# Patient Record
Sex: FEMALE | Race: WHITE | NOT HISPANIC OR LATINO | Employment: OTHER | URBAN - NONMETROPOLITAN AREA
[De-identification: names, ages, dates, MRNs, and addresses within clinical notes are randomized per-mention and may not be internally consistent; named-entity substitution may affect disease eponyms.]

---

## 2017-01-11 PROBLEM — H93.19 TINNITUS: Status: ACTIVE | Noted: 2017-01-11

## 2017-01-11 PROBLEM — E55.9 VITAMIN D DEFICIENCY: Status: ACTIVE | Noted: 2017-01-11

## 2017-01-11 PROBLEM — K52.839 MICROSCOPIC COLITIS: Status: ACTIVE | Noted: 2017-01-11

## 2017-01-11 PROBLEM — G47.00 INSOMNIA: Status: ACTIVE | Noted: 2017-01-11

## 2017-01-11 PROBLEM — H40.9 GLAUCOMA: Status: ACTIVE | Noted: 2017-01-11

## 2017-01-11 PROBLEM — E78.5 HYPERLIPIDEMIA: Status: ACTIVE | Noted: 2017-01-11

## 2017-01-11 PROBLEM — I83.90 ASYMPTOMATIC VARICOSE VEINS: Status: ACTIVE | Noted: 2017-01-11

## 2017-01-11 PROBLEM — M81.0 OSTEOPOROSIS: Status: ACTIVE | Noted: 2017-01-11

## 2017-04-12 ENCOUNTER — OFFICE VISIT (OUTPATIENT)
Dept: INTERNAL MEDICINE | Facility: CLINIC | Age: 72
End: 2017-04-12

## 2017-04-12 ENCOUNTER — ANTICOAGULATION VISIT (OUTPATIENT)
Dept: INTERNAL MEDICINE | Facility: CLINIC | Age: 72
End: 2017-04-12

## 2017-04-12 VITALS
BODY MASS INDEX: 19.15 KG/M2 | TEMPERATURE: 97.8 F | RESPIRATION RATE: 14 BRPM | DIASTOLIC BLOOD PRESSURE: 64 MMHG | SYSTOLIC BLOOD PRESSURE: 100 MMHG | OXYGEN SATURATION: 98 % | HEIGHT: 67 IN | WEIGHT: 122 LBS | HEART RATE: 64 BPM

## 2017-04-12 DIAGNOSIS — Z23 NEED FOR PNEUMOCOCCAL VACCINE: ICD-10-CM

## 2017-04-12 DIAGNOSIS — E78.5 HYPERLIPIDEMIA, UNSPECIFIED HYPERLIPIDEMIA TYPE: Primary | ICD-10-CM

## 2017-04-12 DIAGNOSIS — G47.00 INSOMNIA, UNSPECIFIED TYPE: ICD-10-CM

## 2017-04-12 DIAGNOSIS — E55.9 VITAMIN D DEFICIENCY: ICD-10-CM

## 2017-04-12 DIAGNOSIS — Z12.11 SCREENING FOR COLON CANCER: ICD-10-CM

## 2017-04-12 DIAGNOSIS — Z00.00 HEALTH CARE MAINTENANCE: ICD-10-CM

## 2017-04-12 DIAGNOSIS — M81.0 OSTEOPOROSIS: ICD-10-CM

## 2017-04-12 DIAGNOSIS — I83.90 ASYMPTOMATIC VARICOSE VEINS, UNSPECIFIED LATERALITY: ICD-10-CM

## 2017-04-12 LAB
25(OH)D3+25(OH)D2 SERPL-MCNC: 53.7 NG/ML
ALBUMIN SERPL-MCNC: 4.1 G/DL (ref 3.5–5)
ALBUMIN/GLOB SERPL: 1.6 G/DL (ref 1–2)
ALP SERPL-CCNC: 52 U/L (ref 38–126)
ALT SERPL-CCNC: 37 U/L (ref 13–69)
APPEARANCE UR: CLEAR
AST SERPL-CCNC: 33 U/L (ref 15–46)
BASOPHILS # BLD AUTO: 0.07 10*3/MM3 (ref 0–0.2)
BASOPHILS NFR BLD AUTO: 1.1 % (ref 0–2.5)
BILIRUB SERPL-MCNC: 0.5 MG/DL (ref 0.2–1.3)
BILIRUB UR QL STRIP: NEGATIVE
BUN SERPL-MCNC: 16 MG/DL (ref 7–20)
BUN/CREAT SERPL: 20 (ref 7.1–23.5)
CALCIUM SERPL-MCNC: 10 MG/DL (ref 8.4–10.2)
CHLORIDE SERPL-SCNC: 104 MMOL/L (ref 98–107)
CHOLEST SERPL-MCNC: 182 MG/DL (ref 0–199)
CO2 SERPL-SCNC: 30 MMOL/L (ref 26–30)
COLOR UR: YELLOW
CREAT SERPL-MCNC: 0.8 MG/DL (ref 0.6–1.3)
EOSINOPHIL # BLD AUTO: 0.15 10*3/MM3 (ref 0–0.7)
EOSINOPHIL NFR BLD AUTO: 2.4 % (ref 0–7)
ERYTHROCYTE [DISTWIDTH] IN BLOOD BY AUTOMATED COUNT: 12.9 % (ref 11.5–14.5)
GLOBULIN SER CALC-MCNC: 2.6 GM/DL
GLUCOSE SERPL-MCNC: 92 MG/DL (ref 74–98)
GLUCOSE UR QL: NEGATIVE
HCT VFR BLD AUTO: 45.1 % (ref 37–47)
HDLC SERPL-MCNC: 94 MG/DL (ref 40–60)
HGB BLD-MCNC: 14.1 G/DL (ref 12–16)
HGB UR QL STRIP: NEGATIVE
IMM GRANULOCYTES # BLD: 0.02 10*3/MM3 (ref 0–0.06)
IMM GRANULOCYTES NFR BLD: 0.3 % (ref 0–0.6)
KETONES UR QL STRIP: NEGATIVE
LDLC SERPL CALC-MCNC: 77 MG/DL (ref 0–99)
LEUKOCYTE ESTERASE UR QL STRIP: NEGATIVE
LYMPHOCYTES # BLD AUTO: 1.36 10*3/MM3 (ref 0.6–3.4)
LYMPHOCYTES NFR BLD AUTO: 22.2 % (ref 10–50)
MCH RBC QN AUTO: 30.2 PG (ref 27–31)
MCHC RBC AUTO-ENTMCNC: 31.3 G/DL (ref 30–37)
MCV RBC AUTO: 96.6 FL (ref 81–99)
MONOCYTES # BLD AUTO: 0.44 10*3/MM3 (ref 0–0.9)
MONOCYTES NFR BLD AUTO: 7.2 % (ref 0–12)
NEUTROPHILS # BLD AUTO: 4.09 10*3/MM3 (ref 2–6.9)
NEUTROPHILS NFR BLD AUTO: 66.8 % (ref 37–80)
NITRITE UR QL STRIP: NEGATIVE
NRBC BLD AUTO-RTO: 0 /100 WBC (ref 0–0)
PH UR STRIP: 7 [PH] (ref 5–8)
PLATELET # BLD AUTO: 241 10*3/MM3 (ref 130–400)
POTASSIUM SERPL-SCNC: 4.3 MMOL/L (ref 3.5–5.1)
PROT SERPL-MCNC: 6.7 G/DL (ref 6.3–8.2)
PROT UR QL STRIP: NEGATIVE
RBC # BLD AUTO: 4.67 10*6/MM3 (ref 4.2–5.4)
SODIUM SERPL-SCNC: 141 MMOL/L (ref 137–145)
SP GR UR: 1.01 (ref 1–1.03)
TRIGL SERPL-MCNC: 55 MG/DL
TSH SERPL DL<=0.005 MIU/L-ACNC: 1.42 MIU/ML (ref 0.47–4.68)
UROBILINOGEN UR STRIP-MCNC: NORMAL MG/DL
VLDLC SERPL CALC-MCNC: 11 MG/DL
WBC # BLD AUTO: 6.13 10*3/MM3 (ref 4.8–10.8)

## 2017-04-12 PROCEDURE — G0009 ADMIN PNEUMOCOCCAL VACCINE: HCPCS | Performed by: INTERNAL MEDICINE

## 2017-04-12 PROCEDURE — 90670 PCV13 VACCINE IM: CPT | Performed by: INTERNAL MEDICINE

## 2017-04-12 PROCEDURE — G0439 PPPS, SUBSEQ VISIT: HCPCS | Performed by: INTERNAL MEDICINE

## 2017-04-12 RX ORDER — TAFLUPROST 0 MG/.3ML
SOLUTION/ DROPS OPHTHALMIC
Refills: 3 | COMMUNITY
Start: 2017-03-02 | End: 2018-04-16

## 2017-04-12 RX ORDER — IBUPROFEN 200 MG
600 CAPSULE ORAL DAILY
COMMUNITY
Start: 2014-10-24

## 2017-04-12 RX ORDER — UREA 10 %
800 LOTION (ML) TOPICAL DAILY
COMMUNITY
Start: 2014-10-24

## 2017-04-12 RX ORDER — MULTIVIT WITH MINERALS/LUTEIN
TABLET ORAL DAILY
COMMUNITY
Start: 2014-10-24 | End: 2018-10-11 | Stop reason: ALTCHOICE

## 2017-04-12 RX ORDER — MAGNESIUM CARB/ALUMINUM HYDROX 105-160MG
TABLET,CHEWABLE ORAL ONCE
COMMUNITY
End: 2017-12-13 | Stop reason: HOSPADM

## 2017-04-12 RX ORDER — BRINZOLAMIDE/BRIMONIDINE TARTRATE 10; 2 MG/ML; MG/ML
SUSPENSION/ DROPS OPHTHALMIC
Refills: 3 | COMMUNITY
Start: 2017-02-03

## 2017-04-12 NOTE — PROGRESS NOTES
QUICK REFERENCE INFORMATION:  The ABCs of the Annual Wellness Visit    Subsequent Medicare Wellness Visit    HEALTH RISK ASSESSMENT    1945    Recent Hospitalizations:  No recent hospitalization(s)..        Current Medical Providers:  Patient Care Team:  Marty Cheung MD as PCP - General  Marty Cheung MD as PCP - Family Medicine  [place] OD as PCP - Claims Attributed - PLEASE DO NOT REMOVE  Marty Cheung MD as MSSP ACO Attributed - PLEASE DO NOT REMOVE        Smoking Status:  History   Smoking Status   • Former Smoker   Smokeless Tobacco   • Not on file     Comment: quit over 2 years ago       Alcohol Consumption:  History   Alcohol Use No       Depression Screen:   PHQ-9 Depression Screening 4/12/2017   Little interest or pleasure in doing things 0   Feeling down, depressed, or hopeless 0   Trouble falling or staying asleep, or sleeping too much 0   Feeling tired or having little energy 0   Poor appetite or overeating 0   Feeling bad about yourself - or that you are a failure or have let yourself or your family down 0   Trouble concentrating on things, such as reading the newspaper or watching television 0   Moving or speaking so slowly that other people could have noticed. Or the opposite - being so fidgety or restless that you have been moving around a lot more than usual 0   Thoughts that you would be better off dead, or of hurting yourself in some way 0   PHQ-9 Total Score 0   If you checked off any problems, how difficult have these problems made it for you to do your work, take care of things at home, or get along with other people? Not difficult at all       Health Habits and Functional and Cognitive Screening:  Functional & Cognitive Status 4/12/2017   Do you have difficulty preparing food and eating? No   Do you have difficulty bathing yourself? No   Do you have difficulty getting dressed? No   Do you have difficulty using the toilet? No   Do you have difficulty moving around from place  to place? No   In the past year have you fallen or experienced a near fall? No   Do you need help using the phone?  No   Are you deaf or do you have serious difficulty hearing?  No   Do you need help with transportation? No   Do you need help shopping? No   Do you need help preparing meals?  No   Do you need help with housework?  No   Do you need help with laundry? No   Do you need help taking your medications? No   Do you need help managing money? No   Do you have difficulty concentrating, remembering or making decisions? No       Health Habits  Current Diet: Well Balanced Diet  Dental Exam: Up to date  Eye Exam: Up to date  Exercise (times per week): 0 times per week  Current Exercise Activities Include: None      Does the patient have evidence of cognitive impairment? No    Aspirin use counseling: Taking ASA appropriately as indicated      Recent Lab Results:  CMP:  Lab Results   Component Value Date    GLU 95 11/07/2014    BUN 8 (L) 04/13/2016    CREATININE 0.7 04/13/2016    BCR 17.7 11/07/2014     04/13/2016    K 4.3 04/13/2016    CO2 31 04/13/2016    CALCIUM 9.4 04/13/2016    ALBUMIN 4.3 04/13/2016    LABIL2 1.4 11/07/2014    BILITOT 0.5 04/13/2016    ALKPHOS 70 04/13/2016    AST 22 04/13/2016    ALT 20 04/13/2016     Lipid Panel:  Lab Results   Component Value Date    CHLPL 197 04/13/2016    TRIG 69 04/13/2016    HDL 94 (H) 04/13/2016     HbA1c:       Visual Acuity:  No exam data present    Age-appropriate Screening Schedule:  Refer to the list below for future screening recommendations based on patient's age, sex and/or medical conditions. Orders for these recommended tests are listed in the plan section. The patient has been provided with a written plan.    Health Maintenance   Topic Date Due   • TDAP/TD VACCINES (1 - Tdap) 07/30/1962   • PNEUMOCOCCAL VACCINES (65+ LOW/MEDIUM RISK) (1 of 2 - PCV13) 07/30/2008   • ZOSTER VACCINE  05/05/2016   • INFLUENZA VACCINE  08/01/2016   • DXA SCAN  04/12/2017   •  LIPID PANEL  04/13/2017   • MAMMOGRAM  04/15/2018   • COLONOSCOPY  07/01/2024        Subjective   History of Present Illness    Odalys Miles is a 71 y.o. female who presents for an Subsequent Wellness Visit.    The following portions of the patient's history were reviewed and updated as appropriate: allergies, current medications, past family history, past medical history, past social history, past surgical history and problem list.    Outpatient Medications Prior to Visit   Medication Sig Dispense Refill   • calcitonin, salmon, (MIACALCIN) 200 UNIT/ACT nasal spray 1 spray into each nostril Daily. INSERT 1 SQUIRT IN ONE NOSTRIL DAILY ALTERNATING EACH NOSTRIL 3 each 3     No facility-administered medications prior to visit.        Patient Active Problem List   Diagnosis   • Glaucoma   • Hyperlipidemia   • Insomnia   • Microscopic colitis   • Tinnitus   • Asymptomatic varicose veins   • Vitamin D deficiency   • Osteoporosis       Advance Care Planning:  has an advance directive - a copy HAS NOT been provided    Identification of Risk Factors:  Risk factors include: inactivity.    Review of Systems   Constitutional: Negative.    HENT: Negative.    Eyes: Negative.    Respiratory: Negative.    Cardiovascular: Negative.    Gastrointestinal: Negative.    Endocrine: Negative.    Genitourinary: Negative.    Musculoskeletal: Negative.    Skin: Negative.    Allergic/Immunologic: Negative.    Neurological: Negative.    Hematological: Negative.    Psychiatric/Behavioral: Negative.        Compared to one year ago, the patient feels her physical health is the same.  Compared to one year ago, the patient feels her mental health is the same.    Objective     Physical Exam   Constitutional: She is oriented to person, place, and time. She appears well-developed and well-nourished.   HENT:   Head: Normocephalic and atraumatic.   Right Ear: External ear normal.   Left Ear: External ear normal.   Nose: Nose normal.   Mouth/Throat:  "Oropharynx is clear and moist.   Eyes: Conjunctivae and EOM are normal. Pupils are equal, round, and reactive to light.   Neck: Normal range of motion. Neck supple.   Cardiovascular: Normal rate, regular rhythm, normal heart sounds and intact distal pulses.    Pulmonary/Chest: Effort normal and breath sounds normal.   Abdominal: Soft. Bowel sounds are normal.   Genitourinary: Vagina normal and uterus normal.   Musculoskeletal: Normal range of motion.   Neurological: She is alert and oriented to person, place, and time. She has normal reflexes.   Skin: Skin is warm and dry.   Psychiatric: She has a normal mood and affect. Her behavior is normal. Judgment and thought content normal.       Vitals:    04/12/17 0927   BP: 100/64   Pulse: 64   Resp: 14   Temp: 97.8 °F (36.6 °C)   SpO2: 98%   Weight: 122 lb (55.3 kg)   Height: 67\" (170.2 cm)       Body mass index is 19.11 kg/(m^2).  Discussed the patient's BMI with her. The BMI is in the acceptable range.    Assessment/Plan   Patient Self-Management and Personalized Health Advice  The patient has been provided with information about: exercise and preventive services including:   · Advance directive.    Visit Diagnoses:  No diagnosis found.    No orders of the defined types were placed in this encounter.      Outpatient Encounter Prescriptions as of 4/12/2017   Medication Sig Dispense Refill   • ascorbic acid (VITAMIN C) 1000 MG tablet Take  by mouth Daily.     • aspirin 81 MG tablet Take  by mouth Daily.     • BUDESONIDE ER PO Take  by mouth.     • calcitonin, salmon, (MIACALCIN) 200 UNIT/ACT nasal spray 1 spray into each nostril Daily. INSERT 1 SQUIRT IN ONE NOSTRIL DAILY ALTERNATING EACH NOSTRIL 3 each 3   • calcium (OS-KAYLYN) 600 MG tablet Take  by mouth Daily.     • Cholecalciferol (VITAMIN D3) 1000 UNITS capsule Take 1 capsule by mouth Daily.     • folic acid (FOLVITE) 800 MCG tablet Take  by mouth Daily.     • magnesium citrate 1.745 GM/30ML solution solution Take  by " mouth 1 (One) Time.     • Probiotic Product (PROBIOTIC ADVANCED PO) Take  by mouth.     • SIMBRINZA 1-0.2 % suspension INSTILL 1 DROP INTO BOTH EYES TWICE A DAY  3   • ZIOPTAN 0.0015 % solution ophthalmic solution INSTILL 1 DROP IN BOTH EYES AT BEDTIME  3     No facility-administered encounter medications on file as of 4/12/2017.        Reviewed use of high risk medication in the elderly: no  Reviewed for potential of harmful drug interactions in the elderly: no    Follow Up:  No Follow-up on file.     An After Visit Summary and PPPS with all of these plans were given to the patient.       microscopic colitis, follow up with GI Dr. Travis for treatment plan.  osteoporosis dexa 4/2015  Tinnitus seen by ENT  insomnia melatonin continue, decline medicine  Hyperlipidemia contine tx  Vitamin D deficiency continue supplement  tdap flu Pneumovax zostavax done. prevnar today  Mammogram schedule patient declined, agree to do today  colonoscopy 7/2014   do lab today  annual PE

## 2017-05-10 LAB
DEVELOPER EXPIRATION DATE: NORMAL
DEVELOPER LOT NUMBER: NORMAL
EXPIRATION DATE: NORMAL
FECAL OCCULT BLOOD SCREEN, POC: NEGATIVE
Lab: NORMAL
NEGATIVE CONTROL: NEGATIVE
POSITIVE CONTROL: POSITIVE

## 2017-05-10 PROCEDURE — 82270 OCCULT BLOOD FECES: CPT | Performed by: INTERNAL MEDICINE

## 2017-05-25 ENCOUNTER — TRANSCRIBE ORDERS (OUTPATIENT)
Dept: INTERNAL MEDICINE | Facility: CLINIC | Age: 72
End: 2017-05-25

## 2017-05-25 DIAGNOSIS — Z12.31 VISIT FOR SCREENING MAMMOGRAM: Primary | ICD-10-CM

## 2017-06-01 ENCOUNTER — HOSPITAL ENCOUNTER (OUTPATIENT)
Dept: MAMMOGRAPHY | Facility: HOSPITAL | Age: 72
Discharge: HOME | End: 2017-06-01
Attending: INTERNAL MEDICINE | Admitting: INTERNAL MEDICINE

## 2017-06-01 DIAGNOSIS — Z12.31 VISIT FOR SCREENING MAMMOGRAM: ICD-10-CM

## 2017-06-01 PROCEDURE — G0202 SCR MAMMO BI INCL CAD: HCPCS | Performed by: RADIOLOGY

## 2017-06-01 PROCEDURE — 77063 BREAST TOMOSYNTHESIS BI: CPT | Performed by: RADIOLOGY

## 2017-06-01 PROCEDURE — G0202 SCR MAMMO BI INCL CAD: HCPCS

## 2017-06-01 PROCEDURE — 77063 BREAST TOMOSYNTHESIS BI: CPT

## 2017-07-24 ENCOUNTER — OUTSIDE FACILITY SERVICE (OUTPATIENT)
Dept: GASTROENTEROLOGY | Facility: CLINIC | Age: 72
End: 2017-07-24

## 2017-07-24 ENCOUNTER — LAB REQUISITION (OUTPATIENT)
Dept: LAB | Facility: HOSPITAL | Age: 72
End: 2017-07-24

## 2017-07-24 DIAGNOSIS — D12.3 BENIGN NEOPLASM OF TRANSVERSE COLON: ICD-10-CM

## 2017-07-24 DIAGNOSIS — R19.7 DIARRHEA: ICD-10-CM

## 2017-07-24 PROCEDURE — 45380 COLONOSCOPY AND BIOPSY: CPT | Performed by: INTERNAL MEDICINE

## 2017-07-24 PROCEDURE — 88305 TISSUE EXAM BY PATHOLOGIST: CPT | Performed by: INTERNAL MEDICINE

## 2017-07-25 LAB
CYTO UR: NORMAL
LAB AP CASE REPORT: NORMAL
LAB AP CLINICAL INFORMATION: NORMAL
Lab: NORMAL
PATH REPORT.FINAL DX SPEC: NORMAL
PATH REPORT.GROSS SPEC: NORMAL

## 2017-07-28 ENCOUNTER — TELEPHONE (OUTPATIENT)
Dept: GASTROENTEROLOGY | Facility: CLINIC | Age: 72
End: 2017-07-28

## 2017-07-28 NOTE — TELEPHONE ENCOUNTER
Talked to patient today regarding her Biopsy results letter from Dr Travis. Patient stated she is already down to One capsule a day with the Entocort. I advised patient to continue Entocort one capsule a day up to 3 months. After 3 months of therapy is finished to give us a call and I will see what Dr Travis wants the next step of  therapy to be. Patient voiced understanding.

## 2017-09-07 ENCOUNTER — OFFICE VISIT (OUTPATIENT)
Dept: INTERNAL MEDICINE | Facility: CLINIC | Age: 72
End: 2017-09-07

## 2017-09-07 VITALS
OXYGEN SATURATION: 95 % | HEIGHT: 67 IN | RESPIRATION RATE: 14 BRPM | DIASTOLIC BLOOD PRESSURE: 60 MMHG | SYSTOLIC BLOOD PRESSURE: 100 MMHG | HEART RATE: 60 BPM | BODY MASS INDEX: 18.99 KG/M2 | WEIGHT: 121 LBS | TEMPERATURE: 98.1 F

## 2017-09-07 DIAGNOSIS — M54.32 SCIATICA OF LEFT SIDE: Primary | ICD-10-CM

## 2017-09-07 PROCEDURE — 99213 OFFICE O/P EST LOW 20 MIN: CPT | Performed by: INTERNAL MEDICINE

## 2017-09-07 RX ORDER — TRAMADOL HYDROCHLORIDE 50 MG/1
50 TABLET ORAL EVERY 6 HOURS PRN
Qty: 28 TABLET | Refills: 1 | Status: SHIPPED | OUTPATIENT
Start: 2017-09-07 | End: 2017-12-13 | Stop reason: HOSPADM

## 2017-09-07 RX ORDER — CYCLOBENZAPRINE HCL 10 MG
10 TABLET ORAL 3 TIMES DAILY PRN
Qty: 42 TABLET | Refills: 0 | Status: SHIPPED | OUTPATIENT
Start: 2017-09-07 | End: 2017-12-13 | Stop reason: HOSPADM

## 2017-09-07 RX ORDER — DICLOFENAC SODIUM 75 MG/1
TABLET, DELAYED RELEASE ORAL
Qty: 30 TABLET | Refills: 1 | Status: SHIPPED | OUTPATIENT
Start: 2017-09-07 | End: 2017-09-07

## 2017-09-07 NOTE — PROGRESS NOTES
Subjective   Odalys Miles is a 72 y.o. female.     Chief Complaint   Patient presents with   • Back Pain     lower back pain - left hip pain - pain runs down the left leg       Back Pain   This is a new problem. The current episode started in the past 7 days. The problem occurs intermittently. The problem is unchanged. The pain is present in the gluteal. The quality of the pain is described as aching. The pain radiates to the left thigh. The pain is moderate. The pain is worse during the day. The symptoms are aggravated by bending and lying down. Stiffness is present all day. Pertinent negatives include no numbness. She has tried analgesics for the symptoms. The treatment provided no relief.          Current Outpatient Prescriptions:   •  ascorbic acid (VITAMIN C) 1000 MG tablet, Take  by mouth Daily., Disp: , Rfl:   •  aspirin 81 MG tablet, Take  by mouth Daily., Disp: , Rfl:   •  calcitonin, salmon, (MIACALCIN) 200 UNIT/ACT nasal spray, 1 spray into each nostril Daily. INSERT 1 SQUIRT IN ONE NOSTRIL DAILY ALTERNATING EACH NOSTRIL, Disp: 3 each, Rfl: 3  •  calcium (OS-KAYLYN) 600 MG tablet, Take  by mouth Daily., Disp: , Rfl:   •  Cholecalciferol (VITAMIN D3) 1000 UNITS capsule, Take 1 capsule by mouth Daily., Disp: , Rfl:   •  folic acid (FOLVITE) 800 MCG tablet, Take  by mouth Daily., Disp: , Rfl:   •  magnesium citrate 1.745 GM/30ML solution solution, Take  by mouth 1 (One) Time., Disp: , Rfl:   •  Probiotic Product (PROBIOTIC ADVANCED PO), Take  by mouth., Disp: , Rfl:   •  SIMBRINZA 1-0.2 % suspension, INSTILL 1 DROP INTO BOTH EYES TWICE A DAY, Disp: , Rfl: 3  •  ZIOPTAN 0.0015 % solution ophthalmic solution, INSTILL 1 DROP IN BOTH EYES AT BEDTIME, Disp: , Rfl: 3  •  cyclobenzaprine (FLEXERIL) 10 MG tablet, Take 1 tablet by mouth 3 (Three) Times a Day As Needed for Muscle Spasms., Disp: 42 tablet, Rfl: 0  •  traMADol (ULTRAM) 50 MG tablet, Take 1 tablet by mouth Every 6 (Six) Hours As Needed for Moderate  Pain ., Disp: 28 tablet, Rfl: 1    The following portions of the patient's history were reviewed and updated as appropriate: allergies, current medications, past family history, past medical history, past social history, past surgical history and problem list.    Review of Systems   Constitutional: Negative.    Respiratory: Negative.    Cardiovascular: Negative.    Gastrointestinal: Negative.    Musculoskeletal: Positive for back pain.   Skin: Negative.    Neurological: Negative for numbness.   Psychiatric/Behavioral: Negative.        Objective   Physical Exam   Constitutional: She is oriented to person, place, and time. She appears well-developed and well-nourished.   Neck: Neck supple.   Cardiovascular: Normal rate, regular rhythm and normal heart sounds.    Pulmonary/Chest: Effort normal and breath sounds normal.   Abdominal: Soft. Bowel sounds are normal.   Musculoskeletal: She exhibits tenderness.   Neurological: She is alert and oriented to person, place, and time.   Psychiatric: She has a normal mood and affect. Her behavior is normal.       All tests have been reviewed.    Assessment/Plan   Diagnoses and all orders for this visit:    Sciatica of left side  -     Ambulatory Referral to Physical Therapy    Other orders  -     cyclobenzaprine (FLEXERIL) 10 MG tablet; Take 1 tablet by mouth 3 (Three) Times a Day As Needed for Muscle Spasms.  -     Tylenol and tramadoa           3 week with others

## 2017-09-25 ENCOUNTER — TELEPHONE (OUTPATIENT)
Dept: GASTROENTEROLOGY | Facility: CLINIC | Age: 72
End: 2017-09-25

## 2017-09-25 NOTE — TELEPHONE ENCOUNTER
Informed patient of Dr Travis recommendation below. Patient voiced understanding. She request 90 supply refill. She also scheduled an appointment in 3 months with Dr Travis.

## 2017-09-25 NOTE — TELEPHONE ENCOUNTER
Patient called this morning. She stated she had been taking the Entocort 1 capsule daily; which she was doing well on. For the past two months; she has been taking the Entocort- 1 capsule every other day. Now, she having Urgency Liquid stools. Dr Travis please advise. Thanks

## 2017-09-25 NOTE — TELEPHONE ENCOUNTER
Please have her resume taking one (3mg) Entercort in AM daily for at least 3 months.  Then, she should call to see if office visit is needed.Pino Travis MD

## 2017-09-26 DIAGNOSIS — K52.839 MICROSCOPIC COLITIS, UNSPECIFIED MICROSCOPIC COLITIS TYPE: Primary | ICD-10-CM

## 2017-09-26 RX ORDER — BUDESONIDE 3 MG/1
3 CAPSULE, COATED PELLETS ORAL DAILY
Qty: 90 CAPSULE | Refills: 0 | Status: SHIPPED | OUTPATIENT
Start: 2017-09-26 | End: 2017-12-13 | Stop reason: SDUPTHER

## 2017-09-28 ENCOUNTER — OFFICE VISIT (OUTPATIENT)
Dept: INTERNAL MEDICINE | Facility: CLINIC | Age: 72
End: 2017-09-28

## 2017-09-28 VITALS
HEIGHT: 67 IN | OXYGEN SATURATION: 99 % | BODY MASS INDEX: 19.15 KG/M2 | WEIGHT: 122 LBS | DIASTOLIC BLOOD PRESSURE: 60 MMHG | SYSTOLIC BLOOD PRESSURE: 100 MMHG | RESPIRATION RATE: 14 BRPM | HEART RATE: 64 BPM | TEMPERATURE: 98.7 F

## 2017-09-28 DIAGNOSIS — E78.5 HYPERLIPIDEMIA, UNSPECIFIED HYPERLIPIDEMIA TYPE: Primary | ICD-10-CM

## 2017-09-28 DIAGNOSIS — K52.839 MICROSCOPIC COLITIS, UNSPECIFIED MICROSCOPIC COLITIS TYPE: ICD-10-CM

## 2017-09-28 DIAGNOSIS — M81.0 OSTEOPOROSIS: ICD-10-CM

## 2017-09-28 DIAGNOSIS — G47.00 INSOMNIA, UNSPECIFIED TYPE: ICD-10-CM

## 2017-09-28 DIAGNOSIS — M54.32 SCIATICA OF LEFT SIDE: ICD-10-CM

## 2017-09-28 DIAGNOSIS — I83.90 ASYMPTOMATIC VARICOSE VEINS, UNSPECIFIED LATERALITY: ICD-10-CM

## 2017-09-28 DIAGNOSIS — E55.9 VITAMIN D DEFICIENCY: ICD-10-CM

## 2017-09-28 PROCEDURE — 99214 OFFICE O/P EST MOD 30 MIN: CPT | Performed by: INTERNAL MEDICINE

## 2017-09-28 NOTE — PROGRESS NOTES
Subjective   Odalys Miles is a 72 y.o. female.     Chief Complaint   Patient presents with   • Follow-up       History of Present Illness   Patient here for follow-up of.  Sciatic nerve pain on the left side patient is awaiting for physical therapy Aleve helps.  Certain position make it worse achy in nature patient has this pain for more than a month.  Colitis on medication stable now.  Osteoporosis on medication stable.  Hyperlipidemia stable on good diet.  Vitamin D deficiency on supplement to stable.  Mammogram stable now.    Current Outpatient Prescriptions:   •  ascorbic acid (VITAMIN C) 1000 MG tablet, Take  by mouth Daily., Disp: , Rfl:   •  aspirin 81 MG tablet, Take  by mouth Daily., Disp: , Rfl:   •  Budesonide (ENTOCORT EC) 3 MG 24 hr capsule, Take 1 capsule by mouth Daily for 90 days. (Patient taking differently: Take 6 mg by mouth Daily.), Disp: 90 capsule, Rfl: 0  •  calcitonin, salmon, (MIACALCIN) 200 UNIT/ACT nasal spray, 1 spray into each nostril Daily. INSERT 1 SQUIRT IN ONE NOSTRIL DAILY ALTERNATING EACH NOSTRIL, Disp: 3 each, Rfl: 3  •  calcium (OS-KAYLYN) 600 MG tablet, Take  by mouth Daily., Disp: , Rfl:   •  Cholecalciferol (VITAMIN D3) 1000 UNITS capsule, Take 1 capsule by mouth Daily., Disp: , Rfl:   •  cyclobenzaprine (FLEXERIL) 10 MG tablet, Take 1 tablet by mouth 3 (Three) Times a Day As Needed for Muscle Spasms., Disp: 42 tablet, Rfl: 0  •  folic acid (FOLVITE) 800 MCG tablet, Take  by mouth Daily., Disp: , Rfl:   •  magnesium citrate 1.745 GM/30ML solution solution, Take  by mouth 1 (One) Time., Disp: , Rfl:   •  Probiotic Product (PROBIOTIC ADVANCED PO), Take  by mouth., Disp: , Rfl:   •  SIMBRINZA 1-0.2 % suspension, INSTILL 1 DROP INTO BOTH EYES TWICE A DAY, Disp: , Rfl: 3  •  traMADol (ULTRAM) 50 MG tablet, Take 1 tablet by mouth Every 6 (Six) Hours As Needed for Moderate Pain ., Disp: 28 tablet, Rfl: 1  •  ZIOPTAN 0.0015 % solution ophthalmic solution, INSTILL 1 DROP IN BOTH  EYES AT BEDTIME, Disp: , Rfl: 3    The following portions of the patient's history were reviewed and updated as appropriate: allergies, current medications, past family history, past medical history, past social history, past surgical history and problem list.    Review of Systems   Constitutional: Negative.    Respiratory: Negative.    Cardiovascular: Negative.    Gastrointestinal: Negative.    Musculoskeletal: Positive for arthralgias and back pain.   Skin: Negative.    Neurological: Negative.    Psychiatric/Behavioral: Negative.        Objective   Physical Exam   Constitutional: She is oriented to person, place, and time. She appears well-nourished.   Neck: Neck supple.   Cardiovascular: Normal rate, regular rhythm and normal heart sounds.    Pulmonary/Chest: Effort normal and breath sounds normal.   Abdominal: Bowel sounds are normal.   Musculoskeletal: She exhibits tenderness.   Neurological: She is alert and oriented to person, place, and time.   Skin: Skin is warm.   Psychiatric: She has a normal mood and affect.       All tests have been reviewed.    Assessment/Plan   There are no diagnoses linked to this encounter.          Sciatica of left side pending  to Physical Therapy and aleve  microscopic colitis, follow up with GI Dr. Travis on med  osteoporosis dexa 4/2015  Tinnitus seen by ENT  insomnia melatonin continue, decline medicine  Hyperlipidemia contine tx  Vitamin D deficiency continue supplement  tdap  Pneumovax zostavax done. prevnar done  Mammogram normal  colonoscopy 7/2014     annual PE

## 2017-09-29 ENCOUNTER — TELEPHONE (OUTPATIENT)
Dept: GASTROENTEROLOGY | Facility: CLINIC | Age: 72
End: 2017-09-29

## 2017-09-29 NOTE — TELEPHONE ENCOUNTER
Returned patient's call. No answer; left voice message. Entocort 3 mg- one by mouth in the morning x 3 months per Dr Travis.

## 2017-10-03 ENCOUNTER — TREATMENT (OUTPATIENT)
Dept: PHYSICAL THERAPY | Facility: CLINIC | Age: 72
End: 2017-10-03

## 2017-10-03 DIAGNOSIS — M54.42 ACUTE LEFT-SIDED LOW BACK PAIN WITH LEFT-SIDED SCIATICA: Primary | ICD-10-CM

## 2017-10-03 DIAGNOSIS — M53.3 SI (SACROILIAC) PAIN: ICD-10-CM

## 2017-10-03 PROCEDURE — G8978 MOBILITY CURRENT STATUS: HCPCS | Performed by: PHYSICAL THERAPIST

## 2017-10-03 PROCEDURE — G8979 MOBILITY GOAL STATUS: HCPCS | Performed by: PHYSICAL THERAPIST

## 2017-10-03 PROCEDURE — 97110 THERAPEUTIC EXERCISES: CPT | Performed by: PHYSICAL THERAPIST

## 2017-10-03 PROCEDURE — 97161 PT EVAL LOW COMPLEX 20 MIN: CPT | Performed by: PHYSICAL THERAPIST

## 2017-10-03 NOTE — PROGRESS NOTES
Physical Therapy Initial Evaluation and Plan of Care      Subjective Evaluation    History of Present Illness  Mechanism of injury: Pt reports she had skin tear left ankle. Was trying to keep leg elevated and was sitting on couch with poor posture and left leg elevated above her heart. Feels this is what caused her back to start hurting. Began about 2 months ago.   Pain left side SI, buttock and left leg to the knee. Denies N/T in leg.   No diagnostic tests have been done.   History of left lower leg injury with vascular damage and poor circulation.   Osteoporosis        Patient Occupation: retired Pain  Current pain ratin  At worst pain rating: 10  Exacerbated by: sitting, raising leg to put on pants, end of day, some sleep disturbance.    Patient Goals  Patient goals for therapy: decreased pain             Objective     Postural Observations    Additional Postural Observation Details  Increased thoracic kyphosis, sway back    Tenderness     Additional Tenderness Details  Left PSIS, SI , piriformis    Neurological Testing     Additional Neurological Details  Pt has history of decreased sensation left LE from previous injury.   Does not report any numbness/tingling left LE at this time    Active Range of Motion     Additional Active Range of Motion Details  AROM Trunk:   Flexion 73 pain  Extension 19  Right sidebending 21 pain  Left sidebending 10  Right rotation 35  Left rotation 40    Strength/Myotome Testing     Additional Strength Details  Normal strength. Pain with left hip flexion    Tests     Lumbar     Left   Negative passive SLR.     Additional Tests Details  Distraction- decreased pain    General Comments     Spine Comments  As pt has wounds around left ankle/heel she has been wearing 2 heel cups in the left shoe only to elevate foot as to where there is not pressure from shoe placed on wounds.   Advised her to do this bilateral however vs just on the left side.         Assessment/Plan    Manual  Therapy:         mins  31447;  Therapeutic Exercise:    10     mins  53108;     Neuromuscular Saritha:        mins  98766;    Therapeutic Activity:          mins  51333;     Gait Training:           mins  20586;     Ultrasound:          mins  34974;    Electrical Stimulation:         mins  89895 ( );  Dry Needling          mins self-pay    Timed Treatment:   10   mins   Total Treatment:     55   mins    PT SIGNATURE: Lucio Pham PT, DPT   DATE TREATMENT INITIATED: 10/3/2017    Initial Certification  Certification Period: 1/1/2018  I certify that the therapy services are furnished while this patient is under my care.  The services outlined above are required by this patient, and will be reviewed every 90 days.     PHYSICIAN: Marty Cheung MD      DATE:     Please sign and return via fax to  .. Thank you, [facility].

## 2017-10-10 ENCOUNTER — TREATMENT (OUTPATIENT)
Dept: PHYSICAL THERAPY | Facility: CLINIC | Age: 72
End: 2017-10-10

## 2017-10-10 DIAGNOSIS — M54.42 ACUTE LEFT-SIDED LOW BACK PAIN WITH LEFT-SIDED SCIATICA: Primary | ICD-10-CM

## 2017-10-10 DIAGNOSIS — M53.3 SI (SACROILIAC) PAIN: ICD-10-CM

## 2017-10-10 PROCEDURE — 97110 THERAPEUTIC EXERCISES: CPT | Performed by: PHYSICAL THERAPIST

## 2017-10-10 PROCEDURE — 97140 MANUAL THERAPY 1/> REGIONS: CPT | Performed by: PHYSICAL THERAPIST

## 2017-10-10 PROCEDURE — 97530 THERAPEUTIC ACTIVITIES: CPT | Performed by: PHYSICAL THERAPIST

## 2017-10-10 NOTE — PROGRESS NOTES
Physical Therapy Daily Progress Note          Subjective  Pt admits she has not been doing exercises at home like she should. Has changed heel lifts in shoes to be equal height bilateral. Says she has been feeling much better overall. Has not had any pain in her leg. Now just localized to left hip. Some increased pain after sitting in car in traffic for 3 hours, but even then, did not have any symptoms in her leg.     Objective    See Exercise, Manual, and Modality Logs for complete treatment.   TTP left SI, piriformis      Assessment & Plan     Assessment  Impairments: abnormal or restricted ROM, activity intolerance, impaired physical strength, lacks appropriate home exercise program and pain with function  Assessment details: Pt presents to therapy with complaints of left sided low back pain that radiates into left buttock and left LE. Denies N/T. Decreased sitting tolerance. Pain with lifting leg to put on pants. Deficits with trunk ROM. Negative SLR. Pt was noted to be wearing 2 heel lifts on left shoe only to take pressure off of wound at ankle. Advised her against this and feel it could be a cause for her symptoms.   Prognosis: good  Prognosis details: STG 2 wks  1. Pt to be independent with HEP.  2. Pt to rate pain at worst less than or equal to 2/10 for improved sleep quality.   3. Pt to report at least 50% decrease in radicular pain for improved function.     LTG 4 wks  1. Pt to have improved trunk ROM to WNL for improved function with lower body dressing.  2. Pt to have improved LE strength to 5/5 for improved walking and standing tolerance.   3. Pt to rate overall improvement with therapy at least 60% for return to previous activity level.   Functional Limitations: sleeping, walking, uncomfortable because of pain, sitting and standing  Plan  Therapy options: will be seen for skilled physical therapy services  Planned modality interventions: cryotherapy, electrical stimulation/Russian stimulation,  thermotherapy (hydrocollator packs) and ultrasound  Planned therapy interventions: abdominal trunk stabilization, flexibility, home exercise program, manual therapy, soft tissue mobilization, spinal/joint mobilization, strengthening, stretching and therapeutic activities  Frequency: 2x week  Duration in weeks: 4  Treatment plan discussed with: patient     Pt doing well. Progressing. Symptoms have decreased. Radicular symptoms have improved. Did well with exercises and reports she will try to do some at home    Progress per Plan of Care           Manual Therapy:    10     mins  77060;  Therapeutic Exercise:    18     mins  65093;     Neuromuscular Saritha:         mins  61082;    Therapeutic Activity:     12     mins  23913;     Gait Training:            mins  83326;     Ultrasound:          mins  28443;    Electrical Stimulation:         mins  40874 ( );  Dry Needling          mins self-pay    Timed Treatment:   40   mins   Total Treatment:     55   mins    Lucio Pham PT, DPT  Physical Therapist

## 2017-10-12 ENCOUNTER — TREATMENT (OUTPATIENT)
Dept: PHYSICAL THERAPY | Facility: CLINIC | Age: 72
End: 2017-10-12

## 2017-10-12 DIAGNOSIS — M53.3 SI (SACROILIAC) PAIN: ICD-10-CM

## 2017-10-12 DIAGNOSIS — M54.42 ACUTE LEFT-SIDED LOW BACK PAIN WITH LEFT-SIDED SCIATICA: Primary | ICD-10-CM

## 2017-10-12 PROCEDURE — 97530 THERAPEUTIC ACTIVITIES: CPT | Performed by: PHYSICAL THERAPIST

## 2017-10-12 PROCEDURE — 97110 THERAPEUTIC EXERCISES: CPT | Performed by: PHYSICAL THERAPIST

## 2017-10-12 NOTE — PROGRESS NOTES
Physical Therapy Daily Progress Note    Visit # : 3        Subjective Pt reports her hip is hurting some today. Worse when she sits.     Objective    See Exercise, Manual, and Modality Logs for complete treatment.       Assessment/Plan Sig TTP at glut med today. STM done to this region along with SI and piriformis.  Did well with exercises. No increased pain.     Progress per Plan of Care           Manual Therapy:    10     mins  44596;  Therapeutic Exercise:    28     mins  41438;     Neuromuscular Saritha:         mins  14056;    Therapeutic Activity:     10     mins  60146;     Gait Training:            mins  07308;     Ultrasound:          mins  91526;    Electrical Stimulation:         mins  90676 ( );  Dry Needling          mins self-pay    Timed Treatment:   48   mins   Total Treatment:     63   mins    Lucio Pham, PT, DPT  Physical Therapist

## 2017-10-16 ENCOUNTER — TREATMENT (OUTPATIENT)
Dept: PHYSICAL THERAPY | Facility: CLINIC | Age: 72
End: 2017-10-16

## 2017-10-16 DIAGNOSIS — M54.42 ACUTE LEFT-SIDED LOW BACK PAIN WITH LEFT-SIDED SCIATICA: Primary | ICD-10-CM

## 2017-10-16 DIAGNOSIS — M53.3 SI (SACROILIAC) PAIN: ICD-10-CM

## 2017-10-16 PROCEDURE — 97140 MANUAL THERAPY 1/> REGIONS: CPT | Performed by: PHYSICAL THERAPIST

## 2017-10-16 PROCEDURE — 97110 THERAPEUTIC EXERCISES: CPT | Performed by: PHYSICAL THERAPIST

## 2017-10-16 NOTE — PROGRESS NOTES
Physical Therapy Daily Progress Note    Visit # : 4        Subjective Pt reports she is feeling a little better, but says it's still hurting. Admits she hasn't been doing exercises at home as much as she should. Overall pain has decreased. Has not been had to take any Advil and is sleeping better at night.     Objective    See Exercise, Manual, and Modality Logs for complete treatment.       Assessment/Plan Less TTP today with STM. Doing well with exercises. Will progress with strengthening as able.     Progress per Plan of Care           Manual Therapy:    8     mins  86953;  Therapeutic Exercise:    33     mins  76352;     Neuromuscular Saritha:         mins  05549;    Therapeutic Activity:          mins  07825;     Gait Training:            mins  05808;     Ultrasound:          mins  37056;    Electrical Stimulation:         mins  33697 ( );  Dry Needling          mins self-pay    Timed Treatment:   41   mins   Total Treatment:     57   mins    Lucio Pham, PT, DPT  Physical Therapist

## 2017-10-19 ENCOUNTER — TREATMENT (OUTPATIENT)
Dept: PHYSICAL THERAPY | Facility: CLINIC | Age: 72
End: 2017-10-19

## 2017-10-19 DIAGNOSIS — M54.42 ACUTE LEFT-SIDED LOW BACK PAIN WITH LEFT-SIDED SCIATICA: Primary | ICD-10-CM

## 2017-10-19 DIAGNOSIS — M53.3 SI (SACROILIAC) PAIN: ICD-10-CM

## 2017-10-19 PROCEDURE — 97140 MANUAL THERAPY 1/> REGIONS: CPT | Performed by: PHYSICAL THERAPIST

## 2017-10-19 PROCEDURE — 97110 THERAPEUTIC EXERCISES: CPT | Performed by: PHYSICAL THERAPIST

## 2017-10-19 NOTE — PROGRESS NOTES
Physical Therapy Daily Progress Note    Visit # : 5        Subjective Pt reports she is feeling better. It still hurts some but is getting better. Still hasn't been doing exercises as often as prescribed.     Objective    See Exercise, Manual, and Modality Logs for complete treatment.       Assessment/Plan Less TTP today. Pt progressing. Hopefully she will continue to improve as she has progressed well thus far.     Progress per Plan of Care           Manual Therapy:    10     mins  60980;  Therapeutic Exercise:    30     mins  89010;     Neuromuscular Saritha:         mins  50927;    Therapeutic Activity:          mins  04610;     Gait Training:            mins  43879;     Ultrasound:          mins  34481;    Electrical Stimulation:         mins  04551 ( );  Dry Needling          mins self-pay    Timed Treatment:   40   mins   Total Treatment:     58   mins    Lucio Pham, PT, DPT  Physical Therapist

## 2017-10-24 ENCOUNTER — TREATMENT (OUTPATIENT)
Dept: PHYSICAL THERAPY | Facility: CLINIC | Age: 72
End: 2017-10-24

## 2017-10-24 DIAGNOSIS — M54.42 ACUTE LEFT-SIDED LOW BACK PAIN WITH LEFT-SIDED SCIATICA: Primary | ICD-10-CM

## 2017-10-24 DIAGNOSIS — M53.3 SI (SACROILIAC) PAIN: ICD-10-CM

## 2017-10-24 PROCEDURE — 97140 MANUAL THERAPY 1/> REGIONS: CPT | Performed by: PHYSICAL THERAPIST

## 2017-10-24 PROCEDURE — 97110 THERAPEUTIC EXERCISES: CPT | Performed by: PHYSICAL THERAPIST

## 2017-10-24 NOTE — PROGRESS NOTES
Physical Therapy Daily Progress Note    Visit # : 6        Subjective Pt reports overall her hip is feeling better. Still some pain with sitting, but has not been radiating into her leg.     Objective    See Exercise, Manual, and Modality Logs for complete treatment.       Assessment/Plan Less TTP today. Pt progressing with therapy. Still requires additional therapy in order to meet goals outlined in the plan of care.     Progress per Plan of Care           Manual Therapy:    10     mins  00337;  Therapeutic Exercise:    34     mins  50102;     Neuromuscular Saritha:         mins  59249;    Therapeutic Activity:          mins  28635;     Gait Training:            mins  93635;     Ultrasound:          mins  01564;    Electrical Stimulation:         mins  21221 ( );  Dry Needling          mins self-pay    Timed Treatment:   44   mins   Total Treatment:     62   mins    Lucio Pham, PT, DPT  Physical Therapist

## 2017-10-26 ENCOUNTER — TREATMENT (OUTPATIENT)
Dept: PHYSICAL THERAPY | Facility: CLINIC | Age: 72
End: 2017-10-26

## 2017-10-26 DIAGNOSIS — M53.3 SI (SACROILIAC) PAIN: ICD-10-CM

## 2017-10-26 DIAGNOSIS — M54.42 ACUTE LEFT-SIDED LOW BACK PAIN WITH LEFT-SIDED SCIATICA: Primary | ICD-10-CM

## 2017-10-26 PROCEDURE — 97140 MANUAL THERAPY 1/> REGIONS: CPT | Performed by: PHYSICAL THERAPIST

## 2017-10-26 PROCEDURE — 97110 THERAPEUTIC EXERCISES: CPT | Performed by: PHYSICAL THERAPIST

## 2017-10-26 NOTE — PROGRESS NOTES
Physical Therapy Daily Progress Note    Visit # : 7        Subjective Pt reports her hip and back are feeling better. Still some pain with prolonged sitting. Asked her if she was able to sit longer before pain would start and she wasn't sure. Hasn't pain that close attention    Objective    See Exercise, Manual, and Modality Logs for complete treatment.       Assessment/Plan Pt progressing with strengthening. Able to combine bridge with add squeeze today. Less TTP in SI region. Advised to use pillow/cushion with prolonged sitting.     Progress per Plan of Care           Manual Therapy:    10     mins  21785;  Therapeutic Exercise:    29     mins  00143;     Neuromuscular Saritha:         mins  26208;    Therapeutic Activity:          mins  43053;     Gait Training:            mins  34352;     Ultrasound:          mins  22989;    Electrical Stimulation:         mins  31733 ( );  Dry Needling          mins self-pay    Timed Treatment:   39   mins   Total Treatment:     55   mins    Lucio Pham, PT, DPT  Physical Therapist

## 2017-10-31 ENCOUNTER — TREATMENT (OUTPATIENT)
Dept: PHYSICAL THERAPY | Facility: CLINIC | Age: 72
End: 2017-10-31

## 2017-10-31 DIAGNOSIS — M54.42 ACUTE LEFT-SIDED LOW BACK PAIN WITH LEFT-SIDED SCIATICA: Primary | ICD-10-CM

## 2017-10-31 DIAGNOSIS — M53.3 SI (SACROILIAC) PAIN: ICD-10-CM

## 2017-10-31 PROCEDURE — 97140 MANUAL THERAPY 1/> REGIONS: CPT | Performed by: PHYSICAL THERAPIST

## 2017-10-31 PROCEDURE — 97110 THERAPEUTIC EXERCISES: CPT | Performed by: PHYSICAL THERAPIST

## 2017-10-31 NOTE — PROGRESS NOTES
Physical Therapy Daily Progress Note    Visit # : 8        Subjective Pt reports overall improvement with therapy 95%. Says the only time she has pain is with sitting on a hard surface. Feels she will be ready for discharge this week. Wants to come to appt Thursday and then discharge with HEP.     Objective    See Exercise, Manual, and Modality Logs for complete treatment.       Assessment/Plan Less TTP today. Will reassess next visit for discharge summary if pt is still feeling well. Will review HEP and issued pictures, TB, etc as needed.   Advised her to use cushion/pillow when sitting on a hard surface.     Progress per Plan of Care           Manual Therapy:    10     mins  49757;  Therapeutic Exercise:    29     mins  34403;     Neuromuscular Saritha:         mins  28335;    Therapeutic Activity:          mins  49382;     Gait Training:            mins  23311;     Ultrasound:          mins  10182;    Electrical Stimulation:         mins  64674 ( );  Dry Needling          mins self-pay    Timed Treatment:   39   mins   Total Treatment:     55   mins    Lucio Pham, PT, DPT  Physical Therapist

## 2017-11-02 ENCOUNTER — TREATMENT (OUTPATIENT)
Dept: PHYSICAL THERAPY | Facility: CLINIC | Age: 72
End: 2017-11-02

## 2017-11-02 DIAGNOSIS — M54.42 ACUTE LEFT-SIDED LOW BACK PAIN WITH LEFT-SIDED SCIATICA: Primary | ICD-10-CM

## 2017-11-02 DIAGNOSIS — M53.3 SI (SACROILIAC) PAIN: ICD-10-CM

## 2017-11-02 PROCEDURE — 97110 THERAPEUTIC EXERCISES: CPT | Performed by: PHYSICAL THERAPIST

## 2017-11-02 PROCEDURE — 97140 MANUAL THERAPY 1/> REGIONS: CPT | Performed by: PHYSICAL THERAPIST

## 2017-11-02 NOTE — PROGRESS NOTES
Physical Therapy Daily Progress Note/MD note    Visit # : 9        Subjective Pt reports overall improvement with therapy 95%. Says the only time she really has pain is when sitting on a hard surface. Feels she is ready for discharge. Will continue with exercises at home.     Objective    See Exercise, Manual, and Modality Logs for complete treatment.     AROM Trunk:  Flexion 90  Extension 30  Right sidebending 28  Left sidebending 22  Right rotation 40  Left rotation 40    AROM pain free    TTP left SI, piriformis region    Assessment/Plan Pt has progressed with therapy. Some pain has persisted, but seems to only occur with prolonged sitting or sitting on a hard surface. Pt has been adivsed to use a pillow/cushion and has been instructed on trying to avoid sacral sitting. Pt to be discharged at this time to continue with HEP.              Manual Therapy:    10     mins  32533;  Therapeutic Exercise:    32     mins  92878;     Neuromuscular Saritha:         mins  77004;    Therapeutic Activity:          mins  63190;     Gait Training:            mins  78786;     Ultrasound:          mins  10331;    Electrical Stimulation:         mins  46276 ( );  Dry Needling          mins self-pay    Timed Treatment:   42   mins   Total Treatment:     57   mins    Lucio Pham PT, DPT  Physical Therapist

## 2017-12-13 ENCOUNTER — OFFICE VISIT (OUTPATIENT)
Dept: GASTROENTEROLOGY | Facility: CLINIC | Age: 72
End: 2017-12-13

## 2017-12-13 VITALS
DIASTOLIC BLOOD PRESSURE: 72 MMHG | TEMPERATURE: 97.6 F | WEIGHT: 123 LBS | BODY MASS INDEX: 19.3 KG/M2 | SYSTOLIC BLOOD PRESSURE: 110 MMHG | RESPIRATION RATE: 14 BRPM | HEART RATE: 59 BPM | OXYGEN SATURATION: 99 % | HEIGHT: 67 IN

## 2017-12-13 DIAGNOSIS — K52.832 LYMPHOCYTIC COLITIS: Primary | ICD-10-CM

## 2017-12-13 DIAGNOSIS — Z79.899 MEDICATION MANAGEMENT: ICD-10-CM

## 2017-12-13 DIAGNOSIS — K52.839 MICROSCOPIC COLITIS, UNSPECIFIED MICROSCOPIC COLITIS TYPE: ICD-10-CM

## 2017-12-13 PROCEDURE — 99214 OFFICE O/P EST MOD 30 MIN: CPT | Performed by: INTERNAL MEDICINE

## 2017-12-13 RX ORDER — BUDESONIDE 3 MG/1
3 CAPSULE, COATED PELLETS ORAL DAILY
Qty: 90 CAPSULE | Refills: 3 | Status: SHIPPED | OUTPATIENT
Start: 2017-12-13 | End: 2018-03-13

## 2017-12-13 NOTE — PROGRESS NOTES
[facility]   History & Physical    Chief Complaint   Patient presents with   • Microscopic Colitis         Subjective CC diarrhea      HPI  71 yo female with well-documented microscopic colitis who has done well on Entercort 3mg daily but has full-blown recurrence of symptoms of watery diarrhea and incontinence when she stops her Entercort.  She has numerous questions about her disease and her treatment.  No blood in stools. No weight loss.  No upper GI symptoms.  Her last colonoscopy was July 2017.  She did have polyps also and has been advised to return for her next colon exam in 2022. When taking 3 mg daily of Entercort she has 1 formed stool per day.      Past Medical History:   Diagnosis Date   • Abnormal liver enzymes    • Body mass index (BMI) 20.0-20.9, adult    • BPPV (benign paroxysmal positional vertigo)    • Fracture     as a child   • Glaucoma    • Ingrowing toenail    • Mammogram abnormal    • Onychomycosis    • Osteoporosis    • Sebaceous cyst    • Skin cancer    • Syncope, near    • Transient ischemic attack          Family History   Problem Relation Age of Onset   • Heart attack Mother    • Diabetes Mother    • Stroke Father    • Breast cancer Neg Hx    • Ovarian cancer Neg Hx           reports that she quit smoking about 10 years ago. She started smoking about 57 years ago. She does not have any smokeless tobacco history on file. She reports that she does not drink alcohol or use illicit drugs.        Current Outpatient Prescriptions:   •  ascorbic acid (VITAMIN C) 1000 MG tablet, Take  by mouth Daily., Disp: , Rfl:   •  aspirin 81 MG tablet, Take  by mouth Daily., Disp: , Rfl:   •  Budesonide (ENTOCORT EC) 3 MG 24 hr capsule, Take 1 capsule by mouth Daily for 90 days., Disp: 90 capsule, Rfl: 3  •  calcitonin, salmon, (MIACALCIN) 200 UNIT/ACT nasal spray, 1 spray into each nostril Daily. INSERT 1 SQUIRT IN ONE NOSTRIL DAILY ALTERNATING EACH NOSTRIL, Disp: 3 each,  "Rfl: 3  •  calcium (OS-KAYLYN) 600 MG tablet, Take  by mouth Daily., Disp: , Rfl:   •  Cholecalciferol (VITAMIN D3) 1000 UNITS capsule, Take 1 capsule by mouth Daily., Disp: , Rfl:   •  folic acid (FOLVITE) 800 MCG tablet, Take  by mouth Daily., Disp: , Rfl:   •  Multiple Vitamins-Minerals (MULTIVITAMIN ADULT PO), Take  by mouth., Disp: , Rfl:   •  Probiotic Product (PROBIOTIC ADVANCED PO), Take  by mouth., Disp: , Rfl:   •  SIMBRINZA 1-0.2 % suspension, INSTILL 1 DROP INTO BOTH EYES TWICE A DAY, Disp: , Rfl: 3  •  ZIOPTAN 0.0015 % solution ophthalmic solution, INSTILL 1 DROP IN BOTH EYES AT BEDTIME, Disp: , Rfl: 3    Allergies:  Review of patient's allergies indicates no known allergies.    ROS:    Review of Systems   Constitution: Negative.   HENT: Negative.    Eyes: Negative.    Cardiovascular: Negative.    Respiratory: Negative.    Endocrine: Negative.    Hematologic/Lymphatic: Negative.    Skin: Negative.    Musculoskeletal: Negative.    Gastrointestinal: Negative.    Genitourinary: Negative.    Neurological: Negative.    Psychiatric/Behavioral: Negative.    Allergic/Immunologic: Negative.        Objective     Blood pressure 110/72, pulse 59, temperature 97.6 °F (36.4 °C), temperature source Temporal Artery , resp. rate 14, height 170.2 cm (67\"), weight 55.8 kg (123 lb), SpO2 99 %.    Physical Exam   Constitutional: Pt is oriented to person, place, and time and well-developed, well-nourished, and in no distress.   HENT:   Mouth/Throat: Oropharynx is clear and moist.   Neck: Normal range of motion. Neck supple.   Cardiovascular: Normal rate, regular rhythm and normal heart sounds.    Pulmonary/Chest: Effort normal and breath sounds normal. No respiratory distress. No  wheezes.   Abdominal: Soft. Bowel sounds are normal.  Soft, non-tender, normal bowel sounds; no bruits, organomegaly or masses.  Skin: Skin is warm and dry.   Psychiatric: Mood, memory, affect and judgment normal.     Assessment/Plan We discussed at " length her diagnosis (not precancerous, not a surgical problem , cause unknown but treatment with budesonide helps almost everyone with colitis of this type) .  Our aim is to get off Entercort whenever able to tolerate its cessation but to at least try decreasing dose every 6 months.  She will try every other day and cessation in 6 months.  I have refilled her Entercort 3 mg daily for 1 year.  She is to return as needed.     Diagnosis:  Odalys was seen today for microscopic colitis.    Diagnoses and all orders for this visit:    Lymphocytic colitis  -     Budesonide (ENTOCORT EC) 3 MG 24 hr capsule; Take 1 capsule by mouth Daily for 90 days.    Medication management  -     Budesonide (ENTOCORT EC) 3 MG 24 hr capsule; Take 1 capsule by mouth Daily for 90 days.    Microscopic colitis, unspecified microscopic colitis type  -     Budesonide (ENTOCORT EC) 3 MG 24 hr capsule; Take 1 capsule by mouth Daily for 90 days.        Anticipated Surgical Procedure:    The risks, benefits, and alternatives of this procedure have been discussed with the patient or the responsible party- the patient understands and agrees to proceed.

## 2018-04-16 ENCOUNTER — OFFICE VISIT (OUTPATIENT)
Dept: INTERNAL MEDICINE | Facility: CLINIC | Age: 73
End: 2018-04-16

## 2018-04-16 VITALS
RESPIRATION RATE: 14 BRPM | HEIGHT: 67 IN | SYSTOLIC BLOOD PRESSURE: 120 MMHG | HEART RATE: 64 BPM | BODY MASS INDEX: 19.3 KG/M2 | WEIGHT: 123 LBS | OXYGEN SATURATION: 94 % | DIASTOLIC BLOOD PRESSURE: 78 MMHG | TEMPERATURE: 97.7 F

## 2018-04-16 DIAGNOSIS — M75.81 ROTATOR CUFF TENDONITIS, RIGHT: ICD-10-CM

## 2018-04-16 DIAGNOSIS — E55.9 VITAMIN D DEFICIENCY: ICD-10-CM

## 2018-04-16 DIAGNOSIS — M54.32 SCIATICA OF LEFT SIDE: ICD-10-CM

## 2018-04-16 DIAGNOSIS — G47.00 INSOMNIA, UNSPECIFIED TYPE: ICD-10-CM

## 2018-04-16 DIAGNOSIS — H40.9 GLAUCOMA, UNSPECIFIED GLAUCOMA TYPE, UNSPECIFIED LATERALITY: ICD-10-CM

## 2018-04-16 DIAGNOSIS — E78.5 HYPERLIPIDEMIA, UNSPECIFIED HYPERLIPIDEMIA TYPE: Primary | ICD-10-CM

## 2018-04-16 DIAGNOSIS — M81.0 OSTEOPOROSIS, UNSPECIFIED OSTEOPOROSIS TYPE, UNSPECIFIED PATHOLOGICAL FRACTURE PRESENCE: ICD-10-CM

## 2018-04-16 DIAGNOSIS — I83.90 ASYMPTOMATIC VARICOSE VEINS: ICD-10-CM

## 2018-04-16 DIAGNOSIS — H93.19 TINNITUS, UNSPECIFIED LATERALITY: ICD-10-CM

## 2018-04-16 DIAGNOSIS — K52.839 MICROSCOPIC COLITIS, UNSPECIFIED MICROSCOPIC COLITIS TYPE: ICD-10-CM

## 2018-04-16 LAB
25(OH)D3+25(OH)D2 SERPL-MCNC: 34 NG/ML
ALBUMIN SERPL-MCNC: 4.1 G/DL (ref 3.5–5)
ALBUMIN/GLOB SERPL: 1.5 G/DL (ref 1–2)
ALP SERPL-CCNC: 67 U/L (ref 38–126)
ALT SERPL-CCNC: 33 U/L (ref 13–69)
APPEARANCE UR: CLEAR
AST SERPL-CCNC: 29 U/L (ref 15–46)
BASOPHILS # BLD AUTO: 0.08 10*3/MM3 (ref 0–0.2)
BASOPHILS NFR BLD AUTO: 1 % (ref 0–2.5)
BILIRUB SERPL-MCNC: 0.5 MG/DL (ref 0.2–1.3)
BILIRUB UR QL STRIP: NEGATIVE
BUN SERPL-MCNC: 18 MG/DL (ref 7–20)
BUN/CREAT SERPL: 22.5 (ref 7.1–23.5)
CALCIUM SERPL-MCNC: 10.2 MG/DL (ref 8.4–10.2)
CHLORIDE SERPL-SCNC: 102 MMOL/L (ref 98–107)
CHOLEST SERPL-MCNC: 183 MG/DL (ref 0–199)
CO2 SERPL-SCNC: 31 MMOL/L (ref 26–30)
COLOR UR: YELLOW
CREAT SERPL-MCNC: 0.8 MG/DL (ref 0.6–1.3)
EOSINOPHIL # BLD AUTO: 0.15 10*3/MM3 (ref 0–0.7)
EOSINOPHIL NFR BLD AUTO: 1.9 % (ref 0–7)
ERYTHROCYTE [DISTWIDTH] IN BLOOD BY AUTOMATED COUNT: 12.6 % (ref 11.5–14.5)
GFR SERPLBLD CREATININE-BSD FMLA CKD-EPI: 70 ML/MIN/1.73
GFR SERPLBLD CREATININE-BSD FMLA CKD-EPI: 85 ML/MIN/1.73
GLOBULIN SER CALC-MCNC: 2.7 GM/DL
GLUCOSE SERPL-MCNC: 91 MG/DL (ref 74–98)
GLUCOSE UR QL: NEGATIVE
HCT VFR BLD AUTO: 44.5 % (ref 37–47)
HDLC SERPL-MCNC: 96 MG/DL (ref 40–60)
HGB BLD-MCNC: 14.4 G/DL (ref 12–16)
HGB UR QL STRIP: NEGATIVE
IMM GRANULOCYTES # BLD: 0.03 10*3/MM3 (ref 0–0.06)
IMM GRANULOCYTES NFR BLD: 0.4 % (ref 0–0.6)
KETONES UR QL STRIP: NEGATIVE
LDLC SERPL CALC-MCNC: 76 MG/DL (ref 0–99)
LEUKOCYTE ESTERASE UR QL STRIP: NEGATIVE
LYMPHOCYTES # BLD AUTO: 1.42 10*3/MM3 (ref 0.6–3.4)
LYMPHOCYTES NFR BLD AUTO: 17.7 % (ref 10–50)
MCH RBC QN AUTO: 30.3 PG (ref 27–31)
MCHC RBC AUTO-ENTMCNC: 32.4 G/DL (ref 30–37)
MCV RBC AUTO: 93.5 FL (ref 81–99)
MONOCYTES # BLD AUTO: 0.39 10*3/MM3 (ref 0–0.9)
MONOCYTES NFR BLD AUTO: 4.9 % (ref 0–12)
NEUTROPHILS # BLD AUTO: 5.97 10*3/MM3 (ref 2–6.9)
NEUTROPHILS NFR BLD AUTO: 74.1 % (ref 37–80)
NITRITE UR QL STRIP: NEGATIVE
NRBC BLD AUTO-RTO: 0 /100 WBC (ref 0–0)
PH UR STRIP: 7 [PH] (ref 5–8)
PLATELET # BLD AUTO: 271 10*3/MM3 (ref 130–400)
POTASSIUM SERPL-SCNC: 4.1 MMOL/L (ref 3.5–5.1)
PROT SERPL-MCNC: 6.8 G/DL (ref 6.3–8.2)
PROT UR QL STRIP: NEGATIVE
RBC # BLD AUTO: 4.76 10*6/MM3 (ref 4.2–5.4)
SODIUM SERPL-SCNC: 143 MMOL/L (ref 137–145)
SP GR UR: 1.01 (ref 1–1.03)
TRIGL SERPL-MCNC: 54 MG/DL
TSH SERPL DL<=0.005 MIU/L-ACNC: 0.65 MIU/ML (ref 0.47–4.68)
UROBILINOGEN UR STRIP-MCNC: NORMAL MG/DL
VLDLC SERPL CALC-MCNC: 10.8 MG/DL
WBC # BLD AUTO: 8.04 10*3/MM3 (ref 4.8–10.8)

## 2018-04-16 PROCEDURE — 20610 DRAIN/INJ JOINT/BURSA W/O US: CPT | Performed by: INTERNAL MEDICINE

## 2018-04-16 PROCEDURE — G0439 PPPS, SUBSEQ VISIT: HCPCS | Performed by: INTERNAL MEDICINE

## 2018-04-16 PROCEDURE — 99213 OFFICE O/P EST LOW 20 MIN: CPT | Performed by: INTERNAL MEDICINE

## 2018-04-16 RX ORDER — TETRAHYDROZOLINE HCL 0.05 %
DROPS OPHTHALMIC (EYE)
COMMUNITY
End: 2018-10-11

## 2018-04-16 RX ORDER — METHYLPREDNISOLONE ACETATE 40 MG/ML
40 INJECTION, SUSPENSION INTRA-ARTICULAR; INTRALESIONAL; INTRAMUSCULAR; SOFT TISSUE ONCE
Status: COMPLETED | OUTPATIENT
Start: 2018-04-16 | End: 2018-04-16

## 2018-04-16 RX ADMIN — METHYLPREDNISOLONE ACETATE 40 MG: 40 INJECTION, SUSPENSION INTRA-ARTICULAR; INTRALESIONAL; INTRAMUSCULAR; SOFT TISSUE at 11:23

## 2018-04-16 NOTE — PROGRESS NOTES
QUICK REFERENCE INFORMATION:  The ABCs of the Annual Wellness Visit    Subsequent Medicare Wellness Visit    HEALTH RISK ASSESSMENT    1945    Recent Hospitalizations:  No hospitalization(s) within the last year..        Current Medical Providers:  Patient Care Team:  Marty Cheung MD as PCP - General  Marty Cheung MD as PCP - Family Medicine  Coleman Babcock MD as PCP - Claims Attributed        Smoking Status:  History   Smoking Status   • Former Smoker   • Start date: 1960   • Quit date: 2007   Smokeless Tobacco   • Not on file       Alcohol Consumption:  History   Alcohol Use No       Depression Screen:   PHQ-2/PHQ-9 Depression Screening 4/16/2018   Little interest or pleasure in doing things 0   Feeling down, depressed, or hopeless 0   Trouble falling or staying asleep, or sleeping too much -   Feeling tired or having little energy -   Poor appetite or overeating -   Feeling bad about yourself - or that you are a failure or have let yourself or your family down -   Trouble concentrating on things, such as reading the newspaper or watching television -   Moving or speaking so slowly that other people could have noticed. Or the opposite - being so fidgety or restless that you have been moving around a lot more than usual -   Thoughts that you would be better off dead, or of hurting yourself in some way -   Total Score 0   If you checked off any problems, how difficult have these problems made it for you to do your work, take care of things at home, or get along with other people? -       Health Habits and Functional and Cognitive Screening:  Functional & Cognitive Status 4/16/2018   Do you have difficulty preparing food and eating? No   Do you have difficulty bathing yourself, getting dressed or grooming yourself? No   Do you have difficulty using the toilet? No   Do you have difficulty moving around from place to place? No   Do you have trouble with steps or getting out of a bed or a chair? No   In the  past year have you fallen or experienced a near fall? No   Current Diet Well Balanced Diet   Dental Exam Unknown   Eye Exam Up to date   Exercise (times per week) 7 times per week   Current Exercise Activities Include Walking   Do you need help using the phone?  No   Are you deaf or do you have serious difficulty hearing?  No   Do you need help with transportation? No   Do you need help shopping? No   Do you need help preparing meals?  No   Do you need help with housework?  No   Do you need help with laundry? No   Do you need help taking your medications? No   Do you need help managing money? No   Do you ever drive or ride in a car without wearing a seat belt? No   Have you felt unusual stress, anger or loneliness in the last month? No   Who do you live with? Alone   If you need help, do you have trouble finding someone available to you? No   Have you been bothered in the last four weeks by sexual problems? No   Do you have difficulty concentrating, remembering or making decisions? No           Does the patient have evidence of cognitive impairment? No    Aspirin use counseling: Taking ASA appropriately as indicated      Recent Lab Results:  CMP:  Lab Results   Component Value Date    GLU 92 04/12/2017    BUN 16 04/12/2017    CREATININE 0.80 04/12/2017    EGFRIFNONA 70 04/12/2017    EGFRIFAFRI 85 04/12/2017    BCR 20.0 04/12/2017     04/12/2017    K 4.3 04/12/2017    CO2 30.0 04/12/2017    CALCIUM 10.0 04/12/2017    PROTENTOTREF 6.7 04/12/2017    ALBUMIN 4.10 04/12/2017    LABGLOBREF 2.6 04/12/2017    LABIL2 1.6 04/12/2017    BILITOT 0.5 04/12/2017    ALKPHOS 52 04/12/2017    AST 33 04/12/2017    ALT 37 04/12/2017     Lipid Panel:  Lab Results   Component Value Date    TRIG 55 04/12/2017    HDL 94 (H) 04/12/2017    VLDL 11 04/12/2017     HbA1c:       Visual Acuity:  No exam data present    Age-appropriate Screening Schedule:  Refer to the list below for future screening recommendations based on patient's age,  sex and/or medical conditions. Orders for these recommended tests are listed in the plan section. The patient has been provided with a written plan.    Health Maintenance   Topic Date Due   • TDAP/TD VACCINES (1 - Tdap) 04/13/2012   • DXA SCAN  04/12/2017   • LIPID PANEL  04/12/2018   • MAMMOGRAM  06/01/2019   • COLONOSCOPY  07/26/2027   • PNEUMOCOCCAL VACCINES (65+ LOW/MEDIUM RISK)  Completed   • ZOSTER VACCINE  Completed        Subjective   History of Present Illness    Odalys Miels is a 72 y.o. female who presents for an Subsequent Wellness Visit.Patient here for Medicare wellness also medical problems to be addressed.  Patient complains right shoulder pain chronic gradually getting worse achy in nature certain position worse over-the-counter, medicine no help physical therapy no help lay on the shoulder makes it worse.  Left sided sciatica patient still has pain.  Insomnia on melatonin helps.  Hyperlipidemia stable.  Vitamin D deficiency on supplement to stable.  microscopic colitis patient is seeing gastroenterologist    The following portions of the patient's history were reviewed and updated as appropriate: allergies, current medications, past family history, past medical history, past social history, past surgical history and problem list.    Outpatient Medications Prior to Visit   Medication Sig Dispense Refill   • ascorbic acid (VITAMIN C) 1000 MG tablet Take  by mouth Daily.     • aspirin 81 MG tablet Take  by mouth Daily.     • calcium (OS-KAYLYN) 600 MG tablet Take  by mouth Daily.     • Cholecalciferol (VITAMIN D3) 1000 UNITS capsule Take 1 capsule by mouth Daily.     • folic acid (FOLVITE) 800 MCG tablet Take  by mouth Daily.     • Multiple Vitamins-Minerals (MULTIVITAMIN ADULT PO) Take  by mouth.     • Probiotic Product (PROBIOTIC ADVANCED PO) Take  by mouth.     • SIMBRINZA 1-0.2 % suspension INSTILL 1 DROP INTO BOTH EYES TWICE A DAY  3   • calcitonin, salmon, (MIACALCIN) 200 UNIT/ACT nasal spray  "1 spray into each nostril Daily. INSERT 1 SQUIRT IN ONE NOSTRIL DAILY ALTERNATING EACH NOSTRIL 3 each 3   • ZIOPTAN 0.0015 % solution ophthalmic solution INSTILL 1 DROP IN BOTH EYES AT BEDTIME  3     No facility-administered medications prior to visit.        Patient Active Problem List   Diagnosis   • Glaucoma   • Hyperlipidemia   • Insomnia   • Microscopic colitis   • Tinnitus   • Asymptomatic varicose veins   • Vitamin D deficiency   • Osteoporosis   • Sciatica of left side       Advance Care Planning:  power of  for healthcare on file    Identification of Risk Factors:  Risk factors include: none.    Review of Systems   Constitutional: Negative.    Respiratory: Negative.    Cardiovascular: Negative.    Gastrointestinal: Negative.    Musculoskeletal: Positive for arthralgias and back pain.   Skin: Negative.    Psychiatric/Behavioral: Negative.        Compared to one year ago, the patient feels her physical health is the same.  Compared to one year ago, the patient feels her mental health is the same.    Objective     Physical Exam   Constitutional: She is oriented to person, place, and time. She appears well-developed and well-nourished.   Neck: Neck supple.   Cardiovascular: Normal rate, regular rhythm and normal heart sounds.    Pulmonary/Chest: Effort normal and breath sounds normal.   Abdominal: Soft. Bowel sounds are normal.   Musculoskeletal: She exhibits tenderness.   Neurological: She is alert and oriented to person, place, and time.   Psychiatric: She has a normal mood and affect. Her behavior is normal.       Vitals:    04/16/18 0928   BP: 120/78   Pulse: 64   Resp: 14   Temp: 97.7 °F (36.5 °C)   SpO2: 94%   Weight: 55.8 kg (123 lb)   Height: 170.2 cm (67\")       Patient's Body mass index is 19.26 kg/m². BMI is within normal parameters. No follow-up required.      Assessment/Plan   Patient Self-Management and Personalized Health Advice  The patient has been provided with information about: none " and preventive services including:   · Advance directive.    Visit Diagnoses:  No diagnosis found.    No orders of the defined types were placed in this encounter.      Outpatient Encounter Prescriptions as of 4/16/2018   Medication Sig Dispense Refill   • ascorbic acid (VITAMIN C) 1000 MG tablet Take  by mouth Daily.     • aspirin 81 MG tablet Take  by mouth Daily.     • calcium (OS-KAYLYN) 600 MG tablet Take  by mouth Daily.     • Cholecalciferol (VITAMIN D3) 1000 UNITS capsule Take 1 capsule by mouth Daily.     • folic acid (FOLVITE) 800 MCG tablet Take  by mouth Daily.     • magnesium chloride ER (MAG-DELAY) 535 (64 Mg) MG CR tablet Take  by mouth Daily.     • Multiple Vitamins-Minerals (MULTIVITAMIN ADULT PO) Take  by mouth.     • Probiotic Product (PROBIOTIC ADVANCED PO) Take  by mouth.     • SIMBRINZA 1-0.2 % suspension INSTILL 1 DROP INTO BOTH EYES TWICE A DAY  3   • tetrahydrozoline 0.05 % ophthalmic solution 1 drop 3 times daily.     • [DISCONTINUED] calcitonin, salmon, (MIACALCIN) 200 UNIT/ACT nasal spray 1 spray into each nostril Daily. INSERT 1 SQUIRT IN ONE NOSTRIL DAILY ALTERNATING EACH NOSTRIL 3 each 3   • [DISCONTINUED] ZIOPTAN 0.0015 % solution ophthalmic solution INSTILL 1 DROP IN BOTH EYES AT BEDTIME  3     No facility-administered encounter medications on file as of 4/16/2018.        Reviewed use of high risk medication in the elderly: no  Reviewed for potential of harmful drug interactions in the elderly: no    Follow Up:  No Follow-up on file.     An After Visit Summary and PPPS with all of these plans were given to the patient.        Sciatica of left side refer to Physical Therapy and aleve     microscopic colitis, follow up with GI Dr. Travis on med  osteoporosis dexa scheduled  Tinnitus seen by ENT  insomnia melatonin continue, decline medicine  Hyperlipidemia contine tx  Vitamin D deficiency continue supplement  tdap  Pneumovax zostavax done. prevnar done  Right shoulder pain rotator cuff  tendonitis, cortisone sthot  Mammogram normal  colonoscopy 7/2014   Td 3/1/2013   1 mo after test

## 2018-04-17 ENCOUNTER — APPOINTMENT (OUTPATIENT)
Dept: BONE DENSITY | Facility: HOSPITAL | Age: 73
End: 2018-04-17

## 2018-04-17 PROCEDURE — 77080 DXA BONE DENSITY AXIAL: CPT

## 2018-04-25 ENCOUNTER — TELEPHONE (OUTPATIENT)
Dept: INTERNAL MEDICINE | Facility: CLINIC | Age: 73
End: 2018-04-25

## 2018-04-25 DIAGNOSIS — G89.29 CHRONIC RIGHT SHOULDER PAIN: Primary | ICD-10-CM

## 2018-04-25 DIAGNOSIS — M25.511 CHRONIC RIGHT SHOULDER PAIN: Primary | ICD-10-CM

## 2018-04-25 DIAGNOSIS — M25.552 LEFT HIP PAIN: ICD-10-CM

## 2018-04-25 NOTE — TELEPHONE ENCOUNTER
Miley called from [facility] physical therapy.  Patient is scheduled to be seen today, but her most recent order sts DX is Hyperlipidemia and they need another diagnosis listed, as patient has Medicare and it cannot be billed with the current diagnosis.  Per Patient, she is being seen for her Right Shoulder and Left Hip.  Can a new order be put in and faxed to JEANNE at [phone]?

## 2018-05-06 ENCOUNTER — OFFICE VISIT (OUTPATIENT)
Dept: PRIMARY CARE CLINIC | Age: 73
End: 2018-05-06
Payer: MEDICARE

## 2018-05-06 VITALS
SYSTOLIC BLOOD PRESSURE: 116 MMHG | DIASTOLIC BLOOD PRESSURE: 74 MMHG | WEIGHT: 116.5 LBS | BODY MASS INDEX: 17.66 KG/M2 | OXYGEN SATURATION: 95 % | HEIGHT: 68 IN | TEMPERATURE: 98.3 F | HEART RATE: 91 BPM | RESPIRATION RATE: 18 BRPM

## 2018-05-06 DIAGNOSIS — J01.10 ACUTE FRONTAL SINUSITIS, RECURRENCE NOT SPECIFIED: Primary | ICD-10-CM

## 2018-05-06 DIAGNOSIS — R05.9 COUGH: ICD-10-CM

## 2018-05-06 DIAGNOSIS — R09.81 HEAD CONGESTION: ICD-10-CM

## 2018-05-06 LAB
INFLUENZA A ANTIBODY: NEGATIVE
INFLUENZA B ANTIBODY: NEGATIVE

## 2018-05-06 PROCEDURE — G8400 PT W/DXA NO RESULTS DOC: HCPCS | Performed by: NURSE PRACTITIONER

## 2018-05-06 PROCEDURE — 1090F PRES/ABSN URINE INCON ASSESS: CPT | Performed by: NURSE PRACTITIONER

## 2018-05-06 PROCEDURE — G8427 DOCREV CUR MEDS BY ELIG CLIN: HCPCS | Performed by: NURSE PRACTITIONER

## 2018-05-06 PROCEDURE — 99213 OFFICE O/P EST LOW 20 MIN: CPT | Performed by: NURSE PRACTITIONER

## 2018-05-06 PROCEDURE — 87804 INFLUENZA ASSAY W/OPTIC: CPT | Performed by: NURSE PRACTITIONER

## 2018-05-06 PROCEDURE — 1036F TOBACCO NON-USER: CPT | Performed by: NURSE PRACTITIONER

## 2018-05-06 PROCEDURE — 1123F ACP DISCUSS/DSCN MKR DOCD: CPT | Performed by: NURSE PRACTITIONER

## 2018-05-06 PROCEDURE — 3017F COLORECTAL CA SCREEN DOC REV: CPT | Performed by: NURSE PRACTITIONER

## 2018-05-06 PROCEDURE — G8419 CALC BMI OUT NRM PARAM NOF/U: HCPCS | Performed by: NURSE PRACTITIONER

## 2018-05-06 PROCEDURE — 4040F PNEUMOC VAC/ADMIN/RCVD: CPT | Performed by: NURSE PRACTITIONER

## 2018-05-06 RX ORDER — CALCIUM CARBONATE 500(1250)
500 TABLET ORAL DAILY
Status: ON HOLD | COMMUNITY
End: 2022-05-02 | Stop reason: SDUPTHER

## 2018-05-06 RX ORDER — CEFDINIR 300 MG/1
300 CAPSULE ORAL 2 TIMES DAILY
Qty: 20 CAPSULE | Refills: 0 | Status: SHIPPED | OUTPATIENT
Start: 2018-05-06 | End: 2018-05-16

## 2018-05-06 RX ORDER — PREDNISONE 5 MG/1
TABLET ORAL
Qty: 1 EACH | Refills: 0 | Status: ON HOLD | OUTPATIENT
Start: 2018-05-06 | End: 2019-12-30

## 2018-05-06 RX ORDER — MAGNESIUM 30 MG
30 TABLET ORAL 2 TIMES DAILY
Status: ON HOLD | COMMUNITY
End: 2019-12-30

## 2018-05-06 RX ORDER — LORATADINE 10 MG/1
10 CAPSULE, LIQUID FILLED ORAL DAILY
Status: ON HOLD | COMMUNITY
End: 2019-12-30

## 2018-05-06 ASSESSMENT — ENCOUNTER SYMPTOMS
WHEEZING: 0
SINUS PRESSURE: 1
NAUSEA: 1
COUGH: 1
SHORTNESS OF BREATH: 0
SORE THROAT: 0
EYE ITCHING: 0
ABDOMINAL DISTENTION: 0
VOICE CHANGE: 1
ABDOMINAL PAIN: 0
BACK PAIN: 0
BLOOD IN STOOL: 0
SINUS PAIN: 1
CHEST TIGHTNESS: 0
EYE REDNESS: 0
DIARRHEA: 1
CONSTIPATION: 0

## 2018-05-17 ENCOUNTER — OFFICE VISIT (OUTPATIENT)
Dept: INTERNAL MEDICINE | Facility: CLINIC | Age: 73
End: 2018-05-17

## 2018-05-17 VITALS
RESPIRATION RATE: 12 BRPM | SYSTOLIC BLOOD PRESSURE: 120 MMHG | OXYGEN SATURATION: 97 % | TEMPERATURE: 98 F | BODY MASS INDEX: 19.3 KG/M2 | HEART RATE: 78 BPM | HEIGHT: 67 IN | WEIGHT: 123 LBS | DIASTOLIC BLOOD PRESSURE: 74 MMHG

## 2018-05-17 DIAGNOSIS — M81.0 OSTEOPOROSIS, UNSPECIFIED OSTEOPOROSIS TYPE, UNSPECIFIED PATHOLOGICAL FRACTURE PRESENCE: ICD-10-CM

## 2018-05-17 DIAGNOSIS — G47.00 INSOMNIA, UNSPECIFIED TYPE: ICD-10-CM

## 2018-05-17 DIAGNOSIS — E55.9 VITAMIN D DEFICIENCY: Primary | ICD-10-CM

## 2018-05-17 PROCEDURE — 99213 OFFICE O/P EST LOW 20 MIN: CPT | Performed by: INTERNAL MEDICINE

## 2018-05-17 NOTE — PROGRESS NOTES
Subjective   Odalys Miles is a 72 y.o. female.     Chief Complaint   Patient presents with   • Follow-up     follow up after physical therapy       History of Present Illness   Patient here for follow-up.  Sciatica nerve pain improved.  DEXA scan showed osteopenia.  Insomnia stable on melatonin.  Hyperlipidemia stable medication.  The vitamin D stable supplement.  Shoulder pain improved after cortisone shot.    Current Outpatient Prescriptions:   •  ascorbic acid (VITAMIN C) 1000 MG tablet, Take  by mouth Daily., Disp: , Rfl:   •  aspirin 81 MG tablet, Take  by mouth Daily., Disp: , Rfl:   •  calcium (OS-KAYLYN) 600 MG tablet, Take  by mouth Daily., Disp: , Rfl:   •  Cholecalciferol (VITAMIN D3) 1000 UNITS capsule, Take 1 capsule by mouth Daily., Disp: , Rfl:   •  folic acid (FOLVITE) 800 MCG tablet, Take  by mouth Daily., Disp: , Rfl:   •  magnesium chloride ER (MAG-DELAY) 535 (64 Mg) MG CR tablet, Take  by mouth Daily., Disp: , Rfl:   •  Multiple Vitamins-Minerals (MULTIVITAMIN ADULT PO), Take  by mouth., Disp: , Rfl:   •  Probiotic Product (PROBIOTIC ADVANCED PO), Take  by mouth., Disp: , Rfl:   •  SIMBRINZA 1-0.2 % suspension, INSTILL 1 DROP INTO BOTH EYES TWICE A DAY, Disp: , Rfl: 3  •  tetrahydrozoline 0.05 % ophthalmic solution, 1 drop 3 times daily., Disp: , Rfl:     The following portions of the patient's history were reviewed and updated as appropriate: allergies, current medications, past family history, past medical history, past social history, past surgical history and problem list.    Review of Systems   Constitutional: Negative.    Respiratory: Negative.    Cardiovascular: Negative.    Gastrointestinal: Negative.    Skin: Negative.    Psychiatric/Behavioral: Negative.        Objective   Physical Exam   Constitutional: She is oriented to person, place, and time. She appears well-developed and well-nourished.   Neck: Neck supple.   Cardiovascular: Normal rate, regular rhythm and normal heart sounds.     Pulmonary/Chest: Effort normal and breath sounds normal.   Abdominal: Soft. Bowel sounds are normal.   Neurological: She is alert and oriented to person, place, and time.   Psychiatric: She has a normal mood and affect. Her behavior is normal.       All tests have been reviewed.    Assessment/Plan   There are no diagnoses linked to this encounter.          Sciatica of left side refer to Physical Therapy and aleve improved     microscopic colitis, follow up with GI Dr. Travis on med  Osteopenia on  dexa continue oscal d  Tinnitus seen by ENT  insomnia melatonin continue, decline medicine  Hyperlipidemia contine tx  Vitamin D deficiency continue supplement  tdap  Pneumovax zostavax done. prevnar done  Right shoulder pain rotator cuff tendonitis, cortisone sthot improved  Mammogram normal  colonoscopy 7/2014   Td 3/1/2013  Annual wellness check

## 2018-08-21 ENCOUNTER — OFFICE VISIT (OUTPATIENT)
Dept: INTERNAL MEDICINE | Facility: CLINIC | Age: 73
End: 2018-08-21

## 2018-08-21 VITALS
DIASTOLIC BLOOD PRESSURE: 70 MMHG | HEART RATE: 74 BPM | WEIGHT: 120 LBS | SYSTOLIC BLOOD PRESSURE: 120 MMHG | TEMPERATURE: 97.6 F | OXYGEN SATURATION: 95 % | BODY MASS INDEX: 18.83 KG/M2 | RESPIRATION RATE: 14 BRPM | HEIGHT: 67 IN

## 2018-08-21 DIAGNOSIS — K52.839 MICROSCOPIC COLITIS, UNSPECIFIED MICROSCOPIC COLITIS TYPE: Primary | ICD-10-CM

## 2018-08-21 DIAGNOSIS — R19.7 DIARRHEA, UNSPECIFIED TYPE: ICD-10-CM

## 2018-08-21 PROCEDURE — 99214 OFFICE O/P EST MOD 30 MIN: CPT | Performed by: INTERNAL MEDICINE

## 2018-08-21 NOTE — PATIENT INSTRUCTIONS
Diarrhea, Microscopic colitis, unspecified microscopic colitis type with sx, continue budesinide, follow GI , add imodium, avoid dairy product.     Diarrhea, Dark stool do heme and lab, add fiber and continue probiotic

## 2018-08-21 NOTE — PROGRESS NOTES
Subjective   Odalys Miles is a 73 y.o. female.     Chief Complaint   Patient presents with   • Diarrhea       Diarrhea    This is a recurrent problem. The current episode started 1 to 4 weeks ago. The problem occurs 5 to 10 times per day. The problem has been unchanged. Diarrhea characteristics: black color. The patient states that diarrhea awakens her from sleep. Associated symptoms include weight loss. Pertinent negatives include no abdominal pain, bloating, chills or vomiting. Nothing aggravates the symptoms. Risk factors: IBD. Treatments tried: budesinide. The treatment provided no relief. Her past medical history is significant for inflammatory bowel disease.          Current Outpatient Prescriptions:   •  ascorbic acid (VITAMIN C) 1000 MG tablet, Take  by mouth Daily., Disp: , Rfl:   •  aspirin 81 MG tablet, Take  by mouth Daily., Disp: , Rfl:   •  calcium (OS-KAYLYN) 600 MG tablet, Take  by mouth Daily., Disp: , Rfl:   •  Cholecalciferol (VITAMIN D3) 1000 UNITS capsule, Take 1 capsule by mouth Daily., Disp: , Rfl:   •  folic acid (FOLVITE) 800 MCG tablet, Take  by mouth Daily., Disp: , Rfl:   •  magnesium chloride ER (MAG-DELAY) 535 (64 Mg) MG CR tablet, Take  by mouth Daily., Disp: , Rfl:   •  Multiple Vitamins-Minerals (MULTIVITAMIN ADULT PO), Take  by mouth., Disp: , Rfl:   •  Probiotic Product (PROBIOTIC ADVANCED PO), Take  by mouth., Disp: , Rfl:   •  SIMBRINZA 1-0.2 % suspension, INSTILL 1 DROP INTO BOTH EYES TWICE A DAY, Disp: , Rfl: 3  •  tetrahydrozoline 0.05 % ophthalmic solution, 1 drop 3 times daily., Disp: , Rfl:     The following portions of the patient's history were reviewed and updated as appropriate: allergies, current medications, past family history, past medical history, past social history, past surgical history and problem list.    Review of Systems   Constitutional: Positive for weight loss. Negative for chills.   Respiratory: Negative.    Cardiovascular: Negative.     Gastrointestinal: Positive for diarrhea. Negative for abdominal distention, abdominal pain, anal bleeding, bloating, blood in stool, nausea and vomiting.   Skin: Negative.    Psychiatric/Behavioral: Negative.        Objective   Physical Exam   Constitutional: She is oriented to person, place, and time. She appears well-developed and well-nourished.   Neck: Neck supple.   Cardiovascular: Normal rate, regular rhythm and normal heart sounds.    Pulmonary/Chest: Effort normal and breath sounds normal.   Abdominal: Soft. Bowel sounds are normal. She exhibits no mass. There is no tenderness. There is no rebound and no guarding. No hernia.   Neurological: She is alert and oriented to person, place, and time.   Psychiatric: She has a normal mood and affect. Her behavior is normal.       All tests have been reviewed.    Assessment/Plan   Diagnoses and all orders for this visit:    Diarrhea, Microscopic colitis, unspecified microscopic colitis type with sx, continue budesinide, follow GI , add imodium, avoid dairy product.     Diarrhea, Dark stool do heme and lab, add fiber and continue probiotic    10 days

## 2018-08-23 LAB
BASOPHILS # BLD AUTO: 0.08 10*3/MM3 (ref 0–0.2)
BASOPHILS NFR BLD AUTO: 1.2 % (ref 0–2.5)
BUN SERPL-MCNC: 16 MG/DL (ref 7–20)
BUN/CREAT SERPL: 22.9 (ref 7.1–23.5)
CALCIUM SERPL-MCNC: 10 MG/DL (ref 8.4–10.2)
CHLORIDE SERPL-SCNC: 105 MMOL/L (ref 98–107)
CO2 SERPL-SCNC: 27 MMOL/L (ref 26–30)
CREAT SERPL-MCNC: 0.7 MG/DL (ref 0.6–1.3)
EOSINOPHIL # BLD AUTO: 0.14 10*3/MM3 (ref 0–0.7)
EOSINOPHIL NFR BLD AUTO: 2.1 % (ref 0–7)
ERYTHROCYTE [DISTWIDTH] IN BLOOD BY AUTOMATED COUNT: 13 % (ref 11.5–14.5)
ERYTHROCYTE [SEDIMENTATION RATE] IN BLOOD BY WESTERGREN METHOD: 2 MM/HR (ref 0–20)
GLUCOSE SERPL-MCNC: 92 MG/DL (ref 74–98)
HCT VFR BLD AUTO: 44.8 % (ref 37–47)
HEMOCCULT STL QL IA: NEGATIVE
HGB BLD-MCNC: 14.5 G/DL (ref 12–16)
IMM GRANULOCYTES # BLD: 0.03 10*3/MM3 (ref 0–0.06)
IMM GRANULOCYTES NFR BLD: 0.5 % (ref 0–0.6)
LYMPHOCYTES # BLD AUTO: 1.54 10*3/MM3 (ref 0.6–3.4)
LYMPHOCYTES NFR BLD AUTO: 23.4 % (ref 10–50)
MCH RBC QN AUTO: 29.8 PG (ref 27–31)
MCHC RBC AUTO-ENTMCNC: 32.4 G/DL (ref 30–37)
MCV RBC AUTO: 92.2 FL (ref 81–99)
MONOCYTES # BLD AUTO: 0.4 10*3/MM3 (ref 0–0.9)
MONOCYTES NFR BLD AUTO: 6.1 % (ref 0–12)
NEUTROPHILS # BLD AUTO: 4.39 10*3/MM3 (ref 2–6.9)
NEUTROPHILS NFR BLD AUTO: 66.7 % (ref 37–80)
NRBC BLD AUTO-RTO: 0 /100 WBC (ref 0–0)
PLATELET # BLD AUTO: 249 10*3/MM3 (ref 130–400)
POTASSIUM SERPL-SCNC: 4.3 MMOL/L (ref 3.5–5.1)
RBC # BLD AUTO: 4.86 10*6/MM3 (ref 4.2–5.4)
SODIUM SERPL-SCNC: 140 MMOL/L (ref 137–145)
WBC # BLD AUTO: 6.58 10*3/MM3 (ref 4.8–10.8)

## 2018-09-04 ENCOUNTER — OFFICE VISIT (OUTPATIENT)
Dept: INTERNAL MEDICINE | Facility: CLINIC | Age: 73
End: 2018-09-04

## 2018-09-04 VITALS
DIASTOLIC BLOOD PRESSURE: 50 MMHG | SYSTOLIC BLOOD PRESSURE: 90 MMHG | HEIGHT: 67 IN | OXYGEN SATURATION: 97 % | RESPIRATION RATE: 14 BRPM | TEMPERATURE: 98.2 F | HEART RATE: 60 BPM

## 2018-09-04 DIAGNOSIS — R19.7 DIARRHEA, UNSPECIFIED TYPE: ICD-10-CM

## 2018-09-04 DIAGNOSIS — K52.839 MICROSCOPIC COLITIS, UNSPECIFIED MICROSCOPIC COLITIS TYPE: ICD-10-CM

## 2018-09-04 DIAGNOSIS — K52.839 MICROSCOPIC COLITIS, UNSPECIFIED MICROSCOPIC COLITIS TYPE: Primary | ICD-10-CM

## 2018-09-04 PROCEDURE — 99213 OFFICE O/P EST LOW 20 MIN: CPT | Performed by: INTERNAL MEDICINE

## 2018-09-04 NOTE — PROGRESS NOTES
Subjective   Odalys Miles is a 73 y.o. female.     Chief Complaint   Patient presents with   • Follow-up     10 day follow up    • Diarrhea       History of Present Illness   Patient here for follow-up.  Patient still has diarrhea.  Patient states Imodium helps some and a fiber also helped some.  Patient did not use them together.  Denies any abdominal pain.  History of a colitis on budesonide 1 tablet a day.  No fever chills is yet to do stool test.  Blood test normal.    Current Outpatient Prescriptions:   •  ascorbic acid (VITAMIN C) 1000 MG tablet, Take  by mouth Daily., Disp: , Rfl:   •  aspirin 81 MG tablet, Take  by mouth Daily., Disp: , Rfl:   •  calcium (OS-KAYLYN) 600 MG tablet, Take  by mouth Daily., Disp: , Rfl:   •  Cholecalciferol (VITAMIN D3) 1000 UNITS capsule, Take 1 capsule by mouth Daily., Disp: , Rfl:   •  folic acid (FOLVITE) 800 MCG tablet, Take  by mouth Daily., Disp: , Rfl:   •  magnesium chloride ER (MAG-DELAY) 535 (64 Mg) MG CR tablet, Take  by mouth Daily., Disp: , Rfl:   •  Multiple Vitamins-Minerals (MULTIVITAMIN ADULT PO), Take  by mouth., Disp: , Rfl:   •  Probiotic Product (PROBIOTIC ADVANCED PO), Take  by mouth., Disp: , Rfl:   •  SIMBRINZA 1-0.2 % suspension, INSTILL 1 DROP INTO BOTH EYES TWICE A DAY, Disp: , Rfl: 3  •  tetrahydrozoline 0.05 % ophthalmic solution, 1 drop 3 times daily., Disp: , Rfl:     The following portions of the patient's history were reviewed and updated as appropriate: allergies, current medications, past family history, past medical history, past social history, past surgical history and problem list.    Review of Systems   Constitutional: Negative.    Respiratory: Negative.    Cardiovascular: Negative.    Gastrointestinal: Positive for diarrhea.   Skin: Negative.    Psychiatric/Behavioral: Negative.        Objective   Physical Exam   Constitutional: She is oriented to person, place, and time. She appears well-developed and well-nourished.   Neck: Neck  supple.   Cardiovascular: Normal rate, regular rhythm and normal heart sounds.    Pulmonary/Chest: Effort normal and breath sounds normal.   Abdominal: Soft. Bowel sounds are normal.   Neurological: She is alert and oriented to person, place, and time.   Psychiatric: She has a normal mood and affect. Her behavior is normal.       All tests have been reviewed.    Assessment/Plan   There are no diagnoses linked to this encounter.          Diarrhea, Microscopic colitis, unspecified microscopic colitis type with sx, continue budesinide, follow GI , improved imodium, avoid dairy product.      Diarrhea, Dark stool do heme and lab yet to, add fiber and continue probiotic    Refer to GI.      21 days

## 2018-09-07 ENCOUNTER — TELEPHONE (OUTPATIENT)
Dept: INTERNAL MEDICINE | Facility: CLINIC | Age: 73
End: 2018-09-07

## 2018-09-13 LAB
BACTERIA SPEC CULT: NORMAL
BACTERIA SPEC CULT: NORMAL
C DIFF TOX GENS STL QL NAA+PROBE: NEGATIVE
CAMPYLOBACTER STL CULT: NORMAL
E COLI SXT STL QL IA: NEGATIVE
SALM + SHIG STL CULT: NORMAL
WBC STL QL MICRO: NORMAL
WBC STL QL MICRO: NORMAL

## 2018-10-11 ENCOUNTER — OFFICE VISIT (OUTPATIENT)
Dept: GASTROENTEROLOGY | Facility: CLINIC | Age: 73
End: 2018-10-11

## 2018-10-11 VITALS
WEIGHT: 118.2 LBS | BODY MASS INDEX: 18.55 KG/M2 | SYSTOLIC BLOOD PRESSURE: 100 MMHG | HEIGHT: 67 IN | OXYGEN SATURATION: 97 % | HEART RATE: 61 BPM | DIASTOLIC BLOOD PRESSURE: 70 MMHG | TEMPERATURE: 97.2 F

## 2018-10-11 DIAGNOSIS — K52.839 MICROSCOPIC COLITIS, UNSPECIFIED MICROSCOPIC COLITIS TYPE: Primary | ICD-10-CM

## 2018-10-11 DIAGNOSIS — R19.7 DIARRHEA, UNSPECIFIED TYPE: ICD-10-CM

## 2018-10-11 PROCEDURE — 99214 OFFICE O/P EST MOD 30 MIN: CPT | Performed by: INTERNAL MEDICINE

## 2018-10-11 RX ORDER — BIMATOPROST 0.01 %
DROPS OPHTHALMIC (EYE) NIGHTLY
COMMUNITY
Start: 2018-10-03

## 2018-10-11 RX ORDER — BUDESONIDE 3 MG/1
3 CAPSULE, COATED PELLETS ORAL EVERY OTHER DAY
Refills: 3 | COMMUNITY
Start: 2018-10-01 | End: 2018-12-17 | Stop reason: SDUPTHER

## 2018-10-11 NOTE — PROGRESS NOTES
PCP: Marty Cheung MD    Chief Complaint   Patient presents with   • Microscopic Colitis       History of Present Illness:   HPI  Ms. Miles returns to the office for a follow-up visit.  She states a couple of months ago she began to have more issues with diarrhea.  The patient does not have any nocturnal symptoms.  She also does very well after supper.  The patient states the symptoms begin after she wakes up in the morning.  There is also an occasional urge to have a bowel movement.  Ms. Miles denies any blood in the stool.  She denies any nausea or vomiting.  There is no history of abdominal pain.  The patient has a history of microscopic colitis and is taking budesonide daily.  The patient has not been started on any new medication.  There is no history of travel outside the [place] or camping.  Past Medical History:   Diagnosis Date   • Abnormal liver enzymes    • Body mass index (BMI) 20.0-20.9, adult    • BPPV (benign paroxysmal positional vertigo)    • Fracture     as a child   • Glaucoma    • Ingrowing toenail    • Mammogram abnormal    • Microscopic colitis    • Onychomycosis    • Osteoporosis    • Sebaceous cyst    • Skin cancer    • Syncope, near    • Transient ischemic attack        Past Surgical History:   Procedure Laterality Date   • COLONOSCOPY      DR JAIMES   • TUBAL ABDOMINAL LIGATION           Current Outpatient Prescriptions:   •  Budesonide (ENTOCORT EC) 3 MG 24 hr capsule, Take 3 mg by mouth Every Other Day., Disp: , Rfl: 3  •  calcium (OS-KAYLYN) 600 MG tablet, Take  by mouth 2 (Two) Times a Day., Disp: , Rfl:   •  Cholecalciferol (VITAMIN D3) 1000 UNITS capsule, Take 1 capsule by mouth Daily., Disp: , Rfl:   •  folic acid (FOLVITE) 800 MCG tablet, Take  by mouth Daily., Disp: , Rfl:   •  LUMIGAN 0.01 % ophthalmic drops, , Disp: , Rfl:   •  magnesium chloride ER (MAG-DELAY) 535 (64 Mg) MG CR tablet, Take 64 mg by mouth 2 (Two) Times a Day., Disp: , Rfl:   •  Multiple Vitamins-Minerals  (MULTIVITAMIN ADULT PO), Take  by mouth., Disp: , Rfl:   •  Probiotic Product (PROBIOTIC ADVANCED PO), Take  by mouth., Disp: , Rfl:   •  SIMBRINZA 1-0.2 % suspension, INSTILL 1 DROP INTO BOTH EYES TWICE A DAY, Disp: , Rfl: 3    No Known Allergies    Family History   Problem Relation Age of Onset   • Heart attack Mother    • Diabetes Mother    • Stroke Father    • Breast cancer Neg Hx    • Ovarian cancer Neg Hx        Social History     Social History   • Marital status:      Spouse name: N/A   • Number of children: N/A   • Years of education: N/A     Occupational History   • Not on file.     Social History Main Topics   • Smoking status: Former Smoker     Start date: 1960     Quit date: 2007   • Smokeless tobacco: Never Used   • Alcohol use No   • Drug use: No   • Sexual activity: Defer     Other Topics Concern   • Not on file     Social History Narrative   • No narrative on file       Review of Systems   Constitutional: Negative for activity change, appetite change, fatigue, fever and unexpected weight change.   HENT: Negative for dental problem, hearing loss, mouth sores, postnasal drip, sneezing, trouble swallowing and voice change.    Eyes: Negative for pain, redness, itching and visual disturbance.   Respiratory: Negative for cough, choking, chest tightness, shortness of breath and wheezing.    Cardiovascular: Negative for chest pain, palpitations and leg swelling.   Gastrointestinal: Positive for diarrhea. Negative for abdominal distention, abdominal pain, anal bleeding, blood in stool, constipation, nausea, rectal pain and vomiting.   Endocrine: Negative for cold intolerance, heat intolerance, polydipsia, polyphagia and polyuria.   Genitourinary: Negative.  Negative for dysuria, enuresis, flank pain, hematuria and urgency.   Musculoskeletal: Negative for arthralgias, back pain, gait problem, joint swelling and myalgias.   Skin: Negative for color change, pallor and rash.   Allergic/Immunologic:  Negative for environmental allergies, food allergies and immunocompromised state.   Neurological: Negative for dizziness, tremors, seizures, facial asymmetry, speech difficulty, numbness and headaches.   Hematological: Negative for adenopathy.   Psychiatric/Behavioral: Negative for behavioral problems, confusion, dysphoric mood, hallucinations and self-injury.       Vitals:    10/11/18 0852   BP: 100/70   Pulse: 61   Temp: 97.2 °F (36.2 °C)   SpO2: 97%       Physical Exam   Constitutional: She is oriented to person, place, and time. She appears well-nourished. No distress.   HENT:   Head: Atraumatic.   Mouth/Throat: Oropharynx is clear and moist. No oropharyngeal exudate.   Eyes: EOM are normal. No scleral icterus.   Neck: Neck supple. No thyromegaly present.   Cardiovascular: Normal rate, regular rhythm and normal heart sounds.  Exam reveals no gallop.    No murmur heard.  Pulmonary/Chest: Effort normal and breath sounds normal. She has no wheezes. She has no rales.   Abdominal: Soft. Bowel sounds are normal. There is no tenderness. There is no rebound and no guarding.   Musculoskeletal: She exhibits no edema or tenderness.   Enlarged MCP joints   Lymphadenopathy:     She has no cervical adenopathy.   Neurological: She is alert and oriented to person, place, and time. She exhibits normal muscle tone.   Skin: Skin is dry. No erythema.   Psychiatric: She has a normal mood and affect. Her behavior is normal. Thought content normal.   Vitals reviewed.      Odalys was seen today for microscopic colitis.    Diagnoses and all orders for this visit:    Microscopic colitis, unspecified microscopic colitis type  -     Celiac Ab tTG DGP TIgA; Future    Diarrhea, unspecified type    The patient has a history of microscopic colitis.  The condition of microscopic colitis is associated with celiac sprue.  She also takes magnesium on a daily basis and this is likely contributing to the issue with diarrhea.      Plan: Check celiac  panel.           Stop magnesium.           Will increase the budesonide to 9 mg daily for 6 weeks then 6 mg daily for 2 weeks and 3 mg daily thereafter.           Follow-up in the office in 6 months.    I spent over 50 % of the office visit counseling and answering questions from the patient.

## 2018-10-12 ENCOUNTER — TELEPHONE (OUTPATIENT)
Dept: GASTROENTEROLOGY | Facility: CLINIC | Age: 73
End: 2018-10-12

## 2018-10-12 NOTE — TELEPHONE ENCOUNTER
Patient called back this morning. I explained to patient that its ok to take Budesonide mg once daily in the morning. Patient voiced understanding.

## 2018-10-22 ENCOUNTER — TELEPHONE (OUTPATIENT)
Dept: GASTROENTEROLOGY | Facility: CLINIC | Age: 73
End: 2018-10-22

## 2018-10-23 ENCOUNTER — LAB (OUTPATIENT)
Dept: LAB | Facility: HOSPITAL | Age: 73
End: 2018-10-23

## 2018-10-23 DIAGNOSIS — K52.839 MICROSCOPIC COLITIS, UNSPECIFIED MICROSCOPIC COLITIS TYPE: ICD-10-CM

## 2018-10-23 PROCEDURE — 82784 ASSAY IGA/IGD/IGG/IGM EACH: CPT

## 2018-10-23 PROCEDURE — 83516 IMMUNOASSAY NONANTIBODY: CPT

## 2018-10-23 PROCEDURE — 36415 COLL VENOUS BLD VENIPUNCTURE: CPT

## 2018-10-24 LAB
GLIADIN PEPTIDE IGA SER-ACNC: 5 UNITS (ref 0–19)
GLIADIN PEPTIDE IGG SER-ACNC: 2 UNITS (ref 0–19)
IGA SERPL-MCNC: 120 MG/DL (ref 64–422)
TTG IGA SER-ACNC: <2 U/ML (ref 0–3)
TTG IGG SER-ACNC: <2 U/ML (ref 0–5)

## 2018-12-17 ENCOUNTER — TELEPHONE (OUTPATIENT)
Dept: GASTROENTEROLOGY | Facility: CLINIC | Age: 73
End: 2018-12-17

## 2018-12-17 DIAGNOSIS — K52.838 OTHER MICROSCOPIC COLITIS: Primary | ICD-10-CM

## 2018-12-17 RX ORDER — BUDESONIDE 3 MG/1
3 CAPSULE, COATED PELLETS ORAL EVERY OTHER DAY
Qty: 90 CAPSULE | Refills: 3 | Status: SHIPPED | OUTPATIENT
Start: 2018-12-17 | End: 2019-05-24

## 2019-04-23 ENCOUNTER — OFFICE VISIT (OUTPATIENT)
Dept: INTERNAL MEDICINE | Facility: CLINIC | Age: 74
End: 2019-04-23

## 2019-04-23 VITALS
SYSTOLIC BLOOD PRESSURE: 92 MMHG | HEIGHT: 67 IN | OXYGEN SATURATION: 98 % | DIASTOLIC BLOOD PRESSURE: 54 MMHG | BODY MASS INDEX: 18.96 KG/M2 | TEMPERATURE: 98.1 F | HEART RATE: 82 BPM | WEIGHT: 120.8 LBS

## 2019-04-23 DIAGNOSIS — R19.7 DIARRHEA, UNSPECIFIED TYPE: ICD-10-CM

## 2019-04-23 DIAGNOSIS — K52.839 MICROSCOPIC COLITIS, UNSPECIFIED MICROSCOPIC COLITIS TYPE: ICD-10-CM

## 2019-04-23 DIAGNOSIS — M81.0 OSTEOPOROSIS, UNSPECIFIED OSTEOPOROSIS TYPE, UNSPECIFIED PATHOLOGICAL FRACTURE PRESENCE: ICD-10-CM

## 2019-04-23 DIAGNOSIS — G47.00 INSOMNIA, UNSPECIFIED TYPE: ICD-10-CM

## 2019-04-23 DIAGNOSIS — K92.1 HEMATOCHEZIA: ICD-10-CM

## 2019-04-23 DIAGNOSIS — I83.90 ASYMPTOMATIC VARICOSE VEINS: ICD-10-CM

## 2019-04-23 DIAGNOSIS — H93.19 TINNITUS, UNSPECIFIED LATERALITY: ICD-10-CM

## 2019-04-23 DIAGNOSIS — E78.5 HYPERLIPIDEMIA, UNSPECIFIED HYPERLIPIDEMIA TYPE: Primary | ICD-10-CM

## 2019-04-23 DIAGNOSIS — E55.9 VITAMIN D DEFICIENCY: ICD-10-CM

## 2019-04-23 DIAGNOSIS — H40.9 GLAUCOMA, UNSPECIFIED GLAUCOMA TYPE, UNSPECIFIED LATERALITY: ICD-10-CM

## 2019-04-23 DIAGNOSIS — M75.81 ROTATOR CUFF TENDONITIS, RIGHT: ICD-10-CM

## 2019-04-23 DIAGNOSIS — M54.32 SCIATICA OF LEFT SIDE: ICD-10-CM

## 2019-04-23 PROCEDURE — G0439 PPPS, SUBSEQ VISIT: HCPCS | Performed by: INTERNAL MEDICINE

## 2019-04-23 NOTE — PROGRESS NOTES
QUICK REFERENCE INFORMATION:  The ABCs of the Annual Wellness Visit    Subsequent Medicare Wellness Visit     HEALTH RISK ASSESSMENT    : 1945    Recent Hospitalizations:  No hospitalization(s) within the last year..  ccc      Current Medical Providers:  Patient Care Team:  Marty Cheung MD as PCP - General  Marty Cheung MD as PCP - Family Medicine        Smoking Status:  Social History     Tobacco Use   Smoking Status Former Smoker   • Start date:    • Last attempt to quit:    • Years since quittin.3   Smokeless Tobacco Never Used       Alcohol Consumption:  Social History     Substance and Sexual Activity   Alcohol Use No       Depression Screen:   PHQ-2/PHQ-9 Depression Screening 2019   Little interest or pleasure in doing things 0   Feeling down, depressed, or hopeless 0   Trouble falling or staying asleep, or sleeping too much -   Feeling tired or having little energy -   Poor appetite or overeating -   Feeling bad about yourself - or that you are a failure or have let yourself or your family down -   Trouble concentrating on things, such as reading the newspaper or watching television -   Moving or speaking so slowly that other people could have noticed. Or the opposite - being so fidgety or restless that you have been moving around a lot more than usual -   Thoughts that you would be better off dead, or of hurting yourself in some way -   Total Score 0   If you checked off any problems, how difficult have these problems made it for you to do your work, take care of things at home, or get along with other people? -       Health Habits and Functional and Cognitive Screening:  Functional & Cognitive Status 2019   Do you have difficulty preparing food and eating? No   Do you have difficulty bathing yourself, getting dressed or grooming yourself? No   Do you have difficulty using the toilet? No   Do you have difficulty moving around from place to place? No   Do you have trouble  with steps or getting out of a bed or a chair? No   In the past year have you fallen or experienced a near fall? No   Current Diet Well Balanced Diet   Dental Exam Not up to date   Eye Exam Up to date   Exercise (times per week) 7 times per week   Current Exercise Activities Include Yard Work   Do you need help using the phone?  No   Are you deaf or do you have serious difficulty hearing?  No   Do you need help with transportation? No   Do you need help shopping? No   Do you need help preparing meals?  No   Do you need help with housework?  No   Do you need help with laundry? No   Do you need help taking your medications? No   Do you need help managing money? No   Do you ever drive or ride in a car without wearing a seat belt? No   Have you felt unusual stress, anger or loneliness in the last month? No   Who do you live with? Alone   If you need help, do you have trouble finding someone available to you? No   Have you been bothered in the last four weeks by sexual problems? No   Do you have difficulty concentrating, remembering or making decisions? No           Does the patient have evidence of cognitive impairment? No    Asiprin use counseling: Does not need ASA (and currently is not on it)      Recent Lab Results:    Lab Results   Component Value Date    GLU 92 08/21/2018        Lab Results   Component Value Date    TRIG 54 04/16/2018    HDL 96 (H) 04/16/2018    VLDL 10.8 04/16/2018           Age-appropriate Screening Schedule:  Refer to the list below for future screening recommendations based on patient's age, sex and/or medical conditions. Orders for these recommended tests are listed in the plan section. The patient has been provided with a written plan.    Health Maintenance   Topic Date Due   • TDAP/TD VACCINES (1 - Tdap) 04/13/2012   • ZOSTER VACCINE (2 of 2) 04/25/2013   • LIPID PANEL  04/16/2019   • MAMMOGRAM  06/01/2019   • DXA SCAN  04/17/2020   • COLONOSCOPY  07/26/2027   • PNEUMOCOCCAL VACCINES (65+  LOW/MEDIUM RISK)  Completed        Subjective   History of Present Illness    Odalys Miles is a 73 y.o. female who presents for an Annual Wellness Visit.    The following portions of the patient's history were reviewed and updated as appropriate: allergies, current medications, past family history, past medical history, past social history, past surgical history and problem list.    Outpatient Medications Prior to Visit   Medication Sig Dispense Refill   • Budesonide (ENTOCORT EC) 3 MG 24 hr capsule Take 1 capsule by mouth Every Other Day. 90 capsule 3   • calcium (OS-KAYLYN) 600 MG tablet Take  by mouth 2 (Two) Times a Day.     • Cholecalciferol (VITAMIN D3) 1000 UNITS capsule Take 1 capsule by mouth Daily.     • folic acid (FOLVITE) 800 MCG tablet Take  by mouth Daily.     • LUMIGAN 0.01 % ophthalmic drops      • Multiple Vitamins-Minerals (MULTIVITAMIN ADULT PO) Take  by mouth.     • Probiotic Product (PROBIOTIC ADVANCED PO) Take  by mouth.     • SIMBRINZA 1-0.2 % suspension INSTILL 1 DROP INTO BOTH EYES TWICE A DAY  3   • magnesium chloride ER (MAG-DELAY) 535 (64 Mg) MG CR tablet Take 64 mg by mouth 2 (Two) Times a Day.       No facility-administered medications prior to visit.        Patient Active Problem List   Diagnosis   • Glaucoma   • Hyperlipidemia   • Insomnia   • Microscopic colitis   • Tinnitus   • Asymptomatic varicose veins   • Vitamin D deficiency   • Osteoporosis   • Sciatica of left side   • Rotator cuff tendonitis, right   • Diarrhea       Advance Care Planning:  Patient has an advance directive - a copy has not been provided. Have asked the patient to send this to us to add to record    Identification of Risk Factors:  Risk factors include: inactivity.    Review of Systems   Constitutional: Negative.    Respiratory: Negative.    Cardiovascular: Negative.    Gastrointestinal: Negative.    Musculoskeletal: Negative.    Skin: Negative.    Neurological: Negative.    Psychiatric/Behavioral:  "Negative.        Compared to one year ago, the patient feels her physical health is the same.  Compared to one year ago, the patient feels her mental health is the same.    Objective     Physical Exam   Constitutional: She is oriented to person, place, and time. She appears well-nourished.   Neck: Neck supple.   Cardiovascular: Normal rate, regular rhythm and normal heart sounds.   Pulmonary/Chest: Effort normal and breath sounds normal.   Abdominal: Bowel sounds are normal.   Neurological: She is alert and oriented to person, place, and time.   Skin: Skin is warm.   Psychiatric: She has a normal mood and affect.       Vitals:    04/23/19 1342   BP: 92/54   Pulse: 82   Temp: 98.1 °F (36.7 °C)   TempSrc: Temporal   SpO2: 98%   Weight: 54.8 kg (120 lb 12.8 oz)   Height: 170.2 cm (67\")   PainSc: 0-No pain       Patient's Body mass index is 18.92 kg/m². BMI is within normal parameters. No follow-up required..      Assessment/Plan   Patient Self-Management and Personalized Health Advice  The patient has been provided with information about: exercise and preventive services including:   · Advance directive.    Visit Diagnoses:  No diagnosis found.    No orders of the defined types were placed in this encounter.      Outpatient Encounter Medications as of 4/23/2019   Medication Sig Dispense Refill   • Budesonide (ENTOCORT EC) 3 MG 24 hr capsule Take 1 capsule by mouth Every Other Day. 90 capsule 3   • calcium (OS-KAYLYN) 600 MG tablet Take  by mouth 2 (Two) Times a Day.     • Cholecalciferol (VITAMIN D3) 1000 UNITS capsule Take 1 capsule by mouth Daily.     • folic acid (FOLVITE) 800 MCG tablet Take  by mouth Daily.     • LUMIGAN 0.01 % ophthalmic drops      • Multiple Vitamins-Minerals (MULTIVITAMIN ADULT PO) Take  by mouth.     • Probiotic Product (PROBIOTIC ADVANCED PO) Take  by mouth.     • SIMBRINZA 1-0.2 % suspension INSTILL 1 DROP INTO BOTH EYES TWICE A DAY  3   • [DISCONTINUED] magnesium chloride ER (MAG-DELAY) 535 " (64 Mg) MG CR tablet Take 64 mg by mouth 2 (Two) Times a Day.       No facility-administered encounter medications on file as of 4/23/2019.        Reviewed use of high risk medication in the elderly: no  Reviewed for potential of harmful drug interactions in the elderly: no    Follow Up:  No Follow-up on file.     An After Visit Summary and PPPS with all of these plans were given to the patient.        Diarrhea, Microscopic colitis, unspecified microscopic colitis type with sx, continue budesinide, follow GI , improved after steroid and probiotics and imodium, avoid dairy product. Follow up with GI.   Sciatica of left side refer to Physical Therapy and aleve improved  Osteopenia on  dexa continue oscal d  Tinnitus seen by ENT  insomnia melatonin continue, decline medicine  Hyperlipidemia contine tx  Vitamin D deficiency continue supplement  tdap  Pneumovax zostavax done. prevnar done  Right shoulder pain rotator cuff tendonitis, cortisone shot improved  Mammogram normal  colonoscopy ref scope and recent BRBPR   Td 3/1/2013      6 mo

## 2019-04-26 LAB
25(OH)D3+25(OH)D2 SERPL-MCNC: 55.4 NG/ML
ALBUMIN SERPL-MCNC: 3.4 G/DL (ref 3.5–5)
ALBUMIN/GLOB SERPL: 1.4 G/DL (ref 1–2)
ALP SERPL-CCNC: 54 U/L (ref 38–126)
ALT SERPL-CCNC: 36 U/L (ref 13–69)
APPEARANCE UR: CLEAR
AST SERPL-CCNC: 37 U/L (ref 15–46)
BASOPHILS # BLD AUTO: 0.07 10*3/MM3 (ref 0–0.2)
BASOPHILS NFR BLD AUTO: 1.2 % (ref 0–1.5)
BILIRUB SERPL-MCNC: 0.3 MG/DL (ref 0.2–1.3)
BILIRUB UR QL STRIP: NEGATIVE
BUN SERPL-MCNC: 12 MG/DL (ref 7–20)
BUN/CREAT SERPL: 15 (ref 7.1–23.5)
CALCIUM SERPL-MCNC: 9.2 MG/DL (ref 8.4–10.2)
CHLORIDE SERPL-SCNC: 104 MMOL/L (ref 98–107)
CHOLEST SERPL-MCNC: 158 MG/DL (ref 0–199)
CO2 SERPL-SCNC: 28 MMOL/L (ref 26–30)
COLOR UR: YELLOW
CREAT SERPL-MCNC: 0.8 MG/DL (ref 0.6–1.3)
EOSINOPHIL # BLD AUTO: 0.14 10*3/MM3 (ref 0–0.4)
EOSINOPHIL NFR BLD AUTO: 2.3 % (ref 0.3–6.2)
ERYTHROCYTE [DISTWIDTH] IN BLOOD BY AUTOMATED COUNT: 13.1 % (ref 12.3–15.4)
GLOBULIN SER CALC-MCNC: 2.4 GM/DL
GLUCOSE SERPL-MCNC: 98 MG/DL (ref 74–98)
GLUCOSE UR QL: NEGATIVE
HCT VFR BLD AUTO: 41.3 % (ref 34–46.6)
HDLC SERPL-MCNC: 91 MG/DL (ref 40–60)
HGB BLD-MCNC: 13.1 G/DL (ref 12–15.9)
HGB UR QL STRIP: NEGATIVE
IMM GRANULOCYTES # BLD AUTO: 0.02 10*3/MM3 (ref 0–0.05)
IMM GRANULOCYTES NFR BLD AUTO: 0.3 % (ref 0–0.5)
KETONES UR QL STRIP: NEGATIVE
LDLC SERPL CALC-MCNC: 57 MG/DL (ref 0–99)
LEUKOCYTE ESTERASE UR QL STRIP: NEGATIVE
LYMPHOCYTES # BLD AUTO: 1.2 10*3/MM3 (ref 0.7–3.1)
LYMPHOCYTES NFR BLD AUTO: 20 % (ref 19.6–45.3)
MCH RBC QN AUTO: 30.4 PG (ref 26.6–33)
MCHC RBC AUTO-ENTMCNC: 31.7 G/DL (ref 31.5–35.7)
MCV RBC AUTO: 95.8 FL (ref 79–97)
MONOCYTES # BLD AUTO: 0.41 10*3/MM3 (ref 0.1–0.9)
MONOCYTES NFR BLD AUTO: 6.8 % (ref 5–12)
NEUTROPHILS # BLD AUTO: 4.17 10*3/MM3 (ref 1.7–7)
NEUTROPHILS NFR BLD AUTO: 69.4 % (ref 42.7–76)
NITRITE UR QL STRIP: NEGATIVE
NRBC BLD AUTO-RTO: 0 /100 WBC (ref 0–0.2)
PH UR STRIP: 6.5 [PH] (ref 5–8)
PLATELET # BLD AUTO: 252 10*3/MM3 (ref 140–450)
POTASSIUM SERPL-SCNC: 4 MMOL/L (ref 3.5–5.1)
PROT SERPL-MCNC: 5.8 G/DL (ref 6.3–8.2)
PROT UR QL STRIP: NEGATIVE
RBC # BLD AUTO: 4.31 10*6/MM3 (ref 3.77–5.28)
SODIUM SERPL-SCNC: 139 MMOL/L (ref 137–145)
SP GR UR: 1.01 (ref 1–1.03)
TRIGL SERPL-MCNC: 51 MG/DL
TSH SERPL DL<=0.005 MIU/L-ACNC: 1.21 MIU/ML (ref 0.47–4.68)
UROBILINOGEN UR STRIP-MCNC: NORMAL MG/DL
VLDLC SERPL CALC-MCNC: 10.2 MG/DL
WBC # BLD AUTO: 6.01 10*3/MM3 (ref 3.4–10.8)

## 2019-05-24 ENCOUNTER — TELEPHONE (OUTPATIENT)
Dept: GASTROENTEROLOGY | Facility: CLINIC | Age: 74
End: 2019-05-24

## 2019-05-24 DIAGNOSIS — K52.838 OTHER MICROSCOPIC COLITIS: ICD-10-CM

## 2019-05-24 RX ORDER — BUDESONIDE 3 MG/1
9 CAPSULE, COATED PELLETS ORAL DAILY
Qty: 90 CAPSULE | Refills: 3 | Status: SHIPPED | OUTPATIENT
Start: 2019-05-24 | End: 2019-08-21 | Stop reason: SDUPTHER

## 2019-05-24 NOTE — TELEPHONE ENCOUNTER
I called and left a message for Ms. Miles.  I will prescribe the Entocort to take 3 capsules which will be 9 mg daily.

## 2019-05-24 NOTE — TELEPHONE ENCOUNTER
Dr Alvarado,  Patient called and stated that her diarrhea is just like water. Dr Cheung told her to take some Metamucil daily. It has made it worse. Budesonide- 1 capsule by mouth daily isn't working. Please advise. Thanks

## 2019-06-26 ENCOUNTER — OFFICE VISIT (OUTPATIENT)
Dept: GASTROENTEROLOGY | Facility: CLINIC | Age: 74
End: 2019-06-26

## 2019-06-26 VITALS
DIASTOLIC BLOOD PRESSURE: 70 MMHG | SYSTOLIC BLOOD PRESSURE: 116 MMHG | HEIGHT: 67 IN | HEART RATE: 67 BPM | WEIGHT: 115.4 LBS | OXYGEN SATURATION: 99 % | BODY MASS INDEX: 18.11 KG/M2 | TEMPERATURE: 97.5 F

## 2019-06-26 DIAGNOSIS — K52.839 MICROSCOPIC COLITIS, UNSPECIFIED MICROSCOPIC COLITIS TYPE: Primary | ICD-10-CM

## 2019-06-26 PROCEDURE — 99214 OFFICE O/P EST MOD 30 MIN: CPT | Performed by: INTERNAL MEDICINE

## 2019-06-26 RX ORDER — ASPIRIN 81 MG/1
81 TABLET ORAL DAILY
COMMUNITY
End: 2020-06-10

## 2019-06-26 NOTE — PROGRESS NOTES
PCP: Marty Cheung MD    Chief Complaint   Patient presents with   • Follow-up     Mircoscopic colitis       History of Present Illness:   HPI  Ms. Miles returns to the office.  She is doing better at this time after beginning the Entocort medication again.  She is currently taking 3 capsules daily.  Ms. Miles states that when she tried to decrease the dosage and stop the medication there was an increased frequency of bowel movements.  There is no history of blood in the stool.  She denies any current abdominal pain.  There is no history of nausea or vomiting.  She denies any night sweats, fever chills.  Past Medical History:   Diagnosis Date   • Abnormal liver enzymes    • Body mass index (BMI) 20.0-20.9, adult    • BPPV (benign paroxysmal positional vertigo)    • Fracture     as a child   • Glaucoma    • Ingrowing toenail    • Mammogram abnormal    • Microscopic colitis    • Onychomycosis    • Osteoporosis    • Sebaceous cyst    • Skin cancer    • Syncope, near    • Transient ischemic attack        Past Surgical History:   Procedure Laterality Date   • COLONOSCOPY      DR JAIMES   • TUBAL ABDOMINAL LIGATION           Current Outpatient Medications:   •  aspirin 81 MG EC tablet, Take 81 mg by mouth Daily., Disp: , Rfl:   •  Budesonide (ENTOCORT EC) 3 MG 24 hr capsule, Take 3 capsules by mouth Daily for 90 days., Disp: 90 capsule, Rfl: 3  •  calcium (OS-KAYLYN) 600 MG tablet, Take  by mouth 2 (Two) Times a Day., Disp: , Rfl:   •  Cholecalciferol (VITAMIN D3) 1000 UNITS capsule, Take 1 capsule by mouth Daily., Disp: , Rfl:   •  folic acid (FOLVITE) 800 MCG tablet, Take  by mouth Daily., Disp: , Rfl:   •  LUMIGAN 0.01 % ophthalmic drops, , Disp: , Rfl:   •  Multiple Vitamins-Minerals (MULTIVITAMIN ADULT PO), Take  by mouth., Disp: , Rfl:   •  Probiotic Product (PROBIOTIC ADVANCED PO), Take  by mouth., Disp: , Rfl:   •  SIMBRINZA 1-0.2 % suspension, INSTILL 1 DROP INTO BOTH EYES TWICE A DAY, Disp: , Rfl: 3    No Known  Allergies    Family History   Problem Relation Age of Onset   • Heart attack Mother    • Diabetes Mother    • Stroke Father    • Breast cancer Neg Hx    • Ovarian cancer Neg Hx        Social History     Socioeconomic History   • Marital status:      Spouse name: Not on file   • Number of children: Not on file   • Years of education: Not on file   • Highest education level: Not on file   Tobacco Use   • Smoking status: Former Smoker     Start date:      Last attempt to quit:      Years since quittin.4   • Smokeless tobacco: Never Used   Substance and Sexual Activity   • Alcohol use: No   • Drug use: No   • Sexual activity: Defer       Review of Systems   Constitutional: Negative for activity change, appetite change, fatigue, fever and unexpected weight change.   HENT: Negative for dental problem, hearing loss, mouth sores, postnasal drip, sneezing, trouble swallowing and voice change.    Eyes: Negative for pain, redness, itching and visual disturbance.   Respiratory: Negative for cough, choking, chest tightness, shortness of breath and wheezing.    Cardiovascular: Negative for chest pain, palpitations and leg swelling.   Gastrointestinal: Positive for diarrhea. Negative for abdominal distention, abdominal pain, anal bleeding, blood in stool, constipation, nausea, rectal pain and vomiting.   Endocrine: Negative for cold intolerance, heat intolerance, polydipsia, polyphagia and polyuria.   Genitourinary: Negative.  Negative for dysuria, enuresis, flank pain, hematuria and urgency.   Musculoskeletal: Negative for arthralgias, back pain, gait problem, joint swelling and myalgias.   Skin: Negative for color change, pallor and rash.   Allergic/Immunologic: Negative for environmental allergies, food allergies and immunocompromised state.   Neurological: Negative for dizziness, tremors, seizures, facial asymmetry, speech difficulty, numbness and headaches.   Hematological: Negative for adenopathy.    Psychiatric/Behavioral: Negative for behavioral problems, confusion, dysphoric mood, hallucinations and self-injury.       Vitals:    06/26/19 1317   BP: 116/70   Pulse: 67   Temp: 97.5 °F (36.4 °C)   SpO2: 99%       Physical Exam   Constitutional: She is oriented to person, place, and time. She appears well-nourished. No distress.   HENT:   Head: Atraumatic.   Mouth/Throat: Oropharynx is clear and moist. No oropharyngeal exudate.   Eyes: EOM are normal. No scleral icterus.   Neck: Neck supple. No thyromegaly present.   Cardiovascular: Normal rate, regular rhythm and normal heart sounds. Exam reveals no gallop.   No murmur heard.  Pulmonary/Chest: Effort normal and breath sounds normal. She has no wheezes. She has no rales.   Abdominal: Soft. Bowel sounds are normal. There is no tenderness. There is no rebound and no guarding.   Musculoskeletal: Normal range of motion. She exhibits no edema or tenderness.   Lymphadenopathy:     She has no cervical adenopathy.   Neurological: She is alert and oriented to person, place, and time. She exhibits normal muscle tone.   Skin: Skin is dry. No erythema.   Psychiatric: She has a normal mood and affect. Her behavior is normal. Thought content normal.   Vitals reviewed.      Odalys was seen today for follow-up.    Diagnoses and all orders for this visit:    Microscopic colitis, unspecified microscopic colitis type    The patient has microscopic colitis that is more likely collagenous colitis related.  She does have recurrent symptoms off Entocort therapy.      Plan: Will decrease the Entocort to 2 capsules daily in 1 week and continue for 2 additional months.  The patient will then decrease the dosage to 1 capsule daily.           Follow-up in 1 year.

## 2019-08-21 DIAGNOSIS — K52.838 OTHER MICROSCOPIC COLITIS: ICD-10-CM

## 2019-08-21 RX ORDER — BUDESONIDE 3 MG/1
9 CAPSULE, COATED PELLETS ORAL DAILY
Qty: 270 CAPSULE | Refills: 1 | Status: SHIPPED | OUTPATIENT
Start: 2019-08-21 | End: 2020-02-11

## 2019-09-05 ENCOUNTER — OFFICE VISIT (OUTPATIENT)
Dept: INTERNAL MEDICINE | Facility: CLINIC | Age: 74
End: 2019-09-05

## 2019-09-05 VITALS
BODY MASS INDEX: 18.58 KG/M2 | OXYGEN SATURATION: 99 % | SYSTOLIC BLOOD PRESSURE: 130 MMHG | DIASTOLIC BLOOD PRESSURE: 72 MMHG | HEART RATE: 66 BPM | WEIGHT: 118.4 LBS | HEIGHT: 67 IN | TEMPERATURE: 97.9 F

## 2019-09-05 DIAGNOSIS — L03.116 CELLULITIS OF LEFT LOWER EXTREMITY: Primary | ICD-10-CM

## 2019-09-05 PROCEDURE — 99213 OFFICE O/P EST LOW 20 MIN: CPT | Performed by: INTERNAL MEDICINE

## 2019-09-05 RX ORDER — CEPHALEXIN 500 MG/1
500 CAPSULE ORAL 4 TIMES DAILY
Qty: 28 CAPSULE | Refills: 0 | Status: SHIPPED | OUTPATIENT
Start: 2019-09-05 | End: 2019-10-23

## 2019-09-05 NOTE — PROGRESS NOTES
Subjective   Odalys Miles is a 74 y.o. female.     Chief Complaint   Patient presents with   • Wound Check     pt has two sores on her left ankle       History of Present Illness   Patient complains left ankle swelling erythematous sensitive to touch for 2 weeks.  Patient has a wound in the both side of ankle denies any injury.  No fever chills.  Patient states she has a history of cellulitis wound in the similar area had been treated for 1-1/2-year off and off.    Current Outpatient Medications:   •  aspirin 81 MG EC tablet, Take 81 mg by mouth Daily., Disp: , Rfl:   •  Budesonide (ENTOCORT EC) 3 MG 24 hr capsule, TAKE 3 CAPSULES BY MOUTH DAILY FOR 90 DAYS., Disp: 270 capsule, Rfl: 1  •  calcium (OS-KAYLYN) 600 MG tablet, Take  by mouth 2 (Two) Times a Day., Disp: , Rfl:   •  Cholecalciferol (VITAMIN D3) 1000 UNITS capsule, Take 1 capsule by mouth Daily., Disp: , Rfl:   •  folic acid (FOLVITE) 800 MCG tablet, Take  by mouth Daily., Disp: , Rfl:   •  LUMIGAN 0.01 % ophthalmic drops, , Disp: , Rfl:   •  Multiple Vitamins-Minerals (MULTIVITAMIN ADULT PO), Take  by mouth., Disp: , Rfl:   •  Probiotic Product (PROBIOTIC ADVANCED PO), Take  by mouth., Disp: , Rfl:   •  SIMBRINZA 1-0.2 % suspension, INSTILL 1 DROP INTO BOTH EYES TWICE A DAY, Disp: , Rfl: 3    The following portions of the patient's history were reviewed and updated as appropriate: allergies, current medications, past family history, past medical history, past social history, past surgical history and problem list.    Review of Systems   Constitutional: Negative.    Respiratory: Negative.    Cardiovascular: Negative.    Gastrointestinal: Negative.    Skin: Positive for color change and wound.   Psychiatric/Behavioral: Negative.        Objective   Physical Exam   Constitutional: She is oriented to person, place, and time. She appears well-developed and well-nourished.   Neck: Neck supple.   Cardiovascular: Normal rate, regular rhythm and normal heart  sounds.   Pulmonary/Chest: Effort normal and breath sounds normal.   Abdominal: Soft. Bowel sounds are normal.   Neurological: She is alert and oriented to person, place, and time.   Skin: There is erythema.   Left ankle and lower leg erythema swelling and tender    Psychiatric: She has a normal mood and affect. Her behavior is normal.       All tests have been reviewed.    Assessment/Plan   There are no diagnoses linked to this encounter.            Cellulitis for 2 weeks    Keflex start  Wound care next week  If no better then bone scan since recurrent.

## 2019-10-21 ENCOUNTER — TELEPHONE (OUTPATIENT)
Dept: INTERNAL MEDICINE | Facility: CLINIC | Age: 74
End: 2019-10-21

## 2019-10-22 NOTE — TELEPHONE ENCOUNTER
Do we have the form?  
Pt called and was wanting to know if you did Biometric screenings? Please advise.   
Pt notified that we have the forms here   
none

## 2019-10-23 ENCOUNTER — OFFICE VISIT (OUTPATIENT)
Dept: INTERNAL MEDICINE | Facility: CLINIC | Age: 74
End: 2019-10-23

## 2019-10-23 VITALS
SYSTOLIC BLOOD PRESSURE: 118 MMHG | HEART RATE: 74 BPM | TEMPERATURE: 97.9 F | WEIGHT: 113.8 LBS | OXYGEN SATURATION: 97 % | BODY MASS INDEX: 17.86 KG/M2 | DIASTOLIC BLOOD PRESSURE: 64 MMHG | HEIGHT: 67 IN | RESPIRATION RATE: 16 BRPM

## 2019-10-23 DIAGNOSIS — H93.19 TINNITUS, UNSPECIFIED LATERALITY: ICD-10-CM

## 2019-10-23 DIAGNOSIS — K52.9 COLITIS: ICD-10-CM

## 2019-10-23 DIAGNOSIS — E55.9 VITAMIN D DEFICIENCY: ICD-10-CM

## 2019-10-23 DIAGNOSIS — R19.7 DIARRHEA, UNSPECIFIED TYPE: ICD-10-CM

## 2019-10-23 DIAGNOSIS — M54.32 SCIATICA OF LEFT SIDE: ICD-10-CM

## 2019-10-23 DIAGNOSIS — E78.5 HYPERLIPIDEMIA, UNSPECIFIED HYPERLIPIDEMIA TYPE: Primary | ICD-10-CM

## 2019-10-23 DIAGNOSIS — M81.0 OSTEOPOROSIS, UNSPECIFIED OSTEOPOROSIS TYPE, UNSPECIFIED PATHOLOGICAL FRACTURE PRESENCE: ICD-10-CM

## 2019-10-23 DIAGNOSIS — G47.00 INSOMNIA, UNSPECIFIED TYPE: ICD-10-CM

## 2019-10-23 DIAGNOSIS — H40.9 GLAUCOMA, UNSPECIFIED GLAUCOMA TYPE, UNSPECIFIED LATERALITY: ICD-10-CM

## 2019-10-23 DIAGNOSIS — M75.81 ROTATOR CUFF TENDONITIS, RIGHT: ICD-10-CM

## 2019-10-23 DIAGNOSIS — L03.116 CELLULITIS OF LEFT LOWER EXTREMITY: ICD-10-CM

## 2019-10-23 PROCEDURE — 99214 OFFICE O/P EST MOD 30 MIN: CPT | Performed by: INTERNAL MEDICINE

## 2019-10-23 RX ORDER — GENTAMICIN SULFATE 1 MG/G
CREAM TOPICAL
Refills: 3 | COMMUNITY
Start: 2019-10-11 | End: 2021-06-14

## 2019-10-23 NOTE — PROGRESS NOTES
Subjective   Odalys Miles is a 74 y.o. female.     Chief Complaint   Patient presents with   • Follow-up     2 mo f/u, pt wants biometric screening done   • Allergic Rhinitis     pt reports worse year had to go thru       History of Present Illness   Patient here for follow-up of.  Patient still has diarrhea budesonide for possible colitis.  Patient states the symptoms remains.  No abdominal pain.  Patient has a good appetite no nausea vomiting.  Sleeping stable.  Hyperlipidemia on diet is stable now.  Osteopenia on Os-Behzad D.  Cellulitis patient still seeing wound care specialist.  Sciatica stable.  Also has allergy runny nose    Current Outpatient Medications:   •  aspirin 81 MG EC tablet, Take 81 mg by mouth Daily., Disp: , Rfl:   •  Budesonide (ENTOCORT EC) 3 MG 24 hr capsule, TAKE 3 CAPSULES BY MOUTH DAILY FOR 90 DAYS., Disp: 270 capsule, Rfl: 1  •  calcium (OS-BEHZAD) 600 MG tablet, Take  by mouth 2 (Two) Times a Day., Disp: , Rfl:   •  cephalexin (KEFLEX) 500 MG capsule, Take 1 capsule by mouth 4 (Four) Times a Day., Disp: 28 capsule, Rfl: 0  •  Cholecalciferol (VITAMIN D3) 1000 UNITS capsule, Take 1 capsule by mouth Daily., Disp: , Rfl:   •  folic acid (FOLVITE) 800 MCG tablet, Take  by mouth Daily., Disp: , Rfl:   •  LUMIGAN 0.01 % ophthalmic drops, , Disp: , Rfl:   •  Multiple Vitamins-Minerals (MULTIVITAMIN ADULT PO), Take  by mouth., Disp: , Rfl:   •  Probiotic Product (PROBIOTIC ADVANCED PO), Take  by mouth., Disp: , Rfl:   •  SIMBRINZA 1-0.2 % suspension, INSTILL 1 DROP INTO BOTH EYES TWICE A DAY, Disp: , Rfl: 3    The following portions of the patient's history were reviewed and updated as appropriate: allergies, current medications, past family history, past medical history, past social history, past surgical history and problem list.    Review of Systems   Constitutional: Negative.    Respiratory: Negative.    Cardiovascular: Negative.    Gastrointestinal: Positive for diarrhea.   Musculoskeletal:  Negative.    Skin: Negative.    Neurological: Negative.    Psychiatric/Behavioral: Negative.        Objective   Physical Exam   Constitutional: She is oriented to person, place, and time. She appears well-developed and well-nourished.   Neck: Neck supple.   Cardiovascular: Normal rate, regular rhythm and normal heart sounds.   Pulmonary/Chest: Effort normal and breath sounds normal.   Abdominal: Bowel sounds are normal.   Neurological: She is alert and oriented to person, place, and time.   Skin: Skin is warm.   Psychiatric: She has a normal mood and affect.       All tests have been reviewed.    Assessment/Plan   Diagnoses and all orders for this visit:        Vitamin D deficiency cont supplement    Colitis cont med and follow up with GI, need 2nd opinion from     Tinnitus, unspecified laterality    Sciatica of left side    Osteoporosis, unspecified osteoporosis type, unspecified pathological fracture presence    Rotator cuff tendonitis, right    Glaucoma, unspecified glaucoma type, unspecified laterality    Insomnia, unspecified type    Cellulitis of left lower extremity follow up with [facility] wound care.    Allergy cont med, zyrtec flonase             historical record below  Diarrhea, Microscopic colitis, unspecified microscopic colitis type with sx, continue budesinide, follow GI , improved after steroid and probiotics and imodium, avoid dairy product. Follow up with GI.   Sciatica of left side refer to Physical Therapy and aleve improved  Osteopenia on  dexa continue oscal d  Tinnitus seen by ENT  insomnia melatonin continue, decline medicine  Hyperlipidemia contine tx  Vitamin D deficiency continue supplement  tdap  Pneumovax zostavax done. prevnar done  Right shoulder pain rotator cuff tendonitis, cortisone shot improved  Mammogram normal  colonoscopy ref scope and recent BRBPR   Td 3/1/2013      6 mo PE

## 2019-12-30 ENCOUNTER — HOSPITAL ENCOUNTER (INPATIENT)
Facility: HOSPITAL | Age: 74
LOS: 8 days | Discharge: HOME HEALTH | DRG: 561 | End: 2020-01-07
Attending: INTERNAL MEDICINE | Admitting: INTERNAL MEDICINE
Payer: MEDICARE

## 2019-12-30 PROCEDURE — 6370000000 HC RX 637 (ALT 250 FOR IP): Performed by: INTERNAL MEDICINE

## 2019-12-30 PROCEDURE — 1200000002 HC SEMI PRIVATE SWING BED

## 2019-12-30 PROCEDURE — 99308 SBSQ NF CARE LOW MDM 20: CPT | Performed by: INTERNAL MEDICINE

## 2019-12-30 RX ORDER — ASPIRIN 81 MG/1
81 TABLET, CHEWABLE ORAL 2 TIMES DAILY
Status: DISCONTINUED | OUTPATIENT
Start: 2019-12-30 | End: 2020-01-07 | Stop reason: HOSPADM

## 2019-12-30 RX ORDER — ACETAMINOPHEN 500 MG
500 TABLET ORAL EVERY 6 HOURS PRN
Status: ON HOLD | COMMUNITY
End: 2020-01-07 | Stop reason: HOSPADM

## 2019-12-30 RX ORDER — ELECTROLYTES/DEXTROSE
1 SOLUTION, ORAL ORAL DAILY
Status: ON HOLD | COMMUNITY
End: 2022-05-02 | Stop reason: SDUPTHER

## 2019-12-30 RX ORDER — HYDROCODONE BITARTRATE AND ACETAMINOPHEN 7.5; 325 MG/1; MG/1
1 TABLET ORAL EVERY 6 HOURS PRN
Status: DISCONTINUED | OUTPATIENT
Start: 2019-12-30 | End: 2020-01-07 | Stop reason: HOSPADM

## 2019-12-30 RX ORDER — ACETAMINOPHEN 325 MG/1
650 TABLET ORAL EVERY 4 HOURS PRN
Status: DISCONTINUED | OUTPATIENT
Start: 2019-12-30 | End: 2020-01-07 | Stop reason: HOSPADM

## 2019-12-30 RX ORDER — CYANOCOBALAMIN (VITAMIN B-12) 500 MCG
0.8 TABLET ORAL DAILY
Status: ON HOLD | COMMUNITY
End: 2022-05-02 | Stop reason: SDUPTHER

## 2019-12-30 RX ORDER — MELATONIN
2 TIMES DAILY
Status: ON HOLD | COMMUNITY
End: 2022-02-20

## 2019-12-30 RX ORDER — M-VIT,TX,IRON,MINS/CALC/FOLIC 27MG-0.4MG
1 TABLET ORAL DAILY
Status: DISCONTINUED | OUTPATIENT
Start: 2019-12-30 | End: 2020-01-07 | Stop reason: HOSPADM

## 2019-12-30 RX ORDER — LOPERAMIDE HYDROCHLORIDE 2 MG/1
2 CAPSULE ORAL 4 TIMES DAILY PRN
Status: DISCONTINUED | OUTPATIENT
Start: 2019-12-30 | End: 2020-01-07 | Stop reason: HOSPADM

## 2019-12-30 RX ORDER — ONDANSETRON 2 MG/ML
4 INJECTION INTRAMUSCULAR; INTRAVENOUS EVERY 6 HOURS PRN
Status: DISCONTINUED | OUTPATIENT
Start: 2019-12-30 | End: 2020-01-07 | Stop reason: HOSPADM

## 2019-12-30 RX ORDER — LACTOBACILLUS RHAMNOSUS GG 10B CELL
1 CAPSULE ORAL
Status: DISCONTINUED | OUTPATIENT
Start: 2019-12-31 | End: 2020-01-07 | Stop reason: HOSPADM

## 2019-12-30 RX ADMIN — ACETAMINOPHEN 650 MG: 325 TABLET, FILM COATED ORAL at 21:04

## 2019-12-30 RX ADMIN — LOPERAMIDE HYDROCHLORIDE 2 MG: 2 CAPSULE ORAL at 21:04

## 2019-12-30 RX ADMIN — PSYLLIUM HUSK 1 PACKET: 3.4 POWDER ORAL at 21:04

## 2019-12-30 ASSESSMENT — PAIN SCALES - GENERAL: PAINLEVEL_OUTOF10: 4

## 2019-12-30 NOTE — H&P
History and Physical    Patient:  Archie Childers    CHIEF COMPLAINT:    Hip pain     HISTORY OF PRESENT ILLNESS:   The patient is a 66 y.o. female who had a fall at home going down the stairs to her basement that resulted into left hip fracture, patient admitted to [address] [place] [address] in [place], and she was taken to the OR by Dr. Charlotte Stone, underwent left total hip arthroplasty through an anterior approach, apparently no immediate postoperative complications, during her hospital stay, patient was on pain medication with timolol which is controlling well her pain and she does not need to be on any narcotics per discharge summary, she is on DVT prophylaxis as per Dr. Dipak Castañeda protocol with aspirin 81 mg twice daily for 45 days, patient admitted for further physical therapy. Past Medical History:      Diagnosis Date    Colitis        Past Surgical History:      Procedure Laterality Date    CYST REMOVAL      EYE SURGERY      SKIN BIOPSY      TUBAL LIGATION         Medications Prior to Admission:    Prior to Admission medications    Medication Sig Start Date End Date Taking?  Authorizing Provider   aspirin 81 MG tablet Take 81 mg by mouth daily   Yes Historical Provider, MD   bimatoprost (LUMIGAN) 0.01 % SOLN ophthalmic drops Place 1 drop into both eyes nightly   Yes Historical Provider, MD   Bacillus Coagulans-Inulin (PROBIOTIC FORMULA PO) Take 1 capsule by mouth daily   Yes Historical Provider, MD   brinzolamide-brimonidine (SIMBRINZA) 1-0.2 % SUSP Apply 1 drop to eye 2 times daily Both eyes   Yes Historical Provider, MD   Cholecalciferol (VITAMIN D3) 25 MCG (1000 UT) TABS Take by mouth 2 times daily   Yes Historical Provider, MD   acetaminophen (TYLENOL) 500 MG tablet Take 500 mg by mouth every 6 hours as needed for Pain   Yes Historical Provider, MD   Folic Acid 0.8 MG CAPS Take 0.8 mg by mouth daily   Yes Historical Provider, MD   Multiple Vitamins-Minerals (MULTIVITAMIN ADULT) TABS Take 1 tablet by mouth 12/30/2019 at 5:41 PM

## 2019-12-31 PROBLEM — R53.81 DECLINING FUNCTIONAL STATUS: Status: ACTIVE | Noted: 2019-12-31

## 2019-12-31 PROCEDURE — 97165 OT EVAL LOW COMPLEX 30 MIN: CPT

## 2019-12-31 PROCEDURE — 97530 THERAPEUTIC ACTIVITIES: CPT

## 2019-12-31 PROCEDURE — 6360000002 HC RX W HCPCS: Performed by: INTERNAL MEDICINE

## 2019-12-31 PROCEDURE — 6370000000 HC RX 637 (ALT 250 FOR IP): Performed by: INTERNAL MEDICINE

## 2019-12-31 PROCEDURE — 97161 PT EVAL LOW COMPLEX 20 MIN: CPT

## 2019-12-31 PROCEDURE — 97802 MEDICAL NUTRITION INDIV IN: CPT

## 2019-12-31 PROCEDURE — 1200000002 HC SEMI PRIVATE SWING BED

## 2019-12-31 RX ADMIN — ASPIRIN 81 MG 81 MG: 81 TABLET ORAL at 05:59

## 2019-12-31 RX ADMIN — MULTIPLE VITAMINS W/ MINERALS TAB 1 TABLET: TAB at 08:49

## 2019-12-31 RX ADMIN — Medication 1 CAPSULE: at 08:49

## 2019-12-31 RX ADMIN — ACETAMINOPHEN 650 MG: 325 TABLET, FILM COATED ORAL at 23:07

## 2019-12-31 RX ADMIN — PSYLLIUM HUSK 1 PACKET: 3.4 POWDER ORAL at 08:50

## 2019-12-31 RX ADMIN — ASPIRIN 81 MG 81 MG: 81 TABLET ORAL at 16:53

## 2019-12-31 RX ADMIN — ENOXAPARIN SODIUM 40 MG: 40 INJECTION SUBCUTANEOUS at 08:49

## 2019-12-31 ASSESSMENT — PAIN SCALES - GENERAL
PAINLEVEL_OUTOF10: 2
PAINLEVEL_OUTOF10: 2

## 2019-12-31 NOTE — PROGRESS NOTES
Occupational Therapy   Occupational Therapy Initial Assessment  Date: 2019   Patient Name: Kylie Valdez  MRN: [id number]     : 1945    Date of Service: 2019    Discharge Recommendations:  Home with assist PRN  OT Equipment Recommendations  Equipment Needed: Yes  Mobility Devices: ADL Assistive Devices    Assessment   Performance deficits / Impairments: Decreased functional mobility ; Decreased strength;Decreased ADL status; Decreased safe awareness  Assessment: Pt was seen for skilled OT evaluation session. No pain reported throughout. Pt reports I with ADL & IADL tasks. However, upon interview, pt has not been adhering to safety precautions for THR when performing tasks. Pt education was provided about precautions & allowing assistants to help as needed rather than attempting tasks which could break precautions. Pt is a good candidate for skilled OT interventions due to poor safety awareness. Family member present upon exit; pt seated supine in bed with call bell in reach. Prognosis: Good  Decision Making: Low Complexity  OT Education: OT Role;Precautions; Family Education;IADL Safety; ADL Adaptive Strategies; Plan of Care  REQUIRES OT FOLLOW UP: Yes  Activity Tolerance  Activity Tolerance: Patient Tolerated treatment well           Patient Diagnosis(es): There were no encounter diagnoses. has a past medical history of Colitis, GERD (gastroesophageal reflux disease), Glaucoma, and PAD (peripheral artery disease) ([facility] [address] has a past surgical history that includes Tubal ligation; eye surgery; cyst removal; skin biopsy; and Total hip arthroplasty (Left, 2019).            Restrictions  Restrictions/Precautions  Restrictions/Precautions: Surgical Protocols, General Precautions(WBAT)  Required Braces or Orthoses?: No  Position Activity Restriction  Hip Precautions: No ADduction, No hip flexion > 90 degrees, No hip internal rotation    Subjective   General  Chart Reviewed: Yes  Patient assessed for rehabilitation services?: Yes  Family / Caregiver Present: Yes  Diagnosis: L THR  Patient Currently in Pain: Denies  Pain Assessment  Response to Pain Intervention: Patient Satisfied  Vital Signs  Patient Currently in Pain: Denies  Social/Functional History  Social/Functional History  Lives With: Alone  Type of Home: House  Home Layout: Two level  Home Access: Stairs to enter without rails  Entrance Stairs - Number of Steps: 2  Bathroom Shower/Tub: Tub/Shower unit  Bathroom Toilet: Standard  Home Equipment: Rolling walker  ADL Assistance: (Pt reports I with ADL tasks. Pt education provided about hip precautions which currently are not being adhered to during ADL tasks. )  Homemaking Assistance: Independent  Homemaking Responsibilities: Yes  Ambulation Assistance: Independent(Mod I with RW)  Transfer Assistance: Independent(Mod I with RW)  Active : Yes  Mode of Transportation: Car       Objective   Vision: Impaired  Vision Exceptions: Wears glasses at all times  Hearing: Within functional limits    Orientation  Overall Orientation Status: Within Functional Limits  Observation/Palpation  Posture: Fair  Observation: Supine in bed with family member present; NAD; RM; mild edema L LE  Functional Mobility  Functional - Mobility Device: Standard Walker  Assist Level: Contact guard assistance  ADL  Feeding: (Pt reports I with ADL tasks;  Pt reports to adhering to hip precautions; pt education provided to call nursing staff when assistance is needed to follow hip precautions)        Bed mobility  Rolling to Right: Modified independent  Supine to Sit: Modified independent  Sit to Supine: Modified independent  Transfers  Stand Step Transfers: Contact guard assistance     Cognition  Overall Cognitive Status: WFL       LUE AROM (degrees)  LUE AROM : WFL  Left Hand AROM (degrees)  Left Hand AROM: WFL  RUE AROM (degrees)  RUE AROM : WFL  Right Hand AROM (degrees)  Right Hand AROM: WFL  LUE Strength  Gross LUE Strength:

## 2019-12-31 NOTE — CONSULTS
Nutrition Assessment    Type and Reason for Visit: Initial, Positive Nutrition Screen, Consult    Nutrition Recommendations: Recommend continuing MVI, starting ONS and encouraging intakes as appropriate. Nutrition Assessment: Pt is at risk for ongoing nutritional compromisie AEB weight loss, diarrhea and has wound on hip from surgery. Will offer supplement. Pt has MVI in place. Malnutrition Assessment:  · Malnutrition Status: At risk for malnutrition  · Context: Chronic illness  · Findings of the 6 clinical characteristics of malnutrition (Minimum of 2 out of 6 clinical characteristics is required to make the diagnosis of moderate or severe Protein Calorie Malnutrition based on AND/ASPEN Guidelines):  1. Energy Intake- ,      2. Weight Loss-5% loss or greater, in 1 year  3. Fat Loss-Mild subcutaneous fat loss,    4. Muscle Loss-Unable to assess,    5. Fluid Accumulation-Moderate to severe fluid accumulation(hip fracture, surgery on hip), Extremities  6.  Strength-     Nutrition Risk Level: Moderate, Low    Nutrient Needs:  · Estimated Daily Total Kcal: 6894-2668  · Estimated Daily Protein (g): 76(1.5 gm/kg)  · Estimated Daily Total Fluid (ml/day): 2101-7902( 1 ml/kcal of est needs)    Nutrition Diagnosis:   · Problem: Predicted suboptimal energy intake  · Etiology: related to Diarrhea     Signs and symptoms:  as evidenced by BMI, Weight loss, Localized or generalized fluid accumulation    Objective Information:  · Nutrition-Focused Physical Findings: Pt is underweight, has lost 4 lb in past 3 months, has a hip fracture, pitting +1 edema lower extremities. Good appetite, but has frequent diarrhea. Pt is on fiber supplement and immodium as needed.   · Wound Type: Surgical Wound  · Current Nutrition Therapies:  · Oral Diet Orders: General   · Oral Diet intake:    · Oral Nutrition Supplement (ONS) Orders:    · ONS intake: %  · Anthropometric Measures:  · Ht: 5' 7\" (170.2 cm)   · Current Body Wt: 114

## 2019-12-31 NOTE — PROGRESS NOTES
to Chair: Stand by assistance  Ambulation  Ambulation?: Yes  Ambulation 1  Surface: level tile  Device: Rolling Walker  Assistance: Stand by assistance  Gait Deviations: Slow Tawana;Decreased step length;Decreased step height  Distance: 100 feet     Balance  Posture: Fair  Sitting - Static: Good  Sitting - Dynamic: Good  Standing - Static: Good;-  Standing - Dynamic: [address]  Times per week: 4-5 x week  Plan weeks: 1-2 weeks  Current Treatment Recommendations: Strengthening, Gait Training, Safety Education & Training, Home Exercise Program, Manual Therapy - Soft Tissue Mobilization, Neuromuscular Re-education, Transfer Training, Functional Mobility Training, Balance Training               Goals  Long term goals  Time Frame for Long term goals : 1-2 weeks  Long term goal 1: Achieve Fair+ dynamic standing balance. Long term goal 2: Ambulate 500 feet on level surfaces with standard cane with mod Argenta. Long term goal 3: Ascend/descend 2 flights of stairs with one handrail with good safety awareness and mod Argenta. Long term goal 4: Independent with HEP. Patient Goals   Patient goals : return to independence and home       Therapy Time   Individual Concurrent Group Co-treatment   Time In 0849         Time Out 0913         Minutes 24                 Electronically signed by Houston Hutchins PT on 12/31/2019 at 3:97 AM      Certification of Medical Necessity: It will be understood that this treatment plan is certified medically necessary by the documenting therapist and referring physician mentioned in this report. Unless the physician indicated otherwise through written correspondence with our office, all further referrals will act as certification of medical necessity on this treatment plan. Thank you for this referral.  If you have questions regarding this plan of care, please call [phone].           Revisions to this plan (optional):                     Please sign and return

## 2019-12-31 NOTE — FLOWSHEET NOTE
Pt laying in bed, Alert, No acute distress noted at this time. Pt is Alert and Oriented x 4. Pt ambulated to bathroom with walker, stand by assist. Incision dressing remains clean, dry, intact. Pt reports pain at this time is under control. No change from previous assessment. POC for the day reviewed. Pt agrees she will participated in swing bed Activity for the day reviewed. . See MAR. Pt denies questions. Pt denies any other needs at this time. Will continue to monitor. 12/31/19 3680   Assessment   Charting Type Shift assessment   Neurological   Neuro (WDL) WDL   LLE Motor Response Responds to command   RLE Motor Response Responds to command   R Foot Plantar Flexion Strong   L Foot Plantar Flexion Moderate   R Foot Dorsiflexion Strong   L Foot Dorsiflexion Moderate   Denzel Coma Scale   Eye Opening 4   Best Verbal Response 5   Best Motor Response 6   [place] Coma Scale Score 15   HEENT   HEENT (WDL) WDL   Respiratory   Respiratory (WDL) WDL   Cardiac   Cardiac (WDL) WDL   Gastrointestinal   Abdominal (WDL) WDL   Peripheral Vascular   Peripheral Vascular (WDL) WDL   Edema Left lower extremity   LLE Edema Pitting;+1   Skin Color/Condition   Skin Color/Condition (WDL) WDL   Skin Integrity   Skin Integrity (WDL) WDL   Musculoskeletal   Musculoskeletal (WDL) X   RUE Full movement   LUE Full movement   RL Extremity Full movement   LL Extremity Weakness; Unsteady;Surgery; Swelling   Genitourinary   Genitourinary (WDL) WDL   Flank Tenderness No   Suprapubic Tenderness No   Dysuria No   Psychosocial   Psychosocial (WDL) WDL

## 2020-01-01 PROCEDURE — 1200000002 HC SEMI PRIVATE SWING BED

## 2020-01-01 PROCEDURE — 6370000000 HC RX 637 (ALT 250 FOR IP): Performed by: INTERNAL MEDICINE

## 2020-01-01 RX ADMIN — PSYLLIUM HUSK 1 PACKET: 3.4 POWDER ORAL at 08:43

## 2020-01-01 RX ADMIN — MULTIPLE VITAMINS W/ MINERALS TAB 1 TABLET: TAB at 08:41

## 2020-01-01 RX ADMIN — ACETAMINOPHEN 650 MG: 325 TABLET, FILM COATED ORAL at 21:21

## 2020-01-01 RX ADMIN — Medication 1 CAPSULE: at 08:40

## 2020-01-01 RX ADMIN — ACETAMINOPHEN 650 MG: 325 TABLET, FILM COATED ORAL at 08:41

## 2020-01-01 RX ADMIN — ASPIRIN 81 MG 81 MG: 81 TABLET ORAL at 06:31

## 2020-01-01 RX ADMIN — ASPIRIN 81 MG 81 MG: 81 TABLET ORAL at 21:21

## 2020-01-01 ASSESSMENT — PAIN SCALES - GENERAL
PAINLEVEL_OUTOF10: 2
PAINLEVEL_OUTOF10: 3

## 2020-01-01 NOTE — PLAN OF CARE
Problem: Falls - Risk of:  Goal: Will remain free from falls  Description  Will remain free from falls  Outcome: Ongoing     Problem: Daily Care:  Goal: Daily care needs are met  Description  Daily care needs are met  Outcome: Ongoing

## 2020-01-02 PROCEDURE — 6370000000 HC RX 637 (ALT 250 FOR IP): Performed by: INTERNAL MEDICINE

## 2020-01-02 PROCEDURE — 1200000002 HC SEMI PRIVATE SWING BED

## 2020-01-02 PROCEDURE — 97116 GAIT TRAINING THERAPY: CPT | Performed by: PHYSICAL THERAPY ASSISTANT

## 2020-01-02 PROCEDURE — 97535 SELF CARE MNGMENT TRAINING: CPT

## 2020-01-02 PROCEDURE — 97110 THERAPEUTIC EXERCISES: CPT | Performed by: PHYSICAL THERAPY ASSISTANT

## 2020-01-02 RX ADMIN — ASPIRIN 81 MG 81 MG: 81 TABLET ORAL at 20:08

## 2020-01-02 RX ADMIN — Medication 1 CAPSULE: at 07:52

## 2020-01-02 RX ADMIN — MULTIPLE VITAMINS W/ MINERALS TAB 1 TABLET: TAB at 07:52

## 2020-01-02 RX ADMIN — ASPIRIN 81 MG 81 MG: 81 TABLET ORAL at 05:56

## 2020-01-02 ASSESSMENT — PAIN SCALES - GENERAL: PAINLEVEL_OUTOF10: 0

## 2020-01-02 NOTE — PLAN OF CARE
Problem: Safety:  Goal: Free from accidental physical injury  Description  Free from accidental physical injury  Outcome: Ongoing     Problem: Daily Care:  Goal: Daily care needs are met  Description  Daily care needs are met  Outcome: Ongoing

## 2020-01-02 NOTE — PROGRESS NOTES
Occupational Therapy  Facility/Department: [facility] MED SURG  Daily Treatment Note  NAME: Odalys Howard  : 1945  MRN: [id number]    Date of Service: 2020    Discharge Recommendations:  Home with assist PRN       Assessment   Performance deficits / Impairments: Decreased functional mobility ; Decreased strength;Decreased ADL status; Decreased safe awareness  Assessment: Pt was seen for skilled OT intervention session. No pain reported throughout. Pt moved from supine to EOB & ambulated to bathroom with Mod I using RW. Pt was cued to adhere to hip precautions for pressure relief during transfers. Pt was able to don/doff clothing with Mod I with visual demonstration when using reacher & sock aid. Pt required min verbal cueing to adhere to hip safety precuations during dressing & bathing tasks. Pt showered while seated & performed grooming tasks at sink standing with RW with Mod I. Pt reported feeling dizzy at end of session. Pt education was provided about sitting up in bedside chair as much as possible rather than lying in bed. Upon exit, pt was in bed with call bell in reach. Activity Tolerance  Activity Tolerance: Patient Tolerated treatment well         Patient Diagnosis(es): There were no encounter diagnoses. has a past medical history of Colitis, GERD (gastroesophageal reflux disease), Glaucoma, and PAD (peripheral artery disease) ([facility] [address] has a past surgical history that includes Tubal ligation; eye surgery; cyst removal; skin biopsy; and Total hip arthroplasty (Left, 2019).     Restrictions  Restrictions/Precautions  Restrictions/Precautions: Surgical Protocols, General Precautions(WBAT)  Required Braces or Orthoses?: No  Position Activity Restriction  Hip Precautions: No ADduction, No hip flexion > 90 degrees, No hip internal rotation  Subjective   General  Chart Reviewed: Yes  Patient assessed for rehabilitation services?: Yes  Family / Caregiver Present: Yes  Diagnosis: L THR Objective    ADL  Grooming: Modified independent   UE Bathing: Modified independent   LE Bathing: Modified independent   UE Dressing: Modified independent   LE Dressing: Modified independent (Used reacher & soc aid after visual demontration with min verbal cues )  Toileting: Modified independent         Balance  Sitting Balance: Modified independent   Standing Balance: Modified independent   Functional Mobility  Functional - Mobility Device: Standard Walker  Assist Level: Modified independent   Bed mobility  Rolling to Right: Modified independent  Supine to Sit: Modified independent  Sit to Supine: Modified independent  Transfers  Stand Step Transfers: Modified independent      Plan   Plan  Times per week: 3-5  Times per day: Daily  Plan weeks: 1-2  Current Treatment Recommendations: Strengthening, Patient/Caregiver Education & Training, Equipment Evaluation, Education, & procurement, Balance Training, Safety Education & Training, Self-Care / ADL, Stair training    Goals  Short term goals  Time Frame for Short term goals: 1-2 week  Short term goal 1: Pt will perform HEP with Mod I   Short term goal 2: Pt will dress self with Mod I while adhering to safety precautions with AE as needed   Short term goal 3: Pt will bathe self with Mod I while adhering to safety precautions with AE as needed  Short term goal 4: Pt will toilet with Mod I while adhering to safety precautions with Ae as needed       Therapy Time   Individual Concurrent Group Co-treatment   Time In 0856         Time Out 1003         Minutes 67               In event of d/c, this note will serve as discharge summary.    LISSETH Huang/L

## 2020-01-03 PROCEDURE — 97110 THERAPEUTIC EXERCISES: CPT | Performed by: PHYSICAL THERAPY ASSISTANT

## 2020-01-03 PROCEDURE — 97110 THERAPEUTIC EXERCISES: CPT

## 2020-01-03 PROCEDURE — 1200000002 HC SEMI PRIVATE SWING BED

## 2020-01-03 PROCEDURE — 97535 SELF CARE MNGMENT TRAINING: CPT

## 2020-01-03 PROCEDURE — 6370000000 HC RX 637 (ALT 250 FOR IP): Performed by: INTERNAL MEDICINE

## 2020-01-03 RX ADMIN — MULTIPLE VITAMINS W/ MINERALS TAB 1 TABLET: TAB at 08:29

## 2020-01-03 RX ADMIN — ACETAMINOPHEN 650 MG: 325 TABLET, FILM COATED ORAL at 08:35

## 2020-01-03 RX ADMIN — ASPIRIN 81 MG 81 MG: 81 TABLET ORAL at 05:32

## 2020-01-03 RX ADMIN — ASPIRIN 81 MG 81 MG: 81 TABLET ORAL at 17:13

## 2020-01-03 RX ADMIN — LOPERAMIDE HYDROCHLORIDE 2 MG: 2 CAPSULE ORAL at 19:56

## 2020-01-03 RX ADMIN — ACETAMINOPHEN 650 MG: 325 TABLET, FILM COATED ORAL at 20:02

## 2020-01-03 RX ADMIN — Medication 1 CAPSULE: at 08:29

## 2020-01-03 RX ADMIN — PSYLLIUM HUSK 1 PACKET: 3.4 POWDER ORAL at 08:29

## 2020-01-03 ASSESSMENT — PAIN SCALES - GENERAL
PAINLEVEL_OUTOF10: 2
PAINLEVEL_OUTOF10: 3

## 2020-01-03 NOTE — PROGRESS NOTES
Physical Therapy  Facility/Department: [facility] MED SURG  Daily Treatment Note  NAME: Arvin Whitt  : 1945  MRN: [id number]    Date of Service: 1/3/2020    Discharge Recommendations:  Home with Home health PT, Home with assist PRN, Home independently, Outpatient PT        Assessment   Assessment: Patient agreeable to PT skilled services. Patient requests to only perform excercise today secondary to knee soreness. Patient able to perform and complete graded therex without any adverse reactions. Patient left upright in chair, no needs noted att. Activity Tolerance  Activity Tolerance: Patient Tolerated treatment well     Patient Diagnosis(es): There were no encounter diagnoses. has a past medical history of Colitis, GERD (gastroesophageal reflux disease), Glaucoma, and PAD (peripheral artery disease) ([facility] [address] has a past surgical history that includes Tubal ligation; eye surgery; cyst removal; skin biopsy; and Total hip arthroplasty (Left, 2019). Restrictions  Restrictions/Precautions  Restrictions/Precautions: Surgical Protocols, General Precautions(WBAT)  Required Braces or Orthoses?: No  Position Activity Restriction  Hip Precautions: No ADduction, No hip flexion > 90 degrees, No hip internal rotation  Subjective   General  Chart Reviewed: Yes  Response To Previous Treatment: Not applicable  Family / Caregiver Present: No  Subjective  Subjective: Patient agreeabel to PT skilled services, reports mild knee soreness in LLE. Does not rate pain. Patient states \"I think I over did it yesterday. \"          Objective      Exercises  Hip Flexion: [id number]  Hip Abduction: and adduction [id number]  Knee Long Arc Quad: [id number]  Comments: HS curls with manual resistance [id number]  Other exercises  Other exercises 1: toe raises and mini squats [id number]   PROM RLE (degrees)  RLE PROM: WFL  AROM RLE (degrees)  RLE AROM: WFL  PROM LLE (degrees)  LLE PROM: WFL  AROM LLE (degrees)  LLE AROM : [facility]

## 2020-01-03 NOTE — PLAN OF CARE
harm  1/3/2020 1026 by Hope Coats RN  Outcome: Ongoing  1/2/2020 2309 by Hunter Henry RN  Outcome: Ongoing     Problem: Daily Care:  Goal: Daily care needs are met  Description  Daily care needs are met  1/3/2020 1026 by Hope Coats RN  Outcome: Ongoing  1/2/2020 2309 by Hunter Henry RN  Outcome: Ongoing     Problem: Pain:  Goal: Patient's pain/discomfort is manageable  Description  Patient's pain/discomfort is manageable  1/3/2020 1026 by Hope Coats RN  Outcome: Ongoing  1/2/2020 2309 by Hunter Henry RN  Outcome: Ongoing     Problem: Skin Integrity:  Goal: Skin integrity will stabilize  Description  Skin integrity will stabilize  1/3/2020 1026 by Hope Coats RN  Outcome: Ongoing  1/2/2020 2309 by Hunter Henry RN  Outcome: Ongoing     Problem: Discharge Planning:  Goal: Patients continuum of care needs are met  Description  Patients continuum of care needs are met  1/3/2020 1026 by Hope Coats RN  Outcome: Ongoing  1/2/2020 2309 by Hunter Henry RN  Outcome: Ongoing

## 2020-01-04 LAB
ANION GAP SERPL CALCULATED.3IONS-SCNC: 9 MMOL/L (ref 3–16)
BUN BLDV-MCNC: 14 MG/DL (ref 6–20)
CALCIUM SERPL-MCNC: 8.8 MG/DL (ref 8.5–10.5)
CHLORIDE BLD-SCNC: 105 MMOL/L (ref 98–107)
CO2: 26 MMOL/L (ref 20–30)
CREAT SERPL-MCNC: 0.6 MG/DL (ref 0.4–1.2)
GFR AFRICAN AMERICAN: >59
GFR NON-AFRICAN AMERICAN: >60
GLUCOSE BLD-MCNC: 105 MG/DL (ref 74–106)
HCT VFR BLD CALC: 32 % (ref 37–47)
HEMOGLOBIN: 9.8 G/DL (ref 11.5–16.5)
MCH RBC QN AUTO: 27.8 PG (ref 27–32)
MCHC RBC AUTO-ENTMCNC: 30.6 G/DL (ref 31–35)
MCV RBC AUTO: 90.9 FL (ref 80–100)
PDW BLD-RTO: 13.2 % (ref 11–16)
PLATELET # BLD: 360 K/UL (ref 150–400)
PMV BLD AUTO: 9.4 FL (ref 6–10)
POTASSIUM SERPL-SCNC: 3.3 MMOL/L (ref 3.4–5.1)
RBC # BLD: 3.52 M/UL (ref 3.8–5.8)
SODIUM BLD-SCNC: 140 MMOL/L (ref 136–145)
WBC # BLD: 6.3 K/UL (ref 4–11)

## 2020-01-04 PROCEDURE — 6370000000 HC RX 637 (ALT 250 FOR IP): Performed by: NURSE PRACTITIONER

## 2020-01-04 PROCEDURE — 1200000002 HC SEMI PRIVATE SWING BED

## 2020-01-04 PROCEDURE — 36415 COLL VENOUS BLD VENIPUNCTURE: CPT

## 2020-01-04 PROCEDURE — 85027 COMPLETE CBC AUTOMATED: CPT

## 2020-01-04 PROCEDURE — 6370000000 HC RX 637 (ALT 250 FOR IP): Performed by: INTERNAL MEDICINE

## 2020-01-04 PROCEDURE — 80048 BASIC METABOLIC PNL TOTAL CA: CPT

## 2020-01-04 RX ORDER — POTASSIUM CHLORIDE 20 MEQ/1
20 TABLET, EXTENDED RELEASE ORAL ONCE
Status: COMPLETED | OUTPATIENT
Start: 2020-01-04 | End: 2020-01-04

## 2020-01-04 RX ADMIN — MULTIPLE VITAMINS W/ MINERALS TAB 1 TABLET: TAB at 09:46

## 2020-01-04 RX ADMIN — HYDROCODONE BITARTRATE AND ACETAMINOPHEN 1 TABLET: 7.5; 325 TABLET ORAL at 20:55

## 2020-01-04 RX ADMIN — LOPERAMIDE HYDROCHLORIDE 2 MG: 2 CAPSULE ORAL at 09:46

## 2020-01-04 RX ADMIN — HYDROCODONE BITARTRATE AND ACETAMINOPHEN 1 TABLET: 7.5; 325 TABLET ORAL at 05:44

## 2020-01-04 RX ADMIN — POTASSIUM CHLORIDE 20 MEQ: 1500 TABLET, EXTENDED RELEASE ORAL at 09:46

## 2020-01-04 RX ADMIN — ASPIRIN 81 MG 81 MG: 81 TABLET ORAL at 18:09

## 2020-01-04 RX ADMIN — Medication 1 CAPSULE: at 09:46

## 2020-01-04 RX ADMIN — ASPIRIN 81 MG 81 MG: 81 TABLET ORAL at 05:44

## 2020-01-04 ASSESSMENT — PAIN SCALES - GENERAL
PAINLEVEL_OUTOF10: 7
PAINLEVEL_OUTOF10: 0
PAINLEVEL_OUTOF10: 3

## 2020-01-04 NOTE — PLAN OF CARE
Problem: Falls - Risk of:  Goal: Will remain free from falls  Description  Will remain free from falls  1/4/2020 0015 by Eriberto Benavides RN  Outcome: Ongoing  1/3/2020 1026 by Montana Chao RN  Outcome: Ongoing  Goal: Absence of physical injury  Description  Absence of physical injury  1/3/2020 1026 by Montana Chao RN  Outcome: Ongoing     Problem: Pain Control  Goal: Maintain pain level at or below patient's acceptable level (or 5 if patient is unable to determine acceptable level)  1/3/2020 1026 by Montana Chao RN  Outcome: Ongoing  Goal: Improvement in pain related behaviors BP/HR WNL  1/3/2020 1026 by Montana Chao RN  Outcome: Ongoing     Problem: Skin Integrity/Risk  Goal: No skin breakdown during hospitalization  1/4/2020 0015 by Eriberto Benavides RN  Outcome: Ongoing  1/3/2020 1026 by Montana Chao RN  Outcome: Ongoing  Goal: Wound healing  1/3/2020 1026 by Montana Chao RN  Outcome: Ongoing     Problem: Musculor/Skeletal Functional Status  Goal: Highest potential functional level  1/4/2020 0015 by Eriberto Benavides RN  Outcome: Ongoing  1/3/2020 1026 by Montana Chao RN  Outcome: Ongoing  Goal: Absence of falls  1/3/2020 1026 by Montana Chao RN  Outcome: Ongoing     Problem: Infection:  Goal: Will remain free from infection  Description  Will remain free from infection  1/4/2020 0015 by Eriberto Benavides RN  Outcome: Ongoing  1/3/2020 1026 by Montana Chao RN  Outcome: Ongoing     Problem: Safety:  Goal: Free from accidental physical injury  Description  Free from accidental physical injury  1/4/2020 0015 by Eriberto Benavides RN  Outcome: Ongoing  1/3/2020 1026 by Montana Chao RN  Outcome: Ongoing  Goal: Free from intentional harm  Description  Free from intentional harm  1/3/2020 1026 by Montana Chao RN  Outcome: Ongoing     Problem: Daily Care:  Goal: Daily care needs are met  Description  Daily care needs are met  1/4/2020 0015 by Eriberto Benavides RN  Outcome: Ongoing  1/3/2020 1026 by Digna Mena RN  Outcome: Ongoing     Problem: Pain:  Goal: Patient's pain/discomfort is manageable  Description  Patient's pain/discomfort is manageable  1/4/2020 0015 by Jory Guerrier RN  Outcome: Ongoing  1/3/2020 1026 by Digna Mena RN  Outcome: Ongoing     Problem: Skin Integrity:  Goal: Skin integrity will stabilize  Description  Skin integrity will stabilize  1/4/2020 0015 by Jory Guerrier RN  Outcome: Ongoing  1/3/2020 1026 by Digna Mena RN  Outcome: Ongoing     Problem: Discharge Planning:  Goal: Patients continuum of care needs are met  Description  Patients continuum of care needs are met  1/3/2020 1026 by Digna Mena RN  Outcome: Ongoing

## 2020-01-05 PROCEDURE — 6370000000 HC RX 637 (ALT 250 FOR IP): Performed by: INTERNAL MEDICINE

## 2020-01-05 PROCEDURE — 1200000002 HC SEMI PRIVATE SWING BED

## 2020-01-05 RX ADMIN — Medication 1 CAPSULE: at 09:08

## 2020-01-05 RX ADMIN — HYDROCODONE BITARTRATE AND ACETAMINOPHEN 1 TABLET: 7.5; 325 TABLET ORAL at 20:02

## 2020-01-05 RX ADMIN — MULTIPLE VITAMINS W/ MINERALS TAB 1 TABLET: TAB at 09:08

## 2020-01-05 RX ADMIN — ASPIRIN 81 MG 81 MG: 81 TABLET ORAL at 06:34

## 2020-01-05 RX ADMIN — ASPIRIN 81 MG 81 MG: 81 TABLET ORAL at 20:02

## 2020-01-05 RX ADMIN — LOPERAMIDE HYDROCHLORIDE 2 MG: 2 CAPSULE ORAL at 20:08

## 2020-01-05 ASSESSMENT — PAIN SCALES - GENERAL: PAINLEVEL_OUTOF10: 3

## 2020-01-05 NOTE — PLAN OF CARE
Problem: Falls - Risk of:  Goal: Will remain free from falls  Description  Will remain free from falls  Outcome: Ongoing     Problem: Skin Integrity/Risk  Goal: No skin breakdown during hospitalization  Outcome: Ongoing  Goal: Wound healing  Outcome: Ongoing     Problem: Infection:  Goal: Will remain free from infection  Description  Will remain free from infection  Outcome: Ongoing

## 2020-01-06 PROCEDURE — 97110 THERAPEUTIC EXERCISES: CPT | Performed by: PHYSICAL THERAPY ASSISTANT

## 2020-01-06 PROCEDURE — 1200000002 HC SEMI PRIVATE SWING BED

## 2020-01-06 PROCEDURE — 97530 THERAPEUTIC ACTIVITIES: CPT

## 2020-01-06 PROCEDURE — 99307 SBSQ NF CARE SF MDM 10: CPT | Performed by: INTERNAL MEDICINE

## 2020-01-06 PROCEDURE — 97116 GAIT TRAINING THERAPY: CPT | Performed by: PHYSICAL THERAPY ASSISTANT

## 2020-01-06 PROCEDURE — 6370000000 HC RX 637 (ALT 250 FOR IP): Performed by: PHYSICIAN ASSISTANT

## 2020-01-06 PROCEDURE — 97110 THERAPEUTIC EXERCISES: CPT

## 2020-01-06 PROCEDURE — 6370000000 HC RX 637 (ALT 250 FOR IP): Performed by: INTERNAL MEDICINE

## 2020-01-06 PROCEDURE — 97803 MED NUTRITION INDIV SUBSEQ: CPT

## 2020-01-06 RX ORDER — CHOLESTYRAMINE LIGHT 4 G/5.7G
4 POWDER, FOR SUSPENSION ORAL 2 TIMES DAILY
Status: DISCONTINUED | OUTPATIENT
Start: 2020-01-06 | End: 2020-01-07 | Stop reason: HOSPADM

## 2020-01-06 RX ORDER — POTASSIUM CHLORIDE 20 MEQ/1
40 TABLET, EXTENDED RELEASE ORAL ONCE
Status: COMPLETED | OUTPATIENT
Start: 2020-01-06 | End: 2020-01-06

## 2020-01-06 RX ADMIN — HYDROCODONE BITARTRATE AND ACETAMINOPHEN 1 TABLET: 7.5; 325 TABLET ORAL at 23:01

## 2020-01-06 RX ADMIN — CHOLESTYRAMINE 4 G: 4 POWDER, FOR SUSPENSION ORAL at 20:09

## 2020-01-06 RX ADMIN — ACETAMINOPHEN 650 MG: 325 TABLET, FILM COATED ORAL at 09:58

## 2020-01-06 RX ADMIN — PSYLLIUM HUSK 1 PACKET: 3.4 POWDER ORAL at 09:58

## 2020-01-06 RX ADMIN — Medication 1 CAPSULE: at 09:58

## 2020-01-06 RX ADMIN — CHOLESTYRAMINE 4 G: 4 POWDER, FOR SUSPENSION ORAL at 12:30

## 2020-01-06 RX ADMIN — ASPIRIN 81 MG 81 MG: 81 TABLET ORAL at 09:58

## 2020-01-06 RX ADMIN — MULTIPLE VITAMINS W/ MINERALS TAB 1 TABLET: TAB at 09:58

## 2020-01-06 RX ADMIN — POTASSIUM CHLORIDE 40 MEQ: 1500 TABLET, EXTENDED RELEASE ORAL at 10:03

## 2020-01-06 RX ADMIN — ASPIRIN 81 MG 81 MG: 81 TABLET ORAL at 20:09

## 2020-01-06 ASSESSMENT — PAIN SCALES - GENERAL
PAINLEVEL_OUTOF10: 7
PAINLEVEL_OUTOF10: 3

## 2020-01-06 NOTE — PROGRESS NOTES
breath sounds, no rales, no wheezing. Cardiovascular:  Normal rate, normal rhythm, no murmurs, no gallops, no rubs. GI:  Soft, nondistended, normal bowel sounds, nontender, no organomegaly, no mass. :  No costovertebral angle tenderness. Musculoskeletal:  + LLE edema. No tenderness, no obvious deformities. Patient is moving all extremities. Integument:  Well hydrated, no rash. Lymphatic:  No cervical or axillary lymphadenopathy noted. Neurologic:  Alert & oriented x 3,  no focal deficits noted. Strength is equal throughout. Psychiatric:  Speech and behavior appropriate. Results:     Lab Results   Component Value Date    WBC 6.3 01/04/2020    HGB 9.8 (L) 01/04/2020    HCT 32.0 (L) 01/04/2020    MCV 90.9 01/04/2020     01/04/2020       Lab Results   Component Value Date     01/04/2020    K 3.3 01/04/2020     01/04/2020    CO2 26 01/04/2020    BUN 14 01/04/2020    CREATININE 0.6 01/04/2020    GLUCOSE 105 01/04/2020    CALCIUM 8.8 01/04/2020        Assessment and Plan: Active Hospital Problems   Left femoral neck fracture, status post left total hip arthroplasty through anterior approach  PT/OT; pain control. Continue DVT prophylaxis per orthopedics aspirin 81 mg orally twice daily for 45 days. Follow up with orthopedic as scheduled     GERD  Protonix 40 mg daily     Peripheral arterial disease  Stable      Patient was seen and examined by Dr. Manny Khan and plan of care reviewed.       Electronically signed by KALIE Hobbs on 1/6/2020 at 11:53 AM

## 2020-01-06 NOTE — PROGRESS NOTES
Occupational Therapy  Facility/Department: [facility] MED SURG  Daily Treatment Note  NAME: Lula Peres  : 1945  MRN: [id number]    Date of Service: 2020    Discharge Recommendations:  Home with assist PRN       Assessment   Performance deficits / Impairments: Decreased functional mobility ; Decreased strength;Decreased ADL status; Decreased safe awareness  Assessment: Pt was seen for skilled OT intervention session. Pt reported pain in hips stating she had been sitting too much. Education was provided about importance of sitting up in chair rather than in bed & ambulating as much as tolerated. Pt was able to verbally state hip precautions which should be followed. Pt performed bed mobility, transfers, & ambulation with Mod I. Pt performed 15 minutes of seated chair exercises with visual demonstrations by OT & rest breaks as needed. Handout was given with HEP for utilization upon d/c. Pt ambulated 400 ft with Mod I without rest breaks. Hip & safety precautions were followed throughout. Upon exit, pt was seated in bedside chair with call bell in reach. Activity Tolerance  Activity Tolerance: Patient Tolerated treatment well         Patient Diagnosis(es): There were no encounter diagnoses. has a past medical history of Colitis, GERD (gastroesophageal reflux disease), Glaucoma, and PAD (peripheral artery disease) ([address] has a past surgical history that includes Tubal ligation; eye surgery; cyst removal; skin biopsy; and Total hip arthroplasty (Left, 2019).     Restrictions  Restrictions/Precautions  Restrictions/Precautions: Surgical Protocols, General Precautions(WBAT)  Required Braces or Orthoses?: No  Position Activity Restriction  Hip Precautions: No ADduction, No hip flexion > 90 degrees, No hip internal rotation  Subjective   General  Chart Reviewed: Yes  Patient assessed for rehabilitation services?: Yes  Family / Caregiver Present: Yes  Diagnosis: L THR      Orientation     Objective Balance  Sitting Balance: Modified independent   Standing Balance: Modified independent   Functional Mobility  Functional - Mobility Device: Standard Walker  Assist Level: Modified independent   Bed mobility  Rolling to Right: Modified independent  Supine to Sit: Modified independent  Sit to Supine: Modified independent  Transfers  Stand Step Transfers: Modified independent        Type of ROM/Therapeutic Exercise  Type of ROM/Therapeutic Exercise: AROM  Comment: OT led seated chair dancing exercises; 5 sets of 3 minutes (15 minutes total)  Exercises  Scapular Protraction: Yes  Scapular Retraction: Yes  Shoulder Depression: Yes  Shoulder Elevation: Yes  Shoulder Flexion: Yes  Shoulder Extension: Yes  Shoulder ABduction: Yes  Shoulder ADduction: Yes  Horizontal ABduction: Yes  Horizontal ADduction: Yes  Elbow Flexion: Yes  Elbow Extension: Yes  Supination: Yes  Pronation: Yes  Wrist Flexion: Yes  Wrist Extension: Yes     Plan   Plan  Times per week: 3-5  Times per day: Daily  Plan weeks: 1-2  Current Treatment Recommendations: Strengthening, Patient/Caregiver Education & Training, Equipment Evaluation, Education, & procurement, Balance Training, Safety Education & Training, Self-Care / ADL, Stair training    Goals  Short term goals  Time Frame for Short term goals: 1-2 week  Short term goal 1: Pt will perform HEP with Mod I   Short term goal 2: Pt will dress self with Mod I while adhering to safety precautions with AE as needed   Short term goal 3: Pt will bathe self with Mod I while adhering to safety precautions with AE as needed  Short term goal 4: Pt will toilet with Mod I while adhering to safety precautions with Ae as needed       Therapy Time   Individual Concurrent Group Co-treatment   Time In 0911         Time Out 1012         Minutes 61               In event of d/c, this note will serve as discharge summary.    LISSETH Awan/L

## 2020-01-06 NOTE — PLAN OF CARE
Problem: Falls - Risk of:  Goal: Will remain free from falls  Description  Will remain free from falls  Outcome: Ongoing     Problem: Skin Integrity/Risk  Goal: No skin breakdown during hospitalization  Outcome: Ongoing  Goal: Wound healing  Outcome: Ongoing

## 2020-01-06 NOTE — PROGRESS NOTES
Exercises  Hip Flexion: [id number]  Hip Abduction: and adduction [id number]  Knee Long Arc Quad: [id number]  Comments: HS curls with manual resistance [id number]  Other exercises  Other exercises?: Yes  Other exercises 1: toe raises and mini squats [id number]   PROM RLE (degrees)  RLE PROM: WFL  AROM RLE (degrees)  RLE AROM: WFL  PROM LLE (degrees)  LLE PROM: WFL  AROM LLE (degrees)  LLE AROM : WFL                 Goals  Long term goals  Time Frame for Long term goals : 1-2 weeks  Long term goal 1: Achieve Fair+ dynamic standing balance. Long term goal 2: Ambulate 500 feet on level surfaces with standard cane with mod [place]. Long term goal 3: Ascend/descend 2 flights of stairs with one handrail with good safety awareness and mod [place]. Long term goal 4: Independent with HEP. Patient Goals   Patient goals : return to independence and home    Plan    Plan  Times per week: 4-5 x week  Plan weeks: 1-2 weeks  Current Treatment Recommendations: Strengthening, Gait Training, Safety Education & Training, Home Exercise Program, Manual Therapy - Soft Tissue Mobilization, Neuromuscular Re-education, Transfer Training, Functional Mobility Training, Balance Training  Safety Devices  Type of devices: Call light within reach, Left in chair     Therapy Time   Individual Concurrent Group Co-treatment   Time In 1340         Time Out 1419         Minutes [address], [place]     This note serves a discharge summary in the event of patient discharge.

## 2020-01-06 NOTE — FLOWSHEET NOTE
Pt laying in Chair, Alert, No acute distress noted at this time. Pt is Alert and Oriented x 4. Pt has increase swelling to LLE, 1-2+pitting edema. No change from previous assessment. Pt states that sh has not been wearing her compressing hoses due to they are sore when she puts them on. POC for the day reviewed. Pt is eager to participate in swing bed Activity for the day reviewed. Pt requested PRN pain medication with AM medications for surgical pain. See MAR. Pt denies questions. Pt denies any other needs at this time. Will continue to monitor. 01/06/20 1000   Assessment   Charting Type Shift assessment   Neurological   Neuro (WDL) WDL   LLE Motor Response Responds to command   RLE Motor Response Responds to command   R Foot Plantar Flexion Strong   L Foot Plantar Flexion Moderate   R Foot Dorsiflexion Strong   L Foot Dorsiflexion Moderate   [place] Coma Scale   Eye Opening 4   Best Verbal Response 5   Best Motor Response 6   Denzel Coma Scale Score 15   HEENT   HEENT (WDL) X   Right Eye Impaired vision   Left Eye Impaired vision   Voice Normal   Mucous Membrane Pink;Moist;Intact   Teeth Intact   Respiratory   Respiratory (WDL) WDL   Cardiac   Cardiac (WDL) WDL   Cardiac Monitor   Telemetry Monitor On No   Gastrointestinal   Abdominal (WDL) WDL   Peripheral Vascular   Peripheral Vascular (WDL) X   Edema Right lower extremity; Left lower extremity   RLE Edema Trace   LLE Edema +1;+2;Pitting   LLE Neurovascular Assessment   Capillary Refill Less than/equal to 3 seconds   Color Pink   Temperature Warm   Sensation LLE Pain   Skin Color/Condition   Skin Color/Condition (WDL) WDL   Skin Integrity   Skin Integrity (WDL) X   Musculoskeletal   Musculoskeletal (WDL) X   RUE Full movement   LUE Full movement   RL Extremity Full movement   LL Extremity Weakness; Unsteady;Surgery; Swelling   Genitourinary   Genitourinary (WDL) WDL   Flank Tenderness No   Suprapubic Tenderness No   Dysuria No   Wound 12/31/19 Ankle Left healing vascular ulcer   Date First Assessed/Time First Assessed: 12/31/19 2100   Present on Hospital Admission: Yes  Primary Wound Type: (c) Other (comment)  Location: Ankle  Wound Location Orientation: Left  Wound Description (Comments): healing vascular ulcer   Dressing Status Clean;Dry; Intact   Dressing Changed Changed/New   Dressing/Treatment Other (comment)  (mepilex)   Dressing Change Due 01/06/20   Drainage Amount Scant   Drainage Description Clear   Wound 01/02/20 Hip Left; Anterior Dressing cover not to take off till 1/8-1/9   Date First Assessed/Time First Assessed: 01/02/20 2028   Present on Hospital Admission: Yes  Primary Wound Type: Incision  Location: Hip  Wound Location Orientation: Left; Anterior  Wound Description (Comments): Dressing cover not to take off till 1/8-1/9   Dressing Status Clean;Dry; Intact   Dressing Changed   (DO NOT CHANGE!)   Psychosocial   Psychosocial (WDL) WDL

## 2020-01-07 VITALS
WEIGHT: 113.4 LBS | BODY MASS INDEX: 17.8 KG/M2 | HEIGHT: 67 IN | RESPIRATION RATE: 16 BRPM | DIASTOLIC BLOOD PRESSURE: 54 MMHG | HEART RATE: 89 BPM | OXYGEN SATURATION: 96 % | SYSTOLIC BLOOD PRESSURE: 118 MMHG | TEMPERATURE: 97.3 F

## 2020-01-07 PROBLEM — Z87.81 HISTORY OF FRACTURE OF LEFT HIP: Status: ACTIVE | Noted: 2020-01-07

## 2020-01-07 PROBLEM — K21.9 GERD (GASTROESOPHAGEAL REFLUX DISEASE): Status: ACTIVE | Noted: 2020-01-07

## 2020-01-07 PROCEDURE — 97116 GAIT TRAINING THERAPY: CPT | Performed by: PHYSICAL THERAPY ASSISTANT

## 2020-01-07 PROCEDURE — 6370000000 HC RX 637 (ALT 250 FOR IP): Performed by: PHYSICIAN ASSISTANT

## 2020-01-07 PROCEDURE — 97535 SELF CARE MNGMENT TRAINING: CPT

## 2020-01-07 PROCEDURE — 97803 MED NUTRITION INDIV SUBSEQ: CPT

## 2020-01-07 PROCEDURE — 99315 NF DSCHRG MGMT 30 MIN/LESS: CPT | Performed by: INTERNAL MEDICINE

## 2020-01-07 PROCEDURE — 6370000000 HC RX 637 (ALT 250 FOR IP): Performed by: INTERNAL MEDICINE

## 2020-01-07 RX ORDER — CHOLESTYRAMINE LIGHT 4 G/5.7G
4 POWDER, FOR SUSPENSION ORAL 2 TIMES DAILY
Qty: 60 PACKET | Refills: 3 | Status: ON HOLD | OUTPATIENT
Start: 2020-01-07 | End: 2022-02-20

## 2020-01-07 RX ORDER — ASPIRIN 81 MG/1
81 TABLET ORAL 2 TIMES DAILY
Qty: 90 TABLET | Refills: 1 | Status: ON HOLD | OUTPATIENT
Start: 2020-01-07 | End: 2022-02-21

## 2020-01-07 RX ORDER — PANTOPRAZOLE SODIUM 40 MG/1
40 TABLET, DELAYED RELEASE ORAL DAILY
Qty: 60 TABLET | Refills: 0 | Status: ON HOLD | OUTPATIENT
Start: 2020-01-07 | End: 2022-02-20

## 2020-01-07 RX ADMIN — ASPIRIN 81 MG 81 MG: 81 TABLET ORAL at 09:43

## 2020-01-07 RX ADMIN — Medication 1 CAPSULE: at 09:56

## 2020-01-07 RX ADMIN — ACETAMINOPHEN 650 MG: 325 TABLET, FILM COATED ORAL at 09:56

## 2020-01-07 RX ADMIN — CHOLESTYRAMINE 4 G: 4 POWDER, FOR SUSPENSION ORAL at 09:43

## 2020-01-07 RX ADMIN — MULTIPLE VITAMINS W/ MINERALS TAB 1 TABLET: TAB at 09:43

## 2020-01-07 ASSESSMENT — PAIN SCALES - GENERAL
PAINLEVEL_OUTOF10: 3
PAINLEVEL_OUTOF10: 1

## 2020-01-07 NOTE — PROGRESS NOTES
Nutrition Assessment    Type and Reason for Visit: (late entry: done on 1/6/20)    Nutrition Recommendations: continue to encourage intakes of meals and supplements. Nutrition Assessment: Pt at this time is stable from a nutritional AEB ulcer and incisions healing and  intakes improving and has MVI and supplements ordered. However, Pt still at low risk for nutritional compromise r/t underweight and increased needs. Malnutrition Assessment:  · Malnutrition Status: At risk for malnutrition  · Context: Chronic illness  · Findings of the 6 clinical characteristics of malnutrition (Minimum of 2 out of 6 clinical characteristics is required to make the diagnosis of moderate or severe Protein Calorie Malnutrition based on AND/ASPEN Guidelines):  1. Energy Intake- ,      2. Weight Loss-5% loss or greater, in 1 year  3. Fat Loss-Mild subcutaneous fat loss,    4. Muscle Loss-Unable to assess,    5. Fluid Accumulation-Moderate to severe fluid accumulation(hip fracture, surgery on hip), Extremities  6.  Strength-     Nutrition Risk Level: Moderate, Low    Nutrient Needs:  · Estimated Daily Total Kcal: 1030-0913  · Estimated Daily Protein (g): 76(1.5 gm/kg)  · Estimated Daily Total Fluid (ml/day): 7534-7424( 1 ml/kcal of est needs)    Nutrition Diagnosis:   · Problem: Increased nutrient needs  · Etiology: related to Increased demand for energy/nutrients     Signs and symptoms:  as evidenced by Presence of wounds, Localized or generalized fluid accumulation    Objective Information:  · Nutrition-Focused Physical Findings: Pt is underweight, has lost 4 lb in past 3 months, has a hip fracture, pitting +1 edema lower extremities. Good appetite, but has frequent diarrhea. Pt is on fiber supplement and immodium as needed.   · Wound Type: (healing venous ulcer and incision on hip)  · Current Nutrition Therapies:  · Oral Diet Orders: General   · Oral Diet intake: (60-84% of past 8 meals)  · Oral Nutrition Supplement

## 2020-01-07 NOTE — CARE COORDINATION
Spoke with pt at [facility]. Pt has BSC and RW at home. Agreeable to [facility], but has no preference. Once given list, she opted to use [id number] ([facility] since she had her surgery there. Pt requesting to go home today since her brother is taking her to her appt tomorrow and it will be more convenient on him to get her at her home then come here just to go be DC'd tomorrow or the next day. Pt is cleared for home by therapy. Aware and verbalized hip precautions. No DME needs. Will send [facility] orders to [facility] today.

## 2020-01-07 NOTE — PROGRESS NOTES
[id number]  Stairs/Curb  Stairs?: Yes  Stairs  # Steps : 4  Stairs Height: 6\"  Rails: Left ascending            PROM RLE (degrees)  RLE PROM: WFL  AROM RLE (degrees)  RLE AROM: WFL  PROM LLE (degrees)  LLE PROM: WFL  AROM LLE (degrees)  LLE AROM : WFL       Goals  Long term goals  Time Frame for Long term goals : 1-2 weeks  Long term goal 1: Achieve Fair+ dynamic standing balance. Long term goal 2: Ambulate 500 feet on level surfaces with standard cane with mod [place]. Long term goal 3: Ascend/descend 2 flights of stairs with one handrail with good safety awareness and mod [place]. Long term goal 4: Independent with HEP. Patient Goals   Patient goals : return to independence and home    Plan    Plan  Times per week: 4-5 x week  Plan weeks: 1-2 weeks  Current Treatment Recommendations: Strengthening, Gait Training, Safety Education & Training, Home Exercise Program, Manual Therapy - Soft Tissue Mobilization, Neuromuscular Re-education, Transfer Training, Functional Mobility Training, Balance Training  Safety Devices  Type of devices: Call light within reach, Left in bed     Therapy Time   Individual Concurrent Group Co-treatment   Time In 1143         Time Out 1158         Minutes [address] [place] [address]     This note serves a discharge summary in the event of patient discharge.

## 2020-01-07 NOTE — FLOWSHEET NOTE
Pt laying in bed, Alert, No acute distress noted at this time. Pt is Alert and Oriented x 4. Reports that she feels ready to go home, no change in swelling of LE. No change from previous assessment. POC for the day reviewed. See MAR. Pt denies questions. Pt denies any other needs at this time. Will continue to monitor. 01/07/20 1000   Assessment   Charting Type Shift assessment   Neurological   Neuro (WDL) WDL   LLE Motor Response Responds to command   RLE Motor Response Responds to command   R Foot Plantar Flexion Strong   L Foot Plantar Flexion Moderate   R Foot Dorsiflexion Strong   L Foot Dorsiflexion Moderate   Denzel Coma Scale   Eye Opening 4   Best Verbal Response 5   Best Motor Response 6   [place] Coma Scale Score 15   HEENT   HEENT (WDL) X   Right Eye Impaired vision   Left Eye Impaired vision   Voice Normal   Mucous Membrane Pink;Moist;Intact   Teeth Intact   Respiratory   Respiratory (WDL) WDL   Cardiac   Cardiac (WDL) WDL   Cardiac Monitor   Telemetry Monitor On No   Gastrointestinal   Abdominal (WDL) WDL   Peripheral Vascular   Peripheral Vascular (WDL) X   Edema Right lower extremity; Left lower extremity   RLE Edema Trace   LLE Edema +2;Pitting;+1   LLE Neurovascular Assessment   Capillary Refill Less than/equal to 3 seconds   Color Pink   Temperature Warm   Sensation LLE Pain   L Pedal Pulse +2   Skin Color/Condition   Skin Color/Condition (WDL) WDL   Skin Integrity   Skin Integrity (WDL) X   Musculoskeletal   Musculoskeletal (WDL) X   RUE Full movement   LUE Full movement   RL Extremity Full movement   LL Extremity Weakness; Unsteady;Surgery; Swelling   Genitourinary   Genitourinary (WDL) WDL   Flank Tenderness No   Suprapubic Tenderness No   Dysuria No   Wound 12/31/19 Ankle Left healing vascular ulcer   Date First Assessed/Time First Assessed: 12/31/19 2100   Present on Hospital Admission: Yes  Primary Wound Type: (c) Other (comment)  Location: Ankle  Wound Location Orientation: Left Wound Description (Comments): healing vascular ulcer   Dressing Status Clean;Dry; Intact   Dressing/Treatment Other (comment)  (mepilex)   Drainage Amount Scant   Drainage Description Clear   Wound 01/02/20 Hip Left; Anterior Dressing cover not to take off till 1/8-1/9   Date First Assessed/Time First Assessed: 01/02/20 2028   Present on Hospital Admission: Yes  Primary Wound Type: Incision  Location: Hip  Wound Location Orientation: Left; Anterior  Wound Description (Comments): Dressing cover not to take off till 1/8-1/9   Dressing Status Clean;Dry; Intact   Dressing Changed   (DO NOT CHANGE!)   Psychosocial   Psychosocial (WDL) WDL

## 2020-01-07 NOTE — PROGRESS NOTES
Occupational Therapy  Facility/Department: [address] [place] [address] MED SURG  Daily Treatment Note  NAME: Lula Peres  : 1945  MRN: [id number]    Date of Service: 2020    Discharge Recommendations:  Home with assist PRN       Assessment   Performance deficits / Impairments: Decreased functional mobility ; Decreased strength;Decreased ADL status; Decreased safe awareness  Assessment: Pt seen for skilled OT intervention session. Pt reports no pain throughout stating sitting up in chair rather than lying in bed yesterday as instructed helped. Pt performed ADL & mobility tasks with Mod I utilizing a reacher, sock aid, & shower bench. Pt was verbally prompted to perform showering tasks in sitting but did not follow through & performed in standing using grab bars. OT observed for safety & hip precautions adherement. Upon exit, pt was performing grooming tasks in standing in bathroom with RW stating she had been walking around hospital by self. Activity Tolerance  Activity Tolerance: Patient Tolerated treatment well         Patient Diagnosis(es): There were no encounter diagnoses. has a past medical history of Colitis, GERD (gastroesophageal reflux disease), Glaucoma, and PAD (peripheral artery disease) ([facility] [address] has a past surgical history that includes Tubal ligation; eye surgery; cyst removal; skin biopsy; and Total hip arthroplasty (Left, 2019).     Restrictions  Restrictions/Precautions  Restrictions/Precautions: Surgical Protocols, General Precautions(WBAT)  Required Braces or Orthoses?: No  Position Activity Restriction  Hip Precautions: No ADduction, No hip flexion > 90 degrees, No hip internal rotation  Subjective   General  Chart Reviewed: Yes  Patient assessed for rehabilitation services?: Yes  Family / Caregiver Present: Yes  Diagnosis: L THR      Orientation     Objective    ADL  Grooming: Modified independent   UE Bathing: Modified independent   LE Bathing: Modified independent   UE Dressing: Modified

## 2020-01-07 NOTE — PLAN OF CARE
Problem: Falls - Risk of:  Goal: Will remain free from falls  Description  Will remain free from falls  Outcome: Ongoing     Problem: Pain Control  Goal: Maintain pain level at or below patient's acceptable level (or 5 if patient is unable to determine acceptable level)  Outcome: Ongoing     Problem: Skin Integrity/Risk  Goal: No skin breakdown during hospitalization  Outcome: Ongoing     Problem: Infection:  Goal: Will remain free from infection  Description  Will remain free from infection  Outcome: Ongoing     Problem: Daily Care:  Goal: Daily care needs are met  Description  Daily care needs are met  Outcome: Ongoing

## 2020-01-07 NOTE — DISCHARGE SUMMARY
Discharge Summary      Patient ID: Brittny Kaur      Patient's PCP: Valery Rudd MD    Admit Date: 12/30/2019     Discharge Date: 1/8/2020      Admitting Provider: Keturah Hoffman MD    Discharging Provider: KALIE Hobbs     Reason for this admission:   Hip pain    Discharge Diagnoses: Active Hospital Problems    Diagnosis Date Noted    Declining functional status [R53.81] 12/31/2019       Procedures:  No orders to display         Consults:   IP CONSULT TO CASE MANAGEMENT  IP CONSULT TO DIETITIAN  IP CONSULT TO HOME CARE NEEDS  PT/OT    Briefly:   66year old female admitted for PT/OT after sustaining a left hip fracture and undergoing repair. Hospital Course:     Left femoral neck fracture, status post left total hip arthroplasty through anterior approach  PT/OT; pain control. Continue DVT prophylaxis per orthopedics aspirin 81 mg orally twice daily for 45 days. Follow up with orthopedic as scheduled     GERD  Protonix 40 mg daily     Peripheral arterial disease  Stable         Disposition: home    Discharged Condition: Stable    Vital Signs  Temp: 97.3 °F (36.3 °C)  Pulse: 89  Resp: 16  BP: (!) 118/54  SpO2: 96 %  O2 Device: None (Room air)       Vital signs reviewed in electronic chart. Physical exam  Constitutional:  Well developed, well nourished, no acute distress. Eyes:  PERRL, conjunctiva normal, sclera without icterus. HENT:  Atraumatic, external ears normal, nose normal, oropharynx moist, no pharyngeal exudates. Neck- supple, no JVD, no lymphadenopathy. Respiratory:  No respiratory distress, no wheezing, rales or rhonchi detected. Cardiovascular:  Normal rate, normal rhythm, no murmurs, no gallops, no rubs. GI:  Soft, nondistended, normal bowel sounds, nontender, no hepatosplenomegaly appreciated. Musculoskeletal:  No edema, cyanosis or obvious acute deformity. Moving all extremities. Integument:  Warm and dry. No rash.   Neurologic:  Alert & oriented x 3, no apparent focal deficits noted. Psychiatric:  Speech and behavior appropriate. Activity: activity as tolerated  Diet: regular diet  Follow Up: Primary Care Physician in 1 week and with  on 1/8/20 as scheduled. Labs: For convenience and continuity at follow-up the following most recent labs are provided:    CBC:   Lab Results   Component Value Date    WBC 6.3 01/04/2020    HGB 9.8 01/04/2020    HCT 32.0 01/04/2020     01/04/2020       RENAL:   Lab Results   Component Value Date     01/04/2020    K 3.3 01/04/2020     01/04/2020    CO2 26 01/04/2020    BUN 14 01/04/2020    CREATININE 0.6 01/04/2020         Discharge Medications:     Current Discharge Medication List           Details   cholestyramine light 4 g packet Take 1 packet by mouth 2 times daily  Qty: 60 packet, Refills: 3      aspirin EC 81 MG EC tablet Take 1 tablet by mouth 2 times daily  Qty: 90 tablet, Refills: 1      pantoprazole (PROTONIX) 40 MG tablet Take 1 tablet by mouth daily  Qty: 60 tablet, Refills: 0              Details   Bacillus Coagulans-Inulin (PROBIOTIC FORMULA PO) Take 1 capsule by mouth daily      Cholecalciferol (VITAMIN D3) 25 MCG (1000 UT) TABS Take by mouth 2 times daily      Folic Acid 0.8 MG CAPS Take 0.8 mg by mouth daily      Multiple Vitamins-Minerals (MULTIVITAMIN ADULT) TABS Take 1 tablet by mouth daily      calcium carbonate (OSCAL) 500 MG TABS tablet Take 500 mg by mouth 2 times daily               Patient was seen and examined by Dr. Claudette Buitrago and plan of care reviewed. Signed:  Electronically signed by KALIE Zepeda on 1/7/2020 at 11:09 AM       Thank you Joey Dexter MD for the opportunity to be involved in this patient's care. If you have any questions or concerns please feel free to contact me at [phone].

## 2020-01-08 NOTE — PROGRESS NOTES
I did see and examine the patient with KALIE Espino. The case was discussed with her. Please refer to her note for details. Patient was admitted for rehabilitation after left hip fracture status post repair. Follow orthopedic recommendations. Patient has been compliant with treatment plan and improving. Start Questran for her mild and chronic diarrhea. Further recommendations/details per Aracely's note.

## 2020-01-16 ENCOUNTER — OFFICE VISIT (OUTPATIENT)
Dept: INTERNAL MEDICINE | Facility: CLINIC | Age: 75
End: 2020-01-16

## 2020-01-16 VITALS
HEIGHT: 67 IN | SYSTOLIC BLOOD PRESSURE: 108 MMHG | WEIGHT: 117 LBS | BODY MASS INDEX: 18.36 KG/M2 | DIASTOLIC BLOOD PRESSURE: 72 MMHG | HEART RATE: 68 BPM | TEMPERATURE: 97.1 F | RESPIRATION RATE: 16 BRPM | OXYGEN SATURATION: 96 %

## 2020-01-16 DIAGNOSIS — E55.9 VITAMIN D DEFICIENCY: ICD-10-CM

## 2020-01-16 DIAGNOSIS — M81.0 OSTEOPOROSIS, UNSPECIFIED OSTEOPOROSIS TYPE, UNSPECIFIED PATHOLOGICAL FRACTURE PRESENCE: ICD-10-CM

## 2020-01-16 DIAGNOSIS — G47.00 INSOMNIA, UNSPECIFIED TYPE: ICD-10-CM

## 2020-01-16 DIAGNOSIS — E78.5 HYPERLIPIDEMIA, UNSPECIFIED HYPERLIPIDEMIA TYPE: Primary | ICD-10-CM

## 2020-01-16 DIAGNOSIS — R19.7 DIARRHEA, UNSPECIFIED TYPE: ICD-10-CM

## 2020-01-16 DIAGNOSIS — Z96.642 HISTORY OF LEFT HIP REPLACEMENT: ICD-10-CM

## 2020-01-16 DIAGNOSIS — K52.839 MICROSCOPIC COLITIS, UNSPECIFIED MICROSCOPIC COLITIS TYPE: ICD-10-CM

## 2020-01-16 DIAGNOSIS — D64.9 ANEMIA, UNSPECIFIED TYPE: ICD-10-CM

## 2020-01-16 PROBLEM — Z96.641 HISTORY OF RIGHT HIP REPLACEMENT: Status: ACTIVE | Noted: 2020-01-16

## 2020-01-16 PROBLEM — L03.116 CELLULITIS OF LEFT LOWER EXTREMITY: Status: RESOLVED | Noted: 2019-09-05 | Resolved: 2020-01-16

## 2020-01-16 PROCEDURE — 99214 OFFICE O/P EST MOD 30 MIN: CPT | Performed by: INTERNAL MEDICINE

## 2020-01-16 NOTE — PROGRESS NOTES
Subjective   Odalys Miles is a 74 y.o. female.     Chief Complaint   Patient presents with   • Follow-up     hospital f/u total hip replacement pt states her surgery was on 12/27/19 by Dr. Funez at [facility], pt dc from hospital 12/30/19 pt states that she is doing well from this.        History of Present Illness   Chief Complaint   Patient presents with   • Follow-up     hospital f/u total hip replacement pt states her surgery was on 12/27/19 by Dr. Funez at [facility], pt dc from hospital 12/30/19 pt states that she is doing well from this.    Recent fall resulted in hip fracture and s/p hip replacement. Doing well now. Hb low. Diarrhea improved after meds. Insomnia due to left hip discomfort. Tylenol helps.     Current Outpatient Medications:   •  aspirin 81 MG EC tablet, Take 81 mg by mouth Daily., Disp: , Rfl:   •  calcium (OS-KAYLYN) 600 MG tablet, Take  by mouth 2 (Two) Times a Day., Disp: , Rfl:   •  Cholecalciferol (VITAMIN D3) 1000 UNITS capsule, Take 1 capsule by mouth Daily., Disp: , Rfl:   •  folic acid (FOLVITE) 800 MCG tablet, Take  by mouth Daily., Disp: , Rfl:   •  gentamicin (GARAMYCIN) 0.1 % cream, APPLY TO AFFECTED AREAS DAILY, Disp: , Rfl: 3  •  LUMIGAN 0.01 % ophthalmic drops, , Disp: , Rfl:   •  Multiple Vitamins-Minerals (MULTIVITAMIN ADULT PO), Take  by mouth., Disp: , Rfl:   •  Probiotic Product (PROBIOTIC ADVANCED PO), Take  by mouth., Disp: , Rfl:   •  SIMBRINZA 1-0.2 % suspension, INSTILL 1 DROP INTO BOTH EYES TWICE A DAY, Disp: , Rfl: 3    The following portions of the patient's history were reviewed and updated as appropriate: allergies, current medications, past family history, past medical history, past social history, past surgical history and problem list.    Review of Systems   Constitutional: Negative.    Respiratory: Negative.    Cardiovascular: Negative.    Gastrointestinal: Negative.    Musculoskeletal: Negative.    Skin: Negative.    Neurological:  Negative.    Psychiatric/Behavioral: Negative.        Objective   Physical Exam   Constitutional: She is oriented to person, place, and time. She appears well-nourished.   Neck: Neck supple.   Cardiovascular: Normal rate, regular rhythm and normal heart sounds.   Pulmonary/Chest: Effort normal and breath sounds normal.   Abdominal: Bowel sounds are normal.   Neurological: She is alert and oriented to person, place, and time.   Skin: Skin is warm.   Psychiatric: She has a normal mood and affect.       All tests have been reviewed.    Assessment/Plan   Diagnoses and all orders for this visit:    Hyperlipidemia, unspecified hyperlipidemia type cont diet    Microscopic colitis, unspecified microscopic colitis type cont meds    Vitamin D deficiency cont med    Osteoporosis, unspecified osteoporosis type, unspecified pathological fracture presence need dexa     Insomnia, unspecified type tylenol     History of left hip replacement s/p hip surgery doing well, cont PT     Anemia, unspecified type repeat   -     CBC & Differential  -     Comprehensive Metabolic Panel  -     TSH    3 weeks after labs          historical record below  Diarrhea, Microscopic colitis, unspecified microscopic colitis type with sx, continue budesinide, follow GI , improved after steroid and probiotics and imodium, avoid dairy product. Follow up with GI.   Sciatica of left side refer to Physical Therapy and aleve improved  Osteopenia on  dexa continue oscal d  Tinnitus seen by ENT  insomnia melatonin continue, decline medicine  Hyperlipidemia contine tx  Vitamin D deficiency continue supplement  tdap  Pneumovax zostavax done. prevnar done  Right shoulder pain rotator cuff tendonitis, cortisone shot improved  Mammogram normal  colonoscopy ref scope and recent BRBPR   Td 3/1/2013      6 mo PE

## 2020-01-31 LAB
ALBUMIN SERPL-MCNC: 4 G/DL (ref 3.5–5.2)
ALBUMIN/GLOB SERPL: 2.2 G/DL
ALP SERPL-CCNC: 82 U/L (ref 39–117)
ALT SERPL-CCNC: 13 U/L (ref 1–33)
AST SERPL-CCNC: 22 U/L (ref 1–32)
BASOPHILS # BLD AUTO: 0.06 10*3/MM3 (ref 0–0.2)
BASOPHILS NFR BLD AUTO: 1.1 % (ref 0–1.5)
BILIRUB SERPL-MCNC: 0.3 MG/DL (ref 0.2–1.2)
BUN SERPL-MCNC: 8 MG/DL (ref 8–23)
BUN/CREAT SERPL: 11 (ref 7–25)
CALCIUM SERPL-MCNC: 9.5 MG/DL (ref 8.6–10.5)
CHLORIDE SERPL-SCNC: 103 MMOL/L (ref 98–107)
CO2 SERPL-SCNC: 26.2 MMOL/L (ref 22–29)
CREAT SERPL-MCNC: 0.73 MG/DL (ref 0.57–1)
EOSINOPHIL # BLD AUTO: 0.2 10*3/MM3 (ref 0–0.4)
EOSINOPHIL NFR BLD AUTO: 3.6 % (ref 0.3–6.2)
ERYTHROCYTE [DISTWIDTH] IN BLOOD BY AUTOMATED COUNT: 12.4 % (ref 12.3–15.4)
GLOBULIN SER CALC-MCNC: 1.8 GM/DL
GLUCOSE SERPL-MCNC: 92 MG/DL (ref 65–99)
HCT VFR BLD AUTO: 36.2 % (ref 34–46.6)
HGB BLD-MCNC: 11.6 G/DL (ref 12–15.9)
IMM GRANULOCYTES # BLD AUTO: 0.01 10*3/MM3 (ref 0–0.05)
IMM GRANULOCYTES NFR BLD AUTO: 0.2 % (ref 0–0.5)
LYMPHOCYTES # BLD AUTO: 1.18 10*3/MM3 (ref 0.7–3.1)
LYMPHOCYTES NFR BLD AUTO: 21.1 % (ref 19.6–45.3)
MCH RBC QN AUTO: 27.7 PG (ref 26.6–33)
MCHC RBC AUTO-ENTMCNC: 32 G/DL (ref 31.5–35.7)
MCV RBC AUTO: 86.4 FL (ref 79–97)
MONOCYTES # BLD AUTO: 0.39 10*3/MM3 (ref 0.1–0.9)
MONOCYTES NFR BLD AUTO: 7 % (ref 5–12)
NEUTROPHILS # BLD AUTO: 3.74 10*3/MM3 (ref 1.7–7)
NEUTROPHILS NFR BLD AUTO: 67 % (ref 42.7–76)
NRBC BLD AUTO-RTO: 0.2 /100 WBC (ref 0–0.2)
PLATELET # BLD AUTO: 273 10*3/MM3 (ref 140–450)
POTASSIUM SERPL-SCNC: 4.1 MMOL/L (ref 3.5–5.2)
PROT SERPL-MCNC: 5.8 G/DL (ref 6–8.5)
RBC # BLD AUTO: 4.19 10*6/MM3 (ref 3.77–5.28)
SODIUM SERPL-SCNC: 141 MMOL/L (ref 136–145)
TSH SERPL DL<=0.005 MIU/L-ACNC: 1.56 UIU/ML (ref 0.27–4.2)
WBC # BLD AUTO: 5.58 10*3/MM3 (ref 3.4–10.8)

## 2020-02-03 ENCOUNTER — OFFICE VISIT (OUTPATIENT)
Dept: INTERNAL MEDICINE | Facility: CLINIC | Age: 75
End: 2020-02-03

## 2020-02-03 VITALS
SYSTOLIC BLOOD PRESSURE: 110 MMHG | HEART RATE: 77 BPM | WEIGHT: 114 LBS | OXYGEN SATURATION: 96 % | TEMPERATURE: 97.5 F | BODY MASS INDEX: 17.89 KG/M2 | HEIGHT: 67 IN | DIASTOLIC BLOOD PRESSURE: 64 MMHG

## 2020-02-03 DIAGNOSIS — R35.0 URINE FREQUENCY: ICD-10-CM

## 2020-02-03 DIAGNOSIS — Z96.642 HISTORY OF LEFT HIP REPLACEMENT: ICD-10-CM

## 2020-02-03 DIAGNOSIS — E55.9 VITAMIN D DEFICIENCY: ICD-10-CM

## 2020-02-03 DIAGNOSIS — K52.839 MICROSCOPIC COLITIS, UNSPECIFIED MICROSCOPIC COLITIS TYPE: ICD-10-CM

## 2020-02-03 DIAGNOSIS — G47.00 INSOMNIA, UNSPECIFIED TYPE: ICD-10-CM

## 2020-02-03 DIAGNOSIS — E78.5 HYPERLIPIDEMIA, UNSPECIFIED HYPERLIPIDEMIA TYPE: Primary | ICD-10-CM

## 2020-02-03 DIAGNOSIS — R19.7 DIARRHEA, UNSPECIFIED TYPE: ICD-10-CM

## 2020-02-03 DIAGNOSIS — D64.9 ANEMIA, UNSPECIFIED TYPE: ICD-10-CM

## 2020-02-03 LAB
BILIRUB BLD-MCNC: NEGATIVE MG/DL
CLARITY, POC: CLEAR
COLOR UR: YELLOW
GLUCOSE UR STRIP-MCNC: NEGATIVE MG/DL
KETONES UR QL: NEGATIVE
LEUKOCYTE EST, POC: NEGATIVE
NITRITE UR-MCNC: NEGATIVE MG/ML
PH UR: 6 [PH] (ref 5–8)
PROT UR STRIP-MCNC: NEGATIVE MG/DL
RBC # UR STRIP: NEGATIVE /UL
SP GR UR: 1.01 (ref 1–1.03)
UROBILINOGEN UR QL: NORMAL

## 2020-02-03 PROCEDURE — 81003 URINALYSIS AUTO W/O SCOPE: CPT | Performed by: INTERNAL MEDICINE

## 2020-02-03 PROCEDURE — 99214 OFFICE O/P EST MOD 30 MIN: CPT | Performed by: INTERNAL MEDICINE

## 2020-02-03 RX ORDER — CHOLESTYRAMINE LIGHT 4 G/5.7G
4 POWDER, FOR SUSPENSION ORAL
COMMUNITY
End: 2020-06-10

## 2020-02-03 NOTE — PROGRESS NOTES
Subjective   Odalys Miles is a 74 y.o. female.     Chief Complaint   Patient presents with   • Follow-up     3 wk f/u labs        History of Present Illness   Patient here for follow-up.  Colitis improved on medication.  Anemia also improved.  Hyperlipidemia stable.  Hip replacement improved.  Insomnia stable.  Patient was put on 2 tablet of aspirin after the surgery.  Patient does have some vascular disease.  History of varicose vein surgery.    Current Outpatient Medications:   •  aspirin 81 MG EC tablet, Take 81 mg by mouth Daily., Disp: , Rfl:   •  calcium (OS-KAYLYN) 600 MG tablet, Take  by mouth 2 (Two) Times a Day., Disp: , Rfl:   •  Cholecalciferol (VITAMIN D3) 1000 UNITS capsule, Take 1 capsule by mouth Daily., Disp: , Rfl:   •  cholestyramine light 4 g packet, Take 4 g by mouth. USE 1 PACKET MIXED IN LIQUID BY MOUTH TWICE A DAY, Disp: , Rfl:   •  folic acid (FOLVITE) 800 MCG tablet, Take  by mouth Daily., Disp: , Rfl:   •  gentamicin (GARAMYCIN) 0.1 % cream, APPLY TO AFFECTED AREAS DAILY, Disp: , Rfl: 3  •  LUMIGAN 0.01 % ophthalmic drops, , Disp: , Rfl:   •  Multiple Vitamins-Minerals (MULTIVITAMIN ADULT PO), Take  by mouth., Disp: , Rfl:   •  PANTOPRAZOLE SODIUM PO, Take 40 mg by mouth Daily., Disp: , Rfl:   •  Probiotic Product (PROBIOTIC ADVANCED PO), Take  by mouth., Disp: , Rfl:   •  SIMBRINZA 1-0.2 % suspension, INSTILL 1 DROP INTO BOTH EYES TWICE A DAY, Disp: , Rfl: 3    The following portions of the patient's history were reviewed and updated as appropriate: allergies, current medications, past family history, past medical history, past social history, past surgical history and problem list.    Review of Systems   Constitutional: Negative.    Respiratory: Negative.    Cardiovascular: Negative.    Gastrointestinal: Negative.    Musculoskeletal: Negative.    Skin: Negative.    Neurological: Negative.    Psychiatric/Behavioral: Negative.        Objective   Physical Exam   Constitutional: She is  oriented to person, place, and time. She appears well-nourished.   Neck: Neck supple.   Cardiovascular: Normal rate, regular rhythm and normal heart sounds.   Pulmonary/Chest: Effort normal and breath sounds normal.   Abdominal: Bowel sounds are normal.   Neurological: She is alert and oriented to person, place, and time.   Skin: Skin is warm.   Psychiatric: She has a normal mood and affect.       All tests have been reviewed.    Assessment/Plan   Diagnoses and all orders for this visit:    Hyperlipidemia, unspecified hyperlipidemia type stable now    Diarrhea, unspecified type improved on medication continue follow-up with GI    Anemia, unspecified type improved    Vitamin D deficiency    Insomnia, unspecified type improved    History of left hip replacement improved we will cut down aspirin back to 1 a day    Microscopic colitis, unspecified microscopic colitis type improved on medication follow GI    Other orders  -     cholestyramine light 4 g packet; Take 4 g by mouth. USE 1 PACKET MIXED IN LIQUID BY MOUTH TWICE A DAY  -     PANTOPRAZOLE SODIUM PO; Take 40 mg by mouth Daily.    3 mo          historical record below  Diarrhea, Microscopic colitis, unspecified microscopic colitis type with sx, continue budesinide, follow GI , improved after steroid and probiotics and imodium, avoid dairy product. Follow up with GI.   Sciatica of left side refer to Physical Therapy and aleve improved  Osteopenia on  dexa continue oscal d   Tinnitus seen by ENT  insomnia melatonin continue, decline medicine  Hyperlipidemia contine tx  Vitamin D deficiency continue supplement  tdap  Pneumovax zostavax done. prevnar done  Right shoulder pain rotator cuff tendonitis, cortisone shot improved  Mammogram normal  colonoscopy ref scope and recent BRBPR   Td 3/1/2013      3 mo wellness

## 2020-02-11 DIAGNOSIS — K52.838 OTHER MICROSCOPIC COLITIS: ICD-10-CM

## 2020-02-11 RX ORDER — BUDESONIDE 3 MG/1
9 CAPSULE, COATED PELLETS ORAL DAILY
Qty: 270 CAPSULE | Refills: 1 | Status: SHIPPED | OUTPATIENT
Start: 2020-02-11 | End: 2020-05-11

## 2020-02-19 ENCOUNTER — TELEPHONE (OUTPATIENT)
Dept: INTERNAL MEDICINE | Facility: CLINIC | Age: 75
End: 2020-02-19

## 2020-02-19 RX ORDER — TRAZODONE HYDROCHLORIDE 50 MG/1
TABLET ORAL
Qty: 30 TABLET | Refills: 1 | Status: SHIPPED | OUTPATIENT
Start: 2020-02-19 | End: 2020-03-24 | Stop reason: SDUPTHER

## 2020-02-19 NOTE — TELEPHONE ENCOUNTER
Pt called and wanted to see if she could get something called in to help her sleep, since she broke her hip she hasn't been able to sleep as she can't sleep in her normal sleeping position due to her hip so she is wanting see if there is anything she can take to help with it?    pharmacy confirmed [id number] [place] [facility] [address] [facility] [phone]  - [phone]   [phone]

## 2020-03-20 ENCOUNTER — OUTSIDE FACILITY SERVICE (OUTPATIENT)
Dept: INTERNAL MEDICINE | Facility: CLINIC | Age: 75
End: 2020-03-20

## 2020-03-20 PROCEDURE — G0179 MD RECERTIFICATION HHA PT: HCPCS | Performed by: INTERNAL MEDICINE

## 2020-03-24 RX ORDER — TRAZODONE HYDROCHLORIDE 50 MG/1
TABLET ORAL
Qty: 30 TABLET | Refills: 2 | Status: SHIPPED | OUTPATIENT
Start: 2020-03-24 | End: 2020-06-10

## 2020-05-04 ENCOUNTER — HOSPITAL ENCOUNTER (OUTPATIENT)
Facility: HOSPITAL | Age: 75
Discharge: HOME | End: 2020-05-04
Admitting: INTERNAL MEDICINE
Payer: MEDICARE

## 2020-05-04 LAB
SARS-COV-2, NAAT: NOT DETECTED
SARS-COV-2, PCR: NORMAL

## 2020-05-04 PROCEDURE — U0003 INFECTIOUS AGENT DETECTION BY NUCLEIC ACID (DNA OR RNA); SEVERE ACUTE RESPIRATORY SYNDROME CORONAVIRUS 2 (SARS-COV-2) (CORONAVIRUS DISEASE [COVID-19]), AMPLIFIED PROBE TECHNIQUE, MAKING USE OF HIGH THROUGHPUT TECHNOLOGIES AS DESCRIBED BY CMS-2020-01-R: HCPCS

## 2020-05-04 PROCEDURE — U0002 COVID-19 LAB TEST NON-CDC: HCPCS

## 2020-06-10 ENCOUNTER — OFFICE VISIT (OUTPATIENT)
Dept: INTERNAL MEDICINE | Facility: CLINIC | Age: 75
End: 2020-06-10

## 2020-06-10 VITALS
SYSTOLIC BLOOD PRESSURE: 110 MMHG | WEIGHT: 114 LBS | HEART RATE: 64 BPM | RESPIRATION RATE: 16 BRPM | DIASTOLIC BLOOD PRESSURE: 60 MMHG | TEMPERATURE: 96.6 F | HEIGHT: 67 IN | BODY MASS INDEX: 17.89 KG/M2 | OXYGEN SATURATION: 99 %

## 2020-06-10 DIAGNOSIS — D64.9 ANEMIA, UNSPECIFIED TYPE: ICD-10-CM

## 2020-06-10 DIAGNOSIS — M85.80 OSTEOPENIA, UNSPECIFIED LOCATION: ICD-10-CM

## 2020-06-10 DIAGNOSIS — Z12.31 ENCOUNTER FOR SCREENING MAMMOGRAM FOR MALIGNANT NEOPLASM OF BREAST: ICD-10-CM

## 2020-06-10 DIAGNOSIS — Z12.39 BREAST CANCER SCREENING: ICD-10-CM

## 2020-06-10 DIAGNOSIS — R93.89 ABNORMAL FINDINGS ON DIAGNOSTIC IMAGING OF OTHER SPECIFIED BODY STRUCTURES: ICD-10-CM

## 2020-06-10 DIAGNOSIS — R19.7 DIARRHEA, UNSPECIFIED TYPE: ICD-10-CM

## 2020-06-10 DIAGNOSIS — H93.19 TINNITUS, UNSPECIFIED LATERALITY: ICD-10-CM

## 2020-06-10 DIAGNOSIS — M81.0 OSTEOPOROSIS, UNSPECIFIED OSTEOPOROSIS TYPE, UNSPECIFIED PATHOLOGICAL FRACTURE PRESENCE: ICD-10-CM

## 2020-06-10 DIAGNOSIS — K52.839 MICROSCOPIC COLITIS, UNSPECIFIED MICROSCOPIC COLITIS TYPE: ICD-10-CM

## 2020-06-10 DIAGNOSIS — E55.9 VITAMIN D DEFICIENCY: ICD-10-CM

## 2020-06-10 DIAGNOSIS — M54.32 SCIATICA OF LEFT SIDE: ICD-10-CM

## 2020-06-10 DIAGNOSIS — Z96.642 HISTORY OF LEFT HIP REPLACEMENT: ICD-10-CM

## 2020-06-10 DIAGNOSIS — E78.5 HYPERLIPIDEMIA, UNSPECIFIED HYPERLIPIDEMIA TYPE: Primary | ICD-10-CM

## 2020-06-10 DIAGNOSIS — R93.6 ABNORMAL FINDINGS ON DIAGNOSTIC IMAGING OF LIMBS: ICD-10-CM

## 2020-06-10 DIAGNOSIS — G47.00 INSOMNIA, UNSPECIFIED TYPE: ICD-10-CM

## 2020-06-10 DIAGNOSIS — I83.90 ASYMPTOMATIC VARICOSE VEINS: ICD-10-CM

## 2020-06-10 PROCEDURE — G0439 PPPS, SUBSEQ VISIT: HCPCS | Performed by: INTERNAL MEDICINE

## 2020-06-10 RX ORDER — ASCORBIC ACID 500 MG
1000 TABLET ORAL DAILY
COMMUNITY

## 2020-06-10 RX ORDER — CHOLESTYRAMINE LIGHT 4 G/5.7G
4 POWDER, FOR SUSPENSION ORAL 2 TIMES DAILY
Qty: 60 PACKET | Refills: 3 | OUTPATIENT
Start: 2020-06-10 | End: 2020-10-01

## 2020-06-10 RX ORDER — LOPERAMIDE HYDROCHLORIDE 2 MG/1
2 CAPSULE ORAL 4 TIMES DAILY PRN
COMMUNITY
End: 2022-06-23

## 2020-06-10 NOTE — PROGRESS NOTES
The ABCs of the Annual Wellness Visit  Subsequent Medicare Wellness Visit    Chief Complaint   Patient presents with   • Medicare Wellness-subsequent       Subjective   History of Present Illness:  Odalys Miles is a 74 y.o. female who presents for a Subsequent Medicare Wellness Visit.    HEALTH RISK ASSESSMENT    Recent Hospitalizations:  No hospitalization(s) within the last year.    Current Medical Providers:  Patient Care Team:  Marty Cheung MD as PCP - General  Marty Cheung MD as PCP - Family Medicine  Javy Jamison DPM as PCP - Claims Attributed    Smoking Status:  Social History     Tobacco Use   Smoking Status Former Smoker   • Start date:    • Last attempt to quit:    • Years since quittin.4   Smokeless Tobacco Never Used       Alcohol Consumption:  Social History     Substance and Sexual Activity   Alcohol Use No       Depression Screen:   PHQ-2/PHQ-9 Depression Screening 6/10/2020   Little interest or pleasure in doing things 0   Feeling down, depressed, or hopeless 0   Trouble falling or staying asleep, or sleeping too much -   Feeling tired or having little energy -   Poor appetite or overeating -   Feeling bad about yourself - or that you are a failure or have let yourself or your family down -   Trouble concentrating on things, such as reading the newspaper or watching television -   Moving or speaking so slowly that other people could have noticed. Or the opposite - being so fidgety or restless that you have been moving around a lot more than usual -   Thoughts that you would be better off dead, or of hurting yourself in some way -   Total Score 0   If you checked off any problems, how difficult have these problems made it for you to do your work, take care of things at home, or get along with other people? -       Fall Risk Screen:  STEADI Fall Risk Assessment was completed, and patient is at HIGH risk for falls. Assessment completed on:6/10/2020    Health Habits and Functional  and Cognitive Screening:  Functional & Cognitive Status 6/10/2020   Do you have difficulty preparing food and eating? No   Do you have difficulty bathing yourself, getting dressed or grooming yourself? No   Do you have difficulty using the toilet? No   Do you have difficulty moving around from place to place? No   Do you have trouble with steps or getting out of a bed or a chair? No   Current Diet Well Balanced Diet   Dental Exam Not up to date   Eye Exam Up to date   Exercise (times per week) 2 times per week   Current Exercise Activities Include Yard Work   Do you need help using the phone?  No   Are you deaf or do you have serious difficulty hearing?  No   Do you need help with transportation? No   Do you need help shopping? No   Do you need help preparing meals?  No   Do you need help with housework?  No   Do you need help with laundry? No   Do you need help taking your medications? No   Do you need help managing money? No   Do you ever drive or ride in a car without wearing a seat belt? No   Have you felt unusual stress, anger or loneliness in the last month? No   Who do you live with? Alone   If you need help, do you have trouble finding someone available to you? No   Have you been bothered in the last four weeks by sexual problems? No   Do you have difficulty concentrating, remembering or making decisions? No         Does the patient have evidence of cognitive impairment? No    Asprin use counseling:Does not need ASA (and currently is not on it)    Age-appropriate Screening Schedule:  Refer to the list below for future screening recommendations based on patient's age, sex and/or medical conditions. Orders for these recommended tests are listed in the plan section. The patient has been provided with a written plan.    Health Maintenance   Topic Date Due   • TDAP/TD VACCINES (1 - Tdap) 07/30/1956   • ZOSTER VACCINE (2 of 2) 04/25/2013   • MAMMOGRAM  06/01/2019   • DXA SCAN  04/17/2020   • LIPID PANEL   04/26/2020   • COLONOSCOPY  07/26/2027          The following portions of the patient's history were reviewed and updated as appropriate: allergies, current medications, past family history, past medical history, past social history, past surgical history and problem list.    Outpatient Medications Prior to Visit   Medication Sig Dispense Refill   • calcium (OS-KAYLYN) 600 MG tablet Take  by mouth 2 (Two) Times a Day.     • Cholecalciferol (VITAMIN D3) 1000 UNITS capsule Take 1 capsule by mouth Daily.     • folic acid (FOLVITE) 800 MCG tablet Take  by mouth Daily.     • gentamicin (GARAMYCIN) 0.1 % cream APPLY TO AFFECTED AREAS DAILY  3   • loperamide (IMODIUM) 2 MG capsule Take 2 mg by mouth 4 (Four) Times a Day As Needed for Diarrhea.     • LUMIGAN 0.01 % ophthalmic drops      • Multiple Vitamin (MULTI-VITAMIN DAILY PO) Take  by mouth.     • Probiotic Product (PROBIOTIC ADVANCED PO) Take  by mouth.     • psyllium (METAMUCIL) 58.6 % packet Take 1 packet by mouth Daily.     • SIMBRINZA 1-0.2 % suspension INSTILL 1 DROP INTO BOTH EYES TWICE A DAY  3   • vitamin C (ASCORBIC ACID) 500 MG tablet Take 500 mg by mouth Daily.     • aspirin 81 MG EC tablet Take 81 mg by mouth Daily.     • traZODone (DESYREL) 50 MG tablet 1/2 to one tablet 3 hours before bedtime prn 30 tablet 2   • cholestyramine light 4 g packet Take 4 g by mouth. USE 1 PACKET MIXED IN LIQUID BY MOUTH TWICE A DAY     • PANTOPRAZOLE SODIUM PO Take 40 mg by mouth Daily.       No facility-administered medications prior to visit.        Patient Active Problem List   Diagnosis   • Glaucoma   • Hyperlipidemia   • Insomnia   • Microscopic colitis   • Tinnitus   • Asymptomatic varicose veins   • Vitamin D deficiency   • Osteoporosis   • Sciatica of left side   • Rotator cuff tendonitis, right   • Diarrhea   • History of left hip replacement   • Anemia       Advanced Care Planning:  ACP discussion was held with the patient during this visit. Patient has an advance  "directive (not in EMR), copy requested.    Review of Systems   Constitutional: Negative.    HENT: Negative.    Eyes: Negative.    Respiratory: Negative.    Cardiovascular: Negative.    Gastrointestinal: Positive for diarrhea.   Endocrine: Negative.    Genitourinary: Negative.    Musculoskeletal: Negative.    Skin: Negative.    Allergic/Immunologic: Negative.    Neurological: Negative.    Hematological: Negative.    Psychiatric/Behavioral: Negative.        Compared to one year ago, the patient feels her physical health is the same.  Compared to one year ago, the patient feels her mental health is the same.    Reviewed chart for potential of high risk medication in the elderly: yes  Reviewed chart for potential of harmful drug interactions in the elderly:no    Objective         Vitals:    06/10/20 1116   BP: 110/60   Pulse: 64   Resp: 16   Temp: 96.6 °F (35.9 °C)   SpO2: 99%   Weight: 51.7 kg (114 lb)   Height: 170.2 cm (67.01\")   PainSc: 0-No pain       Body mass index is 17.85 kg/m².  Discussed the patient's BMI with her. The BMI is in the acceptable range.    Physical Exam   Constitutional: She is oriented to person, place, and time. She appears well-developed and well-nourished.   Neck: Neck supple.   Cardiovascular: Normal rate, regular rhythm and normal heart sounds.   Pulmonary/Chest: Effort normal and breath sounds normal.   Abdominal: Soft. Bowel sounds are normal.   Neurological: She is alert and oriented to person, place, and time.   Psychiatric: She has a normal mood and affect. Her behavior is normal.             Assessment/Plan   Medicare Risks and Personalized Health Plan  CMS Preventative Services Quick Reference  Advance Directive Discussion    The above risks/problems have been discussed with the patient.  Pertinent information has been shared with the patient in the After Visit Summary.  Follow up plans and orders are seen below in the Assessment/Plan Section.    Diagnoses and all orders for this " visit:    1. Hyperlipidemia, unspecified hyperlipidemia type (Primary)    2. Asymptomatic varicose veins    3. Microscopic colitis, unspecified microscopic colitis type    4. Vitamin D deficiency    5. Tinnitus, unspecified laterality    6. Sciatica of left side    7. Osteoporosis, unspecified osteoporosis type, unspecified pathological fracture presence    8. Anemia, unspecified type    9. Insomnia, unspecified type    10. Diarrhea, unspecified type    11. History of left hip replacement      Follow Up:  No follow-ups on file.     An After Visit Summary and PPPS were given to the patient.   6 mo        historical record below  Diarrhea, Microscopic colitis, unspecified microscopic colitis type with sx, off budesinide, seen by GI , cont probiotics and imodium, avoid dairy product. Follow up with GI. 8/2020, start cholestyramine  Sciatica of left side refer to Physical Therapy and aleve improved  Osteopenia on  dexa continue oscal d   Tinnitus seen by ENT  insomnia melatonin continue, decline medicine  Hyperlipidemia contine tx  Vitamin D deficiency continue supplement  tdap  Pneumovax zostavax done. prevnar done  Right shoulder pain rotator cuff tendonitis, cortisone shot improved  Mammogram normal  colonoscopy ref scope and recent BRBPR   Td 3/1/2013

## 2020-06-13 LAB
ALBUMIN SERPL-MCNC: 3.9 G/DL (ref 3.5–5.2)
ALBUMIN/GLOB SERPL: 1.6 G/DL
ALP SERPL-CCNC: 66 U/L (ref 39–117)
ALT SERPL-CCNC: 15 U/L (ref 1–33)
AST SERPL-CCNC: 22 U/L (ref 1–32)
BASOPHILS # BLD AUTO: 0.07 10*3/MM3 (ref 0–0.2)
BASOPHILS NFR BLD AUTO: 1.2 % (ref 0–1.5)
BILIRUB SERPL-MCNC: 0.3 MG/DL (ref 0.2–1.2)
BUN SERPL-MCNC: 12 MG/DL (ref 8–23)
BUN/CREAT SERPL: 16.2 (ref 7–25)
CALCIUM SERPL-MCNC: 9.4 MG/DL (ref 8.6–10.5)
CHLORIDE SERPL-SCNC: 107 MMOL/L (ref 98–107)
CHOLEST SERPL-MCNC: 195 MG/DL (ref 0–200)
CO2 SERPL-SCNC: 28.7 MMOL/L (ref 22–29)
CREAT SERPL-MCNC: 0.74 MG/DL (ref 0.57–1)
CRP SERPL-MCNC: 0.04 MG/DL (ref 0–0.5)
EOSINOPHIL # BLD AUTO: 0.11 10*3/MM3 (ref 0–0.4)
EOSINOPHIL NFR BLD AUTO: 1.8 % (ref 0.3–6.2)
ERYTHROCYTE [DISTWIDTH] IN BLOOD BY AUTOMATED COUNT: 14.1 % (ref 12.3–15.4)
ERYTHROCYTE [SEDIMENTATION RATE] IN BLOOD BY WESTERGREN METHOD: 5 MM/HR (ref 0–30)
GLOBULIN SER CALC-MCNC: 2.5 GM/DL
GLUCOSE SERPL-MCNC: 95 MG/DL (ref 65–99)
HCT VFR BLD AUTO: 41.3 % (ref 34–46.6)
HCV AB S/CO SERPL IA: <0.1 S/CO RATIO (ref 0–0.9)
HDLC SERPL-MCNC: 90 MG/DL (ref 40–60)
HGB BLD-MCNC: 13.3 G/DL (ref 12–15.9)
IMM GRANULOCYTES # BLD AUTO: 0.02 10*3/MM3 (ref 0–0.05)
IMM GRANULOCYTES NFR BLD AUTO: 0.3 % (ref 0–0.5)
LDLC SERPL CALC-MCNC: 89 MG/DL (ref 0–100)
LYMPHOCYTES # BLD AUTO: 1.48 10*3/MM3 (ref 0.7–3.1)
LYMPHOCYTES NFR BLD AUTO: 24.3 % (ref 19.6–45.3)
MCH RBC QN AUTO: 28 PG (ref 26.6–33)
MCHC RBC AUTO-ENTMCNC: 32.2 G/DL (ref 31.5–35.7)
MCV RBC AUTO: 86.9 FL (ref 79–97)
MONOCYTES # BLD AUTO: 0.4 10*3/MM3 (ref 0.1–0.9)
MONOCYTES NFR BLD AUTO: 6.6 % (ref 5–12)
NEUTROPHILS # BLD AUTO: 4 10*3/MM3 (ref 1.7–7)
NEUTROPHILS NFR BLD AUTO: 65.8 % (ref 42.7–76)
NRBC BLD AUTO-RTO: 0 /100 WBC (ref 0–0.2)
PLATELET # BLD AUTO: 262 10*3/MM3 (ref 140–450)
POTASSIUM SERPL-SCNC: 4.3 MMOL/L (ref 3.5–5.2)
PROT SERPL-MCNC: 6.4 G/DL (ref 6–8.5)
RBC # BLD AUTO: 4.75 10*6/MM3 (ref 3.77–5.28)
SODIUM SERPL-SCNC: 142 MMOL/L (ref 136–145)
TRIGL SERPL-MCNC: 78 MG/DL (ref 0–150)
TSH SERPL DL<=0.005 MIU/L-ACNC: 0.98 UIU/ML (ref 0.27–4.2)
VLDLC SERPL CALC-MCNC: 15.6 MG/DL
WBC # BLD AUTO: 6.08 10*3/MM3 (ref 3.4–10.8)

## 2020-07-15 ENCOUNTER — APPOINTMENT (OUTPATIENT)
Dept: BONE DENSITY | Facility: HOSPITAL | Age: 75
End: 2020-07-15

## 2020-07-15 ENCOUNTER — HOSPITAL ENCOUNTER (OUTPATIENT)
Dept: MAMMOGRAPHY | Facility: HOSPITAL | Age: 75
Discharge: HOME | End: 2020-07-15
Admitting: INTERNAL MEDICINE

## 2020-07-15 PROCEDURE — 77063 BREAST TOMOSYNTHESIS BI: CPT

## 2020-07-15 PROCEDURE — 77067 SCR MAMMO BI INCL CAD: CPT

## 2020-07-15 PROCEDURE — 77080 DXA BONE DENSITY AXIAL: CPT

## 2020-09-09 ENCOUNTER — TELEPHONE (OUTPATIENT)
Dept: INTERNAL MEDICINE | Facility: CLINIC | Age: 75
End: 2020-09-09

## 2020-09-09 DIAGNOSIS — M25.559 HIP PAIN: Primary | ICD-10-CM

## 2020-09-10 ENCOUNTER — OFFICE VISIT (OUTPATIENT)
Dept: INTERNAL MEDICINE | Facility: CLINIC | Age: 75
End: 2020-09-10

## 2020-09-10 VITALS
HEIGHT: 67 IN | OXYGEN SATURATION: 99 % | WEIGHT: 113.8 LBS | DIASTOLIC BLOOD PRESSURE: 72 MMHG | TEMPERATURE: 97.1 F | HEART RATE: 62 BPM | SYSTOLIC BLOOD PRESSURE: 112 MMHG | BODY MASS INDEX: 17.86 KG/M2

## 2020-09-10 DIAGNOSIS — E55.9 VITAMIN D DEFICIENCY: ICD-10-CM

## 2020-09-10 DIAGNOSIS — E78.5 HYPERLIPIDEMIA, UNSPECIFIED HYPERLIPIDEMIA TYPE: Primary | ICD-10-CM

## 2020-09-10 DIAGNOSIS — M81.0 OSTEOPOROSIS, UNSPECIFIED OSTEOPOROSIS TYPE, UNSPECIFIED PATHOLOGICAL FRACTURE PRESENCE: ICD-10-CM

## 2020-09-10 DIAGNOSIS — K52.839 MICROSCOPIC COLITIS, UNSPECIFIED MICROSCOPIC COLITIS TYPE: ICD-10-CM

## 2020-09-10 DIAGNOSIS — M54.32 SCIATICA OF LEFT SIDE: ICD-10-CM

## 2020-09-10 DIAGNOSIS — G47.00 INSOMNIA, UNSPECIFIED TYPE: ICD-10-CM

## 2020-09-10 PROBLEM — D64.9 ANEMIA: Status: RESOLVED | Noted: 2020-01-16 | Resolved: 2020-09-10

## 2020-09-10 PROBLEM — M75.81 ROTATOR CUFF TENDONITIS, RIGHT: Status: RESOLVED | Noted: 2018-04-16 | Resolved: 2020-09-10

## 2020-09-10 PROBLEM — R19.7 DIARRHEA: Status: RESOLVED | Noted: 2018-08-21 | Resolved: 2020-09-10

## 2020-09-10 PROCEDURE — 99214 OFFICE O/P EST MOD 30 MIN: CPT | Performed by: INTERNAL MEDICINE

## 2020-09-10 RX ORDER — BACLOFEN 10 MG/1
10 TABLET ORAL 3 TIMES DAILY
Qty: 40 TABLET | Refills: 1 | Status: SHIPPED | OUTPATIENT
Start: 2020-09-10 | End: 2020-10-19 | Stop reason: SDUPTHER

## 2020-09-10 NOTE — PROGRESS NOTES
Subjective   Odalys Miles is a 75 y.o. female.     Chief Complaint   Patient presents with   • Hyperlipidemia     3 mo f/u        History of Present Illness   Sciatic pain for a week again radiate to left leg and sharp pain worse with standing after sitting . Tylenol no help. Patient still has diarrhea and colitis. Patient self d/c'd questran. . Shoulder pain resolved after PT.  Hyperlipidemia stable now.     Current Outpatient Medications:   •  calcium (OS-KAYLYN) 600 MG tablet, Take  by mouth 2 (Two) Times a Day., Disp: , Rfl:   •  Cholecalciferol (VITAMIN D3) 1000 UNITS capsule, Take 1 capsule by mouth Daily., Disp: , Rfl:   •  cholestyramine light 4 g packet, Take 1 packet by mouth 2 (Two) Times a Day. USE 1 PACKET MIXED IN LIQUID BY MOUTH TWICE A DAY, Disp: 60 packet, Rfl: 3  •  folic acid (FOLVITE) 800 MCG tablet, Take  by mouth Daily., Disp: , Rfl:   •  gentamicin (GARAMYCIN) 0.1 % cream, APPLY TO AFFECTED AREAS DAILY, Disp: , Rfl: 3  •  loperamide (IMODIUM) 2 MG capsule, Take 2 mg by mouth 4 (Four) Times a Day As Needed for Diarrhea., Disp: , Rfl:   •  LUMIGAN 0.01 % ophthalmic drops, , Disp: , Rfl:   •  Multiple Vitamin (MULTI-VITAMIN DAILY PO), Take  by mouth., Disp: , Rfl:   •  Probiotic Product (PROBIOTIC ADVANCED PO), Take  by mouth., Disp: , Rfl:   •  psyllium (METAMUCIL) 58.6 % packet, Take 1 packet by mouth Daily., Disp: , Rfl:   •  SIMBRINZA 1-0.2 % suspension, INSTILL 1 DROP INTO BOTH EYES TWICE A DAY, Disp: , Rfl: 3  •  vitamin C (ASCORBIC ACID) 500 MG tablet, Take 500 mg by mouth Daily., Disp: , Rfl:   •  baclofen (LIORESAL) 10 MG tablet, Take 1 tablet by mouth 3 (Three) Times a Day., Disp: 40 tablet, Rfl: 1    The following portions of the patient's history were reviewed and updated as appropriate: allergies, current medications, past family history, past medical history, past social history, past surgical history and problem list.    Review of Systems   Constitutional: Negative.     Respiratory: Negative.    Cardiovascular: Negative.    Gastrointestinal: Negative.    Musculoskeletal: Positive for back pain.   Skin: Negative.    Neurological: Negative.    Psychiatric/Behavioral: Negative.        Objective   Physical Exam   Constitutional: She is oriented to person, place, and time. She appears well-nourished.   Neck: Neck supple.   Cardiovascular: Normal rate, regular rhythm and normal heart sounds.   Pulmonary/Chest: Effort normal and breath sounds normal.   Abdominal: Bowel sounds are normal.   Musculoskeletal: She exhibits tenderness (left gluteal tender).   Neurological: She is alert and oriented to person, place, and time.   Skin: Skin is warm.   Psychiatric: She has a normal mood and affect.       All tests have been reviewed.    Assessment/Plan   Diagnoses and all orders for this visit:    Hyperlipidemia, unspecified hyperlipidemia type diet     Sciatica of left side  -     XR Spine Lumbar 2 or 3 View  -     baclofen (LIORESAL) 10 MG tablet; Take 1 tablet by mouth 3 (Three) Times a Day.  -     Ambulatory Referral to Physical Therapy Evaluate and treat    Osteoporosis, unspecified osteoporosis type, unspecified pathological fracture presence, repeat DEXA is osteopenia. Recent hip fracture due to osteoporosis. If patient again on budesinide we may need to start reclast.     Vitamin D deficiency    Microscopic colitis, unspecified microscopic colitis type  -     Ambulatory Referral to Gastroenterology    Insomnia, unspecified type cont med    3 weeks           historical record below  Diarrhea, Microscopic colitis, unspecified microscopic colitis type with sx, off budesinide, seen by GI , cont probiotics and imodium, avoid dairy product. Follow up with GI. 8/2020, cholestyramine causing vit D malabsorption  Sciatica of left side refer to Physical Therapy and aleve improved  Osteopenia on  dexa continue oscal d   Tinnitus seen by ENT  Shoulder pain improved after PT  insomnia melatonin  continue, decline medicine  Hyperlipidemia contine tx  Vitamin D deficiency continue supplement  tdap  Pneumovax zostavax done. prevnar done  Right shoulder pain rotator cuff tendonitis, cortisone shot improved  Mammogram normal  colonoscopy ref scope and recent BRBPR   Td 3/1/2013

## 2020-10-01 ENCOUNTER — OFFICE VISIT (OUTPATIENT)
Dept: INTERNAL MEDICINE | Facility: CLINIC | Age: 75
End: 2020-10-01

## 2020-10-01 VITALS
DIASTOLIC BLOOD PRESSURE: 64 MMHG | TEMPERATURE: 97.1 F | BODY MASS INDEX: 17.91 KG/M2 | SYSTOLIC BLOOD PRESSURE: 110 MMHG | OXYGEN SATURATION: 100 % | HEART RATE: 64 BPM | HEIGHT: 67 IN | WEIGHT: 114.12 LBS

## 2020-10-01 DIAGNOSIS — S81.801D WOUND OF RIGHT LOWER EXTREMITY, SUBSEQUENT ENCOUNTER: ICD-10-CM

## 2020-10-01 DIAGNOSIS — K52.839 MICROSCOPIC COLITIS, UNSPECIFIED MICROSCOPIC COLITIS TYPE: Primary | ICD-10-CM

## 2020-10-01 DIAGNOSIS — M81.0 OSTEOPOROSIS, UNSPECIFIED OSTEOPOROSIS TYPE, UNSPECIFIED PATHOLOGICAL FRACTURE PRESENCE: ICD-10-CM

## 2020-10-01 DIAGNOSIS — M54.32 SCIATICA OF LEFT SIDE: ICD-10-CM

## 2020-10-01 PROBLEM — S81.801A WOUND OF RIGHT LEG: Status: ACTIVE | Noted: 2020-10-01

## 2020-10-01 PROCEDURE — 99214 OFFICE O/P EST MOD 30 MIN: CPT | Performed by: INTERNAL MEDICINE

## 2020-10-01 NOTE — PROGRESS NOTES
Subjective   Odalys Miles is a 75 y.o. female.     Chief Complaint   Patient presents with   •      3 wk f/u   • Sciatica     pt c/o sciatica pain still but pt states that it is getting better with PT        History of Present Illness   Patient here for follow-up right leg wound is improved after wound care.  Sciatica pain improved after physical therapy.  Patient still has diarrhea Imodium no help.  Patient here to see gastroenterologist.  Osteopenia on bone density scan now.    Current Outpatient Medications:   •  baclofen (LIORESAL) 10 MG tablet, Take 1 tablet by mouth 3 (Three) Times a Day., Disp: 40 tablet, Rfl: 1  •  calcium (OS-KAYLYN) 600 MG tablet, Take  by mouth 2 (Two) Times a Day., Disp: , Rfl:   •  Cholecalciferol (VITAMIN D3) 1000 UNITS capsule, Take 1 capsule by mouth Daily., Disp: , Rfl:   •  folic acid (FOLVITE) 800 MCG tablet, Take  by mouth Daily., Disp: , Rfl:   •  gentamicin (GARAMYCIN) 0.1 % cream, APPLY TO AFFECTED AREAS DAILY, Disp: , Rfl: 3  •  loperamide (IMODIUM) 2 MG capsule, Take 2 mg by mouth 4 (Four) Times a Day As Needed for Diarrhea., Disp: , Rfl:   •  LUMIGAN 0.01 % ophthalmic drops, , Disp: , Rfl:   •  Multiple Vitamin (MULTI-VITAMIN DAILY PO), Take  by mouth., Disp: , Rfl:   •  Probiotic Product (PROBIOTIC ADVANCED PO), Take  by mouth., Disp: , Rfl:   •  psyllium (METAMUCIL) 58.6 % packet, Take 1 packet by mouth Daily., Disp: , Rfl:   •  SIMBRINZA 1-0.2 % suspension, INSTILL 1 DROP INTO BOTH EYES TWICE A DAY, Disp: , Rfl: 3  •  vitamin C (ASCORBIC ACID) 500 MG tablet, Take 500 mg by mouth Daily., Disp: , Rfl:     The following portions of the patient's history were reviewed and updated as appropriate: allergies, current medications, past family history, past medical history, past social history, past surgical history and problem list.    Review of Systems   Constitutional: Negative.    Respiratory: Negative.    Cardiovascular: Negative.    Gastrointestinal: Positive for  diarrhea.   Musculoskeletal: Positive for back pain.   Skin: Negative.    Neurological: Negative.    Psychiatric/Behavioral: Negative.        Objective   Physical Exam  Vitals signs and nursing note reviewed.   Constitutional:       Appearance: Normal appearance. She is well-developed.   HENT:      Head: Normocephalic and atraumatic.      Right Ear: External ear normal.      Left Ear: External ear normal.      Nose: Nose normal.   Eyes:      Conjunctiva/sclera: Conjunctivae normal.      Pupils: Pupils are equal, round, and reactive to light.   Neck:      Musculoskeletal: Normal range of motion and neck supple.   Cardiovascular:      Rate and Rhythm: Normal rate and regular rhythm.      Heart sounds: Normal heart sounds.   Pulmonary:      Effort: Pulmonary effort is normal.      Breath sounds: Normal breath sounds.   Abdominal:      General: Bowel sounds are normal.      Palpations: Abdomen is soft.   Musculoskeletal: Normal range of motion.         General: Tenderness present.   Skin:     General: Skin is warm.   Neurological:      Mental Status: She is alert and oriented to person, place, and time.      Deep Tendon Reflexes: Reflexes are normal and symmetric.   Psychiatric:         Behavior: Behavior normal.         Thought Content: Thought content normal.         Judgment: Judgment normal.         All tests have been reviewed.    Assessment/Plan   Diagnoses and all orders for this visit:    Microscopic colitis, unspecified microscopic colitis type cont fiber and imodium and follow GI    Sciatica of left side improved after PT and med continue medication as needed    Osteopenia cont oscal D for now    Wound of right lower extremity, subsequent encounter cont wound care    3 mo               historical record below  Diarrhea, Microscopic colitis, unspecified microscopic colitis type with sx, off budesinide, seen by GI , cont probiotics and imodium, avoid dairy product. Follow up with GI. 8/2020, cholestyramine  causing vit D malabsorption  Sciatica of left side refer to Physical Therapy and aleve improved  Osteopenia on  dexa continue oscal d   Tinnitus seen by ENT  Shoulder pain improved after PT  insomnia melatonin continue, decline medicine  Hyperlipidemia contine tx  Vitamin D deficiency continue supplement  tdap  Pneumovax zostavax done. prevnar done  Right shoulder pain rotator cuff tendonitis, cortisone shot improved  Mammogram normal  colonoscopy ref scope and recent BRBPR   Td 3/1/2013

## 2020-10-19 DIAGNOSIS — M54.32 SCIATICA OF LEFT SIDE: ICD-10-CM

## 2020-10-19 RX ORDER — BACLOFEN 10 MG/1
10 TABLET ORAL 3 TIMES DAILY
Qty: 40 TABLET | Refills: 1 | Status: SHIPPED | OUTPATIENT
Start: 2020-10-19 | End: 2020-11-17

## 2020-10-19 NOTE — TELEPHONE ENCOUNTER
Caller: Odalys Miles    Relationship: Self    Best call back number: [phone]    Medication needed:   Requested Prescriptions     Pending Prescriptions Disp Refills   • baclofen (LIORESAL) 10 MG tablet 40 tablet 1     Sig: Take 1 tablet by mouth 3 (Three) Times a Day.       When do you need the refill by: 10/19/20    What details did the patient provide when requesting the medication: patient been out for 4 days and is still in a lot of pain    Does the patient have less than a 3 day supply:  [x] Yes  [] No    What is the patient's preferred pharmacy: [facility] #[id number] - [place], [place] - [address] [phone] [facility] [phone] FX

## 2020-11-17 DIAGNOSIS — M54.32 SCIATICA OF LEFT SIDE: ICD-10-CM

## 2020-11-17 RX ORDER — BACLOFEN 10 MG/1
TABLET ORAL
Qty: 40 TABLET | Refills: 1 | Status: SHIPPED | OUTPATIENT
Start: 2020-11-17 | End: 2021-06-14

## 2020-11-17 NOTE — TELEPHONE ENCOUNTER
Caller: Odalys Miles    Relationship: Self    Best call back number: [phone]     Medication needed:   Requested Prescriptions     Pending Prescriptions Disp Refills   • baclofen (LIORESAL) 10 MG tablet [Pharmacy Med Name: BACLOFEN 10 MG TABLET] 40 tablet 1     Sig: TAKE 1 TABLET BY MOUTH THREE TIMES A DAY       When do you need the refill by: TODAY     What details did the patient provide when requesting the medication: HAS ENOUGH TO GET THROUGH TODAY  BUT WOULD LIKE TO  TONIGHT.     Does the patient have less than a 3 day supply:  [x] Yes  [] No    What is the patient's preferred pharmacy: [facility] #[id number] - [place], [place] - [address] [phone] [facility] [phone] FX

## 2021-03-23 ENCOUNTER — OFFICE VISIT (OUTPATIENT)
Dept: INTERNAL MEDICINE | Facility: CLINIC | Age: 76
End: 2021-03-23

## 2021-03-23 VITALS
OXYGEN SATURATION: 96 % | TEMPERATURE: 95.5 F | SYSTOLIC BLOOD PRESSURE: 114 MMHG | HEIGHT: 67 IN | WEIGHT: 118 LBS | DIASTOLIC BLOOD PRESSURE: 72 MMHG | HEART RATE: 70 BPM | BODY MASS INDEX: 18.52 KG/M2

## 2021-03-23 DIAGNOSIS — G47.00 INSOMNIA, UNSPECIFIED TYPE: ICD-10-CM

## 2021-03-23 DIAGNOSIS — E78.5 HYPERLIPIDEMIA, UNSPECIFIED HYPERLIPIDEMIA TYPE: Primary | ICD-10-CM

## 2021-03-23 DIAGNOSIS — M54.32 SCIATICA OF LEFT SIDE: ICD-10-CM

## 2021-03-23 DIAGNOSIS — E55.9 VITAMIN D DEFICIENCY: ICD-10-CM

## 2021-03-23 DIAGNOSIS — R93.89 ABNORMAL FINDINGS ON DIAGNOSTIC IMAGING OF OTHER SPECIFIED BODY STRUCTURES: ICD-10-CM

## 2021-03-23 DIAGNOSIS — K52.839 MICROSCOPIC COLITIS, UNSPECIFIED MICROSCOPIC COLITIS TYPE: ICD-10-CM

## 2021-03-23 PROBLEM — S81.801A WOUND OF RIGHT LEG: Status: RESOLVED | Noted: 2020-10-01 | Resolved: 2021-03-23

## 2021-03-23 PROCEDURE — 99214 OFFICE O/P EST MOD 30 MIN: CPT | Performed by: INTERNAL MEDICINE

## 2021-03-23 NOTE — PROGRESS NOTES
Subjective   Odalys Miles is a 75 y.o. female.     Chief Complaint   Patient presents with   • Ulcerative Colitis     follow up       History of Present Illness   Patient here for follow-up.  Microscopic colitis stable now.  No diarrhea for the time being.  Sciatica pain also improved.  Insomnia stable now.  Hyperlipidemia stable on medication.  Vitamin D deficiency stable on supplement.    Current Outpatient Medications:   •  calcium (OS-KAYLYN) 600 MG tablet, Take  by mouth 2 (Two) Times a Day., Disp: , Rfl:   •  Cholecalciferol (VITAMIN D3) 1000 UNITS capsule, Take 1 capsule by mouth Daily., Disp: , Rfl:   •  folic acid (FOLVITE) 800 MCG tablet, Take  by mouth Daily., Disp: , Rfl:   •  loperamide (IMODIUM) 2 MG capsule, Take 2 mg by mouth 4 (Four) Times a Day As Needed for Diarrhea., Disp: , Rfl:   •  LUMIGAN 0.01 % ophthalmic drops, , Disp: , Rfl:   •  Multiple Vitamin (MULTI-VITAMIN DAILY PO), Take  by mouth., Disp: , Rfl:   •  Probiotic Product (PROBIOTIC ADVANCED PO), Take  by mouth., Disp: , Rfl:   •  psyllium (METAMUCIL) 58.6 % packet, Take 1 packet by mouth Daily., Disp: , Rfl:   •  SIMBRINZA 1-0.2 % suspension, INSTILL 1 DROP INTO BOTH EYES TWICE A DAY, Disp: , Rfl: 3  •  vitamin C (ASCORBIC ACID) 500 MG tablet, Take 500 mg by mouth Daily., Disp: , Rfl:   •  baclofen (LIORESAL) 10 MG tablet, TAKE 1 TABLET BY MOUTH THREE TIMES A DAY, Disp: 40 tablet, Rfl: 1  •  gentamicin (GARAMYCIN) 0.1 % cream, APPLY TO AFFECTED AREAS DAILY, Disp: , Rfl: 3    The following portions of the patient's history were reviewed and updated as appropriate: allergies, current medications, past family history, past medical history, past social history, past surgical history and problem list.    Review of Systems   Constitutional: Negative.    Respiratory: Negative.    Cardiovascular: Negative.    Gastrointestinal: Negative.    Musculoskeletal: Negative.    Skin: Negative.    Neurological: Negative.    Psychiatric/Behavioral:  Negative.        Objective   Physical Exam  Cardiovascular:      Rate and Rhythm: Normal rate and regular rhythm.      Heart sounds: Normal heart sounds.   Pulmonary:      Effort: Pulmonary effort is normal.      Breath sounds: Normal breath sounds.   Abdominal:      General: Bowel sounds are normal.   Musculoskeletal:      Cervical back: Neck supple.   Skin:     General: Skin is warm.   Neurological:      Mental Status: She is alert and oriented to person, place, and time.         All tests have been reviewed.    Assessment/Plan   Diagnoses and all orders for this visit:    Hyperlipidemia, unspecified hyperlipidemia type  -     Comprehensive Metabolic Panel  -     Lipid Panel    Vitamin D deficiency  -     Vitamin D 25 Hydroxy    Microscopic colitis, unspecified microscopic colitis type resolved  -     CBC & Differential  -     TSH  -     Sedimentation Rate  -     C-reactive Protein    Sciatica of left side stable now continue medication    Insomnia, unspecified type continue medication    Abnormal findings on diagnostic imaging of other specified body structures   -     TSH    3 mo after labs          historical record below  Diarrhea, Microscopic colitis, unspecified microscopic colitis type with sx, off budesinide, seen by GI , cont probiotics and imodium, avoid dairy product. Follow up with GI. 8/2020, cholestyramine causing vit D malabsorption  Sciatica of left side refer to Physical Therapy and aleve improved  Osteopenia on  dexa continue oscal d   Tinnitus seen by ENT  Shoulder pain improved after PT  insomnia melatonin continue, decline medicine  Hyperlipidemia contine tx  Vitamin D deficiency continue supplement  tdap  Pneumovax zostavax done. prevnar done  Right shoulder pain rotator cuff tendonitis, cortisone shot improved  Mammogram normal  colonoscopy ref scope and recent BRBPR   Td 3/1/2013

## 2021-06-11 LAB
25(OH)D3+25(OH)D2 SERPL-MCNC: 49.3 NG/ML (ref 30–100)
ALBUMIN SERPL-MCNC: 4 G/DL (ref 3.5–5.2)
ALBUMIN/GLOB SERPL: 2.2 G/DL
ALP SERPL-CCNC: 68 U/L (ref 39–117)
ALT SERPL-CCNC: 15 U/L (ref 1–33)
AST SERPL-CCNC: 20 U/L (ref 1–32)
BASOPHILS # BLD AUTO: 0.06 10*3/MM3 (ref 0–0.2)
BASOPHILS NFR BLD AUTO: 1 % (ref 0–1.5)
BILIRUB SERPL-MCNC: 0.3 MG/DL (ref 0–1.2)
BUN SERPL-MCNC: 12 MG/DL (ref 8–23)
BUN/CREAT SERPL: 16.7 (ref 7–25)
CALCIUM SERPL-MCNC: 8.9 MG/DL (ref 8.6–10.5)
CHLORIDE SERPL-SCNC: 107 MMOL/L (ref 98–107)
CHOLEST SERPL-MCNC: 168 MG/DL (ref 0–200)
CO2 SERPL-SCNC: 28.6 MMOL/L (ref 22–29)
CREAT SERPL-MCNC: 0.72 MG/DL (ref 0.57–1)
CRP SERPL-MCNC: <0.3 MG/DL (ref 0–0.5)
EOSINOPHIL # BLD AUTO: 0.13 10*3/MM3 (ref 0–0.4)
EOSINOPHIL NFR BLD AUTO: 2.2 % (ref 0.3–6.2)
ERYTHROCYTE [DISTWIDTH] IN BLOOD BY AUTOMATED COUNT: 11.8 % (ref 12.3–15.4)
ERYTHROCYTE [SEDIMENTATION RATE] IN BLOOD BY WESTERGREN METHOD: 3 MM/HR (ref 0–30)
GLOBULIN SER CALC-MCNC: 1.8 GM/DL
GLUCOSE SERPL-MCNC: 91 MG/DL (ref 65–99)
HCT VFR BLD AUTO: 42.7 % (ref 34–46.6)
HDLC SERPL-MCNC: 75 MG/DL (ref 40–60)
HGB BLD-MCNC: 13.9 G/DL (ref 12–15.9)
IMM GRANULOCYTES # BLD AUTO: 0.02 10*3/MM3 (ref 0–0.05)
IMM GRANULOCYTES NFR BLD AUTO: 0.3 % (ref 0–0.5)
LDLC SERPL CALC-MCNC: 80 MG/DL (ref 0–100)
LYMPHOCYTES # BLD AUTO: 1.29 10*3/MM3 (ref 0.7–3.1)
LYMPHOCYTES NFR BLD AUTO: 21.9 % (ref 19.6–45.3)
MCH RBC QN AUTO: 30 PG (ref 26.6–33)
MCHC RBC AUTO-ENTMCNC: 32.6 G/DL (ref 31.5–35.7)
MCV RBC AUTO: 92 FL (ref 79–97)
MONOCYTES # BLD AUTO: 0.46 10*3/MM3 (ref 0.1–0.9)
MONOCYTES NFR BLD AUTO: 7.8 % (ref 5–12)
NEUTROPHILS # BLD AUTO: 3.92 10*3/MM3 (ref 1.7–7)
NEUTROPHILS NFR BLD AUTO: 66.8 % (ref 42.7–76)
NRBC BLD AUTO-RTO: 0 /100 WBC (ref 0–0.2)
PLATELET # BLD AUTO: 244 10*3/MM3 (ref 140–450)
POTASSIUM SERPL-SCNC: 4.3 MMOL/L (ref 3.5–5.2)
PROT SERPL-MCNC: 5.8 G/DL (ref 6–8.5)
RBC # BLD AUTO: 4.64 10*6/MM3 (ref 3.77–5.28)
SODIUM SERPL-SCNC: 145 MMOL/L (ref 136–145)
TRIGL SERPL-MCNC: 67 MG/DL (ref 0–150)
TSH SERPL DL<=0.005 MIU/L-ACNC: 1.27 UIU/ML (ref 0.27–4.2)
VLDLC SERPL CALC-MCNC: 13 MG/DL (ref 5–40)
WBC # BLD AUTO: 5.88 10*3/MM3 (ref 3.4–10.8)

## 2021-06-14 ENCOUNTER — OFFICE VISIT (OUTPATIENT)
Dept: INTERNAL MEDICINE | Facility: CLINIC | Age: 76
End: 2021-06-14

## 2021-06-14 VITALS
OXYGEN SATURATION: 98 % | SYSTOLIC BLOOD PRESSURE: 116 MMHG | TEMPERATURE: 97.1 F | WEIGHT: 115 LBS | BODY MASS INDEX: 18.05 KG/M2 | HEART RATE: 79 BPM | HEIGHT: 67 IN | DIASTOLIC BLOOD PRESSURE: 70 MMHG

## 2021-06-14 DIAGNOSIS — K52.9 COLITIS: ICD-10-CM

## 2021-06-14 DIAGNOSIS — E78.5 HYPERLIPIDEMIA, UNSPECIFIED HYPERLIPIDEMIA TYPE: Primary | ICD-10-CM

## 2021-06-14 DIAGNOSIS — I83.90 ASYMPTOMATIC VARICOSE VEINS: ICD-10-CM

## 2021-06-14 DIAGNOSIS — M81.0 OSTEOPOROSIS, UNSPECIFIED OSTEOPOROSIS TYPE, UNSPECIFIED PATHOLOGICAL FRACTURE PRESENCE: ICD-10-CM

## 2021-06-14 DIAGNOSIS — E55.9 VITAMIN D DEFICIENCY: ICD-10-CM

## 2021-06-14 DIAGNOSIS — Z96.642 HISTORY OF LEFT HIP REPLACEMENT: ICD-10-CM

## 2021-06-14 DIAGNOSIS — K52.839 MICROSCOPIC COLITIS, UNSPECIFIED MICROSCOPIC COLITIS TYPE: ICD-10-CM

## 2021-06-14 DIAGNOSIS — H93.19 TINNITUS, UNSPECIFIED LATERALITY: ICD-10-CM

## 2021-06-14 DIAGNOSIS — G47.00 INSOMNIA, UNSPECIFIED TYPE: ICD-10-CM

## 2021-06-14 DIAGNOSIS — M54.32 SCIATICA OF LEFT SIDE: ICD-10-CM

## 2021-06-14 DIAGNOSIS — H40.9 GLAUCOMA, UNSPECIFIED GLAUCOMA TYPE, UNSPECIFIED LATERALITY: ICD-10-CM

## 2021-06-14 PROCEDURE — 99214 OFFICE O/P EST MOD 30 MIN: CPT | Performed by: INTERNAL MEDICINE

## 2021-06-14 PROCEDURE — G0439 PPPS, SUBSEQ VISIT: HCPCS | Performed by: INTERNAL MEDICINE

## 2021-06-14 NOTE — PROGRESS NOTES
The ABCs of the Annual Wellness Visit  Subsequent Medicare Wellness Visit    Chief Complaint   Patient presents with   • Medicare Wellness-subsequent       Subjective   History of Present Illness:  Odalys Miles is a 75 y.o. female who presents for a Subsequent Medicare Wellness Visit.    Patient here for Medicare wellness.  Also has medical problems to be discussed.  Hyperlipidemia stable on medication.  Vitamin D stable on supplement.  Patient continues to have diarrhea denies any hematochezia denies any abdominal pain nausea vomiting.  Osteoporosis improved on DEXA scan now.  Insomnia stable on medication.    HEALTH RISK ASSESSMENT    Recent Hospitalizations:  No hospitalization(s) within the last year.    Current Medical Providers:  Patient Care Team:  Marty Cheung MD as PCP - General (Internal Medicine)    Smoking Status:  Social History     Tobacco Use   Smoking Status Former Smoker   • Start date:    • Quit date:    • Years since quittin.4   Smokeless Tobacco Never Used       Alcohol Consumption:  Social History     Substance and Sexual Activity   Alcohol Use No       Depression Screen:   PHQ-2/PHQ-9 Depression Screening 2021   Little interest or pleasure in doing things 0   Feeling down, depressed, or hopeless 1   Trouble falling or staying asleep, or sleeping too much -   Feeling tired or having little energy -   Poor appetite or overeating -   Feeling bad about yourself - or that you are a failure or have let yourself or your family down -   Trouble concentrating on things, such as reading the newspaper or watching television -   Moving or speaking so slowly that other people could have noticed. Or the opposite - being so fidgety or restless that you have been moving around a lot more than usual -   Thoughts that you would be better off dead, or of hurting yourself in some way -   Total Score 1   If you checked off any problems, how difficult have these problems made it for you to  do your work, take care of things at home, or get along with other people? -       Fall Risk Screen:  MARTHA Fall Risk Assessment was completed, and patient is at LOW risk for falls.Assessment completed on:6/14/2021    Health Habits and Functional and Cognitive Screening:  Functional & Cognitive Status 6/14/2021   Do you have difficulty preparing food and eating? No   Do you have difficulty bathing yourself, getting dressed or grooming yourself? No   Do you have difficulty using the toilet? No   Do you have difficulty moving around from place to place? No   Do you have trouble with steps or getting out of a bed or a chair? No   Current Diet Well Balanced Diet   Dental Exam Not up to date        Dental Exam Comment wears dentures   Eye Exam Up to date        Eye Exam Comment 2021   Exercise (times per week) 0 times per week   Current Exercise Activities Include None   Do you need help using the phone?  No   Are you deaf or do you have serious difficulty hearing?  No   Do you need help with transportation? No   Do you need help shopping? No   Do you need help preparing meals?  No   Do you need help with housework?  No   Do you need help with laundry? No   Do you need help taking your medications? No   Do you need help managing money? No   Do you ever drive or ride in a car without wearing a seat belt? No   Have you felt unusual stress, anger or loneliness in the last month? Yes   Who do you live with? Alone   If you need help, do you have trouble finding someone available to you? No   Have you been bothered in the last four weeks by sexual problems? No   Do you have difficulty concentrating, remembering or making decisions? Yes         Does the patient have evidence of cognitive impairment? No    Asprin use counseling:Does not need ASA (and currently is not on it)    Age-appropriate Screening Schedule:  Refer to the list below for future screening recommendations based on patient's age, sex and/or medical conditions.  Orders for these recommended tests are listed in the plan section. The patient has been provided with a written plan.    Health Maintenance   Topic Date Due   • ZOSTER VACCINE (2 of 2) 04/25/2013   • TDAP/TD VACCINES (2 - Tdap) 04/12/2022   • LIPID PANEL  06/11/2022   • DXA SCAN  07/15/2022   • MAMMOGRAM  09/28/2022          The following portions of the patient's history were reviewed and updated as appropriate: allergies, current medications, past family history, past medical history, past social history, past surgical history and problem list.    Outpatient Medications Prior to Visit   Medication Sig Dispense Refill   • calcium (OS-KAYLYN) 600 MG tablet Take  by mouth 2 (Two) Times a Day.     • Cholecalciferol (VITAMIN D3) 1000 UNITS capsule Take 1 capsule by mouth Daily.     • folic acid (FOLVITE) 800 MCG tablet Take  by mouth Daily.     • loperamide (IMODIUM) 2 MG capsule Take 2 mg by mouth 4 (Four) Times a Day As Needed for Diarrhea.     • LUMIGAN 0.01 % ophthalmic drops      • Multiple Vitamin (MULTI-VITAMIN DAILY PO) Take  by mouth.     • Probiotic Product (PROBIOTIC ADVANCED PO) Take  by mouth.     • psyllium (METAMUCIL) 58.6 % packet Take 1 packet by mouth Daily.     • SIMBRINZA 1-0.2 % suspension INSTILL 1 DROP INTO BOTH EYES TWICE A DAY  3   • vitamin C (ASCORBIC ACID) 500 MG tablet Take 500 mg by mouth Daily.     • baclofen (LIORESAL) 10 MG tablet TAKE 1 TABLET BY MOUTH THREE TIMES A DAY 40 tablet 1   • gentamicin (GARAMYCIN) 0.1 % cream APPLY TO AFFECTED AREAS DAILY  3     No facility-administered medications prior to visit.       Patient Active Problem List   Diagnosis   • Glaucoma   • Hyperlipidemia   • Insomnia   • Microscopic colitis   • Tinnitus   • Asymptomatic varicose veins   • Vitamin D deficiency   • Osteoporosis   • Sciatica of left side   • History of left hip replacement       Advanced Care Planning:  ACP discussion was held with the patient during this visit. Patient has an advance directive  "(not in EMR), copy requested.    Review of Systems   Constitutional: Negative.    HENT: Negative.    Eyes: Negative.    Respiratory: Negative.    Cardiovascular: Negative.    Gastrointestinal: Negative.    Endocrine: Negative.    Genitourinary: Negative.    Musculoskeletal: Negative.    Skin: Negative.    Allergic/Immunologic: Negative.    Neurological: Negative.    Hematological: Negative.    Psychiatric/Behavioral: Negative.        Compared to one year ago, the patient feels her physical health is the same.  Compared to one year ago, the patient feels her mental health is the same.    Reviewed chart for potential of high risk medication in the elderly: yes  Reviewed chart for potential of harmful drug interactions in the elderly:no    Objective         Vitals:    06/14/21 1455   BP: 116/70   Pulse: 79   Temp: 97.1 °F (36.2 °C)   SpO2: 98%   Weight: 52.2 kg (115 lb)   Height: 170.2 cm (67\")   PainSc:   2       Body mass index is 18.01 kg/m².  Discussed the patient's BMI with her. The BMI is in the acceptable range.    Physical Exam  Constitutional:       Appearance: She is well-developed.   HENT:      Head: Normocephalic and atraumatic.      Right Ear: External ear normal.      Left Ear: External ear normal.      Nose: Nose normal.   Eyes:      Conjunctiva/sclera: Conjunctivae normal.      Pupils: Pupils are equal, round, and reactive to light.   Cardiovascular:      Rate and Rhythm: Normal rate and regular rhythm.      Heart sounds: Normal heart sounds.   Pulmonary:      Effort: Pulmonary effort is normal.      Breath sounds: Normal breath sounds.   Abdominal:      General: Bowel sounds are normal.      Palpations: Abdomen is soft.   Genitourinary:     Vagina: Normal.   Musculoskeletal:         General: Normal range of motion.      Cervical back: Normal range of motion and neck supple.   Skin:     General: Skin is warm and dry.   Neurological:      Mental Status: She is alert and oriented to person, place, and " time.      Deep Tendon Reflexes: Reflexes are normal and symmetric.   Psychiatric:         Behavior: Behavior normal.         Thought Content: Thought content normal.         Judgment: Judgment normal.         Lab Results   Component Value Date    GLU 91 06/11/2021    CHLPL 168 06/11/2021    TRIG 67 06/11/2021    HDL 75 (H) 06/11/2021    LDL 80 06/11/2021    VLDL 13 06/11/2021        Assessment/Plan   Medicare Risks and Personalized Health Plan  CMS Preventative Services Quick Reference  Advance Directive Discussion    The above risks/problems have been discussed with the patient.  Pertinent information has been shared with the patient in the After Visit Summary.  Follow up plans and orders are seen below in the Assessment/Plan Section.    Diagnoses and all orders for this visit:    1. Hyperlipidemia, continue good diet     2. Asymptomatic varicose veins    3. Tinnitus, unspecified laterality    4. Vitamin D deficiency    5. Glaucoma, unspecified glaucoma type, unspecified laterality follow-up with eye doctor    6. Microscopic colitis, defer to gastroenterologist    7. Sciatica of left side continue medication as needed    8. Osteoporosis, unspecified osteoporosis type, unspecified pathological fracture presence reviewed the DEXA scan continue medication    9. History of left hip replacement     10. Insomnia, unspecified type continue medication as needed    11. Colitis  -     Ambulatory Referral to Gastroenterology      Follow Up:  Return in about 6 months (around 12/14/2021) for Recheck.     An After Visit Summary and PPPS were given to the patient.         historical record below  Diarrhea, Microscopic colitis, unspecified microscopic colitis type with sx, off budesinide, seen by GI , cont probiotics and imodium, avoid dairy product. Follow up with GI. 8/2020, cholestyramine causing vit D malabsorption  Sciatica of left side refer to Physical Therapy and aleve improved  Osteopenia on  dexa continue oscal d    Tinnitus seen by ENT  Shoulder pain improved after PT  insomnia melatonin continue, decline medicine  Hyperlipidemia contine tx  Vitamin D deficiency continue supplement  tdap  Pneumovax zostavax done. prevnar done  Right shoulder pain rotator cuff tendonitis, cortisone shot improved  Mammogram normal  colonoscopy ref scope and recent BRBPR   Td 3/1/2013

## 2021-07-08 ENCOUNTER — TELEPHONE (OUTPATIENT)
Dept: INTERNAL MEDICINE | Facility: CLINIC | Age: 76
End: 2021-07-08

## 2021-07-08 NOTE — TELEPHONE ENCOUNTER
Caller: Odalys Miles    Relationship to patient: Self    Best call back number: [phone]    Patient is needing: PATIENT STATES SHE HAD A HIP SURGERY AND HER BLOOD COUNT WAS LOW ABOUT 1.5 YEARS AGO AFTER THE OPERATION. PATIENT IS WANTING TO KNOW IF SHE CAN DONATE BLOOD THIS Saturday. PATIENT WOULD LIKE A CALL BACK.

## 2021-07-26 ENCOUNTER — OFFICE VISIT (OUTPATIENT)
Dept: GASTROENTEROLOGY | Facility: CLINIC | Age: 76
End: 2021-07-26

## 2021-07-26 VITALS
DIASTOLIC BLOOD PRESSURE: 58 MMHG | SYSTOLIC BLOOD PRESSURE: 102 MMHG | TEMPERATURE: 96.9 F | WEIGHT: 115 LBS | HEIGHT: 67 IN | BODY MASS INDEX: 18.05 KG/M2

## 2021-07-26 DIAGNOSIS — Z86.010 PERSONAL HISTORY OF COLONIC POLYPS: ICD-10-CM

## 2021-07-26 DIAGNOSIS — Z80.0 FAMILY HX OF COLON CANCER: ICD-10-CM

## 2021-07-26 DIAGNOSIS — K52.831 COLLAGENOUS COLITIS: Primary | ICD-10-CM

## 2021-07-26 PROCEDURE — 99214 OFFICE O/P EST MOD 30 MIN: CPT | Performed by: INTERNAL MEDICINE

## 2021-07-26 RX ORDER — MONTELUKAST SODIUM 4 MG/1
1 TABLET, CHEWABLE ORAL 2 TIMES DAILY
Qty: 60 TABLET | Refills: 2 | Status: SHIPPED | OUTPATIENT
Start: 2021-07-26 | End: 2021-11-03 | Stop reason: HOSPADM

## 2021-07-26 RX ORDER — SODIUM, POTASSIUM,MAG SULFATES 17.5-3.13G
2 SOLUTION, RECONSTITUTED, ORAL ORAL ONCE
Qty: 354 ML | Refills: 0 | Status: SHIPPED | OUTPATIENT
Start: 2021-07-26 | End: 2021-07-26

## 2021-07-26 RX ORDER — SODIUM CHLORIDE 9 MG/ML
70 INJECTION, SOLUTION INTRAVENOUS CONTINUOUS PRN
Status: CANCELLED | OUTPATIENT
Start: 2021-07-26

## 2021-07-26 NOTE — PROGRESS NOTES
Follow Up Note     Date: 2021   Patient Name: Odalys Miles  MRN: [id number]  : 1945     Referring Physician: Marty Cheung MD    Chief Complaint:    Chief Complaint   Patient presents with   • Microscopic colitis       Interval History:   2021  Odalys Miles is a 75 y.o. female who is here today for follow up for her microscopic colitis.  She states that she was diagnosed with microscopic colitis in  and was followed by Dr. Alvarado.  In  She went to  GI after seeing Dr Prater for second opinion as she wanted to take her off from steroids.she states that there she was started on imodium and metamucil that did not help her much.  She will have bowel movement anywhere 1-2 times soft to loose daily while she is on Imodium 1 tablets p.o. twice daily.  She will have occasional unexpected bowel movements with the fecal urgency whenever she goes out. No abdominal pain. Her son had colon CA at the age of 49yrs.   Her last colonoscopy was in  that revealed collagenous colitis.  She had prior history of colon polyps but no polysp in .     2019  Ms. Miles returns to the office.  She is doing better at this time after beginning the Entocort medication again.  She is currently taking 3 capsules daily.  Ms. Miles states that when she tried to decrease the dosage and stop the medication there was an increased frequency of bowel movements.  There is no history of blood in the stool.  She denies any current abdominal pain.  There is no history of nausea or vomiting.  She denies any night sweats, fever chills.    Subjective      Past Medical History:   Past Medical History:   Diagnosis Date   • Abnormal liver enzymes    • Body mass index (BMI) 20.0-20.9, adult    • BPPV (benign paroxysmal positional vertigo)    • Fracture     as a child   • Glaucoma    • Ingrowing toenail    • Mammogram abnormal    • Microscopic colitis    • Onychomycosis    • Osteoporosis    • Sebaceous  cyst    • Skin cancer    • Syncope, near    • Transient ischemic attack      Past Surgical History:   Past Surgical History:   Procedure Laterality Date   • COLONOSCOPY      DR JAIMES   • TUBAL ABDOMINAL LIGATION         Family History:   Family History   Problem Relation Age of Onset   • Heart attack Mother    • Diabetes Mother    • Stroke Father    • Breast cancer Neg Hx    • Ovarian cancer Neg Hx        Social History:   Social History     Socioeconomic History   • Marital status:      Spouse name: Not on file   • Number of children: Not on file   • Years of education: Not on file   • Highest education level: Not on file   Tobacco Use   • Smoking status: Former Smoker     Start date:      Quit date:      Years since quittin.5   • Smokeless tobacco: Never Used   Substance and Sexual Activity   • Alcohol use: No   • Drug use: No   • Sexual activity: Defer       Medications:     Current Outpatient Medications:   •  calcium (OS-KAYLYN) 600 MG tablet, Take  by mouth 2 (Two) Times a Day., Disp: , Rfl:   •  Cholecalciferol (VITAMIN D3) 1000 UNITS capsule, Take 1 capsule by mouth Daily., Disp: , Rfl:   •  folic acid (FOLVITE) 800 MCG tablet, Take  by mouth Daily., Disp: , Rfl:   •  loperamide (IMODIUM) 2 MG capsule, Take 2 mg by mouth 4 (Four) Times a Day As Needed for Diarrhea., Disp: , Rfl:   •  LUMIGAN 0.01 % ophthalmic drops, , Disp: , Rfl:   •  Multiple Vitamin (MULTI-VITAMIN DAILY PO), Take  by mouth., Disp: , Rfl:   •  Probiotic Product (PROBIOTIC ADVANCED PO), Take  by mouth., Disp: , Rfl:   •  psyllium (METAMUCIL) 58.6 % packet, Take 1 packet by mouth Daily., Disp: , Rfl:   •  SIMBRINZA 1-0.2 % suspension, INSTILL 1 DROP INTO BOTH EYES TWICE A DAY, Disp: , Rfl: 3  •  vitamin C (ASCORBIC ACID) 500 MG tablet, Take 500 mg by mouth Daily., Disp: , Rfl:     Allergies:   No Known Allergies    Review of Systems:   Review of Systems   Constitutional: Negative for appetite change, fatigue, fever and  "unexpected weight loss.   HENT: Negative for trouble swallowing.    Gastrointestinal: Positive for diarrhea. Negative for abdominal distention, abdominal pain, anal bleeding, blood in stool, constipation, nausea, rectal pain, vomiting, GERD and indigestion.       The following portions of the patient's history were reviewed and updated as appropriate: allergies, current medications, past family history, past medical history, past social history, past surgical history and problem list.    Objective     Physical Exam:  Vital Signs:   Vitals:    07/26/21 1527   BP: 102/58   Temp: 96.9 °F (36.1 °C)   TempSrc: Temporal   Weight: 52.2 kg (115 lb)   Height: 170.2 cm (67\")       Physical Exam  Vitals and nursing note reviewed.   Constitutional:       Comments: She is poorly built and nourished   HENT:      Head: Normocephalic and atraumatic.   Eyes:      Conjunctiva/sclera: Conjunctivae normal.   Cardiovascular:      Rate and Rhythm: Normal rate and regular rhythm.   Abdominal:      General: Bowel sounds are normal. There is no distension.      Palpations: Abdomen is soft. There is no mass.      Tenderness: There is no abdominal tenderness. There is no guarding or rebound.      Hernia: No hernia is present.   Musculoskeletal:      Cervical back: Normal range of motion and neck supple.   Neurological:      Mental Status: She is alert.         Results Review:   I reviewed the patient's new clinical results.    No visits with results within 90 Day(s) from this visit.   Latest known visit with results is:   Office Visit on 03/23/2021   Component Date Value Ref Range Status   • WBC 06/11/2021 5.88  3.40 - 10.80 10*3/mm3 Final   • RBC 06/11/2021 4.64  3.77 - 5.28 10*6/mm3 Final   • Hemoglobin 06/11/2021 13.9  12.0 - 15.9 g/dL Final   • Hematocrit 06/11/2021 42.7  34.0 - 46.6 % Final   • MCV 06/11/2021 92.0  79.0 - 97.0 fL Final   • MCH 06/11/2021 30.0  26.6 - 33.0 pg Final   • MCHC 06/11/2021 32.6  31.5 - 35.7 g/dL Final   • RDW " 06/11/2021 11.8* 12.3 - 15.4 % Final   • Platelets 06/11/2021 244  140 - 450 10*3/mm3 Final   • Neutrophil Rel % 06/11/2021 66.8  42.7 - 76.0 % Final   • Lymphocyte Rel % 06/11/2021 21.9  19.6 - 45.3 % Final   • Monocyte Rel % 06/11/2021 7.8  5.0 - 12.0 % Final   • Eosinophil Rel % 06/11/2021 2.2  0.3 - 6.2 % Final   • Basophil Rel % 06/11/2021 1.0  0.0 - 1.5 % Final   • Neutrophils Absolute 06/11/2021 3.92  1.70 - 7.00 10*3/mm3 Final   • Lymphocytes Absolute 06/11/2021 1.29  0.70 - 3.10 10*3/mm3 Final   • Monocytes Absolute 06/11/2021 0.46  0.10 - 0.90 10*3/mm3 Final   • Eosinophils Absolute 06/11/2021 0.13  0.00 - 0.40 10*3/mm3 Final   • Basophils Absolute 06/11/2021 0.06  0.00 - 0.20 10*3/mm3 Final   • Immature Granulocyte Rel % 06/11/2021 0.3  0.0 - 0.5 % Final   • Immature Grans Absolute 06/11/2021 0.02  0.00 - 0.05 10*3/mm3 Final   • nRBC 06/11/2021 0.0  0.0 - 0.2 /100 WBC Final   • Glucose 06/11/2021 91  65 - 99 mg/dL Final   • BUN 06/11/2021 12  8 - 23 mg/dL Final   • Creatinine 06/11/2021 0.72  0.57 - 1.00 mg/dL Final   • eGFR Non  Am 06/11/2021 79  >60 mL/min/1.73 Final    Comment: The MDRD GFR formula is only valid for adults with stable  renal function between ages 18 and 70.     • eGFR  Am 06/11/2021 95  >60 mL/min/1.73 Final   • BUN/Creatinine Ratio 06/11/2021 16.7  7.0 - 25.0 Final   • Sodium 06/11/2021 145  136 - 145 mmol/L Final   • Potassium 06/11/2021 4.3  3.5 - 5.2 mmol/L Final   • Chloride 06/11/2021 107  98 - 107 mmol/L Final   • Total CO2 06/11/2021 28.6  22.0 - 29.0 mmol/L Final   • Calcium 06/11/2021 8.9  8.6 - 10.5 mg/dL Final   • Total Protein 06/11/2021 5.8* 6.0 - 8.5 g/dL Final   • Albumin 06/11/2021 4.00  3.50 - 5.20 g/dL Final   • Globulin 06/11/2021 1.8  gm/dL Final   • A/G Ratio 06/11/2021 2.2  g/dL Final   • Total Bilirubin 06/11/2021 0.3  0.0 - 1.2 mg/dL Final   • Alkaline Phosphatase 06/11/2021 68  39 - 117 U/L Final   • AST (SGOT) 06/11/2021 20  1 - 32 U/L Final   •  ALT (SGPT) 06/11/2021 15  1 - 33 U/L Final   • Total Cholesterol 06/11/2021 168  0 - 200 mg/dL Final    Comment: Cholesterol Reference Ranges  (U.S. Department of Health and Human Services ATP III  Classifications)  Desirable          <200 mg/dL  Borderline High    200-239 mg/dL  High Risk          >240 mg/dL  Triglyceride Reference Ranges  (U.S. Department of Health and Human Services ATP III  Classifications)  Normal           <150 mg/dL  Borderline High  150-199 mg/dL  High             200-499 mg/dL  Very High        >500 mg/dL  HDL Reference Ranges  (U.S. Department of Health and Human Services ATP III  Classifcations)  Low     <40 mg/dl (major risk factor for CHD)  High    >60 mg/dl ('negative' risk factor for CHD)  LDL Reference Ranges  (U.S. Department of Health and Human Services ATP III  Classifcations)  Optimal          <100 mg/dL  Near Optimal     100-129 mg/dL  Borderline High  130-159 mg/dL  High             160-189 mg/dL  Very High        >189 mg/dL     • Triglycerides 06/11/2021 67  0 - 150 mg/dL Final   • HDL Cholesterol 06/11/2021 75* 40 - 60 mg/dL Final   • VLDL Cholesterol Behzad 06/11/2021 13  5 - 40 mg/dL Final   • LDL Chol Calc (NIH) 06/11/2021 80  0 - 100 mg/dL Final   • TSH 06/11/2021 1.270  0.270 - 4.200 uIU/mL Final   • Sed Rate 06/11/2021 3  0 - 30 mm/hr Final   • C-Reactive Protein 06/11/2021 <0.30  0.00 - 0.50 mg/dL Final   • 25 Hydroxy, Vitamin D 06/11/2021 49.3  30.0 - 100.0 ng/ml Final    Comment: Results may be falsely increased if patient taking Biotin.  Reference Range for Total Vitamin D 25(OH)  Deficiency <20.0 ng/mL  Insufficiency 21-29 ng/mL  Sufficiency  ng/mL  Toxicity >100 ng/ml        No radiology results for the last 90 days.    Assessment / Plan      1.  Microscopic colitis -collagenous colitis  Biopsy confirmed microscopic colitis in 2014.  She was on budesonide for a while and later on changed to Imodium with Metamucil in 2019.  She still has a significant issues  especially with the fecal urgency.  Patient still doesn't want to be on a long-term steroids.  We discussed on alternatives including the colestipol cholestyramine powder.   We'll start her on colestipol 1 g p.o. twice daily and will titrate up the dose to 3 times daily or make it half a pill twice daily depending on her response.  Also advised to take a half a pill to 1 pill of Imodium whenever she goes out for anything including shopping, dining.  We will hold off the Imodium while taking colestipol, that can be added depending on her response to colestipol.    Pending on response that she may need a low-dose of budesonide along with above measures.    2. Personal history of colon polyps  3. Family history colon CA  Her son had a colon cancer at age of 49.  She had a prior history of colon polyps however last colonoscopy in 2014 no polyps removed.  She is due for high risk screening colonoscopy,  will schedule the same.    The indications, technique, alternatives and potential risk and complications were discussed with the patient including but not limited to bleeding, bowel perforations, missing lesions and anesthetic complications. The patient understands and wishes to proceed with the procedure and has given their verbal consent. Written patient education information was given to the patient.   The patient will call if they have further questions before procedure.         Follow Up:   No follow-ups on file.    Jose Mayer MD  [facility]  7/26/2021  15:29 EDT     Please note that portions of this note may have been completed with a voice recognition program.

## 2021-07-27 ENCOUNTER — TELEPHONE (OUTPATIENT)
Dept: GASTROENTEROLOGY | Facility: CLINIC | Age: 76
End: 2021-07-27

## 2021-07-27 RX ORDER — BISACODYL 5 MG
TABLET, DELAYED RELEASE (ENTERIC COATED) ORAL
Qty: 4 TABLET | Refills: 0 | Status: SHIPPED | OUTPATIENT
Start: 2021-07-27 | End: 2021-10-12 | Stop reason: HOSPADM

## 2021-07-27 RX ORDER — POLYETHYLENE GLYCOL 3350 17 G/17G
POWDER, FOR SOLUTION ORAL
Qty: 238 G | Refills: 0 | Status: SHIPPED | OUTPATIENT
Start: 2021-07-27 | End: 2021-10-12 | Stop reason: HOSPADM

## 2021-07-27 NOTE — TELEPHONE ENCOUNTER
----- Message from Mia Morrissey MA sent at 7/27/2021  8:39 AM EDT -----  Suprep not covered by insurance

## 2021-08-24 ENCOUNTER — TELEPHONE (OUTPATIENT)
Dept: GASTROENTEROLOGY | Facility: CLINIC | Age: 76
End: 2021-08-24

## 2021-08-24 NOTE — TELEPHONE ENCOUNTER
----- Message from Yvonne Jacobo PA-C sent at 8/24/2021  8:16 AM EDT -----    ----- Message -----  From: Jose Mayer MD  Sent: 8/23/2021   7:33 PM EDT  To: Yvonne Jacobo PA-C    Agree    ----- Message -----  From: Yvonne Jacobo PA-C  Sent: 8/23/2021  12:27 PM EDT  To: Jose Mayer MD    I don't see any reason she can't go ahead and get shingles vaccine, just wanted to confirm with you.  ----- Message -----  From: Mia Morrissey MA  Sent: 8/23/2021  12:03 PM EDT  To: Yvonne Jacobo PA-C    Please advise  ----- Message -----  From: Aura Dong MA  Sent: 8/23/2021  11:22 AM EDT  To: Mia Morrissey MA    Patient left , states when she was seen in the office, she was asked about recent vaccinations.  She is due for Shingles booster, and wants to know if this is okay for her to get with her medications and all.

## 2021-08-31 PROBLEM — Z86.010 PERSONAL HISTORY OF COLONIC POLYPS: Status: ACTIVE | Noted: 2021-08-31

## 2021-08-31 PROBLEM — Z86.0100 PERSONAL HISTORY OF COLONIC POLYPS: Status: ACTIVE | Noted: 2021-08-31

## 2021-10-08 ENCOUNTER — TELEPHONE (OUTPATIENT)
Dept: GASTROENTEROLOGY | Facility: CLINIC | Age: 76
End: 2021-10-08

## 2021-10-08 NOTE — TELEPHONE ENCOUNTER
Patient is scheduled for procedure on 10/12/2021. Called patient to confirm appointment. Reached voicemail. Left detailed message for return call

## 2021-10-11 RX ORDER — MELATONIN/PYRIDOXINE HCL (B6) 10 MG-10MG
TABLET,IMMED, EXTENDED RELEASE, BIPHASIC ORAL NIGHTLY
COMMUNITY

## 2021-10-11 RX ORDER — POTASSIUM CHLORIDE 750 MG/1
10 TABLET, FILM COATED, EXTENDED RELEASE ORAL 2 TIMES DAILY
COMMUNITY
End: 2021-11-03 | Stop reason: HOSPADM

## 2021-10-11 NOTE — PRE-PROCEDURE INSTRUCTIONS
PAT phone history completed with pt for upcoming procedure on 10/12/21, with Dr. Mayer.     PAT PASS GIVEN/REVIEWED WITH PT.  VERBALIZED UNDERSTANDING OF THE FOLLOWING:  DO NOT EAT, DRINK, SMOKE, USE SMOKELESS TOBACCO OR CHEW GUM AFTER MIDNIGHT THE NIGHT BEFORE SURGERY.  THIS ALSO INCLUDES HARD CANDIES AND MINTS.    DO NOT SHAVE THE AREA TO BE OPERATED ON AT LEAST 48 HOURS PRIOR TO THE PROCEDURE.  DO NOT WEAR MAKE UP OR NAIL POLISH.  DO NOT LEAVE IN ANY PIERCING OR WEAR JEWELRY THE DAY OF SURGERY.      DO NOT USE ADHESIVES IF YOU WEAR DENTURES.    DO NOT WEAR EYE CONTACTS; BRING IN YOUR GLASSES.    ONLY TAKE MEDICATION THE MORNING OF YOUR PROCEDURE IF INSTRUCTED BY YOUR SURGEON WITH ENOUGH WATER TO SWALLOW THE MEDICATION.  IF YOUR SURGEON DID NOT SPECIFY WHICH MEDICATIONS TO TAKE, YOU WILL NEED TO CALL THEIR OFFICE FOR FURTHER INSTRUCTIONS AND DO AS THEY INSTRUCT.    LEAVE ANYTHING YOU CONSIDER VALUABLE AT HOME.    YOU WILL NEED TO ARRANGE FOR SOMEONE TO DRIVE YOU HOME AFTER SURGERY.  IT IS RECOMMENDED THAT YOU DO NOT DRIVE, WORK, DRINK ALCOHOL OR MAKE MAJOR DECISIONS FOR AT LEAST 24 HOURS AFTER YOUR PROCEDURE IS COMPLETE.      THE DAY OF YOUR PROCEDURE, BRING IN THE FOLLOWING IF APPLICABLE:   PICTURE ID AND INSURANCE/MEDICARE OR MEDICAID CARDS/ANY CO-PAY THAT MAY BE DUE   COPY OF ADVANCED DIRECTIVE/LIVING WILL/POWER OR    CPAP/BIPAP/INHALERS   SKIN PREP SHEET   YOUR PREADMISSION TESTING PASS (IF NOT A PHONE HISTORY)           COVID self-quarantine instructions reviewed with the pt.  Verbalized understanding.

## 2021-10-12 ENCOUNTER — HOSPITAL ENCOUNTER (OUTPATIENT)
Facility: HOSPITAL | Age: 76
Setting detail: HOSPITAL OUTPATIENT SURGERY
Discharge: HOME | End: 2021-10-12
Attending: INTERNAL MEDICINE | Admitting: INTERNAL MEDICINE

## 2021-10-12 ENCOUNTER — ANESTHESIA EVENT (OUTPATIENT)
Dept: GASTROENTEROLOGY | Facility: HOSPITAL | Age: 76
End: 2021-10-12

## 2021-10-12 ENCOUNTER — ANESTHESIA (OUTPATIENT)
Dept: GASTROENTEROLOGY | Facility: HOSPITAL | Age: 76
End: 2021-10-12

## 2021-10-12 VITALS
OXYGEN SATURATION: 97 % | TEMPERATURE: 97.7 F | SYSTOLIC BLOOD PRESSURE: 110 MMHG | BODY MASS INDEX: 17.13 KG/M2 | DIASTOLIC BLOOD PRESSURE: 66 MMHG | RESPIRATION RATE: 16 BRPM | HEART RATE: 70 BPM | HEIGHT: 68 IN | WEIGHT: 113 LBS

## 2021-10-12 DIAGNOSIS — Z86.010 PERSONAL HISTORY OF COLONIC POLYPS: ICD-10-CM

## 2021-10-12 DIAGNOSIS — K52.831 COLLAGENOUS COLITIS: ICD-10-CM

## 2021-10-12 PROCEDURE — 45382 COLONOSCOPY W/CONTROL BLEED: CPT | Performed by: INTERNAL MEDICINE

## 2021-10-12 PROCEDURE — 88305 TISSUE EXAM BY PATHOLOGIST: CPT | Performed by: INTERNAL MEDICINE

## 2021-10-12 PROCEDURE — 45380 COLONOSCOPY AND BIOPSY: CPT | Performed by: INTERNAL MEDICINE

## 2021-10-12 PROCEDURE — 25010000002 PROPOFOL 200 MG/20ML EMULSION: Performed by: NURSE ANESTHETIST, CERTIFIED REGISTERED

## 2021-10-12 RX ORDER — LIDOCAINE HYDROCHLORIDE 20 MG/ML
INJECTION, SOLUTION INTRAVENOUS AS NEEDED
Status: DISCONTINUED | OUTPATIENT
Start: 2021-10-12 | End: 2021-10-12 | Stop reason: SURG

## 2021-10-12 RX ORDER — KETAMINE HYDROCHLORIDE 50 MG/ML
INJECTION, SOLUTION, CONCENTRATE INTRAMUSCULAR; INTRAVENOUS AS NEEDED
Status: DISCONTINUED | OUTPATIENT
Start: 2021-10-12 | End: 2021-10-12 | Stop reason: SURG

## 2021-10-12 RX ORDER — PROPOFOL 10 MG/ML
INJECTION, EMULSION INTRAVENOUS AS NEEDED
Status: DISCONTINUED | OUTPATIENT
Start: 2021-10-12 | End: 2021-10-12 | Stop reason: SURG

## 2021-10-12 RX ORDER — SODIUM CHLORIDE 9 MG/ML
70 INJECTION, SOLUTION INTRAVENOUS CONTINUOUS PRN
Status: DISCONTINUED | OUTPATIENT
Start: 2021-10-12 | End: 2021-10-12 | Stop reason: HOSPADM

## 2021-10-12 RX ORDER — SIMETHICONE 20 MG/.3ML
EMULSION ORAL AS NEEDED
Status: DISCONTINUED | OUTPATIENT
Start: 2021-10-12 | End: 2021-10-12 | Stop reason: HOSPADM

## 2021-10-12 RX ADMIN — SODIUM CHLORIDE 70 ML/HR: 9 INJECTION, SOLUTION INTRAVENOUS at 10:14

## 2021-10-12 RX ADMIN — LIDOCAINE HYDROCHLORIDE 60 MG: 20 INJECTION, SOLUTION INTRAVENOUS at 12:02

## 2021-10-12 RX ADMIN — PROPOFOL 80 MG: 10 INJECTION, EMULSION INTRAVENOUS at 12:02

## 2021-10-12 RX ADMIN — KETAMINE HYDROCHLORIDE 25 MG: 50 INJECTION, SOLUTION INTRAMUSCULAR; INTRAVENOUS at 12:02

## 2021-10-12 RX ADMIN — PROPOFOL 120 MG: 10 INJECTION, EMULSION INTRAVENOUS at 12:31

## 2021-10-12 NOTE — H&P
[facility]  HISTORY AND PHYSICAL    Patient Name: Odalys Miles  : 1945  MRN: [id number]    Chief Complaint:   For surveillance colonoscopy    History Of Presenting Illness:    Personal History of colon polyp   Family history of colon CA  H/o Microscopic colitis    Past Medical History:   Diagnosis Date   • Abnormal liver enzymes    • Body mass index (BMI) 20.0-20.9, adult    • BPPV (benign paroxysmal positional vertigo)    • Fracture     as a child   • Glaucoma    • Hip fracture (HCC) 10/11/2019    Hip repalced in    • Ingrowing toenail    • Mammogram abnormal    • Microscopic colitis    • Onychomycosis    • Osteoporosis    • Sebaceous cyst    • Skin cancer    • Stroke (HCC) 10/11/2021    TIA in    • Syncope, near    • Transient ischemic attack        Past Surgical History:   Procedure Laterality Date   • COLONOSCOPY      DR JAIMES   • HIP SURGERY Left 10/11/2021    2019   • TUBAL ABDOMINAL LIGATION         Social History     Socioeconomic History   • Marital status:    Tobacco Use   • Smoking status: Former Smoker     Start date:      Quit date:      Years since quittin.7   • Smokeless tobacco: Never Used   Substance and Sexual Activity   • Alcohol use: No   • Drug use: No   • Sexual activity: Defer       Family History   Problem Relation Age of Onset   • Heart attack Mother    • Diabetes Mother    • Stroke Father    • Breast cancer Neg Hx    • Ovarian cancer Neg Hx        Prior to Admission Medications:  Medications Prior to Admission   Medication Sig Dispense Refill Last Dose   • bisacodyl (Dulcolax) 5 MG EC tablet Follow instructions given at office 4 tablet 0 10/11/2021 at Unknown time   • calcium (OS-KAYLYN) 600 MG tablet Take 600 mg by mouth 2 (Two) Times a Day.   Past Week at Unknown time   • Cholecalciferol (VITAMIN D3) 1000 UNITS capsule Take 1 capsule by mouth Daily.   Past Week at Unknown time   • colestipol (COLESTID) 1 g tablet Take 1 tablet by mouth  2 (Two) Times a Day. 60 tablet 2 Past Week at Unknown time   • folic acid (FOLVITE) 800 MCG tablet Take 800 mcg by mouth Daily.   Past Week at Unknown time   • loperamide (IMODIUM) 2 MG capsule Take 2 mg by mouth 4 (Four) Times a Day As Needed for Diarrhea.   Past Month at Unknown time   • LUMIGAN 0.01 % ophthalmic drops Administer  to both eyes Every Night.   Past Week at Unknown time   • Melatonin 10-10 MG tablet controlled-release Take  by mouth Every Night.   Past Week at Unknown time   • Multiple Vitamin (MULTI-VITAMIN DAILY PO) Take  by mouth.   Past Week at Unknown time   • polyethylene glycol (MIRALAX) 17 GM/SCOOP powder Follow directions given at office 238 g 0 10/11/2021 at Unknown time   • potassium chloride 10 MEQ CR tablet Take 10 mEq by mouth 2 (Two) Times a Day.   Past Week at Unknown time   • Probiotic Product (PROBIOTIC ADVANCED PO) Take  by mouth.   Past Week at Unknown time   • SIMBRINZA 1-0.2 % suspension INSTILL 1 DROP INTO BOTH EYES TWICE A DAY  3 Past Week at Unknown time   • vitamin C (ASCORBIC ACID) 500 MG tablet Take 500 mg by mouth Daily.   Past Week at Unknown time   • psyllium (METAMUCIL) 58.6 % packet Take 1 packet by mouth Daily.          Allergies:  No Known Allergies     Vitals: Temp:  [97.9 °F (36.6 °C)] 97.9 °F (36.6 °C)  Heart Rate:  [65] 65  Resp:  [20] 20  BP: (87)/(53) 87/53    Review Of Systems:  Constitutional:  Negative for chills, fever, and unexpected weight change.  Respiratory:  Negative for cough, chest tightness, shortness of breath, and wheezing.  Cardiovascular:  Negative for chest pain, palpitations, and leg swelling.  Gastrointestinal:  Negative for abdominal distention, abdominal pain, Nausea, vomiting.  Neurological:  Negative for Weakness, numbness, and headaches.     Physical Exam:    General Appearance:  Alert, cooperative, in no acute distress.   Lungs:   Clear to auscultation, respirations regular, even and                 unlabored.   Heart:  Regular rhythm  and normal rate.   Abdomen:   Normal bowel sounds, no masses, no organomegaly. Soft, non-tender, non-distended   Neurologic: Alert and oriented x 3. Moves all four limbs equally       Plan: COLONOSCOPY (N/A)     Jose Mayer MD  10/12/2021

## 2021-10-12 NOTE — DISCHARGE INSTRUCTIONS
Rest today  No pushing,pulling,tugging,heavy lifting, or strenuous activity   No major decision making,driving,or drinking alcoholic beverages for 24 hours due to the sedation you received  Always use good hand hygiene/washing technique  No driving on pain medication.    To assist you in voiding:  Drink plenty of fluids  Listen to running water while attempting to void.    If you are unable to urinate and you have an uncomfortable urge to void or it has been   6 hours since you were discharged, return to the Emergency Room.    - Discharge patient to home (ambulatory).   - High fiber diet.   - Continue present medications.   - Await pathology results.   - Repeat colonoscopy depend on clinical symptoms   - Return to GI office in 8 weeks.

## 2021-10-12 NOTE — ANESTHESIA POSTPROCEDURE EVALUATION
Patient: Odalys Miles    Procedure Summary     Date: 10/12/21 Room / Location: [facility] ENDOSCOPY 2 / [facility] ENDOSCOPY    Anesthesia Start: 1158 Anesthesia Stop: 1235    Procedure: COLONOSCOPY with biopsy and APC sigmoid AVM cauterization (N/A Anus) Diagnosis:       Collagenous colitis      Personal history of colonic polyps      (Collagenous colitis [K52.831])      (Personal history of colonic polyps [Z86.010])    Surgeons: Jose Mayer MD Provider: Norbert Esparza CRNA    Anesthesia Type: MAC ASA Status: 3          Anesthesia Type: MAC    Vitals  Vitals Value Taken Time   /58 10/12/21 1238   Temp 97.7 °F (36.5 °C) 10/12/21 1238   Pulse 67 10/12/21 1238   Resp 16 10/12/21 1238   SpO2 97 % 10/12/21 1238           Post Anesthesia Care and Evaluation    Patient location during evaluation: PHASE II  Patient participation: complete - patient participated  Level of consciousness: awake  Pain score: 1  Pain management: adequate  Airway patency: patent  Anesthetic complications: No anesthetic complications  PONV Status: controlled  Cardiovascular status: acceptable and stable  Respiratory status: acceptable  Hydration status: acceptable

## 2021-10-12 NOTE — ANESTHESIA PREPROCEDURE EVALUATION
Anesthesia Evaluation     Patient summary reviewed and Nursing notes reviewed   no history of anesthetic complications:  NPO Solid Status: > 8 hours  NPO Liquid Status: > 8 hours           Airway   Mallampati: II  TM distance: >3 FB  Neck ROM: full  no difficulty expected  Dental - normal exam     Pulmonary - negative pulmonary ROS and normal exam   Cardiovascular - normal exam    Rhythm: regular  Rate: normal    (+) hyperlipidemia,       Neuro/Psych  (+) TIA, CVA, numbness,     GI/Hepatic/Renal/Endo - negative ROS     Musculoskeletal (-) negative ROS    Abdominal    Substance History - negative use     OB/GYN negative ob/gyn ROS         Other      history of cancer                    Anesthesia Plan    ASA 3     MAC   (Pt told that intravenous sedation will be used as the primary anesthetic along with local anesthesia if necessary. Every effort will be made to make sure the patient is comfortable.     The patient was told they may or may not have recall for the procedure. It was further explained that if the MAC was not adequate that a general anesthetic with either an LMA or endotracheal tube would be required.     Will proceed with the plan of care.)  intravenous induction     Anesthetic plan, all risks, benefits, and alternatives have been provided, discussed and informed consent has been obtained with: patient.

## 2021-10-14 LAB
LAB AP CASE REPORT: NORMAL
PATH REPORT.FINAL DX SPEC: NORMAL

## 2021-10-19 ENCOUNTER — TELEPHONE (OUTPATIENT)
Dept: INTERNAL MEDICINE | Facility: CLINIC | Age: 76
End: 2021-10-19

## 2021-10-19 NOTE — TELEPHONE ENCOUNTER
Patient did not complete XRAY SPINE ordered on 09/10/2020. This order is too old to be used. May I cancel the order?

## 2021-10-29 ENCOUNTER — HOSPITAL ENCOUNTER (EMERGENCY)
Facility: HOSPITAL | Age: 76
Discharge: HOME | End: 2021-10-29
Attending: EMERGENCY MEDICINE | Admitting: EMERGENCY MEDICINE

## 2021-10-29 ENCOUNTER — APPOINTMENT (OUTPATIENT)
Dept: GENERAL RADIOLOGY | Facility: HOSPITAL | Age: 76
End: 2021-10-29

## 2021-10-29 VITALS
WEIGHT: 118 LBS | BODY MASS INDEX: 18.52 KG/M2 | RESPIRATION RATE: 16 BRPM | OXYGEN SATURATION: 97 % | TEMPERATURE: 97.6 F | HEART RATE: 70 BPM | HEIGHT: 67 IN | SYSTOLIC BLOOD PRESSURE: 147 MMHG | DIASTOLIC BLOOD PRESSURE: 66 MMHG

## 2021-10-29 DIAGNOSIS — S42.202A CLOSED FRACTURE OF PROXIMAL END OF LEFT HUMERUS, UNSPECIFIED FRACTURE MORPHOLOGY, INITIAL ENCOUNTER: Primary | ICD-10-CM

## 2021-10-29 PROCEDURE — 25010000002 MORPHINE SULFATE (PF) 2 MG/ML SOLUTION: Performed by: EMERGENCY MEDICINE

## 2021-10-29 PROCEDURE — 73030 X-RAY EXAM OF SHOULDER: CPT

## 2021-10-29 PROCEDURE — 99283 EMERGENCY DEPT VISIT LOW MDM: CPT

## 2021-10-29 PROCEDURE — 96372 THER/PROPH/DIAG INJ SC/IM: CPT

## 2021-10-29 RX ORDER — ACETAMINOPHEN AND CODEINE PHOSPHATE 300; 30 MG/1; MG/1
1 TABLET ORAL EVERY 6 HOURS PRN
Qty: 10 TABLET | Refills: 0 | Status: SHIPPED | OUTPATIENT
Start: 2021-10-29 | End: 2021-12-14

## 2021-10-29 RX ORDER — MORPHINE SULFATE 2 MG/ML
2 INJECTION, SOLUTION INTRAMUSCULAR; INTRAVENOUS ONCE
Status: COMPLETED | OUTPATIENT
Start: 2021-10-29 | End: 2021-10-29

## 2021-10-29 RX ADMIN — MORPHINE SULFATE 2 MG: 2 INJECTION, SOLUTION INTRAMUSCULAR; INTRAVENOUS at 14:30

## 2021-11-01 ENCOUNTER — PRE-ADMISSION TESTING (OUTPATIENT)
Dept: PREADMISSION TESTING | Facility: HOSPITAL | Age: 76
End: 2021-11-01

## 2021-11-01 ENCOUNTER — PATIENT OUTREACH (OUTPATIENT)
Dept: CASE MANAGEMENT | Facility: OTHER | Age: 76
End: 2021-11-01

## 2021-11-01 ENCOUNTER — HOSPITAL ENCOUNTER (OUTPATIENT)
Dept: GENERAL RADIOLOGY | Facility: HOSPITAL | Age: 76
Discharge: HOME | End: 2021-11-01

## 2021-11-01 VITALS — BODY MASS INDEX: 19.02 KG/M2 | WEIGHT: 121.2 LBS | HEIGHT: 67 IN

## 2021-11-01 LAB
ANION GAP SERPL CALCULATED.3IONS-SCNC: 7.2 MMOL/L (ref 5–15)
BUN SERPL-MCNC: 13 MG/DL (ref 8–23)
BUN/CREAT SERPL: 18.6 (ref 7–25)
CALCIUM SPEC-SCNC: 9.3 MG/DL (ref 8.6–10.5)
CHLORIDE SERPL-SCNC: 104 MMOL/L (ref 98–107)
CO2 SERPL-SCNC: 27.8 MMOL/L (ref 22–29)
CREAT SERPL-MCNC: 0.7 MG/DL (ref 0.57–1)
DEPRECATED RDW RBC AUTO: 44 FL (ref 37–54)
ERYTHROCYTE [DISTWIDTH] IN BLOOD BY AUTOMATED COUNT: 13.4 % (ref 12.3–15.4)
GFR SERPL CREATININE-BSD FRML MDRD: 81 ML/MIN/1.73
GLUCOSE SERPL-MCNC: 93 MG/DL (ref 65–99)
HCT VFR BLD AUTO: 37.9 % (ref 34–46.6)
HGB BLD-MCNC: 12 G/DL (ref 12–15.9)
MCH RBC QN AUTO: 28.6 PG (ref 26.6–33)
MCHC RBC AUTO-ENTMCNC: 31.7 G/DL (ref 31.5–35.7)
MCV RBC AUTO: 90.2 FL (ref 79–97)
PLATELET # BLD AUTO: 255 10*3/MM3 (ref 140–450)
PMV BLD AUTO: 9.8 FL (ref 6–12)
POTASSIUM SERPL-SCNC: 3.9 MMOL/L (ref 3.5–5.2)
RBC # BLD AUTO: 4.2 10*6/MM3 (ref 3.77–5.28)
SODIUM SERPL-SCNC: 139 MMOL/L (ref 136–145)
WBC # BLD AUTO: 5.97 10*3/MM3 (ref 3.4–10.8)

## 2021-11-01 PROCEDURE — U0005 INFEC AGEN DETEC AMPLI PROBE: HCPCS

## 2021-11-01 PROCEDURE — C9803 HOPD COVID-19 SPEC COLLECT: HCPCS

## 2021-11-01 PROCEDURE — 93005 ELECTROCARDIOGRAM TRACING: CPT

## 2021-11-01 PROCEDURE — 80048 BASIC METABOLIC PNL TOTAL CA: CPT

## 2021-11-01 PROCEDURE — 36415 COLL VENOUS BLD VENIPUNCTURE: CPT

## 2021-11-01 PROCEDURE — U0004 COV-19 TEST NON-CDC HGH THRU: HCPCS

## 2021-11-01 PROCEDURE — 85027 COMPLETE CBC AUTOMATED: CPT

## 2021-11-01 PROCEDURE — 93010 ELECTROCARDIOGRAM REPORT: CPT | Performed by: INTERNAL MEDICINE

## 2021-11-01 PROCEDURE — 71046 X-RAY EXAM CHEST 2 VIEWS: CPT

## 2021-11-01 NOTE — OUTREACH NOTE
Ambulatory Case Management Note    Patient Outreach    RN-ACM outreach with patient.  Patient had an ED visit at [facility] 10/29/21.  Patient presented after a fall.  Clinical impression noted as closed fracture of proximal end of left humerus.  Patient was treated and discharged home to follow with PCP and orthopedic surgery.  Medication changes at discharge include the addition of acetaminophen-codeine 300-30mg.     Care Evaluation    Questions/Answers      Most Recent Value   Suggested Appointments --  [Follow with Orthopedic Surgeon Chayo at 1:30 today as scheduled. ]   Annual Wellness Visit:  Patient Has Completed   Care Gaps Addressed Colon Cancer Screening,  Mammogram   Colon Cancer Screening Type Exempt   Mammogram Status Exempt   Other Patient Education/Resources  24/7 [facility] Nurse Call Line  [AVS, education, and recommended f/u reviewed.  Education on falls risk reviewed.  Patient stated to not need/use a cane or walker.  Patient reported hx of hip fracture with a prior fall.  ]   Advanced Directives: Send Materials   Medication Adherence Medications understood  [Patient states to have only needed to take pain medication 3 times since her injury.]   Healthy Lifestyle (Self-Efficacy) recognizes when to contact medical assistance,  self-reports important symptoms to medical professional,  recognizes when to stop activity        General & Health Literacy Assessment    Questions/Answers      Most Recent Value   Assessment Completed With Patient   Living Arrangement Alone   Type of Residence Private Residence   Home Care Services No  [Patient reported having a friend that checks in each morning and afternoon to provide assistance as needed. ]   Bed or Wheelchair Confined No   Difficulty Keeping Appointments No   Temple or Spiritual Beliefs that Impact Treatment No   Chronic Pain No   Health Literacy Good          Mallory Boles RN  Ambulatory Case Management    11/1/2021, 11:04  EDT

## 2021-11-02 LAB — SARS-COV-2 RNA PNL SPEC NAA+PROBE: NOT DETECTED

## 2021-11-03 ENCOUNTER — ANESTHESIA EVENT (OUTPATIENT)
Dept: PERIOP | Facility: HOSPITAL | Age: 76
End: 2021-11-03

## 2021-11-03 ENCOUNTER — ANESTHESIA (OUTPATIENT)
Dept: PERIOP | Facility: HOSPITAL | Age: 76
End: 2021-11-03

## 2021-11-03 ENCOUNTER — APPOINTMENT (OUTPATIENT)
Dept: ULTRASOUND IMAGING | Facility: HOSPITAL | Age: 76
End: 2021-11-03

## 2021-11-03 ENCOUNTER — APPOINTMENT (OUTPATIENT)
Dept: GENERAL RADIOLOGY | Facility: HOSPITAL | Age: 76
End: 2021-11-03

## 2021-11-03 ENCOUNTER — HOSPITAL ENCOUNTER (OUTPATIENT)
Facility: HOSPITAL | Age: 76
Setting detail: HOSPITAL OUTPATIENT SURGERY
Discharge: HOME | End: 2021-11-03
Attending: ORTHOPAEDIC SURGERY | Admitting: ORTHOPAEDIC SURGERY

## 2021-11-03 VITALS
TEMPERATURE: 98.7 F | DIASTOLIC BLOOD PRESSURE: 63 MMHG | RESPIRATION RATE: 16 BRPM | SYSTOLIC BLOOD PRESSURE: 131 MMHG | OXYGEN SATURATION: 98 % | HEART RATE: 57 BPM

## 2021-11-03 PROCEDURE — C1713 ANCHOR/SCREW BN/BN,TIS/BN: HCPCS | Performed by: ORTHOPAEDIC SURGERY

## 2021-11-03 PROCEDURE — 25010000002 FENTANYL CITRATE (PF) 100 MCG/2ML SOLUTION: Performed by: NURSE ANESTHETIST, CERTIFIED REGISTERED

## 2021-11-03 PROCEDURE — 0 CEFAZOLIN SODIUM-DEXTROSE 2-3 GM-%(50ML) RECONSTITUTED SOLUTION: Performed by: ORTHOPAEDIC SURGERY

## 2021-11-03 PROCEDURE — 25010000002 ONDANSETRON PER 1 MG: Performed by: NURSE ANESTHETIST, CERTIFIED REGISTERED

## 2021-11-03 PROCEDURE — 76000 FLUOROSCOPY <1 HR PHYS/QHP: CPT

## 2021-11-03 PROCEDURE — 94799 UNLISTED PULMONARY SVC/PX: CPT

## 2021-11-03 PROCEDURE — 25010000002 SUCCINYLCHOLINE PER 20 MG: Performed by: NURSE ANESTHETIST, CERTIFIED REGISTERED

## 2021-11-03 PROCEDURE — 25010000002 HYDROMORPHONE 1 MG/ML SOLUTION: Performed by: NURSE ANESTHETIST, CERTIFIED REGISTERED

## 2021-11-03 PROCEDURE — 25010000002 DEXAMETHASONE SODIUM PHOSPHATE 10 MG/ML SOLUTION: Performed by: NURSE ANESTHETIST, CERTIFIED REGISTERED

## 2021-11-03 DEVICE — PROXIMAL HUMERAL NAIL, CANNULATED, LEFT
Type: IMPLANTABLE DEVICE | Site: HUMERUS | Status: FUNCTIONAL
Brand: T2

## 2021-11-03 DEVICE — LOCKING SCREW, FULLY THREADED: Type: IMPLANTABLE DEVICE | Site: HUMERUS | Status: FUNCTIONAL

## 2021-11-03 RX ORDER — LIDOCAINE HYDROCHLORIDE 20 MG/ML
INJECTION, SOLUTION INTRAVENOUS AS NEEDED
Status: DISCONTINUED | OUTPATIENT
Start: 2021-11-03 | End: 2021-11-03 | Stop reason: SURG

## 2021-11-03 RX ORDER — OXYCODONE HYDROCHLORIDE AND ACETAMINOPHEN 5; 325 MG/1; MG/1
1 TABLET ORAL EVERY 4 HOURS PRN
Qty: 50 TABLET | Refills: 0 | Status: SHIPPED | OUTPATIENT
Start: 2021-11-03 | End: 2021-12-14

## 2021-11-03 RX ORDER — CLINDAMYCIN PHOSPHATE 900 MG/50ML
900 INJECTION, SOLUTION INTRAVENOUS ONCE
Status: CANCELLED | OUTPATIENT
Start: 2021-11-03 | End: 2021-11-03

## 2021-11-03 RX ORDER — SODIUM CHLORIDE, SODIUM LACTATE, POTASSIUM CHLORIDE, CALCIUM CHLORIDE 600; 310; 30; 20 MG/100ML; MG/100ML; MG/100ML; MG/100ML
1000 INJECTION, SOLUTION INTRAVENOUS CONTINUOUS
Status: DISCONTINUED | OUTPATIENT
Start: 2021-11-03 | End: 2021-11-03 | Stop reason: HOSPADM

## 2021-11-03 RX ORDER — BUPIVACAINE HYDROCHLORIDE 5 MG/ML
INJECTION, SOLUTION EPIDURAL; INTRACAUDAL AS NEEDED
Status: DISCONTINUED | OUTPATIENT
Start: 2021-11-03 | End: 2021-11-03 | Stop reason: SURG

## 2021-11-03 RX ORDER — ROCURONIUM BROMIDE 10 MG/ML
INJECTION, SOLUTION INTRAVENOUS AS NEEDED
Status: DISCONTINUED | OUTPATIENT
Start: 2021-11-03 | End: 2021-11-03 | Stop reason: SURG

## 2021-11-03 RX ORDER — ONDANSETRON 2 MG/ML
4 INJECTION INTRAMUSCULAR; INTRAVENOUS ONCE AS NEEDED
Status: DISCONTINUED | OUTPATIENT
Start: 2021-11-03 | End: 2021-11-03 | Stop reason: HOSPADM

## 2021-11-03 RX ORDER — FENTANYL CITRATE 50 UG/ML
INJECTION, SOLUTION INTRAMUSCULAR; INTRAVENOUS AS NEEDED
Status: DISCONTINUED | OUTPATIENT
Start: 2021-11-03 | End: 2021-11-03 | Stop reason: SURG

## 2021-11-03 RX ORDER — SUCCINYLCHOLINE CHLORIDE 20 MG/ML
INJECTION INTRAMUSCULAR; INTRAVENOUS AS NEEDED
Status: DISCONTINUED | OUTPATIENT
Start: 2021-11-03 | End: 2021-11-03 | Stop reason: SURG

## 2021-11-03 RX ORDER — ETOMIDATE 2 MG/ML
INJECTION INTRAVENOUS AS NEEDED
Status: DISCONTINUED | OUTPATIENT
Start: 2021-11-03 | End: 2021-11-03 | Stop reason: SURG

## 2021-11-03 RX ORDER — SULFAMETHOXAZOLE AND TRIMETHOPRIM 800; 160 MG/1; MG/1
1 TABLET ORAL 2 TIMES DAILY
Qty: 20 TABLET | Refills: 0 | Status: SHIPPED | OUTPATIENT
Start: 2021-11-03 | End: 2021-12-14

## 2021-11-03 RX ORDER — CEFAZOLIN SODIUM 2 G/50ML
2 SOLUTION INTRAVENOUS ONCE
Status: COMPLETED | OUTPATIENT
Start: 2021-11-03 | End: 2021-11-03

## 2021-11-03 RX ORDER — ONDANSETRON 2 MG/ML
INJECTION INTRAMUSCULAR; INTRAVENOUS AS NEEDED
Status: DISCONTINUED | OUTPATIENT
Start: 2021-11-03 | End: 2021-11-03 | Stop reason: SURG

## 2021-11-03 RX ORDER — MAGNESIUM HYDROXIDE 1200 MG/15ML
LIQUID ORAL AS NEEDED
Status: DISCONTINUED | OUTPATIENT
Start: 2021-11-03 | End: 2021-11-03 | Stop reason: HOSPADM

## 2021-11-03 RX ORDER — DEXAMETHASONE SODIUM PHOSPHATE 10 MG/ML
INJECTION, SOLUTION INTRAMUSCULAR; INTRAVENOUS AS NEEDED
Status: DISCONTINUED | OUTPATIENT
Start: 2021-11-03 | End: 2021-11-03 | Stop reason: SURG

## 2021-11-03 RX ADMIN — ROCURONIUM BROMIDE 40 MG: 10 INJECTION INTRAVENOUS at 15:48

## 2021-11-03 RX ADMIN — CEFAZOLIN SODIUM 2 G: 2 SOLUTION INTRAVENOUS at 15:27

## 2021-11-03 RX ADMIN — DEXAMETHASONE SODIUM PHOSPHATE 5 MG: 10 INJECTION, SOLUTION INTRAMUSCULAR; INTRAVENOUS at 15:44

## 2021-11-03 RX ADMIN — FENTANYL CITRATE 100 MCG: 50 INJECTION INTRAMUSCULAR; INTRAVENOUS at 15:27

## 2021-11-03 RX ADMIN — ROCURONIUM BROMIDE 10 MG: 10 INJECTION INTRAVENOUS at 15:32

## 2021-11-03 RX ADMIN — BUPIVACAINE HYDROCHLORIDE 20 ML: 5 INJECTION, SOLUTION EPIDURAL; INTRACAUDAL; PERINEURAL at 15:44

## 2021-11-03 RX ADMIN — SODIUM CHLORIDE, POTASSIUM CHLORIDE, SODIUM LACTATE AND CALCIUM CHLORIDE: 600; 310; 30; 20 INJECTION, SOLUTION INTRAVENOUS at 16:13

## 2021-11-03 RX ADMIN — SUCCINYLCHOLINE CHLORIDE 100 MG: 20 INJECTION, SOLUTION INTRAMUSCULAR; INTRAVENOUS at 15:32

## 2021-11-03 RX ADMIN — ETOMIDATE 12 MG: 2 INJECTION, SOLUTION INTRAVENOUS at 15:32

## 2021-11-03 RX ADMIN — SUGAMMADEX 400 MG: 100 INJECTION, SOLUTION INTRAVENOUS at 16:17

## 2021-11-03 RX ADMIN — HYDROMORPHONE HYDROCHLORIDE 0.5 MG: 1 INJECTION, SOLUTION INTRAMUSCULAR; INTRAVENOUS; SUBCUTANEOUS at 17:04

## 2021-11-03 RX ADMIN — LIDOCAINE HYDROCHLORIDE 40 MG: 20 INJECTION, SOLUTION INTRAVENOUS at 15:32

## 2021-11-03 RX ADMIN — ONDANSETRON 4 MG: 2 INJECTION INTRAMUSCULAR; INTRAVENOUS at 16:06

## 2021-11-03 RX ADMIN — SODIUM CHLORIDE, POTASSIUM CHLORIDE, SODIUM LACTATE AND CALCIUM CHLORIDE 1000 ML: 600; 310; 30; 20 INJECTION, SOLUTION INTRAVENOUS at 13:11

## 2021-11-03 NOTE — ANESTHESIA POSTPROCEDURE EVALUATION
Patient: Odalys Miles    Procedure Summary     Date: 11/03/21 Room / Location:  [facility]  /  [facility]    Anesthesia Start: 1527 Anesthesia Stop:     Procedure: HUMERUS PROXIMAL OPEN REDUCTION INTERNAL FIXATION WITH IMTRAMEDULLARY NAIL FIXATION LEFT (Left Arm Upper) Diagnosis:       Displaced transverse fx shaft of humerus, left arm, init      (Displaced transverse fx shaft of humerus, left arm, init [S42.322A])    Surgeons: Linden Domingo MD Provider: Adelfo Miller CRNA    Anesthesia Type: MAC ASA Status: 3          Anesthesia Type: MAC    Vitals  HR 77  Resp 12  Temp 97.2  /57  Sat 98          Post Anesthesia Care and Evaluation    Patient location during evaluation: PACU  Patient participation: complete - patient participated  Level of consciousness: awake and alert  Pain score: 0  Pain management: satisfactory to patient  Airway patency: patent  Anesthetic complications: No anesthetic complications  PONV Status: none  Cardiovascular status: acceptable and stable  Respiratory status: acceptable and face mask  Hydration status: acceptable

## 2021-11-03 NOTE — ANESTHESIA PROCEDURE NOTES
Airway  Urgency: elective    Date/Time: 11/3/2021 3:33 PM  Airway not difficult    General Information and Staff    Patient location during procedure: OR  CRNA: Adelfo Miller CRNA    Indications and Patient Condition  Indications for airway management: airway protection    Preoxygenated: yes      Final Airway Details  Final airway type: endotracheal airway      Successful airway: ETT  Cuffed: yes   Successful intubation technique: direct laryngoscopy  Endotracheal tube insertion site: oral  Blade: Rahman  Blade size: 2  ETT size (mm): 7.0  Cormack-Lehane Classification: grade I - full view of glottis  Placement verified by: chest auscultation and capnometry   Measured from: lips  ETT/EBT  to lips (cm): 21  Number of attempts at approach: 1    Additional Comments  Dentition and Lips as preoperative assesment

## 2021-11-03 NOTE — OP NOTE
Orthopedics HUMERUS PROXIMAL OPEN REDUCTION INTERNAL FIXATION WITH IMTRAMEDULLARY NAIL FIXATION LEFT  Op Note    Odalys Miles  11/3/2021    Pre-op Diagnosis:   Displaced transverse fx shaft of humerus, left arm, init [S42.322A]    Post-op Diagnosis:  Same  Procedure intramedullary nailing of left proximal humeral shaft fracture    Anesthesia:  General    Staff:   Circulator: Faviola Swanson RN  Radiology Technologist: Kiley Bender  Scrub Person: Laura Harrison; Coleman Menjivar      Specimens: None      Drains: None    Indication  This 76-year-old female had a proximal humeral fracture with significant displacement elected proceed with operative intervention she understood this she understood the procedure she understood the risk and benefits and decided to proceed she understood understood the risk and benefits including infection union nonunion malunion nerve injury and desire to proceed    Procedure  Description patient was identified in the holding room her left shoulder was marked take the operating room Mr. A general anesthetic per anesthesia team she had been administered a block she was prepped and draped usual sterile fashion left upper extremity.  Attention made to the left upper extremity where longitudinal incision was made over the proximal aspect of her shoulder through the mid deltoid it was split the C-arm imaging was obtained adequate alignment was noted with a all the awl was utilized anterior the intramedullary canal adequate exposure was noted the nail was then placed in the intramedullary canal the fracture site was reduced it was advanced across the fracture site adequate alignment was noted on C-arm imaging AP and lateral views.  The guide was utilized for the 3 proximal screws starting laterally stab incision made the triple guide was advanced to the appropriate position it was then drilled and a appropriate length screw size 40 was placed.  This was repeated for the posterior  oblique and anterior oblique screws.  The distal interlock was then placed using the percutaneous guide stab incision made the guide was then advanced to the level of the bone a 28 mm screw was advanced across the distal interlock and adequate alignment was noted.  The wounds were alessia irrigated the deep fascia was closed over the deltoid skin closed in 2 layers sterile dressings were applied patient was emerged anesthesia taken to PACU in stable condition    Complications:  None    Tourniquet:: None    Dressing: Sterile    Disposition: PACU    Lidnen Domingo MD     Date: 11/3/2021  Time: 16:23 EDT

## 2021-11-03 NOTE — ANESTHESIA PROCEDURE NOTES
Peripheral Block      Patient reassessed immediately prior to procedure    Patient location during procedure: OR  Start time: 11/3/2021 3:36 PM  Stop time: 11/3/2021 3:45 PM  Performed by  CRNA: Adelfo Miller CRNA  Preanesthetic Checklist  Completed: patient identified, IV checked, site marked, risks and benefits discussed, surgical consent, monitors and equipment checked, pre-op evaluation and timeout performed  Prep:  Pt Position: supine  Sterile barriers:cap, gloves, mask and washed/disinfected hands  Prep: ChloraPrep  Patient monitoring: blood pressure monitoring, continuous pulse oximetry and EKG  Procedure  Performed under: general  Guidance:ultrasound guided    ULTRASOUND INTERPRETATION.  Using ultrasound guidance a 21 G gauge needle was placed in close proximity to the brachial plexus nerve, at which point, under ultrasound guidance anesthetic was injected in the area of the nerve and spread of the anesthesia was seen on ultrasound in close proximity thereto.  There were no abnormalities seen on ultrasound; a digital image was taken; and the patient tolerated the procedure with no complications. Images:still images obtained, printed/placed on chart  Loss of twitch: 0.5 mA  Laterality:left  Block Type:supraclavicular  Injection Technique:single-shot  Needle Type:echogenic  Needle Gauge:21 G  Resistance on Injection: none          Post Assessment  Injection Assessment: negative aspiration for heme, no paresthesia on injection and incremental injection  Patient Tolerance:comfortable throughout block  Complications:no  Additional Notes   Incremental injection with negative aspirate. No pain on injection. Normal injection resistance.  Vascular puncture avoided. Nerves and surrounding tissues identified with local spread around nerves visualized.

## 2021-11-03 NOTE — DISCHARGE INSTRUCTIONS
No pushing, pulling, tugging,  heavy lifting, or strenuous activity.  No major decision making, driving, or drinking alcoholic beverages for 24 hours. ( due to the medications you have  received)  Always use good hand hygiene/washing techniques.  NO driving while taking pain medications.    * if you have an incision:  Check your incision area every day for signs of infection.   Check for:  * more redness, swelling, or pain  *more fluid or blood  *warmth  *pus or bad smell      To assist you in voiding:  Drink plenty of fluids  Listen to running water while attempting to void.    If you are unable to urinate and you have an uncomfortable urge to void or it has been   6 hours since you were discharged, return to the Emergency Room

## 2021-11-03 NOTE — ANESTHESIA PREPROCEDURE EVALUATION
Anesthesia Evaluation     Patient summary reviewed and Nursing notes reviewed   no history of anesthetic complications:  NPO Solid Status: > 8 hours  NPO Liquid Status: > 8 hours           Airway   Mallampati: II  TM distance: >3 FB  Neck ROM: full  no difficulty expected  Dental - normal exam   (+) upper dentures, lower dentures and partials    Pulmonary - negative pulmonary ROS and normal exam   Cardiovascular - normal exam    ECG reviewed  Rhythm: regular  Rate: normal    (+) hyperlipidemia,     ROS comment: Vent. Rate :  56 BPM     Atrial Rate :  56 BPM     P-R Int : 156 ms          QRS Dur :  66 ms      QT Int : 444 ms       P-R-T Axes :  71 -18  27 degrees     QTc Int : 428 ms     Sinus bradycardia  Possible Left atrial enlargement  Borderline ECG  No previous ECGs available     Referred By: ROXANNA        Neuro/Psych  (+) TIA, CVA, numbness,     GI/Hepatic/Renal/Endo    (+)  GERD,  liver disease history of elevated LFT,     Musculoskeletal (-) negative ROS    Abdominal  - normal exam   Substance History - negative use     OB/GYN negative ob/gyn ROS   (-)  Pregnant    Comment: Post menopause      Other      history of cancer    ROS/Med Hx Other: Glaucoma  Hyperlipidemia  Insomnia  Microscopic colitis  Tinnitus  Asymptomatic varicose veins  Vitamin D deficiency  Osteoporosis  Sciatica of left side  History of left hip replacement  Personal history of colonic polyps  Syncope, near  BPPV (benign paroxysmal positional vertigo)    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     Pt advised that a endotracheal tube (ETT),  laryngeal mask airway (LMA) or mask would be utilized to maintain the airway. Pt verbalized understanding and agreed to plan.    Possible supraclavicular vs infraclavicular PNB for post operative pain control. )  intravenous induction     Anesthetic plan, all risks, benefits, and alternatives have been provided, discussed and informed consent has been obtained with: patient.    Plan discussed with CRNA.

## 2021-11-04 LAB
QT INTERVAL: 444 MS
QTC INTERVAL: 428 MS

## 2021-11-16 ENCOUNTER — TELEPHONE (OUTPATIENT)
Dept: GASTROENTEROLOGY | Facility: CLINIC | Age: 76
End: 2021-11-16

## 2021-11-16 DIAGNOSIS — K52.839 MICROSCOPIC COLITIS, UNSPECIFIED MICROSCOPIC COLITIS TYPE: Primary | ICD-10-CM

## 2021-11-16 RX ORDER — MONTELUKAST SODIUM 4 MG/1
1 TABLET, CHEWABLE ORAL 2 TIMES DAILY
Qty: 60 TABLET | Refills: 2 | Status: SHIPPED | OUTPATIENT
Start: 2021-11-16 | End: 2022-05-05

## 2021-11-16 NOTE — TELEPHONE ENCOUNTER
----- Message from Yvonne Jacobo PA-C sent at 11/15/2021  5:08 PM EST -----  Regarding: RE: RESULTS  Reviewed colonoscopy and pathology results from 10/2021. She had a long (tortuous) colon. Had an angioectasia (vessel close to surface) in the sigmoid colon which was treated, internal hemorrhoids, normal terminal ileum. Biopsies showed microscopic colitis (already known problem). She should be taking colestipol as prescribed. She has upcoming appt with Dr. Mayer in Dec 2021 to discuss further.     ----- Message -----  From: Mia Morrissey MA  Sent: 11/15/2021   2:26 PM EST  To: Yvonne Jacobo PA-C  Subject: FW: RESULTS                                      Patient called requesting results. Please advise  ----- Message -----  From: Steph Palacios  Sent: 11/15/2021   2:21 PM EST  To: Mia Morrissey MA  Subject: RESULTS                                          Patient left message - calling for colon results

## 2021-12-01 ENCOUNTER — PATIENT OUTREACH (OUTPATIENT)
Dept: CASE MANAGEMENT | Facility: OTHER | Age: 76
End: 2021-12-01

## 2021-12-01 NOTE — OUTREACH NOTE
Ambulatory Case Management Note    Patient Outreach    Patient returned call to RN-ACM.  Patient reported doing well at this time.  She indicated her arm/shoulder is healing as anticipated.  She is attending outpatient therapy at a local facility in [place].  Patient lives near the facility and is able to drive self.  Other needs/questions/concerns for RN-ACM to address were denied.  [facility] d/c for this episode.        Mallory Boles RN  Ambulatory Case Management    12/1/2021, 15:36 EST

## 2021-12-14 ENCOUNTER — OFFICE VISIT (OUTPATIENT)
Dept: INTERNAL MEDICINE | Facility: CLINIC | Age: 76
End: 2021-12-14

## 2021-12-14 VITALS
HEIGHT: 67 IN | DIASTOLIC BLOOD PRESSURE: 74 MMHG | SYSTOLIC BLOOD PRESSURE: 120 MMHG | HEART RATE: 69 BPM | BODY MASS INDEX: 18.52 KG/M2 | WEIGHT: 118 LBS | TEMPERATURE: 97.3 F | OXYGEN SATURATION: 96 %

## 2021-12-14 DIAGNOSIS — M81.0 OSTEOPOROSIS, UNSPECIFIED OSTEOPOROSIS TYPE, UNSPECIFIED PATHOLOGICAL FRACTURE PRESENCE: ICD-10-CM

## 2021-12-14 DIAGNOSIS — K52.832 LYMPHOCYTIC COLITIS: ICD-10-CM

## 2021-12-14 DIAGNOSIS — G47.00 INSOMNIA, UNSPECIFIED TYPE: ICD-10-CM

## 2021-12-14 DIAGNOSIS — E55.9 VITAMIN D DEFICIENCY: ICD-10-CM

## 2021-12-14 DIAGNOSIS — E78.5 HYPERLIPIDEMIA, UNSPECIFIED HYPERLIPIDEMIA TYPE: Primary | ICD-10-CM

## 2021-12-14 PROBLEM — M54.32 SCIATICA OF LEFT SIDE: Status: RESOLVED | Noted: 2017-09-07 | Resolved: 2021-12-14

## 2021-12-14 PROCEDURE — 99214 OFFICE O/P EST MOD 30 MIN: CPT | Performed by: INTERNAL MEDICINE

## 2021-12-14 NOTE — PROGRESS NOTES
Subjective   Odalys Miles is a 76 y.o. female.     Chief Complaint   Patient presents with   • Follow-up     recent lab results   • Hyperlipidemia       History of Present Illness   Patient here for follow-up of.  Diarrhea still colestipol no help.  Recent colonoscopy unremarkable but the biopsy showed lymphocytic colitis.  Hyperlipidemia stable osteoporosis is stable on supplement vitamin D stable on supplement insomnia patient is taking melatonin.    Current Outpatient Medications:   •  calcium (OS-KAYLYN) 600 MG tablet, Take 600 mg by mouth Daily., Disp: , Rfl:   •  Cholecalciferol (VITAMIN D3) 1000 UNITS capsule, Take 1 capsule by mouth Daily., Disp: , Rfl:   •  colestipol (COLESTID) 1 g tablet, Take 1 tablet by mouth 2 (Two) Times a Day., Disp: 60 tablet, Rfl: 2  •  folic acid (FOLVITE) 800 MCG tablet, Take 800 mcg by mouth Daily., Disp: , Rfl:   •  loperamide (IMODIUM) 2 MG capsule, Take 2 mg by mouth 4 (Four) Times a Day As Needed for Diarrhea., Disp: , Rfl:   •  LUMIGAN 0.01 % ophthalmic drops, Administer  to both eyes Every Night., Disp: , Rfl:   •  Melatonin 10-10 MG tablet controlled-release, Take  by mouth Every Night., Disp: , Rfl:   •  Multiple Vitamin (MULTI-VITAMIN DAILY PO), Take 1 tablet by mouth Daily., Disp: , Rfl:   •  Probiotic Product (PROBIOTIC ADVANCED PO), Take  by mouth., Disp: , Rfl:   •  psyllium (METAMUCIL) 58.6 % packet, Take 1 packet by mouth Daily As Needed., Disp: , Rfl:   •  SIMBRINZA 1-0.2 % suspension, INSTILL 1 DROP INTO BOTH EYES TWICE A DAY, Disp: , Rfl: 3  •  vitamin C (ASCORBIC ACID) 500 MG tablet, Take 1,000 mg by mouth Daily., Disp: , Rfl:     The following portions of the patient's history were reviewed and updated as appropriate: allergies, current medications, past family history, past medical history, past social history, past surgical history and problem list.    Review of Systems   Constitutional: Negative.    Respiratory: Negative.    Cardiovascular: Negative.     Gastrointestinal: Positive for diarrhea.   Musculoskeletal: Negative.    Skin: Negative.    Neurological: Negative.    Psychiatric/Behavioral: Negative.        Objective   Physical Exam  Cardiovascular:      Rate and Rhythm: Normal rate and regular rhythm.      Heart sounds: Normal heart sounds.   Pulmonary:      Effort: Pulmonary effort is normal.      Breath sounds: Normal breath sounds.   Abdominal:      General: Bowel sounds are normal.   Musculoskeletal:      Cervical back: Neck supple.   Skin:     General: Skin is warm.   Neurological:      Mental Status: She is alert and oriented to person, place, and time.         All tests have been reviewed.    Assessment/Plan   Diagnoses and all orders for this visit:    Hyperlipidemia,  good diet     Lymphocytic colitis follow-up with GI for medications.    Osteoporosis, recent bone density scan showed osteopenia only.  pathological fracture presence continue supplements.  We will discuss more next time regarding Reclast.    Vitamin D deficiency continue supplement    Insomnia, unspecified type continue melatonin    6 months for annual wellness          historical record below  Diarrhea, Microscopic colitis, unspecified microscopic colitis type with sx, off budesinide, seen by GI , cont probiotics and imodium, avoid dairy product. Follow up with GI. 8/2020, cholestyramine causing vit D malabsorption  Sciatica of left side refer to Physical Therapy and aleve improved  Tinnitus seen by ENT  insomnia melatonin continue, decline medicine  tdap  Pneumovax zostavax done. prevnar done  Right shoulder pain rotator cuff tendonitis, cortisone shot improved  colonoscopy showed lymphocytis colitis   Td 3/1/2013

## 2021-12-16 ENCOUNTER — OFFICE VISIT (OUTPATIENT)
Dept: GASTROENTEROLOGY | Facility: CLINIC | Age: 76
End: 2021-12-16

## 2021-12-16 VITALS
WEIGHT: 117.2 LBS | BODY MASS INDEX: 18.39 KG/M2 | HEIGHT: 67 IN | TEMPERATURE: 98 F | SYSTOLIC BLOOD PRESSURE: 118 MMHG | DIASTOLIC BLOOD PRESSURE: 60 MMHG

## 2021-12-16 DIAGNOSIS — K52.832 LYMPHOCYTIC COLITIS: ICD-10-CM

## 2021-12-16 DIAGNOSIS — K55.20 ANGIODYSPLASIA OF THE COLON: ICD-10-CM

## 2021-12-16 DIAGNOSIS — R19.7 DIARRHEA, UNSPECIFIED TYPE: Primary | ICD-10-CM

## 2021-12-16 PROCEDURE — 99213 OFFICE O/P EST LOW 20 MIN: CPT | Performed by: INTERNAL MEDICINE

## 2021-12-16 RX ORDER — BUDESONIDE 3 MG/1
9 CAPSULE, COATED PELLETS ORAL EVERY MORNING
Qty: 90 CAPSULE | Refills: 2 | Status: SHIPPED | OUTPATIENT
Start: 2021-12-16 | End: 2022-03-16

## 2021-12-16 NOTE — PROGRESS NOTES
Follow Up Note     Date: 2021   Patient Name: Odalys Miles  MRN: [id number]  : 1945     Referring Physician: Marty Cheung MD    Chief Complaint:    Chief Complaint   Patient presents with   • Follow-up   • Collagenous colitis       Interval History:   2021  Odalys Miles is a 76 y.o. female who is here today for follow up for his lymphocytic colitis. She states that after she started on colestipol it helped a little initially but later on second course of medicine made her symptoms worse with explosive loose stools and bloating and she stopped the medications.  She had a recent colonoscopy and she is here to discuss the pathology report and further plan.    2021  Odalys Miles is a 75 y.o. female who is here today for follow up for her microscopic colitis.  She states that she was diagnosed with microscopic colitis in  and was followed by Dr. Alvarado.  In  She went to  GI after seeing Dr Prater for second opinion as she wanted to take her off from steroids.she states that there she was started on imodium and metamucil that did not help her much.  She will have bowel movement anywhere 1-2 times soft to loose daily while she is on Imodium 1 tablets p.o. twice daily.  She will have occasional unexpected bowel movements with the fecal urgency whenever she goes out. No abdominal pain. Her son had colon CA at the age of 49yrs.   Her last colonoscopy was in  that revealed collagenous colitis.  She had prior history of colon polyps but no polysp in .      2019  Ms. Miles returns to the office.  She is doing better at this time after beginning the Entocort medication again.  She is currently taking 3 capsules daily.  Ms. Miles states that when she tried to decrease the dosage and stop the medication there was an increased frequency of bowel movements.  There is no history of blood in the stool.  She denies any current abdominal pain.  There is no  history of nausea or vomiting.  She denies any night sweats, fever chills.  Subjective      Past Medical History:   Past Medical History:   Diagnosis Date   • Abnormal liver enzymes    • Basal cell carcinoma     under left eye   • Body mass index (BMI) 20.0-20.9, adult    • BPPV (benign paroxysmal positional vertigo)    • COVID-19 vaccine series completed     Moderna   • Elevated cholesterol    • Fracture     as a child   • GERD (gastroesophageal reflux disease)    • Glaucoma    • Glaucoma    • Hip fracture (HCC) 10/11/2019    Hip repalced in 2020   • Ingrowing toenail    • Mammogram abnormal    • Microscopic colitis 07/23/2014   • Onychomycosis    • Osteoporosis    • Seasonal allergies    • Sebaceous cyst    • Syncope, near     pt doesn't recall   • TIA (transient ischemic attack) 11/07/2014   • Vascular abnormality     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; chronic wound present    • Wears dentures     full upper plate, partial on the lower   • Wears glasses      Past Surgical History:   Past Surgical History:   Procedure Laterality Date   • CATARACT EXTRACTION, BILATERAL     • COLONOSCOPY      DR JAIMES   • COLONOSCOPY N/A 10/12/2021    Procedure: COLONOSCOPY with biopsy and APC sigmoid AVM cauterization;  Surgeon: Jose Mayer MD;  Location: [facility] ENDOSCOPY;  Service: Gastroenterology;  Laterality: N/A;   • HIP SURGERY Left 12/27/2019    secondary to a fracture   • ORIF HUMERUS FRACTURE Left 11/3/2021    Procedure: HUMERUS PROXIMAL OPEN REDUCTION INTERNAL FIXATION WITH IMTRAMEDULLARY NAIL FIXATION LEFT;  Surgeon: Linden Domingo MD;  Location: [facility] OR;  Service: Orthopedics;  Laterality: Left;   • SKIN BIOPSY     • TUBAL ABDOMINAL LIGATION         Family History:   Family History   Problem Relation Age of Onset   • Heart attack Mother    • Diabetes Mother    • Stroke Father    • Breast cancer Neg Hx    • Ovarian cancer Neg Hx        Social History:   Social History  "    Socioeconomic History   • Marital status:    Tobacco Use   • Smoking status: Former Smoker     Packs/day: 0.25     Years: 45.00     Pack years: 11.25     Types: Cigarettes     Start date:      Quit date:      Years since quittin.9   • Smokeless tobacco: Never Used   • Tobacco comment: was a \"closet\" smoker   Vaping Use   • Vaping Use: Never used   Substance and Sexual Activity   • Alcohol use: Yes     Comment: rare   • Drug use: No   • Sexual activity: Defer       Medications:     Current Outpatient Medications:   •  calcium (OS-KAYLYN) 600 MG tablet, Take 600 mg by mouth Daily., Disp: , Rfl:   •  Cholecalciferol (VITAMIN D3) 1000 UNITS capsule, Take 1 capsule by mouth Daily., Disp: , Rfl:   •  colestipol (COLESTID) 1 g tablet, Take 1 tablet by mouth 2 (Two) Times a Day., Disp: 60 tablet, Rfl: 2  •  folic acid (FOLVITE) 800 MCG tablet, Take 800 mcg by mouth Daily., Disp: , Rfl:   •  loperamide (IMODIUM) 2 MG capsule, Take 2 mg by mouth 4 (Four) Times a Day As Needed for Diarrhea., Disp: , Rfl:   •  LUMIGAN 0.01 % ophthalmic drops, Administer  to both eyes Every Night., Disp: , Rfl:   •  Melatonin 10-10 MG tablet controlled-release, Take  by mouth Every Night., Disp: , Rfl:   •  Multiple Vitamin (MULTI-VITAMIN DAILY PO), Take 1 tablet by mouth Daily., Disp: , Rfl:   •  Probiotic Product (PROBIOTIC ADVANCED PO), Take  by mouth., Disp: , Rfl:   •  psyllium (METAMUCIL) 58.6 % packet, Take 1 packet by mouth Daily As Needed., Disp: , Rfl:   •  SIMBRINZA 1-0.2 % suspension, INSTILL 1 DROP INTO BOTH EYES TWICE A DAY, Disp: , Rfl: 3  •  vitamin C (ASCORBIC ACID) 500 MG tablet, Take 1,000 mg by mouth Daily., Disp: , Rfl:     Allergies:   No Known Allergies    Review of Systems:   Review of Systems   Constitutional: Negative for appetite change, fatigue, fever and unexpected weight loss.   HENT: Negative for trouble swallowing.    Gastrointestinal: Positive for diarrhea. Negative for abdominal distention, " "abdominal pain, anal bleeding, blood in stool, constipation, nausea, rectal pain, vomiting, GERD and indigestion.       The following portions of the patient's history were reviewed and updated as appropriate: allergies, current medications, past family history, past medical history, past social history, past surgical history and problem list.    Objective     Physical Exam:  Vital Signs:   Vitals:    12/16/21 1344   BP: 118/60   Temp: 98 °F (36.7 °C)   TempSrc: Infrared   Weight: 53.2 kg (117 lb 3.2 oz)   Height: 170.2 cm (67\")       Physical Exam  Constitutional:       Appearance: Normal appearance.   HENT:      Head: Normocephalic and atraumatic.   Eyes:      Conjunctiva/sclera: Conjunctivae normal.   Abdominal:      General: Abdomen is flat. There is no distension.      Palpations: There is no mass.      Tenderness: There is no abdominal tenderness. There is no guarding or rebound.      Hernia: No hernia is present.   Musculoskeletal:      Cervical back: Normal range of motion and neck supple.   Neurological:      Mental Status: She is alert.         Results Review:   I reviewed the patient's new clinical results.    Pre-Admission Testing on 11/01/2021   Component Date Value Ref Range Status   • WBC 11/01/2021 5.97  3.40 - 10.80 10*3/mm3 Final   • RBC 11/01/2021 4.20  3.77 - 5.28 10*6/mm3 Final   • Hemoglobin 11/01/2021 12.0  12.0 - 15.9 g/dL Final   • Hematocrit 11/01/2021 37.9  34.0 - 46.6 % Final   • MCV 11/01/2021 90.2  79.0 - 97.0 fL Final   • MCH 11/01/2021 28.6  26.6 - 33.0 pg Final   • MCHC 11/01/2021 31.7  31.5 - 35.7 g/dL Final   • RDW 11/01/2021 13.4  12.3 - 15.4 % Final   • RDW-SD 11/01/2021 44.0  37.0 - 54.0 fl Final   • MPV 11/01/2021 9.8  6.0 - 12.0 fL Final   • Platelets 11/01/2021 255  140 - 450 10*3/mm3 Final   • QT Interval 11/01/2021 444  ms Final   • QTC Interval 11/01/2021 428  ms Final   • Glucose 11/01/2021 93  65 - 99 mg/dL Final   • BUN 11/01/2021 13  8 - 23 mg/dL Final   • Creatinine " 11/01/2021 0.70  0.57 - 1.00 mg/dL Final   • Sodium 11/01/2021 139  136 - 145 mmol/L Final   • Potassium 11/01/2021 3.9  3.5 - 5.2 mmol/L Final   • Chloride 11/01/2021 104  98 - 107 mmol/L Final   • CO2 11/01/2021 27.8  22.0 - 29.0 mmol/L Final   • Calcium 11/01/2021 9.3  8.6 - 10.5 mg/dL Final   • eGFR Non African Amer 11/01/2021 81  >60 mL/min/1.73 Final   • BUN/Creatinine Ratio 11/01/2021 18.6  7.0 - 25.0 Final   • Anion Gap 11/01/2021 7.2  5.0 - 15.0 mmol/L Final   • COVID19 11/01/2021 Not Detected  Not Detected - Ref. Range Final   Admission on 10/12/2021, Discharged on 10/12/2021   Component Date Value Ref Range Status   • Case Report 10/12/2021    Final                    Value:Surgical Pathology Report                         Case: [id number]                                  Authorizing Provider:  Jose Mayer MD  Collected:           10/12/2021 12:04 PM          Ordering Location:     [facility]    Received:            10/12/2021 02:06 PM                                 [facility]                                                                    Pathologist:           Annabelle King MD                                                        Specimens:   1) - Small Intestine, Ileum, Terminal ileum biopsy for history of microscopic colitis               2) - Large Intestine, Cecum, Cecal/ascending colon biopsy for history of microscopic                colitis                                                                                             3) - Large Intestine, Transverse Colon, Transverse colon biopsy for history of                      microscopic colitis                                                                                                           4) - Large Intestine, Left / Descending Colon, Descending colon biopsy for history of               microscopic colitis                                                                                 5) - Large  Intestine, Sigmoid Colon, Sigmoid colon biopsy for history of microscopic                colitis                                                                                             6) - Large Intestine, Rectum, Rectal biopsy for history of microscopic colitis            • Final Diagnosis 10/12/2021    Final                    Value:This result contains rich text formatting which cannot be displayed here.      XR Shoulder 2+ View Left    Result Date: 10/29/2021  Fracture of the proximal humeral diaphysis.    Images were reviewed, interpreted, and dictated by Dr. Eugene Galo M.D. Transcribed by Inocencia Taylor PA-C.  This report was finalized on 10/29/2021 2:22 PM by Eugene Galo M.D..    XR Chest PA & Lateral    Result Date: 11/2/2021  1. No acute cardiopulmonary findings.  2. Proximal left humeral fracture.  This report was finalized on 11/2/2021 8:31 AM by Eddie Rodriguez MD.    10/20/2021 colonoscopy  - Tortuous colon.  - A single non-bleeding colonic angioectasia. Treated with argon plasma coagulation (APC).  - Non-bleeding internal hemorrhoids.  - The examined portion of the ileum was normal. Biopsied.  - Biopsies performed in the rectum, in the sigmoid colon, in the descending colon and in the transverse colon and  AC, Caecum.  - No endoscopic signs of colitis    Pathology;   Lymphocytic colitis all biopsies on the colon  Terminal ileum biopsies normal      Assessment / Plan      1.  Microscopic colitis -lymphocytic colitis  12/16/2021  Colonoscopy done on 10/20/2021 did not reveal any endoscopic signs of colitis or ileitis. However her colonic biopsies multiple done on the ascending colon, transverse colon, descending colon, sigmoid and rectum, all revealed lymphocytic colitis. Terminal ileum biopsy was normal.  Patient did not get any symptom relief from colestipol.  We will start her on budesonide 9 mg p.o. daily for 3 months followed by 6 mg p.o. daily for 3 months followed by 3 mg p.o.  daily thereafter.   We will adjust the dose depending on her response. Patient may need anywhere 3 to 6 mg of budesonide for maintenance.  Also advised to take a Imodium 1 tablet p.o. as needed whenever doing out for shopping, travel, dinner.  As patient did not have any response to colestipol we will get a stool for C. difficile to rule out C. difficile colitis. Follow-up in 3 months time.    7/26/2021.  Biopsy confirmed microscopic colitis in 2014.  She was on budesonide for a while and later on changed to Imodium with Metamucil in 2019.  She still has a significant issues especially with the fecal urgency.  Patient still doesn't want to be on a long-term steroids.  We discussed on alternatives including the colestipol cholestyramine powder.   We'll start her on colestipol 1 g p.o. twice daily and will titrate up the dose to 3 times daily or make it half a pill twice daily depending on her response.  Also advised to take a half a pill to 1 pill of Imodium whenever she goes out for anything including shopping, dining.  We will hold off the Imodium while taking colestipol, that can be added depending on her response to colestipol.  Pending on response that she may need a low-dose of budesonide along with above measures.     2. Personal history of colon polyps  3. Family history colon CA  4. Angiodysplasia of the colon.  12/16/2021  Colonoscopy done on 10/20/2021 did not reveal any colon polyps. She had a colonic angiectasia which was ablated with APC. Her recent lab work does not reveal any anemia. Will consider high risk screening colonoscopy in 5 years time in 2026 depending on her medical and general condition.    7/26/2021  Her son had a colon cancer at age of 49.  She had a prior history of colon polyps however last colonoscopy in 2014 no polyps removed.  She is due for high risk screening colonoscopy,  will schedule the same.                Follow Up:   No follow-ups on file.    Jose Mayer,  MD  [facility]  12/16/2021  13:47 EST     Please note that portions of this note may have been completed with a voice recognition program.

## 2021-12-17 PROCEDURE — 87493 C DIFF AMPLIFIED PROBE: CPT | Performed by: INTERNAL MEDICINE

## 2021-12-20 ENCOUNTER — TELEPHONE (OUTPATIENT)
Dept: GASTROENTEROLOGY | Facility: CLINIC | Age: 76
End: 2021-12-20

## 2021-12-20 DIAGNOSIS — A49.8 CLOSTRIDIUM DIFFICILE INFECTION: Primary | ICD-10-CM

## 2021-12-20 LAB — C DIFF TOX GENS STL QL NAA+PROBE: DETECTED

## 2021-12-20 RX ORDER — VANCOMYCIN HYDROCHLORIDE 125 MG/1
125 CAPSULE ORAL 4 TIMES DAILY
Qty: 40 CAPSULE | Refills: 0 | Status: SHIPPED | OUTPATIENT
Start: 2021-12-20 | End: 2021-12-30

## 2021-12-20 NOTE — TELEPHONE ENCOUNTER
Pt had positive results for C diff on recent stool testing. I have sent vancomycin for treatment. Please let her know. Vancomycin may need pre-auth.

## 2022-02-13 ENCOUNTER — APPOINTMENT (OUTPATIENT)
Dept: GENERAL RADIOLOGY | Facility: HOSPITAL | Age: 77
End: 2022-02-13

## 2022-02-13 ENCOUNTER — HOSPITAL ENCOUNTER (EMERGENCY)
Facility: HOSPITAL | Age: 77
Discharge: ANOTHER HOSPITAL | End: 2022-02-13
Attending: EMERGENCY MEDICINE | Admitting: EMERGENCY MEDICINE

## 2022-02-13 VITALS
TEMPERATURE: 98.9 F | OXYGEN SATURATION: 100 % | WEIGHT: 118 LBS | SYSTOLIC BLOOD PRESSURE: 150 MMHG | BODY MASS INDEX: 18.52 KG/M2 | DIASTOLIC BLOOD PRESSURE: 72 MMHG | HEIGHT: 67 IN | RESPIRATION RATE: 16 BRPM | HEART RATE: 60 BPM

## 2022-02-13 DIAGNOSIS — I73.9 PERIPHERAL VASCULAR DISEASE: ICD-10-CM

## 2022-02-13 DIAGNOSIS — S82.142A CLOSED DISPLACED BICONDYLAR FRACTURE OF LEFT TIBIA, INITIAL ENCOUNTER: Primary | ICD-10-CM

## 2022-02-13 PROCEDURE — 25010000002 FENTANYL CITRATE (PF) 50 MCG/ML SOLUTION: Performed by: EMERGENCY MEDICINE

## 2022-02-13 PROCEDURE — 96374 THER/PROPH/DIAG INJ IV PUSH: CPT

## 2022-02-13 PROCEDURE — 99284 EMERGENCY DEPT VISIT MOD MDM: CPT

## 2022-02-13 PROCEDURE — 73590 X-RAY EXAM OF LOWER LEG: CPT

## 2022-02-13 PROCEDURE — 73562 X-RAY EXAM OF KNEE 3: CPT

## 2022-02-13 PROCEDURE — 96376 TX/PRO/DX INJ SAME DRUG ADON: CPT

## 2022-02-13 RX ORDER — ONDANSETRON 2 MG/ML
4 INJECTION INTRAMUSCULAR; INTRAVENOUS ONCE
Status: DISCONTINUED | OUTPATIENT
Start: 2022-02-13 | End: 2022-02-13 | Stop reason: HOSPADM

## 2022-02-13 RX ORDER — FENTANYL CITRATE 50 UG/ML
25 INJECTION, SOLUTION INTRAMUSCULAR; INTRAVENOUS
Status: DISCONTINUED | OUTPATIENT
Start: 2022-02-13 | End: 2022-02-13 | Stop reason: HOSPADM

## 2022-02-13 RX ADMIN — FENTANYL CITRATE 25 MCG: 50 INJECTION INTRAMUSCULAR; INTRAVENOUS at 13:07

## 2022-02-13 RX ADMIN — FENTANYL CITRATE 25 MCG: 50 INJECTION INTRAMUSCULAR; INTRAVENOUS at 15:25

## 2022-02-13 NOTE — ED PROVIDER NOTES
EMERGENCY DEPARTMENT ENCOUNTER    Pt Name: Odalys Miles  MRN: [id number]  Pt :   1945  Room Number:    Date of encounter:  2022  PCP: Marty Cheung MD  ED Provider: ARLET Echeverria    Historian: patient      HPI:  Chief Complaint: left knee pain        Context: Odalys Miles is a 76 y.o. female who presents to the ED c/o acute pain to the left knee onset approximately 2 hours ago after she sustained a fall on some steps that she missed.  She denies any head, neck, spine or pelvic injury.  She cannot endorse her weightbearing to her left leg.    Review of systems is negative for fever chills or recent illness.  Negative for syncope or dizziness.  Cardiovascular, GI and  systems are negative.  No profound weakness, dizziness or syncope.  No neurosensory complaints or focal weakness.    Patient has a past medical history of vascular disease to the left leg with subsequent healing wounds at the left ankle region.      PAST MEDICAL HISTORY  Past Medical History:   Diagnosis Date   • Abnormal liver enzymes    • Basal cell carcinoma     under left eye   • Body mass index (BMI) 20.0-20.9, adult    • BPPV (benign paroxysmal positional vertigo)    • COVID-19 vaccine series completed     Moderna   • Elevated cholesterol    • Fracture     as a child   • GERD (gastroesophageal reflux disease)    • Glaucoma    • Glaucoma    • Hip fracture (HCC) 10/11/2019    Hip repalced in    • Ingrowing toenail    • Mammogram abnormal    • Microscopic colitis 2014   • Onychomycosis    • Osteoporosis    • Seasonal allergies    • Sebaceous cyst    • Syncope, near     pt doesn't recall   • TIA (transient ischemic attack) 2014   • Vascular abnormality     Per patient accident in  caused poor circulation to LLE; chronic wound present    • Wears dentures     full upper plate, partial on the lower   • Wears glasses          PAST SURGICAL HISTORY  Past Surgical History:   Procedure Laterality  "Date   • CATARACT EXTRACTION, BILATERAL     • COLONOSCOPY      DR JAIMES   • COLONOSCOPY N/A 10/12/2021    Procedure: COLONOSCOPY with biopsy and APC sigmoid AVM cauterization;  Surgeon: Jose Mayer MD;  Location: [facility] ENDOSCOPY;  Service: Gastroenterology;  Laterality: N/A;   • HIP SURGERY Left 12/27/2019    secondary to a fracture   • ORIF HUMERUS FRACTURE Left 11/3/2021    Procedure: HUMERUS PROXIMAL OPEN REDUCTION INTERNAL FIXATION WITH IMTRAMEDULLARY NAIL FIXATION LEFT;  Surgeon: Linden Domingo MD;  Location: [facility] OR;  Service: Orthopedics;  Laterality: Left;   • SKIN BIOPSY     • TUBAL ABDOMINAL LIGATION           FAMILY HISTORY  Family History   Problem Relation Age of Onset   • Heart attack Mother    • Diabetes Mother    • Stroke Father    • Breast cancer Neg Hx    • Ovarian cancer Neg Hx          SOCIAL HISTORY  Social History     Socioeconomic History   • Marital status:    Tobacco Use   • Smoking status: Former Smoker     Packs/day: 0.25     Years: 45.00     Pack years: 11.25     Types: Cigarettes     Start date: 1960     Quit date: 2007     Years since quitting: 15.1   • Smokeless tobacco: Never Used   • Tobacco comment: was a \"closet\" smoker   Vaping Use   • Vaping Use: Never used   Substance and Sexual Activity   • Alcohol use: Yes     Comment: rare   • Drug use: No   • Sexual activity: Defer         ALLERGIES  Patient has no known allergies.        REVIEW OF SYSTEMS  Review of Systems     All systems reviewed and negative except for those discussed in HPI.       PHYSICAL EXAM    I have reviewed the triage vital signs and nursing notes.    ED Triage Vitals [02/13/22 1200]   Temp Heart Rate Resp BP SpO2   98.9 °F (37.2 °C) 63 16 140/73 96 %      Temp src Heart Rate Source Patient Position BP Location FiO2 (%)   Oral Monitor -- -- --       Physical Exam  GENERAL:   Appears in no acute distress.  She is a very good historian.  Her pain is under control after being medicated by EMS in " route.  Her vital signs are normal  HENT: Nares patent.  Atraumatic.  Normocephalic  EYES: No scleral icterus.  CV: Regular rhythm, regular rate.  No tachycardia.  No peripheral edema  RESPIRATORY: Normal effort.  No audible wheezes, rales or rhonchi.  Chest wall is nontender  ABDOMEN: Soft, nontender  MUSCULOSKELETAL: No deformities.  All extremities are atraumatic unremarkable with exception of the left lower extremity: There is crepitus with passive range of motion at the right knee with deformity appreciated at the tibial plateau.  Proximal distal joints are negative.  Neurovascular exam is negative  NEURO: Alert, moves all extremities, follows commands.  SKIN: Warm, dry, no rash visualized.        LAB RESULTS  No results found for this or any previous visit (from the past 24 hour(s)).    If labs were ordered, I independently reviewed the results.        RADIOLOGY  XR Knee 3 View Left    Result Date: 2/13/2022  PROCEDURE: XR KNEE 3 VW LEFT-  HISTORY: fall with left knee injury  FINDINGS:  Three views show a comminuted fracture of the proximal tibial metaphysis with a fracture line extending into the lateral tibial plateau and a fracture line extending into the region of the lateral tibial spine. A comminuted fracture is seen of the proximal fibular metaphysis. There are mild degenerative changes. Osteopenia is noted. A sclerotic abnormality is seen in the distal femur which may represent an enchondroma. No soft tissue abnormality is seen. No foreign body is identified.      Comminuted proximal tibial and fibular fractures.        This report was signed and finalized on 2/13/2022 2:51 PM by Neymar Goodson MD.    XR Tibia Fibula 2 View Left    Result Date: 2/13/2022  PROCEDURE: XR TIBIA FIBULA 2 VW LEFT-  HISTORY: falll injury  FINDINGS:  Two views show a comminuted fracture of the proximal tibial metaphysis extending into the lateral tibial spine and lateral tibial plateau. There is a separate fragment which  involves the lateral tibial plateau with up to 8 mm of distraction. There is also comminuted fracture of the proximal fibular metaphysis extending into the fibular head. Osteopenia is noted. Mild degenerative changes are noted. Multiple soft tissue calcifications are seen of the distal lower leg. No foreign body is identified.      Comminuted proximal tibial and fibular fractures.        This report was signed and finalized on 2/13/2022 2:53 PM by Neymar Goodson MD.          PROCEDURES    Procedures    No orders to display       MEDICATIONS GIVEN IN ER    Medications   fentaNYL citrate (PF) (SUBLIMAZE) injection 25 mcg (25 mcg Intravenous Given 2/13/22 1525)   ondansetron (ZOFRAN) injection 4 mg (4 mg Intravenous Not Given 2/13/22 1321)           ED Course as of 02/13/22 1720   Sun Feb 13, 2022   1446 I have conferred with [facility] and dr. Arnol Barajas has accepted the patient in transfer ED to ED to [facility].  Patient and her  understand and concur.  Patient is wearing an immobilizer. She is pain free currently.   [MS]      ED Course User Index  [MS] Kenia Persaud APRN           AS OF 17:20 EST VITALS:    BP - 150/72  HR - 60  TEMP - 98.9 °F (37.2 °C)  O2 SATS - 100%                  DIAGNOSIS  Final diagnoses:   Closed displaced bicondylar fracture of left tibia, initial encounter   Peripheral vascular disease (HCC)         DISPOSITION    Transfer to  as an ED to ED transfer               Kenia Persaud APRN  02/13/22 1510       Kenia Persaud APRN  02/13/22 1720

## 2022-02-13 NOTE — ED NOTES
called @ this time requesting trauma per ARLET Aquino. Stated they would give us a call back with physician on-line.     Padmini Zelaya  02/13/22 3690       Padmini Zelaya  02/13/22 9580

## 2022-02-20 ENCOUNTER — HOSPITAL ENCOUNTER (INPATIENT)
Facility: HOSPITAL | Age: 77
LOS: 26 days | Discharge: HOME HEALTH | DRG: 560 | End: 2022-03-18
Attending: INTERNAL MEDICINE | Admitting: INTERNAL MEDICINE
Payer: MEDICARE

## 2022-02-20 DIAGNOSIS — S82.142D CLOSED FRACTURE OF LEFT TIBIAL PLATEAU WITH ROUTINE HEALING, SUBSEQUENT ENCOUNTER: ICD-10-CM

## 2022-02-20 DIAGNOSIS — S82.832D CLOSED FRACTURE OF NECK OF LEFT FIBULA WITH ROUTINE HEALING, SUBSEQUENT ENCOUNTER: Primary | ICD-10-CM

## 2022-02-20 PROCEDURE — 6370000000 HC RX 637 (ALT 250 FOR IP): Performed by: PHYSICIAN ASSISTANT

## 2022-02-20 PROCEDURE — 1200000002 HC SEMI PRIVATE SWING BED

## 2022-02-20 PROCEDURE — 6360000002 HC RX W HCPCS: Performed by: PHYSICIAN ASSISTANT

## 2022-02-20 RX ORDER — BUDESONIDE 3 MG/1
9 CAPSULE, COATED PELLETS ORAL EVERY MORNING
COMMUNITY

## 2022-02-20 RX ORDER — LOPERAMIDE HYDROCHLORIDE 2 MG/1
2 CAPSULE ORAL 3 TIMES DAILY PRN
Status: DISCONTINUED | OUTPATIENT
Start: 2022-02-20 | End: 2022-03-18 | Stop reason: HOSPADM

## 2022-02-20 RX ORDER — ACETAMINOPHEN 325 MG/1
650 TABLET ORAL EVERY 6 HOURS PRN
COMMUNITY

## 2022-02-20 RX ORDER — FERROUS SULFATE TAB EC 324 MG (65 MG FE EQUIVALENT) 324 (65 FE) MG
325 TABLET DELAYED RESPONSE ORAL EVERY OTHER DAY
Status: DISCONTINUED | OUTPATIENT
Start: 2022-02-20 | End: 2022-02-21

## 2022-02-20 RX ORDER — POTASSIUM CHLORIDE 750 MG/1
10 CAPSULE, EXTENDED RELEASE ORAL DAILY
Status: DISCONTINUED | OUTPATIENT
Start: 2022-02-20 | End: 2022-03-18 | Stop reason: HOSPADM

## 2022-02-20 RX ORDER — POLYETHYLENE GLYCOL 3350 17 G/17G
17 POWDER, FOR SOLUTION ORAL DAILY
Status: ON HOLD | COMMUNITY
End: 2022-04-04

## 2022-02-20 RX ORDER — MAGNESIUM OXIDE 400 MG/1
400 TABLET ORAL DAILY
Status: ON HOLD | COMMUNITY
End: 2022-05-02 | Stop reason: SDUPTHER

## 2022-02-20 RX ORDER — POTASSIUM CHLORIDE 750 MG/1
10 CAPSULE, EXTENDED RELEASE ORAL 2 TIMES DAILY
Status: ON HOLD | COMMUNITY
End: 2022-03-18 | Stop reason: HOSPADM

## 2022-02-20 RX ORDER — LANOLIN ALCOHOL/MO/W.PET/CERES
400 CREAM (GRAM) TOPICAL DAILY
Status: DISCONTINUED | OUTPATIENT
Start: 2022-02-20 | End: 2022-03-18 | Stop reason: HOSPADM

## 2022-02-20 RX ORDER — ACETAMINOPHEN 325 MG/1
650 TABLET ORAL EVERY 6 HOURS
Status: DISCONTINUED | OUTPATIENT
Start: 2022-02-20 | End: 2022-02-25

## 2022-02-20 RX ORDER — M-VIT,TX,IRON,MINS/CALC/FOLIC 27MG-0.4MG
1 TABLET ORAL DAILY
Status: DISCONTINUED | OUTPATIENT
Start: 2022-02-20 | End: 2022-03-18 | Stop reason: HOSPADM

## 2022-02-20 RX ORDER — LOPERAMIDE HYDROCHLORIDE 2 MG/1
2 CAPSULE ORAL 4 TIMES DAILY PRN
Status: ON HOLD | COMMUNITY
End: 2022-03-18 | Stop reason: HOSPADM

## 2022-02-20 RX ORDER — BUDESONIDE 3 MG/1
3 CAPSULE, COATED PELLETS ORAL DAILY
Status: DISCONTINUED | OUTPATIENT
Start: 2022-02-20 | End: 2022-02-22

## 2022-02-20 RX ORDER — ASCORBIC ACID 500 MG
500 TABLET ORAL DAILY
Status: DISCONTINUED | OUTPATIENT
Start: 2022-02-20 | End: 2022-03-18 | Stop reason: HOSPADM

## 2022-02-20 RX ORDER — ASCORBIC ACID 500 MG
500 TABLET ORAL DAILY
COMMUNITY

## 2022-02-20 RX ORDER — OXYCODONE HYDROCHLORIDE 5 MG/1
5 TABLET ORAL EVERY 6 HOURS PRN
Status: DISCONTINUED | OUTPATIENT
Start: 2022-02-20 | End: 2022-02-25

## 2022-02-20 RX ORDER — POLYETHYLENE GLYCOL 3350 17 G/17G
17 POWDER, FOR SOLUTION ORAL DAILY PRN
Status: DISCONTINUED | OUTPATIENT
Start: 2022-02-20 | End: 2022-03-18 | Stop reason: HOSPADM

## 2022-02-20 RX ORDER — BRINZOLAMIDE/BRIMONIDINE TARTRATE 10; 2 MG/ML; MG/ML
SUSPENSION/ DROPS OPHTHALMIC
COMMUNITY

## 2022-02-20 RX ORDER — ACETAMINOPHEN 650 MG/1
650 SUPPOSITORY RECTAL EVERY 6 HOURS PRN
Status: DISCONTINUED | OUTPATIENT
Start: 2022-02-20 | End: 2022-03-18 | Stop reason: HOSPADM

## 2022-02-20 RX ORDER — PHENOL 1.4 %
10 AEROSOL, SPRAY (ML) MUCOUS MEMBRANE NIGHTLY
Status: ON HOLD | COMMUNITY
End: 2022-05-02 | Stop reason: SDUPTHER

## 2022-02-20 RX ORDER — SENNA AND DOCUSATE SODIUM 50; 8.6 MG/1; MG/1
1 TABLET, FILM COATED ORAL 2 TIMES DAILY
Status: DISCONTINUED | OUTPATIENT
Start: 2022-02-20 | End: 2022-03-18 | Stop reason: HOSPADM

## 2022-02-20 RX ORDER — SENNA AND DOCUSATE SODIUM 50; 8.6 MG/1; MG/1
1 TABLET, FILM COATED ORAL 2 TIMES DAILY
Status: ON HOLD | COMMUNITY
End: 2022-04-04

## 2022-02-20 RX ORDER — FERROUS SULFATE TAB EC 324 MG (65 MG FE EQUIVALENT) 324 (65 FE) MG
324 TABLET DELAYED RESPONSE ORAL EVERY OTHER DAY
Status: ON HOLD | COMMUNITY
End: 2022-05-02 | Stop reason: SDUPTHER

## 2022-02-20 RX ORDER — ONDANSETRON 4 MG/1
4 TABLET, ORALLY DISINTEGRATING ORAL EVERY 8 HOURS PRN
Status: DISCONTINUED | OUTPATIENT
Start: 2022-02-20 | End: 2022-03-18 | Stop reason: HOSPADM

## 2022-02-20 RX ORDER — ASPIRIN 81 MG/1
81 TABLET ORAL 2 TIMES DAILY
Status: DISCONTINUED | OUTPATIENT
Start: 2022-02-20 | End: 2022-02-21

## 2022-02-20 RX ORDER — OXYCODONE HYDROCHLORIDE 5 MG/1
5 TABLET ORAL EVERY 6 HOURS PRN
Status: ON HOLD | COMMUNITY
End: 2022-03-18 | Stop reason: HOSPADM

## 2022-02-20 RX ORDER — LATANOPROST 50 UG/ML
1 SOLUTION/ DROPS OPHTHALMIC NIGHTLY
Status: DISCONTINUED | OUTPATIENT
Start: 2022-02-20 | End: 2022-02-21 | Stop reason: ALTCHOICE

## 2022-02-20 RX ORDER — ONDANSETRON 2 MG/ML
4 INJECTION INTRAMUSCULAR; INTRAVENOUS EVERY 6 HOURS PRN
Status: DISCONTINUED | OUTPATIENT
Start: 2022-02-20 | End: 2022-03-18 | Stop reason: HOSPADM

## 2022-02-20 RX ORDER — ACETAMINOPHEN 325 MG/1
650 TABLET ORAL EVERY 6 HOURS PRN
Status: DISCONTINUED | OUTPATIENT
Start: 2022-02-20 | End: 2022-03-18 | Stop reason: HOSPADM

## 2022-02-20 RX ORDER — POLYETHYLENE GLYCOL 3350 17 G/17G
17 POWDER, FOR SOLUTION ORAL DAILY
Status: DISCONTINUED | OUTPATIENT
Start: 2022-02-20 | End: 2022-03-18 | Stop reason: HOSPADM

## 2022-02-20 RX ORDER — LANOLIN ALCOHOL/MO/W.PET/CERES
0.8 CREAM (GRAM) TOPICAL DAILY
Status: DISCONTINUED | OUTPATIENT
Start: 2022-02-20 | End: 2022-03-18 | Stop reason: HOSPADM

## 2022-02-20 RX ORDER — LACTOBACILLUS RHAMNOSUS GG 10B CELL
1 CAPSULE ORAL DAILY
Status: DISCONTINUED | OUTPATIENT
Start: 2022-02-20 | End: 2022-03-18 | Stop reason: HOSPADM

## 2022-02-20 RX ADMIN — OXYCODONE 5 MG: 5 TABLET ORAL at 17:02

## 2022-02-20 RX ADMIN — ASPIRIN 81 MG: 81 TABLET, COATED ORAL at 20:59

## 2022-02-20 RX ADMIN — ENOXAPARIN SODIUM 30 MG: 100 INJECTION SUBCUTANEOUS at 20:58

## 2022-02-20 RX ADMIN — SENNOSIDES AND DOCUSATE SODIUM 1 TABLET: 8.6; 5 TABLET ORAL at 20:59

## 2022-02-20 RX ADMIN — ACETAMINOPHEN 650 MG: 325 TABLET, FILM COATED ORAL at 20:59

## 2022-02-20 RX ADMIN — LATANOPROST 1 DROP: 50 SOLUTION/ DROPS OPHTHALMIC at 20:58

## 2022-02-20 RX ADMIN — OXYCODONE 5 MG: 5 TABLET ORAL at 23:13

## 2022-02-20 RX ADMIN — ACETAMINOPHEN 650 MG: 325 TABLET, FILM COATED ORAL at 14:59

## 2022-02-20 RX ADMIN — Medication 10 MG: at 23:13

## 2022-02-20 ASSESSMENT — PAIN SCALES - GENERAL
PAINLEVEL_OUTOF10: 8
PAINLEVEL_OUTOF10: 10
PAINLEVEL_OUTOF10: 10
PAINLEVEL_OUTOF10: 7

## 2022-02-20 NOTE — LETTER
Pt is going home tomorrow (Friday) and will need assist as soon as possible due to anxiety about going home. #:        Barbara Schuster RN  Care Coordinator  [facility]  [address], [place]  Office:  [phone]  Fax:  [phone]  [email or web address]

## 2022-02-20 NOTE — PROGRESS NOTES
Attempted to look at Pt surgical site on L leg, but Pt states ace wrap is not to be removed unless soiled until her F/U appointment on 3/8 per her MD instructions. Pt does not have any paperwork. Will attempt to call [facility] to obtain another copy.

## 2022-02-20 NOTE — FLOWSHEET NOTE
02/20/22 1358   Assessment   Charting Type Admission   Neurological   Neuro (WDL) WDL   Denzel Coma Scale   Eye Opening 4   Best Verbal Response 5   Best Motor Response 6   [place] Coma Scale Score 15   HEENT   HEENT (WDL) X   Right Eye Impaired vision   Left Eye Impaired vision   Respiratory   Respiratory (WDL) WDL   Cardiac   Cardiac (WDL) WDL   Gastrointestinal   Abdominal (WDL) X   Last BM (including prior to admit) 02/19/22   Skin Color/Condition   Skin Color/Condition (WDL) WDL   Skin Integrity   Skin Integrity (WDL) X   Skin Integrity Other (Comment)  (sx site)   Location Left knee   Preventative Dressing Yes   Musculoskeletal   Musculoskeletal (WDL) X   LL Extremity Limited movement;Surgery; Unsteady   Genitourinary   Genitourinary (WDL) WDL   Psychosocial   Psychosocial (WDL) WDL

## 2022-02-20 NOTE — PROGRESS NOTES
Pt states she has already taken all her medicines today and will only have a few to take tonight. States any that she takes daily, she has already had. States she only wants tylenol at this time.

## 2022-02-21 PROBLEM — K52.839 MICROSCOPIC COLITIS: Status: ACTIVE | Noted: 2022-02-21

## 2022-02-21 PROBLEM — R63.6 UNDERWEIGHT: Status: ACTIVE | Noted: 2022-02-21

## 2022-02-21 PROBLEM — S82.832A: Status: ACTIVE | Noted: 2022-02-21

## 2022-02-21 PROBLEM — H40.9 GLAUCOMA: Status: ACTIVE | Noted: 2022-02-21

## 2022-02-21 PROBLEM — D64.9 ANEMIA: Status: ACTIVE | Noted: 2022-02-21

## 2022-02-21 PROBLEM — R25.2 LEG CRAMPS: Status: ACTIVE | Noted: 2022-02-21

## 2022-02-21 PROBLEM — S82.142A CLOSED FRACTURE OF LEFT TIBIAL PLATEAU: Status: ACTIVE | Noted: 2022-02-21

## 2022-02-21 PROBLEM — R29.6 RECURRENT FALLS: Status: ACTIVE | Noted: 2022-02-21

## 2022-02-21 LAB
A/G RATIO: 1.3 (ref 0.8–2)
ALBUMIN SERPL-MCNC: 3 G/DL (ref 3.4–4.8)
ALP BLD-CCNC: 59 U/L (ref 25–100)
ALT SERPL-CCNC: 38 U/L (ref 4–36)
ANION GAP SERPL CALCULATED.3IONS-SCNC: 10 MMOL/L (ref 3–16)
AST SERPL-CCNC: 47 U/L (ref 8–33)
BILIRUB SERPL-MCNC: 0.5 MG/DL (ref 0.3–1.2)
BUN BLDV-MCNC: 18 MG/DL (ref 6–20)
CALCIUM SERPL-MCNC: 9 MG/DL (ref 8.5–10.5)
CHLORIDE BLD-SCNC: 105 MMOL/L (ref 98–107)
CO2: 24 MMOL/L (ref 20–30)
CREAT SERPL-MCNC: 0.6 MG/DL (ref 0.4–1.2)
GFR AFRICAN AMERICAN: >59
GFR NON-AFRICAN AMERICAN: >60
GLOBULIN: 2.4 G/DL
GLUCOSE BLD-MCNC: 94 MG/DL (ref 74–106)
HCT VFR BLD CALC: 31.4 % (ref 37–47)
HEMOGLOBIN: 9.7 G/DL (ref 11.5–16.5)
MCH RBC QN AUTO: 29.8 PG (ref 27–32)
MCHC RBC AUTO-ENTMCNC: 30.9 G/DL (ref 31–35)
MCV RBC AUTO: 96.3 FL (ref 80–100)
PDW BLD-RTO: 14.1 % (ref 11–16)
PLATELET # BLD: 252 K/UL (ref 150–400)
PMV BLD AUTO: 9 FL (ref 6–10)
POTASSIUM REFLEX MAGNESIUM: 4.2 MMOL/L (ref 3.4–5.1)
RBC # BLD: 3.26 M/UL (ref 3.8–5.8)
SODIUM BLD-SCNC: 139 MMOL/L (ref 136–145)
TOTAL PROTEIN: 5.4 G/DL (ref 6.4–8.3)
WBC # BLD: 6.6 K/UL (ref 4–11)

## 2022-02-21 PROCEDURE — 97802 MEDICAL NUTRITION INDIV IN: CPT

## 2022-02-21 PROCEDURE — 97161 PT EVAL LOW COMPLEX 20 MIN: CPT

## 2022-02-21 PROCEDURE — 6370000000 HC RX 637 (ALT 250 FOR IP): Performed by: PHYSICIAN ASSISTANT

## 2022-02-21 PROCEDURE — 80053 COMPREHEN METABOLIC PANEL: CPT

## 2022-02-21 PROCEDURE — 6360000002 HC RX W HCPCS: Performed by: PHYSICIAN ASSISTANT

## 2022-02-21 PROCEDURE — 97165 OT EVAL LOW COMPLEX 30 MIN: CPT

## 2022-02-21 PROCEDURE — 36415 COLL VENOUS BLD VENIPUNCTURE: CPT

## 2022-02-21 PROCEDURE — 85027 COMPLETE CBC AUTOMATED: CPT

## 2022-02-21 PROCEDURE — 97530 THERAPEUTIC ACTIVITIES: CPT

## 2022-02-21 PROCEDURE — 97116 GAIT TRAINING THERAPY: CPT

## 2022-02-21 PROCEDURE — 1200000002 HC SEMI PRIVATE SWING BED

## 2022-02-21 RX ORDER — CALCIUM CARBONATE 200(500)MG
500 TABLET,CHEWABLE ORAL DAILY
Status: DISCONTINUED | OUTPATIENT
Start: 2022-02-22 | End: 2022-03-18 | Stop reason: HOSPADM

## 2022-02-21 RX ORDER — FERROUS SULFATE TAB EC 324 MG (65 MG FE EQUIVALENT) 324 (65 FE) MG
324 TABLET DELAYED RESPONSE ORAL EVERY OTHER DAY
Status: DISCONTINUED | OUTPATIENT
Start: 2022-02-22 | End: 2022-02-26

## 2022-02-21 RX ORDER — FAMOTIDINE 20 MG/1
20 TABLET, FILM COATED ORAL 2 TIMES DAILY
Status: DISCONTINUED | OUTPATIENT
Start: 2022-02-21 | End: 2022-03-18 | Stop reason: HOSPADM

## 2022-02-21 RX ORDER — ASPIRIN 81 MG/1
81 TABLET, CHEWABLE ORAL 2 TIMES DAILY
COMMUNITY
Start: 2022-03-07 | End: 2022-03-21

## 2022-02-21 RX ORDER — DIPHENHYDRAMINE HYDROCHLORIDE, ZINC ACETATE 2; .1 G/100G; G/100G
CREAM TOPICAL 3 TIMES DAILY PRN
Status: DISCONTINUED | OUTPATIENT
Start: 2022-02-21 | End: 2022-03-18 | Stop reason: HOSPADM

## 2022-02-21 RX ADMIN — SENNOSIDES AND DOCUSATE SODIUM 1 TABLET: 8.6; 5 TABLET ORAL at 21:22

## 2022-02-21 RX ADMIN — ACETAMINOPHEN 650 MG: 325 TABLET, FILM COATED ORAL at 14:22

## 2022-02-21 RX ADMIN — OXYCODONE HYDROCHLORIDE AND ACETAMINOPHEN 500 MG: 500 TABLET ORAL at 08:44

## 2022-02-21 RX ADMIN — ACETAMINOPHEN 650 MG: 325 TABLET, FILM COATED ORAL at 08:41

## 2022-02-21 RX ADMIN — POLYETHYLENE GLYCOL 3350 17 G: 17 POWDER, FOR SOLUTION ORAL at 08:41

## 2022-02-21 RX ADMIN — ENOXAPARIN SODIUM 30 MG: 100 INJECTION SUBCUTANEOUS at 21:12

## 2022-02-21 RX ADMIN — Medication 400 MG: at 08:44

## 2022-02-21 RX ADMIN — SENNOSIDES AND DOCUSATE SODIUM 1 TABLET: 8.6; 5 TABLET ORAL at 08:45

## 2022-02-21 RX ADMIN — Medication 10 MG: at 21:12

## 2022-02-21 RX ADMIN — ASPIRIN 81 MG: 81 TABLET, COATED ORAL at 08:52

## 2022-02-21 RX ADMIN — MULTIPLE VITAMINS W/ MINERALS TAB 1 TABLET: TAB at 08:44

## 2022-02-21 RX ADMIN — FAMOTIDINE 20 MG: 20 TABLET, FILM COATED ORAL at 10:09

## 2022-02-21 RX ADMIN — ACETAMINOPHEN 650 MG: 325 TABLET, FILM COATED ORAL at 02:26

## 2022-02-21 RX ADMIN — ENOXAPARIN SODIUM 30 MG: 100 INJECTION SUBCUTANEOUS at 08:46

## 2022-02-21 RX ADMIN — ACETAMINOPHEN 650 MG: 325 TABLET, FILM COATED ORAL at 21:12

## 2022-02-21 RX ADMIN — OXYCODONE 5 MG: 5 TABLET ORAL at 05:07

## 2022-02-21 RX ADMIN — Medication 1 CAPSULE: at 08:45

## 2022-02-21 RX ADMIN — Medication 1 TABLET: at 08:44

## 2022-02-21 RX ADMIN — FOLIC ACID TAB 400 MCG 0.8 MG: 400 TAB at 08:45

## 2022-02-21 RX ADMIN — POTASSIUM CHLORIDE 10 MEQ: 10 CAPSULE, COATED, EXTENDED RELEASE ORAL at 08:44

## 2022-02-21 RX ADMIN — FAMOTIDINE 20 MG: 20 TABLET, FILM COATED ORAL at 21:13

## 2022-02-21 RX ADMIN — OXYCODONE 5 MG: 5 TABLET ORAL at 17:26

## 2022-02-21 ASSESSMENT — PAIN SCALES - GENERAL
PAINLEVEL_OUTOF10: 4
PAINLEVEL_OUTOF10: 5
PAINLEVEL_OUTOF10: 2
PAINLEVEL_OUTOF10: 7
PAINLEVEL_OUTOF10: 5

## 2022-02-21 NOTE — PROGRESS NOTES
Occupational Therapy   Occupational Therapy Initial Assessment  Date: 2022   Patient Name: Javier Lyons  MRN: [id number]     : 1945    Date of Service: 2022    Discharge Recommendations:  Continue to assess pending progress       Assessment   Performance deficits / Impairments: Decreased functional mobility ; Decreased ADL status; Decreased endurance;Decreased strength;Decreased high-level IADLs;Decreased balance  Assessment: Pt agreeable to OT services. Pt pleasant and motivated to participate in therapy services. Pt come to sit at EOB with MOD I. Pt able to recall precautions and maintain in standing for NWB on LLE. Pt Donjoy ELS brace intact. Pt sat unsupported at EOB. Pt donned sock seated at EOB with minimal difficulty. Pt asked to have family/friends to bring in clothes to focus on dressing during OT sessions. Pt come to stand at EOB and ambulated with RW SBA ~30 feet with fatigue noted. Pt declined need for toileting. Pt will benefit from skilled OT services while IP. Pt transferred from sitting to supine without assistance. Prognosis: Good  Decision Making: Low Complexity  REQUIRES OT FOLLOW UP: Yes           Patient Diagnosis(es): There were no encounter diagnoses. has a past medical history of Colitis, GERD (gastroesophageal reflux disease), Glaucoma, and PAD (peripheral artery disease) ([facility] [place] [address] has a past surgical history that includes Tubal ligation; eye surgery; cyst removal; skin biopsy; and Total hip arthroplasty (Left, 2019).            Restrictions  Restrictions/Precautions  Restrictions/Precautions: General Precautions,Fall Risk,Weight Bearing  Required Braces or Orthoses?: Yes  Lower Extremity Weight Bearing Restrictions  Left Lower Extremity Weight Bearing: Non Weight Bearing  Required Braces or Orthoses  Left Lower Extremity Brace:  (ELS brace locked in extension)    Subjective   General  Chart Reviewed: Yes  Patient assessed for rehabilitation services?: Yes  Family / Caregiver Present: No  Referring Practitioner: Rema Nagel PA-C  Diagnosis: Functional decline, Left tibial plateau fx, neck of left fibula  Subjective  Subjective: Pt states she lives alone, LLE pain 2/10. Pt agreeable to OT services. Patient Currently in Pain: Yes (2/10)  Vital Signs  Patient Currently in Pain: Yes (2/10)  Social/Functional History  Social/Functional History  Lives With: Alone  Type of Home: House  Home Layout: Two level,Laundry in basement  Home Access: Stairs to enter without rails (Pt states brother is building a ramp today.)  Entrance Stairs - Number of Steps: 3  Bathroom Shower/Tub: Tub/Shower unit  Bathroom Toilet: Standard  Bathroom Equipment: 3-in-1 commode,Shower chair (Brother is going to bring a toilet raiser to pt.)  Home Equipment: Rolling walker (plans on borrowing WC from brother)  ADL Assistance: Independent  Homemaking Assistance: Independent  Homemaking Responsibilities: Yes  Ambulation Assistance: Independent  Transfer Assistance: Independent  Active :  Yes  Additional Comments: Pt states friend will be staying with her upon DC, has had HHS previously and agreeable upon DC       Objective   Vision: Impaired  Vision Exceptions: Wears glasses at all times  Hearing: Within functional limits    Orientation  Overall Orientation Status: Within Functional Limits  Observation/Palpation  Observation: Pt lying in bed, room air, NAD, pleasant and cooperative  Functional Mobility  Assist Level: Stand by assistance (<>CGA)  Functional Mobility Comments: 30 feet maintaining NWB using RW  ADL  LE Dressing: Contact guard assistance  Tone RUE  RUE Tone: Normotonic  Tone LUE  LUE Tone: Normotonic  Coordination  Movements Are Fluid And Coordinated: Yes     Bed mobility  Rolling to Left: Modified independent  Supine to Sit: Modified independent  Sit to Supine: Modified independent  Scooting: Modified independent  Transfers  Stand Pivot Transfers: Stand by assistance  Sit to stand: Stand by assistance     Cognition  Overall Cognitive Status: WFL        Sensation  Overall Sensation Status: WFL        LUE AROM (degrees)  LUE AROM : WFL  RUE AROM (degrees)  RUE AROM : WFL  LUE Strength  L Shoulder Flex: 4-/5  L Elbow Flex: 4/5  L Elbow Ext: 4-/5  L Hand General: 4/5  RUE Strength  R Shoulder Flex: 4/5  R Elbow Flex: 4/5  R Elbow Ext: 4-/5  R Hand General: 4/5                   Plan   Plan  Times per week: 3-5  Times per day: Daily  Plan weeks: 1-2  Current Treatment Recommendations: Strengthening,Endurance Training,Balance Training,Self-Care / ADL,Safety Education & Training,Patient/Caregiver Education & Training,Functional Mobility Training    Goals  Short term goals  Time Frame for Short term goals: 2 weeks  Short term goal 1: Pt to complete dressing with MOD I. Short term goal 2: Pt to complete bathing with NINO. Short term goal 3: Pt to complete toileting with MOD I. Short term goal 4: Pt to complete safe ADL transfers maintaining WB status. Short term goal 5: Pt to tolerate x15 minutes of activity to increase functional activity tolerance. Therapy Time   Individual Concurrent Group Co-treatment   Time In 1022         Time Out 7386         Minutes 30              This note serves as a DC summary in the event of pt discharge.      Carolyn Robbins OTR/L

## 2022-02-21 NOTE — PROGRESS NOTES
Medication Reconciliation  Med rec performed for pt utilizing DC med list from [facility].   See notes below:  -Added \"both eyes\" to [address] and [place] directions.  -Added Nonformulary \"EZ Tears\" supplement to home med list per [address] [facility] list.  -Changed iron strength from 325mg to 324 mg per [facility] Dc med list.  Alesia NavarroD

## 2022-02-21 NOTE — CONSULTS
Comprehensive Nutrition Assessment    Type and Reason for Visit:  Initial,Consult,Wound    Nutrition Recommendations/Plan: Recommend to continue with current diet, and MVI. Will start ONS TID and monitor intakes of meals and ONS. Pt at this time eating good and agreeable to ONS. Nutrition Assessment:  Pt is admitted with declining function r/t healing LE fracture (x 2). Pt is at moderate risk for ongoing nutritional compromise r/t increased needs and predicted inadequate intakes. Malnutrition Assessment:  Malnutrition Status: At risk for malnutrition (Comment) (Pt has osteoporosis, and is underweight,  She seems to be eating good at this this time, and states she eats good, but has had colitis for many years.)    Context:  Acute Illness (Acute for fracture, Chronic for  colitis)     Findings of the 6 clinical characteristics of malnutrition:  Energy Intake:  No significant decrease in energy intake  Weight Loss:  No significant weight loss     Body Fat Loss:  No significant body fat loss     Muscle Mass Loss:  Unable to assess    Fluid Accumulation:  No significant fluid accumulation     Strength:       Estimated Daily Nutrient Needs:  Energy (kcal):  4815-5884 (30-32 kcal/kg cbw); Weight Used for Energy Requirements:  Current     Protein (g):  65-78 (1.25-1.5 gm/kg CBW); Weight Used for Protein Requirements:  Current        Fluid (ml/day):  6733-0660; Method Used for Fluid Requirements:  1 ml/kcal      Nutrition Related Findings:  Pt presents with being underweight and has full leg brace. No edema reported. Has chronic colitis, ostesoporosis, GERD, PAD. Alb-3/0; ALT-38; AST-47. She is on laxitives, oxy, MVI ordered. Wounds:  Surgical Incision       Current Nutrition Therapies:    ADULT DIET;  Regular  ADULT ORAL NUTRITION SUPPLEMENT; Breakfast, Lunch, Dinner; Standard High Calorie/High Protein Oral Supplement    Anthropometric Measures:  · Height: 5' 7\" (170.2 cm)  · Current Body Weight: 115 lb (52.2 kg)   · Admission Body Weight:      · Usual Body Weight:       · Ideal Body Weight: 135 lbs; % Ideal Body Weight 85.2 %   · BMI: 18  · Adjusted Body Weight:  ; No Adjustment   · Adjusted BMI:      · BMI Categories: Underweight (BMI less than 22) age over 72       Nutrition Diagnosis:   · Underweight related to acute injury/trauma as evidenced by BMI      Nutrition Interventions:   Food and/or Nutrient Delivery:  Continue Current Diet,Start Oral Nutrition Supplement,Mineral Supplement,Vitamin Supplement  Nutrition Education/Counseling:  Education completed (spoke to patient about the importance of eating enough calories and protein while healing and then on return home.)   Coordination of Nutrition Care:  Continue to monitor while inpatient,Interdisciplinary Rounds    Goals:  to meet est needs for age/condition       Nutrition Monitoring and Evaluation:   Behavioral-Environmental Outcomes:      Food/Nutrient Intake Outcomes:  Food and Nutrient Intake,Supplement Intake  Physical Signs/Symptoms Outcomes:  Biochemical Data,GI Status,Fluid Status or Edema,Nutrition Focused Physical Findings,Weight     Discharge Planning:     Too soon to determine     Electronically signed by Yuliya Stewart MS, RD, LD on 2/21/22 at 4:14 PM EST

## 2022-02-21 NOTE — PROGRESS NOTES
Physical Therapy    Facility/Department: [facility] MED SURG  Initial Assessment    NAME: Thais Gordillo  : 1945  MRN: [id number]    Date of Service: 2022    Discharge Recommendations:  Continue to assess pending progress        Assessment   Body structures, Functions, Activity limitations: Decreased endurance;Decreased ROM; Decreased strength;Decreased balance; Increased pain  Assessment: PT eval completed. Patient able to perform bed mobility with modified independence. Sit to stand and ambulates 28', NWB L LE, with RW and CGA. Patient presents with deficits in strength, balance, activity tolerance, safety awarenss and independence. She is expected to benefit from continued skilled PT intervention to improve her mobility status prior to D/C. Prognosis: Good  Decision Making: Low Complexity  REQUIRES PT FOLLOW UP: Yes  Activity Tolerance  Activity Tolerance: Patient limited by fatigue;Patient limited by endurance; Patient limited by pain       Patient Diagnosis(es): There were no encounter diagnoses. has a past medical history of Colitis, GERD (gastroesophageal reflux disease), Glaucoma, and PAD (peripheral artery disease) ([facility] [address] has a past surgical history that includes Tubal ligation; eye surgery; cyst removal; skin biopsy; and Total hip arthroplasty (Left, 2019).     Restrictions  Restrictions/Precautions  Restrictions/Precautions: General Precautions,Fall Risk,Weight Bearing  Required Braces or Orthoses?: Yes  Lower Extremity Weight Bearing Restrictions  Left Lower Extremity Weight Bearing: Non Weight Bearing  Required Braces or Orthoses  Left Lower Extremity Brace:  (ELS brace locked in extension)  Vision/Hearing  Vision: Impaired  Vision Exceptions: Wears glasses at all times  Hearing: Within functional limits     Subjective  General  Chart Reviewed: Yes  Patient assessed for rehabilitation services?: Yes  Family / Caregiver Present: No  Referring Practitioner: Fransisco Lynn  Follows Commands: Within Functional Limits  Subjective  Subjective: Patient received supine in bed. Pleasant, cooperative and agreeable to work with PT. Pain Screening  Patient Currently in Pain: Yes (2/10)  Vital Signs  Patient Currently in Pain: Yes (2/10)  Pre Treatment Pain Screening  Intervention List: Patient able to continue with treatment    Orientation  Orientation  Overall Orientation Status: Within Normal Limits  Social/Functional History  Social/Functional History  Lives With: Alone  Type of Home: House  Home Layout: Two level,Laundry in basement  Home Access: Stairs to enter without rails (Pt states brother is building a ramp today.)  Entrance Stairs - Number of Steps: 3  Bathroom Shower/Tub: Tub/Shower unit  Bathroom Toilet: Standard  Bathroom Equipment: 3-in-1 commode,Shower chair (Brother is going to bring a toilet raiser to pt.)  Home Equipment: Rolling walker (plans on borrowing WC from brother)  ADL Assistance: Independent  Homemaking Assistance: Independent  Homemaking Responsibilities: Yes  Ambulation Assistance: Independent  Transfer Assistance: Independent  Active :  Yes  Additional Comments: Pt states friend will be staying with her upon DC, has had HHS previously and agreeable upon DC     Observation/Palpation  Posture: Good  Observation: Pt lying in bed, room air, NAD, pleasant and cooperative    PROM RLE (degrees)  RLE PROM: WNL  AROM RLE (degrees)  RLE AROM: WNL  PROM LLE (degrees)  LLE General PROM: ELS brace on and locked in extension  AROM LLE (degrees)  LLE General AROM: ELS brace on and locked in extension  PROM RUE (degrees)  RUE PROM: WFL  AROM RUE (degrees)  RUE AROM : WFL  PROM LUE (degrees)  LUE PROM: WFL  AROM LUE (degrees)  LUE AROM : WFL  Strength RLE  Strength RLE: WFL  Strength LLE  Comment: n/t d/t fx and brace  Strength RUE  Strength RUE: WFL  Strength LUE  Strength LUE: WFL        Bed mobility  Rolling to Left: Modified independent  Supine to Sit: Modified independent  Sit to Supine: Modified independent  Scooting: Modified independent  Transfers  Sit to Stand: Contact guard assistance  Stand to sit: Contact guard assistance  Bed to Chair: Contact guard assistance  Ambulation  Ambulation?: Yes  WB Status: NWB L LE  Ambulation 1  Surface: level tile  Device: Rolling Walker  Assistance: Contact guard assistance  Distance: 28'     Balance  Posture: Good  Sitting - Static: Good  Sitting - Dynamic: Good  Standing - Static: Good  Standing - Dynamic: Good;-        Plan   Plan  Times per week: 3-5x/week  Times per day: Daily  Current Treatment Recommendations: Strengthening,Transfer Training,Endurance Training,Patient/Caregiver Education & Training,Balance Training,Gait Training,Home Exercise Program,Functional Mobility Training,Safety Education & Training  Safety Devices  Type of devices: Call light within reach,Left in bed,Nurse notified      Goals  Long term goals  Time Frame for Long term goals : 10 days  Long term goal 1: Patient will perform all bed mobility independently. Long term goal 2: Patient will perform sit to stand and transfers with modified independence. Long term goal 3: Patient will ambulate 50'x2, NWB L LE, with RW and SBA. Long term goal 4: Patient will demonstrate independence with HEP. Patient Goals   Patient goals : Return home.        Therapy Time   Individual Concurrent Group Co-treatment   Time In 1022         Time Out 1055         Minutes Rolf Candelaria 77, PT

## 2022-02-21 NOTE — PROGRESS NOTES
Pt had a few medications at bedside that Pt has been taking previously before admission. These were not on Pt med list. On call provider notified who states pharmacy will be able to order in the morning. Medications consists of a dietary supplement, eye drops, and anti itch cream. Names of medications are as follows. 1. EZ tears dietary supplement- Pt takes these twice daily  2. Simbrinza 1%-0.2% eye drops- Pt takes these twice daily  3. Diphenhydramine-zinc acetate (Benadryl) cream- Pt uses this 3 times daily as needed for a rash on her back. Medications currently at nurses station.

## 2022-02-21 NOTE — FLOWSHEET NOTE
02/21/22 0944   Discharge [address] 82 Family Members;Friends/Neighbors   Current Services Prior To Admission Durable Medical Equipment   DME Bedside Commode; Wheelchair;Walker; Shower Chair   Potential Assistance Needed N/A   Potential Assistance Purchasing Medications No   Type of Home Care Services None   Patient expects to be discharged to: House  (Any)   Expected Discharge Date 02/24/22   Follow Up Appointment: Best Day/Time    (Any)   Has RW, BSC, WC, SC.  Nothing needed @ [facility]

## 2022-02-21 NOTE — H&P
History and Physical    Patient:  Gabrielle Cary    CHIEF COMPLAINT:    Declining functional status      HISTORY OF PRESENT ILLNESS:   The patient is a 64 y.o. female with PMH of microscopic colitis, GERD, glaucoma, PAD, frequent falls, recent left humerus fracture (nearly healed) and osteoporosis admitted to Swing bed for rehab following recent left tibial plateau fracture and left fibular neck fracture. Pt underwent ORIF on 2/15/22 by Nazanin Freed. Hospital course complicated by acute blood loss anemia with iron deficiency anemia. She did not require blood transfusions at OSH. Pt livers alone and was independent prior to fall. Now that she is NWB, she requires assistance and was sent to Swing bed for ongoing rehab. Past Medical History:      Diagnosis Date    Colitis     GERD (gastroesophageal reflux disease)     Glaucoma     PAD (peripheral artery disease) (HCC)        Past Surgical History:      Procedure Laterality Date    CYST REMOVAL      EYE SURGERY      SKIN BIOPSY      TOTAL HIP ARTHROPLASTY Left 12/24/2019    TUBAL LIGATION         Medications Prior to Admission:    Prior to Admission medications    Medication Sig Start Date End Date Taking?  Authorizing Provider   aspirin 81 MG chewable tablet Take 81 mg by mouth in the morning and at bedtime For 14 days 3/7/22 3/21/22 Yes Historical Provider, MD   Probiotic Product (PROBIOTIC DAILY PO) Take 1 capsule by mouth daily   Yes Historical Provider, MD   NONFORMULARY Take 1 capsule by mouth in the morning and at bedtime EZ Tears supplement   Yes Historical Provider, MD   acetaminophen (TYLENOL) 325 MG tablet Take 650 mg by mouth every 6 hours   Yes Historical Provider, MD   enoxaparin (LOVENOX) 30 MG/0.3ML injection Inject 30 mg into the skin 2 times daily   Yes Historical Provider, MD   ferrous sulfate 324 (65 Fe) MG EC tablet Take 324 mg by mouth every other day    Yes Historical Provider, MD   magnesium oxide (MAG-OX) 400 MG tablet Take 400 mg by mouth daily   Yes Historical Provider, MD   oxyCODONE (ROXICODONE) 5 MG immediate release tablet Take 5 mg by mouth every 6 hours as needed for Pain. For up to 3 days   Yes Historical Provider, MD   polyethylene glycol (GLYCOLAX) 17 g packet Take 17 g by mouth daily   Yes Historical Provider, MD   sennosides-docusate sodium (SENOKOT-S) 8.6-50 MG tablet Take 1 tablet by mouth in the morning and at bedtime   Yes Historical Provider, MD   vitamin C (ASCORBIC ACID) 500 MG tablet Take 500 mg by mouth daily    Yes Historical Provider, MD   bimatoprost (LUMIGAN) 0.01 % SOLN ophthalmic drops Place 1 drop into both eyes nightly    Yes Historical Provider, MD   budesonide (ENTOCORT EC) 3 MG extended release capsule Take 9 mg by mouth every morning    Yes Historical Provider, MD   loperamide (IMODIUM) 2 MG capsule Take 2 mg by mouth 4 times daily as needed    Yes Historical Provider, MD   Melatonin 10 MG TABS Take 10 mg by mouth at bedtime    Yes Historical Provider, MD   potassium chloride (MICRO-K) 10 MEQ extended release capsule Take 10 mEq by mouth 2 times daily    Yes Historical Provider, MD   brinzolamide-brimonidine (SIMBRINZA) 1-0.2 % SUSP Instill one drop into both eyes twice daily   Yes Historical Provider, MD   Lactobacillus (PROBIOTIC ACIDOPHILUS PO) Take 1 capsule by mouth daily    Yes Historical Provider, MD   diphenhydrAMINE-zinc acetate (BENADRYL) 1-0.1 % cream Apply topically 3 times daily as needed. Yes Historical Provider, MD   Folic Acid 0.8 MG CAPS Take 0.8 mg by mouth daily    Yes Historical Provider, MD   Multiple Vitamins-Minerals (MULTIVITAMIN ADULT) TABS Take 1 tablet by mouth daily    Yes Historical Provider, MD   calcium carbonate (OSCAL) 500 MG TABS tablet Take 500 mg by mouth daily    Yes Historical Provider, MD       Allergies:  Patient has no known allergies. Social History:   TOBACCO:   reports that she has quit smoking.  She has never used smokeless tobacco.  ETOH:   reports no history of alcohol use. OCCUPATION:  None     Family History:   No family history on file. Review of system  Constitutional:  Denies fever or chills   Eyes:  Denies eye pain or redness  HENT:  Denies nasal congestion or sore throat   Respiratory:  Denies cough or shortness of breath   Cardiovascular:  Denies chest pain or edema   GI:  Denies abdominal pain, nausea, vomiting, bloody stools or diarrhea   :  Denies dysuria or frequency  Musculoskeletal:  Denies acute neck pain or body aches. Positive for left lower extremity pain. Integument:  Denies rash or itching  Neurologic:  Denies headache, dizziness, numbness, tingling or unilateral weakness  Psychiatric:  Denies acute depression or acute anxiety      Vital Signs  Temp: 97.5 °F (36.4 °C)  Pulse: 82  Resp: 18  BP: (!) 112/50  SpO2: 96 %  O2 Device: None (Room air)       vital signs reviewed in electronic chart. Physical exam  Constitutional:  Well developed, thin, elderly female sitting upright in bed in no acute distress  Eyes:  no scleral icterus, conjunctiva normal   HENT:  Atraumatic, external ears normal, nose normal, oropharynx moist, no pharyngeal exudates. Neck- supple, no JVD, no lymphadenopathy  Respiratory:  No respiratory distress, no wheezing, rales or rhonchi detected  Cardiovascular:  Normal rate, normal rhythm, no murmurs, no gallops, no rubs, no edema   GI:  Soft, nondistended, normal bowel sounds, nontender, no voluntary guarding  Musculoskeletal:  No cyanosis. LLE in Ace and ELS. Able to move toes. Good pulses BLE. Integument:  Warm and dry.   Lymphatic:  No cervical or axillary lymphadenopathy noted   Neurologic:  Alert & oriented x 3, no apparent focal deficits noted   Psychiatric:  Speech and behavior appropriate         Lab Results   Component Value Date     02/21/2022    K 4.2 02/21/2022     02/21/2022    CO2 24 02/21/2022    BUN 18 02/21/2022    CREATININE 0.6 02/21/2022    GLUCOSE 94 02/21/2022    CALCIUM 9.0 02/21/2022    PROT 5.4 (L) 02/21/2022    LABALBU 3.0 (L) 02/21/2022    BILITOT 0.5 02/21/2022    ALKPHOS 59 02/21/2022    AST 47 (H) 02/21/2022    ALT 38 (H) 02/21/2022    LABGLOM >60 02/21/2022    GFRAA >59 02/21/2022    AGRATIO 1.3 02/21/2022    GLOB 2.4 02/21/2022           Lab Results   Component Value Date    WBC 6.6 02/21/2022    HGB 9.7 (L) 02/21/2022    HCT 31.4 (L) 02/21/2022    MCV 96.3 02/21/2022     02/21/2022       PA/lat CXR:   No orders to display       Assessment and Plan     Active Hospital Problems    Diagnosis Date Noted    Closed fracture of left tibial plateau [X38.758X]  -Fragility fracture s/p fall in setting of osteoporosis  -s/p ORIF on 2/15/22 with [facility]   -vitamin D level was 51.7  -For pain control, continue scheduled APAP and prn oxycodone  -bowel regimen: senna and Miralax  -DVT prophylaxis: enoxaparin 30 mg subcutaneous BID through 3/6/21, then aspirin 81 mg BID through 3/20/22  -NWB to the LLE, with ELS brace locked in extension  - bone mineral metabolism team was consulted while at [facility]. OP appointment requested prior to discharge  -PT/OT consulted     02/21/2022    Closed fracture of neck of left fibula [S82.832A]  -see above   02/21/2022    Anemia [D64.9]  -per [facility] dc summary, hgb 13 on arrival/prior to surgery. Downtrended to 10 post op. Iron level 23, transferrin sat 9. Started on ferrous sulfate every other day. 02/21/2022    Recurrent falls [R29.6]  -Patient lives alone and reports being independent with ADLs prior to fall.  Does not use assist devices at home however has a history of falls with known osteoporosis with multiple fractures in the last three months.   -Continue with PT/OT  -fall precautions   02/21/2022    Underweight [R63.6]  -dietician consulted  -continue Boost TID and MVI w/minerals    02/21/2022    Microscopic colitis [K52.839]  -continue budesonide   02/21/2022    Glaucoma [H40.9]  -Continue lumigan, brinzolamide/brimonidine, & supplements per patient request.    02/21/2022    Leg cramps [R25.2]  -continue home potassium supplement   02/21/2022    Declining functional status [R53.81]  -secondary to recent fall/fracture and NWB status  -plan as outlined above 12/31/2019        Patient was seen and examined by Dr. Ceci Garcia and plan of care reviewed. Treatment plan was formulated collaboratively.       KALIE Hernandez certifies per CMS regulation for 42 .15(a), that the patient may reasonably be expected to be discharged or transferred to a hospital within 96 hours after admission to [address] [place] [address]    Electronically signed by KALIE Hernandez on 2/21/2022 at 10:14 AM

## 2022-02-21 NOTE — FLOWSHEET NOTE
02/20/22 2059   Assessment   Charting Type Shift assessment   Neurological   Neuro (WDL) WDL   [place] Coma Scale   Eye Opening 4   Best Verbal Response 5   Best Motor Response 6   Denzel Coma Scale Score 15   HEENT   HEENT (WDL) X   Right Eye Impaired vision   Left Eye Impaired vision   Respiratory   Respiratory (WDL) WDL   Respiratory Pattern Regular   Respiratory Depth Normal   Respiratory Quality/Effort Unlabored   Chest Assessment Chest expansion symmetrical;Trachea midline   L Breath Sounds Clear   R Breath Sounds Clear   Cardiac   Cardiac (WDL) WDL   Cardiac Monitor   Telemetry Monitor On No   Gastrointestinal   Abdominal (WDL) X   RUQ Bowel Sounds Active   LUQ Bowel Sounds Active   RLQ Bowel Sounds Active   LLQ Bowel Sounds Active   Peripheral Vascular   Peripheral Vascular (WDL) WDL   Edema None   Skin Color/Condition   Skin Color/Condition (WDL) WDL   Skin Integrity   Skin Integrity (WDL) X   Skin Integrity Other (Comment)  (sx site)   Location lt knee   Preventative Dressing Yes   Musculoskeletal   Musculoskeletal (WDL) X   LL Extremity Limited movement;Surgery; Unsteady   Genitourinary   Genitourinary (WDL) WDL   Psychosocial   Psychosocial (WDL) WDL

## 2022-02-21 NOTE — ACP (ADVANCE CARE PLANNING)
Advance Care Planning     General Advance Care Planning (ACP) Conversation    Date of Conversation: 2/21/2022  Conducted with: Patient with Decision Making Capacity    Healthcare Decision Maker:  Primary Decision Maker: Alanna Sicard (POA)  [phone]    Today we documented Decision Maker(s). The patient will provide ACP documents. Content/Action Overview:   Has ACP document(s) NOT on file - requested patient to provide  Reviewed DNR/DNI and patient elects Full Code (Attempt Resuscitation)    Patient does not want to be kept in a vegistative state    Length of Voluntary ACP Conversation in minutes:  <16 minutes (Non-Billable)    NEMESIO Capellan Intern

## 2022-02-22 PROCEDURE — 97530 THERAPEUTIC ACTIVITIES: CPT

## 2022-02-22 PROCEDURE — 6370000000 HC RX 637 (ALT 250 FOR IP): Performed by: PHYSICIAN ASSISTANT

## 2022-02-22 PROCEDURE — 6360000002 HC RX W HCPCS: Performed by: PHYSICIAN ASSISTANT

## 2022-02-22 PROCEDURE — 97116 GAIT TRAINING THERAPY: CPT

## 2022-02-22 PROCEDURE — 1200000002 HC SEMI PRIVATE SWING BED

## 2022-02-22 RX ORDER — BUDESONIDE 3 MG/1
9 CAPSULE, COATED PELLETS ORAL DAILY
Status: DISCONTINUED | OUTPATIENT
Start: 2022-02-23 | End: 2022-03-18 | Stop reason: HOSPADM

## 2022-02-22 RX ADMIN — OXYCODONE 5 MG: 5 TABLET ORAL at 00:20

## 2022-02-22 RX ADMIN — OXYCODONE HYDROCHLORIDE AND ACETAMINOPHEN 500 MG: 500 TABLET ORAL at 09:14

## 2022-02-22 RX ADMIN — OXYCODONE 5 MG: 5 TABLET ORAL at 13:22

## 2022-02-22 RX ADMIN — DIPHENHYDRAMINE HYDROCHLORIDE, ZINC ACETATE: 2; .1 CREAM TOPICAL at 20:14

## 2022-02-22 RX ADMIN — Medication 4 G: at 20:14

## 2022-02-22 RX ADMIN — CALCIUM CARBONATE 500 MG: 500 TABLET, CHEWABLE ORAL at 09:11

## 2022-02-22 RX ADMIN — MULTIPLE VITAMINS W/ MINERALS TAB 1 TABLET: TAB at 09:12

## 2022-02-22 RX ADMIN — FOLIC ACID TAB 400 MCG 0.8 MG: 400 TAB at 09:11

## 2022-02-22 RX ADMIN — Medication 10 MG: at 20:05

## 2022-02-22 RX ADMIN — FERROUS SULFATE TAB EC 324 MG (65 MG FE EQUIVALENT) 324 MG: 324 (65 FE) TABLET DELAYED RESPONSE at 09:11

## 2022-02-22 RX ADMIN — SENNOSIDES AND DOCUSATE SODIUM 1 TABLET: 8.6; 5 TABLET ORAL at 20:05

## 2022-02-22 RX ADMIN — POLYETHYLENE GLYCOL 3350 17 G: 17 POWDER, FOR SOLUTION ORAL at 09:12

## 2022-02-22 RX ADMIN — POTASSIUM CHLORIDE 10 MEQ: 10 CAPSULE, COATED, EXTENDED RELEASE ORAL at 09:11

## 2022-02-22 RX ADMIN — Medication 1 CAPSULE: at 09:11

## 2022-02-22 RX ADMIN — ACETAMINOPHEN 650 MG: 325 TABLET, FILM COATED ORAL at 00:20

## 2022-02-22 RX ADMIN — ENOXAPARIN SODIUM 30 MG: 100 INJECTION SUBCUTANEOUS at 09:09

## 2022-02-22 RX ADMIN — ENOXAPARIN SODIUM 30 MG: 100 INJECTION SUBCUTANEOUS at 20:05

## 2022-02-22 RX ADMIN — ACETAMINOPHEN 650 MG: 325 TABLET, FILM COATED ORAL at 20:05

## 2022-02-22 RX ADMIN — OXYCODONE 5 MG: 5 TABLET ORAL at 20:05

## 2022-02-22 RX ADMIN — ACETAMINOPHEN 650 MG: 325 TABLET, FILM COATED ORAL at 09:12

## 2022-02-22 RX ADMIN — SENNOSIDES AND DOCUSATE SODIUM 1 TABLET: 8.6; 5 TABLET ORAL at 09:12

## 2022-02-22 RX ADMIN — FAMOTIDINE 20 MG: 20 TABLET, FILM COATED ORAL at 20:05

## 2022-02-22 RX ADMIN — ACETAMINOPHEN 650 MG: 325 TABLET, FILM COATED ORAL at 14:24

## 2022-02-22 RX ADMIN — Medication 400 MG: at 09:12

## 2022-02-22 RX ADMIN — FAMOTIDINE 20 MG: 20 TABLET, FILM COATED ORAL at 09:14

## 2022-02-22 ASSESSMENT — PAIN SCALES - GENERAL
PAINLEVEL_OUTOF10: 3
PAINLEVEL_OUTOF10: 7
PAINLEVEL_OUTOF10: 6
PAINLEVEL_OUTOF10: 3
PAINLEVEL_OUTOF10: 7
PAINLEVEL_OUTOF10: 9
PAINLEVEL_OUTOF10: 4
PAINLEVEL_OUTOF10: 7

## 2022-02-22 ASSESSMENT — PAIN DESCRIPTION - PAIN TYPE: TYPE: ACUTE PAIN

## 2022-02-22 ASSESSMENT — PAIN DESCRIPTION - LOCATION: LOCATION: LEG

## 2022-02-22 ASSESSMENT — PAIN DESCRIPTION - ORIENTATION: ORIENTATION: LEFT

## 2022-02-22 ASSESSMENT — PAIN DESCRIPTION - DESCRIPTORS: DESCRIPTORS: CONSTANT

## 2022-02-22 NOTE — FLOWSHEET NOTE
02/22/22 0821   Assessment   Charting Type Shift assessment   Neurological   Neuro (WDL) WDL   [place] Coma Scale   Eye Opening 4   Best Verbal Response 5   Best Motor Response 6   Denzel Coma Scale Score 15   HEENT   HEENT (WDL) X   Right Eye Impaired vision   Left Eye Impaired vision   Respiratory   Respiratory (WDL) WDL   Respiratory Pattern Regular   Respiratory Depth Normal   Respiratory Quality/Effort Unlabored   Chest Assessment Chest expansion symmetrical;Trachea midline   L Breath Sounds Clear   R Breath Sounds Clear   Cardiac   Cardiac (WDL) WDL   Cardiac Monitor   Telemetry Monitor On No   Gastrointestinal   Abdominal (WDL) X  (hx colitis)   Abdomen Inspection Flat;Soft   Tenderness Nontender   RUQ Bowel Sounds Active   LUQ Bowel Sounds Active   RLQ Bowel Sounds Active   LLQ Bowel Sounds Active   Peripheral Vascular   Peripheral Vascular (WDL) WDL   Skin Color/Condition   Skin Color/Condition (WDL) X   Skin Integrity   Skin Integrity (WDL) X   Musculoskeletal   Musculoskeletal (WDL) X   LL Extremity Limited movement;Surgery; Unsteady  (NWB)   Genitourinary   Genitourinary (WDL) WDL   Psychosocial   Psychosocial (WDL) WDL

## 2022-02-22 NOTE — PROGRESS NOTES
Physical Therapy  Facility/Department: [facility] MED SURG  Daily Treatment Note  NAME: Ab Mehta  : 1945  MRN: [id number]    Date of Service: 2022    Discharge Recommendations:  Continue to assess pending progress      Assessment   Assessment: Patient reports increased L LE pain today. Checked with RN and found patient's pain meds are prn. Advised patient she will need to ask for them. She was not aware that she had to ask for them. RN came in and administered pain meds. Patient completed bed mobility tasks slowly with Mod I.  Partial cotreatment with OT. Patient stood with CGA and ambulated ~20 feet with RW and CGA of 2. She was able to maintain L LE NWB but was very fatigued after 20 feet. Patient sat down in a chair for a short rest and then stood again to switch to a recliner. Several pillows were utilized in the recliner for patient's comfort. Ice pack provided per patient request for L hip. REQUIRES PT FOLLOW UP: Yes  Activity Tolerance  Activity Tolerance: Patient Tolerated treatment well;Patient limited by pain; Patient limited by fatigue     Patient Diagnosis(es): There were no encounter diagnoses. has a past medical history of Colitis, GERD (gastroesophageal reflux disease), Glaucoma, and PAD (peripheral artery disease) ([facility] [address] has a past surgical history that includes Tubal ligation; eye surgery; cyst removal; skin biopsy; and Total hip arthroplasty (Left, 2019). Restrictions  Restrictions/Precautions  Restrictions/Precautions: General Precautions,Fall Risk,Weight Bearing  Required Braces or Orthoses?: Yes  Lower Extremity Weight Bearing Restrictions  Left Lower Extremity Weight Bearing: Non Weight Bearing  Required Braces or Orthoses  Left Lower Extremity Brace:  (ELS brace locked in extension)  Subjective   General  Chart Reviewed: Yes  Response To Previous Treatment: Patient with no complaints from previous session.   Family / Caregiver Present: No  Subjective  Subjective: Patient reports being in more pain today. States she doesn't think she's had any pain medicine. Pain Screening  Patient Currently in Pain: Yes  Pain Assessment  Pain Assessment: 0-10  Pain Level: 7  Pain Type: Acute pain  Pain Location: Leg  Pain Orientation: Left  Pain Descriptors: Constant  Vital Signs  Patient Currently in Pain: Yes       Objective   Bed mobility  Rolling to Left: Modified independent  Supine to Sit: Modified independent  Scooting: Modified independent  Transfers  Sit to Stand: Contact guard assistance  Stand to sit: Contact guard assistance  Ambulation  Ambulation?: Yes  Ambulation 1  Surface: level tile  Device: Rolling Walker  Assistance: Contact guard assistance;2 Person assistance  Distance: 25 feet     Balance  Posture: Good  Sitting - Static: Good  Sitting - Dynamic: Good  Standing - Static: Good  Standing - Dynamic: Good; - (with A.D.)      Goals  Long term goals  Time Frame for Long term goals : 10 days  Long term goal 1: Patient will perform all bed mobility independently. Long term goal 2: Patient will perform sit to stand and transfers with modified independence. Long term goal 3: Patient will ambulate 50'x2, NWB L LE, with RW and SBA. Long term goal 4: Patient will demonstrate independence with HEP. Patient Goals   Patient goals : Return home. Plan    Plan  Times per week: 3-5x/week  Times per day: Daily  Current Treatment Recommendations: Anai Hover Training,Patient/Caregiver Education & Training,Balance Training,Gait Training,Home Exercise Program,Functional Mobility Training,Safety Education & Training  Safety Devices  Type of devices: Left in chair,Call light within reach     Therapy Time   Individual Concurrent Group Co-treatment   Time In 4773         Time Out 9232         Minutes 44              This note serves as D/C summary if patient is discharged prior to next visit.   Indira Rader, PTA

## 2022-02-22 NOTE — PLAN OF CARE
0  Problem: Pain:  Goal: Patient's pain/discomfort is manageable  Description: Patient's pain/discomfort is manageable  Outcome: Met This Shift     Problem: Falls - Risk of:  Goal: Will remain free from falls  Description: Will remain free from falls  Outcome: Ongoing  Goal: Absence of physical injury  Description: Absence of physical injury  Outcome: Ongoing     Problem: Safety:  Goal: Free from accidental physical injury  Description: Free from accidental physical injury  2/22/2022 0416 by Jorge Thompson RN  Outcome: Ongoing     Problem: Daily Care:  Goal: Daily care needs are met  Description: Daily care needs are met  2/22/2022 0416 by Jorge Thompson RN  Outcome: Ongoing     Problem: Pain:  Goal: Control of acute pain  Description: Control of acute pain  Outcome: Ongoing

## 2022-02-23 PROCEDURE — 99308 SBSQ NF CARE LOW MDM 20: CPT | Performed by: INTERNAL MEDICINE

## 2022-02-23 PROCEDURE — 97530 THERAPEUTIC ACTIVITIES: CPT

## 2022-02-23 PROCEDURE — 97116 GAIT TRAINING THERAPY: CPT

## 2022-02-23 PROCEDURE — 6370000000 HC RX 637 (ALT 250 FOR IP): Performed by: PHYSICIAN ASSISTANT

## 2022-02-23 PROCEDURE — 1200000002 HC SEMI PRIVATE SWING BED

## 2022-02-23 PROCEDURE — 97110 THERAPEUTIC EXERCISES: CPT

## 2022-02-23 PROCEDURE — 6360000002 HC RX W HCPCS: Performed by: PHYSICIAN ASSISTANT

## 2022-02-23 RX ADMIN — ACETAMINOPHEN 650 MG: 325 TABLET, FILM COATED ORAL at 13:59

## 2022-02-23 RX ADMIN — Medication 10 MG: at 21:12

## 2022-02-23 RX ADMIN — OXYCODONE 5 MG: 5 TABLET ORAL at 02:45

## 2022-02-23 RX ADMIN — OXYCODONE 5 MG: 5 TABLET ORAL at 08:28

## 2022-02-23 RX ADMIN — SENNOSIDES AND DOCUSATE SODIUM 1 TABLET: 8.6; 5 TABLET ORAL at 08:31

## 2022-02-23 RX ADMIN — POLYETHYLENE GLYCOL 3350 17 G: 17 POWDER, FOR SOLUTION ORAL at 08:29

## 2022-02-23 RX ADMIN — SENNOSIDES AND DOCUSATE SODIUM 1 TABLET: 8.6; 5 TABLET ORAL at 21:18

## 2022-02-23 RX ADMIN — ENOXAPARIN SODIUM 30 MG: 100 INJECTION SUBCUTANEOUS at 08:29

## 2022-02-23 RX ADMIN — Medication 400 MG: at 08:31

## 2022-02-23 RX ADMIN — OXYCODONE HYDROCHLORIDE AND ACETAMINOPHEN 500 MG: 500 TABLET ORAL at 08:31

## 2022-02-23 RX ADMIN — CALCIUM CARBONATE 500 MG: 500 TABLET, CHEWABLE ORAL at 08:30

## 2022-02-23 RX ADMIN — Medication 4 G: at 14:03

## 2022-02-23 RX ADMIN — MULTIPLE VITAMINS W/ MINERALS TAB 1 TABLET: TAB at 08:31

## 2022-02-23 RX ADMIN — POTASSIUM CHLORIDE 10 MEQ: 10 CAPSULE, COATED, EXTENDED RELEASE ORAL at 08:30

## 2022-02-23 RX ADMIN — FAMOTIDINE 20 MG: 20 TABLET, FILM COATED ORAL at 21:15

## 2022-02-23 RX ADMIN — BUDESONIDE 9 MG: 3 CAPSULE, COATED PELLETS ORAL at 08:32

## 2022-02-23 RX ADMIN — FAMOTIDINE 20 MG: 20 TABLET, FILM COATED ORAL at 08:31

## 2022-02-23 RX ADMIN — OXYCODONE 5 MG: 5 TABLET ORAL at 14:28

## 2022-02-23 RX ADMIN — Medication 1 CAPSULE: at 08:30

## 2022-02-23 RX ADMIN — Medication 4 G: at 21:15

## 2022-02-23 RX ADMIN — OXYCODONE 5 MG: 5 TABLET ORAL at 21:13

## 2022-02-23 RX ADMIN — ACETAMINOPHEN 650 MG: 325 TABLET, FILM COATED ORAL at 21:13

## 2022-02-23 RX ADMIN — ENOXAPARIN SODIUM 30 MG: 100 INJECTION SUBCUTANEOUS at 21:14

## 2022-02-23 RX ADMIN — ACETAMINOPHEN 650 MG: 325 TABLET, FILM COATED ORAL at 08:30

## 2022-02-23 RX ADMIN — FOLIC ACID TAB 400 MCG 0.8 MG: 400 TAB at 08:30

## 2022-02-23 RX ADMIN — ACETAMINOPHEN 650 MG: 325 TABLET, FILM COATED ORAL at 02:45

## 2022-02-23 ASSESSMENT — PAIN DESCRIPTION - ORIENTATION
ORIENTATION: LEFT
ORIENTATION: LEFT

## 2022-02-23 ASSESSMENT — PAIN SCALES - GENERAL
PAINLEVEL_OUTOF10: 2
PAINLEVEL_OUTOF10: 8
PAINLEVEL_OUTOF10: 9
PAINLEVEL_OUTOF10: 0
PAINLEVEL_OUTOF10: 7
PAINLEVEL_OUTOF10: 9
PAINLEVEL_OUTOF10: 10
PAINLEVEL_OUTOF10: 7

## 2022-02-23 ASSESSMENT — PAIN DESCRIPTION - LOCATION
LOCATION: LEG
LOCATION: LEG

## 2022-02-23 ASSESSMENT — PAIN DESCRIPTION - PAIN TYPE: TYPE: ACUTE PAIN

## 2022-02-23 NOTE — PROGRESS NOTES
Physical Therapy  Facility/Department: [facility] MED SURG  Daily Treatment Note  NAME: Doyle Cortez  : 1945  MRN: [id number]    Date of Service: 2022    Discharge Recommendations:  Continue to assess pending progress      Assessment   Assessment: Patient states she's better today but still having L LE pain. Patient completed bed mobility tasks with Mod I.  Stood from bedside with SBA and ambulated ~12 feet with RW, LLE NWB, and CGA. Patient fatigues easily with ambulating NWB on L LE. After a brief rest in a chair patient ambulated another 12 feet and transferred to the recliner. Positioned patient with several pillows for comfort. REQUIRES PT FOLLOW UP: Yes  Activity Tolerance  Activity Tolerance: Patient Tolerated treatment well     Patient Diagnosis(es): There were no encounter diagnoses. has a past medical history of Colitis, GERD (gastroesophageal reflux disease), Glaucoma, and PAD (peripheral artery disease) ([facility] [address] has a past surgical history that includes Tubal ligation; eye surgery; cyst removal; skin biopsy; and Total hip arthroplasty (Left, 2019). Restrictions  Restrictions/Precautions  Restrictions/Precautions: General Precautions,Fall Risk,Weight Bearing  Required Braces or Orthoses?: Yes  Lower Extremity Weight Bearing Restrictions  Left Lower Extremity Weight Bearing: Non Weight Bearing  Required Braces or Orthoses  Left Lower Extremity Brace:  (ELS brace locked in extension)  Subjective   General  Chart Reviewed: Yes  Response To Previous Treatment: Patient with no complaints from previous session. Subjective  Subjective: Patient states she's better today but still having pain even with medicine.   Pain Screening  Patient Currently in Pain: Yes  Pain Assessment  Pain Location: Leg  Pain Orientation: Left  Vital Signs  Patient Currently in Pain: Yes       Objective   Bed mobility  Rolling to Left: Modified independent  Scooting: Modified independent  Transfers  Sit to Stand: Stand by assistance  Stand to sit: Stand by assistance  Ambulation  Ambulation?: Yes  WB Status: NWB L LE  Ambulation 1  Surface: level tile  Device: Rolling Walker  Assistance: Contact guard assistance  Distance: 12 feet x 2     Balance  Posture: Good  Sitting - Static: Good  Sitting - Dynamic: Good  Standing - Static: Good  Standing - Dynamic: Good; - (with A.D.)      Goals  Long term goals  Time Frame for Long term goals : 10 days  Long term goal 1: Patient will perform all bed mobility independently. Long term goal 2: Patient will perform sit to stand and transfers with modified independence. Long term goal 3: Patient will ambulate 50'x2, NWB L LE, with RW and SBA. Long term goal 4: Patient will demonstrate independence with HEP. Patient Goals   Patient goals : Return home. Plan    Plan  Times per week: 3-5x/week  Times per day: Daily  Current Treatment Recommendations: Hamida Ferny Training,Patient/Caregiver Education & Training,Balance Training,Gait Training,Home Exercise Program,Functional Mobility Training,Safety Education & Training  Safety Devices  Type of devices: Left in chair,Call light within reach     Therapy Time   Individual Concurrent Group Co-treatment   Time In 18         Time Out 4579         Minutes 25              This note serves as D/C summary if patient is discharged prior to next visit.   Ousmane Cardona, PTA

## 2022-02-23 NOTE — PLAN OF CARE
Problem: Falls - Risk of:  Goal: Will remain free from falls  Description: Will remain free from falls  2/23/2022 1033 by Kirstie Jones RN  Outcome: Ongoing  2/23/2022 1032 by Kirstie Jones RN  Outcome: Ongoing

## 2022-02-23 NOTE — PROGRESS NOTES
Occupational Therapy  Facility/Department: [facility] MED SURG  Daily Treatment Note  NAME: Jordi Sultana  : 1945  MRN: [id number]    Date of Service: 2022    Discharge Recommendations:  Continue to assess pending progress       Assessment   Assessment: Pt reports pain in LLE and pain in BUE shoulders. Pt had transferred to bed with assist from nursing staff. Pt assisted with repositioning for comfort. Pt did agree to participate in BUE AROM  and isometric exercises. Pt tolerated with rest breaks needed secondary pain. Pt will benefit from ongoing OT services to improve independence with ADL's and mobility. Patient Diagnosis(es): There were no encounter diagnoses. has a past medical history of Colitis, GERD (gastroesophageal reflux disease), Glaucoma, and PAD (peripheral artery disease) ([facility] [address] has a past surgical history that includes Tubal ligation; eye surgery; cyst removal; skin biopsy; and Total hip arthroplasty (Left, 2019). Restrictions  Restrictions/Precautions  Restrictions/Precautions: General Precautions,Fall Risk,Weight Bearing  Required Braces or Orthoses?: Yes  Lower Extremity Weight Bearing Restrictions  Left Lower Extremity Weight Bearing: Non Weight Bearing  Required Braces or Orthoses  Left Lower Extremity Brace:  (ELS brace locked in extension)  Subjective   General  Chart Reviewed: Yes  Patient assessed for rehabilitation services?: Yes  Family / Caregiver Present: No  Referring Practitioner: Justine Jaime PA-C  Diagnosis: Functional decline, Left tibial plateau fx, neck of left fibula  Subjective  Subjective: Pt states she lives alone, LLE pain 2/10. Pt agreeable to OT services.       Orientation     Objective                                                                Type of ROM/Therapeutic Exercise  Type of ROM/Therapeutic Exercise: AROM  Exercises  Scapular Protraction: x10  Scapular Retraction: x10  Shoulder Elevation: x10  Shoulder Flexion: x10  Shoulder Extension: x10  Shoulder ABduction: x10  Shoulder ADduction: x10  Horizontal ABduction: x10  Horizontal ADduction: x10  Other: Isometric shoulder ADD x10                    Plan   Plan  Times per week: 3-5  Times per day: Daily  Plan weeks: 1-2  Current Treatment Recommendations: Strengthening,Endurance Training,Balance Training,Self-Care / ADL,Safety Education & Training,Patient/Caregiver Education & Training,Functional Mobility Training    Goals  Short term goals  Time Frame for Short term goals: 2 weeks  Short term goal 1: Pt to complete dressing with MOD I. Short term goal 2: Pt to complete bathing with NINO. Short term goal 3: Pt to complete toileting with MOD I. Short term goal 4: Pt to complete safe ADL transfers maintaining WB status. Short term goal 5: Pt to tolerate x15 minutes of activity to increase functional activity tolerance. Therapy Time   Individual Concurrent Group Co-treatment   Time In 0208         Time Out 0136         Minutes 29              This note serves as a DC summary in the event of pt discharge.      Carolyn Robbins OTR/L

## 2022-02-23 NOTE — PLAN OF CARE
Problem: Falls - Risk of:  Goal: Will remain free from falls  Description: Will remain free from falls  2/22/2022 2023 by René Wing RN  Outcome: Ongoing     Problem: Skin Integrity:  Goal: Will show no infection signs and symptoms  Description: Will show no infection signs and symptoms  2/22/2022 2023 by René Wing RN  Outcome: Ongoing     Problem: Infection:  Goal: Will remain free from infection  Description: Will remain free from infection  2/22/2022 2023 by René Wing RN  Outcome: Ongoing     Problem: Safety:  Goal: Free from accidental physical injury  Description: Free from accidental physical injury  Outcome: Ongoing     Problem: Discharge Planning:  Goal: Patients continuum of care needs are met  Description: Patients continuum of care needs are met  Outcome: Ongoing

## 2022-02-23 NOTE — PROGRESS NOTES
Occupational Therapy  Facility/Department: [address] [place] [address] MED SURG  Daily Treatment Note  NAME: Isabell Bowman  : 1945  MRN: [id number]    Date of Service: 2022    Discharge Recommendations:  Continue to assess pending progress       Assessment   Assessment: CO tx with PTA. Pt reports increased pain. Pt with decreased activity tolerance secondary to pain. Pt ambulated ~ 20 feet with CGA x2 and required seated rest break secondary to pain. Pt unable to tolerate further activity at this time. Pt come to stand with CGA x2 and transferred to recliner. Pt positioned in recliner with pillows for comfort. Pt left with call light in reach. Patient Diagnosis(es): There were no encounter diagnoses. has a past medical history of Colitis, GERD (gastroesophageal reflux disease), Glaucoma, and PAD (peripheral artery disease) ([facility] [address] has a past surgical history that includes Tubal ligation; eye surgery; cyst removal; skin biopsy; and Total hip arthroplasty (Left, 2019). Restrictions  Restrictions/Precautions  Restrictions/Precautions: General Precautions,Fall Risk,Weight Bearing  Required Braces or Orthoses?: Yes  Lower Extremity Weight Bearing Restrictions  Left Lower Extremity Weight Bearing: Non Weight Bearing  Required Braces or Orthoses  Left Lower Extremity Brace:  (ELS brace locked in extension)  Subjective   General  Chart Reviewed: Yes  Patient assessed for rehabilitation services?: Yes  Family / Caregiver Present: No  Referring Practitioner: Ilda Castañeda PA-C  Diagnosis: Functional decline, Left tibial plateau fx, neck of left fibula  Subjective  Subjective: Pt states she lives alone, LLE pain 2/10. Pt agreeable to OT services.       Orientation     Objective        Plan   Plan  Times per week: 3-5  Times per day: Daily  Plan weeks: 1-2  Current Treatment Recommendations: Moses Sparks Training,Self-Care / ADL,Safety Education & Training,Patient/Caregiver Education & Training,Functional Mobility Training    Goals  Short term goals  Time Frame for Short term goals: 2 weeks  Short term goal 1: Pt to complete dressing with MOD I. Short term goal 2: Pt to complete bathing with NINO. Short term goal 3: Pt to complete toileting with MOD I. Short term goal 4: Pt to complete safe ADL transfers maintaining WB status. Short term goal 5: Pt to tolerate x15 minutes of activity to increase functional activity tolerance. Therapy Time   Individual Concurrent Group Co-treatment   Time In 6751         Time Out 0156         Minutes 21              This note serves as a DC summary in the event of pt discharge.      Carolyn Robbins, OTR/L

## 2022-02-23 NOTE — FLOWSHEET NOTE
02/23/22 0900   Assessment   Charting Type Shift assessment   Neurological   Neuro (WDL) WDL   Swallow Screening   Is the patient able to remain alert for testing? Yes   Was the Patient Eating a Modified Diet Prior to being Admitted? No   Denzel Coma Scale   Eye Opening 4   Best Verbal Response 5   Best Motor Response 6   Denzel Coma Scale Score 15   HEENT   HEENT (WDL) X   Right Eye Impaired vision   Left Eye Impaired vision   Respiratory   Respiratory (WDL) WDL   Respiratory Pattern Regular   Respiratory Depth Normal   Respiratory Quality/Effort Unlabored   Chest Assessment Chest expansion symmetrical;Trachea midline   L Breath Sounds Clear   R Breath Sounds Clear   Cardiac   Cardiac (WDL) WDL   Cardiac Monitor   Telemetry Monitor On No   Gastrointestinal   Abdominal (WDL) X  (hx colitis)   GI Symptoms Constipation   Abdomen Inspection Flat;Soft   Tenderness Nontender   RUQ Bowel Sounds Active   LUQ Bowel Sounds Active   RLQ Bowel Sounds Active   LLQ Bowel Sounds Active   Peripheral Vascular   Peripheral Vascular (WDL) WDL   Edema None   Skin Color/Condition   Skin Color/Condition (WDL) X   Skin Integrity   Skin Integrity (WDL) X   Musculoskeletal   Musculoskeletal (WDL) X   LL Extremity Limited movement;Surgery; Unsteady  (NWB)   Genitourinary   Genitourinary (WDL) WDL   Psychosocial   Psychosocial (WDL) WDL   Pt alert times 4-Pt able to take medications well per [facility]- pt given pain medication for pain- LLE remains in brace- Ace wrap intact- Call bell within reach will monitor

## 2022-02-23 NOTE — FLOWSHEET NOTE
02/23/22 0819   Vital Signs   Temp 99.1 °F (37.3 °C)   Pulse 84   Resp 18   BP (!) 99/55   MAP (mmHg) 69   Oxygen Therapy   SpO2 99 %   O2 Device None (Room air)

## 2022-02-23 NOTE — FLOWSHEET NOTE
02/22/22 2020   Assessment   Charting Type Shift assessment   Neurological   Neuro (WDL) WDL   [place] Coma Scale   Eye Opening 4   Best Verbal Response 5   Best Motor Response 6   Denzel Coma Scale Score 15   HEENT   HEENT (WDL) X   Right Eye Impaired vision   Left Eye Impaired vision   Respiratory   Respiratory (WDL) WDL   Respiratory Pattern Regular   Respiratory Depth Normal   Respiratory Quality/Effort Unlabored   Chest Assessment Chest expansion symmetrical;Trachea midline   L Breath Sounds Clear   R Breath Sounds Clear   Cardiac   Cardiac (WDL) WDL   Cardiac Monitor   Telemetry Monitor On No   Gastrointestinal   Abdominal (WDL) X  (hx colitis)   GI Symptoms Constipation   Abdomen Inspection Flat;Soft   Last BM (including prior to admit) 02/22/22   Tenderness Nontender   RUQ Bowel Sounds Active   LUQ Bowel Sounds Active   RLQ Bowel Sounds Active   LLQ Bowel Sounds Active   Peripheral Vascular   Peripheral Vascular (WDL) WDL   Edema None   Skin Color/Condition   Skin Color/Condition (WDL) X   Skin Color Pale   Skin Condition/Temp Warm;Dry   Skin Integrity   Skin Integrity (WDL) X   Skin Integrity Other (Comment)  (sx site)   Location LLE   Preventative Dressing Yes   Musculoskeletal   Musculoskeletal (WDL) X   LL Extremity Limited movement;Surgery; Unsteady  (NWB)   Genitourinary   Genitourinary (WDL) WDL   Psychosocial   Psychosocial (WDL) WDL

## 2022-02-23 NOTE — PROGRESS NOTES
Progress Note      Subjective:   Chief complaint: declining functional status    Interval History:   Sitting up in bed today. Talking on the phone to her brother. No acute distress. Complains of pain in her left leg and tingling in distal LLE. Declines gabapentin or lyrica at this time. Reports mild swelling in toes. Having regular BMs. Appetite stable. Review of systems:   Constitutional:  Denies fever or chills   Eyes:  Denies eye pain or redness  HENT:  Denies nasal congestion or sore throat   Respiratory:  Denies cough or shortness of breath   Cardiovascular:  Denies chest pain or edema   GI:  Denies abdominal pain, nausea, vomiting, bloody stools or diarrhea   :  Denies dysuria or frequency  Musculoskeletal:  Denies acute neck pain or body aches. Positive for left lower extremity pain. Integument:  Denies rash or itching  Neurologic:  Denies headache, dizziness, numbness, tingling or unilateral weakness  Psychiatric:  Denies acute depression or acute anxiety    Past medical history, surgical history, family history and social history reviewed and unchanged compared to H&P earlier this admission.     Medications:   Scheduled Meds:   budesonide  9 mg Oral Daily    brinzolamide-brimonidine  1 drop Ophthalmic BID    ferrous sulfate  324 mg Oral Every Other Day    famotidine  20 mg Oral BID    NONFORMULARY 1 capsule  1 capsule Oral BID    bimatoprost  1 drop Both Eyes Nightly    calcium carbonate  500 mg Oral Daily    acetaminophen  650 mg Oral Q6H    lactobacillus  1 capsule Oral Daily    enoxaparin  30 mg SubCUTAneous BID    folic acid  0.8 mg Oral Daily    therapeutic multivitamin-minerals  1 tablet Oral Daily    magnesium oxide  400 mg Oral Daily    polyethylene glycol  17 g Oral Daily    potassium chloride  10 mEq Oral Daily    sennosides-docusate sodium  1 tablet Oral BID    vitamin C  500 mg Oral Daily     Continuous Infusions:    Objective:     Vital Signs  Temp: 97.8 °F (36.6 °C)  Pulse: 76  Resp: 18  BP: (!) 100/42 (manual)  SpO2: 95 %  O2 Device: None (Room air)       Vital signs reviewed in electronic charts. Physical exam  Constitutional:  Well developed, thin, elderly female sitting upright in bed in no acute distress  Eyes:  no scleral icterus, conjunctiva normal   HENT:  Atraumatic, external ears normal, nose normal, oropharynx moist, no pharyngeal exudates. Neck- supple, no JVD, no lymphadenopathy  Respiratory:  No respiratory distress, no wheezing, rales or rhonchi detected  Cardiovascular:  Normal rate, normal rhythm, no murmurs, no gallops, no rubs, no edema   GI:  Soft, nondistended, normal bowel sounds, nontender, no voluntary guarding  Musculoskeletal:  No cyanosis. LLE in Ace and ELS brace. Mild swelling in left toes. Integument:  Warm and dry. Neurologic:  Alert & oriented x 3, no apparent focal deficits noted   Psychiatric:  Speech and behavior appropriate     Results:     Lab Results   Component Value Date    WBC 6.6 02/21/2022    HGB 9.7 (L) 02/21/2022    HCT 31.4 (L) 02/21/2022    MCV 96.3 02/21/2022     02/21/2022       Lab Results   Component Value Date     02/21/2022    K 4.2 02/21/2022     02/21/2022    CO2 24 02/21/2022    BUN 18 02/21/2022    CREATININE 0.6 02/21/2022    GLUCOSE 94 02/21/2022    CALCIUM 9.0 02/21/2022        Assessment and Plan:      Closed fracture of left tibial plateau [J11.906F]  -Fragility fracture s/p fall in setting of osteoporosis  -s/p ORIF on 2/15/22 with [facility]   -vitamin D level was 51.7  -For pain control, continue scheduled APAP and prn oxycodone  -bowel regimen: senna and Miralax  -DVT prophylaxis: enoxaparin 30 mg subcutaneous BID through 3/6/21, then aspirin 81 mg BID through 3/20/22  -NWB to the E, with ELS brace locked in extension  -[place] bone mineral metabolism team was consulted while at [facility].  OP appointment requested prior to discharge  -PT/OT consulted   - patient declines gabapentin for neuropathic pain 2/23   - mild swelling in left toes. Continue to monitor closely. 02/21/2022    Closed fracture of neck of left fibula [S82.832A]  -see above    02/21/2022    Anemia [D64.9]  -per General acute hospital summary, hgb 13 on arrival/prior to surgery. Downtrended to 10 post op. Iron level 23, transferrin sat 9. Started on ferrous sulfate every other day. - Hgb 9.7 on 2/23   - repeat labs in AM     02/21/2022    Recurrent falls [R29.6]  -Patient lives alone and reports being independent with ADLs prior to fall. Does not use assist devices at home however has a history of falls with known osteoporosis with multiple fractures in the last three months.   -Continue with PT/OT  -fall precautions    02/21/2022    Underweight [R63.6]  -dietitian consulted  -continue Boost TID and MVI w/minerals     02/21/2022    Microscopic colitis [K52.839]  -continue budesonide    02/21/2022    Glaucoma [H40.9]  -Continue lumigan, brinzolamide/brimonidine, & supplements per patient request.     02/21/2022    Leg cramps [R25.2]  -continue home potassium supplement    02/21/2022    Declining functional status [R53.81]  -secondary to recent fall/fracture and NWB status  -plan as outlined above          Patient was seen and examined with Dr. Gris Bryson. After reviewing patient data and diagnostic testing the plan of care was established in conjunction with Dr. Gris Bryson.     Electronically signed by KALIE Davis on 2/23/2022 at 3:05 PM

## 2022-02-24 LAB
A/G RATIO: 1.4 (ref 0.8–2)
ALBUMIN SERPL-MCNC: 3 G/DL (ref 3.4–4.8)
ALP BLD-CCNC: 74 U/L (ref 25–100)
ALT SERPL-CCNC: 44 U/L (ref 4–36)
ANION GAP SERPL CALCULATED.3IONS-SCNC: 8 MMOL/L (ref 3–16)
AST SERPL-CCNC: 31 U/L (ref 8–33)
BILIRUB SERPL-MCNC: 0.4 MG/DL (ref 0.3–1.2)
BUN BLDV-MCNC: 22 MG/DL (ref 6–20)
CALCIUM SERPL-MCNC: 8.9 MG/DL (ref 8.5–10.5)
CHLORIDE BLD-SCNC: 105 MMOL/L (ref 98–107)
CO2: 24 MMOL/L (ref 20–30)
CREAT SERPL-MCNC: 0.6 MG/DL (ref 0.4–1.2)
FOLATE: 17.76 NG/ML
GFR AFRICAN AMERICAN: >59
GFR NON-AFRICAN AMERICAN: >60
GLOBULIN: 2.2 G/DL
GLUCOSE BLD-MCNC: 101 MG/DL (ref 74–106)
HCT VFR BLD CALC: 30.5 % (ref 37–47)
HEMOGLOBIN: 9.3 G/DL (ref 11.5–16.5)
MCH RBC QN AUTO: 29.6 PG (ref 27–32)
MCHC RBC AUTO-ENTMCNC: 30.5 G/DL (ref 31–35)
MCV RBC AUTO: 97.1 FL (ref 80–100)
PDW BLD-RTO: 14.6 % (ref 11–16)
PLATELET # BLD: 311 K/UL (ref 150–400)
PMV BLD AUTO: 8.8 FL (ref 6–10)
POTASSIUM SERPL-SCNC: 4.2 MMOL/L (ref 3.4–5.1)
RBC # BLD: 3.14 M/UL (ref 3.8–5.8)
SODIUM BLD-SCNC: 137 MMOL/L (ref 136–145)
TOTAL PROTEIN: 5.2 G/DL (ref 6.4–8.3)
VITAMIN B-12: 577 PG/ML (ref 211–911)
WBC # BLD: 6.7 K/UL (ref 4–11)

## 2022-02-24 PROCEDURE — 36415 COLL VENOUS BLD VENIPUNCTURE: CPT

## 2022-02-24 PROCEDURE — 80053 COMPREHEN METABOLIC PANEL: CPT

## 2022-02-24 PROCEDURE — 82607 VITAMIN B-12: CPT

## 2022-02-24 PROCEDURE — 1200000002 HC SEMI PRIVATE SWING BED

## 2022-02-24 PROCEDURE — 6360000002 HC RX W HCPCS: Performed by: PHYSICIAN ASSISTANT

## 2022-02-24 PROCEDURE — 97116 GAIT TRAINING THERAPY: CPT

## 2022-02-24 PROCEDURE — 6370000000 HC RX 637 (ALT 250 FOR IP): Performed by: PHYSICIAN ASSISTANT

## 2022-02-24 PROCEDURE — 97110 THERAPEUTIC EXERCISES: CPT

## 2022-02-24 PROCEDURE — 82746 ASSAY OF FOLIC ACID SERUM: CPT

## 2022-02-24 PROCEDURE — 97530 THERAPEUTIC ACTIVITIES: CPT

## 2022-02-24 PROCEDURE — 85027 COMPLETE CBC AUTOMATED: CPT

## 2022-02-24 RX ORDER — GABAPENTIN 100 MG/1
100 CAPSULE ORAL NIGHTLY
Status: DISCONTINUED | OUTPATIENT
Start: 2022-02-24 | End: 2022-03-18 | Stop reason: HOSPADM

## 2022-02-24 RX ADMIN — FOLIC ACID TAB 400 MCG 0.8 MG: 400 TAB at 08:58

## 2022-02-24 RX ADMIN — POTASSIUM CHLORIDE 10 MEQ: 10 CAPSULE, COATED, EXTENDED RELEASE ORAL at 08:57

## 2022-02-24 RX ADMIN — ENOXAPARIN SODIUM 30 MG: 100 INJECTION SUBCUTANEOUS at 20:22

## 2022-02-24 RX ADMIN — ACETAMINOPHEN 650 MG: 325 TABLET, FILM COATED ORAL at 13:58

## 2022-02-24 RX ADMIN — OXYCODONE 5 MG: 5 TABLET ORAL at 20:21

## 2022-02-24 RX ADMIN — ENOXAPARIN SODIUM 30 MG: 100 INJECTION SUBCUTANEOUS at 08:59

## 2022-02-24 RX ADMIN — ACETAMINOPHEN 650 MG: 325 TABLET, FILM COATED ORAL at 08:58

## 2022-02-24 RX ADMIN — Medication 10 MG: at 20:21

## 2022-02-24 RX ADMIN — GABAPENTIN 100 MG: 100 CAPSULE ORAL at 20:21

## 2022-02-24 RX ADMIN — BUDESONIDE 9 MG: 3 CAPSULE, COATED PELLETS ORAL at 08:57

## 2022-02-24 RX ADMIN — ACETAMINOPHEN 650 MG: 325 TABLET, FILM COATED ORAL at 20:20

## 2022-02-24 RX ADMIN — ACETAMINOPHEN 650 MG: 325 TABLET, FILM COATED ORAL at 01:56

## 2022-02-24 RX ADMIN — OXYCODONE 5 MG: 5 TABLET ORAL at 08:59

## 2022-02-24 RX ADMIN — Medication 1 CAPSULE: at 08:58

## 2022-02-24 RX ADMIN — SENNOSIDES AND DOCUSATE SODIUM 1 TABLET: 8.6; 5 TABLET ORAL at 08:59

## 2022-02-24 RX ADMIN — POLYETHYLENE GLYCOL 3350 17 G: 17 POWDER, FOR SOLUTION ORAL at 08:58

## 2022-02-24 RX ADMIN — OXYCODONE 5 MG: 5 TABLET ORAL at 03:04

## 2022-02-24 RX ADMIN — OXYCODONE 5 MG: 5 TABLET ORAL at 13:58

## 2022-02-24 RX ADMIN — FAMOTIDINE 20 MG: 20 TABLET, FILM COATED ORAL at 08:59

## 2022-02-24 RX ADMIN — OXYCODONE HYDROCHLORIDE AND ACETAMINOPHEN 500 MG: 500 TABLET ORAL at 08:57

## 2022-02-24 RX ADMIN — FAMOTIDINE 20 MG: 20 TABLET, FILM COATED ORAL at 20:21

## 2022-02-24 RX ADMIN — Medication 400 MG: at 08:59

## 2022-02-24 RX ADMIN — MULTIPLE VITAMINS W/ MINERALS TAB 1 TABLET: TAB at 08:58

## 2022-02-24 RX ADMIN — FERROUS SULFATE TAB EC 324 MG (65 MG FE EQUIVALENT) 324 MG: 324 (65 FE) TABLET DELAYED RESPONSE at 08:58

## 2022-02-24 RX ADMIN — CALCIUM CARBONATE 500 MG: 500 TABLET, CHEWABLE ORAL at 09:01

## 2022-02-24 RX ADMIN — SENNOSIDES AND DOCUSATE SODIUM 1 TABLET: 8.6; 5 TABLET ORAL at 20:32

## 2022-02-24 ASSESSMENT — PAIN DESCRIPTION - ORIENTATION
ORIENTATION: LEFT
ORIENTATION: LEFT

## 2022-02-24 ASSESSMENT — PAIN SCALES - GENERAL
PAINLEVEL_OUTOF10: 3
PAINLEVEL_OUTOF10: 9
PAINLEVEL_OUTOF10: 8
PAINLEVEL_OUTOF10: 7
PAINLEVEL_OUTOF10: 6
PAINLEVEL_OUTOF10: 7

## 2022-02-24 ASSESSMENT — PAIN DESCRIPTION - PAIN TYPE: TYPE: ACUTE PAIN

## 2022-02-24 ASSESSMENT — PAIN DESCRIPTION - LOCATION
LOCATION: LEG
LOCATION: LEG

## 2022-02-24 ASSESSMENT — PAIN DESCRIPTION - DESCRIPTORS: DESCRIPTORS: CONSTANT

## 2022-02-24 NOTE — PLAN OF CARE
Problem: Safety:  Goal: Free from accidental physical injury  Description: Free from accidental physical injury  Outcome: Ongoing     Problem: Daily Care:  Goal: Daily care needs are met  Description: Daily care needs are met  Outcome: Ongoing     Problem: Discharge Planning:  Goal: Patients continuum of care needs are met  Description: Patients continuum of care needs are met  Outcome: Ongoing     Problem: Inadequate protein intake (NI-5.7. 1)  Goal: Food and/or Nutrient Delivery  Description: Individualized approach for food/nutrient provision.   2/23/2022 1416 by Otilia Hansen, MS, RD, LD  Outcome: Ongoing

## 2022-02-24 NOTE — FLOWSHEET NOTE
02/24/22 0806   Vital Signs   Temp 98.4 °F (36.9 °C)   Pulse 81   BP (!) 114/53   MAP (mmHg) 70   Oxygen Therapy   SpO2 97 %   O2 Device None (Room air)

## 2022-02-24 NOTE — FLOWSHEET NOTE
02/24/22 0900   Assessment   Charting Type Shift assessment   Neurological   Neuro (WDL) WDL   Swallow Screening   Is the patient able to remain alert for testing? Yes   Was the Patient Eating a Modified Diet Prior to being Admitted? No   Denzel Coma Scale   Eye Opening 4   Best Verbal Response 5   Best Motor Response 6   Denzel Coma Scale Score 15   NIHSS Stroke Scale   NIHSS Stroke Scale Assessed No   HEENT   HEENT (WDL) X   Right Eye Impaired vision   Left Eye Impaired vision   Respiratory   Respiratory (WDL) WDL   Respiratory Pattern Regular   Respiratory Depth Normal   Respiratory Quality/Effort Unlabored   Chest Assessment Chest expansion symmetrical;Trachea midline   L Breath Sounds Clear   R Breath Sounds Clear   Cardiac   Cardiac (WDL) WDL   Cardiac Monitor   Telemetry Monitor On No   Gastrointestinal   Abdominal (WDL) X  (hx colitis)   Abdomen Inspection Flat;Soft   Tenderness Nontender   RUQ Bowel Sounds Active   LUQ Bowel Sounds Active   RLQ Bowel Sounds Active   LLQ Bowel Sounds Active   Peripheral Vascular   Peripheral Vascular (WDL) WDL   Edema None   LLE Neurovascular Assessment   L Pedal Pulse +1   Skin Color/Condition   Skin Color/Condition (WDL) X   Skin Color Pale   Skin Condition/Temp Dry; Warm   Skin Integrity   Skin Integrity (WDL) X   Skin Integrity Other (Comment)  (sx site ace wrap and brace intact)   Location LLE   Preventative Dressing Yes   Multiple Skin Integrity Sites No   Dressing Site   (LLE)   Musculoskeletal   Musculoskeletal (WDL) X   RUE Full movement   LUE Full movement   RL Extremity Full movement   LL Extremity Limited movement;Surgery; Unsteady  (NWB)   Genitourinary   Genitourinary (WDL) WDL   Anus/Rectum   Anus/Rectum (WDL) WDL   Psychosocial   Psychosocial (WDL) WDL   Pt alert times 4-Pt able to take am medications well per MAR-Breathing equal and unlabored- Brace and ace wrap-intact- pedal pulse positive- weak- foot warm to touch-Pain medication given for pain Call bell at side- Will monitor

## 2022-02-24 NOTE — PLAN OF CARE
Problem: Pain:  Goal: Patient's pain/discomfort is manageable  Description: Patient's pain/discomfort is manageable  Outcome: Ongoing     Problem: Infection:  Goal: Will remain free from infection  Description: Will remain free from infection  Outcome: Ongoing

## 2022-02-24 NOTE — PROGRESS NOTES
Occupational Therapy  Facility/Department: [facility] MED SURG  Daily Treatment Note  NAME: Beba Maxwell  : 1945  MRN: [id number]    Date of Service: 2022    Discharge Recommendations:  Continue to assess pending progress       Assessment   Assessment: Co tx with PTA on this date. Pt reports pain but improved from previous session on this date. pt come to sit at EOB with MOD I. Pt come to stand with SBA and ambulated with CGA in room ~25 feet with fatigue and pain reported requiring seated rest break. Pt participated in BUE arm exercises seated in chair. Pt educated on compensatory techniques to use upon DC ie using WC for mobility with elevated leg rest, wearing dresses/skirts for decreased dificulty with toileting tasks. Pt tolerated BUE ther ex well left seated upright in chair with call light in reach. Patient Diagnosis(es): There were no encounter diagnoses. has a past medical history of Colitis, GERD (gastroesophageal reflux disease), Glaucoma, and PAD (peripheral artery disease) ([facility] [address] has a past surgical history that includes Tubal ligation; eye surgery; cyst removal; skin biopsy; and Total hip arthroplasty (Left, 2019). Restrictions  Restrictions/Precautions  Restrictions/Precautions: General Precautions,Fall Risk,Weight Bearing  Required Braces or Orthoses?: Yes  Lower Extremity Weight Bearing Restrictions  Left Lower Extremity Weight Bearing: Non Weight Bearing  Required Braces or Orthoses  Left Lower Extremity Brace:  (ELS brace locked in extension)  Subjective   General  Chart Reviewed: Yes  Patient assessed for rehabilitation services?: Yes  Family / Caregiver Present: No  Referring Practitioner: Gloria Slater PA-C  Diagnosis: Functional decline, Left tibial plateau fx, neck of left fibula  Subjective  Subjective: Pt states she lives alone, LLE pain 2/10. Pt agreeable to OT services.       Orientation     Objective             Functional Mobility  Assist Level: Contact guard assistance  Functional Mobility Comments: 25  Bed mobility  Supine to Sit: Modified independent  Scooting: Modified independent  Transfers  Stand Pivot Transfers: Stand by assistance;Contact guard assistance  Sit to stand: Stand by assistance     Type of ROM/Therapeutic Exercise  Type of ROM/Therapeutic Exercise: AROM  Exercises  Scapular Protraction: x10  Scapular Retraction: x10  Shoulder Elevation: x10  Shoulder Extension: isometrics x10  Shoulder ABduction: Isometrics x10  Shoulder ADduction: x10 isometrics  Horizontal ABduction: x10  Horizontal ADduction: x10                    Plan   Plan  Times per week: 3-5  Times per day: Daily  Plan weeks: 1-2  Current Treatment Recommendations: Strengthening,Endurance Training,Balance Training,Self-Care / ADL,Safety Education & Training,Patient/Caregiver Education & Training,Functional Mobility Training    Goals  Short term goals  Time Frame for Short term goals: 2 weeks  Short term goal 1: Pt to complete dressing with MOD I. Short term goal 2: Pt to complete bathing with NINO. Short term goal 3: Pt to complete toileting with MOD I. Short term goal 4: Pt to complete safe ADL transfers maintaining WB status. Short term goal 5: Pt to tolerate x15 minutes of activity to increase functional activity tolerance. Therapy Time   Individual Concurrent Group Co-treatment   Time In 0112         Time Out 3336         Minutes 44              This note serves as a DC summary in the event of pt discharge.      Carolyn Robbins OTR/L

## 2022-02-24 NOTE — PROGRESS NOTES
Physical Therapy  Facility/Department: [facility] MED SURG  Daily Treatment Note  NAME: Doyle Cortez  : 1945  MRN: [id number]    Date of Service: 2022    Discharge Recommendations:  Continue to assess pending progress      Assessment   Assessment: Cotreated with OT. Patient transitioned supine to sit with Mod I.  Stood with SBA and ambulated ~25 feet with RW, LLE NWB, and CGA with 1 brief standing rest.  Patient transferred to the recliner and performed R LE therex in semi-reclined position. Able to wiggle toes on L foot but ankle pumps are painful. REQUIRES PT FOLLOW UP: Yes  Activity Tolerance  Activity Tolerance: Patient Tolerated treatment well     Patient Diagnosis(es): There were no encounter diagnoses. has a past medical history of Colitis, GERD (gastroesophageal reflux disease), Glaucoma, and PAD (peripheral artery disease) ([facility] [address] has a past surgical history that includes Tubal ligation; eye surgery; cyst removal; skin biopsy; and Total hip arthroplasty (Left, 2019). Restrictions  Restrictions/Precautions  Restrictions/Precautions: General Precautions,Fall Risk,Weight Bearing  Required Braces or Orthoses?: Yes  Lower Extremity Weight Bearing Restrictions  Left Lower Extremity Weight Bearing: Non Weight Bearing  Required Braces or Orthoses  Left Lower Extremity Brace:  (ELS brace locked in extension)  Subjective   General  Chart Reviewed: Yes  Family / Caregiver Present: Yes  Subjective  Subjective: Patient reports doing better, still having pain but better overall.   Pain Screening  Patient Currently in Pain: Yes  Pain Assessment  Pain Location: Leg  Pain Orientation: Left  Vital Signs  Patient Currently in Pain: Yes    Objective   Bed mobility  Supine to Sit: Modified independent  Scooting: Modified independent  Transfers  Sit to Stand: Stand by assistance  Stand to sit: Stand by assistance  Ambulation  Ambulation?: Yes  WB Status: NWB L LE  Ambulation 1  Surface: level tile  Device: Rolling Walker  Assistance: Contact guard assistance  Distance: ~25 feet with 1 brief standing rest     Balance  Posture: Good  Sitting - Static: Good  Sitting - Dynamic: Good  Standing - Static: Good  Standing - Dynamic: Good; - (with A.D.)  Exercises  Heelslides: 10 R  Hip Abduction: 10 R  Ankle Pumps: 10 R, wiggling toes L      Goals  Long term goals  Time Frame for Long term goals : 10 days  Long term goal 1: Patient will perform all bed mobility independently. Long term goal 2: Patient will perform sit to stand and transfers with modified independence. Long term goal 3: Patient will ambulate 50'x2, NWB L LE, with RW and SBA. Long term goal 4: Patient will demonstrate independence with HEP. Patient Goals   Patient goals : Return home. Plan    Plan  Times per week: 3-5x/week  Times per day: Daily  Current Treatment Recommendations: Anai Mulligan Training,Patient/Caregiver Education & Training,Balance Training,Gait Training,Home Exercise Program,Functional Mobility Training,Safety Education & Training  Safety Devices  Type of devices: Left in chair,Call light within reach     Therapy Time   Individual Concurrent Group Co-treatment   Time In 1311         Time Out 3842         Minutes 45              This note serves as D/C summary if patient is discharged prior to next visit.   Indira Rader, PTA

## 2022-02-25 ENCOUNTER — APPOINTMENT (OUTPATIENT)
Dept: ULTRASOUND IMAGING | Facility: HOSPITAL | Age: 77
DRG: 560 | End: 2022-02-25
Attending: INTERNAL MEDICINE
Payer: MEDICARE

## 2022-02-25 PROCEDURE — 97116 GAIT TRAINING THERAPY: CPT

## 2022-02-25 PROCEDURE — 97530 THERAPEUTIC ACTIVITIES: CPT

## 2022-02-25 PROCEDURE — 97535 SELF CARE MNGMENT TRAINING: CPT

## 2022-02-25 PROCEDURE — 97803 MED NUTRITION INDIV SUBSEQ: CPT

## 2022-02-25 PROCEDURE — 6370000000 HC RX 637 (ALT 250 FOR IP): Performed by: PHYSICIAN ASSISTANT

## 2022-02-25 PROCEDURE — 93971 EXTREMITY STUDY: CPT

## 2022-02-25 PROCEDURE — 6360000002 HC RX W HCPCS: Performed by: PHYSICIAN ASSISTANT

## 2022-02-25 PROCEDURE — 1200000002 HC SEMI PRIVATE SWING BED

## 2022-02-25 RX ORDER — OXYCODONE HYDROCHLORIDE AND ACETAMINOPHEN 5; 325 MG/1; MG/1
1 TABLET ORAL 2 TIMES DAILY
Status: DISCONTINUED | OUTPATIENT
Start: 2022-02-25 | End: 2022-03-18 | Stop reason: HOSPADM

## 2022-02-25 RX ORDER — ACETAMINOPHEN 325 MG/1
650 TABLET ORAL EVERY 8 HOURS PRN
Status: DISCONTINUED | OUTPATIENT
Start: 2022-02-25 | End: 2022-03-03 | Stop reason: SDUPTHER

## 2022-02-25 RX ORDER — OXYCODONE HYDROCHLORIDE AND ACETAMINOPHEN 5; 325 MG/1; MG/1
1 TABLET ORAL EVERY 4 HOURS PRN
Status: DISCONTINUED | OUTPATIENT
Start: 2022-02-25 | End: 2022-02-25

## 2022-02-25 RX ORDER — OXYCODONE HYDROCHLORIDE AND ACETAMINOPHEN 5; 325 MG/1; MG/1
1 TABLET ORAL 3 TIMES DAILY PRN
Status: DISCONTINUED | OUTPATIENT
Start: 2022-02-25 | End: 2022-03-18 | Stop reason: HOSPADM

## 2022-02-25 RX ADMIN — BUDESONIDE 9 MG: 3 CAPSULE, COATED PELLETS ORAL at 08:32

## 2022-02-25 RX ADMIN — FAMOTIDINE 20 MG: 20 TABLET, FILM COATED ORAL at 08:26

## 2022-02-25 RX ADMIN — ACETAMINOPHEN 650 MG: 325 TABLET, FILM COATED ORAL at 03:03

## 2022-02-25 RX ADMIN — FOLIC ACID TAB 400 MCG 0.8 MG: 400 TAB at 08:26

## 2022-02-25 RX ADMIN — OXYCODONE AND ACETAMINOPHEN 1 TABLET: 5; 325 TABLET ORAL at 14:43

## 2022-02-25 RX ADMIN — OXYCODONE 5 MG: 5 TABLET ORAL at 08:28

## 2022-02-25 RX ADMIN — ENOXAPARIN SODIUM 30 MG: 100 INJECTION SUBCUTANEOUS at 21:07

## 2022-02-25 RX ADMIN — CALCIUM CARBONATE 500 MG: 500 TABLET, CHEWABLE ORAL at 08:27

## 2022-02-25 RX ADMIN — Medication 400 MG: at 08:26

## 2022-02-25 RX ADMIN — FAMOTIDINE 20 MG: 20 TABLET, FILM COATED ORAL at 21:06

## 2022-02-25 RX ADMIN — Medication 10 MG: at 21:06

## 2022-02-25 RX ADMIN — OXYCODONE AND ACETAMINOPHEN 1 TABLET: 5; 325 TABLET ORAL at 22:15

## 2022-02-25 RX ADMIN — Medication 1 CAPSULE: at 08:26

## 2022-02-25 RX ADMIN — MULTIPLE VITAMINS W/ MINERALS TAB 1 TABLET: TAB at 08:25

## 2022-02-25 RX ADMIN — ACETAMINOPHEN 650 MG: 325 TABLET, FILM COATED ORAL at 08:26

## 2022-02-25 RX ADMIN — SENNOSIDES AND DOCUSATE SODIUM 1 TABLET: 8.6; 5 TABLET ORAL at 08:26

## 2022-02-25 RX ADMIN — POTASSIUM CHLORIDE 10 MEQ: 10 CAPSULE, COATED, EXTENDED RELEASE ORAL at 08:25

## 2022-02-25 RX ADMIN — SENNOSIDES AND DOCUSATE SODIUM 1 TABLET: 8.6; 5 TABLET ORAL at 21:06

## 2022-02-25 RX ADMIN — OXYCODONE 5 MG: 5 TABLET ORAL at 03:03

## 2022-02-25 RX ADMIN — ENOXAPARIN SODIUM 30 MG: 100 INJECTION SUBCUTANEOUS at 08:27

## 2022-02-25 RX ADMIN — GABAPENTIN 100 MG: 100 CAPSULE ORAL at 21:06

## 2022-02-25 RX ADMIN — OXYCODONE HYDROCHLORIDE AND ACETAMINOPHEN 500 MG: 500 TABLET ORAL at 08:26

## 2022-02-25 RX ADMIN — OXYCODONE AND ACETAMINOPHEN 1 TABLET: 5; 325 TABLET ORAL at 21:06

## 2022-02-25 ASSESSMENT — PAIN SCALES - GENERAL
PAINLEVEL_OUTOF10: 10
PAINLEVEL_OUTOF10: 8
PAINLEVEL_OUTOF10: 10
PAINLEVEL_OUTOF10: 8
PAINLEVEL_OUTOF10: 9
PAINLEVEL_OUTOF10: 8

## 2022-02-25 ASSESSMENT — PAIN DESCRIPTION - ORIENTATION: ORIENTATION: LEFT

## 2022-02-25 ASSESSMENT — PAIN DESCRIPTION - LOCATION: LOCATION: LEG

## 2022-02-25 NOTE — FLOWSHEET NOTE
02/25/22 1153   Assessment   Charting Type Shift assessment   Neurological   Neuro (WDL) WDL   Swallow Screening   Is the patient able to remain alert for testing? Yes   Denzel Coma Scale   Eye Opening 4   Best Verbal Response 5   Best Motor Response 6   Denzel Coma Scale Score 15   HEENT   HEENT (WDL) X   Right Eye Impaired vision   Left Eye Impaired vision   Respiratory   Respiratory (WDL) WDL   Respiratory Pattern Regular   Respiratory Depth Normal   Respiratory Quality/Effort Unlabored   Chest Assessment Chest expansion symmetrical;Trachea midline   L Breath Sounds Clear   R Breath Sounds Clear   Cardiac   Cardiac (WDL) WDL   Cardiac Monitor   Telemetry Monitor On No   Gastrointestinal   Abdominal (WDL) X  (hx colitis)   Abdomen Inspection Flat;Soft   Tenderness Nontender   RUQ Bowel Sounds Active   LUQ Bowel Sounds Active   RLQ Bowel Sounds Active   LLQ Bowel Sounds Active   Peripheral Vascular   Peripheral Vascular (WDL) WDL   Edema None   Skin Color/Condition   Skin Color/Condition (WDL) X   Skin Color Pale   Skin Condition/Temp Dry; Warm   Skin Integrity   Skin Integrity (WDL) X   Skin Integrity Other (Comment)  (brace/ace wrap)   Location LLE   Preventative Dressing Yes   Musculoskeletal   Musculoskeletal (WDL) X   RUE Full movement   LUE Full movement   RL Extremity Full movement   LL Extremity Limited movement;Surgery; Unsteady  (NWB)   Genitourinary   Genitourinary (WDL) WDL   Urine Assessment   Incontinence No   Urine Color Yellow/straw   Urine Appearance Clear   Urine Odor No odor   Anus/Rectum   Anus/Rectum (WDL) WDL   Psychosocial   Psychosocial (WDL) WDL

## 2022-02-25 NOTE — CARE COORDINATION
Interdisciplinary rounding completed. Eleni Canavan (provider rep), Elisabet Scales (), Johnny Yoo (nursing), Zakiya (PT), Carolyn (OT), and Radha (dietitian) all involved. Activities reviewed with OT. Therapy - Patient transitioned supine to sit with Mod I.  Stood with SBA and ambulated ~25 feet with RW, LLE NWB, and CGA with 1 brief standing rest.  Patient transferred to the recliner and performed R LE therex in semi-reclined position. Able to wiggle toes on L foot but ankle pumps are painful. Pharmacy - Lovenox 30mg SQ daily for DVT prophylaxis, Pain control with Oxycodone and Tylenol as needed. Dietary- Recommend to continue with current diet, and MVI. Will start ONS TID and monitor intakes of meals and ONS. Pt at this time eating good and agreeable to ONS. DC planning - Lives alone. Has RW, BSC, WC, SC.  HH at DC vs outpt. Pt will have family friend assist as needed. Plan is to continue therapy until ready to DC to home. Medically stable and doing well. Pt was having issues with pain management earlier in the week, but did better yesterday with pain per report.

## 2022-02-25 NOTE — FLOWSHEET NOTE
02/24/22 2000   Assessment   Charting Type Shift assessment   Neurological   Neuro (WDL) WDL   [place] Coma Scale   Eye Opening 4   Best Verbal Response 5   Best Motor Response 6   Denzel Coma Scale Score 15   HEENT   HEENT (WDL) X   Right Eye Impaired vision   Left Eye Impaired vision   Respiratory   Respiratory (WDL) WDL   Respiratory Pattern Regular   Respiratory Depth Normal   Respiratory Quality/Effort Unlabored   Chest Assessment Chest expansion symmetrical;Trachea midline   L Breath Sounds Clear   R Breath Sounds Clear   Cardiac   Cardiac (WDL) WDL   Cardiac Monitor   Telemetry Monitor On No   Gastrointestinal   Abdominal (WDL) X  (hx colitis)   Abdomen Inspection Flat;Soft   Tenderness Nontender   RUQ Bowel Sounds Active   LUQ Bowel Sounds Active   RLQ Bowel Sounds Active   LLQ Bowel Sounds Active   Peripheral Vascular   Peripheral Vascular (WDL) WDL   Edema None   LLE Neurovascular Assessment   L Pedal Pulse +1   Skin Color/Condition   Skin Color/Condition (WDL) X   Skin Color Pale   Skin Condition/Temp Dry; Warm   Skin Integrity   Skin Integrity (WDL) X   Skin Integrity Other (Comment)  (Brace/ACE )   Location LLE   Musculoskeletal   Musculoskeletal (WDL) X   RUE Full movement   LUE Full movement   RL Extremity Full movement   LL Extremity Limited movement;Surgery; Unsteady  (NWB)   Genitourinary   Genitourinary (WDL) WDL   Anus/Rectum   Anus/Rectum (WDL) WDL   Psychosocial   Psychosocial (WDL) WDL

## 2022-02-25 NOTE — PROGRESS NOTES
Occupational Therapy  Facility/Department: [address] [place] [address] MED SURG  Daily Treatment Note  NAME: Greg Prasad  : 1945  MRN: [id number]    Date of Service: 2022    Discharge Recommendations:  Continue to assess pending progress       Assessment   Assessment: Pt agreeable to OT services. Pt transferred to sitting at EOB without difficulty. Pt participated in sponge bathing seated at EOB. Pt completed UB bathing with NINO. Pt completed LB bathing with SBA. Pt tolerated well decreased reports of pain on this date. Pt transferred to standing at EOB with SBA using RW for support. Partial co tx with PTA. Pt ambulated in room ~25 feet with RW WC follow. Pt had seated rest break. LLE elevated and pt self propelled in hallway using BUE. pt tolerated well self propelling ~125 feet with one rest break. pt demo good safety awareness with transfers and turning WC. Pt transferred to recliner and positioned with LLE elevated. Pt left with call light in reach. Patient Diagnosis(es): There were no encounter diagnoses. has a past medical history of Colitis, GERD (gastroesophageal reflux disease), Glaucoma, and PAD (peripheral artery disease) ([facility] [address] has a past surgical history that includes Tubal ligation; eye surgery; cyst removal; skin biopsy; and Total hip arthroplasty (Left, 2019).     Restrictions  Restrictions/Precautions  Restrictions/Precautions: General Precautions,Fall Risk,Weight Bearing  Required Braces or Orthoses?: Yes  Lower Extremity Weight Bearing Restrictions  Left Lower Extremity Weight Bearing: Non Weight Bearing  Required Braces or Orthoses  Left Lower Extremity Brace:  (ELS brace locked in extension)  Subjective   General  Chart Reviewed: Yes  Patient assessed for rehabilitation services?: Yes  Family / Caregiver Present: No  Referring Practitioner: Rema Nagel PA-C  Diagnosis: Functional decline, Left tibial plateau fx, neck of left fibula  Subjective  Subjective: Pt states she lives alone, LLE pain 2/10. Pt agreeable to OT services. Orientation     Objective    ADL  Grooming: Setup  UE Bathing: Setup  LE Bathing: Stand by assistance  UE Dressing: Setup        Functional Mobility  Functional - Mobility Device: Rolling Walker  Functional Mobility Comments: ~25 feet with RW maintaining NWB status with WC follow. Bed mobility  Supine to Sit: Modified independent  Scooting: Modified independent  Transfers  Stand Pivot Transfers: Contact guard assistance;Stand by assistance  Sit to stand: Stand by assistance    Plan   Plan  Times per week: 3-5  Times per day: Daily  Plan weeks: 1-2  Current Treatment Recommendations: Shana Cooks Training,Self-Care / ADL,Safety Education & Training,Patient/Caregiver Education & Training,Functional Mobility Training    Goals  Short term goals  Time Frame for Short term goals: 2 weeks  Short term goal 1: Pt to complete dressing with MOD I. Short term goal 2: Pt to complete bathing with NINO. Short term goal 3: Pt to complete toileting with MOD I. Short term goal 4: Pt to complete safe ADL transfers maintaining WB status. Short term goal 5: Pt to tolerate x15 minutes of activity to increase functional activity tolerance. Therapy Time   Individual Concurrent Group Co-treatment   Time In 0840         Time Out 4644         Minutes 68              This note serves as a DC summary in the event of pt discharge.      Carolyn Robbins OTR/L

## 2022-02-25 NOTE — PROGRESS NOTES
Physical Therapy  Facility/Department: [facility] MED SURG  Daily Treatment Note  NAME: Thais Gordillo  : 1945  MRN: [id number]    Date of Service: 2022    Discharge Recommendations:  Continue to assess pending progress      Assessment   Assessment: Partial cotreatment with OT. Patient finished sponge bathing and personal care and then stood from bedside with SBA and ambulated ~25 feet with RW, NWB LLE, and CGA. Patient transferred to w/c and propelled with her hands in the hallway with LLE propped up for NWB. Patient returned to the room and transferred to the recliner with RW and CGA. REQUIRES PT FOLLOW UP: Yes  Activity Tolerance  Activity Tolerance: Patient Tolerated treatment well     Patient Diagnosis(es): There were no encounter diagnoses. has a past medical history of Colitis, GERD (gastroesophageal reflux disease), Glaucoma, and PAD (peripheral artery disease) ([facility] [address] has a past surgical history that includes Tubal ligation; eye surgery; cyst removal; skin biopsy; and Total hip arthroplasty (Left, 2019). Restrictions  Restrictions/Precautions  Restrictions/Precautions: General Precautions,Fall Risk,Weight Bearing  Required Braces or Orthoses?: Yes  Lower Extremity Weight Bearing Restrictions  Left Lower Extremity Weight Bearing: Non Weight Bearing  Required Braces or Orthoses  Left Lower Extremity Brace:  (ELS brace locked in extension)  Subjective   General  Chart Reviewed: Yes  Response To Previous Treatment: Patient with no complaints from previous session. Family / Caregiver Present: No  Subjective  Subjective: Patient reports having pain medicine this morning and feels she may do a little better today.   Pain Screening  Patient Currently in Pain: Yes  Pain Assessment  Pain Location: Leg  Pain Orientation: Left  Vital Signs  Patient Currently in Pain: Yes       Objective    Ambulation  Ambulation?: Yes  WB Status: NWB L LE  Ambulation 1  Surface: level tile  Device: Rolling Walker  Other Apparatus: Wheelchair follow  Assistance: Contact guard assistance  Distance: ~25 feet     Balance  Posture: Good  Sitting - Static: Good  Sitting - Dynamic: Good  Standing - Static: Good  Standing - Dynamic: Good; - (with A.D.)     Goals  Long term goals  Time Frame for Long term goals : 10 days  Long term goal 1: Patient will perform all bed mobility independently. Long term goal 2: Patient will perform sit to stand and transfers with modified independence. Long term goal 3: Patient will ambulate 50'x2, NWB L LE, with RW and SBA. Long term goal 4: Patient will demonstrate independence with HEP. Patient Goals   Patient goals : Return home. Plan    Plan  Times per week: 3-5x/week  Times per day: Daily  Current Treatment Recommendations: Diania Banker Training,Patient/Caregiver Education & Training,Balance Training,Gait Training,Home Exercise Program,Functional Mobility Training,Safety Education & Training  Safety Devices  Type of devices: Left in chair,Call light within reach     Therapy Time   Individual Concurrent Group Co-treatment   Time In 0925         Time Out 4154         Minutes 24              This note serves as D/C summary if patient is discharged prior to next visit.   Darlene Cornell, JENNIFER

## 2022-02-25 NOTE — PLAN OF CARE
Problem: Falls - Risk of:  Goal: Will remain free from falls  Description: Will remain free from falls  2/25/2022 1152 by Cassie Martinez RN  Outcome: Ongoing  2/24/2022 2315 by Mariza Andrade LPN  Outcome: Ongoing  Goal: Absence of physical injury  Description: Absence of physical injury  2/24/2022 2315 by Mariza Andrade LPN  Outcome: Ongoing

## 2022-02-25 NOTE — PROGRESS NOTES
Nutrition Assessment     Type and Reason for Visit:  (follow up)    Nutrition Recommendations/Plan: Recommend to continue with current diet and ONS TID. Encourage intakes of meals and ONS if not eating 75% of meals. Nutrition Assessment:  Pt continues with healing LE fracture (tibial and fibular neck fracture). Pt intakes have improved and remain stable. She is at low-moderate risk for ongoing nutritional compromise r/t being underweight and predicted inadequate intakes. Malnutrition Assessment:  Malnutrition Status: At risk for malnutrition (Comment) (Pt has osteoporosis, and is underweight,  She seems to be eating good at this this time, and states she eats good, but has had colitis for many years.)    Estimated Daily Nutrient Needs:  Energy (kcal): 2674-2768 (30-32 kcal/kg cbw); Weight Used for Energy Requirements:  Current     Protein (g): 65-78 (1.25-1.5 gm/kg CBW); Weight Used for Protein Requirements:  Current        Fluid (ml/day): 1924-8566; Weight Used for Fluid Requirements:  1 ml/kcal      Nutrition Related Findings: Pt presents with underweight and has some swelling of the toes on left foot. Pt has MVI, folic acid, mag ox, glycolax, KCL ordered. Labs: BUN22, Alb-3.0, ALT-44. Current Nutrition Therapies:    ADULT DIET;  Regular  ADULT ORAL NUTRITION SUPPLEMENT; Breakfast, Lunch, Dinner; Standard High Calorie/High Protein Oral Supplement    Anthropometric Measures:  · Height: 5' 7\" (170.2 cm)  · Current Body Wt: 115 lb (52.2 kg)   · BMI: 18    Nutrition Diagnosis:   · Predicted inadequate energy intake related to acute injury/trauma,increase demand for energy/nutrients as evidenced by BMI,wounds      Nutrition Interventions:   Food and/or Nutrient Delivery:  Continue Current Diet,Continue Oral Nutrition Supplement  Nutrition Education/Counseling:  Education completed (spoke to patient about the importance of eating enough calories and protein while healing and then on return home.) Coordination of Nutrition Care:  Continue to monitor while inpatient,Interdisciplinary Rounds    Goals:  to meet est needs for age/condition       Nutrition Monitoring and Evaluation:   Behavioral-Environmental Outcomes:      Food/Nutrient Intake Outcomes:  Food and Nutrient Intake,Supplement Intake  Physical Signs/Symptoms Outcomes:  Biochemical Data,GI Status,Weight     Discharge Planning:    Continue Oral Nutrition Supplement,Continue current diet     Electronically signed by Arely Perales MS, RD, LD on 2/25/22 at 2:41 PM EST

## 2022-02-26 PROCEDURE — 99308 SBSQ NF CARE LOW MDM 20: CPT | Performed by: INTERNAL MEDICINE

## 2022-02-26 PROCEDURE — 6370000000 HC RX 637 (ALT 250 FOR IP): Performed by: PHYSICIAN ASSISTANT

## 2022-02-26 PROCEDURE — 6360000002 HC RX W HCPCS: Performed by: PHYSICIAN ASSISTANT

## 2022-02-26 PROCEDURE — 1200000002 HC SEMI PRIVATE SWING BED

## 2022-02-26 RX ORDER — FERROUS SULFATE TAB EC 324 MG (65 MG FE EQUIVALENT) 324 (65 FE) MG
324 TABLET DELAYED RESPONSE ORAL
Status: DISCONTINUED | OUTPATIENT
Start: 2022-02-27 | End: 2022-03-18 | Stop reason: HOSPADM

## 2022-02-26 RX ADMIN — FOLIC ACID TAB 400 MCG 0.8 MG: 400 TAB at 08:13

## 2022-02-26 RX ADMIN — ENOXAPARIN SODIUM 30 MG: 100 INJECTION SUBCUTANEOUS at 20:53

## 2022-02-26 RX ADMIN — CALCIUM CARBONATE 500 MG: 500 TABLET, CHEWABLE ORAL at 08:13

## 2022-02-26 RX ADMIN — ACETAMINOPHEN 650 MG: 325 TABLET, FILM COATED ORAL at 03:19

## 2022-02-26 RX ADMIN — POTASSIUM CHLORIDE 10 MEQ: 10 CAPSULE, COATED, EXTENDED RELEASE ORAL at 08:13

## 2022-02-26 RX ADMIN — Medication 400 MG: at 08:12

## 2022-02-26 RX ADMIN — OXYCODONE AND ACETAMINOPHEN 1 TABLET: 5; 325 TABLET ORAL at 08:13

## 2022-02-26 RX ADMIN — Medication 1 CAPSULE: at 08:13

## 2022-02-26 RX ADMIN — SENNOSIDES AND DOCUSATE SODIUM 1 TABLET: 8.6; 5 TABLET ORAL at 20:53

## 2022-02-26 RX ADMIN — OXYCODONE HYDROCHLORIDE AND ACETAMINOPHEN 500 MG: 500 TABLET ORAL at 08:13

## 2022-02-26 RX ADMIN — FAMOTIDINE 20 MG: 20 TABLET, FILM COATED ORAL at 20:52

## 2022-02-26 RX ADMIN — GABAPENTIN 100 MG: 100 CAPSULE ORAL at 20:52

## 2022-02-26 RX ADMIN — ENOXAPARIN SODIUM 30 MG: 100 INJECTION SUBCUTANEOUS at 08:13

## 2022-02-26 RX ADMIN — OXYCODONE AND ACETAMINOPHEN 1 TABLET: 5; 325 TABLET ORAL at 20:53

## 2022-02-26 RX ADMIN — FERROUS SULFATE TAB EC 324 MG (65 MG FE EQUIVALENT) 324 MG: 324 (65 FE) TABLET DELAYED RESPONSE at 08:12

## 2022-02-26 RX ADMIN — OXYCODONE AND ACETAMINOPHEN 1 TABLET: 5; 325 TABLET ORAL at 14:34

## 2022-02-26 RX ADMIN — SENNOSIDES AND DOCUSATE SODIUM 1 TABLET: 8.6; 5 TABLET ORAL at 08:15

## 2022-02-26 RX ADMIN — FAMOTIDINE 20 MG: 20 TABLET, FILM COATED ORAL at 08:13

## 2022-02-26 RX ADMIN — MULTIPLE VITAMINS W/ MINERALS TAB 1 TABLET: TAB at 08:12

## 2022-02-26 RX ADMIN — Medication 10 MG: at 20:52

## 2022-02-26 RX ADMIN — BUDESONIDE 9 MG: 3 CAPSULE, COATED PELLETS ORAL at 08:14

## 2022-02-26 ASSESSMENT — PAIN SCALES - GENERAL
PAINLEVEL_OUTOF10: 7
PAINLEVEL_OUTOF10: 8
PAINLEVEL_OUTOF10: 7
PAINLEVEL_OUTOF10: 10

## 2022-02-26 NOTE — FLOWSHEET NOTE
02/25/22 2030   Assessment   Charting Type Shift assessment   Neurological   Neuro (WDL) WDL   [place] Coma Scale   Eye Opening 4   Best Verbal Response 5   Best Motor Response 6   Denzel Coma Scale Score 15   HEENT   HEENT (WDL) X   Right Eye Impaired vision   Left Eye Impaired vision   Respiratory   Respiratory (WDL) WDL   Respiratory Pattern Regular   Respiratory Depth Normal   Respiratory Quality/Effort Unlabored   Chest Assessment Chest expansion symmetrical;Trachea midline   L Breath Sounds Clear   R Breath Sounds Clear   Cardiac   Cardiac (WDL) WDL   Cardiac Monitor   Telemetry Monitor On No   Gastrointestinal   Abdominal (WDL) X  (hx colitis)   Abdomen Inspection Flat;Soft   Tenderness Nontender   RUQ Bowel Sounds Active   LUQ Bowel Sounds Active   RLQ Bowel Sounds Active   LLQ Bowel Sounds Active   Peripheral Vascular   Peripheral Vascular (WDL) WDL   Edema None   Skin Color/Condition   Skin Color/Condition (WDL) X   Skin Color Pale   Skin Condition/Temp Dry; Warm   Skin Integrity   Skin Integrity (WDL) X   Skin Integrity Other (Comment)  (Brace/ACE)   Location LLE   Preventative Dressing Yes   Musculoskeletal   Musculoskeletal (WDL) X   RUE Full movement   LUE Full movement   RL Extremity Full movement   LL Extremity Limited movement;Surgery; Unsteady  (NWB)   Genitourinary   Genitourinary (WDL) WDL   Urine Assessment   Incontinence No   Urine Color Yellow/straw   Urine Appearance Clear   Urine Odor No odor   Anus/Rectum   Anus/Rectum (WDL) WDL   Psychosocial   Psychosocial (WDL) WDL

## 2022-02-26 NOTE — FLOWSHEET NOTE
02/26/22 0949   Assessment   Charting Type Shift assessment   Neurological   Neuro (WDL) WDL   Swallow Screening   Is the patient able to remain alert for testing? Yes   Denzel Coma Scale   Eye Opening 4   Best Verbal Response 5   Best Motor Response 6   Denzel Coma Scale Score 15   HEENT   HEENT (WDL) X   Right Eye Impaired vision   Left Eye Impaired vision   Respiratory   Respiratory (WDL) WDL   Respiratory Pattern Regular   Respiratory Depth Normal   Respiratory Quality/Effort Unlabored   Chest Assessment Chest expansion symmetrical;Trachea midline   L Breath Sounds Clear   R Breath Sounds Clear   Cardiac   Cardiac (WDL) WDL   Cardiac Monitor   Telemetry Monitor On No   Gastrointestinal   Abdominal (WDL) X  (hx colitis)   GI Symptoms Constipation   Abdomen Inspection Flat;Soft   Tenderness Nontender   RUQ Bowel Sounds Active   LUQ Bowel Sounds Active   RLQ Bowel Sounds Active   LLQ Bowel Sounds Active   Peripheral Vascular   Peripheral Vascular (WDL) WDL   Edema None   Skin Color/Condition   Skin Color/Condition (WDL) X   Skin Color Pale   Skin Condition/Temp Dry; Warm   Skin Integrity   Skin Integrity (WDL) X   Skin Integrity Other (Comment)  (brace/ace wrap)   Location LLE   Preventative Dressing Yes   Musculoskeletal   Musculoskeletal (WDL) X   RUE Full movement   LUE Full movement   RL Extremity Full movement   LL Extremity Limited movement;Surgery; Unsteady  (NWB)   Genitourinary   Genitourinary (WDL) WDL   Urine Assessment   Incontinence No   Urine Color Yellow/straw   Urine Appearance Clear   Urine Odor No odor   Anus/Rectum   Anus/Rectum (WDL) WDL   Psychosocial   Psychosocial (WDL) WDL

## 2022-02-26 NOTE — PROGRESS NOTES
Progress Note      Subjective:   Chief complaint: declining functional status, left lower extremity pain     Interval History:   Patient complaining of pain in her LLE. States pain is not controlled. She requests pain medication be scheduled some because she forgets to ask and ends up with severe pain by the time she takes the medication. She is concerned about her leg and wants ace bandage removed for evaluation. Having regular BMs. She is anxious. Review of systems:   Constitutional:  Denies fever or chills. Eyes:  Denies change in visual acuity or discharge. HENT:  Denies nasal congestion or sore throat. Respiratory:  Denies cough or shortness of breath. Cardiovascular:  Denies chest pain, palpitation. GI:  Denies abdominal pain, nausea, vomiting, bloody stools or diarrhea. :  Denies dysuria or frequency. Musculoskeletal:  Denies back pain. Positive for left lower extremity pain. Integument:  Denies rash or itching. Neurologic:  Denies headache, focal weakness or sensory changes. Psychiatric:  Denies depression or anxiety. Past medical history, surgical history, family history and social history reviewed and unchanged compared to H&P earlier this admission.     Medications:   Scheduled Meds:   oxyCODONE-acetaminophen  1 tablet Oral BID    gabapentin  100 mg Oral Nightly    budesonide  9 mg Oral Daily    brinzolamide-brimonidine  1 drop Ophthalmic BID    ferrous sulfate  324 mg Oral Every Other Day    famotidine  20 mg Oral BID    NONFORMULARY 1 capsule  1 capsule Oral BID    bimatoprost  1 drop Both Eyes Nightly    calcium carbonate  500 mg Oral Daily    lactobacillus  1 capsule Oral Daily    enoxaparin  30 mg SubCUTAneous BID    folic acid  0.8 mg Oral Daily    therapeutic multivitamin-minerals  1 tablet Oral Daily    magnesium oxide  400 mg Oral Daily    polyethylene glycol  17 g Oral Daily    potassium chloride  10 mEq Oral Daily    sennosides-docusate sodium  1 tablet Oral BID    vitamin C  500 mg Oral Daily     Continuous Infusions:    Objective:     Vital Signs  Temp: 98.2 °F (36.8 °C)  Pulse: 85  Resp: 18  BP: (!) 116/50  SpO2: 100 %  O2 Device: None (Room air)       Vital signs reviewed in electronic charts. Physical exam  Constitutional:  Well developed, thin, elderly female sitting upright in bed in no acute distress  Eyes:  no scleral icterus, conjunctiva normal   HENT:  Atraumatic, external ears normal, nose normal, oropharynx moist, no pharyngeal exudates. Neck- supple, no JVD, no lymphadenopathy  Respiratory:  No respiratory distress, no wheezing, rales or rhonchi detected  Cardiovascular:  Normal rate, normal rhythm, no murmurs, no gallops, no rubs, no edema   GI:  Soft, nondistended, normal bowel sounds, nontender, no voluntary guarding  Musculoskeletal:  No cyanosis. Pedal pulses appreciated. LLE with bandages intact to anterior shin. Scattered bruising with +1 edema to distal aspect of LLE   Integument:  Warm and dry. See above. Neurologic:  Alert & oriented x 3, no apparent focal deficits noted   Psychiatric:  Speech and behavior appropriate.  Appears anxious.        Results:     Lab Results   Component Value Date    WBC 6.7 02/24/2022    HGB 9.3 (L) 02/24/2022    HCT 30.5 (L) 02/24/2022    MCV 97.1 02/24/2022     02/24/2022       Lab Results   Component Value Date     02/24/2022    K 4.2 02/24/2022    K 4.2 02/21/2022     02/24/2022    CO2 24 02/24/2022    BUN 22 02/24/2022    CREATININE 0.6 02/24/2022    GLUCOSE 101 02/24/2022    CALCIUM 8.9 02/24/2022        Assessment and Plan:      Closed fracture of left tibial plateau [Y91.046T]  -Fragility fracture s/p fall in setting of osteoporosis  -s/p ORIF on 2/15/22 with  Ortho   -vitamin D level 2/18 98  -DVT prophylaxis: enoxaparin 30 mg subcutaneous BID through 3/6/21, then aspirin 81 mg BID through 3/20/22  -NWB to the LLE, with ELS brace locked in extension  - [place] bone mineral metabolism team was consulted while at [facility]. OP appointment requested prior to discharge  -PT/OT consulted   - continue bowel regimen  - 2/23 patient declines gabapentin for neuropathic pain. Very mild swelling to left toes. - 2/25 patient complains of uncontrolled pain. Requests ACE bandage be removed for further evaluation. Attempted to reach ortho team x 2 and not able to reach them. I removed ACE bandage to evaluate. +1 edema to distal LLE with bandages intact anterior shin without excessive drainage. In setting of swelling and patient's report of uncontrolled pain obtained venous duplex which was negative for DVT. DC oxycodone and start percocet. Schedule percocet at 0800 and 2000 per patient request. Also started gabapentin 100 mg qhs. Of note folate and B12 within normal limits. 02/21/2022    Closed fracture of neck of left fibula [S82.832A]  -see above    02/21/2022    Anemia [D64.9]  -per [facility] dc summary, hgb 13 on arrival/prior to surgery. Downtrended to 10 post op. Iron level 23, transferrin sat 9. Started on ferrous sulfate every other day. - Hgb 9.3 on 2/24. Increase frequency of ferrous sulfate to daily. - on gi ppx    02/21/2022    Recurrent falls [R29.6]  -Patient lives alone and reports being independent with ADLs prior to fall.  Does not use assist devices at home however has a history of falls with known osteoporosis with multiple fractures in the last three months.   -Continue with PT/OT  -fall precautions    02/21/2022    Underweight [R63.6]  -dietitian consulted  -continue Boost TID and MVI w/minerals     02/21/2022    Microscopic colitis [K52.839]  -continue budesonide    02/21/2022    Glaucoma [H40.9]  -Continue lumigan, brinzolamide/brimonidine, & supplements per patient request.     02/21/2022    Leg cramps [R25.2]  -continue home potassium supplement  - trial of low dose gabapentin  - B12, folate, electrolytes wnl    02/21/2022    Declining functional status [R53.81]  -secondary to recent fall/fracture and NWB status  -plan as outlined above        Patient was seen and examined with Dr. Irma Salvador. After reviewing patient data and diagnostic testing the plan of care was established in conjunction with Dr. Irma Salvador.     Electronically signed by KALIE Lagos on 2/26/2022 at 8:49 AM

## 2022-02-26 NOTE — PLAN OF CARE
Problem: Falls - Risk of:  Goal: Will remain free from falls  Description: Will remain free from falls  2/26/2022 0949 by Jairo Miller RN  Outcome: Ongoing  2/25/2022 2155 by Devaughn Fraire LPN  Outcome: Ongoing  Goal: Absence of physical injury  Description: Absence of physical injury  2/25/2022 2155 by Devaughn Fraire LPN  Outcome: Ongoing

## 2022-02-27 PROCEDURE — 1200000002 HC SEMI PRIVATE SWING BED

## 2022-02-27 PROCEDURE — 6370000000 HC RX 637 (ALT 250 FOR IP): Performed by: PHYSICIAN ASSISTANT

## 2022-02-27 PROCEDURE — 6360000002 HC RX W HCPCS: Performed by: PHYSICIAN ASSISTANT

## 2022-02-27 RX ORDER — POLYVINYL ALCOHOL 14 MG/ML
1 SOLUTION/ DROPS OPHTHALMIC PRN
Status: DISCONTINUED | OUTPATIENT
Start: 2022-02-27 | End: 2022-03-18 | Stop reason: HOSPADM

## 2022-02-27 RX ADMIN — BUDESONIDE 9 MG: 3 CAPSULE, COATED PELLETS ORAL at 09:07

## 2022-02-27 RX ADMIN — GABAPENTIN 100 MG: 100 CAPSULE ORAL at 20:52

## 2022-02-27 RX ADMIN — ENOXAPARIN SODIUM 30 MG: 100 INJECTION SUBCUTANEOUS at 09:05

## 2022-02-27 RX ADMIN — FAMOTIDINE 20 MG: 20 TABLET, FILM COATED ORAL at 20:53

## 2022-02-27 RX ADMIN — FAMOTIDINE 20 MG: 20 TABLET, FILM COATED ORAL at 09:03

## 2022-02-27 RX ADMIN — ENOXAPARIN SODIUM 30 MG: 100 INJECTION SUBCUTANEOUS at 20:56

## 2022-02-27 RX ADMIN — SENNOSIDES AND DOCUSATE SODIUM 1 TABLET: 8.6; 5 TABLET ORAL at 09:04

## 2022-02-27 RX ADMIN — OXYCODONE AND ACETAMINOPHEN 1 TABLET: 5; 325 TABLET ORAL at 09:04

## 2022-02-27 RX ADMIN — OXYCODONE HYDROCHLORIDE AND ACETAMINOPHEN 500 MG: 500 TABLET ORAL at 09:04

## 2022-02-27 RX ADMIN — POLYVINYL ALCOHOL 1 DROP: 14 SOLUTION/ DROPS OPHTHALMIC at 10:03

## 2022-02-27 RX ADMIN — OXYCODONE AND ACETAMINOPHEN 1 TABLET: 5; 325 TABLET ORAL at 01:34

## 2022-02-27 RX ADMIN — MULTIPLE VITAMINS W/ MINERALS TAB 1 TABLET: TAB at 09:04

## 2022-02-27 RX ADMIN — OXYCODONE AND ACETAMINOPHEN 1 TABLET: 5; 325 TABLET ORAL at 17:22

## 2022-02-27 RX ADMIN — CALCIUM CARBONATE 500 MG: 500 TABLET, CHEWABLE ORAL at 10:03

## 2022-02-27 RX ADMIN — FOLIC ACID TAB 400 MCG 0.8 MG: 400 TAB at 09:04

## 2022-02-27 RX ADMIN — Medication 10 MG: at 20:53

## 2022-02-27 RX ADMIN — SENNOSIDES AND DOCUSATE SODIUM 1 TABLET: 8.6; 5 TABLET ORAL at 20:52

## 2022-02-27 RX ADMIN — DIPHENHYDRAMINE HYDROCHLORIDE, ZINC ACETATE: 2; .1 CREAM TOPICAL at 09:09

## 2022-02-27 RX ADMIN — Medication 1 CAPSULE: at 09:04

## 2022-02-27 RX ADMIN — OXYCODONE AND ACETAMINOPHEN 1 TABLET: 5; 325 TABLET ORAL at 20:52

## 2022-02-27 RX ADMIN — Medication 400 MG: at 09:04

## 2022-02-27 RX ADMIN — POTASSIUM CHLORIDE 10 MEQ: 10 CAPSULE, COATED, EXTENDED RELEASE ORAL at 09:03

## 2022-02-27 RX ADMIN — FERROUS SULFATE TAB EC 324 MG (65 MG FE EQUIVALENT) 324 MG: 324 (65 FE) TABLET DELAYED RESPONSE at 09:04

## 2022-02-27 ASSESSMENT — PAIN SCALES - GENERAL
PAINLEVEL_OUTOF10: 8
PAINLEVEL_OUTOF10: 7

## 2022-02-27 NOTE — PLAN OF CARE
Problem: Falls - Risk of:  Goal: Will remain free from falls  Description: Will remain free from falls  2/27/2022 1108 by Charles Reich RN  Outcome: Ongoing  2/26/2022 2140 by Yuliya Granado LPN  Outcome: Ongoing  Goal: Absence of physical injury  Description: Absence of physical injury  2/26/2022 2140 by Yuliya Granado LPN  Outcome: Ongoing

## 2022-02-27 NOTE — FLOWSHEET NOTE
02/26/22 2030   Assessment   Charting Type Shift assessment   Neurological   Neuro (WDL) WDL   [place] Coma Scale   Eye Opening 4   Best Verbal Response 5   Best Motor Response 6   Denzel Coma Scale Score 15   HEENT   HEENT (WDL) X   Right Eye Impaired vision   Left Eye Impaired vision   Respiratory   Respiratory (WDL) WDL   Respiratory Pattern Regular   Respiratory Depth Normal   Respiratory Quality/Effort Unlabored   Chest Assessment Chest expansion symmetrical;Trachea midline   L Breath Sounds Clear   R Breath Sounds Clear   Cardiac   Cardiac (WDL) WDL   Cardiac Monitor   Telemetry Monitor On No   Gastrointestinal   Abdominal (WDL) X  (hx colitis)   GI Symptoms Constipation   Abdomen Inspection Flat;Soft   Tenderness Nontender   RUQ Bowel Sounds Active   LUQ Bowel Sounds Active   RLQ Bowel Sounds Active   LLQ Bowel Sounds Active   Peripheral Vascular   Peripheral Vascular (WDL) WDL   Edema None   Skin Color/Condition   Skin Color/Condition (WDL) X   Skin Color Pale   Skin Condition/Temp Dry; Warm   Skin Integrity   Skin Integrity (WDL) X   Skin Integrity Other (Comment)  (Brace/ACE)   Location LLE   Musculoskeletal   Musculoskeletal (WDL) X   RUE Full movement   LUE Full movement   RL Extremity Full movement   LL Extremity Limited movement;Surgery; Unsteady  (NWB)   Genitourinary   Genitourinary (WDL) WDL   Urine Assessment   Incontinence No   Urine Color Yellow/straw   Urine Appearance Clear   Urine Odor No odor   Anus/Rectum   Anus/Rectum (WDL) WDL   Psychosocial   Psychosocial (WDL) WDL

## 2022-02-27 NOTE — FLOWSHEET NOTE
02/27/22 1139   Assessment   Charting Type Shift assessment   Neurological   Neuro (WDL) WDL   Swallow Screening   Is the patient able to remain alert for testing? Yes   [place] Coma Scale   Eye Opening 4   Best Verbal Response 5   Best Motor Response 6   Denzel Coma Scale Score 15   HEENT   HEENT (WDL) X   Right Eye Impaired vision; Other (Comment)  (redness around eye)   Left Eye Impaired vision  (redness around eye)   Respiratory   Respiratory (WDL) WDL   Respiratory Pattern Regular   Respiratory Depth Normal   Respiratory Quality/Effort Unlabored   Chest Assessment Chest expansion symmetrical;Trachea midline   L Breath Sounds Clear   R Breath Sounds Clear   Cardiac   Cardiac (WDL) WDL   Cardiac Monitor   Telemetry Monitor On No   Gastrointestinal   Abdominal (WDL) X  (hx colitis)   Abdomen Inspection Flat;Soft   Tenderness Nontender   RUQ Bowel Sounds Active   LUQ Bowel Sounds Active   RLQ Bowel Sounds Active   LLQ Bowel Sounds Active   Peripheral Vascular   Peripheral Vascular (WDL) WDL   Edema None   Skin Color/Condition   Skin Color/Condition (WDL) X   Skin Color Pale   Skin Condition/Temp Dry; Warm   Skin Integrity   Skin Integrity (WDL) X   Skin Integrity Other (Comment)  (brace/ace wrap)   Location LLE   Preventative Dressing Yes   Musculoskeletal   Musculoskeletal (WDL) X   RUE Full movement   LUE Full movement   RL Extremity Full movement   LL Extremity Limited movement;Surgery; Unsteady  (NWB)   Genitourinary   Genitourinary (WDL) WDL   Urine Assessment   Incontinence No   Urine Color Yellow/straw   Urine Appearance Clear   Urine Odor No odor   Anus/Rectum   Anus/Rectum (WDL) WDL   Psychosocial   Psychosocial (WDL) WDL

## 2022-02-28 LAB
ANION GAP SERPL CALCULATED.3IONS-SCNC: 8 MMOL/L (ref 3–16)
BUN BLDV-MCNC: 19 MG/DL (ref 6–20)
CALCIUM SERPL-MCNC: 9.5 MG/DL (ref 8.5–10.5)
CHLORIDE BLD-SCNC: 105 MMOL/L (ref 98–107)
CO2: 28 MMOL/L (ref 20–30)
CREAT SERPL-MCNC: 0.7 MG/DL (ref 0.4–1.2)
GFR AFRICAN AMERICAN: >59
GFR NON-AFRICAN AMERICAN: >60
GLUCOSE BLD-MCNC: 89 MG/DL (ref 74–106)
HCT VFR BLD CALC: 35.6 % (ref 37–47)
HEMOGLOBIN: 10.4 G/DL (ref 11.5–16.5)
MCH RBC QN AUTO: 29.3 PG (ref 27–32)
MCHC RBC AUTO-ENTMCNC: 29.2 G/DL (ref 31–35)
MCV RBC AUTO: 100.3 FL (ref 80–100)
PDW BLD-RTO: 15.1 % (ref 11–16)
PLATELET # BLD: 397 K/UL (ref 150–400)
PMV BLD AUTO: 8.7 FL (ref 6–10)
POTASSIUM SERPL-SCNC: 5.3 MMOL/L (ref 3.4–5.1)
RBC # BLD: 3.55 M/UL (ref 3.8–5.8)
SODIUM BLD-SCNC: 141 MMOL/L (ref 136–145)
WBC # BLD: 7.3 K/UL (ref 4–11)

## 2022-02-28 PROCEDURE — 97110 THERAPEUTIC EXERCISES: CPT

## 2022-02-28 PROCEDURE — 6370000000 HC RX 637 (ALT 250 FOR IP): Performed by: PHYSICIAN ASSISTANT

## 2022-02-28 PROCEDURE — 6360000002 HC RX W HCPCS: Performed by: PHYSICIAN ASSISTANT

## 2022-02-28 PROCEDURE — 80048 BASIC METABOLIC PNL TOTAL CA: CPT

## 2022-02-28 PROCEDURE — 97530 THERAPEUTIC ACTIVITIES: CPT

## 2022-02-28 PROCEDURE — 97535 SELF CARE MNGMENT TRAINING: CPT

## 2022-02-28 PROCEDURE — 1200000002 HC SEMI PRIVATE SWING BED

## 2022-02-28 PROCEDURE — 36415 COLL VENOUS BLD VENIPUNCTURE: CPT

## 2022-02-28 PROCEDURE — 85027 COMPLETE CBC AUTOMATED: CPT

## 2022-02-28 RX ADMIN — OXYCODONE AND ACETAMINOPHEN 1 TABLET: 5; 325 TABLET ORAL at 00:16

## 2022-02-28 RX ADMIN — CALCIUM CARBONATE 500 MG: 500 TABLET, CHEWABLE ORAL at 08:59

## 2022-02-28 RX ADMIN — MULTIPLE VITAMINS W/ MINERALS TAB 1 TABLET: TAB at 08:59

## 2022-02-28 RX ADMIN — Medication 400 MG: at 08:59

## 2022-02-28 RX ADMIN — SENNOSIDES AND DOCUSATE SODIUM 1 TABLET: 8.6; 5 TABLET ORAL at 20:22

## 2022-02-28 RX ADMIN — OXYCODONE AND ACETAMINOPHEN 1 TABLET: 5; 325 TABLET ORAL at 08:58

## 2022-02-28 RX ADMIN — OXYCODONE AND ACETAMINOPHEN 1 TABLET: 5; 325 TABLET ORAL at 14:47

## 2022-02-28 RX ADMIN — OXYCODONE HYDROCHLORIDE AND ACETAMINOPHEN 500 MG: 500 TABLET ORAL at 08:59

## 2022-02-28 RX ADMIN — Medication 10 MG: at 20:22

## 2022-02-28 RX ADMIN — FAMOTIDINE 20 MG: 20 TABLET, FILM COATED ORAL at 08:59

## 2022-02-28 RX ADMIN — FOLIC ACID TAB 400 MCG 0.8 MG: 400 TAB at 08:59

## 2022-02-28 RX ADMIN — FAMOTIDINE 20 MG: 20 TABLET, FILM COATED ORAL at 20:26

## 2022-02-28 RX ADMIN — OXYCODONE AND ACETAMINOPHEN 1 TABLET: 5; 325 TABLET ORAL at 20:22

## 2022-02-28 RX ADMIN — Medication 1 CAPSULE: at 08:59

## 2022-02-28 RX ADMIN — BUDESONIDE 9 MG: 3 CAPSULE, COATED PELLETS ORAL at 09:01

## 2022-02-28 RX ADMIN — OXYCODONE AND ACETAMINOPHEN 1 TABLET: 5; 325 TABLET ORAL at 05:31

## 2022-02-28 RX ADMIN — POTASSIUM CHLORIDE 10 MEQ: 10 CAPSULE, COATED, EXTENDED RELEASE ORAL at 08:59

## 2022-02-28 RX ADMIN — SENNOSIDES AND DOCUSATE SODIUM 1 TABLET: 8.6; 5 TABLET ORAL at 08:59

## 2022-02-28 RX ADMIN — ENOXAPARIN SODIUM 30 MG: 100 INJECTION SUBCUTANEOUS at 08:59

## 2022-02-28 RX ADMIN — ENOXAPARIN SODIUM 30 MG: 100 INJECTION SUBCUTANEOUS at 20:23

## 2022-02-28 RX ADMIN — GABAPENTIN 100 MG: 100 CAPSULE ORAL at 20:22

## 2022-02-28 RX ADMIN — FERROUS SULFATE TAB EC 324 MG (65 MG FE EQUIVALENT) 324 MG: 324 (65 FE) TABLET DELAYED RESPONSE at 08:59

## 2022-02-28 ASSESSMENT — PAIN SCALES - GENERAL
PAINLEVEL_OUTOF10: 9
PAINLEVEL_OUTOF10: 8
PAINLEVEL_OUTOF10: 7
PAINLEVEL_OUTOF10: 9
PAINLEVEL_OUTOF10: 7

## 2022-02-28 ASSESSMENT — PAIN DESCRIPTION - LOCATION: LOCATION: LEG;SHOULDER

## 2022-02-28 NOTE — FLOWSHEET NOTE
02/27/22 2045   Assessment   Charting Type Shift assessment   Neurological   Neuro (WDL) WDL   [place] Coma Scale   Eye Opening 4   Best Verbal Response 5   Best Motor Response 6   Denzel Coma Scale Score 15   HEENT   HEENT (WDL) X   Right Eye Impaired vision; Other (Comment)  (redness around eye)   Left Eye Impaired vision  (redness around eye)   Respiratory   Respiratory (WDL) WDL   Respiratory Pattern Regular   Respiratory Depth Normal   Respiratory Quality/Effort Unlabored   Chest Assessment Chest expansion symmetrical;Trachea midline   L Breath Sounds Clear   R Breath Sounds Clear   Cardiac   Cardiac (WDL) WDL   Cardiac Monitor   Telemetry Monitor On No   Gastrointestinal   Abdominal (WDL) X  (hx colitis)   Abdomen Inspection Flat;Soft   Tenderness Nontender   RUQ Bowel Sounds Active   LUQ Bowel Sounds Active   RLQ Bowel Sounds Active   LLQ Bowel Sounds Active   Peripheral Vascular   Peripheral Vascular (WDL) WDL   Edema None   Skin Color/Condition   Skin Color/Condition (WDL) X   Skin Color Pale   Skin Condition/Temp Dry; Warm   Skin Integrity   Skin Integrity (WDL) X   Skin Integrity Other (Comment)  (Brace/ACE)   Location LLE   Musculoskeletal   Musculoskeletal (WDL) X   RUE Full movement   LUE Full movement   RL Extremity Full movement   LL Extremity Limited movement;Surgery; Unsteady  (NWB)   Genitourinary   Genitourinary (WDL) WDL   Urine Assessment   Incontinence No   Urine Color Yellow/straw   Urine Appearance Clear   Urine Odor No odor   Anus/Rectum   Anus/Rectum (WDL) WDL   Psychosocial   Psychosocial (WDL) WDL

## 2022-02-28 NOTE — PLAN OF CARE
Problem: Falls - Risk of:  Goal: Will remain free from falls  Description: Will remain free from falls  2/28/2022 1006 by Cong Constantino RN  Outcome: Ongoing  2/27/2022 2121 by Sophia Sanchez LPN  Outcome: Ongoing  Goal: Absence of physical injury  Description: Absence of physical injury  2/27/2022 2121 by Sophia Sanchez LPN  Outcome: Ongoing

## 2022-02-28 NOTE — FLOWSHEET NOTE
02/28/22 1006   Assessment   Charting Type Shift assessment   Neurological   Neuro (WDL) WDL   Swallow Screening   Is the patient able to remain alert for testing? Yes   [place] Coma Scale   Eye Opening 4   Best Verbal Response 5   Best Motor Response 6   Denzel Coma Scale Score 15   HEENT   HEENT (WDL) X   Right Eye Impaired vision; Other (Comment)   Left Eye Impaired vision   Respiratory   Respiratory (WDL) WDL   Respiratory Pattern Regular   Respiratory Depth Normal   Respiratory Quality/Effort Unlabored   Chest Assessment Chest expansion symmetrical;Trachea midline   L Breath Sounds Clear   R Breath Sounds Clear   Cardiac   Cardiac (WDL) WDL   Cardiac Monitor   Telemetry Monitor On No   Gastrointestinal   Abdominal (WDL) X  (hx colitis)   GI Symptoms Constipation   Abdomen Inspection Flat;Soft   Tenderness Nontender   RUQ Bowel Sounds Active   LUQ Bowel Sounds Active   RLQ Bowel Sounds Active   LLQ Bowel Sounds Active   Peripheral Vascular   Peripheral Vascular (WDL) WDL   Edema None   Skin Color/Condition   Skin Color/Condition (WDL) X   Skin Color Pale   Skin Condition/Temp Dry; Warm   Skin Integrity   Skin Integrity (WDL) X   Skin Integrity Other (Comment)  (brace/ace wrap)   Location LLE   Preventative Dressing Yes   Musculoskeletal   Musculoskeletal (WDL) X   RUE Full movement   LUE Full movement   RL Extremity Full movement   LL Extremity Limited movement;Surgery; Unsteady  (NWB)   Genitourinary   Genitourinary (WDL) WDL   Urine Assessment   Incontinence No   Urine Color Yellow/straw   Urine Appearance Clear   Urine Odor No odor   Psychosocial   Psychosocial (WDL) WDL

## 2022-02-28 NOTE — PROGRESS NOTES
Occupational Therapy  Facility/Department: [address] [place] [address] MED SURG  Daily Treatment Note  NAME: Aleena Culp  : 1945  MRN: [id number]    Date of Service: 2022    Discharge Recommendations:  Continue to assess pending progress       Assessment   Assessment: Pt agreeable to OT services. Pt participated in sponge bathing. Pt completed UB bathing and dressing with NINO. Pt tolerated well. Pt completed LB bathing with SBA<>SUP. Pt tolerated well. Pt completed oral hygiene with NINO. Pt NINO with lunch tray. Patient Diagnosis(es): There were no encounter diagnoses. has a past medical history of Colitis, GERD (gastroesophageal reflux disease), Glaucoma, and PAD (peripheral artery disease) ([facility] [address] has a past surgical history that includes Tubal ligation; eye surgery; cyst removal; skin biopsy; and Total hip arthroplasty (Left, 2019). Restrictions  Restrictions/Precautions  Restrictions/Precautions: General Precautions,Fall Risk,Weight Bearing  Required Braces or Orthoses?: Yes  Lower Extremity Weight Bearing Restrictions  Left Lower Extremity Weight Bearing: Non Weight Bearing  Required Braces or Orthoses  Left Lower Extremity Brace:  (ELS brace locked in extension)  Subjective   General  Chart Reviewed: Yes  Patient assessed for rehabilitation services?: Yes  Family / Caregiver Present: No  Referring Practitioner: Lc Hernández PA-C  Diagnosis: Functional decline, Left tibial plateau fx, neck of left fibula  Subjective  Subjective: Pt states she lives alone, LLE pain 2/10. Pt agreeable to OT services.       Orientation     Objective    ADL  Grooming: Setup  UE Bathing: Setup  LE Bathing: Stand by assistance;Supervision  UE Dressing: Setup     Plan   Plan  Times per week: 3-5  Times per day: Daily  Plan weeks: 1-2  Current Treatment Recommendations: Noemy Griffith Training,Self-Care / ADL,Safety Education & Training,Patient/Caregiver Education & Training,Functional Mobility Training    Goals  Short term goals  Time Frame for Short term goals: 2 weeks  Short term goal 1: Pt to complete dressing with MOD I. Short term goal 2: Pt to complete bathing with NINO. Short term goal 3: Pt to complete toileting with MOD I. Short term goal 4: Pt to complete safe ADL transfers maintaining WB status. Short term goal 5: Pt to tolerate x15 minutes of activity to increase functional activity tolerance. Therapy Time   Individual Concurrent Group Co-treatment   Time In 8916         Time Out 1154         Minutes 38              This note serves as a DC summary in the event of pt discharge.      Carolyn Robbins, OTR/L

## 2022-03-01 PROCEDURE — 6360000002 HC RX W HCPCS: Performed by: PHYSICIAN ASSISTANT

## 2022-03-01 PROCEDURE — 99308 SBSQ NF CARE LOW MDM 20: CPT | Performed by: INTERNAL MEDICINE

## 2022-03-01 PROCEDURE — 97530 THERAPEUTIC ACTIVITIES: CPT

## 2022-03-01 PROCEDURE — 97110 THERAPEUTIC EXERCISES: CPT

## 2022-03-01 PROCEDURE — 1200000002 HC SEMI PRIVATE SWING BED

## 2022-03-01 PROCEDURE — 6370000000 HC RX 637 (ALT 250 FOR IP): Performed by: PHYSICIAN ASSISTANT

## 2022-03-01 PROCEDURE — 97535 SELF CARE MNGMENT TRAINING: CPT

## 2022-03-01 RX ADMIN — FOLIC ACID TAB 400 MCG 0.8 MG: 400 TAB at 09:04

## 2022-03-01 RX ADMIN — CALCIUM CARBONATE 500 MG: 500 TABLET, CHEWABLE ORAL at 09:14

## 2022-03-01 RX ADMIN — FERROUS SULFATE TAB EC 324 MG (65 MG FE EQUIVALENT) 324 MG: 324 (65 FE) TABLET DELAYED RESPONSE at 09:03

## 2022-03-01 RX ADMIN — MULTIPLE VITAMINS W/ MINERALS TAB 1 TABLET: TAB at 09:03

## 2022-03-01 RX ADMIN — Medication 1 CAPSULE: at 09:03

## 2022-03-01 RX ADMIN — POTASSIUM CHLORIDE 10 MEQ: 10 CAPSULE, COATED, EXTENDED RELEASE ORAL at 09:03

## 2022-03-01 RX ADMIN — ENOXAPARIN SODIUM 30 MG: 100 INJECTION SUBCUTANEOUS at 21:21

## 2022-03-01 RX ADMIN — OXYCODONE AND ACETAMINOPHEN 1 TABLET: 5; 325 TABLET ORAL at 16:15

## 2022-03-01 RX ADMIN — OXYCODONE HYDROCHLORIDE AND ACETAMINOPHEN 500 MG: 500 TABLET ORAL at 09:03

## 2022-03-01 RX ADMIN — Medication 10 MG: at 21:54

## 2022-03-01 RX ADMIN — Medication 400 MG: at 09:03

## 2022-03-01 RX ADMIN — FAMOTIDINE 20 MG: 20 TABLET, FILM COATED ORAL at 09:04

## 2022-03-01 RX ADMIN — SENNOSIDES AND DOCUSATE SODIUM 1 TABLET: 8.6; 5 TABLET ORAL at 09:04

## 2022-03-01 RX ADMIN — SENNOSIDES AND DOCUSATE SODIUM 1 TABLET: 8.6; 5 TABLET ORAL at 21:25

## 2022-03-01 RX ADMIN — GABAPENTIN 100 MG: 100 CAPSULE ORAL at 21:25

## 2022-03-01 RX ADMIN — FAMOTIDINE 20 MG: 20 TABLET, FILM COATED ORAL at 21:25

## 2022-03-01 RX ADMIN — OXYCODONE AND ACETAMINOPHEN 1 TABLET: 5; 325 TABLET ORAL at 21:24

## 2022-03-01 RX ADMIN — OXYCODONE AND ACETAMINOPHEN 1 TABLET: 5; 325 TABLET ORAL at 05:11

## 2022-03-01 RX ADMIN — BUDESONIDE 9 MG: 3 CAPSULE, COATED PELLETS ORAL at 09:08

## 2022-03-01 RX ADMIN — ENOXAPARIN SODIUM 30 MG: 100 INJECTION SUBCUTANEOUS at 09:04

## 2022-03-01 RX ADMIN — OXYCODONE AND ACETAMINOPHEN 1 TABLET: 5; 325 TABLET ORAL at 00:59

## 2022-03-01 RX ADMIN — OXYCODONE AND ACETAMINOPHEN 1 TABLET: 5; 325 TABLET ORAL at 09:03

## 2022-03-01 ASSESSMENT — PAIN SCALES - GENERAL
PAINLEVEL_OUTOF10: 7
PAINLEVEL_OUTOF10: 5
PAINLEVEL_OUTOF10: 6
PAINLEVEL_OUTOF10: 7
PAINLEVEL_OUTOF10: 7

## 2022-03-01 NOTE — PROGRESS NOTES
Progress Note      Subjective:   Chief complaint:   Declining functional status    Interval History:   Pt seen and examined multiple times this week. States she still has pain, but it has improved with adjustments of pain medications. Review of systems:   Constitutional:  Denies fever or chills. Eyes:  Denies change in visual acuity or discharge. HENT:  Denies nasal congestion or sore throat. Respiratory:  Denies cough or shortness of breath. Cardiovascular:  Denies chest pain, palpitation. GI:  Denies abdominal pain, nausea, vomiting, bloody stools or diarrhea. :  Denies dysuria or frequency. Musculoskeletal:  Denies back pain. Positive for left lower extremity pain. Integument:  Denies rash or itching. Neurologic:  Denies headache, focal weakness or sensory changes. Psychiatric:  Denies depression or anxiety. Past medical history, surgical history, family history and social history reviewed and unchanged compared to H&P earlier this admission.     Medications:   Scheduled Meds:   ferrous sulfate  324 mg Oral Daily with breakfast    oxyCODONE-acetaminophen  1 tablet Oral BID    gabapentin  100 mg Oral Nightly    budesonide  9 mg Oral Daily    brinzolamide-brimonidine  1 drop Ophthalmic BID    famotidine  20 mg Oral BID    NONFORMULARY 1 capsule  1 capsule Oral BID    bimatoprost  1 drop Both Eyes Nightly    calcium carbonate  500 mg Oral Daily    lactobacillus  1 capsule Oral Daily    enoxaparin  30 mg SubCUTAneous BID    folic acid  0.8 mg Oral Daily    therapeutic multivitamin-minerals  1 tablet Oral Daily    magnesium oxide  400 mg Oral Daily    polyethylene glycol  17 g Oral Daily    potassium chloride  10 mEq Oral Daily    sennosides-docusate sodium  1 tablet Oral BID    vitamin C  500 mg Oral Daily     Continuous Infusions:    Objective:     Vital Signs  Temp: 98.2 °F (36.8 °C)  Pulse: 77  Resp: 12  BP: (!) 110/54  SpO2: 97 %  O2 Device: None (Room air)       Vital toes.  -2/25 patient complained of uncontrolled pain. LLE venous duplex obtained and negative for DVT. Pain meds adjusted with better control of pain.    02/21/2022    Closed fracture of neck of left fibula [S82.832A]  -see above    02/21/2022    Anemia [D64.9]  -per [facility] dc summary, hgb 13 on arrival/prior to surgery. Downtrended to 10 post op. Iron level 23, transferrin sat 9. Started on ferrous sulfate supplement. - Hgb 10.4 on 2/28  - on gi ppx    02/21/2022    Recurrent falls [R29.6]  -Patient lives alone and reports being independent with ADLs prior to fall. Does not use assist devices at home however has a history of falls with known osteoporosis with multiple fractures in the last three months.   -Continue with PT/OT  -fall precautions    02/21/2022    Underweight [R63.6]  -dietitian consulted  -continue Boost TID and MVI w/minerals     02/21/2022    Microscopic colitis [K52.839]  -continue budesonide    02/21/2022    Glaucoma [H40.9]  -Continue lumigan, brinzolamide/brimonidine, & supplements per patient request.     02/21/2022    Leg cramps [R25.2]  -on home potassium supplement; placed on hold 3/1 due to K+ 5.3  - trial of low dose gabapentin  - B12, folate, electrolytes wnl    02/21/2022    Declining functional status [R53.81]  -secondary to recent fall/fracture and NWB status  -plan as outlined above              Patient was seen and examined by Dr. Ian Salguero and plan of care reviewed. Treatment plan was formulated collaboratively.       Electronically signed by KALIE Gibbons on 3/1/2022 at 12:06 PM

## 2022-03-01 NOTE — PROGRESS NOTES
Confirmed with Dr. Jewel Beaver office that patient does not have an appointment on the 8th and that her appointment is on the 4th.

## 2022-03-01 NOTE — PROGRESS NOTES
Physical Therapy  Facility/Department: [facility] MED SURG  Daily Treatment Note  NAME: Mary Anna  : 1945  MRN: [id number]    Date of Service: 3/1/2022    Discharge Recommendations:  Continue to assess pending progress      Assessment   Assessment: Patient up in recliner when PTA arrived. Engaged patient in B LE therex to improve strength and functional mobility. Patient stood from recliner with supervision and transferred with RW and SBA to [facility] while maintaining L LE NWB. Patient able to toilet and complete jabari hygiene without assistance. Transferred back to the recliner with RW and SBA. REQUIRES PT FOLLOW UP: Yes  Activity Tolerance  Activity Tolerance: Patient Tolerated treatment well     Patient Diagnosis(es): There were no encounter diagnoses. has a past medical history of Colitis, GERD (gastroesophageal reflux disease), Glaucoma, and PAD (peripheral artery disease) ([facility] [address] has a past surgical history that includes Tubal ligation; eye surgery; cyst removal; skin biopsy; and Total hip arthroplasty (Left, 2019). Restrictions  Restrictions/Precautions  Restrictions/Precautions: General Precautions,Fall Risk,Weight Bearing  Required Braces or Orthoses?: Yes  Lower Extremity Weight Bearing Restrictions  Left Lower Extremity Weight Bearing: Non Weight Bearing  Required Braces or Orthoses  Left Lower Extremity Brace:  (ELS brace locked in extension)  Subjective   General  Chart Reviewed: Yes  Response To Previous Treatment: Patient with no complaints from previous session. Family / Caregiver Present: No  Subjective  Subjective: Patient reports she has an ortho appointment next Tuesday. States she is still doing better but having pain in the shoulders.   Pain Screening  Patient Currently in Pain: Yes  Vital Signs  Patient Currently in Pain: Yes       Objective      Transfers  Sit to Stand: Supervision  Stand to sit: Supervision  Bed to Chair: Stand by assistance (recliner to [facility]) Balance  Posture: Good  Sitting - Static: Good  Sitting - Dynamic: Good  Standing - Static: Good  Standing - Dynamic: Good; - (with A.D.)  Exercises  Quad Sets: 2x10  Heelslides: 2x10 R  Gluteal Sets: 2x10  Hip Abduction: 2x10 R  Knee Short Arc Quad: 2x10 R  Ankle Pumps: 2x10 R, wiggling toes L      Goals  Long term goals  Time Frame for Long term goals : 10 days  Long term goal 1: Patient will perform all bed mobility independently. Long term goal 2: Patient will perform sit to stand and transfers with modified independence. Long term goal 3: Patient will ambulate 50'x2, NWB L LE, with RW and SBA. Long term goal 4: Patient will demonstrate independence with HEP. Patient Goals   Patient goals : Return home. Plan    Plan  Times per week: 3-5x/week  Times per day: Daily  Current Treatment Recommendations: Bibi Smalls Training,Patient/Caregiver Education & Training,Balance Training,Gait Training,Home Exercise Program,Functional Mobility Training,Safety Education & Training  Safety Devices  Type of devices: Left in chair,Call light within reach     Therapy Time   Individual Concurrent Group Co-treatment   Time In 9102         Time Out 9871         Minutes 58              This note serves as D/C summary if patient is discharged prior to next visit.   Chetan Tong, PTA

## 2022-03-01 NOTE — PLAN OF CARE
Problem: Falls - Risk of:  Goal: Will remain free from falls  3/1/2022 1025 by Vernell Kelly RN  Outcome: Ongoing  2/28/2022 2052 by Carolynn Duque LPN  Outcome: Ongoing  Goal: Absence of physical injury  3/1/2022 1025 by Vernell Kelly RN  Outcome: Ongoing  2/28/2022 2052 by Carolynn Duque LPN  Outcome: Ongoing     Problem: Skin Integrity:  Goal: Will show no infection signs and symptoms  Outcome: Ongoing  Goal: Absence of new skin breakdown  Outcome: Ongoing     Problem: Infection:  Goal: Will remain free from infection  Outcome: Ongoing     Problem: Safety:  Goal: Free from accidental physical injury  Outcome: Ongoing  Goal: Free from intentional harm  Outcome: Ongoing     Problem: Daily Care:  Goal: Daily care needs are met  Outcome: Ongoing     Problem: Pain:  Goal: Patient's pain/discomfort is manageable  Outcome: Ongoing  Goal: Pain level will decrease  Outcome: Ongoing  Goal: Control of acute pain  Outcome: Ongoing  Goal: Control of chronic pain  Outcome: Ongoing     Problem: Skin Integrity:  Goal: Skin integrity will stabilize  Outcome: Ongoing     Problem: Discharge Planning:  Goal: Patients continuum of care needs are met  Outcome: Ongoing

## 2022-03-01 NOTE — FLOWSHEET NOTE
02/28/22 2015   Assessment   Charting Type Shift assessment   Neurological   Neuro (WDL) WDL   [place] Coma Scale   Eye Opening 4   Best Verbal Response 5   Best Motor Response 6   [place] Coma Scale Score 15   HEENT   HEENT (WDL) X   Right Eye Impaired vision; Other (Comment)   Left Eye Impaired vision   Respiratory   Respiratory (WDL) WDL   Respiratory Pattern Regular   Respiratory Depth Normal   Respiratory Quality/Effort Unlabored   Chest Assessment Chest expansion symmetrical;Trachea midline   L Breath Sounds Clear   R Breath Sounds Clear   Cardiac   Cardiac (WDL) WDL   Cardiac Monitor   Telemetry Monitor On No   Gastrointestinal   Abdominal (WDL) X  (hx colitis)   GI Symptoms Constipation   Abdomen Inspection Flat;Soft   Tenderness Nontender   RUQ Bowel Sounds Active   LUQ Bowel Sounds Active   RLQ Bowel Sounds Active   LLQ Bowel Sounds Active   Peripheral Vascular   Peripheral Vascular (WDL) WDL   Edema None   Skin Color/Condition   Skin Color/Condition (WDL) X   Skin Color Pale   Skin Condition/Temp Dry; Warm   Skin Integrity   Skin Integrity (WDL) X   Skin Integrity Other (Comment)  (Brace/ACE)   Location LLE   Musculoskeletal   Musculoskeletal (WDL) X   RUE Full movement   LUE Full movement   RL Extremity Full movement   LL Extremity Limited movement;Surgery; Unsteady  (NWB)   Genitourinary   Genitourinary (WDL) WDL   Urine Assessment   Incontinence No   Urine Color Yellow/straw   Urine Appearance Clear   Urine Odor No odor   Psychosocial   Psychosocial (WDL) WDL

## 2022-03-01 NOTE — FLOWSHEET NOTE
03/01/22 0917   Assessment   Charting Type Shift assessment   Neurological   Neuro (WDL) WDL   [place] Coma Scale   Eye Opening 4   Best Verbal Response 5   Best Motor Response 6   Denzel Coma Scale Score 15   HEENT   HEENT (WDL) X   Right Eye Impaired vision; Other (Comment)   Left Eye Impaired vision   Respiratory   Respiratory (WDL) WDL   Respiratory Pattern Regular   Respiratory Depth Normal   Respiratory Quality/Effort Unlabored   Chest Assessment Chest expansion symmetrical;Trachea midline   L Breath Sounds Clear   R Breath Sounds Clear   Breath Sounds   Right Upper Lobe Clear   Right Middle Lobe Clear   Right Lower Lobe Clear   Left Upper Lobe Clear   Left Lower Lobe Clear   Cardiac   Cardiac (WDL) WDL   Cardiac Monitor   Telemetry Monitor On No   Gastrointestinal   Abdominal (WDL) X  (hx colitis)   GI Symptoms Constipation   Abdomen Inspection Flat;Soft   Tenderness Nontender   RUQ Bowel Sounds Active   LUQ Bowel Sounds Active   RLQ Bowel Sounds Active   LLQ Bowel Sounds Active   Peripheral Vascular   Peripheral Vascular (WDL) WDL   Edema None   Skin Color/Condition   Skin Color/Condition (WDL) X   Skin Integrity   Skin Integrity (WDL) X   Musculoskeletal   Musculoskeletal (WDL) X   RUE Full movement   LUE Full movement   RL Extremity Full movement   LL Extremity Limited movement;Surgery; Unsteady  (NWB)   Genitourinary   Genitourinary (WDL) WDL   Urine Assessment   Incontinence No   Urine Color Yellow/straw   Urine Appearance Clear   Urine Odor No odor   Psychosocial   Psychosocial (WDL) WDL   Pt awake and in bed. Left Lower Ext ace wrap and brace intact. Toes warm and pink. Pt moves toes without complaint. LLE elevated and supported on pillow. Pain controlled with current pain meds.

## 2022-03-01 NOTE — PROGRESS NOTES
Occupational Therapy  Facility/Department: [address] [place] [address] MED SURG  Daily Treatment Note  NAME: Javier Lyons  : 1945  MRN: [id number]    Date of Service: 3/1/2022    Discharge Recommendations:  Continue to assess pending progress       Assessment   Assessment: Pt agreeable to OT services. Pt transferred to sitting at EOB with MOD I. Pt transferred to [facility] with CGA<>SBA maintaining WB status. Pt self propelled throughout facility ~250 feet using BUE. Pt required rest breaks secondary to fatigue in BUE shoulders. Pt able to maneuver in and out of doorway without assistance. Pt returned to room and completed toileting in bathroom using grab bar to complete transfer to toilet. Pt toileted without assistance. OT instructed pt in LB dressing technique. Pt demo ability to don undergarments with CGA<>SBA using reacher. Pt tolerated well and demo good safety. Pt transferred to recliner and positioned with pillows. Pt left with call light in reach. Activity Tolerance  Activity Tolerance: Patient limited by fatigue         Patient Diagnosis(es): There were no encounter diagnoses. has a past medical history of Colitis, GERD (gastroesophageal reflux disease), Glaucoma, and PAD (peripheral artery disease) ([facility] [address] has a past surgical history that includes Tubal ligation; eye surgery; cyst removal; skin biopsy; and Total hip arthroplasty (Left, 2019).     Restrictions  Restrictions/Precautions  Restrictions/Precautions: General Precautions,Fall Risk,Weight Bearing  Required Braces or Orthoses?: Yes  Lower Extremity Weight Bearing Restrictions  Left Lower Extremity Weight Bearing: Non Weight Bearing  Required Braces or Orthoses  Left Lower Extremity Brace:  (ELS brace locked in extension)  Subjective   General  Chart Reviewed: Yes  Patient assessed for rehabilitation services?: Yes  Family / Caregiver Present: No  Referring Practitioner: Hansa Valiente PA-C  Diagnosis: Functional decline, Left tibial plateau fx, neck of left fibula  Subjective  Subjective: Pt states she lives alone, LLE pain 2/10. Pt agreeable to OT services. Orientation     Objective    ADL  LE Dressing: Contact guard assistance;Stand by assistance  Toileting: Stand by assistance        Functional Mobility  Functional - Mobility Device: Wheelchair  Functional Mobility Comments: 250 feet with 3 rest breaks secondary to fatigue in BUE shoulders. Toilet Transfers  Toilet - Technique: Stand pivot  Toilet Transfer: Contact guard assistance  Bed mobility  Supine to Sit: Modified independent  Scooting: Modified independent  Transfers  Stand Pivot Transfers: Contact guard assistance;Stand by assistance  Sit to stand: Contact guard assistance;Stand by assistance        Plan   Plan  Times per week: 3-5  Times per day: Daily  Plan weeks: 1-2  Current Treatment Recommendations: Strengthening,Endurance Training,Balance Training,Self-Care / ADL,Safety Education & Training,Patient/Caregiver Education & Training,Functional Mobility Training    Goals  Short term goals  Time Frame for Short term goals: 2 weeks  Short term goal 1: Pt to complete dressing with MOD I. Short term goal 2: Pt to complete bathing with NINO. Short term goal 3: Pt to complete toileting with MOD I. Short term goal 4: Pt to complete safe ADL transfers maintaining WB status. Short term goal 5: Pt to tolerate x15 minutes of activity to increase functional activity tolerance. Therapy Time   Individual Concurrent Group Co-treatment   Time In 1033         Time Out 1116         Minutes 43              This note serves as a DC summary in the event of pt discharge.      Carolyn Robbins OTR/L

## 2022-03-02 PROCEDURE — 6370000000 HC RX 637 (ALT 250 FOR IP): Performed by: PHYSICIAN ASSISTANT

## 2022-03-02 PROCEDURE — 6360000002 HC RX W HCPCS: Performed by: PHYSICIAN ASSISTANT

## 2022-03-02 PROCEDURE — 1200000002 HC SEMI PRIVATE SWING BED

## 2022-03-02 PROCEDURE — 97110 THERAPEUTIC EXERCISES: CPT

## 2022-03-02 PROCEDURE — 97530 THERAPEUTIC ACTIVITIES: CPT

## 2022-03-02 PROCEDURE — 97535 SELF CARE MNGMENT TRAINING: CPT

## 2022-03-02 RX ADMIN — OXYCODONE AND ACETAMINOPHEN 1 TABLET: 5; 325 TABLET ORAL at 02:53

## 2022-03-02 RX ADMIN — OXYCODONE AND ACETAMINOPHEN 1 TABLET: 5; 325 TABLET ORAL at 20:08

## 2022-03-02 RX ADMIN — SENNOSIDES AND DOCUSATE SODIUM 1 TABLET: 8.6; 5 TABLET ORAL at 20:08

## 2022-03-02 RX ADMIN — FERROUS SULFATE TAB EC 324 MG (65 MG FE EQUIVALENT) 324 MG: 324 (65 FE) TABLET DELAYED RESPONSE at 08:34

## 2022-03-02 RX ADMIN — GABAPENTIN 100 MG: 100 CAPSULE ORAL at 20:08

## 2022-03-02 RX ADMIN — FAMOTIDINE 20 MG: 20 TABLET, FILM COATED ORAL at 08:33

## 2022-03-02 RX ADMIN — OXYCODONE HYDROCHLORIDE AND ACETAMINOPHEN 500 MG: 500 TABLET ORAL at 08:33

## 2022-03-02 RX ADMIN — Medication 400 MG: at 08:34

## 2022-03-02 RX ADMIN — CALCIUM CARBONATE 500 MG: 500 TABLET, CHEWABLE ORAL at 09:19

## 2022-03-02 RX ADMIN — MULTIPLE VITAMINS W/ MINERALS TAB 1 TABLET: TAB at 08:33

## 2022-03-02 RX ADMIN — FAMOTIDINE 20 MG: 20 TABLET, FILM COATED ORAL at 20:08

## 2022-03-02 RX ADMIN — ENOXAPARIN SODIUM 30 MG: 100 INJECTION SUBCUTANEOUS at 20:09

## 2022-03-02 RX ADMIN — ENOXAPARIN SODIUM 30 MG: 100 INJECTION SUBCUTANEOUS at 08:33

## 2022-03-02 RX ADMIN — OXYCODONE AND ACETAMINOPHEN 1 TABLET: 5; 325 TABLET ORAL at 08:37

## 2022-03-02 RX ADMIN — BUDESONIDE 9 MG: 3 CAPSULE, COATED PELLETS ORAL at 08:35

## 2022-03-02 RX ADMIN — Medication 1 CAPSULE: at 08:33

## 2022-03-02 RX ADMIN — Medication 10 MG: at 20:12

## 2022-03-02 RX ADMIN — FOLIC ACID TAB 400 MCG 0.8 MG: 400 TAB at 08:33

## 2022-03-02 RX ADMIN — SENNOSIDES AND DOCUSATE SODIUM 1 TABLET: 8.6; 5 TABLET ORAL at 08:33

## 2022-03-02 ASSESSMENT — PAIN SCALES - GENERAL
PAINLEVEL_OUTOF10: 8
PAINLEVEL_OUTOF10: 5
PAINLEVEL_OUTOF10: 0
PAINLEVEL_OUTOF10: 6

## 2022-03-02 ASSESSMENT — PAIN DESCRIPTION - LOCATION: LOCATION: LEG;SHOULDER

## 2022-03-02 NOTE — FLOWSHEET NOTE
symmetrical;Trachea midline   L Breath Sounds Clear   R Breath Sounds Clear   Cardiac   Cardiac (WDL) WDL   Cardiac Monitor   Telemetry Monitor On No   Gastrointestinal   Abdominal (WDL) X  (hx colitis)   Abdomen Inspection Flat;Soft   Tenderness Nontender   RUQ Bowel Sounds Active   LUQ Bowel Sounds Active   RLQ Bowel Sounds Active   LLQ Bowel Sounds Active   Peripheral Vascular   Peripheral Vascular (WDL) WDL   Edema None   LLE Neurovascular Assessment   L Pedal Pulse +1   Skin Color/Condition   Skin Color/Condition (WDL) X   Skin Color Pale   Skin Condition/Temp Warm;Dry   Skin Integrity   Skin Integrity (WDL) X   Skin Integrity Other (Comment)  (brace/ace wrap)   Location LLE   Musculoskeletal   Musculoskeletal (WDL) X   RUE Full movement   LUE Full movement   RL Extremity Full movement   LL Extremity Limited movement;Surgery; Unsteady   Genitourinary   Genitourinary (WDL) WDL   Urine Assessment   Incontinence No   Anus/Rectum   Anus/Rectum (WDL) WDL   Psychosocial   Psychosocial (WDL) WDL

## 2022-03-02 NOTE — PROGRESS NOTES
Occupational Therapy  Facility/Department: [address] [place] [address] MED SURG  Daily Treatment Note  NAME: Mi Penaloza  : 1945  MRN: [id number]    Date of Service: 3/2/2022    Discharge Recommendations:  Continue to assess pending progress       Assessment   Assessment: Pt agreeable to OT services. Pt transferred to standing and ambulated to bathroom using RW SBA. Pt transferred to toilet with SBA. Pt toileted with SUP. Pt completed sponge bathing seated on toilet with NINO. Pt completed hygiene/grooming with NINO seated on toilet. Pt ambulated back to chair with RW SBA. Pt completed BUE AROM ther ex isometrics. Pt continues to have BUE shoulder pain secondary to using RW for support to maintain NWB status. Patient Diagnosis(es): There were no encounter diagnoses. has a past medical history of Colitis, GERD (gastroesophageal reflux disease), Glaucoma, and PAD (peripheral artery disease) ([facility] [address] has a past surgical history that includes Tubal ligation; eye surgery; cyst removal; skin biopsy; and Total hip arthroplasty (Left, 2019). Restrictions  Restrictions/Precautions  Restrictions/Precautions: General Precautions,Fall Risk,Weight Bearing  Required Braces or Orthoses?: Yes  Lower Extremity Weight Bearing Restrictions  Left Lower Extremity Weight Bearing: Non Weight Bearing  Required Braces or Orthoses  Left Lower Extremity Brace:  (ELS brace locked in extension)  Subjective   General  Chart Reviewed: Yes  Patient assessed for rehabilitation services?: Yes  Family / Caregiver Present: No  Referring Practitioner: Sheryle Moores, PA-C  Diagnosis: Functional decline, Left tibial plateau fx, neck of left fibula  Subjective  Subjective: Pt states she lives alone, LLE pain 2/10. Pt agreeable to OT services.       Orientation     Objective    ADL  Grooming: Setup  UE Bathing: Setup  LE Bathing: Setup  UE Dressing: Setup  Toileting: Stand by assistance        Functional Mobility  Functional - Mobility Device: Rolling Walker  Activity: To/from bathroom  Assist Level: Stand by assistance                                                  Type of ROM/Therapeutic Exercise  Type of ROM/Therapeutic Exercise: AROM  Exercises  Scapular Protraction: x10  Scapular Retraction: x10  Shoulder Elevation: x10  Shoulder Flexion: isometrics x10  Shoulder Extension: isometrics x10  Shoulder ADduction: x10 isometrics                    Plan   Plan  Times per week: 3-5  Times per day: Daily  Plan weeks: 1-2  Current Treatment Recommendations: Strengthening,Endurance Training,Balance Training,Self-Care / ADL,Safety Education & Training,Patient/Caregiver Education & Training,Functional Mobility Training    Goals  Short term goals  Time Frame for Short term goals: 2 weeks  Short term goal 1: Pt to complete dressing with MOD I. Short term goal 2: Pt to complete bathing with NINO. Short term goal 3: Pt to complete toileting with MOD I. Short term goal 4: Pt to complete safe ADL transfers maintaining WB status. Short term goal 5: Pt to tolerate x15 minutes of activity to increase functional activity tolerance. Therapy Time   Individual Concurrent Group Co-treatment   Time In 0101         Time Out 0139         Minutes 38              This note serves as a DC summary in the event of pt discharge.      Carolyn Robbins, OTR/L

## 2022-03-02 NOTE — PROGRESS NOTES
Physical Therapy  Facility/Department: [facility] MED SURG  Daily Treatment Note  NAME: Afua Pacheco  : 1945  MRN: [id number]    Date of Service: 3/2/2022    Discharge Recommendations:  Continue to assess pending progress        Assessment   Assessment: Patient completed bed mobility tasks with Mod I.  Stood with supervision and ambulated 40 feet with RW and SBA. Patient reports her shoulders really bother her as much or more than her leg. Patient transferred to the recliner and performed B LE therex for strtengthening. REQUIRES PT FOLLOW UP: Yes  Activity Tolerance  Activity Tolerance: Patient Tolerated treatment well     Patient Diagnosis(es): There were no encounter diagnoses. has a past medical history of Colitis, GERD (gastroesophageal reflux disease), Glaucoma, and PAD (peripheral artery disease) ([facility] [address] has a past surgical history that includes Tubal ligation; eye surgery; cyst removal; skin biopsy; and Total hip arthroplasty (Left, 2019). Restrictions  Restrictions/Precautions  Restrictions/Precautions: General Precautions,Fall Risk,Weight Bearing  Required Braces or Orthoses?: Yes  Lower Extremity Weight Bearing Restrictions  Left Lower Extremity Weight Bearing: Non Weight Bearing  Required Braces or Orthoses  Left Lower Extremity Brace:  (ELS brace locked in extension)  Subjective   General  Chart Reviewed: Yes  Response To Previous Treatment: Patient with no complaints from previous session. Family / Caregiver Present: No  Subjective  Subjective: Patient reports her appointment has been changed to this Friday. States the pain meds are helping her leg some, but her shoulders continue to hurt.   Pain Screening  Patient Currently in Pain: Yes  Pain Assessment  Pain Location: Leg;Shoulder  Vital Signs  Patient Currently in Pain: Yes       Objective   Bed mobility  Rolling to Left: Modified independent  Supine to Sit: Modified independent  Scooting: Modified independent  Transfers  Sit to Stand: Supervision  Stand to sit: Supervision  Ambulation  Ambulation?: Yes  WB Status: NWB L LE  Ambulation 1  Surface: level tile  Device: Rolling Walker  Assistance: Stand by assistance  Distance: 40 feet     Balance  Posture: Good  Sitting - Static: Good  Sitting - Dynamic: Good  Standing - Static: Good  Standing - Dynamic: Good; - (with A.D.)  Exercises  Quad Sets: 2x10  Heelslides: 2x10 R  Gluteal Sets: 2x10  Hip Abduction: 2x10 R  Knee Short Arc Quad: 2x10 R  Ankle Pumps: 2x10 R, wiggling toes L         Goals  Long term goals  Time Frame for Long term goals : 10 days  Long term goal 1: Patient will perform all bed mobility independently. Long term goal 2: Patient will perform sit to stand and transfers with modified independence. Long term goal 3: Patient will ambulate 50'x2, NWB L LE, with RW and SBA. Long term goal 4: Patient will demonstrate independence with HEP. Patient Goals   Patient goals : Return home. Plan    Plan  Times per week: 3-5x/week  Times per day: Daily  Current Treatment Recommendations: Avis Small Training,Patient/Caregiver Education & Training,Balance Training,Gait Training,Home Exercise Program,Functional Mobility Training,Safety Education & Training  Safety Devices  Type of devices: Left in chair,Call light within reach     Therapy Time   Individual Concurrent Group Co-treatment   Time In 0928         Time Out 3683         Minutes 30              This note serves as D/C summary if patient is discharged prior to next visit.   Lona Mondragon, PTA

## 2022-03-03 PROCEDURE — 6370000000 HC RX 637 (ALT 250 FOR IP): Performed by: PHYSICIAN ASSISTANT

## 2022-03-03 PROCEDURE — 97803 MED NUTRITION INDIV SUBSEQ: CPT

## 2022-03-03 PROCEDURE — 97530 THERAPEUTIC ACTIVITIES: CPT

## 2022-03-03 PROCEDURE — 97535 SELF CARE MNGMENT TRAINING: CPT

## 2022-03-03 PROCEDURE — 1200000002 HC SEMI PRIVATE SWING BED

## 2022-03-03 PROCEDURE — 97110 THERAPEUTIC EXERCISES: CPT

## 2022-03-03 PROCEDURE — 6360000002 HC RX W HCPCS: Performed by: PHYSICIAN ASSISTANT

## 2022-03-03 RX ADMIN — GABAPENTIN 100 MG: 100 CAPSULE ORAL at 20:08

## 2022-03-03 RX ADMIN — OXYCODONE AND ACETAMINOPHEN 1 TABLET: 5; 325 TABLET ORAL at 00:48

## 2022-03-03 RX ADMIN — BUDESONIDE 9 MG: 3 CAPSULE, COATED PELLETS ORAL at 08:14

## 2022-03-03 RX ADMIN — MULTIPLE VITAMINS W/ MINERALS TAB 1 TABLET: TAB at 08:09

## 2022-03-03 RX ADMIN — FAMOTIDINE 20 MG: 20 TABLET, FILM COATED ORAL at 20:08

## 2022-03-03 RX ADMIN — OXYCODONE AND ACETAMINOPHEN 1 TABLET: 5; 325 TABLET ORAL at 08:08

## 2022-03-03 RX ADMIN — SENNOSIDES AND DOCUSATE SODIUM 1 TABLET: 8.6; 5 TABLET ORAL at 20:08

## 2022-03-03 RX ADMIN — Medication 10 MG: at 20:08

## 2022-03-03 RX ADMIN — ENOXAPARIN SODIUM 30 MG: 100 INJECTION SUBCUTANEOUS at 08:08

## 2022-03-03 RX ADMIN — FAMOTIDINE 20 MG: 20 TABLET, FILM COATED ORAL at 08:09

## 2022-03-03 RX ADMIN — OXYCODONE HYDROCHLORIDE AND ACETAMINOPHEN 500 MG: 500 TABLET ORAL at 08:09

## 2022-03-03 RX ADMIN — Medication 400 MG: at 08:08

## 2022-03-03 RX ADMIN — OXYCODONE AND ACETAMINOPHEN 1 TABLET: 5; 325 TABLET ORAL at 20:09

## 2022-03-03 RX ADMIN — SENNOSIDES AND DOCUSATE SODIUM 1 TABLET: 8.6; 5 TABLET ORAL at 08:08

## 2022-03-03 RX ADMIN — CALCIUM CARBONATE 500 MG: 500 TABLET, CHEWABLE ORAL at 08:14

## 2022-03-03 RX ADMIN — FERROUS SULFATE TAB EC 324 MG (65 MG FE EQUIVALENT) 324 MG: 324 (65 FE) TABLET DELAYED RESPONSE at 08:09

## 2022-03-03 RX ADMIN — FOLIC ACID TAB 400 MCG 0.8 MG: 400 TAB at 08:09

## 2022-03-03 RX ADMIN — Medication 1 CAPSULE: at 08:08

## 2022-03-03 RX ADMIN — ENOXAPARIN SODIUM 30 MG: 100 INJECTION SUBCUTANEOUS at 20:08

## 2022-03-03 ASSESSMENT — PAIN DESCRIPTION - LOCATION: LOCATION: LEG;SHOULDER

## 2022-03-03 ASSESSMENT — PAIN SCALES - GENERAL
PAINLEVEL_OUTOF10: 0
PAINLEVEL_OUTOF10: 7
PAINLEVEL_OUTOF10: 8
PAINLEVEL_OUTOF10: 5

## 2022-03-03 NOTE — PLAN OF CARE
Problem: Skin Integrity:  Goal: Will show no infection signs and symptoms  Description: Will show no infection signs and symptoms  Outcome: Ongoing  Goal: Absence of new skin breakdown  Description: Absence of new skin breakdown  Outcome: Ongoing     Problem: Infection:  Goal: Will remain free from infection  Description: Will remain free from infection  Outcome: Ongoing     Problem: Safety:  Goal: Free from accidental physical injury  Description: Free from accidental physical injury  Outcome: Ongoing  Goal: Free from intentional harm  Description: Free from intentional harm  Outcome: Ongoing     Problem: Daily Care:  Goal: Daily care needs are met  Description: Daily care needs are met  Outcome: Ongoing     Problem: Pain:  Goal: Patient's pain/discomfort is manageable  Description: Patient's pain/discomfort is manageable  Outcome: Ongoing  Goal: Pain level will decrease  Description: Pain level will decrease  Outcome: Ongoing  Goal: Control of acute pain  Description: Control of acute pain  Outcome: Ongoing  Goal: Control of chronic pain  Description: Control of chronic pain  Outcome: Ongoing

## 2022-03-03 NOTE — PROGRESS NOTES
Occupational Therapy  Facility/Department: [address] [place] [address] MED SURG  Daily Treatment Note  NAME: Rudy Garcia  : 1945  MRN: [id number]    Date of Service: 3/3/2022    Discharge Recommendations:  Continue to assess pending progress       Assessment   Assessment: Pt agreeable to OT services. Pt transferred to standing and ambulated to bathroom using RW SBA. Pt transferred to toilet with SBA. Pt completed sponge bathing seated on toilet with NINO. Pt completed hygiene/grooming with NINO seated on toilet. Pt ambulated back to chair with RW SBA. Pt continues to have BUE shoulder pain secondary to using RW for support to maintain NWB status pt required standing rest breaks secondary to pain and fatigue. Pt positioned in chair and left with call light in reach. Patient Diagnosis(es): There were no encounter diagnoses. has a past medical history of Colitis, GERD (gastroesophageal reflux disease), Glaucoma, and PAD (peripheral artery disease) ([facility] [address] has a past surgical history that includes Tubal ligation; eye surgery; cyst removal; skin biopsy; and Total hip arthroplasty (Left, 2019). Restrictions  Restrictions/Precautions  Restrictions/Precautions: General Precautions,Fall Risk,Weight Bearing  Required Braces or Orthoses?: Yes  Lower Extremity Weight Bearing Restrictions  Left Lower Extremity Weight Bearing: Non Weight Bearing  Required Braces or Orthoses  Left Lower Extremity Brace:  (ELS brace locked in extension)  Subjective   General  Chart Reviewed: Yes  Patient assessed for rehabilitation services?: Yes  Family / Caregiver Present: No  Referring Practitioner: Kendra Hunt PA-C  Diagnosis: Functional decline, Left tibial plateau fx, neck of left fibula  Subjective  Subjective: Pt states she lives alone, LLE pain 2/10. Pt agreeable to OT services.       Orientation     Objective    ADL  Grooming: Setup  UE Bathing: Setup  LE Bathing: Setup  UE Dressing: Setup       Plan   Plan  Times per week: 3-5  Times per day: Daily  Plan weeks: 1-2  Current Treatment Recommendations: Strengthening,Endurance Training,Balance Training,Self-Care / ADL,Safety Education & Training,Patient/Caregiver Education & Training,Functional Mobility Training    Goals  Short term goals  Time Frame for Short term goals: 2 weeks  Short term goal 1: Pt to complete dressing with MOD I. Short term goal 2: Pt to complete bathing with NINO. Short term goal 3: Pt to complete toileting with MOD I. Short term goal 4: Pt to complete safe ADL transfers maintaining WB status. Short term goal 5: Pt to tolerate x15 minutes of activity to increase functional activity tolerance. Therapy Time   Individual Concurrent Group Co-treatment   Time In 6058         Time Out 0339         Minutes 41              This note serves as a DC summary in the event of pt discharge.      Carolyn Robbins OTR/L

## 2022-03-03 NOTE — PROGRESS NOTES
Physical Therapy  Facility/Department: [facility] MED SURG  Daily Treatment Note  NAME: Doyle Cortez  : 1945  MRN: [id number]    Date of Service: 3/3/2022    Discharge Recommendations:  Continue to assess pending progress      Assessment   Assessment: Patient completed bed mobility tasks with Mod I.  Stood with supervision and transferred to [facility]. Patient able to toilet, complete hygiene, and maintain L LE NWB with supervision. Patient ambulated 40 feet with RW, L LE NWB, and SBA. Transferred to the recliner and performed B LE therex. REQUIRES PT FOLLOW UP: Yes  Activity Tolerance  Activity Tolerance: Patient Tolerated treatment well     Patient Diagnosis(es): There were no encounter diagnoses. has a past medical history of Colitis, GERD (gastroesophageal reflux disease), Glaucoma, and PAD (peripheral artery disease) ([facility] [address] has a past surgical history that includes Tubal ligation; eye surgery; cyst removal; skin biopsy; and Total hip arthroplasty (Left, 2019). Restrictions  Restrictions/Precautions  Restrictions/Precautions: General Precautions,Fall Risk,Weight Bearing  Required Braces or Orthoses?: Yes  Lower Extremity Weight Bearing Restrictions  Left Lower Extremity Weight Bearing: Non Weight Bearing  Required Braces or Orthoses  Left Lower Extremity Brace:  (ELS brace locked in extension)  Subjective   General  Chart Reviewed: Yes  Response To Previous Treatment: Patient with no complaints from previous session. Family / Caregiver Present: No  Subjective  Subjective: Patient repots her brother will take her to appointments tomorrow.   Pain Screening  Patient Currently in Pain: Yes  Pain Assessment  Pain Location: Leg;Shoulder  Vital Signs  Patient Currently in Pain: Yes       Objective   Bed mobility  Rolling to Left: Modified independent  Supine to Sit: Modified independent  Scooting: Modified independent  Transfers  Sit to Stand: Supervision  Stand to sit: Supervision  Bed to Chair: Stand by assistance  Ambulation  Ambulation?: Yes  WB Status: NWB L LE  Ambulation 1  Surface: level tile  Device: Rolling Walker  Assistance: Stand by assistance  Distance: 40 feet     Balance  Posture: Good  Sitting - Static: Good  Sitting - Dynamic: Good  Standing - Static: Good  Standing - Dynamic: Good; - (with A.D.)  Exercises  Quad Sets: 2x10  Heelslides: 2x10 R  Gluteal Sets: 2x10  Hip Abduction: 2x10 R  Knee Short Arc Quad: 2x10 R  Ankle Pumps: 2x10 R, wiggling toes L      Goals  Long term goals  Time Frame for Long term goals : 10 days  Long term goal 1: Patient will perform all bed mobility independently. Long term goal 2: Patient will perform sit to stand and transfers with modified independence. Long term goal 3: Patient will ambulate 50'x2, NWB L LE, with RW and SBA. Long term goal 4: Patient will demonstrate independence with HEP. Patient Goals   Patient goals : Return home. Plan    Plan  Times per week: 3-5x/week  Times per day: Daily  Current Treatment Recommendations: Lesli Esquivel Training,Patient/Caregiver Education & Training,Balance Training,Gait Training,Home Exercise Program,Functional Mobility Training,Safety Education & Training  Safety Devices  Type of devices: Left in chair,Call light within reach     Therapy Time   Individual Concurrent Group Co-treatment   Time In 0930         Time Out 7298         Minutes 23              This note serves as D/C summary if patient is discharged prior to next visit.   No Maher, PTA

## 2022-03-03 NOTE — PROGRESS NOTES
Nutrition Assessment     Type and Reason for Visit:  (follow up)    Nutrition Recommendations/Plan: Recommend a follow up lab test for Potassium. On 2/28/22 was 5.3.  KCl on hold since March 1. Will make appropriate recommendation for diet and ONS based on pertinent labs. Nutrition Assessment:  Pt continue with healing LE fracture and healing vascular ulcer. Pt intakes remain stable. She continues to be low-moderate risk for ongoing nutritional compromise r/t being underweight and predicted inadequate intakes. Malnutrition Assessment:  Malnutrition Status: At risk for malnutrition (Comment) (Pt has osteoporosis, and is underweight,  She seems to be eating good at this this time, and states she eats good, but has had colitis for many years.)    Estimated Daily Nutrient Needs:  Energy (kcal): 0408-4682 (30-32 kcal/kg cbw); Weight Used for Energy Requirements:  Current     Protein (g): 65-78 (1.25-1.5 gm/kg CBW); Weight Used for Protein Requirements:  Current        Fluid (ml/day): 1685-9742; Weight Used for Fluid Requirements:  1 ml/kcal      Nutrition Related Findings: Pt presents underwieght but appears with no muscle wasting or fat loss. No edema reported. Last labs done on 2/28 and K+ elevated. 5.3. Current Nutrition Therapies:    ADULT DIET;  Regular  ADULT ORAL NUTRITION SUPPLEMENT; Breakfast, Lunch, Dinner; Standard High Calorie/High Protein Oral Supplement    Anthropometric Measures:  · Height: 5' 7\" (170.2 cm)  · Current Body Wt: 115 lb (52.2 kg)   · BMI: 18    Nutrition Diagnosis:   · Altered nutrition-related lab values related to renal dysfunction as evidenced by lab values      Nutrition Interventions:   Food and/or Nutrient Delivery:  Continue Current Diet,Continue Oral Nutrition Supplement  Nutrition Education/Counseling:  Education completed (spoke to patient about the importance of eating enough calories and protein while healing and then on return home.)   Coordination of Nutrition Care:  Interdisciplinary Rounds,Continue to monitor while inpatient    Goals:  to meet est needs for age/condition       Nutrition Monitoring and Evaluation:   Behavioral-Environmental Outcomes:      Food/Nutrient Intake Outcomes:  Food and Nutrient Intake,Supplement Intake  Physical Signs/Symptoms Outcomes:  Biochemical Data,Weight,Skin     Discharge Planning:    Continue current diet,Continue Oral Nutrition Supplement     Electronically signed by Charles Carcamo MS, RD, LD on 3/3/22 at 1:04 PM EST

## 2022-03-04 LAB
A/G RATIO: 1.4 (ref 0.8–2)
ALBUMIN SERPL-MCNC: 3.1 G/DL (ref 3.4–4.8)
ALP BLD-CCNC: 119 U/L (ref 25–100)
ALT SERPL-CCNC: 37 U/L (ref 4–36)
ANION GAP SERPL CALCULATED.3IONS-SCNC: 9 MMOL/L (ref 3–16)
AST SERPL-CCNC: 24 U/L (ref 8–33)
BASOPHILS ABSOLUTE: 0.1 K/UL (ref 0–0.1)
BASOPHILS RELATIVE PERCENT: 0.8 %
BILIRUB SERPL-MCNC: 0.3 MG/DL (ref 0.3–1.2)
BUN BLDV-MCNC: 16 MG/DL (ref 6–20)
CALCIUM SERPL-MCNC: 9.1 MG/DL (ref 8.5–10.5)
CHLORIDE BLD-SCNC: 104 MMOL/L (ref 98–107)
CO2: 25 MMOL/L (ref 20–30)
CREAT SERPL-MCNC: <0.5 MG/DL (ref 0.4–1.2)
EOSINOPHILS ABSOLUTE: 0.3 K/UL (ref 0–0.4)
EOSINOPHILS RELATIVE PERCENT: 4.7 %
GFR AFRICAN AMERICAN: >59
GFR NON-AFRICAN AMERICAN: >60
GLOBULIN: 2.2 G/DL
GLUCOSE BLD-MCNC: 89 MG/DL (ref 74–106)
HCT VFR BLD CALC: 35.3 % (ref 37–47)
HEMOGLOBIN: 10.5 G/DL (ref 11.5–16.5)
IMMATURE GRANULOCYTES #: 0.1 K/UL
IMMATURE GRANULOCYTES %: 1 % (ref 0–5)
LYMPHOCYTES ABSOLUTE: 1.6 K/UL (ref 1.5–4)
LYMPHOCYTES RELATIVE PERCENT: 25 %
MCH RBC QN AUTO: 29.6 PG (ref 27–32)
MCHC RBC AUTO-ENTMCNC: 29.7 G/DL (ref 31–35)
MCV RBC AUTO: 99.4 FL (ref 80–100)
MONOCYTES ABSOLUTE: 0.5 K/UL (ref 0.2–0.8)
MONOCYTES RELATIVE PERCENT: 8.1 %
NEUTROPHILS ABSOLUTE: 3.8 K/UL (ref 2–7.5)
NEUTROPHILS RELATIVE PERCENT: 60.4 %
PDW BLD-RTO: 15.4 % (ref 11–16)
PLATELET # BLD: 374 K/UL (ref 150–400)
PMV BLD AUTO: 9 FL (ref 6–10)
POTASSIUM REFLEX MAGNESIUM: 4 MMOL/L (ref 3.4–5.1)
RBC # BLD: 3.55 M/UL (ref 3.8–5.8)
SODIUM BLD-SCNC: 138 MMOL/L (ref 136–145)
TOTAL PROTEIN: 5.3 G/DL (ref 6.4–8.3)
WBC # BLD: 6.2 K/UL (ref 4–11)

## 2022-03-04 PROCEDURE — 85025 COMPLETE CBC W/AUTO DIFF WBC: CPT

## 2022-03-04 PROCEDURE — 36415 COLL VENOUS BLD VENIPUNCTURE: CPT

## 2022-03-04 PROCEDURE — 6370000000 HC RX 637 (ALT 250 FOR IP): Performed by: PHYSICIAN ASSISTANT

## 2022-03-04 PROCEDURE — 80053 COMPREHEN METABOLIC PANEL: CPT

## 2022-03-04 PROCEDURE — 6360000002 HC RX W HCPCS: Performed by: PHYSICIAN ASSISTANT

## 2022-03-04 PROCEDURE — 1200000002 HC SEMI PRIVATE SWING BED

## 2022-03-04 RX ORDER — ASPIRIN 81 MG/1
81 TABLET, CHEWABLE ORAL 2 TIMES DAILY
Status: DISCONTINUED | OUTPATIENT
Start: 2022-03-07 | End: 2022-03-18 | Stop reason: HOSPADM

## 2022-03-04 RX ADMIN — FERROUS SULFATE TAB EC 324 MG (65 MG FE EQUIVALENT) 324 MG: 324 (65 FE) TABLET DELAYED RESPONSE at 07:19

## 2022-03-04 RX ADMIN — OXYCODONE AND ACETAMINOPHEN 1 TABLET: 5; 325 TABLET ORAL at 20:16

## 2022-03-04 RX ADMIN — ENOXAPARIN SODIUM 30 MG: 100 INJECTION SUBCUTANEOUS at 07:19

## 2022-03-04 RX ADMIN — FOLIC ACID TAB 400 MCG 0.8 MG: 400 TAB at 07:18

## 2022-03-04 RX ADMIN — OXYCODONE AND ACETAMINOPHEN 1 TABLET: 5; 325 TABLET ORAL at 15:54

## 2022-03-04 RX ADMIN — FAMOTIDINE 20 MG: 20 TABLET, FILM COATED ORAL at 20:17

## 2022-03-04 RX ADMIN — ENOXAPARIN SODIUM 30 MG: 100 INJECTION SUBCUTANEOUS at 20:16

## 2022-03-04 RX ADMIN — Medication 10 MG: at 20:29

## 2022-03-04 RX ADMIN — Medication 400 MG: at 07:18

## 2022-03-04 RX ADMIN — GABAPENTIN 100 MG: 100 CAPSULE ORAL at 20:16

## 2022-03-04 RX ADMIN — MULTIPLE VITAMINS W/ MINERALS TAB 1 TABLET: TAB at 07:19

## 2022-03-04 RX ADMIN — SENNOSIDES AND DOCUSATE SODIUM 1 TABLET: 8.6; 5 TABLET ORAL at 07:19

## 2022-03-04 RX ADMIN — OXYCODONE AND ACETAMINOPHEN 1 TABLET: 5; 325 TABLET ORAL at 07:18

## 2022-03-04 RX ADMIN — Medication 1 CAPSULE: at 07:19

## 2022-03-04 RX ADMIN — SENNOSIDES AND DOCUSATE SODIUM 1 TABLET: 8.6; 5 TABLET ORAL at 20:16

## 2022-03-04 RX ADMIN — FAMOTIDINE 20 MG: 20 TABLET, FILM COATED ORAL at 07:23

## 2022-03-04 RX ADMIN — BUDESONIDE 9 MG: 3 CAPSULE, COATED PELLETS ORAL at 07:21

## 2022-03-04 RX ADMIN — OXYCODONE HYDROCHLORIDE AND ACETAMINOPHEN 500 MG: 500 TABLET ORAL at 07:18

## 2022-03-04 ASSESSMENT — PAIN SCALES - GENERAL
PAINLEVEL_OUTOF10: 5
PAINLEVEL_OUTOF10: 7
PAINLEVEL_OUTOF10: 8

## 2022-03-04 NOTE — PLAN OF CARE
Patient stable, received am dose of meds[facility] by family who is currently here to pick her up and provide transportation.

## 2022-03-05 PROCEDURE — 6360000002 HC RX W HCPCS: Performed by: PHYSICIAN ASSISTANT

## 2022-03-05 PROCEDURE — 6370000000 HC RX 637 (ALT 250 FOR IP): Performed by: PHYSICIAN ASSISTANT

## 2022-03-05 PROCEDURE — 1200000002 HC SEMI PRIVATE SWING BED

## 2022-03-05 RX ADMIN — MULTIPLE VITAMINS W/ MINERALS TAB 1 TABLET: TAB at 08:41

## 2022-03-05 RX ADMIN — FAMOTIDINE 20 MG: 20 TABLET, FILM COATED ORAL at 08:46

## 2022-03-05 RX ADMIN — Medication 10 MG: at 20:11

## 2022-03-05 RX ADMIN — ENOXAPARIN SODIUM 30 MG: 100 INJECTION SUBCUTANEOUS at 08:42

## 2022-03-05 RX ADMIN — Medication 400 MG: at 08:42

## 2022-03-05 RX ADMIN — FERROUS SULFATE TAB EC 324 MG (65 MG FE EQUIVALENT) 324 MG: 324 (65 FE) TABLET DELAYED RESPONSE at 08:42

## 2022-03-05 RX ADMIN — FAMOTIDINE 20 MG: 20 TABLET, FILM COATED ORAL at 20:10

## 2022-03-05 RX ADMIN — OXYCODONE AND ACETAMINOPHEN 1 TABLET: 5; 325 TABLET ORAL at 20:11

## 2022-03-05 RX ADMIN — Medication 1 CAPSULE: at 08:41

## 2022-03-05 RX ADMIN — CALCIUM CARBONATE 500 MG: 500 TABLET, CHEWABLE ORAL at 08:42

## 2022-03-05 RX ADMIN — BUDESONIDE 9 MG: 3 CAPSULE, COATED PELLETS ORAL at 08:43

## 2022-03-05 RX ADMIN — ENOXAPARIN SODIUM 30 MG: 100 INJECTION SUBCUTANEOUS at 20:10

## 2022-03-05 RX ADMIN — SENNOSIDES AND DOCUSATE SODIUM 1 TABLET: 8.6; 5 TABLET ORAL at 20:10

## 2022-03-05 RX ADMIN — FOLIC ACID TAB 400 MCG 0.8 MG: 400 TAB at 08:41

## 2022-03-05 RX ADMIN — SENNOSIDES AND DOCUSATE SODIUM 1 TABLET: 8.6; 5 TABLET ORAL at 08:42

## 2022-03-05 RX ADMIN — GABAPENTIN 100 MG: 100 CAPSULE ORAL at 20:10

## 2022-03-05 RX ADMIN — OXYCODONE AND ACETAMINOPHEN 1 TABLET: 5; 325 TABLET ORAL at 08:42

## 2022-03-05 RX ADMIN — OXYCODONE HYDROCHLORIDE AND ACETAMINOPHEN 500 MG: 500 TABLET ORAL at 08:42

## 2022-03-05 ASSESSMENT — PAIN SCALES - GENERAL
PAINLEVEL_OUTOF10: 8
PAINLEVEL_OUTOF10: 5

## 2022-03-05 NOTE — FLOWSHEET NOTE
03/05/22 1031   Assessment   Charting Type Shift assessment   Neurological   Neuro (WDL) WDL   Swallow Screening   Is the patient able to remain alert for testing? Yes   [place] Coma Scale   Eye Opening 4   Best Verbal Response 5   Best Motor Response 6   [place] Coma Scale Score 15   HEENT   HEENT (WDL) X   Right Eye Impaired vision   Left Eye Impaired vision   Respiratory   Respiratory (WDL) WDL   Respiratory Pattern Regular   Respiratory Depth Normal   Respiratory Quality/Effort Unlabored   Chest Assessment Chest expansion symmetrical;Trachea midline   L Breath Sounds Clear   R Breath Sounds Clear   Breath Sounds   Right Upper Lobe Clear   Right Middle Lobe Clear   Right Lower Lobe Clear   Left Upper Lobe Clear   Left Lower Lobe Clear   Cardiac   Cardiac (WDL) WDL   Cardiac Monitor   Telemetry Monitor On No   Gastrointestinal   Abdominal (WDL) X  (hx colitis)   GI Symptoms Constipation   Abdomen Inspection Flat;Soft   Tenderness Nontender   RUQ Bowel Sounds Active   LUQ Bowel Sounds Active   RLQ Bowel Sounds Active   LLQ Bowel Sounds Active   Peripheral Vascular   Peripheral Vascular (WDL) WDL   Edema None   Skin Color/Condition   Skin Color/Condition (WDL) X   Skin Color Appropriate for ethnicity   Skin Condition/Temp Dry; Warm   Skin Integrity   Skin Integrity (WDL) X   Skin Integrity Other (Comment)  (brace/ace wrap, bruising, redness)   Location LLE   Dressing Site   (steri strips)   Assessed this shift? Yes   Musculoskeletal   Musculoskeletal (WDL) X   RUE Full movement   LUE Full movement   RL Extremity Full movement   LL Extremity Limited movement; Unsteady   Genitourinary   Genitourinary (WDL) WDL   Urine Assessment   Incontinence No   Urine Color Yellow/straw   Urine Appearance Clear   Urine Odor No odor   Anus/Rectum   Anus/Rectum (WDL) WDL   Psychosocial   Psychosocial (WDL) WDL

## 2022-03-05 NOTE — PLAN OF CARE
Problem: Falls - Risk of:  Goal: Will remain free from falls  Description: Will remain free from falls  3/5/2022 1029 by Courtney Garces RN  Outcome: Ongoing  3/5/2022 1029 by Courtney Garces RN  Outcome: Ongoing

## 2022-03-06 PROCEDURE — 6370000000 HC RX 637 (ALT 250 FOR IP): Performed by: PHYSICIAN ASSISTANT

## 2022-03-06 PROCEDURE — 6360000002 HC RX W HCPCS: Performed by: PHYSICIAN ASSISTANT

## 2022-03-06 PROCEDURE — 1200000002 HC SEMI PRIVATE SWING BED

## 2022-03-06 RX ADMIN — OXYCODONE AND ACETAMINOPHEN 1 TABLET: 5; 325 TABLET ORAL at 08:36

## 2022-03-06 RX ADMIN — CALCIUM CARBONATE 500 MG: 500 TABLET, CHEWABLE ORAL at 08:43

## 2022-03-06 RX ADMIN — Medication 1 CAPSULE: at 08:36

## 2022-03-06 RX ADMIN — FAMOTIDINE 20 MG: 20 TABLET, FILM COATED ORAL at 20:19

## 2022-03-06 RX ADMIN — FOLIC ACID TAB 400 MCG 0.8 MG: 400 TAB at 08:37

## 2022-03-06 RX ADMIN — MULTIPLE VITAMINS W/ MINERALS TAB 1 TABLET: TAB at 08:36

## 2022-03-06 RX ADMIN — GABAPENTIN 100 MG: 100 CAPSULE ORAL at 20:18

## 2022-03-06 RX ADMIN — Medication 10 MG: at 20:18

## 2022-03-06 RX ADMIN — OXYCODONE HYDROCHLORIDE AND ACETAMINOPHEN 500 MG: 500 TABLET ORAL at 08:37

## 2022-03-06 RX ADMIN — SENNOSIDES AND DOCUSATE SODIUM 1 TABLET: 8.6; 5 TABLET ORAL at 20:18

## 2022-03-06 RX ADMIN — ENOXAPARIN SODIUM 30 MG: 100 INJECTION SUBCUTANEOUS at 20:18

## 2022-03-06 RX ADMIN — FAMOTIDINE 20 MG: 20 TABLET, FILM COATED ORAL at 08:37

## 2022-03-06 RX ADMIN — OXYCODONE AND ACETAMINOPHEN 1 TABLET: 5; 325 TABLET ORAL at 20:18

## 2022-03-06 RX ADMIN — SENNOSIDES AND DOCUSATE SODIUM 1 TABLET: 8.6; 5 TABLET ORAL at 08:37

## 2022-03-06 RX ADMIN — Medication 400 MG: at 08:37

## 2022-03-06 RX ADMIN — ENOXAPARIN SODIUM 30 MG: 100 INJECTION SUBCUTANEOUS at 08:38

## 2022-03-06 RX ADMIN — FERROUS SULFATE TAB EC 324 MG (65 MG FE EQUIVALENT) 324 MG: 324 (65 FE) TABLET DELAYED RESPONSE at 08:37

## 2022-03-06 RX ADMIN — OXYCODONE AND ACETAMINOPHEN 1 TABLET: 5; 325 TABLET ORAL at 02:30

## 2022-03-06 RX ADMIN — BUDESONIDE 9 MG: 3 CAPSULE, COATED PELLETS ORAL at 08:38

## 2022-03-06 ASSESSMENT — PAIN SCALES - GENERAL
PAINLEVEL_OUTOF10: 7
PAINLEVEL_OUTOF10: 6
PAINLEVEL_OUTOF10: 6

## 2022-03-06 NOTE — FLOWSHEET NOTE
03/05/22 2008   Assessment   Charting Type Shift assessment   Neurological   Neuro (WDL) WDL   [place] Coma Scale   Eye Opening 4   Best Verbal Response 5   Best Motor Response 6   Denzel Coma Scale Score 15   HEENT   HEENT (WDL) X   Right Eye Impaired vision   Left Eye Impaired vision   Respiratory   Respiratory (WDL) WDL   Respiratory Pattern Regular   Respiratory Depth Normal   Respiratory Quality/Effort Unlabored   Chest Assessment Chest expansion symmetrical;Trachea midline   L Breath Sounds Clear   R Breath Sounds Clear   Breath Sounds   Right Upper Lobe Clear   Right Middle Lobe Clear   Right Lower Lobe Clear   Left Upper Lobe Clear   Left Lower Lobe Clear   Cardiac   Cardiac (WDL) WDL   Cardiac Monitor   Telemetry Monitor On No   Gastrointestinal   Abdominal (WDL) X  (hx colitis)   GI Symptoms Constipation   Abdomen Inspection Flat;Soft   Tenderness Nontender   RUQ Bowel Sounds Active   LUQ Bowel Sounds Active   RLQ Bowel Sounds Active   LLQ Bowel Sounds Active   Peripheral Vascular   Peripheral Vascular (WDL) X   Edema Left lower extremity   LLE Edema +1;Pitting   LLE Neurovascular Assessment   Capillary Refill Less than/equal to 3 seconds   Color Pink;Red   Temperature Cool   L Pedal Pulse +2   Skin Color/Condition   Skin Color/Condition (WDL) X   Skin Color Appropriate for ethnicity   Skin Condition/Temp Warm;Dry   Skin Integrity   Skin Integrity (WDL) X   Skin Integrity Other (Comment)  (brace/ace wrap)   Location LLE   Preventative Dressing Yes   Musculoskeletal   Musculoskeletal (WDL) X   RUE Full movement   LUE Full movement   RL Extremity Full movement   LL Extremity Limited movement; Unsteady   Genitourinary   Genitourinary (WDL) WDL   Urine Assessment   Incontinence No   Psychosocial   Psychosocial (WDL) WDL

## 2022-03-06 NOTE — FLOWSHEET NOTE
03/06/22 0920   Assessment   Charting Type Shift assessment   Neurological   Neuro (WDL) WDL   Swallow Screening   Is the patient able to remain alert for testing? Yes   [place] Coma Scale   Eye Opening 4   Best Verbal Response 5   Best Motor Response 6   Denzel Coma Scale Score 15   HEENT   HEENT (WDL) X   Right Eye Impaired vision   Left Eye Impaired vision   Respiratory   Respiratory (WDL) WDL   Respiratory Pattern Regular   Respiratory Depth Normal   Respiratory Quality/Effort Unlabored   Chest Assessment Chest expansion symmetrical;Trachea midline   L Breath Sounds Clear   R Breath Sounds Clear   Breath Sounds   Right Upper Lobe Clear   Right Middle Lobe Clear   Right Lower Lobe Clear   Left Upper Lobe Clear   Left Lower Lobe Clear   Cardiac   Cardiac (WDL) WDL   Cardiac Monitor   Telemetry Monitor On No   Gastrointestinal   Abdominal (WDL) X  (hx colitis)   Abdomen Inspection Flat;Soft   Tenderness Nontender   RUQ Bowel Sounds Active   LUQ Bowel Sounds Active   RLQ Bowel Sounds Active   LLQ Bowel Sounds Active   Peripheral Vascular   Peripheral Vascular (WDL) X   Edema Left lower extremity   LLE Edema +1;Pitting   LLE Neurovascular Assessment   Capillary Refill Less than/equal to 3 seconds   Color Pink;Red   Temperature Cool   Skin Color/Condition   Skin Color/Condition (WDL) X   Skin Color Appropriate for ethnicity   Skin Condition/Temp Dry; Warm   Skin Integrity   Skin Integrity (WDL) X   Skin Integrity Other (Comment)  (brace/ace wrap)   Location LLE   Preventative Dressing Yes   Musculoskeletal   Musculoskeletal (WDL) X   RUE Full movement   LUE Full movement   RL Extremity Full movement   LL Extremity Limited movement; Unsteady   Genitourinary   Genitourinary (WDL) WDL   Urine Assessment   Incontinence No   Urine Color Yellow/straw   Urine Appearance Clear   Urine Odor No odor   Anus/Rectum   Anus/Rectum (WDL) X   Evaluation Hemorrhoids   Psychosocial   Psychosocial (WDL) WDL

## 2022-03-07 PROCEDURE — 97530 THERAPEUTIC ACTIVITIES: CPT

## 2022-03-07 PROCEDURE — 97116 GAIT TRAINING THERAPY: CPT

## 2022-03-07 PROCEDURE — 1200000002 HC SEMI PRIVATE SWING BED

## 2022-03-07 PROCEDURE — 97110 THERAPEUTIC EXERCISES: CPT

## 2022-03-07 PROCEDURE — 6370000000 HC RX 637 (ALT 250 FOR IP): Performed by: PHYSICIAN ASSISTANT

## 2022-03-07 PROCEDURE — 97535 SELF CARE MNGMENT TRAINING: CPT

## 2022-03-07 PROCEDURE — 97803 MED NUTRITION INDIV SUBSEQ: CPT

## 2022-03-07 RX ADMIN — OXYCODONE AND ACETAMINOPHEN 1 TABLET: 5; 325 TABLET ORAL at 20:28

## 2022-03-07 RX ADMIN — OXYCODONE HYDROCHLORIDE AND ACETAMINOPHEN 500 MG: 500 TABLET ORAL at 08:25

## 2022-03-07 RX ADMIN — OXYCODONE AND ACETAMINOPHEN 1 TABLET: 5; 325 TABLET ORAL at 21:27

## 2022-03-07 RX ADMIN — SENNOSIDES AND DOCUSATE SODIUM 1 TABLET: 8.6; 5 TABLET ORAL at 20:27

## 2022-03-07 RX ADMIN — ASPIRIN 81 MG 81 MG: 81 TABLET ORAL at 08:26

## 2022-03-07 RX ADMIN — Medication 10 MG: at 20:25

## 2022-03-07 RX ADMIN — CALCIUM CARBONATE 500 MG: 500 TABLET, CHEWABLE ORAL at 08:25

## 2022-03-07 RX ADMIN — FAMOTIDINE 20 MG: 20 TABLET, FILM COATED ORAL at 20:27

## 2022-03-07 RX ADMIN — MULTIPLE VITAMINS W/ MINERALS TAB 1 TABLET: TAB at 08:25

## 2022-03-07 RX ADMIN — OXYCODONE AND ACETAMINOPHEN 1 TABLET: 5; 325 TABLET ORAL at 03:37

## 2022-03-07 RX ADMIN — SENNOSIDES AND DOCUSATE SODIUM 1 TABLET: 8.6; 5 TABLET ORAL at 08:25

## 2022-03-07 RX ADMIN — BUDESONIDE 9 MG: 3 CAPSULE, COATED PELLETS ORAL at 08:26

## 2022-03-07 RX ADMIN — Medication 1 CAPSULE: at 08:25

## 2022-03-07 RX ADMIN — ACETAMINOPHEN 650 MG: 325 TABLET, FILM COATED ORAL at 04:11

## 2022-03-07 RX ADMIN — FERROUS SULFATE TAB EC 324 MG (65 MG FE EQUIVALENT) 324 MG: 324 (65 FE) TABLET DELAYED RESPONSE at 08:26

## 2022-03-07 RX ADMIN — GABAPENTIN 100 MG: 100 CAPSULE ORAL at 20:27

## 2022-03-07 RX ADMIN — ASPIRIN 81 MG 81 MG: 81 TABLET ORAL at 20:27

## 2022-03-07 RX ADMIN — FAMOTIDINE 20 MG: 20 TABLET, FILM COATED ORAL at 08:25

## 2022-03-07 RX ADMIN — OXYCODONE AND ACETAMINOPHEN 1 TABLET: 5; 325 TABLET ORAL at 08:29

## 2022-03-07 RX ADMIN — FOLIC ACID TAB 400 MCG 0.8 MG: 400 TAB at 08:26

## 2022-03-07 RX ADMIN — POLYETHYLENE GLYCOL 3350 17 G: 17 POWDER, FOR SOLUTION ORAL at 08:25

## 2022-03-07 RX ADMIN — Medication 400 MG: at 08:26

## 2022-03-07 ASSESSMENT — PAIN SCALES - GENERAL
PAINLEVEL_OUTOF10: 6
PAINLEVEL_OUTOF10: 3
PAINLEVEL_OUTOF10: 10
PAINLEVEL_OUTOF10: 6
PAINLEVEL_OUTOF10: 10
PAINLEVEL_OUTOF10: 10
PAINLEVEL_OUTOF10: 3

## 2022-03-07 ASSESSMENT — PAIN DESCRIPTION - LOCATION: LOCATION: LEG;SHOULDER

## 2022-03-07 NOTE — PROGRESS NOTES
Occupational Therapy  Facility/Department: [facility] MED SURG  Daily Treatment Note  NAME: Isabell Bowman  : 1945  MRN: [id number]    Date of Service: 3/7/2022    Discharge Recommendations:  Continue to assess pending progress       Assessment   Assessment: Pt agreeable to OT services. Pt transferred to standing with SUP. Pt ambulated to bathroom maintaining weight bearing status. Pt toileted with MOD I. Pt returned to recliner with RW SBA. Pt educated on tub transfer bench and provided with picture for example. Pt educated on ease of transfer using bench for bathing transfers. Pt tolerated BUE ther ex seated in chair without difficulty. Pt left seated upright and positioned with BLE elevated. Patient Diagnosis(es): There were no encounter diagnoses. has a past medical history of Colitis, GERD (gastroesophageal reflux disease), Glaucoma, and PAD (peripheral artery disease) ([facility] [address] has a past surgical history that includes Tubal ligation; eye surgery; cyst removal; skin biopsy; and Total hip arthroplasty (Left, 2019). Restrictions  Restrictions/Precautions  Restrictions/Precautions: General Precautions,Fall Risk,Weight Bearing  Required Braces or Orthoses?: Yes  Lower Extremity Weight Bearing Restrictions  Left Lower Extremity Weight Bearing: Non Weight Bearing  Required Braces or Orthoses  Left Lower Extremity Brace:  (ELS brace locked in extension)  Subjective   General  Chart Reviewed: Yes  Patient assessed for rehabilitation services?: Yes  Family / Caregiver Present: No  Referring Practitioner: TERESA Gomez  Diagnosis: Functional decline, Left tibial plateau fx, neck of left fibula  Subjective  Subjective: Pt states she lives alone, LLE pain 2/10. Pt agreeable to OT services.       Orientation     Objective    ADL  Toileting: Modified independent         Functional Mobility  Functional - Mobility Device: Rolling Walker  Activity: To/from bathroom  Assist Level: Stand by assistance     Transfers  Stand Pivot Transfers: Supervision  Sit to stand: Supervision     Type of ROM/Therapeutic Exercise  Type of ROM/Therapeutic Exercise: AROM  Exercises  Scapular Protraction: x10  Scapular Retraction: x10  Shoulder Elevation: x10  Shoulder Flexion: isometrics x10  Shoulder Extension: isometrics x10  Shoulder ABduction: Isometrics x10  Shoulder ADduction: x10 isometrics     Plan   Plan  Times per week: 3-5  Times per day: Daily  Plan weeks: 1-2  Current Treatment Recommendations: Strengthening,Endurance Training,Balance Training,Self-Care / ADL,Safety Education & Training,Patient/Caregiver Education & Training,Functional Mobility Training    Goals  Short term goals  Time Frame for Short term goals: 2 weeks  Short term goal 1: Pt to complete dressing with MOD I. Short term goal 2: Pt to complete bathing with NINO. Short term goal 3: Pt to complete toileting with MOD I. Short term goal 4: Pt to complete safe ADL transfers maintaining WB status. Short term goal 5: Pt to tolerate x15 minutes of activity to increase functional activity tolerance. Therapy Time   Individual Concurrent Group Co-treatment   Time In 3276         Time Out 2402         Minutes 45               This note serves as a DC summary in the event of pt discharge.    Carolyn Robbins OTR/L

## 2022-03-07 NOTE — CARE COORDINATION
3/05/3697    I certify that the continued skilled inpatient care is necessary for the following reasons:  Therapy and medical management    I estimate that the additional period of skilled inpatient care will be 2 weeks.     Electronically signed by Emilia Hamlin RN on 3/7/2022 at 8:40 AM

## 2022-03-07 NOTE — PROGRESS NOTES
Nutrition Assessment     Type and Reason for Visit:  (follow up)    Nutrition Recommendations/Plan: Recommend to continue with current diet and ONS. Nutrition Assessment:  Pt continues with low-moderate risk r/t good intakes and is underweight. Malnutrition Assessment:  Malnutrition Status: At risk for malnutrition (Comment) (Pt has osteoporosis, and is underweight,  She seems to be eating good at this this time, and states she eats good, but has had colitis for many years.)    Estimated Daily Nutrient Needs:  Energy (kcal): 2357-0767 (30-32 kcal/kg cbw); Weight Used for Energy Requirements:  Current     Protein (g): 65-78 (1.25-1.5 gm/kg CBW); Weight Used for Protein Requirements:  Current        Fluid (ml/day): 0659-5251; Weight Used for Fluid Requirements:  1 ml/kcal      Nutrition Related Findings: Pt presents underweight, with +1 pitting LLE edema. No new weight since admit. Pt has full leg brace on left leg. Potassium back down to 4.0  Pt is eating % most meals and had supplement with all meals. Current Nutrition Therapies:    ADULT DIET;  Regular  ADULT ORAL NUTRITION SUPPLEMENT; Breakfast, Lunch, Dinner; Standard High Calorie/High Protein Oral Supplement    Anthropometric Measures:  · Height: 5' 7\" (170.2 cm)  · Current Body Wt: 115 lb (52.2 kg)   · BMI: 18    Nutrition Diagnosis:   · Increased nutrient needs related to acute injury/trauma as evidenced by wounds,BMI (surgical wound)      Nutrition Interventions:   Food and/or Nutrient Delivery:  Continue Current Diet,Continue Oral Nutrition Supplement  Nutrition Education/Counseling:  Education completed (spoke to patient about the importance of eating enough calories and protein while healing and then on return home.)   Coordination of Nutrition Care:  Continue to monitor while inpatient,Interdisciplinary Rounds    Goals:  to meet est needs for age/condition       Nutrition Monitoring and Evaluation:   Behavioral-Environmental Outcomes:      Food/Nutrient Intake Outcomes:  Food and Nutrient Intake  Physical Signs/Symptoms Outcomes:  Biochemical Data,Weight,Skin,Fluid Status or Edema     Discharge Planning:    Continue Oral Nutrition Supplement,Continue current diet     Electronically signed by Lorena Wray MS, RD, LD on 3/7/22 at 3:17 PM EST

## 2022-03-07 NOTE — PROGRESS NOTES
Physical Therapy  Facility/Department: [facility] MED SURG  Daily Treatment Note - 14 days reassessment  NAME: Afua Pacheco  : 1945  MRN: [id number]    Date of Service: 3/7/2022    Discharge Recommendations:  Continue to assess pending progress      Assessment   Assessment: Patient was seen for BID PT session. Completed bed mobility tasks with Mod I.  Stood with supervision and ambulated 55 feet with RW, NWB L LE, and SBA. Patient transferred to the recliner with RW and SBA. REQUIRES PT FOLLOW UP: Yes  Activity Tolerance  Activity Tolerance: Patient Tolerated treatment well     Patient Diagnosis(es): There were no encounter diagnoses. has a past medical history of Colitis, GERD (gastroesophageal reflux disease), Glaucoma, and PAD (peripheral artery disease) ([facility] [address] has a past surgical history that includes Tubal ligation; eye surgery; cyst removal; skin biopsy; and Total hip arthroplasty (Left, 2019). Restrictions  Restrictions/Precautions  Restrictions/Precautions: General Precautions,Fall Risk,Weight Bearing  Required Braces or Orthoses?: Yes  Lower Extremity Weight Bearing Restrictions  Left Lower Extremity Weight Bearing: Non Weight Bearing  Required Braces or Orthoses  Left Lower Extremity Brace:  (ELS brace locked in extension)  Subjective   General  Chart Reviewed: Yes  Response To Previous Treatment: Patient with no complaints from previous session. Family / Caregiver Present: No  Subjective  Subjective: Patient states she is ready to get up now. Pain Screening  Patient Currently in Pain: Yes  Pain Assessment  Pain Location: Leg;Shoulder  Vital Signs  Patient Currently in Pain: Yes       Objective   Bed mobility  Rolling to Left: Modified independent  Supine to Sit: Modified independent  Scooting: Modified independent  Transfers  Sit to Stand: Supervision  Stand to sit:  Independent  Bed to Chair: Stand by assistance  Ambulation  Ambulation?: Yes  WB Status: NWB L LE  Ambulation 1  Surface: level tile  Device: Rolling Walker  Assistance: Stand by assistance  Distance: 55 feet     Balance  Posture: Good  Sitting - Static: Good  Sitting - Dynamic: Good  Standing - Static: Good  Standing - Dynamic: Good; - (with A.D.)  Exercises  Quad Sets: 2x10  Heelslides: 2x10 R  Gluteal Sets: 2x10  Hip Abduction: 2x10 R  Knee Active Range of Motion: gentle AROM for L knee  Ankle Pumps: 2x10 R, wiggling toes L      Goals  Long term goals  Time Frame for Long term goals : 10 days  Long term goal 1: Patient will perform all bed mobility independently. - partially met - modified independent  Long term goal 2: Patient will perform sit to stand and transfers with modified independence. - mnet  Long term goal 3: Patient will ambulate 50'x2, NWB L LE, with RW and SBA. - not met but improng  Long term goal 4: Patient will demonstrate independence with HEP. Patient Goals   Patient goals : Return home. Patient is showing steady progress. Plan    Plan  Times per week: 3-5x/week  Times per day: Daily  Current Treatment Recommendations: Kaylen Do Training,Patient/Caregiver Education & Training,Balance Training,Gait Training,Home Exercise Program,Functional Mobility Training,Safety Education & Training  Safety Devices  Type of devices: Left in chair,Call light within reach     Therapy Time   Individual Concurrent Group Co-treatment   Time In 7630         Time Out 4212         Minutes 18              This note serves as D/C summary if patient is discharged prior to next visit.   Jackie Rosario PTA     Electronically signed by Malcolm Cortez PT on 3/16/2022 at 9:10 AM

## 2022-03-07 NOTE — PROGRESS NOTES
Physical Therapy  Facility/Department: [facility] MED SURG  Daily Treatment Note  NAME: Dina Desir  : 1945  MRN: [id number]    Date of Service: 3/7/2022    Discharge Recommendations:  Continue to assess pending progress      Assessment   Assessment: Patient awake in bed. Reports MD is now allowing L knee gentle AROM, but she is to remain NWB. Patient performed B LE therex in supine. Patient was set up to complete oral hygiene. PTA will return for BID session for transfers and gait. REQUIRES PT FOLLOW UP: Yes  Activity Tolerance  Activity Tolerance: Patient Tolerated treatment well     Patient Diagnosis(es): There were no encounter diagnoses. has a past medical history of Colitis, GERD (gastroesophageal reflux disease), Glaucoma, and PAD (peripheral artery disease) ([facility] [address] has a past surgical history that includes Tubal ligation; eye surgery; cyst removal; skin biopsy; and Total hip arthroplasty (Left, 2019).     Restrictions  Restrictions/Precautions  Restrictions/Precautions: General Precautions,Fall Risk,Weight Bearing  Required Braces or Orthoses?: Yes  Lower Extremity Weight Bearing Restrictions  Left Lower Extremity Weight Bearing: Non Weight Bearing  Required Braces or Orthoses  Left Lower Extremity Brace:  (ELS brace locked in extension)  Subjective   General  Chart Reviewed: Yes  Response To Previous Treatment: Not applicable  Family / Caregiver Present: No  Subjective  Subjective: Patient reports MD is allowing gentle L knee AROM, but she is to remain NWB  Pain Screening  Patient Currently in Pain: Yes  Vital Signs  Patient Currently in Pain: Yes       Objective      Exercises  Quad Sets: 2x10  Heelslides: 2x10 R  Gluteal Sets: 2x10  Hip Abduction: 2x10 R  Knee Active Range of Motion: gentle AROM for L knee  Ankle Pumps: 2x10 R, wiggling toes L      Goals  Long term goals  Time Frame for Long term goals : 10 days  Long term goal 1: Patient will perform all bed mobility independently. Long term goal 2: Patient will perform sit to stand and transfers with modified independence. Long term goal 3: Patient will ambulate 50'x2, NWB L LE, with RW and SBA. Long term goal 4: Patient will demonstrate independence with HEP. Patient Goals   Patient goals : Return home. Plan    Plan  Times per week: 3-5x/week  Times per day: Daily  Current Treatment Recommendations: Marlys Liz Training,Patient/Caregiver Education & Training,Balance Training,Gait Training,Home Exercise Program,Functional Mobility Training,Safety Education & Training  Safety Devices  Type of devices: Left in bed,Call light within reach     Therapy Time   Individual Concurrent Group Co-treatment   Time In 0918         Time Out 7883         Minutes 16              This note serves as D/C summary if patient is discharged prior to next visit.   Rae Nichole, PTA

## 2022-03-07 NOTE — FLOWSHEET NOTE
03/06/22 2019   Assessment   Charting Type Shift assessment   Neurological   Neuro (WDL) WDL   [place] Coma Scale   Eye Opening 4   Best Verbal Response 5   Best Motor Response 6   [place] Coma Scale Score 15   HEENT   HEENT (WDL) X   Right Eye Impaired vision   Left Eye Impaired vision   Respiratory   Respiratory (WDL) WDL   Respiratory Pattern Regular   Respiratory Depth Normal   Respiratory Quality/Effort Unlabored   Chest Assessment Chest expansion symmetrical;Trachea midline   L Breath Sounds Clear   R Breath Sounds Clear   Cardiac   Cardiac (WDL) WDL   Cardiac Monitor   Telemetry Monitor On No   Gastrointestinal   Abdominal (WDL) X  (hx colitis)   GI Symptoms Constipation   Abdomen Inspection Flat;Soft   Tenderness Nontender   RUQ Bowel Sounds Active   LUQ Bowel Sounds Active   RLQ Bowel Sounds Active   LLQ Bowel Sounds Active   Peripheral Vascular   Peripheral Vascular (WDL) X   Edema Left lower extremity   LLE Edema +1;Pitting   LLE Neurovascular Assessment   Capillary Refill Less than/equal to 3 seconds   Color Pink;Red   Temperature Cool   L Pedal Pulse +2   Skin Color/Condition   Skin Color/Condition (WDL) X   Skin Color Appropriate for ethnicity   Skin Condition/Temp Warm;Dry   Skin Integrity   Skin Integrity (WDL) X   Skin Integrity Other (Comment)  (brace/ace wrap)   Location LLE   Preventative Dressing Yes   Musculoskeletal   Musculoskeletal (WDL) X   RUE Full movement   LUE Full movement   RL Extremity Full movement   LL Extremity Limited movement; Unsteady   Genitourinary   Genitourinary (WDL) WDL   Urine Assessment   Incontinence No   Urine Color Yellow/straw   Urine Appearance Clear   Urine Odor No odor   Anus/Rectum   Anus/Rectum (WDL) X   Evaluation Hemorrhoids   Psychosocial   Psychosocial (WDL) WDL

## 2022-03-08 PROCEDURE — 97530 THERAPEUTIC ACTIVITIES: CPT

## 2022-03-08 PROCEDURE — 6370000000 HC RX 637 (ALT 250 FOR IP): Performed by: PHYSICIAN ASSISTANT

## 2022-03-08 PROCEDURE — 97110 THERAPEUTIC EXERCISES: CPT

## 2022-03-08 PROCEDURE — 97535 SELF CARE MNGMENT TRAINING: CPT

## 2022-03-08 PROCEDURE — 1200000002 HC SEMI PRIVATE SWING BED

## 2022-03-08 RX ADMIN — MULTIPLE VITAMINS W/ MINERALS TAB 1 TABLET: TAB at 08:59

## 2022-03-08 RX ADMIN — Medication 1 CAPSULE: at 08:59

## 2022-03-08 RX ADMIN — FOLIC ACID TAB 400 MCG 0.8 MG: 400 TAB at 09:00

## 2022-03-08 RX ADMIN — OXYCODONE HYDROCHLORIDE AND ACETAMINOPHEN 500 MG: 500 TABLET ORAL at 08:59

## 2022-03-08 RX ADMIN — ASPIRIN 81 MG 81 MG: 81 TABLET ORAL at 21:03

## 2022-03-08 RX ADMIN — SENNOSIDES AND DOCUSATE SODIUM 1 TABLET: 8.6; 5 TABLET ORAL at 08:59

## 2022-03-08 RX ADMIN — BUDESONIDE 9 MG: 3 CAPSULE, COATED PELLETS ORAL at 09:04

## 2022-03-08 RX ADMIN — SENNOSIDES AND DOCUSATE SODIUM 1 TABLET: 8.6; 5 TABLET ORAL at 21:04

## 2022-03-08 RX ADMIN — FERROUS SULFATE TAB EC 324 MG (65 MG FE EQUIVALENT) 324 MG: 324 (65 FE) TABLET DELAYED RESPONSE at 08:59

## 2022-03-08 RX ADMIN — OXYCODONE AND ACETAMINOPHEN 1 TABLET: 5; 325 TABLET ORAL at 12:58

## 2022-03-08 RX ADMIN — FAMOTIDINE 20 MG: 20 TABLET, FILM COATED ORAL at 21:03

## 2022-03-08 RX ADMIN — FAMOTIDINE 20 MG: 20 TABLET, FILM COATED ORAL at 08:59

## 2022-03-08 RX ADMIN — Medication 400 MG: at 08:59

## 2022-03-08 RX ADMIN — OXYCODONE AND ACETAMINOPHEN 1 TABLET: 5; 325 TABLET ORAL at 21:03

## 2022-03-08 RX ADMIN — GABAPENTIN 100 MG: 100 CAPSULE ORAL at 21:03

## 2022-03-08 RX ADMIN — ASPIRIN 81 MG 81 MG: 81 TABLET ORAL at 09:00

## 2022-03-08 RX ADMIN — OXYCODONE AND ACETAMINOPHEN 1 TABLET: 5; 325 TABLET ORAL at 08:59

## 2022-03-08 RX ADMIN — Medication 10 MG: at 21:13

## 2022-03-08 RX ADMIN — CALCIUM CARBONATE 500 MG: 500 TABLET, CHEWABLE ORAL at 08:59

## 2022-03-08 ASSESSMENT — PAIN SCALES - GENERAL
PAINLEVEL_OUTOF10: 10
PAINLEVEL_OUTOF10: 9
PAINLEVEL_OUTOF10: 8

## 2022-03-08 ASSESSMENT — PAIN DESCRIPTION - LOCATION: LOCATION: LEG;SHOULDER

## 2022-03-08 NOTE — FLOWSHEET NOTE
03/07/22 2027   Assessment   Charting Type Shift assessment   Neurological   Neuro (WDL) WDL   [place] Coma Scale   Eye Opening 4   Best Verbal Response 5   Best Motor Response 6   Denzel Coma Scale Score 15   HEENT   HEENT (WDL) X   Right Eye Impaired vision   Left Eye Impaired vision   Respiratory   Respiratory (WDL) WDL   Respiratory Pattern Regular   Respiratory Depth Normal   Respiratory Quality/Effort Unlabored   Chest Assessment Chest expansion symmetrical;Trachea midline   Cardiac   Cardiac (WDL) WDL   Cardiac Monitor   Telemetry Monitor On No   Gastrointestinal   Abdominal (WDL) X  (hx colitis)   GI Symptoms Constipation   Abdomen Inspection Flat;Soft   RUQ Bowel Sounds Active   LUQ Bowel Sounds Active   RLQ Bowel Sounds Active   LLQ Bowel Sounds Active   Peripheral Vascular   Peripheral Vascular (WDL) X   Edema Left lower extremity   LLE Edema Pitting;+2   LLE Neurovascular Assessment   Capillary Refill Less than/equal to 3 seconds   Color Pink;Red   Temperature Cool   L Pedal Pulse +2   Skin Color/Condition   Skin Color/Condition (WDL) X   Skin Color Appropriate for ethnicity   Skin Condition/Temp Warm;Dry   Skin Integrity   Skin Integrity (WDL) X   Skin Integrity Other (Comment)  (brace/ace wrap)   Location LLE   Preventative Dressing Yes   Musculoskeletal   Musculoskeletal (WDL) X   RUE Full movement   LUE Full movement   RL Extremity Full movement   LL Extremity Limited movement; Unsteady   Genitourinary   Genitourinary (WDL) WDL   Urine Assessment   Incontinence No   Urine Color Yellow/straw   Urine Appearance Clear   Urine Odor No odor   Anus/Rectum   Anus/Rectum (WDL) X   Evaluation Hemorrhoids   Psychosocial   Psychosocial (WDL) WDL

## 2022-03-08 NOTE — PROGRESS NOTES
Physical Therapy  Facility/Department: [facility] MED SURG  Daily Treatment Note  NAME: Rudy Garcia  : 1945  MRN: [id number]    Date of Service: 3/8/2022    Discharge Recommendations:  Continue to assess pending progress      Assessment   Assessment: Patient continues to c/o shoulder soreness. States her knee is doing better. Patient completed bed mobility tasks with Mod I. Completed B LE therex in supine and sitting. Stood with supervision and ambulated 60 feet with RW, NWB L LE, and SBA and then transferred to the recliner. Activity Tolerance  Activity Tolerance: Patient Tolerated treatment well     Patient Diagnosis(es): There were no encounter diagnoses. has a past medical history of Colitis, GERD (gastroesophageal reflux disease), Glaucoma, and PAD (peripheral artery disease) ([facility] [address] has a past surgical history that includes Tubal ligation; eye surgery; cyst removal; skin biopsy; and Total hip arthroplasty (Left, 2019). Restrictions  Restrictions/Precautions  Restrictions/Precautions: General Precautions,Fall Risk,Weight Bearing  Required Braces or Orthoses?: Yes  Lower Extremity Weight Bearing Restrictions  Left Lower Extremity Weight Bearing: Non Weight Bearing  Required Braces or Orthoses  Left Lower Extremity Brace:  (ELS brace locked in extension)  Subjective   General  Chart Reviewed: Yes  Response To Previous Treatment: Patient with no complaints from previous session. Family / Caregiver Present: No  Subjective  Subjective: Patient states her knee seems to be doing better, but her shoulders are really sore.   Pain Screening  Patient Currently in Pain: Yes  Pain Assessment  Pain Location: Leg;Shoulder  Vital Signs  Patient Currently in Pain: Yes     Objective   Bed mobility  Rolling to Left: Modified independent  Supine to Sit: Modified independent  Scooting: Modified independent  Transfers  Sit to Stand: Supervision  Stand to sit: Supervision  Ambulation  Ambulation?: Yes  WB Status: NWB L LE  Ambulation 1  Surface: level tile  Device: Rolling Walker  Assistance: Stand by assistance  Distance: 60 feet     Balance  Posture: Good  Sitting - Static: Good  Sitting - Dynamic: Good  Standing - Static: Good  Standing - Dynamic: Good; - (with A.D.)  Exercises  Quad Sets: 2x10  Heelslides: 2x10 R  Hip Abduction: 2x10 R  Knee Active Range of Motion: gentle AROM for L knee  Ankle Pumps: 2x10      Goals  Long term goals  Time Frame for Long term goals : 10 days  Long term goal 1: Patient will perform all bed mobility independently. Long term goal 2: Patient will perform sit to stand and transfers with modified independence. Long term goal 3: Patient will ambulate 50'x2, NWB L LE, with RW and SBA. Long term goal 4: Patient will demonstrate independence with HEP. Patient Goals   Patient goals : Return home. Plan    Plan  Times per week: 3-5x/week  Times per day: Daily  Current Treatment Recommendations: Carmelia Cutting Training,Patient/Caregiver Education & Training,Balance Training,Gait Training,Home Exercise Program,Functional Mobility Training,Safety Education & Training  Safety Devices  Type of devices: Left in chair,Call light within reach     Therapy Time   Individual Concurrent Group Co-treatment   Time In 6846         Time Out 1120         Minutes 27              This note serves as D/C summary if patient is discharged prior to next visit.   Koko Sutton, PTA

## 2022-03-08 NOTE — PROGRESS NOTES
Occupational Therapy  Facility/Department: [address] [place] [address] MED SURG  Daily Treatment Note  NAME: Clint Deleon  : 1945  MRN: [id number]    Date of Service: 3/8/2022    Discharge Recommendations:  Continue to assess pending progress       Assessment   Assessment: Pt agreeable to OT services. Pt agreeable to planned shower. Pt completed bed mobility with MOD I. Pt transferred to standing with SBA. Pt ambulated to bathroom maintaining WB status with SBA. Pt transferred to toilet with SUP. Pt completed toileting with MOD I. Pt demo good safety awareness. OT demonstrated safe shower transfer and pt completed return demo with SBA demo good follow through of education. Pt sat on shower chair and completed UB/LB bathing with NINO. pt tolerated well. OT assisted with washing LLE below incision with hibiclens. Pt donned gown with NINO and completed grooming without difficulty. Pt come to stand with SBA and ambulated to bed with SBA. Pt transferred to supine with MOD I. Pt positioned in bed. Patient Diagnosis(es): There were no encounter diagnoses. has a past medical history of Colitis, GERD (gastroesophageal reflux disease), Glaucoma, and PAD (peripheral artery disease) ([facility] [address] has a past surgical history that includes Tubal ligation; eye surgery; cyst removal; skin biopsy; and Total hip arthroplasty (Left, 2019).     Restrictions  Restrictions/Precautions  Restrictions/Precautions: General Precautions,Fall Risk,Weight Bearing  Required Braces or Orthoses?: Yes  Lower Extremity Weight Bearing Restrictions  Left Lower Extremity Weight Bearing: Non Weight Bearing  Required Braces or Orthoses  Left Lower Extremity Brace:  (ELS brace locked in extension)  Subjective   General  Chart Reviewed: Yes  Patient assessed for rehabilitation services?: Yes  Family / Caregiver Present: No  Referring Practitioner: Alessandro Narayanan PA-C  Diagnosis: Functional decline, Left tibial plateau fx, neck of left fibula  Subjective  Subjective: Pt states she lives alone, LLE pain 2/10. Pt agreeable to OT services. Orientation     Objective    ADL  Grooming: Setup  UE Bathing: Setup  LE Bathing: Setup  UE Dressing: Setup  Toileting: Modified independent         Functional Mobility  Functional - Mobility Device: Rolling Walker  Activity: To/from bathroom  Assist Level: Stand by assistance        Plan   Plan  Times per week: 3-5  Times per day: Daily  Plan weeks: 1-2  Current Treatment Recommendations: Helio Stager Training,Self-Care / ADL,Safety Education & Training,Patient/Caregiver Education & Training,Functional Mobility Training    Goals  Short term goals  Time Frame for Short term goals: 2 weeks  Short term goal 1: Pt to complete dressing with MOD I. Short term goal 2: Pt to complete bathing with NINO. Short term goal 3: Pt to complete toileting with MOD I. Short term goal 4: Pt to complete safe ADL transfers maintaining WB status. Short term goal 5: Pt to tolerate x15 minutes of activity to increase functional activity tolerance. Therapy Time   Individual Concurrent Group Co-treatment   Time In 0932         Time Out 9271         Minutes 42              This note serves as a DC summary in the event of pt discharge.      Carolyn Robbins, OTR/L

## 2022-03-09 PROCEDURE — 97116 GAIT TRAINING THERAPY: CPT

## 2022-03-09 PROCEDURE — 6370000000 HC RX 637 (ALT 250 FOR IP): Performed by: PHYSICIAN ASSISTANT

## 2022-03-09 PROCEDURE — 97530 THERAPEUTIC ACTIVITIES: CPT

## 2022-03-09 PROCEDURE — 97535 SELF CARE MNGMENT TRAINING: CPT

## 2022-03-09 PROCEDURE — 1200000002 HC SEMI PRIVATE SWING BED

## 2022-03-09 PROCEDURE — 97110 THERAPEUTIC EXERCISES: CPT

## 2022-03-09 PROCEDURE — 99308 SBSQ NF CARE LOW MDM 20: CPT | Performed by: INTERNAL MEDICINE

## 2022-03-09 RX ADMIN — OXYCODONE AND ACETAMINOPHEN 1 TABLET: 5; 325 TABLET ORAL at 08:11

## 2022-03-09 RX ADMIN — FERROUS SULFATE TAB EC 324 MG (65 MG FE EQUIVALENT) 324 MG: 324 (65 FE) TABLET DELAYED RESPONSE at 08:10

## 2022-03-09 RX ADMIN — Medication 10 MG: at 20:01

## 2022-03-09 RX ADMIN — OXYCODONE HYDROCHLORIDE AND ACETAMINOPHEN 500 MG: 500 TABLET ORAL at 08:09

## 2022-03-09 RX ADMIN — OXYCODONE AND ACETAMINOPHEN 1 TABLET: 5; 325 TABLET ORAL at 02:56

## 2022-03-09 RX ADMIN — Medication 400 MG: at 08:10

## 2022-03-09 RX ADMIN — FAMOTIDINE 20 MG: 20 TABLET, FILM COATED ORAL at 20:02

## 2022-03-09 RX ADMIN — ASPIRIN 81 MG 81 MG: 81 TABLET ORAL at 20:02

## 2022-03-09 RX ADMIN — CALCIUM CARBONATE 500 MG: 500 TABLET, CHEWABLE ORAL at 08:10

## 2022-03-09 RX ADMIN — ASPIRIN 81 MG 81 MG: 81 TABLET ORAL at 08:10

## 2022-03-09 RX ADMIN — FOLIC ACID TAB 400 MCG 0.8 MG: 400 TAB at 08:09

## 2022-03-09 RX ADMIN — BUDESONIDE 9 MG: 3 CAPSULE, COATED PELLETS ORAL at 08:12

## 2022-03-09 RX ADMIN — FAMOTIDINE 20 MG: 20 TABLET, FILM COATED ORAL at 08:10

## 2022-03-09 RX ADMIN — GABAPENTIN 100 MG: 100 CAPSULE ORAL at 20:02

## 2022-03-09 RX ADMIN — SENNOSIDES AND DOCUSATE SODIUM 1 TABLET: 8.6; 5 TABLET ORAL at 08:09

## 2022-03-09 RX ADMIN — MULTIPLE VITAMINS W/ MINERALS TAB 1 TABLET: TAB at 08:09

## 2022-03-09 RX ADMIN — OXYCODONE AND ACETAMINOPHEN 1 TABLET: 5; 325 TABLET ORAL at 20:02

## 2022-03-09 RX ADMIN — SENNOSIDES AND DOCUSATE SODIUM 1 TABLET: 8.6; 5 TABLET ORAL at 20:01

## 2022-03-09 RX ADMIN — Medication 1 CAPSULE: at 08:10

## 2022-03-09 ASSESSMENT — PAIN DESCRIPTION - LOCATION
LOCATION: LEG
LOCATION: LEG;SHOULDER

## 2022-03-09 ASSESSMENT — PAIN SCALES - GENERAL
PAINLEVEL_OUTOF10: 7
PAINLEVEL_OUTOF10: 7
PAINLEVEL_OUTOF10: 10
PAINLEVEL_OUTOF10: 7

## 2022-03-09 ASSESSMENT — PAIN DESCRIPTION - ORIENTATION: ORIENTATION: LEFT

## 2022-03-09 ASSESSMENT — PAIN DESCRIPTION - PAIN TYPE: TYPE: ACUTE PAIN

## 2022-03-09 NOTE — PROGRESS NOTES
Occupational Therapy  Facility/Department: [address] [place] [address] MED SURG  Daily Treatment Note  NAME: Doyle Cortez  : 1945  MRN: [id number]    Date of Service: 3/9/2022    Discharge Recommendations:  Continue to assess pending progress       Assessment   Assessment: Pt agreeable to OT services. Pt ambulated to bathroom maintaining WB status. Pt transferred to shower with SUP. Pt tolerated well. OT wrapped pt knee and pt completed shower with NINO. Pt tolerated well. Pt donned gown with NINO and completed grooming/hygiene with NINO. Pt returned to bed with MOD I. Patient Diagnosis(es): There were no encounter diagnoses. has a past medical history of Colitis, GERD (gastroesophageal reflux disease), Glaucoma, and PAD (peripheral artery disease) ([facility] [address] has a past surgical history that includes Tubal ligation; eye surgery; cyst removal; skin biopsy; and Total hip arthroplasty (Left, 2019). Restrictions  Restrictions/Precautions  Restrictions/Precautions: General Precautions,Fall Risk,Weight Bearing  Required Braces or Orthoses?: Yes  Lower Extremity Weight Bearing Restrictions  Left Lower Extremity Weight Bearing: Non Weight Bearing  Required Braces or Orthoses  Left Lower Extremity Brace:  (ELS brace locked in extension)  Subjective   General  Chart Reviewed: Yes  Patient assessed for rehabilitation services?: Yes  Family / Caregiver Present: No  Referring Practitioner: TERESA Gomez  Diagnosis: Functional decline, Left tibial plateau fx, neck of left fibula  Subjective  Subjective: Pt states she lives alone, LLE pain 2/10. Pt agreeable to OT services.       Orientation     Objective    ADL  Grooming: Setup  UE Bathing: Setup  LE Bathing: Setup  UE Dressing: Setup  Toileting: Modified independent            Bed mobility  Supine to Sit: Modified independent  Sit to Supine: Modified independent  Scooting: Modified independent  Transfers  Stand Pivot Transfers: Supervision  Sit to stand: Supervision Plan   Plan  Times per week: 3-5  Times per day: Daily  Plan weeks: 1-2  Current Treatment Recommendations: Strengthening,Endurance Training,Balance Training,Self-Care / ADL,Safety Education & Training,Patient/Caregiver Education & Training,Functional Mobility Training    Goals  Short term goals  Time Frame for Short term goals: 2 weeks  Short term goal 1: Pt to complete dressing with MOD I. Short term goal 2: Pt to complete bathing with NINO. Short term goal 3: Pt to complete toileting with MOD I. Short term goal 4: Pt to complete safe ADL transfers maintaining WB status. Short term goal 5: Pt to tolerate x15 minutes of activity to increase functional activity tolerance. Therapy Time   Individual Concurrent Group Co-treatment   Time In 0820         Time Out 8166         Minutes 38              This note serves as a DC summary in the event of pt discharge.      Carolyn Robbins OTR/L

## 2022-03-09 NOTE — PROGRESS NOTES
Progress Note      Subjective:   Chief complaint: Declining functional status     Interval History:   Sitting up in bed today. No acute distress. States when she gets her pain medication as scheduled  Pain controlled but if she has to wait for it at all her pain gets to a 10 and is uncontrolled for awhile. Having regular BMs - usually 1 to 2 per day. No diarrhea. Appetite stable. She is taking ELS brace off herself throughout the day and doing ROM exercises as instructed by ortho at last week's visit. Review of systems:   Constitutional:  Denies fever or chills. Eyes:  Denies change in visual acuity or discharge. HENT:  Denies nasal congestion or sore throat. Respiratory:  Denies cough or shortness of breath. Cardiovascular:  Denies chest pain, palpitation. GI:  Denies abdominal pain, nausea, vomiting, bloody stools or diarrhea. :  Denies dysuria or frequency. Musculoskeletal:  Denies back pain. Positive for intermittent left lower extremity pain. Integument:  Denies rash or itching. Neurologic:  Denies headache, focal weakness or sensory changes. Psychiatric:  Denies depression or anxiety. Past medical history, surgical history, family history and social history reviewed and unchanged compared to H&P earlier this admission.     Medications:   Scheduled Meds:   aspirin  81 mg Oral BID    ferrous sulfate  324 mg Oral Daily with breakfast    oxyCODONE-acetaminophen  1 tablet Oral BID    gabapentin  100 mg Oral Nightly    budesonide  9 mg Oral Daily    brinzolamide-brimonidine  1 drop Ophthalmic BID    famotidine  20 mg Oral BID    NONFORMULARY 1 capsule  1 capsule Oral BID    bimatoprost  1 drop Both Eyes Nightly    calcium carbonate  500 mg Oral Daily    lactobacillus  1 capsule Oral Daily    folic acid  0.8 mg Oral Daily    therapeutic multivitamin-minerals  1 tablet Oral Daily    magnesium oxide  400 mg Oral Daily    polyethylene glycol  17 g Oral Daily    [Held by provider] potassium chloride  10 mEq Oral Daily    sennosides-docusate sodium  1 tablet Oral BID    vitamin C  500 mg Oral Daily     Continuous Infusions:    Objective:     Vital Signs  Temp: 98.6 °F (37 °C)  Pulse: 64  Resp: 18  BP: 119/65  SpO2: 96 %  O2 Device: None (Room air)       Vital signs reviewed in electronic charts. Physical exam  Constitutional:  Well developed, thin, elderly female sitting upright in bed in no acute distress  Eyes:  no scleral icterus, conjunctiva normal   HENT:  Atraumatic, external ears normal, nose normal, oropharynx moist, no pharyngeal exudates. Neck- supple, no JVD, no lymphadenopathy  Respiratory:  No respiratory distress, no wheezing, rales or rhonchi detected  Cardiovascular:  Normal rate, normal rhythm, no murmurs, no gallops, no rubs, no edema   GI:  Soft, nondistended, normal bowel sounds, nontender, no voluntary guarding  Musculoskeletal:  No cyanosis.  LLE with ace bandage intact and ELS brace locked in extension.  no swelling in toes. Integument:  Warm and dry.    Neurologic:  Alert & oriented x 3, no apparent focal deficits noted   Psychiatric:  Speech and behavior appropriate.  .        Results:     Lab Results   Component Value Date    WBC 6.2 03/04/2022    HGB 10.5 (L) 03/04/2022    HCT 35.3 (L) 03/04/2022    MCV 99.4 03/04/2022     03/04/2022       Lab Results   Component Value Date     03/04/2022    K 4.0 03/04/2022     03/04/2022    CO2 25 03/04/2022    BUN 16 03/04/2022    CREATININE <0.5 03/04/2022    GLUCOSE 89 03/04/2022    CALCIUM 9.1 03/04/2022        Assessment and Plan:      Closed fracture of left tibial plateau [A42.075R]  -Fragility fracture s/p fall in setting of osteoporosis  -s/p ORIF on 2/15/22 with [facility]   -vitamin D level 2/18 98  -DVT prophylaxis: enoxaparin 30 mg subcutaneous BID through 3/6/21, then aspirin 81 mg BID through 3/20/22  - 2/25 LLE roxanne duplex negative for DVT  - bone mineral metabolism team was consulted while at [facility] and patient following as outpatient   -PT/OT following  -continue bowel regimen  - Pain controlled on current regimen with gabapentin, percocet, tylenol and she is having regular bowel movements  - reviewed [facility] ortho note from 3/4 and patient to remain NWB with ROM activities - PT/OT and patient aware   - f/u with [facility] ortho as scheduled    02/21/2022    Closed fracture of neck of left fibula [S82.832A]  -see above    02/21/2022    Anemia [D64.9]  -per [facility] dc summary, hgb 13 on arrival/prior to surgery. Downtrended to 10 post op. Iron level 23, transferrin sat 9. Started on ferrous sulfate supplement. - Hgb 10.5 on 3/4  - on gi ppx    02/21/2022    Recurrent falls [R29.6]  -Patient lives alone and reports being independent with ADLs prior to fall. Does not use assist devices at home however has a history of falls with known osteoporosis with multiple fractures in the last three months.   -Continue with PT/OT  -fall precautions    02/21/2022    Underweight [R63.6]  -dietitian following  -continue Boost TID and MVI w/minerals     02/21/2022    Microscopic colitis [K52.839]  -continue budesonide    02/21/2022    Glaucoma [H40.9]  -Continue lumigan, brinzolamide/brimonidine, & supplements per patient request.     02/21/2022    Leg cramps [R25.2]  -on home potassium supplement; placed on hold 3/1 due to K+ 5.3  - B12, folate, electrolytes wnl  - improved with current regimen     02/21/2022    Declining functional status [R53.81]  -secondary to recent fall/fracture and NWB status  -plan as outlined above        Patient was seen and examined with Dr. Maritza Stapleton. After reviewing patient data and diagnostic testing the plan of care was established in conjunction with Dr. Maritza Stapleton.     Electronically signed by KALIE Gordillo on 3/9/2022 at 11:36 AM

## 2022-03-09 NOTE — CARE COORDINATION
Interdisciplinary rounding completed. Vee Mensah (provider rep), Rodriguez Haddad (), Meri Moore (nursing), Zakiya (PT), Carolyn (OT), Daniel Menchaca (pharmacy), and Radha (dietitian) all involved. Activities reviewed with OT. Lives alone, but will have friend staying as of next week. Has WC brother is borrowing for her. Has RW, BSC, WC, SC.  HH at DC needed. Pt ambulated to bathroom maintaining WB status. Pt transferred to shower with SUP. Pt tolerated well. OT wrapped pt knee and pt completed shower with NINO. Pt tolerated well. Pt donned gown with NINO and completed grooming/hygiene with NINO. Pt returned to bed with MOD I. Last wt: 115lb; no new weights. Regular textures, no restrictions. Eating % of meals. ONS TID. +1 pitting edema in LLE. No current antibiotics. Aspirin 81mg po BID for VTE prophylaxis. Plan is to DC to home next week with [facility].

## 2022-03-09 NOTE — PROGRESS NOTES
Physical Therapy  Facility/Department: [facility] MED SURG  Daily Treatment Note  NAME: Parish Ontiveros  : 1945  MRN: [id number]    Date of Service: 3/9/2022    Discharge Recommendations:  Continue to assess pending progress      Assessment   Assessment: PTA engaged patient in B LE therex in supine. Patient completed bed mobility tasks with Mod I. Completed B LE therex in sitting. Patient stood with supervision and ambulated 65 feet with RW, NWB L LE, and SBA and then transferred to the recliner. REQUIRES PT FOLLOW UP: Yes  Activity Tolerance  Activity Tolerance: Patient Tolerated treatment well     Patient Diagnosis(es): There were no encounter diagnoses. has a past medical history of Colitis, GERD (gastroesophageal reflux disease), Glaucoma, and PAD (peripheral artery disease) ([facility] [address] has a past surgical history that includes Tubal ligation; eye surgery; cyst removal; skin biopsy; and Total hip arthroplasty (Left, 2019). Restrictions  Restrictions/Precautions  Restrictions/Precautions: General Precautions,Fall Risk,Weight Bearing  Required Braces or Orthoses?: Yes  Lower Extremity Weight Bearing Restrictions  Left Lower Extremity Weight Bearing: Non Weight Bearing  Required Braces or Orthoses  Left Lower Extremity Brace:  (ELS brace locked in extension)  Subjective   General  Chart Reviewed: Yes  Response To Previous Treatment: Patient with no complaints from previous session. Family / Caregiver Present: No  Subjective  Subjective: Patient states she's doing ok. Reports having increased pain in her L leg last night.   Pain Screening  Patient Currently in Pain: Yes  Pain Assessment  Pain Location: Leg;Shoulder  Vital Signs  Patient Currently in Pain: Yes       Objective   Bed mobility  Rolling to Left: Modified independent  Supine to Sit: Modified independent  Scooting: Modified independent  Transfers  Sit to Stand: Supervision  Stand to sit: Supervision  Ambulation  Ambulation?: Yes  WB Status: NWB L LE  Ambulation 1  Surface: level tile  Device: Rolling Walker  Assistance: Stand by assistance  Distance: 65 feet     Balance  Posture: Good  Sitting - Static: Good  Sitting - Dynamic: Good  Standing - Static: Good  Standing - Dynamic: Good; - (with A.D.)  Exercises  Quad Sets: 2x10  Heelslides: 2x10 R  Gluteal Sets: 2x10  Hip Abduction: 2x10 R  Knee Short Arc Quad: 2x10 R  Knee Active Range of Motion: gentle AROM for L knee  Ankle Pumps: 2x10      Goals  Long term goals  Time Frame for Long term goals : 10 days  Long term goal 1: Patient will perform all bed mobility independently. Long term goal 2: Patient will perform sit to stand and transfers with modified independence. Long term goal 3: Patient will ambulate 50'x2, NWB L LE, with RW and SBA. Long term goal 4: Patient will demonstrate independence with HEP. Patient Goals   Patient goals : Return home. Plan    Plan  Times per week: 3-5x/week  Times per day: Daily  Current Treatment Recommendations: [place] Training,Patient/Caregiver Education & Training,Balance Training,Gait Training,Home Exercise Program,Functional Mobility Training,Safety Education & Training  Safety Devices  Type of devices: Left in chair,Call light within reach     Therapy Time   Individual Concurrent Group Co-treatment   Time In 1027         Time Out 1106         Minutes 39              This note serves as D/C summary if patient is discharged prior to next visit.   James Kelly, PTA

## 2022-03-10 PROCEDURE — 1200000002 HC SEMI PRIVATE SWING BED

## 2022-03-10 PROCEDURE — 6370000000 HC RX 637 (ALT 250 FOR IP): Performed by: PHYSICIAN ASSISTANT

## 2022-03-10 PROCEDURE — 97535 SELF CARE MNGMENT TRAINING: CPT

## 2022-03-10 PROCEDURE — 97530 THERAPEUTIC ACTIVITIES: CPT

## 2022-03-10 RX ADMIN — CALCIUM CARBONATE 500 MG: 500 TABLET, CHEWABLE ORAL at 09:23

## 2022-03-10 RX ADMIN — OXYCODONE AND ACETAMINOPHEN 1 TABLET: 5; 325 TABLET ORAL at 19:59

## 2022-03-10 RX ADMIN — ASPIRIN 81 MG 81 MG: 81 TABLET ORAL at 19:59

## 2022-03-10 RX ADMIN — FERROUS SULFATE TAB EC 324 MG (65 MG FE EQUIVALENT) 324 MG: 324 (65 FE) TABLET DELAYED RESPONSE at 09:24

## 2022-03-10 RX ADMIN — OXYCODONE AND ACETAMINOPHEN 1 TABLET: 5; 325 TABLET ORAL at 00:43

## 2022-03-10 RX ADMIN — Medication 10 MG: at 19:58

## 2022-03-10 RX ADMIN — GABAPENTIN 100 MG: 100 CAPSULE ORAL at 19:59

## 2022-03-10 RX ADMIN — SENNOSIDES AND DOCUSATE SODIUM 1 TABLET: 8.6; 5 TABLET ORAL at 19:59

## 2022-03-10 RX ADMIN — Medication 400 MG: at 09:24

## 2022-03-10 RX ADMIN — ASPIRIN 81 MG 81 MG: 81 TABLET ORAL at 09:24

## 2022-03-10 RX ADMIN — SENNOSIDES AND DOCUSATE SODIUM 1 TABLET: 8.6; 5 TABLET ORAL at 09:24

## 2022-03-10 RX ADMIN — BUDESONIDE 9 MG: 3 CAPSULE, COATED PELLETS ORAL at 09:29

## 2022-03-10 RX ADMIN — OXYCODONE AND ACETAMINOPHEN 1 TABLET: 5; 325 TABLET ORAL at 08:52

## 2022-03-10 RX ADMIN — FAMOTIDINE 20 MG: 20 TABLET, FILM COATED ORAL at 19:59

## 2022-03-10 RX ADMIN — OXYCODONE HYDROCHLORIDE AND ACETAMINOPHEN 500 MG: 500 TABLET ORAL at 09:24

## 2022-03-10 RX ADMIN — MULTIPLE VITAMINS W/ MINERALS TAB 1 TABLET: TAB at 09:24

## 2022-03-10 RX ADMIN — FAMOTIDINE 20 MG: 20 TABLET, FILM COATED ORAL at 09:24

## 2022-03-10 RX ADMIN — Medication 1 CAPSULE: at 09:24

## 2022-03-10 RX ADMIN — FOLIC ACID TAB 400 MCG 0.8 MG: 400 TAB at 09:24

## 2022-03-10 ASSESSMENT — PAIN SCALES - GENERAL
PAINLEVEL_OUTOF10: 8
PAINLEVEL_OUTOF10: 8
PAINLEVEL_OUTOF10: 7
PAINLEVEL_OUTOF10: 3

## 2022-03-10 NOTE — PROGRESS NOTES
Physical Therapy  Facility/Department: [facility] MED SURG  Daily Treatment Note  NAME: Gabrielle Rajput  : 1945  MRN: [id number]    Date of Service: 3/10/2022    Discharge Recommendations:  Continue to assess pending progress      Assessment   Assessment: Patient reports she just found out the lady that will stay with her after d/c home will not be available the first few days at home. She is making arrangements for someone else to stay with her until then. Patient completed bed mobility tasks with Mod I.  Stood with supervision and ambulated 65 feet with RW, NWB L LE, and SBA. Patient transferred to the recliner and her friend came in to visit. REQUIRES PT FOLLOW UP: Yes  Activity Tolerance  Activity Tolerance: Patient Tolerated treatment well     Patient Diagnosis(es): There were no encounter diagnoses. has a past medical history of Colitis, GERD (gastroesophageal reflux disease), Glaucoma, and PAD (peripheral artery disease) ([facility] [address] has a past surgical history that includes Tubal ligation; eye surgery; cyst removal; skin biopsy; and Total hip arthroplasty (Left, 2019). Restrictions  Restrictions/Precautions  Restrictions/Precautions: General Precautions,Fall Risk,Weight Bearing  Required Braces or Orthoses?: Yes  Lower Extremity Weight Bearing Restrictions  Left Lower Extremity Weight Bearing: Non Weight Bearing  Required Braces or Orthoses  Left Lower Extremity Brace:  (ELS brace locked in extension)  Subjective   General  Chart Reviewed: Yes  Response To Previous Treatment: Patient with no complaints from previous session. Family / Caregiver Present: No  Subjective  Subjective: Patient reports she just found out the lady that will stay with her after d/c home will not be available the first few days at home. She is making arrangements for someone else to stay with her until then.   Pain Screening  Patient Currently in Pain: Yes  Vital Signs  Patient Currently in Pain: Yes       Objective Bed mobility  Rolling to Left: Modified independent  Supine to Sit: Modified independent  Scooting: Modified independent  Transfers  Sit to Stand: Supervision  Stand to sit: Supervision  Ambulation  Ambulation?: Yes  WB Status: NWB L LE  Ambulation 1  Surface: level tile  Device: Rolling Walker  Assistance: Stand by assistance  Distance: 65 feet     Balance  Posture: Good  Sitting - Static: Good  Sitting - Dynamic: Good  Standing - Static: Good  Standing - Dynamic: Good; - (with A.D.)      Goals  Long term goals  Time Frame for Long term goals : 10 days  Long term goal 1: Patient will perform all bed mobility independently. Long term goal 2: Patient will perform sit to stand and transfers with modified independence. Long term goal 3: Patient will ambulate 50'x2, NWB L LE, with RW and SBA. Long term goal 4: Patient will demonstrate independence with HEP. Patient Goals   Patient goals : Return home. Plan    Plan  Times per week: 3-5x/week  Times per day: Daily  Current Treatment Recommendations: [place]  Training,Patient/Caregiver Education & Training,Balance Training,Gait Training,Home Exercise Program,Functional Mobility Training,Safety Education & Training  Safety Devices  Type of devices: Left in chair,Call light within reach     Therapy Time   Individual Concurrent Group Co-treatment   Time In 3664         Time Out 3983         Minutes 17              This note serves as D/C summary if patient is discharged prior to next visit.   Len Hurtado, PTA

## 2022-03-10 NOTE — PROGRESS NOTES
Occupational Therapy  Facility/Department: [facility] MED SURG  Daily Treatment Note  NAME: Javier Lyons  : 1945  MRN: [id number]    Date of Service: 3/10/2022    Discharge Recommendations:  Continue to assess pending progress       Assessment   Assessment: Pt agreeable to OT services. Pt completed bed mobility with MOD I. Pt ambulated to bathroom using RW and transferred to shower with SBA. Pt completed all bathing tasks with NINO. Pt assisted with donning brace. Pt returned to bed and transferred to supine with MOD I. Patient Diagnosis(es): There were no encounter diagnoses. has a past medical history of Colitis, GERD (gastroesophageal reflux disease), Glaucoma, and PAD (peripheral artery disease) ([facility] [address] has a past surgical history that includes Tubal ligation; eye surgery; cyst removal; skin biopsy; and Total hip arthroplasty (Left, 2019). Restrictions  Restrictions/Precautions  Restrictions/Precautions: General Precautions,Fall Risk,Weight Bearing  Required Braces or Orthoses?: Yes  Lower Extremity Weight Bearing Restrictions  Left Lower Extremity Weight Bearing: Non Weight Bearing  Required Braces or Orthoses  Left Lower Extremity Brace:  (ELS brace locked in extension)  Subjective   General  Chart Reviewed: Yes  Patient assessed for rehabilitation services?: Yes  Family / Caregiver Present: No  Referring Practitioner: Hansa Valiente PA-C  Diagnosis: Functional decline, Left tibial plateau fx, neck of left fibula  Subjective  Subjective: Pt states she lives alone, LLE pain 2/10. Pt agreeable to OT services.       Orientation     Objective    ADL  Grooming: Setup  UE Bathing: Setup  LE Bathing: Setup  UE Dressing: Setup     Plan   Plan  Times per week: 3-5  Times per day: Daily  Plan weeks: 1-2  Current Treatment Recommendations: [employer] Training,Self-Care / ADL,Safety Education & Training,Patient/Caregiver Education & Training,Functional Mobility Training    Goals  Short term goals  Time Frame for Short term goals: 2 weeks  Short term goal 1: Pt to complete dressing with MOD I. Short term goal 2: Pt to complete bathing with NINO. Short term goal 3: Pt to complete toileting with MOD I. Short term goal 4: Pt to complete safe ADL transfers maintaining WB status. Short term goal 5: Pt to tolerate x15 minutes of activity to increase functional activity tolerance. Therapy Time   Individual Concurrent Group Co-treatment   Time In 0933         Time Out 1011         Minutes 38              This note serves as a DC summary in the event of pt discharge.      Caorlyn Robbins, OTR/L

## 2022-03-11 LAB
ANION GAP SERPL CALCULATED.3IONS-SCNC: 7 MMOL/L (ref 3–16)
BASOPHILS ABSOLUTE: 0.1 K/UL (ref 0–0.1)
BASOPHILS RELATIVE PERCENT: 1 %
BUN BLDV-MCNC: 16 MG/DL (ref 6–20)
CALCIUM SERPL-MCNC: 9.1 MG/DL (ref 8.5–10.5)
CHLORIDE BLD-SCNC: 102 MMOL/L (ref 98–107)
CO2: 26 MMOL/L (ref 20–30)
CREAT SERPL-MCNC: 0.6 MG/DL (ref 0.4–1.2)
EOSINOPHILS ABSOLUTE: 0.4 K/UL (ref 0–0.4)
EOSINOPHILS RELATIVE PERCENT: 6.1 %
GFR AFRICAN AMERICAN: >59
GFR NON-AFRICAN AMERICAN: >60
GLUCOSE BLD-MCNC: 142 MG/DL (ref 74–106)
HCT VFR BLD CALC: 40.1 % (ref 37–47)
HEMOGLOBIN: 11.9 G/DL (ref 11.5–16.5)
IMMATURE GRANULOCYTES #: 0 K/UL
IMMATURE GRANULOCYTES %: 0.7 % (ref 0–5)
LYMPHOCYTES ABSOLUTE: 1 K/UL (ref 1.5–4)
LYMPHOCYTES RELATIVE PERCENT: 16.3 %
MCH RBC QN AUTO: 29.7 PG (ref 27–32)
MCHC RBC AUTO-ENTMCNC: 29.7 G/DL (ref 31–35)
MCV RBC AUTO: 100 FL (ref 80–100)
MONOCYTES ABSOLUTE: 0.4 K/UL (ref 0.2–0.8)
MONOCYTES RELATIVE PERCENT: 7.1 %
NEUTROPHILS ABSOLUTE: 4.2 K/UL (ref 2–7.5)
NEUTROPHILS RELATIVE PERCENT: 68.8 %
PDW BLD-RTO: 15.2 % (ref 11–16)
PLATELET # BLD: 291 K/UL (ref 150–400)
PMV BLD AUTO: 9 FL (ref 6–10)
POTASSIUM SERPL-SCNC: 3.6 MMOL/L (ref 3.4–5.1)
RBC # BLD: 4.01 M/UL (ref 3.8–5.8)
SODIUM BLD-SCNC: 135 MMOL/L (ref 136–145)
WBC # BLD: 6.1 K/UL (ref 4–11)

## 2022-03-11 PROCEDURE — 6370000000 HC RX 637 (ALT 250 FOR IP): Performed by: PHYSICIAN ASSISTANT

## 2022-03-11 PROCEDURE — 1200000002 HC SEMI PRIVATE SWING BED

## 2022-03-11 PROCEDURE — 97535 SELF CARE MNGMENT TRAINING: CPT

## 2022-03-11 PROCEDURE — 97110 THERAPEUTIC EXERCISES: CPT

## 2022-03-11 PROCEDURE — 36415 COLL VENOUS BLD VENIPUNCTURE: CPT

## 2022-03-11 PROCEDURE — 85025 COMPLETE CBC W/AUTO DIFF WBC: CPT

## 2022-03-11 PROCEDURE — 80048 BASIC METABOLIC PNL TOTAL CA: CPT

## 2022-03-11 RX ADMIN — FAMOTIDINE 20 MG: 20 TABLET, FILM COATED ORAL at 21:36

## 2022-03-11 RX ADMIN — ASPIRIN 81 MG 81 MG: 81 TABLET ORAL at 07:48

## 2022-03-11 RX ADMIN — OXYCODONE AND ACETAMINOPHEN 1 TABLET: 5; 325 TABLET ORAL at 07:47

## 2022-03-11 RX ADMIN — CALCIUM CARBONATE 500 MG: 500 TABLET, CHEWABLE ORAL at 07:48

## 2022-03-11 RX ADMIN — FOLIC ACID TAB 400 MCG 0.8 MG: 400 TAB at 07:48

## 2022-03-11 RX ADMIN — OXYCODONE AND ACETAMINOPHEN 1 TABLET: 5; 325 TABLET ORAL at 21:36

## 2022-03-11 RX ADMIN — OXYCODONE HYDROCHLORIDE AND ACETAMINOPHEN 500 MG: 500 TABLET ORAL at 07:47

## 2022-03-11 RX ADMIN — ASPIRIN 81 MG 81 MG: 81 TABLET ORAL at 21:36

## 2022-03-11 RX ADMIN — Medication 1 CAPSULE: at 07:47

## 2022-03-11 RX ADMIN — Medication 400 MG: at 07:48

## 2022-03-11 RX ADMIN — MULTIPLE VITAMINS W/ MINERALS TAB 1 TABLET: TAB at 07:48

## 2022-03-11 RX ADMIN — SENNOSIDES AND DOCUSATE SODIUM 1 TABLET: 8.6; 5 TABLET ORAL at 21:36

## 2022-03-11 RX ADMIN — FERROUS SULFATE TAB EC 324 MG (65 MG FE EQUIVALENT) 324 MG: 324 (65 FE) TABLET DELAYED RESPONSE at 07:47

## 2022-03-11 RX ADMIN — SENNOSIDES AND DOCUSATE SODIUM 1 TABLET: 8.6; 5 TABLET ORAL at 07:48

## 2022-03-11 RX ADMIN — OXYCODONE AND ACETAMINOPHEN 1 TABLET: 5; 325 TABLET ORAL at 18:22

## 2022-03-11 RX ADMIN — FAMOTIDINE 20 MG: 20 TABLET, FILM COATED ORAL at 07:48

## 2022-03-11 RX ADMIN — Medication 10 MG: at 21:42

## 2022-03-11 RX ADMIN — OXYCODONE AND ACETAMINOPHEN 1 TABLET: 5; 325 TABLET ORAL at 02:05

## 2022-03-11 RX ADMIN — GABAPENTIN 100 MG: 100 CAPSULE ORAL at 21:36

## 2022-03-11 RX ADMIN — BUDESONIDE 9 MG: 3 CAPSULE, COATED PELLETS ORAL at 07:49

## 2022-03-11 ASSESSMENT — PAIN SCALES - GENERAL
PAINLEVEL_OUTOF10: 10
PAINLEVEL_OUTOF10: 9
PAINLEVEL_OUTOF10: 7
PAINLEVEL_OUTOF10: 7

## 2022-03-11 NOTE — FLOWSHEET NOTE
03/11/22 0912   Assessment   Charting Type Shift assessment   Neurological   Neuro (WDL) WDL   Swallow Screening   Is the patient able to remain alert for testing? Yes   [place] Coma Scale   Eye Opening 4   Best Verbal Response 5   Best Motor Response 6   [place] Coma Scale Score 15   HEENT   HEENT (WDL) X   Right Eye Impaired vision   Left Eye Impaired vision   Respiratory   Respiratory (WDL) WDL   Respiratory Pattern Regular   Respiratory Depth Normal   Respiratory Quality/Effort Unlabored   Chest Assessment Chest expansion symmetrical;Trachea midline   L Breath Sounds Clear   R Breath Sounds Clear   Breath Sounds   Right Upper Lobe Clear   Right Middle Lobe Clear   Right Lower Lobe Clear   Left Upper Lobe Clear   Left Lower Lobe Clear   Cardiac   Cardiac (WDL) WDL   Cardiac Monitor   Telemetry Monitor On No   Gastrointestinal   Abdominal (WDL) X  (hx colitis)   Abdomen Inspection Flat;Soft   Tenderness Nontender   RUQ Bowel Sounds Active   LUQ Bowel Sounds Active   RLQ Bowel Sounds Active   LLQ Bowel Sounds Active   Peripheral Vascular   Peripheral Vascular (WDL) X   Edema Left lower extremity   LLE Edema Trace   LLE Neurovascular Assessment   Capillary Refill Less than/equal to 3 seconds   Color Pink;Red   Temperature Cool   Skin Color/Condition   Skin Color/Condition (WDL) X   Skin Color Appropriate for ethnicity   Skin Condition/Temp Dry; Warm   Skin Integrity   Skin Integrity (WDL) X   Skin Integrity Other (Comment)  (brace/ace wrap)   Location LLE   Musculoskeletal   Musculoskeletal (WDL) X   RUE Full movement   LUE Full movement   RL Extremity Full movement   LL Extremity Limited movement; Unsteady   Genitourinary   Genitourinary (WDL) WDL   Urine Assessment   Incontinence No   Urine Color Yellow/straw   Urine Appearance Clear   Urine Odor No odor   Anus/Rectum   Anus/Rectum (WDL) X   Evaluation Hemorrhoids   Psychosocial   Psychosocial (WDL) WDL

## 2022-03-11 NOTE — PROGRESS NOTES
Physical Therapy  Facility/Department: [facility] MED SURG  Daily Treatment Note  NAME: Irena Mxawell  : 1945  MRN: [id number]    Date of Service: 3/11/2022    Discharge Recommendations:  Continue to assess pending progress      Assessment   Assessment: Patient up in recliner. Performed B LE therex in seated and semi-reclined positions. REQUIRES PT FOLLOW UP: Yes  Activity Tolerance  Activity Tolerance: Patient Tolerated treatment well     Patient Diagnosis(es): There were no encounter diagnoses. has a past medical history of Colitis, GERD (gastroesophageal reflux disease), Glaucoma, and PAD (peripheral artery disease) ([facility] [address] has a past surgical history that includes Tubal ligation; eye surgery; cyst removal; skin biopsy; and Total hip arthroplasty (Left, 2019). Restrictions  Restrictions/Precautions  Restrictions/Precautions: General Precautions,Fall Risk,Weight Bearing  Required Braces or Orthoses?: Yes  Lower Extremity Weight Bearing Restrictions  Left Lower Extremity Weight Bearing: Non Weight Bearing  Required Braces or Orthoses  Left Lower Extremity Brace:  (ELS brace locked in extension)  Subjective   General  Chart Reviewed: Yes  Response To Previous Treatment: Patient with no complaints from previous session. Family / Caregiver Present: No  Subjective  Subjective: Patient reports she went ahead and got in the recliner after her shower. Offers no new c/o. Pain Screening  Patient Currently in Pain: Yes  Vital Signs  Patient Currently in Pain: Yes       Objective      Exercises  Quad Sets: 2x15  Heelslides: 2x15 R  Gluteal Sets: 2x15  Hip Abduction: 2x15 R  Knee Active Range of Motion: gentle AROM for L knee  Ankle Pumps: 2x15      Goals  Long term goals  Time Frame for Long term goals : 10 days  Long term goal 1: Patient will perform all bed mobility independently. Long term goal 2: Patient will perform sit to stand and transfers with modified independence.   Long term goal 3: Patient will ambulate 50'x2, NWB L LE, with RW and SBA. Long term goal 4: Patient will demonstrate independence with HEP. Patient Goals   Patient goals : Return home. Plan    Plan  Times per week: 3-5x/week  Times per day: Daily  Current Treatment Recommendations: [place] Training,Patient/Caregiver Education & Training,Balance Training,Gait Training,Home Exercise Program,Functional Mobility Training,Safety Education & Training  Safety Devices  Type of devices: Left in chair,Call light within reach     Therapy Time   Individual Concurrent Group Co-treatment   Time In 1120         Time Out 9990         Minutes 23              This note serves as D/C summary if patient is discharged prior to next visit.   Deon Werner, PTA

## 2022-03-11 NOTE — PROGRESS NOTES
Occupational Therapy  Facility/Department: [facility] MED SURG  Daily Treatment Note  NAME: Cara Muhammad  : 1945  MRN: [id number]    Date of Service: 3/11/2022    Discharge Recommendations:  Continue to assess pending progress       Assessment   Assessment: Pt agreeable to OT services. Pt ambulated to bathroom with RW demo good safety maintaining WB status. Pt transferred to shower chair with SBA. Pt completed bathing tasks with NINO. OT assisted with wrapping pt LLE and assisted with donning brace. Pt ambulated to chair and NINO with materials to brush teeth. Patient Diagnosis(es): There were no encounter diagnoses. has a past medical history of Colitis, GERD (gastroesophageal reflux disease), Glaucoma, and PAD (peripheral artery disease) ([facility] [address] has a past surgical history that includes Tubal ligation; eye surgery; cyst removal; skin biopsy; and Total hip arthroplasty (Left, 2019). Restrictions  Restrictions/Precautions  Restrictions/Precautions: General Precautions,Fall Risk,Weight Bearing  Required Braces or Orthoses?: Yes  Lower Extremity Weight Bearing Restrictions  Left Lower Extremity Weight Bearing: Non Weight Bearing  Required Braces or Orthoses  Left Lower Extremity Brace:  (ELS brace locked in extension)  Subjective   General  Chart Reviewed: Yes  Patient assessed for rehabilitation services?: Yes  Family / Caregiver Present: No  Referring Practitioner: Xiomara Constantino PA-C  Diagnosis: Functional decline, Left tibial plateau fx, neck of left fibula  Subjective  Subjective: Pt states she lives alone, LLE pain 2/10. Pt agreeable to OT services.       Orientation     Objective    ADL  Grooming: Setup  UE Bathing: Setup  LE Bathing: Setup  UE Dressing: Setup     Plan   Plan  Times per week: 3-5  Times per day: Daily  Plan weeks: 1-2  Current Treatment Recommendations: [place] Training,Self-Care / ADL,Safety Education & Training,Patient/Caregiver Education & Training,Functional Mobility Training    Goals  Short term goals  Time Frame for Short term goals: 2 weeks  Short term goal 1: Pt to complete dressing with MOD I. Short term goal 2: Pt to complete bathing with NINO. Short term goal 3: Pt to complete toileting with MOD I. Short term goal 4: Pt to complete safe ADL transfers maintaining WB status. Short term goal 5: Pt to tolerate x15 minutes of activity to increase functional activity tolerance. Therapy Time   Individual Concurrent Group Co-treatment   Time In 0912         Time Out 4719         Minutes 42              This note serves as a DC summary in the event of pt discharge.      Carolyn Robbins, OTR/L

## 2022-03-12 PROCEDURE — 6370000000 HC RX 637 (ALT 250 FOR IP): Performed by: PHYSICIAN ASSISTANT

## 2022-03-12 PROCEDURE — 1200000002 HC SEMI PRIVATE SWING BED

## 2022-03-12 RX ADMIN — ASPIRIN 81 MG 81 MG: 81 TABLET ORAL at 08:01

## 2022-03-12 RX ADMIN — BUDESONIDE 9 MG: 3 CAPSULE, COATED PELLETS ORAL at 08:04

## 2022-03-12 RX ADMIN — ASPIRIN 81 MG 81 MG: 81 TABLET ORAL at 20:32

## 2022-03-12 RX ADMIN — Medication 10 MG: at 20:32

## 2022-03-12 RX ADMIN — SENNOSIDES AND DOCUSATE SODIUM 1 TABLET: 8.6; 5 TABLET ORAL at 20:32

## 2022-03-12 RX ADMIN — FAMOTIDINE 20 MG: 20 TABLET, FILM COATED ORAL at 20:32

## 2022-03-12 RX ADMIN — Medication 400 MG: at 08:01

## 2022-03-12 RX ADMIN — FAMOTIDINE 20 MG: 20 TABLET, FILM COATED ORAL at 08:01

## 2022-03-12 RX ADMIN — FOLIC ACID TAB 400 MCG 0.8 MG: 400 TAB at 08:00

## 2022-03-12 RX ADMIN — OXYCODONE AND ACETAMINOPHEN 1 TABLET: 5; 325 TABLET ORAL at 08:01

## 2022-03-12 RX ADMIN — CALCIUM CARBONATE 500 MG: 500 TABLET, CHEWABLE ORAL at 08:01

## 2022-03-12 RX ADMIN — OXYCODONE AND ACETAMINOPHEN 1 TABLET: 5; 325 TABLET ORAL at 20:32

## 2022-03-12 RX ADMIN — MULTIPLE VITAMINS W/ MINERALS TAB 1 TABLET: TAB at 08:01

## 2022-03-12 RX ADMIN — FERROUS SULFATE TAB EC 324 MG (65 MG FE EQUIVALENT) 324 MG: 324 (65 FE) TABLET DELAYED RESPONSE at 08:01

## 2022-03-12 RX ADMIN — GABAPENTIN 100 MG: 100 CAPSULE ORAL at 20:32

## 2022-03-12 RX ADMIN — OXYCODONE HYDROCHLORIDE AND ACETAMINOPHEN 500 MG: 500 TABLET ORAL at 08:01

## 2022-03-12 RX ADMIN — Medication 1 CAPSULE: at 08:01

## 2022-03-12 RX ADMIN — SENNOSIDES AND DOCUSATE SODIUM 1 TABLET: 8.6; 5 TABLET ORAL at 08:01

## 2022-03-12 ASSESSMENT — PAIN SCALES - GENERAL
PAINLEVEL_OUTOF10: 8
PAINLEVEL_OUTOF10: 4
PAINLEVEL_OUTOF10: 7

## 2022-03-12 NOTE — FLOWSHEET NOTE
03/12/22 0916   Assessment   Charting Type Shift assessment   Neurological   Neuro (WDL) WDL   HEENT   HEENT (WDL) X   Right Eye Impaired vision   Left Eye Impaired vision   Respiratory   Respiratory (WDL) WDL   Respiratory Pattern Regular   Respiratory Depth Normal   Respiratory Quality/Effort Unlabored   Chest Assessment Chest expansion symmetrical;Trachea midline   L Breath Sounds Clear   R Breath Sounds Clear   Breath Sounds   Right Upper Lobe Clear   Right Middle Lobe Clear   Right Lower Lobe Clear   Left Upper Lobe Clear   Left Lower Lobe Clear   Cardiac   Cardiac (WDL) WDL   Cardiac Monitor   Telemetry Monitor On No   Gastrointestinal   Abdominal (WDL) X   GI Symptoms Constipation   Abdomen Inspection Flat;Soft   Tenderness Nontender   RUQ Bowel Sounds Active   LUQ Bowel Sounds Active   RLQ Bowel Sounds Active   LLQ Bowel Sounds Active   Peripheral Vascular   Peripheral Vascular (WDL) X   Edema Left lower extremity   LLE Edema Trace   LLE Neurovascular Assessment   Capillary Refill Less than/equal to 3 seconds   Color Pink;Red   Temperature Cool   Skin Color/Condition   Skin Color/Condition (WDL) X   Skin Integrity   Skin Integrity (WDL) X   Musculoskeletal   Musculoskeletal (WDL) X   RUE Full movement   LUE Full movement   RL Extremity Full movement   LL Extremity Limited movement; Unsteady   Genitourinary   Genitourinary (WDL) WDL   Urine Assessment   Incontinence No   Urine Color Yellow/straw   Urine Appearance Clear   Urine Odor No odor   Anus/Rectum   Anus/Rectum (WDL) X   Evaluation Hemorrhoids   Psychosocial   Psychosocial (WDL) WDL

## 2022-03-12 NOTE — FLOWSHEET NOTE
03/12/22 0103   Assessment   Charting Type Shift assessment   Neurological   Neuro (WDL) WDL   Swallow Screening   Is the patient able to remain alert for testing? Yes   Was the Patient Eating a Modified Diet Prior to being Admitted? No   Denzel Coma Scale   Eye Opening 4   Best Verbal Response 5   Best Motor Response 6   Denzel Coma Scale Score 15   NIHSS Stroke Scale   NIHSS Stroke Scale Assessed No   HEENT   HEENT (WDL) X   Right Eye Impaired vision   Left Eye Impaired vision   Respiratory   Respiratory (WDL) WDL   Respiratory Pattern Regular   Respiratory Depth Normal   Respiratory Quality/Effort Unlabored   Chest Assessment Chest expansion symmetrical;Trachea midline   L Breath Sounds Clear   R Breath Sounds Clear   Breath Sounds   Right Upper Lobe Clear   Right Middle Lobe Clear   Right Lower Lobe Clear   Left Upper Lobe Clear   Left Lower Lobe Clear   Cardiac   Cardiac (WDL) WDL   Cardiac Monitor   Telemetry Monitor On No   Gastrointestinal   Abdominal (WDL) X   GI Symptoms Constipation   Abdomen Inspection Flat;Soft   Last BM (including prior to admit) 03/09/22   RUQ Bowel Sounds Active   LUQ Bowel Sounds Active   RLQ Bowel Sounds Active   LLQ Bowel Sounds Active   Peripheral Vascular   Peripheral Vascular (WDL) X   Edema Left lower extremity   LLE Edema Trace   LLE Neurovascular Assessment   Capillary Refill Less than/equal to 3 seconds   Color Pink;Red   Temperature Cool   Skin Color/Condition   Skin Color Pale;Pink   Skin Condition/Temp Warm;Dry   Skin Integrity   Skin Integrity (WDL) X   Skin Integrity Other (Comment)  (ace wrap and brace)   Location LLE   Preventative Dressing Yes   Assessed this shift? Yes   Musculoskeletal   Musculoskeletal (WDL) X   RUE Full movement   LUE Full movement   RL Extremity Full movement   LL Extremity Limited movement; Unsteady   Genitourinary   Genitourinary (WDL) WDL   Urine Assessment   Incontinence No   Urine Color Yellow/straw   Urine Appearance Clear   Urine Odor No odor   Anus/Rectum   Anus/Rectum (WDL) X   Evaluation Hemorrhoids   Psychosocial   Psychosocial (WDL) WDL

## 2022-03-13 PROCEDURE — 6370000000 HC RX 637 (ALT 250 FOR IP): Performed by: PHYSICIAN ASSISTANT

## 2022-03-13 PROCEDURE — 1200000002 HC SEMI PRIVATE SWING BED

## 2022-03-13 RX ADMIN — BUDESONIDE 9 MG: 3 CAPSULE, COATED PELLETS ORAL at 08:22

## 2022-03-13 RX ADMIN — FAMOTIDINE 20 MG: 20 TABLET, FILM COATED ORAL at 08:21

## 2022-03-13 RX ADMIN — Medication 400 MG: at 08:20

## 2022-03-13 RX ADMIN — SENNOSIDES AND DOCUSATE SODIUM 1 TABLET: 8.6; 5 TABLET ORAL at 08:20

## 2022-03-13 RX ADMIN — FAMOTIDINE 20 MG: 20 TABLET, FILM COATED ORAL at 20:58

## 2022-03-13 RX ADMIN — OXYCODONE HYDROCHLORIDE AND ACETAMINOPHEN 500 MG: 500 TABLET ORAL at 08:20

## 2022-03-13 RX ADMIN — OXYCODONE AND ACETAMINOPHEN 1 TABLET: 5; 325 TABLET ORAL at 08:20

## 2022-03-13 RX ADMIN — SENNOSIDES AND DOCUSATE SODIUM 1 TABLET: 8.6; 5 TABLET ORAL at 20:58

## 2022-03-13 RX ADMIN — MULTIPLE VITAMINS W/ MINERALS TAB 1 TABLET: TAB at 08:20

## 2022-03-13 RX ADMIN — ASPIRIN 81 MG 81 MG: 81 TABLET ORAL at 20:58

## 2022-03-13 RX ADMIN — CALCIUM CARBONATE 500 MG: 500 TABLET, CHEWABLE ORAL at 08:21

## 2022-03-13 RX ADMIN — FERROUS SULFATE TAB EC 324 MG (65 MG FE EQUIVALENT) 324 MG: 324 (65 FE) TABLET DELAYED RESPONSE at 08:21

## 2022-03-13 RX ADMIN — OXYCODONE AND ACETAMINOPHEN 1 TABLET: 5; 325 TABLET ORAL at 20:59

## 2022-03-13 RX ADMIN — OXYCODONE AND ACETAMINOPHEN 1 TABLET: 5; 325 TABLET ORAL at 00:48

## 2022-03-13 RX ADMIN — Medication 1 CAPSULE: at 08:20

## 2022-03-13 RX ADMIN — FOLIC ACID TAB 400 MCG 0.8 MG: 400 TAB at 08:21

## 2022-03-13 RX ADMIN — ASPIRIN 81 MG 81 MG: 81 TABLET ORAL at 08:21

## 2022-03-13 RX ADMIN — GABAPENTIN 100 MG: 100 CAPSULE ORAL at 20:58

## 2022-03-13 ASSESSMENT — PAIN SCALES - GENERAL
PAINLEVEL_OUTOF10: 3
PAINLEVEL_OUTOF10: 8
PAINLEVEL_OUTOF10: 8
PAINLEVEL_OUTOF10: 7

## 2022-03-13 NOTE — FLOWSHEET NOTE
03/12/22 2147   Assessment   Charting Type Shift assessment   Neurological   Neuro (WDL) WDL   Swallow Screening   Is the patient able to remain alert for testing? Yes   Was the Patient Eating a Modified Diet Prior to being Admitted? No   Denzel Coma Scale   Eye Opening 4   Best Verbal Response 5   Best Motor Response 6   Denzel Coma Scale Score 15   NIHSS Stroke Scale   NIHSS Stroke Scale Assessed No   HEENT   HEENT (WDL) X   Right Eye Impaired vision   Left Eye Impaired vision   Respiratory   Respiratory (WDL) WDL   Respiratory Pattern Regular   Respiratory Depth Normal   Respiratory Quality/Effort Unlabored   Chest Assessment Chest expansion symmetrical;Trachea midline   L Breath Sounds Clear   R Breath Sounds Clear   Breath Sounds   Right Upper Lobe Clear   Right Middle Lobe Clear   Right Lower Lobe Clear   Left Upper Lobe Clear   Left Lower Lobe Clear   Cardiac   Cardiac (WDL) WDL   Cardiac Monitor   Telemetry Monitor On No   Gastrointestinal   Abdominal (WDL) X   Abdomen Inspection Flat;Soft   Tenderness Nontender   RUQ Bowel Sounds Active   LUQ Bowel Sounds Active   RLQ Bowel Sounds Active   LLQ Bowel Sounds Active   Peripheral Vascular   Peripheral Vascular (WDL) X   Edema Left lower extremity   LLE Edema Trace   LLE Neurovascular Assessment   Capillary Refill Less than/equal to 3 seconds   Color Pink;Red   Temperature Cool   Skin Color/Condition   Skin Color/Condition (WDL) X   Skin Color Pale;Pink   Skin Condition/Temp Warm;Dry   Skin Integrity   Skin Integrity (WDL) X   Skin Integrity Other (Comment)  (ace wrap and brace)   Location LLE   Preventative Dressing Yes   Assessed this shift? Yes   Musculoskeletal   Musculoskeletal (WDL) X   RUE Full movement   LUE Full movement   RL Extremity Full movement   LL Extremity Limited movement; Unsteady   Genitourinary   Genitourinary (WDL) WDL   Urine Assessment   Incontinence No   Anus/Rectum   Anus/Rectum (WDL) X   Evaluation Hemorrhoids   Psychosocial Psychosocial (WDL) WDL

## 2022-03-13 NOTE — FLOWSHEET NOTE
03/13/22 0918   Assessment   Charting Type Shift assessment   Neurological   Neuro (WDL) WDL   [place] Coma Scale   Eye Opening 4   Best Verbal Response 5   Best Motor Response 6   Denzel Coma Scale Score 15   HEENT   HEENT (WDL) X   Right Eye Impaired vision   Left Eye Impaired vision   Respiratory   Respiratory (WDL) WDL   Respiratory Pattern Regular   Respiratory Depth Normal   Respiratory Quality/Effort Unlabored   Chest Assessment Chest expansion symmetrical;Trachea midline   L Breath Sounds Clear   R Breath Sounds Clear   Breath Sounds   Right Upper Lobe Clear   Right Middle Lobe Clear   Right Lower Lobe Clear   Left Upper Lobe Clear   Left Lower Lobe Clear   Cardiac   Cardiac (WDL) WDL   Cardiac Monitor   Telemetry Monitor On No   Gastrointestinal   Abdominal (WDL) X   Abdomen Inspection Flat;Soft   Tenderness Nontender   RUQ Bowel Sounds Active   LUQ Bowel Sounds Active   RLQ Bowel Sounds Active   LLQ Bowel Sounds Active   Peripheral Vascular   Peripheral Vascular (WDL) X   Edema Left lower extremity   LLE Edema Trace   LLE Neurovascular Assessment   Capillary Refill Less than/equal to 3 seconds   Color Pink;Red   Temperature Cool   Skin Color/Condition   Skin Color/Condition (WDL) X   Skin Color Pale   Skin Condition/Temp Dry; Warm   Skin Integrity   Skin Integrity (WDL) X   Skin Integrity Other (Comment)  (ace wrap/brace)   Location LLE   Musculoskeletal   Musculoskeletal (WDL) X   RUE Full movement   LUE Full movement   RL Extremity Full movement   LL Extremity Limited movement; Unsteady   Genitourinary   Genitourinary (WDL) WDL   Urine Assessment   Incontinence No   Urine Color Yellow/straw   Urine Appearance Clear   Urine Odor No odor   Anus/Rectum   Anus/Rectum (WDL) X   Evaluation Hemorrhoids   Psychosocial   Psychosocial (WDL) WDL

## 2022-03-14 PROCEDURE — 1200000002 HC SEMI PRIVATE SWING BED

## 2022-03-14 PROCEDURE — 6370000000 HC RX 637 (ALT 250 FOR IP): Performed by: PHYSICIAN ASSISTANT

## 2022-03-14 PROCEDURE — 97530 THERAPEUTIC ACTIVITIES: CPT

## 2022-03-14 PROCEDURE — 6370000000 HC RX 637 (ALT 250 FOR IP)

## 2022-03-14 PROCEDURE — 97803 MED NUTRITION INDIV SUBSEQ: CPT

## 2022-03-14 PROCEDURE — 97535 SELF CARE MNGMENT TRAINING: CPT

## 2022-03-14 RX ORDER — MECOBALAMIN 5000 MCG
TABLET,DISINTEGRATING ORAL
Status: COMPLETED
Start: 2022-03-14 | End: 2022-03-14

## 2022-03-14 RX ADMIN — GABAPENTIN 100 MG: 100 CAPSULE ORAL at 20:38

## 2022-03-14 RX ADMIN — MULTIPLE VITAMINS W/ MINERALS TAB 1 TABLET: TAB at 07:57

## 2022-03-14 RX ADMIN — OXYCODONE AND ACETAMINOPHEN 1 TABLET: 5; 325 TABLET ORAL at 20:39

## 2022-03-14 RX ADMIN — BUDESONIDE 9 MG: 3 CAPSULE, COATED PELLETS ORAL at 07:58

## 2022-03-14 RX ADMIN — FAMOTIDINE 20 MG: 20 TABLET, FILM COATED ORAL at 07:58

## 2022-03-14 RX ADMIN — Medication 10 MG: at 01:05

## 2022-03-14 RX ADMIN — Medication 400 MG: at 07:57

## 2022-03-14 RX ADMIN — ASPIRIN 81 MG 81 MG: 81 TABLET ORAL at 07:57

## 2022-03-14 RX ADMIN — OXYCODONE AND ACETAMINOPHEN 1 TABLET: 5; 325 TABLET ORAL at 07:58

## 2022-03-14 RX ADMIN — SENNOSIDES AND DOCUSATE SODIUM 1 TABLET: 8.6; 5 TABLET ORAL at 07:57

## 2022-03-14 RX ADMIN — ACETAMINOPHEN 650 MG: 325 TABLET, FILM COATED ORAL at 01:05

## 2022-03-14 RX ADMIN — Medication 10 MG: at 20:39

## 2022-03-14 RX ADMIN — OXYCODONE AND ACETAMINOPHEN 1 TABLET: 5; 325 TABLET ORAL at 16:39

## 2022-03-14 RX ADMIN — ASPIRIN 81 MG 81 MG: 81 TABLET ORAL at 20:39

## 2022-03-14 RX ADMIN — FERROUS SULFATE TAB EC 324 MG (65 MG FE EQUIVALENT) 324 MG: 324 (65 FE) TABLET DELAYED RESPONSE at 07:56

## 2022-03-14 RX ADMIN — FOLIC ACID TAB 400 MCG 0.8 MG: 400 TAB at 07:57

## 2022-03-14 RX ADMIN — FAMOTIDINE 20 MG: 20 TABLET, FILM COATED ORAL at 20:38

## 2022-03-14 RX ADMIN — OXYCODONE HYDROCHLORIDE AND ACETAMINOPHEN 500 MG: 500 TABLET ORAL at 07:57

## 2022-03-14 RX ADMIN — CALCIUM CARBONATE 500 MG: 500 TABLET, CHEWABLE ORAL at 07:56

## 2022-03-14 RX ADMIN — OXYCODONE AND ACETAMINOPHEN 1 TABLET: 5; 325 TABLET ORAL at 03:04

## 2022-03-14 RX ADMIN — Medication 1 CAPSULE: at 07:57

## 2022-03-14 RX ADMIN — SENNOSIDES AND DOCUSATE SODIUM 1 TABLET: 8.6; 5 TABLET ORAL at 20:38

## 2022-03-14 ASSESSMENT — PAIN SCALES - GENERAL
PAINLEVEL_OUTOF10: 9
PAINLEVEL_OUTOF10: 8
PAINLEVEL_OUTOF10: 7

## 2022-03-14 NOTE — PROGRESS NOTES
Physical Therapy  Facility/Department: [facility] MED SURG  Daily Treatment Note  NAME: Romi Delgadillo  : 1945  MRN: [id number]    Date of Service: 3/14/2022    Discharge Recommendations:  Continue to assess pending progress      Assessment   Assessment: Patient sitting up in bed visiting with brother. Patient completed bed mobility tasks with Mod I.  Stood with supervision and ambulated ~10 feet with RW, NWB on L LE, and supervision. Patient states she didn't sleep well last night and may nap in the chair a little after lunch. REQUIRES PT FOLLOW UP: Yes  Activity Tolerance  Activity Tolerance: Patient Tolerated treatment well     Patient Diagnosis(es): There were no encounter diagnoses. has a past medical history of Colitis, GERD (gastroesophageal reflux disease), Glaucoma, and PAD (peripheral artery disease) ([facility] [address] has a past surgical history that includes Tubal ligation; eye surgery; cyst removal; skin biopsy; and Total hip arthroplasty (Left, 2019). Restrictions  Restrictions/Precautions  Restrictions/Precautions: General Precautions,Fall Risk,Weight Bearing  Required Braces or Orthoses?: Yes  Lower Extremity Weight Bearing Restrictions  Left Lower Extremity Weight Bearing: Non Weight Bearing  Required Braces or Orthoses  Left Lower Extremity Brace:  (ELS brace locked in extension)  Subjective   General  Chart Reviewed: Yes  Response To Previous Treatment: Not applicable  Family / Caregiver Present: Yes  Subjective  Subjective: Patient reports she went back to bed after her shower but is ready to get up now.   Pain Screening  Patient Currently in Pain: Yes  Vital Signs  Patient Currently in Pain: Yes       Objective   Bed mobility  Rolling to Left: Modified independent  Supine to Sit: Modified independent  Scooting: Modified independent  Transfers  Sit to Stand: Supervision  Stand to sit: Supervision  Bed to Chair: Supervision  Ambulation  Ambulation?: Yes  WB Status: NWB L LE  Ambulation 1  Surface: level tile  Device: Rolling Walker  Assistance: Supervision  Distance: 10 feet     Balance  Posture: Good  Sitting - Static: Good  Sitting - Dynamic: Good  Standing - Static: Good  Standing - Dynamic: Good;-      Goals  Long term goals  Time Frame for Long term goals : 10 days  Long term goal 1: Patient will perform all bed mobility independently. Long term goal 2: Patient will perform sit to stand and transfers with modified independence. Long term goal 3: Patient will ambulate 50'x2, NWB L LE, with RW and SBA. Long term goal 4: Patient will demonstrate independence with HEP. Patient Goals   Patient goals : Return home. Plan    Plan  Times per week: 3-5x/week  Times per day: Daily  Current Treatment Recommendations: [facility] Training,Patient/Caregiver Education & Training,Balance Training,Gait Training,Home Exercise Program,Functional Mobility Training,Safety Education & Training  Safety Devices  Type of devices: Left in chair,Call light within reach (brother present)     Therapy Time   Individual Concurrent Group Co-treatment   Time In 200         Time Out 1151         Minutes 18              This note serves as D/C summary if patient is discharged prior to next visit.   Mary Wilson, PTA

## 2022-03-14 NOTE — PROGRESS NOTES
Occupational Therapy  Facility/Department: [address] [place] [address] MED SURG  Daily Treatment Note  NAME: Cara Muhammad  : 1945  MRN: [id number]    Date of Service: 3/14/2022    Discharge Recommendations:  Continue to assess pending progress       Assessment   Assessment: Pt agreeable to OT services. Pt ambulated to bathroom with RW demo good safety maintaining WB status. Pt transferred to shower chair with SBA. Pt completed bathing tasks with NINO. OT assisted with wrapping pt LLE and assisted with donning brace. Pt ambulated to bed and NINO with materials to brush teeth. Patient Diagnosis(es): There were no encounter diagnoses. has a past medical history of Colitis, GERD (gastroesophageal reflux disease), Glaucoma, and PAD (peripheral artery disease) ([facility] [address] has a past surgical history that includes Tubal ligation; eye surgery; cyst removal; skin biopsy; and Total hip arthroplasty (Left, 2019). Restrictions  Restrictions/Precautions  Restrictions/Precautions: General Precautions,Fall Risk,Weight Bearing  Required Braces or Orthoses?: Yes  Lower Extremity Weight Bearing Restrictions  Left Lower Extremity Weight Bearing: Non Weight Bearing  Required Braces or Orthoses  Left Lower Extremity Brace:  (ELS brace locked in extension)  Subjective   General  Chart Reviewed: Yes  Patient assessed for rehabilitation services?: Yes  Family / Caregiver Present: No  Referring Practitioner: Xiomara Cosntantino PA-C  Diagnosis: Functional decline, Left tibial plateau fx, neck of left fibula  Subjective  Subjective: Pt states she lives alone, LLE pain 2/10. Pt agreeable to OT services.       Orientation     Objective    ADL  Grooming: Setup  UE Bathing: Setup  LE Bathing: Setup  UE Dressing: Setup        Plan   Plan  Times per week: 3-5  Times per day: Daily  Plan weeks: 1-2  Current Treatment Recommendations: [facility] Training,Self-Care / ADL,Safety Education & Training,Patient/Caregiver Education & Training,Functional Mobility Training    Goals  Short term goals  Time Frame for Short term goals: 2 weeks  Short term goal 1: Pt to complete dressing with MOD I. Short term goal 2: Pt to complete bathing with NINO. Short term goal 3: Pt to complete toileting with MOD I. Short term goal 4: Pt to complete safe ADL transfers maintaining WB status. Short term goal 5: Pt to tolerate x15 minutes of activity to increase functional activity tolerance. Therapy Time   Individual Concurrent Group Co-treatment   Time In 9639         Time Out 0154         Minutes 44              This note serves as a DC summary in the event of pt discharge.      Carolyn Robbins, OTR/L

## 2022-03-14 NOTE — PLAN OF CARE
Problem: Skin Integrity:  Goal: Will show no infection signs and symptoms  Description: Will show no infection signs and symptoms  Outcome: Ongoing     Problem: Safety:  Goal: Free from accidental physical injury  Description: Free from accidental physical injury  Outcome: Ongoing     Problem: Discharge Planning:  Goal: Patients continuum of care needs are met  Description: Patients continuum of care needs are met  Outcome: Ongoing

## 2022-03-14 NOTE — FLOWSHEET NOTE
Pt resting quietly in bed. Am assessment complete, respirations equal and unlabored. Pt took am medications without difficulty. Pt instructed to call out with any needs or concerns, none at this time. Reviewed plan of care with pt, verbalized understanding. Call bell within pt reach.      03/14/22 0825   Assessment   Charting Type Shift assessment   Neurological   Neuro (WDL) WDL   [place] Coma Scale   Eye Opening 4   Best Verbal Response 5   Best Motor Response 6   G[place]ard Coma Scale Score 15   HEENT   HEENT (WDL) X   Right Eye Impaired vision   Left Eye Impaired vision   Tongue Pink & moist   Voice Normal   Respiratory   Respiratory (WDL) WDL   Respiratory Pattern Regular   Respiratory Depth Normal   Respiratory Quality/Effort Unlabored   Chest Assessment Chest expansion symmetrical;Trachea midline   L Breath Sounds Clear   R Breath Sounds Clear   Breath Sounds   Right Upper Lobe Clear   Right Middle Lobe Clear   Right Lower Lobe Clear   Left Upper Lobe Clear   Left Lower Lobe Clear   Cardiac   Cardiac (WDL) WDL   Cardiac Monitor   Telemetry Monitor On No   Gastrointestinal   Abdominal (WDL) X   Abdomen Inspection Flat;Soft   Tenderness Nontender   RUQ Bowel Sounds Active   LUQ Bowel Sounds Active   RLQ Bowel Sounds Active   LLQ Bowel Sounds Active   Peripheral Vascular   Peripheral Vascular (WDL) X   Edema Left lower extremity   LLE Edema Trace   LLE Neurovascular Assessment   Capillary Refill Less than/equal to 3 seconds   Color Pink;Red   Temperature Cool   L Pedal Pulse +2   Skin Color/Condition   Skin Color/Condition (WDL) X   Skin Color Appropriate for ethnicity   Skin Condition/Temp Warm;Dry   Skin Integrity   Skin Integrity (WDL) X   Skin Integrity Other (Comment)  (fracture repair surgery)   Location LLE   Preventative Dressing Yes   Musculoskeletal   Musculoskeletal (WDL) X   RUE Full movement   LUE Full movement   RL Extremity Full movement   LL Extremity Limited movement; Unsteady   Genitourinary Genitourinary (WDL) WDL   Urine Assessment   Incontinence No   Urine Color Yellow/straw   Urine Appearance Clear   Urine Odor No odor   Anus/Rectum   Anus/Rectum (WDL) X   Evaluation Hemorrhoids   Psychosocial   Psychosocial (WDL) WDL

## 2022-03-14 NOTE — PROGRESS NOTES
Checked in on patient while rounding. Offered spiritual services to patient. Patient seemed to be in good spirits.

## 2022-03-14 NOTE — PROGRESS NOTES
Nutrition Assessment     Type and Reason for Visit:  (follow up)    Nutrition Recommendations/Plan: Recommend to continue with current diet and ONS. Encourage intakes. Nutrition Assessment:  Pt continues with low-moderate risk r/t good intakes. Malnutrition Assessment:  Malnutrition Status: At risk for malnutrition (Comment) (Pt has osteoporosis, and is underweight,  She seems to be eating good at this this time, and states she eats good, but has had colitis for many years.)    Estimated Daily Nutrient Needs:  Energy (kcal): 8565-5488 (30-32 kcal/kg cbw); Weight Used for Energy Requirements:  Current     Protein (g): 65-78 (1.25-1.5 gm/kg CBW); Weight Used for Protein Requirements:  Current        Fluid (ml/day): 9514-9583; Weight Used for Fluid Requirements:  1 ml/kcal      Nutrition Related Findings: No new weights available, Edema now trace in LLE. Potassium down to 3.6 and no new labs. pt continues to eat good. Current Nutrition Therapies:    ADULT DIET;  Regular  ADULT ORAL NUTRITION SUPPLEMENT; Breakfast, Lunch, Dinner; Standard High Calorie/High Protein Oral Supplement    Anthropometric Measures:  · Height: 5' 7\" (170.2 cm)  · Current Body Wt: 115 lb (52.2 kg)   · BMI: 18    Nutrition Diagnosis:   · Increased nutrient needs related to acute injury/trauma as evidenced by wounds,BMI      Nutrition Interventions:   Food and/or Nutrient Delivery:  Continue Current Diet,Continue Oral Nutrition Supplement  Nutrition Education/Counseling:  Education completed (spoke to patient about the importance of eating enough calories and protein while healing and then on return home.)   Coordination of Nutrition Care:  Continue to monitor while inpatient,Interdisciplinary Rounds    Goals:  to meet est needs for age/condition       Nutrition Monitoring and Evaluation:   Behavioral-Environmental Outcomes:      Food/Nutrient Intake Outcomes:  Food and Nutrient Intake,Supplement Intake  Physical Signs/Symptoms Outcomes:  Biochemical Data,Weight,Skin,Fluid Status or Edema     Discharge Planning:    Continue current diet     Electronically signed by Yuliya Stewart, MS, RD, LD on 3/14/22 at 3:57 PM EDT

## 2022-03-15 LAB
GLUCOSE BLD-MCNC: 211 MG/DL (ref 74–106)
PERFORMED ON: ABNORMAL

## 2022-03-15 PROCEDURE — 97110 THERAPEUTIC EXERCISES: CPT

## 2022-03-15 PROCEDURE — 97530 THERAPEUTIC ACTIVITIES: CPT

## 2022-03-15 PROCEDURE — 6370000000 HC RX 637 (ALT 250 FOR IP): Performed by: PHYSICIAN ASSISTANT

## 2022-03-15 PROCEDURE — 1200000002 HC SEMI PRIVATE SWING BED

## 2022-03-15 PROCEDURE — 97535 SELF CARE MNGMENT TRAINING: CPT

## 2022-03-15 RX ADMIN — ASPIRIN 81 MG 81 MG: 81 TABLET ORAL at 20:25

## 2022-03-15 RX ADMIN — FAMOTIDINE 20 MG: 20 TABLET, FILM COATED ORAL at 08:24

## 2022-03-15 RX ADMIN — ASPIRIN 81 MG 81 MG: 81 TABLET ORAL at 08:24

## 2022-03-15 RX ADMIN — OXYCODONE HYDROCHLORIDE AND ACETAMINOPHEN 500 MG: 500 TABLET ORAL at 08:24

## 2022-03-15 RX ADMIN — SENNOSIDES AND DOCUSATE SODIUM 1 TABLET: 8.6; 5 TABLET ORAL at 08:24

## 2022-03-15 RX ADMIN — Medication 10 MG: at 20:25

## 2022-03-15 RX ADMIN — MULTIPLE VITAMINS W/ MINERALS TAB 1 TABLET: TAB at 08:24

## 2022-03-15 RX ADMIN — FOLIC ACID TAB 400 MCG 0.8 MG: 400 TAB at 08:24

## 2022-03-15 RX ADMIN — OXYCODONE AND ACETAMINOPHEN 1 TABLET: 5; 325 TABLET ORAL at 08:23

## 2022-03-15 RX ADMIN — Medication 400 MG: at 08:24

## 2022-03-15 RX ADMIN — OXYCODONE AND ACETAMINOPHEN 1 TABLET: 5; 325 TABLET ORAL at 17:45

## 2022-03-15 RX ADMIN — OXYCODONE AND ACETAMINOPHEN 1 TABLET: 5; 325 TABLET ORAL at 20:25

## 2022-03-15 RX ADMIN — Medication 1 CAPSULE: at 08:24

## 2022-03-15 RX ADMIN — FAMOTIDINE 20 MG: 20 TABLET, FILM COATED ORAL at 20:25

## 2022-03-15 RX ADMIN — GABAPENTIN 100 MG: 100 CAPSULE ORAL at 20:25

## 2022-03-15 RX ADMIN — CALCIUM CARBONATE 500 MG: 500 TABLET, CHEWABLE ORAL at 08:24

## 2022-03-15 RX ADMIN — BUDESONIDE 9 MG: 3 CAPSULE, COATED PELLETS ORAL at 08:27

## 2022-03-15 RX ADMIN — SENNOSIDES AND DOCUSATE SODIUM 1 TABLET: 8.6; 5 TABLET ORAL at 20:25

## 2022-03-15 RX ADMIN — FERROUS SULFATE TAB EC 324 MG (65 MG FE EQUIVALENT) 324 MG: 324 (65 FE) TABLET DELAYED RESPONSE at 08:24

## 2022-03-15 ASSESSMENT — PAIN SCALES - GENERAL
PAINLEVEL_OUTOF10: 10
PAINLEVEL_OUTOF10: 8
PAINLEVEL_OUTOF10: 7

## 2022-03-15 NOTE — PROGRESS NOTES
Occupational Therapy  Facility/Department: [address] [place] [address] MED SURG  Daily Treatment Note  NAME: Thiago Marques  : 1945  MRN: [id number]    Date of Service: 3/15/2022    Discharge Recommendations:  Continue to assess pending progress       Assessment   Assessment: Pt agreeable to OT services. Pt ambulated to bathroom with RW demo good safety maintaining WB status. Pt transferred to shower with SBA. Pt completed bathing seated on shower chair with NINO. Pt tolerated well. OT assisted with wrapping LLE and donning brace. Pt tolerated well. Pt ambulated back to bed. Patient Diagnosis(es): There were no encounter diagnoses. has a past medical history of Colitis, GERD (gastroesophageal reflux disease), Glaucoma, and PAD (peripheral artery disease) ([facility] [address] has a past surgical history that includes Tubal ligation; eye surgery; cyst removal; skin biopsy; and Total hip arthroplasty (Left, 2019). Restrictions  Restrictions/Precautions  Restrictions/Precautions: General Precautions,Fall Risk,Weight Bearing  Required Braces or Orthoses?: Yes  Lower Extremity Weight Bearing Restrictions  Left Lower Extremity Weight Bearing: Non Weight Bearing  Required Braces or Orthoses  Left Lower Extremity Brace:  (ELS brace locked in extension)  Subjective   General  Chart Reviewed: Yes  Patient assessed for rehabilitation services?: Yes  Family / Caregiver Present: No  Referring Practitioner: Rosa Arellano PA-C  Diagnosis: Functional decline, Left tibial plateau fx, neck of left fibula  Subjective  Subjective: Pt states she lives alone, LLE pain 2/10. Pt agreeable to OT services.       Orientation     Objective    ADL  Grooming: Setup  UE Bathing: Setup  LE Bathing: Setup  UE Dressing: Setup           Bed mobility  Rolling to Left: Modified independent  Supine to Sit: Modified independent  Scooting: Modified independent  Transfers  Stand Pivot Transfers: Supervision  Sit to stand: Supervision Plan   Plan  Times per week: 3-5  Times per day: Daily  Plan weeks: 1-2  Current Treatment Recommendations: Strengthening,Endurance Training,Balance Training,Self-Care / ADL,Safety Education & Training,Patient/Caregiver Education & Training,Functional Mobility Training    Goals  Short term goals  Time Frame for Short term goals: 2 weeks  Short term goal 1: Pt to complete dressing with MOD I. Short term goal 2: Pt to complete bathing with NINO. Short term goal 3: Pt to complete toileting with MOD I. Short term goal 4: Pt to complete safe ADL transfers maintaining WB status. Short term goal 5: Pt to tolerate x15 minutes of activity to increase functional activity tolerance. Therapy Time   Individual Concurrent Group Co-treatment   Time In 8457         Time Out 8336         Minutes 46              This note serves as a DC summary in the event of pt discharge.    Carolyn Robbins OTR/L

## 2022-03-15 NOTE — PROGRESS NOTES
Physical Therapy  Facility/Department: [facility] MED SURG  Daily Treatment Note  NAME: Candy Min  : 1945  MRN: [id number]    Date of Service: 3/15/2022    Discharge Recommendations:  Continue to assess pending progress      Assessment   Assessment: Patient sitting up in bed visiting with brother. Completed B LE therex in supine. Patient completed bed mobility tasks with Mod I.  Stood with supervision and ambulated 75 feet with RW, NWB on L LE, and supervision. Patient transferred to the recliner with supervision. REQUIRES PT FOLLOW UP: Yes  Activity Tolerance  Activity Tolerance: Patient Tolerated treatment well     Patient Diagnosis(es): There were no encounter diagnoses. has a past medical history of Colitis, GERD (gastroesophageal reflux disease), Glaucoma, and PAD (peripheral artery disease) ([facility] [address] has a past surgical history that includes Tubal ligation; eye surgery; cyst removal; skin biopsy; and Total hip arthroplasty (Left, 2019). Restrictions  Restrictions/Precautions  Restrictions/Precautions: General Precautions,Fall Risk,Weight Bearing  Required Braces or Orthoses?: Yes  Lower Extremity Weight Bearing Restrictions  Left Lower Extremity Weight Bearing: Non Weight Bearing  Required Braces or Orthoses  Left Lower Extremity Brace:  (ELS brace locked in extension)  Subjective   General  Chart Reviewed: Yes  Response To Previous Treatment: Patient with no complaints from previous session. Family / Caregiver Present: Yes (brother)  Subjective  Subjective: Patient states she is doing ok and plans to go home next week.   Pain Screening  Patient Currently in Pain: Yes  Vital Signs  Patient Currently in Pain: Yes       Objective   Bed mobility  Rolling to Left: Modified independent  Supine to Sit: Modified independent  Scooting: Modified independent  Transfers  Sit to Stand: Supervision  Stand to sit: Supervision  Ambulation  Ambulation?: Yes  WB Status: NWB L LE  Ambulation 1  Surface: level tile  Device: Rolling Walker  Assistance: Supervision  Distance: 75 feet     Balance  Posture: Good  Sitting - Static: Good  Sitting - Dynamic: Good  Standing - Static: Good  Standing - Dynamic: Good;-  Exercises  Quad Sets: 2x15  Gluteal Sets: 2x15  Knee Active Range of Motion: gentle AROM for L knee  Ankle Pumps: 2x15      Goals  Long term goals  Time Frame for Long term goals : 10 days  Long term goal 1: Patient will perform all bed mobility independently. Long term goal 2: Patient will perform sit to stand and transfers with modified independence. Long term goal 3: Patient will ambulate 50'x2, NWB L LE, with RW and SBA. Long term goal 4: Patient will demonstrate independence with HEP. Patient Goals   Patient goals : Return home. Plan    Plan  Times per week: 3-5x/week  Times per day: Daily  Current Treatment Recommendations: Alhaji Ku Training,Patient/Caregiver Education & Training,Balance Training,Gait Training,Home Exercise Program,Functional Mobility Training,Safety Education & Training  Safety Devices  Type of devices: Left in chair,Call light within reach     Therapy Time   Individual Concurrent Group Co-treatment   Time In 1119         Time Out 8850         Minutes 26              This note serves as D/C summary if patient is discharged prior to next visit.   Jovan Mc, PTA

## 2022-03-15 NOTE — PLAN OF CARE
Problem: Falls - Risk of:  Goal: Will remain free from falls  Outcome: Ongoing  Goal: Absence of physical injury  Outcome: Ongoing     Problem: Skin Integrity:  Goal: Will show no infection signs and symptoms  Outcome: Ongoing  Goal: Absence of new skin breakdown  Outcome: Ongoing     Problem: Infection:  Goal: Will remain free from infection  Outcome: Ongoing     Problem: Safety:  Goal: Free from accidental physical injury  Outcome: Ongoing  Goal: Free from intentional harm  Outcome: Ongoing     Problem: Daily Care:  Goal: Daily care needs are met  Outcome: Ongoing     Problem: Pain:  Goal: Patient's pain/discomfort is manageable  Outcome: Ongoing  Goal: Pain level will decrease  Outcome: Ongoing  Goal: Control of acute pain  Outcome: Ongoing  Goal: Control of chronic pain  Outcome: Ongoing     Problem: Skin Integrity:  Goal: Skin integrity will stabilize  Outcome: Ongoing     Problem: Discharge Planning:  Goal: Patients continuum of care needs are met  Outcome: Ongoing

## 2022-03-15 NOTE — FLOWSHEET NOTE
03/15/22 0930   Assessment   Charting Type Shift assessment   Neurological   Neuro (WDL) WDL   [place] Coma Scale   Eye Opening 4   Best Verbal Response 5   Best Motor Response 6   Denzel Coma Scale Score 15   HEENT   HEENT (WDL) X   Right Eye Impaired vision   Left Eye Impaired vision   Tongue Pink & moist   Voice Normal   Respiratory   Respiratory (WDL) WDL   Respiratory Pattern Regular   Respiratory Depth Normal   Respiratory Quality/Effort Unlabored   Chest Assessment Chest expansion symmetrical;Trachea midline   L Breath Sounds Clear   R Breath Sounds Clear   Breath Sounds   Right Upper Lobe Clear   Right Middle Lobe Clear   Right Lower Lobe Clear   Left Upper Lobe Clear   Left Lower Lobe Clear   Cardiac   Cardiac (WDL) WDL   Cardiac Monitor   Telemetry Monitor On No   Gastrointestinal   Abdominal (WDL) X   GI Symptoms Constipation   Abdomen Inspection Flat;Soft   Tenderness Nontender   RUQ Bowel Sounds Active   LUQ Bowel Sounds Active   RLQ Bowel Sounds Active   LLQ Bowel Sounds Active   Peripheral Vascular   Peripheral Vascular (WDL) X   Edema Left lower extremity   LLE Edema Trace   LLE Neurovascular Assessment   Capillary Refill Less than/equal to 3 seconds   Color Pink;Red   Temperature Cool   Skin Color/Condition   Skin Color/Condition (WDL) X   Skin Integrity   Skin Integrity (WDL) X   Musculoskeletal   Musculoskeletal (WDL) X   RUE Full movement   LUE Full movement   RL Extremity Full movement   LL Extremity Limited movement; Unsteady   Genitourinary   Genitourinary (WDL) WDL   Urine Assessment   Incontinence No   Urine Color Yellow/straw   Psychosocial   Psychosocial (WDL) WDL   Pt up in chair. Pt had shower this am. PT/OT cleaned pt wound and applied dry dressing and ace wrap. Pt tolerating well. Continues to take po pain medications with good pain control.

## 2022-03-15 NOTE — FLOWSHEET NOTE
03/14/22 2324   Assessment   Charting Type Shift assessment   Neurological   Neuro (WDL) WDL   Swallow Screening   Is the patient able to remain alert for testing? Yes   Was the Patient Eating a Modified Diet Prior to being Admitted? No   Denzel Coma Scale   Eye Opening 4   Best Verbal Response 5   Best Motor Response 6   Denzel Coma Scale Score 15   NIHSS Stroke Scale   NIHSS Stroke Scale Assessed No   HEENT   HEENT (WDL) X   Right Eye Impaired vision   Left Eye Impaired vision   Tongue Pink & moist   Voice Normal   Respiratory   Respiratory (WDL) WDL   Respiratory Pattern Regular   Respiratory Depth Normal   Respiratory Quality/Effort Unlabored   Chest Assessment Chest expansion symmetrical;Trachea midline   L Breath Sounds Clear   R Breath Sounds Clear   Breath Sounds   Right Upper Lobe Clear   Right Middle Lobe Clear   Right Lower Lobe Clear   Left Upper Lobe Clear   Left Lower Lobe Clear   Cardiac   Cardiac (WDL) WDL   Cardiac Monitor   Telemetry Monitor On No   Gastrointestinal   Abdominal (WDL) X   GI Symptoms Constipation   Abdomen Inspection Flat;Soft   Tenderness Nontender   RUQ Bowel Sounds Active   LUQ Bowel Sounds Active   RLQ Bowel Sounds Active   LLQ Bowel Sounds Active   Peripheral Vascular   Peripheral Vascular (WDL) X   Edema Left lower extremity   LLE Edema Trace   LLE Neurovascular Assessment   Capillary Refill Less than/equal to 3 seconds   Color Pink;Red   Temperature Cool   Skin Color/Condition   Skin Color/Condition (WDL) X   Skin Color Pink   Skin Condition/Temp Warm;Dry   Skin Integrity   Skin Integrity (WDL) X   Skin Integrity Other (Comment)  (ace wrap and brace for fx repair)   Location LLE   Preventative Dressing Yes   Multiple Skin Integrity Sites Yes   Assessed this shift? Yes   Musculoskeletal   Musculoskeletal (WDL) X   RUE Full movement   LUE Full movement   RL Extremity Full movement   LL Extremity Limited movement; Unsteady   Genitourinary   Genitourinary (WDL) WDL   Urine Assessment   Incontinence No   Urine Color Yellow/straw   Psychosocial   Psychosocial (WDL) WDL

## 2022-03-16 PROCEDURE — 97530 THERAPEUTIC ACTIVITIES: CPT

## 2022-03-16 PROCEDURE — 6370000000 HC RX 637 (ALT 250 FOR IP): Performed by: PHYSICIAN ASSISTANT

## 2022-03-16 PROCEDURE — 1200000002 HC SEMI PRIVATE SWING BED

## 2022-03-16 PROCEDURE — 97110 THERAPEUTIC EXERCISES: CPT

## 2022-03-16 PROCEDURE — 99308 SBSQ NF CARE LOW MDM 20: CPT | Performed by: INTERNAL MEDICINE

## 2022-03-16 PROCEDURE — 97535 SELF CARE MNGMENT TRAINING: CPT

## 2022-03-16 RX ORDER — BUDESONIDE 3 MG/1
9 CAPSULE, COATED PELLETS ORAL EVERY MORNING
Qty: 90 CAPSULE | Refills: 0 | Status: SHIPPED | OUTPATIENT
Start: 2022-03-16 | End: 2022-04-11

## 2022-03-16 RX ADMIN — ASPIRIN 81 MG 81 MG: 81 TABLET ORAL at 07:58

## 2022-03-16 RX ADMIN — FERROUS SULFATE TAB EC 324 MG (65 MG FE EQUIVALENT) 324 MG: 324 (65 FE) TABLET DELAYED RESPONSE at 07:58

## 2022-03-16 RX ADMIN — FAMOTIDINE 20 MG: 20 TABLET, FILM COATED ORAL at 07:58

## 2022-03-16 RX ADMIN — SENNOSIDES AND DOCUSATE SODIUM 1 TABLET: 8.6; 5 TABLET ORAL at 20:21

## 2022-03-16 RX ADMIN — SENNOSIDES AND DOCUSATE SODIUM 1 TABLET: 8.6; 5 TABLET ORAL at 07:58

## 2022-03-16 RX ADMIN — OXYCODONE HYDROCHLORIDE AND ACETAMINOPHEN 500 MG: 500 TABLET ORAL at 07:58

## 2022-03-16 RX ADMIN — Medication 1 CAPSULE: at 07:58

## 2022-03-16 RX ADMIN — ASPIRIN 81 MG 81 MG: 81 TABLET ORAL at 20:21

## 2022-03-16 RX ADMIN — GABAPENTIN 100 MG: 100 CAPSULE ORAL at 20:21

## 2022-03-16 RX ADMIN — MULTIPLE VITAMINS W/ MINERALS TAB 1 TABLET: TAB at 07:58

## 2022-03-16 RX ADMIN — OXYCODONE AND ACETAMINOPHEN 1 TABLET: 5; 325 TABLET ORAL at 20:21

## 2022-03-16 RX ADMIN — FOLIC ACID TAB 400 MCG 0.8 MG: 400 TAB at 07:58

## 2022-03-16 RX ADMIN — BUDESONIDE 9 MG: 3 CAPSULE, COATED PELLETS ORAL at 08:00

## 2022-03-16 RX ADMIN — OXYCODONE AND ACETAMINOPHEN 1 TABLET: 5; 325 TABLET ORAL at 07:58

## 2022-03-16 RX ADMIN — OXYCODONE AND ACETAMINOPHEN 1 TABLET: 5; 325 TABLET ORAL at 01:38

## 2022-03-16 RX ADMIN — Medication 400 MG: at 07:58

## 2022-03-16 RX ADMIN — Medication 10 MG: at 20:21

## 2022-03-16 RX ADMIN — FAMOTIDINE 20 MG: 20 TABLET, FILM COATED ORAL at 20:21

## 2022-03-16 RX ADMIN — CALCIUM CARBONATE 500 MG: 500 TABLET, CHEWABLE ORAL at 07:58

## 2022-03-16 ASSESSMENT — PAIN SCALES - GENERAL
PAINLEVEL_OUTOF10: 8

## 2022-03-16 NOTE — PROGRESS NOTES
Occupational Therapy  Facility/Department: [address] [place] [address] MED SURG  Daily Treatment Note  NAME: Thais Gordillo  : 1945  MRN: [id number]    Date of Service: 3/16/2022    Discharge Recommendations:  Continue to assess pending progress       Assessment   Assessment: Pt agreeable to OT services. Pt ambulated to bathroom with RW demo good safety maintaining WB status. Pt transferred to shower with SBA. Pt completed bathing seated on shower chair with NINO. Pt tolerated well. OT assisted with wrapping LLE and donning brace. Pt tolerated well. Pt ambulated to chair NINO with oral hygiene supplies and call light in reach. Patient Diagnosis(es): There were no encounter diagnoses. has a past medical history of Colitis, GERD (gastroesophageal reflux disease), Glaucoma, and PAD (peripheral artery disease) ([facility] [address] has a past surgical history that includes Tubal ligation; eye surgery; cyst removal; skin biopsy; and Total hip arthroplasty (Left, 2019). Restrictions  Restrictions/Precautions  Restrictions/Precautions: General Precautions,Fall Risk,Weight Bearing  Required Braces or Orthoses?: Yes  Lower Extremity Weight Bearing Restrictions  Left Lower Extremity Weight Bearing: Non Weight Bearing  Required Braces or Orthoses  Left Lower Extremity Brace:  (ELS brace locked in extension)  Subjective   General  Chart Reviewed: Yes  Patient assessed for rehabilitation services?: Yes  Family / Caregiver Present: No  Referring Practitioner: Raji Angelo PA-C  Diagnosis: Functional decline, Left tibial plateau fx, neck of left fibula  Subjective  Subjective: Pt states she lives alone, LLE pain 2/10. Pt agreeable to OT services.       Orientation     Objective    ADL  Grooming: Setup  UE Bathing: Setup  UE Dressing: Setup           Bed mobility  Supine to Sit: Modified independent  Sit to Supine: Modified independent  Scooting: Modified independent     Plan   Plan  Times per week: 3-5  Times per day: Daily  Plan weeks: 1-2  Current Treatment Recommendations: Strengthening,Endurance Training,Balance Training,Self-Care / ADL,Safety Education & Training,Patient/Caregiver Education & Training,Functional Mobility Training    Goals  Short term goals  Time Frame for Short term goals: 2 weeks  Short term goal 1: Pt to complete dressing with MOD I. Short term goal 2: Pt to complete bathing with NINO. Short term goal 3: Pt to complete toileting with MOD I. Short term goal 4: Pt to complete safe ADL transfers maintaining WB status. Short term goal 5: Pt to tolerate x15 minutes of activity to increase functional activity tolerance. Therapy Time   Individual Concurrent Group Co-treatment   Time In 4325         Time Out 0902         Minutes 38              This note serves as a DC summary in the event of pt discharge.      Carolyn Robbins, OTR/L

## 2022-03-16 NOTE — PROGRESS NOTES
Progress Note      Subjective:   Chief complaint:   Declining functional status    Interval History:   Pt seen and examined multiple times this week. Continues to work with PT OT. No acute complaints. Review of systems:   Constitutional:  Denies fever or chills. Eyes:  Denies change in visual acuity or discharge. HENT:  Denies nasal congestion or sore throat. Respiratory:  Denies cough or shortness of breath. Cardiovascular:  Denies chest pain, palpitation. GI:  Denies abdominal pain, nausea, vomiting, bloody stools or diarrhea. :  Denies dysuria or frequency. Musculoskeletal:  Denies back pain. Positive for intermittent left lower extremity pain. Integument:  Denies rash or itching. Neurologic:  Denies headache, focal weakness or sensory changes. Psychiatric:  Denies depression or anxiety    Past medical history, surgical history, family history and social history reviewed and unchanged compared to H&P earlier this admission.     Medications:   Scheduled Meds:   aspirin  81 mg Oral BID    ferrous sulfate  324 mg Oral Daily with breakfast    oxyCODONE-acetaminophen  1 tablet Oral BID    gabapentin  100 mg Oral Nightly    budesonide  9 mg Oral Daily    brinzolamide-brimonidine  1 drop Ophthalmic BID    famotidine  20 mg Oral BID    NONFORMULARY 1 capsule  1 capsule Oral BID    bimatoprost  1 drop Both Eyes Nightly    calcium carbonate  500 mg Oral Daily    lactobacillus  1 capsule Oral Daily    folic acid  0.8 mg Oral Daily    therapeutic multivitamin-minerals  1 tablet Oral Daily    magnesium oxide  400 mg Oral Daily    polyethylene glycol  17 g Oral Daily    [Held by provider] potassium chloride  10 mEq Oral Daily    sennosides-docusate sodium  1 tablet Oral BID    vitamin C  500 mg Oral Daily     Continuous Infusions:    Objective:     Vital Signs  Temp: 97.3 °F (36.3 °C)  Pulse: 73  Resp: 18  BP: 118/68  SpO2: 98 %  O2 Device: None (Room air)       Vital signs reviewed in electronic charts. Physical exam  Constitutional:  Well developed, thin, elderly female sitting upright in bed in no acute distress  Eyes:  no scleral icterus, conjunctiva normal   HENT:  Atraumatic, external ears normal, nose normal, oropharynx moist, no pharyngeal exudates. Neck- supple, no JVD, no lymphadenopathy  Respiratory:  No respiratory distress, no wheezing, rales or rhonchi detected  Cardiovascular:  Normal rate, normal rhythm, no murmurs, no gallops, no rubs, no edema   GI:  Soft, nondistended, normal bowel sounds, nontender, no voluntary guarding  Musculoskeletal:  No cyanosis.  LLE with ace bandage intact and ELS brace locked in extension.  no swelling in toes. Integument:  Warm and dry.    Neurologic:  Alert & oriented x 3, no apparent focal deficits noted   Psychiatric:  Speech and behavior appropriate.      Results:     Lab Results   Component Value Date    WBC 6.1 03/11/2022    HGB 11.9 03/11/2022    HCT 40.1 03/11/2022    .0 03/11/2022     03/11/2022       Lab Results   Component Value Date     03/11/2022    K 3.6 03/11/2022    K 4.0 03/04/2022     03/11/2022    CO2 26 03/11/2022    BUN 16 03/11/2022    CREATININE 0.6 03/11/2022    GLUCOSE 142 03/11/2022    CALCIUM 9.1 03/11/2022        Assessment and Plan:      Closed fracture of left tibial plateau [B05.126T]  -Fragility fracture s/p fall in setting of osteoporosis  -s/p ORIF on 2/15/22 with  Ortho   -vitamin D level 2/18 98  -DVT prophylaxis: received enoxaparin 30 mg subcutaneous BID through 3/6/21; then transitioned to aspirin 81 mg BID through 3/20/22  - 2/25 LLE roxanne duplex negative for DVT  -[place] bone mineral metabolism team was consulted while at [facility] and patient following as outpatient   -PT/OT following  -continue bowel regimen  - Pain controlled on current regimen with gabapentin, percocet, tylenol and she is having regular bowel movements  - reviewed [facility] ortho note from 3/4 and patient to remain NWB with ROM activities - PT/OT and patient aware   - f/u with [facility] ortho as scheduled    02/21/2022    Closed fracture of neck of left fibula [S82.832A]  -see above    02/21/2022    Anemia [D64.9]  -per [facility] dc summary, hgb 13 on arrival/prior to surgery. Downtrended to 10 post op. Iron level 23, transferrin sat 9. Started on ferrous sulfate supplement. - Hgb stable thus far  - on gi ppx    02/21/2022    Recurrent falls [R29.6]  -Patient lives alone and reports being independent with ADLs prior to fall. Does not use assist devices at home however has a history of falls with known osteoporosis with multiple fractures in the last three months.   -Continue with PT/OT  -fall precautions    02/21/2022    Underweight [R63.6]  -dietitian following  -continue Boost TID and MVI w/minerals     02/21/2022    Microscopic colitis [K52.839]  -continue budesonide    02/21/2022    Glaucoma [H40.9]  -Continue lumigan, brinzolamide/brimonidine, & supplements per patient request.     02/21/2022    Leg cramps [R25.2]  -on home potassium supplement; placed on hold 3/1 due to K+ 5.3  - B12, folate, electrolytes wnl  - improved with current regimen     02/21/2022    Declining functional status [R53.81]  -secondary to recent fall/fracture and NWB status  -plan as outlined above        Patient was seen and examined by Dr. Armas Dates and plan of care reviewed. Treatment plan was formulated collaboratively.       Electronically signed by KALIE Riley on 3/16/2022 at 11:18 AM

## 2022-03-16 NOTE — PROGRESS NOTES
Followed up with patient. Was able to pray with patient. Made sure she knew I was here for her. Patient seemed in good spirits, just annoyed that she can't get up.

## 2022-03-16 NOTE — FLOWSHEET NOTE
03/15/22 2025   Assessment   Charting Type Shift assessment   Neurological   Neuro (WDL) WDL   [place] Coma Scale   Eye Opening 4   Best Verbal Response 5   Best Motor Response 6   Denzel Coma Scale Score 15   HEENT   HEENT (WDL) X   Right Eye Impaired vision   Left Eye Impaired vision   Tongue Pink & moist   Voice Normal   Respiratory   Respiratory (WDL) WDL   Respiratory Pattern Regular   Respiratory Depth Normal   Respiratory Quality/Effort Unlabored   Chest Assessment Chest expansion symmetrical;Trachea midline   L Breath Sounds Clear   R Breath Sounds Clear   Cardiac   Cardiac (WDL) WDL   Cardiac Monitor   Telemetry Monitor On No   Gastrointestinal   Abdominal (WDL) X   Abdomen Inspection Flat;Soft   Tenderness Nontender   RUQ Bowel Sounds Active   LUQ Bowel Sounds Active   RLQ Bowel Sounds Active   LLQ Bowel Sounds Active   Peripheral Vascular   Peripheral Vascular (WDL) X   Edema Left lower extremity   LLE Edema Trace   LLE Neurovascular Assessment   Capillary Refill Less than/equal to 3 seconds   Color Pink;Red   Temperature Cool   L Pedal Pulse +2   Skin Color/Condition   Skin Color/Condition (WDL) X   Skin Color Pink   Skin Condition/Temp Warm;Dry   Skin Integrity   Skin Integrity (WDL) X   Skin Integrity Other (Comment)  (ace wrap/brace for fx repair)   Location LLE   Preventative Dressing Yes   Musculoskeletal   Musculoskeletal (WDL) X   RUE Full movement   LUE Full movement   RL Extremity Full movement   LL Extremity Limited movement; Unsteady   Genitourinary   Genitourinary (WDL) WDL   Urine Assessment   Incontinence No   Anus/Rectum   Anus/Rectum (WDL) X   Evaluation Hemorrhoids   Psychosocial   Psychosocial (WDL) WDL

## 2022-03-16 NOTE — PROGRESS NOTES
Physical Therapy  Facility/Department: [facility] MED SURG  Daily Treatment Note  NAME: Afua Pacheco  : 1945  MRN: [id number]    Date of Service: 3/16/2022    Discharge Recommendations:  Continue to assess pending progress      Assessment   Assessment: Patient up in recliner when PTA arrived. Patient reports just walking to and from the bathroom. Engaged patient in B LE therex in semi-reclined and seated positions. Discussed patient's concerns about going home. REQUIRES PT FOLLOW UP: Yes  Activity Tolerance  Activity Tolerance: Patient Tolerated treatment well     Patient Diagnosis(es): There were no encounter diagnoses. has a past medical history of Colitis, GERD (gastroesophageal reflux disease), Glaucoma, and PAD (peripheral artery disease) ([facility] [address] has a past surgical history that includes Tubal ligation; eye surgery; cyst removal; skin biopsy; and Total hip arthroplasty (Left, 2019). Restrictions  Restrictions/Precautions  Restrictions/Precautions: General Precautions,Fall Risk,Weight Bearing  Required Braces or Orthoses?: Yes  Lower Extremity Weight Bearing Restrictions  Left Lower Extremity Weight Bearing: Non Weight Bearing  Required Braces or Orthoses  Left Lower Extremity Brace:  (ELS brace locked in extension)  Subjective   General  Chart Reviewed: Yes  Response To Previous Treatment: Patient with no complaints from previous session. Family / Caregiver Present: No  Subjective  Subjective: Patient states she had some pain last night in the lateral side of her L knee around the steri-strips. Reports it is better today.   Pain Screening  Patient Currently in Pain: Yes  Vital Signs  Patient Currently in Pain: Yes       Objective      Exercises  Quad Sets: 2x15  Gluteal Sets: 2x15  Hip Abduction: 2x15  Knee Long Arc Quad: 15 R  Knee Active Range of Motion: gentle AROM for L knee  Ankle Pumps: 2x15      Goals  Long term goals  Time Frame for Long term goals : 10 days  Long term goal 1: Patient will perform all bed mobility independently. Long term goal 2: Patient will perform sit to stand and transfers with modified independence. Long term goal 3: Patient will ambulate 50'x2, NWB L LE, with RW and SBA. Long term goal 4: Patient will demonstrate independence with HEP. Patient Goals   Patient goals : Return home. Plan    Plan  Times per week: 3-5x/week  Times per day: Daily  Current Treatment Recommendations: Catha Murray Training,Patient/Caregiver Education & Training,Balance Training,Gait Training,Home Exercise Program,Functional Mobility Training,Safety Education & Training  Safety Devices  Type of devices: Left in chair,Call light within reach     Therapy Time   Individual Concurrent Group Co-treatment   Time In 1331         Time Out 1409         Minutes 38              This note serves as D/C summary if patient is discharged prior to next visit.   Jb Alfonso, PTA

## 2022-03-17 PROCEDURE — 6370000000 HC RX 637 (ALT 250 FOR IP): Performed by: PHYSICIAN ASSISTANT

## 2022-03-17 PROCEDURE — 97530 THERAPEUTIC ACTIVITIES: CPT

## 2022-03-17 PROCEDURE — 97535 SELF CARE MNGMENT TRAINING: CPT

## 2022-03-17 PROCEDURE — 1200000002 HC SEMI PRIVATE SWING BED

## 2022-03-17 RX ADMIN — SENNOSIDES AND DOCUSATE SODIUM 1 TABLET: 8.6; 5 TABLET ORAL at 20:37

## 2022-03-17 RX ADMIN — MULTIPLE VITAMINS W/ MINERALS TAB 1 TABLET: TAB at 07:46

## 2022-03-17 RX ADMIN — OXYCODONE HYDROCHLORIDE AND ACETAMINOPHEN 500 MG: 500 TABLET ORAL at 07:46

## 2022-03-17 RX ADMIN — SENNOSIDES AND DOCUSATE SODIUM 1 TABLET: 8.6; 5 TABLET ORAL at 07:46

## 2022-03-17 RX ADMIN — OXYCODONE AND ACETAMINOPHEN 1 TABLET: 5; 325 TABLET ORAL at 00:34

## 2022-03-17 RX ADMIN — GABAPENTIN 100 MG: 100 CAPSULE ORAL at 20:37

## 2022-03-17 RX ADMIN — ASPIRIN 81 MG 81 MG: 81 TABLET ORAL at 20:37

## 2022-03-17 RX ADMIN — FERROUS SULFATE TAB EC 324 MG (65 MG FE EQUIVALENT) 324 MG: 324 (65 FE) TABLET DELAYED RESPONSE at 07:47

## 2022-03-17 RX ADMIN — OXYCODONE AND ACETAMINOPHEN 1 TABLET: 5; 325 TABLET ORAL at 20:37

## 2022-03-17 RX ADMIN — Medication 10 MG: at 20:44

## 2022-03-17 RX ADMIN — BUDESONIDE 9 MG: 3 CAPSULE, COATED PELLETS ORAL at 07:50

## 2022-03-17 RX ADMIN — ASPIRIN 81 MG 81 MG: 81 TABLET ORAL at 07:46

## 2022-03-17 RX ADMIN — FAMOTIDINE 20 MG: 20 TABLET, FILM COATED ORAL at 20:37

## 2022-03-17 RX ADMIN — Medication 400 MG: at 07:46

## 2022-03-17 RX ADMIN — OXYCODONE AND ACETAMINOPHEN 1 TABLET: 5; 325 TABLET ORAL at 07:46

## 2022-03-17 RX ADMIN — FAMOTIDINE 20 MG: 20 TABLET, FILM COATED ORAL at 07:46

## 2022-03-17 RX ADMIN — FOLIC ACID TAB 400 MCG 0.8 MG: 400 TAB at 07:46

## 2022-03-17 RX ADMIN — Medication 1 CAPSULE: at 07:46

## 2022-03-17 RX ADMIN — CALCIUM CARBONATE 500 MG: 500 TABLET, CHEWABLE ORAL at 07:46

## 2022-03-17 ASSESSMENT — PAIN SCALES - GENERAL
PAINLEVEL_OUTOF10: 9
PAINLEVEL_OUTOF10: 10
PAINLEVEL_OUTOF10: 9

## 2022-03-17 ASSESSMENT — PAIN DESCRIPTION - ORIENTATION: ORIENTATION: LEFT

## 2022-03-17 ASSESSMENT — PAIN DESCRIPTION - LOCATION: LOCATION: LEG

## 2022-03-17 ASSESSMENT — PAIN DESCRIPTION - PAIN TYPE: TYPE: ACUTE PAIN

## 2022-03-17 NOTE — PROGRESS NOTES
Physical Therapy  Facility/Department: [facility] MED SURG  Daily Treatment Note  NAME: Gabrielle Rajput  : 1945  MRN: [id number]    Date of Service: 3/17/2022    Discharge Recommendations:  Continue to assess pending progress      Assessment   Assessment: Patient sitting up in bed very upset that she is being discharged home tomorrow. Declines out of bed activity today. Patient states she doesn't understand why she can't stay here another week. Educated patient for handling home safety issues. She reports someone will let her use a rollator. Advised patient she will not be able to safely use one while she is L LE NWB. She has family and friends who are willing to help her at home until her 24-hour caregiver will be available next week, but she is still nervous about going home. Her brother is going today to get groceries for her. Ortho appointment is scheduled for . Patient Diagnosis(es): There were no encounter diagnoses. has a past medical history of Colitis, GERD (gastroesophageal reflux disease), Glaucoma, and PAD (peripheral artery disease) ([facility] [address] has a past surgical history that includes Tubal ligation; eye surgery; cyst removal; skin biopsy; and Total hip arthroplasty (Left, 2019). Restrictions  Restrictions/Precautions  Restrictions/Precautions: General Precautions,Fall Risk,Weight Bearing  Required Braces or Orthoses?: Yes  Lower Extremity Weight Bearing Restrictions  Left Lower Extremity Weight Bearing: Non Weight Bearing  Required Braces or Orthoses  Left Lower Extremity Brace:  (ELS brace locked in extension)  Subjective   General  Chart Reviewed: Yes  Response To Previous Treatment: Patient with no complaints from previous session. Family / Caregiver Present: Yes (brother)  Subjective  Subjective: Patient states she's upset today after being told she will be discharged home tomorrow.   Pain Screening  Patient Currently in Pain: Yes  Vital Signs  Patient Currently in Pain: Yes       Goals  Long term goals  Time Frame for Long term goals : 10 days  Long term goal 1: Patient will perform all bed mobility independently. Long term goal 2: Patient will perform sit to stand and transfers with modified independence. Long term goal 3: Patient will ambulate 50'x2, NWB L LE, with RW and SBA. Long term goal 4: Patient will demonstrate independence with HEP. Patient Goals   Patient goals : Return home. Plan    Plan  Times per week: 3-5x/week  Times per day: Daily  Current Treatment Recommendations: Marlys Liz Training,Patient/Caregiver Education & Training,Balance Training,Gait Training,Home Exercise Program,Functional Mobility Training,Safety Education & Training  Safety Devices  Type of devices: Left in bed,Call light within reach (brother present)     Therapy Time   Individual Concurrent Group Co-treatment   Time In 1128         Time Out 1151         Minutes 23              This note serves as D/C summary if patient is discharged prior to next visit.   Rae Nichole, PTA

## 2022-03-17 NOTE — CARE COORDINATION
The Plan for Transition of Care is related to the following treatment goals: home with [place] and assist from family PRN    The Patient and/or patient representative was provided with a choice of provider and agrees with the discharge plan. [x] Yes [] No    Freedom of choice list was provided with basic dialogue that supports the patient's individualized plan of care/goals, treatment preferences and shares the quality data associated with the providers. [x] Yes [] No    Pt is going home in AM with HH. Family will assist PRN. Pt states she has all needed DME. Family to transport home tomorrow. HH orders received and faxed to [facility].

## 2022-03-17 NOTE — PROGRESS NOTES
Occupational Therapy  Facility/Department: [facility] MED [facility]  Daily Treatment Note  NAME: Thiago Marques  : 1945  MRN: [id number]    Date of Service: 3/17/2022    Discharge Recommendations:  Continue to assess pending progress       Assessment   Assessment: Pt agreeable to OT services. Pt ambulated to bathroom with RW. Pt toileted with MOD I. Pt transferred to shower chair with SUP. Pt completed shower with NINO. Pt tolerated well. Pt returned to bed and transferred with MOD I. Pt NINO with oral hygiene materials. Patient Diagnosis(es): There were no encounter diagnoses. has a past medical history of Colitis, GERD (gastroesophageal reflux disease), Glaucoma, and PAD (peripheral artery disease) ([facility] [address] has a past surgical history that includes Tubal ligation; eye surgery; cyst removal; skin biopsy; and Total hip arthroplasty (Left, 2019). Restrictions  Restrictions/Precautions  Restrictions/Precautions: General Precautions,Fall Risk,Weight Bearing  Required Braces or Orthoses?: Yes  Lower Extremity Weight Bearing Restrictions  Left Lower Extremity Weight Bearing: Non Weight Bearing  Required Braces or Orthoses  Left Lower Extremity Brace:  (ELS brace locked in extension)  Subjective   General  Chart Reviewed: Yes  Patient assessed for rehabilitation services?: Yes  Family / Caregiver Present: No  Referring Practitioner: Rosa Arellano PA-C  Diagnosis: Functional decline, Left tibial plateau fx, neck of left fibula  Subjective  Subjective: Pt states she lives alone, LLE pain 2/10. Pt agreeable to OT services.       Orientation     Objective    ADL  Grooming: Setup  UE Bathing: Setup  LE Bathing: Setup  UE Dressing: Setup  Toileting: Modified independent     Plan   Plan  Times per week: 3-5  Times per day: Daily  Plan weeks: 1-2  Current Treatment Recommendations: Ples Blase Training,Self-Care / ADL,Safety Education & Training,Patient/Caregiver Education & Training,Functional Mobility Training    Goals  Short term goals  Time Frame for Short term goals: 2 weeks  Short term goal 1: Pt to complete dressing with MOD I. Short term goal 2: Pt to complete bathing with NINO. Short term goal 3: Pt to complete toileting with MOD I. Short term goal 4: Pt to complete safe ADL transfers maintaining WB status. Short term goal 5: Pt to tolerate x15 minutes of activity to increase functional activity tolerance. Therapy Time   Individual Concurrent Group Co-treatment   Time In 9231         Time Out 0928         Minutes 50         This note serves as a DC summary in the event of pt discharge.    Carolyn Robbins, OTR/L

## 2022-03-17 NOTE — FLOWSHEET NOTE
03/16/22 2021   Assessment   Charting Type Shift assessment   Neurological   Neuro (WDL) WDL   [place] Coma Scale   Eye Opening 4   Best Verbal Response 5   Best Motor Response 6   [place] Coma Scale Score 15   HEENT   HEENT (WDL) X   Right Eye Impaired vision   Left Eye Impaired vision   Tongue Pink & moist   Voice Normal   Respiratory   Respiratory (WDL) WDL   Respiratory Pattern Regular   Respiratory Depth Normal   Respiratory Quality/Effort Unlabored   Chest Assessment Chest expansion symmetrical;Trachea midline   Cardiac   Cardiac (WDL) WDL   Cardiac Monitor   Telemetry Monitor On No   Gastrointestinal   Abdominal (WDL) X   Abdomen Inspection Flat;Soft   Tenderness Nontender   RUQ Bowel Sounds Active   LUQ Bowel Sounds Active   RLQ Bowel Sounds Active   LLQ Bowel Sounds Active   Peripheral Vascular   Peripheral Vascular (WDL) X   Edema Left lower extremity   LLE Edema Trace   LLE Neurovascular Assessment   Capillary Refill Less than/equal to 3 seconds   Color Pink;Red   Temperature Cool   Skin Color/Condition   Skin Color/Condition (WDL) X   Skin Color Pale   Skin Condition/Temp Warm;Dry   Skin Integrity   Skin Integrity (WDL) X   Skin Integrity Other (Comment)  (ace wrap/brace)   Location LLE   Preventative Dressing Yes   Musculoskeletal   Musculoskeletal (WDL) X   RUE Full movement   LUE Full movement   RL Extremity Full movement   LL Extremity Limited movement; Unsteady   Genitourinary   Genitourinary (WDL) WDL   Urine Assessment   Incontinence No   Urine Color Yellow/straw   Urine Appearance Clear   Urine Odor No odor   Anus/Rectum   Anus/Rectum (WDL) X   Evaluation Hemorrhoids   Psychosocial   Psychosocial (WDL) WDL

## 2022-03-18 VITALS
OXYGEN SATURATION: 94 % | BODY MASS INDEX: 18.05 KG/M2 | HEIGHT: 67 IN | DIASTOLIC BLOOD PRESSURE: 56 MMHG | RESPIRATION RATE: 18 BRPM | WEIGHT: 115 LBS | SYSTOLIC BLOOD PRESSURE: 122 MMHG | TEMPERATURE: 97.5 F | HEART RATE: 87 BPM

## 2022-03-18 PROCEDURE — 6370000000 HC RX 637 (ALT 250 FOR IP): Performed by: PHYSICIAN ASSISTANT

## 2022-03-18 PROCEDURE — 99315 NF DSCHRG MGMT 30 MIN/LESS: CPT | Performed by: INTERNAL MEDICINE

## 2022-03-18 PROCEDURE — 97530 THERAPEUTIC ACTIVITIES: CPT

## 2022-03-18 PROCEDURE — 97535 SELF CARE MNGMENT TRAINING: CPT

## 2022-03-18 RX ORDER — GABAPENTIN 100 MG/1
100 CAPSULE ORAL NIGHTLY
Qty: 30 CAPSULE | Refills: 0 | Status: ON HOLD | OUTPATIENT
Start: 2022-03-18 | End: 2022-04-05

## 2022-03-18 RX ORDER — FAMOTIDINE 20 MG/1
20 TABLET, FILM COATED ORAL 2 TIMES DAILY
Qty: 60 TABLET | Refills: 3 | Status: ON HOLD | OUTPATIENT
Start: 2022-03-18 | End: 2022-05-02 | Stop reason: SDUPTHER

## 2022-03-18 RX ORDER — OXYCODONE HYDROCHLORIDE AND ACETAMINOPHEN 5; 325 MG/1; MG/1
1 TABLET ORAL 2 TIMES DAILY
Qty: 14 TABLET | Refills: 0 | Status: SHIPPED | OUTPATIENT
Start: 2022-03-18 | End: 2022-03-25

## 2022-03-18 RX ADMIN — Medication 1 CAPSULE: at 07:35

## 2022-03-18 RX ADMIN — OXYCODONE AND ACETAMINOPHEN 1 TABLET: 5; 325 TABLET ORAL at 03:36

## 2022-03-18 RX ADMIN — FOLIC ACID TAB 400 MCG 0.8 MG: 400 TAB at 07:35

## 2022-03-18 RX ADMIN — Medication 400 MG: at 07:35

## 2022-03-18 RX ADMIN — CALCIUM CARBONATE 500 MG: 500 TABLET, CHEWABLE ORAL at 07:34

## 2022-03-18 RX ADMIN — FAMOTIDINE 20 MG: 20 TABLET, FILM COATED ORAL at 07:35

## 2022-03-18 RX ADMIN — OXYCODONE HYDROCHLORIDE AND ACETAMINOPHEN 500 MG: 500 TABLET ORAL at 07:35

## 2022-03-18 RX ADMIN — ASPIRIN 81 MG 81 MG: 81 TABLET ORAL at 07:35

## 2022-03-18 RX ADMIN — SENNOSIDES AND DOCUSATE SODIUM 1 TABLET: 8.6; 5 TABLET ORAL at 07:35

## 2022-03-18 RX ADMIN — OXYCODONE AND ACETAMINOPHEN 1 TABLET: 5; 325 TABLET ORAL at 07:36

## 2022-03-18 RX ADMIN — MULTIPLE VITAMINS W/ MINERALS TAB 1 TABLET: TAB at 07:35

## 2022-03-18 RX ADMIN — FERROUS SULFATE TAB EC 324 MG (65 MG FE EQUIVALENT) 324 MG: 324 (65 FE) TABLET DELAYED RESPONSE at 07:36

## 2022-03-18 RX ADMIN — BUDESONIDE 9 MG: 3 CAPSULE, COATED PELLETS ORAL at 07:38

## 2022-03-18 ASSESSMENT — PAIN SCALES - GENERAL
PAINLEVEL_OUTOF10: 9
PAINLEVEL_OUTOF10: 0

## 2022-03-18 NOTE — DISCHARGE SUMMARY
Discharge Summary      Patient ID: Aleena Culp      Patient's PCP: Lani Sicard, MD    Admit Date: 2/20/2022     Discharge Date:  3/18/2022    Admitting Provider: Kiel Gilbert MD    Discharging Provider: KALIE Gibbons     Reason for this admission:   Declining functional status s/p fall with left tibial plateau and left fibula neck fractures requiring surgical intervention    Discharge Diagnoses: Active Hospital Problems    Diagnosis Date Noted    Closed fracture of left tibial plateau [P80.169G] 86/94/0196    Closed fracture of neck of left fibula [S82.832A] 02/21/2022    Anemia [D64.9] 02/21/2022    Recurrent falls [R29.6] 02/21/2022    Underweight [R63.6] 02/21/2022    Microscopic colitis [K52.839] 02/21/2022    Glaucoma [H40.9] 02/21/2022    Leg cramps [R25.2] 02/21/2022    Declining functional status [R53.81] 12/31/2019       Procedures:  US DUP LOWER EXTREMITY LEFT ROXANNE   Final Result   IMPRESSION :      No evidence of femoropopliteal DVT            Consults:   IP CONSULT TO CASE MANAGEMENT  IP CONSULT TO DIETITIAN  IP CONSULT TO HOME CARE NEEDS  PT OT    Briefly:   64 y.o. female with PMH of microscopic colitis, GERD, glaucoma, PAD, frequent falls, recent left humerus fracture (nearly healed) and osteoporosis admitted to [facility] bed for rehab following recent left tibial plateau fracture and left fibular neck fracture. Pt underwent ORIF on 2/15/22 by Cynthia Freed. OSH hospital course complicated by acute blood loss anemia with iron deficiency anemia. She remains NWB LLE but has made progress with PT OT and has been cleared for dc home.      Hospital Course:       Closed fracture of left tibial plateau [N52.831G]  -Fragility fracture s/p fall in setting of osteoporosis  -s/p ORIF on 2/15/22 with [facility] Freed  -vitamin D level 2/18 98  -DVT prophylaxis: received enoxaparin 30 mg subcutaneous BID through 3/6/21; then transitioned to aspirin 81 mg BID through 3/20/22  - 2/25 LLE roxanne duplex negative for DVT  - bone mineral metabolism team was consulted while at [facility] patient following as outpatient   -PT/OT following  -continue bowel regimen  - Pain controlled on current regimen with gabapentin, percocet, tylenol and she is having regular bowel movements  - reviewed [facility] ortho note from 3/4 and patient to remain NWB with ROM activities - PT/OT and patient aware   - f/u with [facility] ortho as scheduled    02/21/2022    Closed fracture of neck of left fibula [S82.832A]  -see above    02/21/2022    Anemia [D64.9]  -per [facility] summary, hgb 13 on arrival/prior to surgery. Downtrended to 10 post op. Iron level 23, transferrin sat 9. Started on ferrous sulfate supplement. - Hgb stable thus far  - on gi ppx    02/21/2022    Recurrent falls [R29.6]  -Patient lives alone and reports being independent with ADLs prior to fall. Does not use assist devices at home however has a history of falls with known osteoporosis with multiple fractures in the last three months.   -Continue with PT/OT  -fall precautions    02/21/2022    Underweight [R63.6]  -dietitian following  -continue Boost TID and MVI w/minerals     02/21/2022    Microscopic colitis [K52.839]  -continue budesonide    02/21/2022    Glaucoma [H40.9]  -Continue lumigan, brinzolamide/brimonidine, & supplements per patient request.     02/21/2022    Leg cramps [R25.2]  -on home potassium supplement; placed on hold 3/1 due to K+ 5.3  - B12, folate, electrolytes wnl  - improved with current regimen     02/21/2022    Declining functional status [R53.81]  -secondary to recent fall/fracture and NWB status  -plan as outlined above            Disposition: home    Discharged Condition: Stable    Vital Signs  Temp: 97.9 °F (36.6 °C)  Pulse: 77  Resp: 18  BP: (!) 149/74  SpO2: 98 %  O2 Device: None (Room air)       Vital signs reviewed in electronic chart.     Physical exam  Constitutional:  Well developed, thin, elderly female sitting upright in bed in no acute distress  Eyes:  no scleral icterus, conjunctiva normal   HENT:  Atraumatic, external ears normal, nose normal, oropharynx moist, no pharyngeal exudates. Neck- supple, no JVD, no lymphadenopathy  Respiratory:  No respiratory distress, no wheezing, rales or rhonchi detected  Cardiovascular:  Normal rate, normal rhythm, no murmurs, no gallops, no rubs, no edema   GI:  Soft, nondistended, normal bowel sounds, nontender, no voluntary guarding  Musculoskeletal:  No cyanosis.  LLE with ace bandage intact and ELS brace locked in extension.  no swelling in toes.   Integument:  Warm and dry.    Neurologic:  Alert & oriented x 3, no apparent focal deficits noted   Psychiatric:  Speech and behavior appropriate. Activity: activity as tolerated  Diet: regular diet  Follow Up: Primary Care Physician in 2 weeks and with [facility] on 4/4/22 at 10:40, [address] [place] [address] clinic lab on 4/4/22 at 1:00 and [facility] as scheduled      Labs: For convenience and continuity at follow-up the following most recent labs are provided:    CBC:   Lab Results   Component Value Date    WBC 6.1 03/11/2022    HGB 11.9 03/11/2022    HCT 40.1 03/11/2022     03/11/2022       RENAL:   Lab Results   Component Value Date     03/11/2022    K 3.6 03/11/2022    K 4.0 03/04/2022     03/11/2022    CO2 26 03/11/2022    BUN 16 03/11/2022    CREATININE 0.6 03/11/2022         Discharge Medications:     Current Discharge Medication List           Details   oxyCODONE-acetaminophen (PERCOCET) 5-325 MG per tablet Take 1 tablet by mouth 2 times daily for 7 days. Qty: 14 tablet, Refills: 0    Comments: Reduce doses taken as pain becomes manageable  Associated Diagnoses: Closed fracture of neck of left fibula with routine healing, subsequent encounter; Closed fracture of left tibial plateau with routine healing, subsequent encounter      gabapentin (NEURONTIN) 100 MG capsule Take 1 capsule by mouth nightly for 30 days.   Qty: 30 capsule, Refills: 0 Associated Diagnoses: Closed fracture of neck of left fibula with routine healing, subsequent encounter; Closed fracture of left tibial plateau with routine healing, subsequent encounter      diclofenac sodium (VOLTAREN) 1 % GEL Apply 4 g topically 4 times daily as needed for Pain  Qty: 100 g, Refills: 0      famotidine (PEPCID) 20 MG tablet Take 1 tablet by mouth 2 times daily  Qty: 60 tablet, Refills: 3              Details   aspirin 81 MG chewable tablet Take 81 mg by mouth in the morning and at bedtime For 14 days      Probiotic Product (PROBIOTIC DAILY PO) Take 1 capsule by mouth daily      NONFORMULARY Take 1 capsule by mouth in the morning and at bedtime EZ Tears supplement      acetaminophen (TYLENOL) 325 MG tablet Take 650 mg by mouth every 6 hours      ferrous sulfate 324 (65 Fe) MG EC tablet Take 324 mg by mouth every other day       magnesium oxide (MAG-OX) 400 MG tablet Take 400 mg by mouth daily      polyethylene glycol (GLYCOLAX) 17 g packet Take 17 g by mouth daily      sennosides-docusate sodium (SENOKOT-S) 8.6-50 MG tablet Take 1 tablet by mouth in the morning and at bedtime      vitamin C (ASCORBIC ACID) 500 MG tablet Take 500 mg by mouth daily       bimatoprost (LUMIGAN) 0.01 % SOLN ophthalmic drops Place 1 drop into both eyes nightly       budesonide (ENTOCORT EC) 3 MG extended release capsule Take 9 mg by mouth every morning       Melatonin 10 MG TABS Take 10 mg by mouth at bedtime       brinzolamide-brimonidine (SIMBRINZA) 1-0.2 % SUSP Instill one drop into both eyes twice daily      Lactobacillus (PROBIOTIC ACIDOPHILUS PO) Take 1 capsule by mouth daily       diphenhydrAMINE-zinc acetate (BENADRYL) 1-0.1 % cream Apply topically 3 times daily as needed.        Folic Acid 0.8 MG CAPS Take 0.8 mg by mouth daily       Multiple Vitamins-Minerals (MULTIVITAMIN ADULT) TABS Take 1 tablet by mouth daily       calcium carbonate (OSCAL) 500 MG TABS tablet Take 500 mg by mouth daily               Patient was seen and examined by Dr. Olga Bianchi and plan of care reviewed. Treatment plan was formulated collaboratively. Signed:  Electronically signed by KALIE Ragland on 3/18/2022 at 9:10 AM       Thank you Catrachita Rothman MD for the opportunity to be involved in this patient's care. If you have any questions or concerns please feel free to contact me at [phone].

## 2022-03-18 NOTE — PROGRESS NOTES
Patient d/c education completed, patient without any questions, patient stable at time of discharge, written prescription from chart given to patient, advised patient not to take percocet and Roxicodone together though, advised of non weight bearing status to LLE, if patient has any worsening symptoms or additional question present to ED or contact hospital. Patient stated understanding. Patient wheeled out in wheelchair to personal vehicle by PCA, was safely transferred into vehicle. Patient d/c'd with all belongings.

## 2022-03-18 NOTE — PLAN OF CARE
Problem: Falls - Risk of:  Goal: Will remain free from falls  Description: Will remain free from falls  Outcome: Completed  Goal: Absence of physical injury  Description: Absence of physical injury  Outcome: Completed     Problem: Skin Integrity:  Goal: Will show no infection signs and symptoms  Description: Will show no infection signs and symptoms  Outcome: Completed  Goal: Absence of new skin breakdown  Description: Absence of new skin breakdown  Outcome: Completed     Problem: Infection:  Goal: Will remain free from infection  Description: Will remain free from infection  Outcome: Completed     Problem: Safety:  Goal: Free from accidental physical injury  Description: Free from accidental physical injury  Outcome: Completed  Goal: Free from intentional harm  Description: Free from intentional harm  Outcome: Completed     Problem: Daily Care:  Goal: Daily care needs are met  Description: Daily care needs are met  Outcome: Completed     Problem: Pain:  Goal: Patient's pain/discomfort is manageable  Description: Patient's pain/discomfort is manageable  Outcome: Completed  Goal: Pain level will decrease  Description: Pain level will decrease  Outcome: Completed  Goal: Control of acute pain  Description: Control of acute pain  Outcome: Completed  Goal: Control of chronic pain  Description: Control of chronic pain  Outcome: Completed     Problem: Skin Integrity:  Goal: Skin integrity will stabilize  Description: Skin integrity will stabilize  Outcome: Completed     Problem: Discharge Planning:  Goal: Patients continuum of care needs are met  Description: Patients continuum of care needs are met  Outcome: Completed

## 2022-03-18 NOTE — PROGRESS NOTES
Occupational Therapy  Facility/Department: [facility] MED SUR[facility]  Daily Treatment Note  NAME: Mary Anna  : 1945  MRN: [id number]    Date of Service: 3/18/2022    Discharge Recommendations:  Continue to assess pending progress       Assessment   Assessment: Pt agreeable to OT services. Pt ambulated to bathroom with RW. Pt transferred to shower chair with SUP. Pt completed shower with NINO. Pt donned UB clothing with NINO and LB clothing with CGA<>SBA. Pt tolerated well. Pt returned to bed and transferred with MOD I. Pt NINO with oral hygiene materials. Patient Diagnosis(es): The primary encounter diagnosis was Closed fracture of neck of left fibula with routine healing, subsequent encounter. A diagnosis of Closed fracture of left tibial plateau with routine healing, subsequent encounter was also pertinent to this visit. has a past medical history of Colitis, GERD (gastroesophageal reflux disease), Glaucoma, and PAD (peripheral artery disease) ([address] has a past surgical history that includes Tubal ligation; eye surgery; cyst removal; skin biopsy; and Total hip arthroplasty (Left, 2019). Restrictions  Restrictions/Precautions  Restrictions/Precautions: General Precautions,Fall Risk,Weight Bearing  Required Braces or Orthoses?: Yes  Lower Extremity Weight Bearing Restrictions  Left Lower Extremity Weight Bearing: Non Weight Bearing  Required Braces or Orthoses  Left Lower Extremity Brace:  (ELS brace locked in extension)  Subjective   General  Chart Reviewed: Yes  Patient assessed for rehabilitation services?: Yes  Family / Caregiver Present: No  Referring Practitioner: Skpi Moreira PA-C  Diagnosis: Functional decline, Left tibial plateau fx, neck of left fibula  Subjective  Subjective: Pt states she lives alone, LLE pain 2/10. Pt agreeable to OT services.       Orientation     Objective    ADL  Grooming: Setup  UE Bathing: Setup  LE Bathing: Setup  UE Dressing: Setup  LE Dressing: Contact guard assistance        Functional Mobility  Functional - Mobility Device: Rolling Walker  Activity: To/from bathroom  Assist Level: Supervision     Plan   Plan  Times per week: 3-5  Times per day: Daily  Plan weeks: 1-2  Current Treatment Recommendations: Strengthening,Endurance Training,Balance Training,Self-Care / ADL,Safety Education & Training,Patient/Caregiver Education & Training,Functional Mobility Training    Goals  Short term goals  Time Frame for Short term goals: 2 weeks  Short term goal 1: Pt to complete dressing with MOD I. Short term goal 2: Pt to complete bathing with NINO. Short term goal 3: Pt to complete toileting with MOD I. Short term goal 4: Pt to complete safe ADL transfers maintaining WB status. Short term goal 5: Pt to tolerate x15 minutes of activity to increase functional activity tolerance. Therapy Time   Individual Concurrent Group Co-treatment   Time In 7565         Time Out 8958         Minutes 44              This note serves as a DC summary in the event of pt discharge.      Carolyn Robbins OTR/L

## 2022-03-18 NOTE — PROGRESS NOTES
Physical Therapy  Facility/Department: [facility] MED SURG  Daily Treatment Note  NAME: Jadyn Carlos  : 1945  MRN: [id number]    Date of Service: 3/18/2022    Discharge Recommendations:  Continue to assess pending progress      Assessment   Assessment: Patient planning for d/c home today. States her friend will stay with her until the other person is available next week. Brother will also be available for occasional assistance. Went over patient's HEP which she plans to continue. Home health will follow. REQUIRES PT FOLLOW UP: Yes  Activity Tolerance  Activity Tolerance: Patient Tolerated treatment well     Patient Diagnosis(es): The primary encounter diagnosis was Closed fracture of neck of left fibula with routine healing, subsequent encounter. A diagnosis of Closed fracture of left tibial plateau with routine healing, subsequent encounter was also pertinent to this visit. has a past medical history of Colitis, GERD (gastroesophageal reflux disease), Glaucoma, and PAD (peripheral artery disease) ([facility] [address] has a past surgical history that includes Tubal ligation; eye surgery; cyst removal; skin biopsy; and Total hip arthroplasty (Left, 2019). Restrictions  Restrictions/Precautions  Restrictions/Precautions: General Precautions,Fall Risk,Weight Bearing  Required Braces or Orthoses?: Yes  Lower Extremity Weight Bearing Restrictions  Left Lower Extremity Weight Bearing: Non Weight Bearing  Required Braces or Orthoses  Left Lower Extremity Brace:  (ELS brace locked in extension)  Subjective   General  Chart Reviewed: Yes  Response To Previous Treatment: Patient with no complaints from previous session. Family / Caregiver Present: No  Subjective  Subjective: Patient states she has it worked out for her friend to stay with her until the other person is available next week.   Pain Screening  Patient Currently in Pain: Yes  Vital Signs  Patient Currently in Pain: Yes       Goals  Long term goals  Time Frame for Long term goals : 10 days  Long term goal 1: Patient will perform all bed mobility independently. Long term goal 2: Patient will perform sit to stand and transfers with modified independence. Long term goal 3: Patient will ambulate 50'x2, NWB L LE, with RW and SBA. Long term goal 4: Patient will demonstrate independence with HEP. Patient Goals   Patient goals : Return home. Plan    Plan  Times per week: 3-5x/week  Times per day: Daily  Current Treatment Recommendations: Bibi Smalls Training,Patient/Caregiver Education & Training,Balance Training,Gait Training,Home Exercise Program,Functional Mobility Training,Safety Education & Training  Safety Devices  Type of devices: Left in bed,Call light within reach     Therapy Time   Individual Concurrent Group Co-treatment   Time In 0956         Time Out 1008         Minutes 12              This note serves as D/C summary if patient is discharged prior to next visit.   Chetan Tong, PTA

## 2022-03-21 ENCOUNTER — TELEPHONE (OUTPATIENT)
Dept: INTERNAL MEDICINE | Facility: CLINIC | Age: 77
End: 2022-03-21

## 2022-03-21 NOTE — TELEPHONE ENCOUNTER
Caller: KRIS    Relationship: NURSE WITH [facility]    Best call back number: [phone]    What was the call regarding: KRIS CALLED TO REQUEST VERBAL ORDERS FOR CONTINUE PHYSICAL THERAPY 1 TIME A WEEK FOR ONE WEEK, TWO TIMES A WEEK FOR 4 WEEKS,  AND 1 TIME A WEEK FOR 3 WEEKS.    Do you require a callback: YES

## 2022-03-21 NOTE — TELEPHONE ENCOUNTER
Caller: CAROLIN     Relationship: NURSE WITH [facility]     Best call back number: [phone]    Additional notes: CAROLIN IS ASKING FOR VERBAL ORDERS FOR NURSING ONCE A WEEK FOR 9 WEEKS DUE TO BREAK IN TIBIA AND FIBULA.  THREE APRN'S ARE REQUESTED TO BE INCLUDED. PHYSICAL THERAPY AND OCCUPATIONAL THERAPY WAS ORDERED BY [facility]

## 2022-03-21 NOTE — TELEPHONE ENCOUNTER
Contacted home health and gave updated verbal orders for PT as requested; staff expressed understanding and will send report for PCP to review and sign

## 2022-03-27 ENCOUNTER — APPOINTMENT (OUTPATIENT)
Dept: CT IMAGING | Facility: HOSPITAL | Age: 77
End: 2022-03-27
Payer: MEDICARE

## 2022-03-27 ENCOUNTER — HOSPITAL ENCOUNTER (EMERGENCY)
Facility: HOSPITAL | Age: 77
Discharge: ANOTHER HOSPITAL | End: 2022-03-27
Attending: HOSPITALIST
Payer: MEDICARE

## 2022-03-27 VITALS
OXYGEN SATURATION: 96 % | RESPIRATION RATE: 20 BRPM | SYSTOLIC BLOOD PRESSURE: 110 MMHG | HEART RATE: 60 BPM | DIASTOLIC BLOOD PRESSURE: 66 MMHG | WEIGHT: 115 LBS | TEMPERATURE: 98.2 F | BODY MASS INDEX: 18.05 KG/M2 | HEIGHT: 67 IN

## 2022-03-27 DIAGNOSIS — M84.48XA BILATERAL SACRAL INSUFFICIENCY FRACTURE, INITIAL ENCOUNTER: Primary | ICD-10-CM

## 2022-03-27 LAB — SARS-COV-2, NAAT: NOT DETECTED

## 2022-03-27 PROCEDURE — 6370000000 HC RX 637 (ALT 250 FOR IP): Performed by: HOSPITALIST

## 2022-03-27 PROCEDURE — 99285 EMERGENCY DEPT VISIT HI MDM: CPT

## 2022-03-27 PROCEDURE — 87635 SARS-COV-2 COVID-19 AMP PRB: CPT

## 2022-03-27 PROCEDURE — 72192 CT PELVIS W/O DYE: CPT

## 2022-03-27 PROCEDURE — 72131 CT LUMBAR SPINE W/O DYE: CPT

## 2022-03-27 RX ORDER — ASPIRIN 81 MG/1
81 TABLET ORAL DAILY
Status: ON HOLD | COMMUNITY
End: 2022-04-04

## 2022-03-27 RX ORDER — OXYCODONE HYDROCHLORIDE AND ACETAMINOPHEN 5; 325 MG/1; MG/1
1 TABLET ORAL ONCE
Status: COMPLETED | OUTPATIENT
Start: 2022-03-27 | End: 2022-03-27

## 2022-03-27 RX ORDER — OXYCODONE HYDROCHLORIDE 5 MG/1
5 TABLET ORAL EVERY 8 HOURS PRN
Status: ON HOLD | COMMUNITY
End: 2022-04-04

## 2022-03-27 RX ADMIN — OXYCODONE AND ACETAMINOPHEN 1 TABLET: 5; 325 TABLET ORAL at 11:51

## 2022-03-27 ASSESSMENT — PAIN DESCRIPTION - DESCRIPTORS: DESCRIPTORS: THROBBING;STABBING

## 2022-03-27 ASSESSMENT — PAIN - FUNCTIONAL ASSESSMENT: PAIN_FUNCTIONAL_ASSESSMENT: 0-10

## 2022-03-27 ASSESSMENT — PAIN DESCRIPTION - PAIN TYPE
TYPE: ACUTE PAIN
TYPE: ACUTE PAIN

## 2022-03-27 ASSESSMENT — PAIN SCALES - GENERAL
PAINLEVEL_OUTOF10: 2
PAINLEVEL_OUTOF10: 5

## 2022-03-27 ASSESSMENT — PAIN DESCRIPTION - ONSET: ONSET: SUDDEN

## 2022-03-27 ASSESSMENT — PAIN DESCRIPTION - LOCATION: LOCATION: BACK

## 2022-03-27 NOTE — ED NOTES
Patient reports to nurse while she was starting IV that when she fell last night she landed on her bottom but did hit her head on the wall, she stated that \"it was the last thing that hit\" and described it as a \"bump\" on the wall. Patient has no complaints of pain to head. Dr Luna Reason notified.      Dalila Parker RN  03/27/22 5882

## 2022-03-27 NOTE — ED NOTES
Patient has been accepted by Dr Jackie Ortiz at [facility], patient to be transferred to ED.      Gary Sanchez RN  03/27/22 0022

## 2022-03-27 NOTE — ED NOTES
Called [facility], spoke with house supervisor. House supervisor phone was answered by Carmine Rubio, but then given to Monika Reyna advised that Dr. Hallie Reeves is on call for ortho - and his number is [phone]           Radha Yao  03/27/22 1209               Call placed to Dr. Desean Turcios phone, no answer. Voicemail left for him to call back here.       Radha Yao  03/27/22 1211                  Ravi Hilario  03/27/22 1310

## 2022-03-27 NOTE — ED NOTES
Patient returned from CT, PO meds admin, patient made comfortable and family brought to bedside.      Primo Toth RN  03/27/22 6388

## 2022-03-27 NOTE — ED NOTES
Patient report called to JUAN JOSE RAJPUT RN at [address] [place] [address] ED.      Marissa Muniz RN  03/27/22 7589

## 2022-03-27 NOTE — ED NOTES
Patient en route to [facility] ED with [facility].      Krishna Cantrell RN  03/27/22 7214

## 2022-03-27 NOTE — ED PROVIDER NOTES
[facility] ENCOUNTER      Pt Name: Afua Pacheco  MRN: [id number]  YOB: 1945  Date of evaluation: 3/27/2022  Provider: Naina Batista, [address] [place] [address]       Chief Complaint   Patient presents with    Fall     fall yesterday morning, complains of lower back pain. patient states she fell due to loss of balance. HISTORY OF PRESENT ILLNESS  (Location/Symptom, Timing/Onset, Context/Setting, Quality, Duration, Modifying Factors, Severity.)   Afua Pacheco is a 64 y.o. female who presents to the emergency department for low back/pelvic pain. Patient had a fall yesterday at home. She is recently had a fall last month with unfortunately caused her to have a tibia fracture of her left lower extremity. She was hospitalized for this and is now back home. She states she gets rehab twice a week at home. Patient got up out of the bed yesterday morning using her walker. She states she had a shoe on her unaffected foot which had a thicker sole towards the back of the heel than what was on the middle part of the shoe. She states when she took a step and sort of caught and caused her to sort of slide off the sole of the shoe and caused her to fall. She states that when she did she sort of landed more on her bottom or tailbone area. She was unable to get up after the fall. She did have assistance from family that helped her up. She states since then she has had continued pain to the extremely low back/pelvic area. She is denying any pain to either hip area. She denied any head injury or loss of consciousness secondary to the fall. She is on aspirin daily. She denies any numbness tingling weakness of extremities out of ordinary. Denies any loss of bowel or bladder control. Denies any chest pain or shortness of breath. Patient brought in by EMS secondary to a pain to the low back/pelvic area      Nursing notes were reviewed.     REVIEW OFSYSTEMS    (2-9 systems for level 4, 10 or more for level 5)   ROS:  General:  No fevers, no chills, no weakness  Cardiovascular:  No chest pain, no palpitations  Respiratory:  No shortness of breath, no cough, no wheezing  Gastrointestinal:  No pain, no nausea, no vomiting, no diarrhea  Musculoskeletal: +Low back pain, + posterior pelvic pain  Skin:  No rash, no easy bruising  Neurologic:  No speech problems, no headache, no extremity weakness  Psychiatric:  No anxiety  Genitourinary:  No dysuria, no hematuria    Except as noted above the remainder of the review of systems was reviewed and negative. PAST MEDICAL HISTORY     Past Medical History:   Diagnosis Date    Colitis     GERD (gastroesophageal reflux disease)     Glaucoma     PAD (peripheral artery disease) (HCC)          SURGICAL HISTORY       Past Surgical History:   Procedure Laterality Date    CYST REMOVAL      EYE SURGERY      SKIN BIOPSY      TOTAL HIP ARTHROPLASTY Left 12/24/2019    TUBAL LIGATION           CURRENT MEDICATIONS       Previous Medications    ACETAMINOPHEN (TYLENOL) 325 MG TABLET    Take 650 mg by mouth every 6 hours    ASPIRIN 81 MG EC TABLET    Take 81 mg by mouth daily    BIMATOPROST (LUMIGAN) 0.01 % SOLN OPHTHALMIC DROPS    Place 1 drop into both eyes nightly     BRINZOLAMIDE-BRIMONIDINE (SIMBRINZA) 1-0.2 % SUSP    Instill one drop into both eyes twice daily    BUDESONIDE (ENTOCORT EC) 3 MG EXTENDED RELEASE CAPSULE    Take 9 mg by mouth every morning     CALCIUM CARBONATE (OSCAL) 500 MG TABS TABLET    Take 500 mg by mouth daily     DICLOFENAC SODIUM (VOLTAREN) 1 % GEL    Apply 4 g topically 4 times daily as needed for Pain    DIPHENHYDRAMINE-ZINC ACETATE (BENADRYL) 1-0.1 % CREAM    Apply topically 3 times daily as needed.      FAMOTIDINE (PEPCID) 20 MG TABLET    Take 1 tablet by mouth 2 times daily    FERROUS SULFATE 324 (65 FE) MG EC TABLET    Take 324 mg by mouth every other day     FOLIC ACID 0.8 MG CAPS    Take 0.8 mg by mouth daily     GABAPENTIN (NEURONTIN) 100 MG CAPSULE    Take 1 capsule by mouth nightly for 30 days. LACTOBACILLUS (PROBIOTIC ACIDOPHILUS PO)    Take 1 capsule by mouth daily     MAGNESIUM OXIDE (MAG-OX) 400 MG TABLET    Take 400 mg by mouth daily    MELATONIN 10 MG TABS    Take 10 mg by mouth at bedtime     MULTIPLE VITAMINS-MINERALS (MULTIVITAMIN ADULT) TABS    Take 1 tablet by mouth daily     NONFORMULARY    Take 1 capsule by mouth in the morning and at bedtime EZ Tears supplement    OXYCODONE (ROXICODONE) 5 MG IMMEDIATE RELEASE TABLET    Take 5 mg by mouth 2 times daily. POLYETHYLENE GLYCOL (GLYCOLAX) 17 G PACKET    Take 17 g by mouth daily    PROBIOTIC PRODUCT (PROBIOTIC DAILY PO)    Take 1 capsule by mouth daily    SENNOSIDES-DOCUSATE SODIUM (SENOKOT-S) 8.6-50 MG TABLET    Take 1 tablet by mouth in the morning and at bedtime    VITAMIN C (ASCORBIC ACID) 500 MG TABLET    Take 500 mg by mouth daily        ALLERGIES     Patient has no known allergies. FAMILY HISTORY     History reviewed. No pertinent family history. SOCIAL HISTORY       Social History     Socioeconomic History    Marital status:      Spouse name: None    Number of children: None    Years of education: None    Highest education level: None   Occupational History    None   Tobacco Use    Smoking status: Former Smoker    Smokeless tobacco: Never Used   Substance and Sexual Activity    Alcohol use: No    Drug use: No    Sexual activity: None   Other Topics Concern    None   Social History Narrative    None     Social Determinants of Health     Financial Resource Strain:     Difficulty of Paying Living Expenses: Not on file   Food Insecurity:     Worried About Running Out of Food in the Last Year: Not on file    Juancarlos of Food in the Last Year: Not on file   Transportation Needs:     Lack of Transportation (Medical): Not on file    Lack of Transportation (Non-Medical):  Not on file   Physical Activity:  Days of Exercise per Week: Not on file    Minutes of Exercise per Session: Not on file   Stress:     Feeling of Stress : Not on file   Social Connections:     Frequency of Communication with Friends and Family: Not on file    Frequency of Social Gatherings with Friends and Family: Not on file    Attends Protestant Services: Not on file    Active Member of [address] [place] [address] [employer] or Organizations: Not on file    Attends Club or Organization Meetings: Not on file    Marital Status: Not on file   Intimate Partner Violence:     Fear of Current or Ex-Partner: Not on file    Emotionally Abused: Not on file    Physically Abused: Not on file    Sexually Abused: Not on file   Housing Stability:     Unable to Pay for Housing in the Last Year: Not on file    Number of [place] in the Last Year: Not on file    Unstable Housing in the Last Year: Not on file         PHYSICAL EXAM    (up to 7 for level 4, 8 or more for level 5)     ED Triage Vitals   BP Temp Temp src Pulse Resp SpO2 Height Weight   -- -- -- -- -- -- -- --       Physical Exam  General :Patient is awake, alert, oriented, in no acute distress, nontoxic appearing  HEENT: Pupils are equally round and reactive to light, EOMI, conjunctivae clear. Oral mucosa is moist, no exudate. Uvula is midline  Neck: Neck is supple, full range of motion, trachea midline, no midline bony tenderness  Cardiac: Heart regular rate, rhythm, no murmurs, rubs, or gallops  Lungs: Lungs are clear to auscultation, there is no wheezing, rhonchi, or rales. There is no use of accessory muscles. Abdomen: Abdomen is soft, nontender, nondistended. There is no firm or pulsatile masses, no rebound rigidity or guarding. Musculoskeletal: 5 out of 5 strength in all 4 extremities. No focal muscle deficits are appreciated  Neuro:  Motor intact, sensory intact, level of consciousness is normal  Dermatology: Skin is warm and dry  Psych: Mentation is grossly normal, cognition is grossly normal. Affect is appropriate. BACK: There is not thoracic or lumbar midline tenderness to palpation. No step-offs. Paraspinal tenderness to palpation is not present in the thoracic or lumbar region region. There is palpable tenderness over the lower sacrum area and coccyx. No overlying rashes. LE strength is 5/5. LE light touch is intact. LE DTR's are 2+ in the patellas and achilles. Straight leg test is negative on the RIGHT, negative on the LEFT. DIAGNOSTIC RESULTS     EKG: All EKG's are interpreted by the Emergency Department Physician who either signs or Co-signs this chart in the [address] a cardiologist.        RADIOLOGY:   Non-plain film images such as CT, Ultrasound and MRI are read by the radiologist. Plain radiographic images are visualized and preliminarily interpreted by the emergency physician with the below findings:      ? Radiologist's Report Reviewed:  CT LUMBAR SPINE WO CONTRAST   Final Result      No fracture seen. CT PELVIS WO CONTRAST Additional Contrast? None   Final Result      Bilateral sacral alar fractures. ED BEDSIDE ULTRASOUND:   Performed by ED Physician - none    LABS:    I have reviewed and interpreted all of the currently available lab results from this visit (ifapplicable):  No results found for this visit on 03/27/22. All other labs were within normal range or not returned as of this dictation.     EMERGENCY DEPARTMENT COURSE and DIFFERENTIAL DIAGNOSIS/MDM:   Vitals:    Vitals:    03/27/22 1121 03/27/22 1122 03/27/22 1158 03/27/22 1213   BP:   (!) 95/47 (!) 99/54   Pulse:       Resp:       Temp:       TempSrc:       SpO2:   99% 97%   Weight: 115 lb (52.2 kg) 115 lb (52.2 kg)     Height:  5' 7\" (1.702 m)         MEDICATIONS ADMINISTERED IN ED:  Medications   oxyCODONE-acetaminophen (PERCOCET) 5-325 MG per tablet 1 tablet (1 tablet Oral Given 3/27/22 1151)       After initial evaluation examination I did have a conversation with the patient about the upcoming plan, treatment possible disposition which they are agreeable to the times dictation. Patient has been going give her an extra dose of her pain medication which she has been taking at home. She states that she is on oxycodone but she does not really know what the dose is. She states that it was given to her when she left the hospital here from being in a swing bed. Patient advised that we would going perform CT scan of the lumbar spine and of the pelvis to rule out any acute fracture. Otherwise she is resting comfortably stretcher no acute distress nontoxic-appearing. Patient is alert white oriented x3 answering questions appropriately and does seem the even in her condition rather jovial.  Patient's final disposition will be determined once her radiological studies been performed reviewed. CT scan of the lumbar spine without contrast read by radiology as no acute fracture. CT scan of the pelvis without contrast read by radiology as bilateral sacral alar fractures. Patient's radiological findings were discussed with her she does state her understanding. Patient advised of the bilateral sacral alar fractures. We will call and discuss the case with orthopedics because these will most likely need some type of fixation. Patient advised we will contact [facility] and surgical treatment is needed then will attempt to try to get her transferred there since she had her last surgery there from her tibial fracture. It was discussed with Dr. Dalton Atkins at the [address] [place] [address] she did accept her in transfer back there since she had surgery there last month. She advised that she be sent to the emergency department at the [facility] for further evaluation and work-up for bilateral sacral insufficiency fractures or bilateral alar fractures. Patient's radiological diagnostic studies will come her time of transfer.          CONSULTS:  None    PROCEDURES:  Procedures    CRITICAL CARE TIME Total Critical Care time was 0 minutes, excluding separately reportable procedures. There was a high probability of clinically significant/life threatening deterioration in the patient's condition which required my urgent intervention. FINAL IMPRESSION      1. Bilateral sacral insufficiency fracture, initial encounter          DISPOSITION/PLAN   DISPOSITION        PATIENT REFERRED TO:  No follow-up provider specified. DISCHARGE MEDICATIONS:  New Prescriptions    No medications on file       Comment: Please note this report has been produced using speech recognition software and may contain errorsrelated to that system including errors in grammar, punctuation, and spelling, as well as words and phrases that may be inappropriate. If there are any questions or concerns please feel free to contact the dictating providerfor clarification.     Rola Lockhart DO  Attending Emergency Physician              Rola Lockhart DO  03/27/22 5923

## 2022-03-27 NOTE — ED NOTES
Call initiated to [address] to contact [facility]. Will call back with provider from [facility].       Kathleen Angel  03/27/22 6776

## 2022-04-02 ENCOUNTER — HOSPITAL ENCOUNTER (INPATIENT)
Facility: HOSPITAL | Age: 77
LOS: 30 days | Discharge: HOME | DRG: 560 | End: 2022-05-02
Attending: INTERNAL MEDICINE | Admitting: INTERNAL MEDICINE
Payer: MEDICARE

## 2022-04-02 DIAGNOSIS — S32.131D CLOSED MINIMALLY DISPLACED ZONE III FRACTURE OF SACRUM WITH ROUTINE HEALING, SUBSEQUENT ENCOUNTER: Primary | ICD-10-CM

## 2022-04-02 DIAGNOSIS — D64.9 ANEMIA, UNSPECIFIED TYPE: ICD-10-CM

## 2022-04-02 DIAGNOSIS — S82.832D CLOSED FRACTURE OF NECK OF LEFT FIBULA WITH ROUTINE HEALING, SUBSEQUENT ENCOUNTER: ICD-10-CM

## 2022-04-02 DIAGNOSIS — S82.142D CLOSED FRACTURE OF LEFT TIBIAL PLATEAU WITH ROUTINE HEALING, SUBSEQUENT ENCOUNTER: ICD-10-CM

## 2022-04-02 DIAGNOSIS — A04.72 C. DIFFICILE DIARRHEA: ICD-10-CM

## 2022-04-02 PROCEDURE — 1200000002 HC SEMI PRIVATE SWING BED

## 2022-04-02 PROCEDURE — 6370000000 HC RX 637 (ALT 250 FOR IP): Performed by: PHYSICIAN ASSISTANT

## 2022-04-02 RX ORDER — FAMOTIDINE 20 MG/1
20 TABLET, FILM COATED ORAL 2 TIMES DAILY
Status: DISCONTINUED | OUTPATIENT
Start: 2022-04-02 | End: 2022-05-02 | Stop reason: HOSPADM

## 2022-04-02 RX ORDER — ACETAMINOPHEN 325 MG/1
650 TABLET ORAL EVERY 4 HOURS PRN
Status: DISCONTINUED | OUTPATIENT
Start: 2022-04-02 | End: 2022-05-02 | Stop reason: HOSPADM

## 2022-04-02 RX ORDER — LACTOBACILLUS RHAMNOSUS GG 10B CELL
1 CAPSULE ORAL DAILY
Status: DISCONTINUED | OUTPATIENT
Start: 2022-04-02 | End: 2022-05-02 | Stop reason: HOSPADM

## 2022-04-02 RX ORDER — ACETAMINOPHEN 325 MG/1
650 TABLET ORAL EVERY 6 HOURS
Status: DISCONTINUED | OUTPATIENT
Start: 2022-04-02 | End: 2022-05-02 | Stop reason: HOSPADM

## 2022-04-02 RX ORDER — POLYETHYLENE GLYCOL 3350 17 G/17G
17 POWDER, FOR SOLUTION ORAL DAILY PRN
Status: DISCONTINUED | OUTPATIENT
Start: 2022-04-02 | End: 2022-04-03

## 2022-04-02 RX ORDER — OXYCODONE HYDROCHLORIDE 5 MG/1
5 TABLET ORAL EVERY 8 HOURS PRN
Status: DISCONTINUED | OUTPATIENT
Start: 2022-04-02 | End: 2022-04-19

## 2022-04-02 RX ORDER — OMEGA-3/DHA/EPA/FISH OIL 1000 MG
1 CAPSULE ORAL DAILY
Status: DISCONTINUED | OUTPATIENT
Start: 2022-04-02 | End: 2022-04-02 | Stop reason: SDUPTHER

## 2022-04-02 RX ORDER — LANOLIN ALCOHOL/MO/W.PET/CERES
400 CREAM (GRAM) TOPICAL DAILY
Status: DISCONTINUED | OUTPATIENT
Start: 2022-04-03 | End: 2022-05-02 | Stop reason: HOSPADM

## 2022-04-02 RX ORDER — CALCIUM CARBONATE 500(1250)
500 TABLET ORAL DAILY
Status: DISCONTINUED | OUTPATIENT
Start: 2022-04-02 | End: 2022-05-02 | Stop reason: HOSPADM

## 2022-04-02 RX ORDER — MECOBALAMIN 5000 MCG
10 TABLET,DISINTEGRATING ORAL NIGHTLY
Status: DISCONTINUED | OUTPATIENT
Start: 2022-04-02 | End: 2022-05-02 | Stop reason: HOSPADM

## 2022-04-02 RX ORDER — LANOLIN ALCOHOL/MO/W.PET/CERES
0.8 CREAM (GRAM) TOPICAL DAILY
Status: DISCONTINUED | OUTPATIENT
Start: 2022-04-03 | End: 2022-05-02 | Stop reason: HOSPADM

## 2022-04-02 RX ORDER — BUDESONIDE 3 MG/1
9 CAPSULE, COATED PELLETS ORAL EVERY MORNING
Status: DISCONTINUED | OUTPATIENT
Start: 2022-04-03 | End: 2022-04-07

## 2022-04-02 RX ORDER — FERROUS SULFATE TAB EC 324 MG (65 MG FE EQUIVALENT) 324 (65 FE) MG
324 TABLET DELAYED RESPONSE ORAL EVERY OTHER DAY
Status: DISCONTINUED | OUTPATIENT
Start: 2022-04-03 | End: 2022-05-02 | Stop reason: HOSPADM

## 2022-04-02 RX ORDER — POLYETHYLENE GLYCOL 3350 17 G/17G
17 POWDER, FOR SOLUTION ORAL DAILY
Status: DISCONTINUED | OUTPATIENT
Start: 2022-04-02 | End: 2022-04-03

## 2022-04-02 RX ORDER — GABAPENTIN 100 MG/1
100 CAPSULE ORAL NIGHTLY
Status: DISCONTINUED | OUTPATIENT
Start: 2022-04-02 | End: 2022-05-02 | Stop reason: HOSPADM

## 2022-04-02 RX ORDER — MONTELUKAST SODIUM 4 MG/1
1 TABLET, CHEWABLE ORAL 2 TIMES DAILY
Status: DISCONTINUED | OUTPATIENT
Start: 2022-04-02 | End: 2022-04-04

## 2022-04-02 RX ORDER — MONTELUKAST SODIUM 4 MG/1
1 TABLET, CHEWABLE ORAL 2 TIMES DAILY
Status: ON HOLD | COMMUNITY
End: 2022-04-04

## 2022-04-02 RX ORDER — M-VIT,TX,IRON,MINS/CALC/FOLIC 27MG-0.4MG
1 TABLET ORAL DAILY
Status: DISCONTINUED | OUTPATIENT
Start: 2022-04-03 | End: 2022-05-02 | Stop reason: HOSPADM

## 2022-04-02 RX ORDER — FERROUS SULFATE TAB EC 324 MG (65 MG FE EQUIVALENT) 324 (65 FE) MG
324 TABLET DELAYED RESPONSE ORAL EVERY OTHER DAY
Status: DISCONTINUED | OUTPATIENT
Start: 2022-04-02 | End: 2022-04-02

## 2022-04-02 RX ORDER — ASCORBIC ACID 500 MG
500 TABLET ORAL DAILY
Status: DISCONTINUED | OUTPATIENT
Start: 2022-04-02 | End: 2022-05-02 | Stop reason: HOSPADM

## 2022-04-02 RX ORDER — SENNA AND DOCUSATE SODIUM 50; 8.6 MG/1; MG/1
1 TABLET, FILM COATED ORAL 2 TIMES DAILY
Status: DISCONTINUED | OUTPATIENT
Start: 2022-04-02 | End: 2022-04-03

## 2022-04-02 RX ADMIN — DOCUSATE SODIUM 50MG AND SENNOSIDES 8.6MG 1 TABLET: 8.6; 5 TABLET, FILM COATED ORAL at 20:37

## 2022-04-02 RX ADMIN — DICLOFENAC SODIUM 4 G: 10 GEL TOPICAL at 14:50

## 2022-04-02 RX ADMIN — ACETAMINOPHEN 650 MG: 325 TABLET, FILM COATED ORAL at 20:37

## 2022-04-02 RX ADMIN — Medication 10 MG: at 20:37

## 2022-04-02 RX ADMIN — FAMOTIDINE 20 MG: 20 TABLET, FILM COATED ORAL at 20:37

## 2022-04-02 RX ADMIN — ACETAMINOPHEN 650 MG: 325 TABLET, FILM COATED ORAL at 14:51

## 2022-04-02 RX ADMIN — GABAPENTIN 100 MG: 100 CAPSULE ORAL at 20:37

## 2022-04-02 RX ADMIN — OXYCODONE 5 MG: 5 TABLET ORAL at 14:51

## 2022-04-02 ASSESSMENT — PAIN SCALES - GENERAL
PAINLEVEL_OUTOF10: 9
PAINLEVEL_OUTOF10: 8

## 2022-04-02 NOTE — LETTER
Pt is going home today. She has had you all in the past and requesting you again. Let me know if you need anything or have any questions. #:        Ronak Edwards RN  Care Coordinator  [facility]  [address], [place]  Office:  [phone]  Fax:  [phone]  [email or web address]

## 2022-04-02 NOTE — FLOWSHEET NOTE
04/02/22 1416   Musculoskeletal   Musculoskeletal (WDL) X   RUE Full movement   LUE Full movement   RL Extremity Full movement   LL Extremity Limited movement   Genitourinary   Genitourinary (WDL) WDL   Psychosocial   Psychosocial (WDL) WDL   Direct admit to room 5-1. Pt discharged from [facility] acute care earlier today status post ORIF to left sacrum; dressing clean, dry and intact. Knee immobilizer to LLE, pt previously had fx tib/fib in February. Awake, alert. Oriented x4. Non weight bearing to LLE. Up to bedside commode with assist without difficulty. Pt has history of chronic diarrhea. Oriented to room, unit and plan of care.

## 2022-04-03 PROBLEM — Z86.73 HISTORY OF TIA (TRANSIENT ISCHEMIC ATTACK): Status: ACTIVE | Noted: 2022-04-03

## 2022-04-03 PROBLEM — M81.0 OSTEOPOROSIS: Status: ACTIVE | Noted: 2022-04-03

## 2022-04-03 PROBLEM — S32.131A: Status: ACTIVE | Noted: 2022-04-03

## 2022-04-03 LAB
A/G RATIO: 1.2 (ref 0.8–2)
ALBUMIN SERPL-MCNC: 2.8 G/DL (ref 3.4–4.8)
ALP BLD-CCNC: 73 U/L (ref 25–100)
ALT SERPL-CCNC: 13 U/L (ref 4–36)
ANION GAP SERPL CALCULATED.3IONS-SCNC: 8 MMOL/L (ref 3–16)
AST SERPL-CCNC: 18 U/L (ref 8–33)
BILIRUB SERPL-MCNC: 0.4 MG/DL (ref 0.3–1.2)
BUN BLDV-MCNC: 17 MG/DL (ref 6–20)
CALCIUM SERPL-MCNC: 8.8 MG/DL (ref 8.5–10.5)
CHLORIDE BLD-SCNC: 107 MMOL/L (ref 98–107)
CO2: 23 MMOL/L (ref 20–30)
CREAT SERPL-MCNC: <0.5 MG/DL (ref 0.4–1.2)
GFR AFRICAN AMERICAN: >59
GFR NON-AFRICAN AMERICAN: >60
GLOBULIN: 2.3 G/DL
GLUCOSE BLD-MCNC: 92 MG/DL (ref 74–106)
HCT VFR BLD CALC: 33.6 % (ref 37–47)
HEMOGLOBIN: 10.5 G/DL (ref 11.5–16.5)
MCH RBC QN AUTO: 30 PG (ref 27–32)
MCHC RBC AUTO-ENTMCNC: 31.3 G/DL (ref 31–35)
MCV RBC AUTO: 96 FL (ref 80–100)
PDW BLD-RTO: 13.2 % (ref 11–16)
PLATELET # BLD: 233 K/UL (ref 150–400)
PMV BLD AUTO: 9 FL (ref 6–10)
POTASSIUM REFLEX MAGNESIUM: 3.7 MMOL/L (ref 3.4–5.1)
RBC # BLD: 3.5 M/UL (ref 3.8–5.8)
SODIUM BLD-SCNC: 138 MMOL/L (ref 136–145)
TOTAL PROTEIN: 5.1 G/DL (ref 6.4–8.3)
WBC # BLD: 5.7 K/UL (ref 4–11)

## 2022-04-03 PROCEDURE — 6370000000 HC RX 637 (ALT 250 FOR IP): Performed by: PHYSICIAN ASSISTANT

## 2022-04-03 PROCEDURE — 36415 COLL VENOUS BLD VENIPUNCTURE: CPT

## 2022-04-03 PROCEDURE — 99306 1ST NF CARE HIGH MDM 50: CPT | Performed by: HOSPITALIST

## 2022-04-03 PROCEDURE — 1200000002 HC SEMI PRIVATE SWING BED

## 2022-04-03 PROCEDURE — 85027 COMPLETE CBC AUTOMATED: CPT

## 2022-04-03 PROCEDURE — 80053 COMPREHEN METABOLIC PANEL: CPT

## 2022-04-03 PROCEDURE — 6360000002 HC RX W HCPCS: Performed by: PHYSICIAN ASSISTANT

## 2022-04-03 RX ORDER — SENNA AND DOCUSATE SODIUM 50; 8.6 MG/1; MG/1
1 TABLET, FILM COATED ORAL 2 TIMES DAILY PRN
Status: DISCONTINUED | OUTPATIENT
Start: 2022-04-03 | End: 2022-05-02 | Stop reason: HOSPADM

## 2022-04-03 RX ORDER — POLYETHYLENE GLYCOL 3350 17 G/17G
17 POWDER, FOR SOLUTION ORAL DAILY PRN
Status: DISCONTINUED | OUTPATIENT
Start: 2022-04-03 | End: 2022-05-02 | Stop reason: HOSPADM

## 2022-04-03 RX ADMIN — DICLOFENAC SODIUM 4 G: 10 GEL TOPICAL at 08:59

## 2022-04-03 RX ADMIN — FERROUS SULFATE TAB EC 324 MG (65 MG FE EQUIVALENT) 324 MG: 324 (65 FE) TABLET DELAYED RESPONSE at 08:20

## 2022-04-03 RX ADMIN — FAMOTIDINE 20 MG: 20 TABLET, FILM COATED ORAL at 20:06

## 2022-04-03 RX ADMIN — GABAPENTIN 100 MG: 100 CAPSULE ORAL at 20:06

## 2022-04-03 RX ADMIN — OXYCODONE HYDROCHLORIDE AND ACETAMINOPHEN 500 MG: 500 TABLET ORAL at 08:19

## 2022-04-03 RX ADMIN — CALCIUM 500 MG: 500 TABLET ORAL at 08:22

## 2022-04-03 RX ADMIN — ACETAMINOPHEN 650 MG: 325 TABLET, FILM COATED ORAL at 20:06

## 2022-04-03 RX ADMIN — OXYCODONE 5 MG: 5 TABLET ORAL at 20:06

## 2022-04-03 RX ADMIN — ACETAMINOPHEN 650 MG: 325 TABLET, FILM COATED ORAL at 14:36

## 2022-04-03 RX ADMIN — ENOXAPARIN SODIUM 40 MG: 40 INJECTION SUBCUTANEOUS at 08:18

## 2022-04-03 RX ADMIN — ENOXAPARIN SODIUM 30 MG: 100 INJECTION SUBCUTANEOUS at 20:07

## 2022-04-03 RX ADMIN — Medication 1 CAPSULE: at 08:19

## 2022-04-03 RX ADMIN — ACETAMINOPHEN 650 MG: 325 TABLET, FILM COATED ORAL at 01:02

## 2022-04-03 RX ADMIN — ACETAMINOPHEN 650 MG: 325 TABLET, FILM COATED ORAL at 08:19

## 2022-04-03 RX ADMIN — FOLIC ACID TAB 400 MCG 0.8 MG: 400 TAB at 08:21

## 2022-04-03 RX ADMIN — OXYCODONE 5 MG: 5 TABLET ORAL at 01:02

## 2022-04-03 RX ADMIN — Medication 400 MG: at 08:22

## 2022-04-03 RX ADMIN — FAMOTIDINE 20 MG: 20 TABLET, FILM COATED ORAL at 08:21

## 2022-04-03 RX ADMIN — MULTIPLE VITAMINS W/ MINERALS TAB 1 TABLET: TAB at 08:22

## 2022-04-03 RX ADMIN — OXYCODONE 5 MG: 5 TABLET ORAL at 09:07

## 2022-04-03 RX ADMIN — Medication 10 MG: at 20:06

## 2022-04-03 ASSESSMENT — PAIN SCALES - GENERAL
PAINLEVEL_OUTOF10: 9
PAINLEVEL_OUTOF10: 5
PAINLEVEL_OUTOF10: 8

## 2022-04-03 NOTE — FLOWSHEET NOTE
04/03/22 0919   Assessment   Charting Type Shift assessment   Neurological   Neuro (WDL) WDL   Level of Consciousness Alert (0)   Denzel Coma Scale   Eye Opening 4   Best Verbal Response 5   Best Motor Response 6   Denzel Coma Scale Score 15   HEENT   HEENT (WDL) X   Right Eye Impaired vision   Left Eye Impaired vision   Tongue Pink & moist   Voice Normal   Mucous Membrane Moist;Pink; Intact   Teeth Dentures lower;Dentures upper   Respiratory   Respiratory (WDL) WDL   Respiratory Pattern Regular   Respiratory Depth Normal   Respiratory Quality/Effort Unlabored   Chest Assessment Chest expansion symmetrical;Trachea midline   L Breath Sounds Clear   R Breath Sounds Clear   Breath Sounds   Right Upper Lobe Clear   Right Middle Lobe Clear   Right Lower Lobe Clear   Left Upper Lobe Clear   Left Lower Lobe Clear   Cardiac   Cardiac (WDL) WDL   Cardiac Monitor   Telemetry Monitor On No   Gastrointestinal   Abdominal (WDL) WDL   RUQ Bowel Sounds Active   LUQ Bowel Sounds Active   RLQ Bowel Sounds Active   LLQ Bowel Sounds Active   Abdomen Inspection Flat;Soft   GI Symptoms Diarrhea  (colitis)   Tenderness Nontender   Peripheral Vascular   Peripheral Vascular (WDL) X   Edema Left lower extremity   LLE Edema +2;Pitting   LLE Neurovascular Assessment   Capillary Refill Less than/equal to 3 seconds   Color Pink   Temperature Cool   L Pedal Pulse +2   Skin Color/Condition   Skin Color/Condition (WDL) X   Skin Color Pale   Skin Condition/Temp Dry; Warm   Skin Integrity   Skin Integrity (WDL) X   Skin Integrity Other (Comment)  (surg incision)   Location let hip   Preventative Dressing Yes   Assessed this shift? Yes   Skin Integrity Site 2   Skin Integrity Location 2 Abrasion   Location 2 left inner ankle   Preventative Dressing No   Assessed this shift?  Yes   Musculoskeletal   Musculoskeletal (WDL) X   RUE Full movement   LUE Full movement   RL Extremity Full movement   LL Extremity Limited movement   Musculoskeletal Details L Knee Brace   Genitourinary   Genitourinary (WDL) WDL   Urine Assessment   Incontinence No   Psychosocial   Psychosocial (WDL) WDL

## 2022-04-03 NOTE — PLAN OF CARE
Problem: Pain:  Goal: Pain level will decrease  Description: Pain level will decrease  4/3/2022 5523 by Fadia Guadarrama RN  Outcome: Met This Shift  4/2/2022 2129 by Erwin Morales RN  Outcome: Ongoing

## 2022-04-03 NOTE — FLOWSHEET NOTE
04/02/22 2042   Assessment   Charting Type Shift assessment   Neurological   Neuro (WDL) WDL   Level of Consciousness Alert (0)   Denzel Coma Scale   Eye Opening 4   Best Verbal Response 5   Best Motor Response 6   Denzel Coma Scale Score 15   HEENT   HEENT (WDL) X   Right Eye Impaired vision   Left Eye Impaired vision   Tongue Pink & moist   Voice Normal   Mucous Membrane Moist;Pink; Intact   Teeth Dentures lower;Dentures upper   Respiratory   Respiratory (WDL) WDL   Respiratory Pattern Regular   Respiratory Depth Normal   Respiratory Quality/Effort Unlabored   Chest Assessment Chest expansion symmetrical;Trachea midline   L Breath Sounds Clear   R Breath Sounds Clear   Breath Sounds   Right Upper Lobe Clear   Right Middle Lobe Clear   Right Lower Lobe Clear   Left Upper Lobe Clear   Left Lower Lobe Clear   Cardiac   Cardiac (WDL) WDL   Cardiac Monitor   Telemetry Monitor On No   Gastrointestinal   Abdominal (WDL) WDL   RUQ Bowel Sounds Active   LUQ Bowel Sounds Active   RLQ Bowel Sounds Active   LLQ Bowel Sounds Active   Abdomen Inspection Flat;Soft   GI Symptoms Diarrhea  (colitis)   Tenderness Nontender   Peripheral Vascular   Peripheral Vascular (WDL) X   Edema Left lower extremity   LLE Edema +2;Pitting   LLE Neurovascular Assessment   Capillary Refill Less than/equal to 3 seconds   Color Pink   Temperature Cool   L Pedal Pulse +2   Skin Color/Condition   Skin Color/Condition (WDL) X   Skin Color Pale   Skin Condition/Temp Warm;Dry   Skin Integrity   Skin Integrity (WDL) X   Skin Integrity Other (Comment)  (sx site)   Location lt hip   Preventative Dressing Yes  (CDI)   Musculoskeletal   Musculoskeletal (WDL) X   RUE Full movement   LUE Full movement   RL Extremity Full movement   LL Extremity Limited movement   Musculoskeletal Details   L Knee Brace   Genitourinary   Genitourinary (WDL) WDL   Urine Assessment   Incontinence No   Psychosocial   Psychosocial (WDL) WDL

## 2022-04-03 NOTE — H&P
History and Physical    Patient:  Valentino Sandifer    CHIEF COMPLAINT:    Declining functional status    HISTORY OF PRESENT ILLNESS:   The patient is a 64 y.o. 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 Per discharge summary, patient initially presented to [facility] on 3/28/2021 with complaints of back pain following a ground-level mechanical fall. Fall had occurred on 3/26 at 6 AM after she was getting up from bed. She had put on her slippers and began to ambulate. Her slipper subsequently came off her foot, resulting in a bad step, causing her to fall backwards onto her back. She subsequently began to experience back pain primarily located along her midline lower back, with vague discomfort with any attempts to ambulate. Patient was able to ambulate following injury; however, the next day she noted that her pain had become worse. After discussion with her home health nurse, she presented for further evaluation. She denied any numbness or tingling of her lower extremities. Also denied any injury of her head, loss of consciousness, syncope/presyncopal symptoms. She does not use any blood thinners. CT L-spine revealed acute predominantly linear fractures with mild comminution of bilateral sacral ala zone 3. There was no associated sacroiliac joint widening or other pelvic ring fractures. Chronic central compression of L3 vertebral body with 30% height loss also noted. [facility] orthopedics were consulted and recommended operative intervention. Patient underwent percutaneous pelvic screw fixation for sacral U fracture with 2 transiliac transacral screws on 3/30/22. No post op complications encountered. Pt then transferred to [facility] for ongoing PT OT. She is NWB LLE with ELS locked in extension. WBAT RLE.     Past Medical History:      Diagnosis Date    Colitis     GERD (gastroesophageal reflux disease)     Glaucoma     [facility]AD [facility]per[facility])        Past Surgical History:      Procedure Laterality Date    CYST REMOVAL      EYE SURGERY      SKIN BIOPSY      TOTAL HIP ARTHROPLASTY Left 12/24/2019    TUBAL LIGATION         Medications Prior to Admission:    Prior to Admission medications    Medication Sig Start Date End Date Taking? Authorizing Provider   colestipol (COLESTID) 1 g tablet Take 1 g by mouth 2 times daily After meals   Yes Historical Provider, MD   aspirin 81 MG EC tablet Take 81 mg by mouth daily    Historical Provider, MD   oxyCODONE (ROXICODONE) 5 MG immediate release tablet Take 5 mg by mouth every 8 hours as needed. Historical Provider, MD   gabapentin (NEURONTIN) 100 MG capsule Take 1 capsule by mouth nightly for 30 days. Patient taking differently: Take 100 mg by mouth daily.  For 3 days 3/18/22 4/17/22  Mitul Chandler MD   diclofenac sodium (VOLTAREN) 1 % GEL Apply 4 g topically 4 times daily as needed for Pain 3/18/22   Mitul Chandler MD   famotidine (PEPCID) 20 MG tablet Take 1 tablet by mouth 2 times daily 3/18/22   Mitul Chandler MD   Probiotic Product (PROBIOTIC DAILY PO) Take 1 capsule by mouth daily    Historical Provider, MD   NONFORMULARY Take 1 capsule by mouth in the morning and at bedtime EZ Tears supplement    Historical Provider, MD   acetaminophen (TYLENOL) 325 MG tablet Take 650 mg by mouth every 6 hours    Historical Provider, MD   ferrous sulfate 324 (65 Fe) MG EC tablet Take 324 mg by mouth every other day     Historical Provider, MD   magnesium oxide (MAG-OX) 400 MG tablet Take 400 mg by mouth daily    Historical Provider, MD   polyethylene glycol (GLYCOLAX) 17 g packet Take 17 g by mouth daily    Historical Provider, MD   sennosides-docusate sodium (SENOKOT-S) 8.6-50 MG tablet Take 1 tablet by mouth in the morning and at bedtime    Historical Provider, MD vitamin C (ASCORBIC ACID) 500 MG tablet Take 500 mg by mouth daily     Historical Provider, MD   bimatoprost (LUMIGAN) 0.01 % SOLN ophthalmic drops Place 1 drop into both eyes nightly     Historical Provider, MD   budesonide (ENTOCORT EC) 3 MG extended release capsule Take 9 mg by mouth every morning     Historical Provider, MD   Melatonin 10 MG TABS Take 10 mg by mouth at bedtime     Historical Provider, MD   brinzolamide-brimonidine ([place]) 1-0.2 % SUSP Instill one drop into both eyes twice daily    Historical Provider, MD   Lactobacillus (PROBIOTIC ACIDOPHILUS PO) Take 1 capsule by mouth daily     Historical Provider, MD   diphenhydrAMINE-zinc acetate (BENADRYL) 1-0.1 % cream Apply topically 3 times daily as needed. Historical Provider, MD   Folic Acid 0.8 MG CAPS Take 0.8 mg by mouth daily     Historical Provider, MD   Multiple Vitamins-Minerals (MULTIVITAMIN ADULT) TABS Take 1 tablet by mouth daily     Historical Provider, MD   calcium carbonate (OSCAL) 500 MG TABS tablet Take 500 mg by mouth daily     Historical Provider, MD       Allergies:  Patient has no known allergies. Social History:   TOBACCO:   reports that she has quit smoking. She has never used smokeless tobacco.  ETOH:   reports no history of alcohol use. OCCUPATION:  None     Family History:   History reviewed. No pertinent family history. Review of system  Constitutional:  Denies fever or chills   Eyes:  Denies eye pain or redness  HENT:  Denies nasal congestion or sore throat   Respiratory:  Denies cough or shortness of breath   Cardiovascular:  Denies chest pain or edema   GI:  Denies abdominal pain, nausea, vomiting, bloody stools or diarrhea   :  Denies dysuria or frequency  Musculoskeletal:  Denies acute neck pain or body aches. Positive for LLE pain and sacral pain.   Integument:  Denies rash or itching  Neurologic:  Denies headache, dizziness, numbness, tingling or unilateral weakness  Psychiatric:  Denies acute depression or acute anxiety      Vital Signs  Temp: 98.1 °F (36.7 °C)  Pulse: 71  Resp: 18  BP: (!) 141/82  SpO2: 98 %  O2 Device: None (Room air)       vital signs reviewed in electronic chart. Physical exam  Constitutional:  Well developed, well nourished, thin, elderly female sitting upright in bed in no acute distress  Eyes:  no scleral icterus, conjunctiva normal   HENT:  Atraumatic, external ears normal, nose normal, oropharynx moist, no pharyngeal exudates. Neck- supple, no JVD, no lymphadenopathy  Respiratory:  No respiratory distress, no wheezing, rales or rhonchi detected  Cardiovascular:  Normal rate, normal rhythm, no murmurs, no gallops, no rubs, no edema   GI:  Soft, nondistended, normal bowel sounds, nontender, no voluntary guarding  Musculoskeletal:  No cyanosis or obvious acute deformity. Moving all extremities . LLE in ELS locked in extension. Dressing at hip c/d/i. ACE wrap clean  Integument:  Warm and dry.   Lymphatic:  No cervical or axillary lymphadenopathy noted   Neurologic:  Alert & oriented x 3, no apparent focal deficits noted   Psychiatric:  Speech and behavior appropriate         Lab Results   Component Value Date     04/03/2022    K 3.7 04/03/2022     04/03/2022    CO2 23 04/03/2022    BUN 17 04/03/2022    CREATININE <0.5 04/03/2022    GLUCOSE 92 04/03/2022    CALCIUM 8.8 04/03/2022    PROT 5.1 (L) 04/03/2022    LABALBU 2.8 (L) 04/03/2022    BILITOT 0.4 04/03/2022    ALKPHOS 73 04/03/2022    AST 18 04/03/2022    ALT 13 04/03/2022    LABGLOM >60 04/03/2022    GFRAA >59 04/03/2022    AGRATIO 1.2 04/03/2022    GLOB 2.3 04/03/2022           Lab Results   Component Value Date    WBC 5.7 04/03/2022    HGB 10.5 (L) 04/03/2022    HCT 33.6 (L) 04/03/2022    MCV 96.0 04/03/2022     04/03/2022       PA/lat CXR:   No orders to display           Assessment and Plan     Active Hospital Problems    Diagnosis Date Noted    Osteoporosis [M81.0]  -continue Calcium and Vitamin D -DEXA scan at [facility] on 4/4 at 1:00 (arrive by 12:50)  -Follow up with [facility] on 4/6/22 at 8:20   04/03/2022    Closed minimally displaced zone III fracture of sacrum ([address] [S32.131A]  -s/p CRPP 3/30 by [facility]  -NWB LLE ELS locked in extension, WBAT RLE  -DVT ppx: lovenox 30mg BID until 4/30  -PRN Pain meds as ordered  -PT OT consulted  -CM consulted for dc planning assistance  -follow up with Ofelia Sanchez on 4/18/22 at 8:10 AM  -Fall precautions   04/03/2022    Closed fracture of left tibial plateau [T93.176N]  -s/p left tibial plateau fracture status post ORIF (2/15/2022)  -NWB LLE ELS locked in extension  -continue PRN pain meds  -PT OT consulted  -CM consulted for dc planning assistance  -Fall precautions   02/21/2022    Microscopic colitis [K52.839]  -history of budesonide for laparoscopic colitis   02/21/2022    Anemia [D64.9]  -hemoglobin at baseline of 10/11   02/21/2022    Declining functional status [R53.81]  -Patient with multiple recent fragility fractures including humeral fracture, left tibial plateau fracture and minimally displaced zone 3 sacral fracture. -PT OT consulted  -Management consulted for discharge planning assistance 12/31/2019     Patient was seen and examined by Dr. Martha Riggins and plan of care reviewed. Treatment plan was formulated collaboratively.       KALIE Lees certifies per CMS regulation for 42 .15(a), that the patient may reasonably be expected to be discharged or transferred to a hospital within 96 hours after admission to [address] [place] [address]    Electronically signed by KALIE Lees on 4/3/2022 at 9:46 AM

## 2022-04-04 PROCEDURE — 6370000000 HC RX 637 (ALT 250 FOR IP): Performed by: PHYSICIAN ASSISTANT

## 2022-04-04 PROCEDURE — 97161 PT EVAL LOW COMPLEX 20 MIN: CPT

## 2022-04-04 PROCEDURE — 97535 SELF CARE MNGMENT TRAINING: CPT

## 2022-04-04 PROCEDURE — 97116 GAIT TRAINING THERAPY: CPT

## 2022-04-04 PROCEDURE — 6360000002 HC RX W HCPCS: Performed by: PHYSICIAN ASSISTANT

## 2022-04-04 PROCEDURE — 1200000002 HC SEMI PRIVATE SWING BED

## 2022-04-04 PROCEDURE — 97166 OT EVAL MOD COMPLEX 45 MIN: CPT

## 2022-04-04 RX ORDER — OXYCODONE HYDROCHLORIDE 5 MG/1
5 TABLET ORAL 2 TIMES DAILY
Status: DISCONTINUED | OUTPATIENT
Start: 2022-04-04 | End: 2022-04-19

## 2022-04-04 RX ORDER — OXYCODONE HYDROCHLORIDE AND ACETAMINOPHEN 5; 325 MG/1; MG/1
1 TABLET ORAL 2 TIMES DAILY PRN
Status: ON HOLD | COMMUNITY
Start: 2022-03-18 | End: 2022-04-05

## 2022-04-04 RX ORDER — LANOLIN/MINERAL OIL/PETROLATUM
1 OINTMENT (GRAM) OPHTHALMIC (EYE) PRN
COMMUNITY

## 2022-04-04 RX ADMIN — OXYCODONE 5 MG: 5 TABLET ORAL at 04:03

## 2022-04-04 RX ADMIN — OXYCODONE HYDROCHLORIDE AND ACETAMINOPHEN 500 MG: 500 TABLET ORAL at 08:34

## 2022-04-04 RX ADMIN — Medication 400 MG: at 08:34

## 2022-04-04 RX ADMIN — ACETAMINOPHEN 650 MG: 325 TABLET, FILM COATED ORAL at 08:34

## 2022-04-04 RX ADMIN — ACETAMINOPHEN 650 MG: 325 TABLET, FILM COATED ORAL at 20:43

## 2022-04-04 RX ADMIN — Medication 10 MG: at 20:45

## 2022-04-04 RX ADMIN — MULTIPLE VITAMINS W/ MINERALS TAB 1 TABLET: TAB at 08:34

## 2022-04-04 RX ADMIN — Medication 1 CAPSULE: at 08:33

## 2022-04-04 RX ADMIN — FOLIC ACID TAB 400 MCG 0.8 MG: 400 TAB at 08:34

## 2022-04-04 RX ADMIN — OXYCODONE 5 MG: 5 TABLET ORAL at 20:44

## 2022-04-04 RX ADMIN — ACETAMINOPHEN 650 MG: 325 TABLET, FILM COATED ORAL at 01:27

## 2022-04-04 RX ADMIN — CALCIUM 500 MG: 500 TABLET ORAL at 08:34

## 2022-04-04 RX ADMIN — FAMOTIDINE 20 MG: 20 TABLET, FILM COATED ORAL at 20:45

## 2022-04-04 RX ADMIN — GABAPENTIN 100 MG: 100 CAPSULE ORAL at 20:45

## 2022-04-04 RX ADMIN — OXYCODONE 5 MG: 5 TABLET ORAL at 11:19

## 2022-04-04 RX ADMIN — FAMOTIDINE 20 MG: 20 TABLET, FILM COATED ORAL at 08:34

## 2022-04-04 RX ADMIN — ENOXAPARIN SODIUM 30 MG: 100 INJECTION SUBCUTANEOUS at 20:45

## 2022-04-04 RX ADMIN — ENOXAPARIN SODIUM 30 MG: 100 INJECTION SUBCUTANEOUS at 08:34

## 2022-04-04 ASSESSMENT — PAIN SCALES - GENERAL
PAINLEVEL_OUTOF10: 10
PAINLEVEL_OUTOF10: 8
PAINLEVEL_OUTOF10: 10
PAINLEVEL_OUTOF10: 4
PAINLEVEL_OUTOF10: 3
PAINLEVEL_OUTOF10: 4
PAINLEVEL_OUTOF10: 9
PAINLEVEL_OUTOF10: 0
PAINLEVEL_OUTOF10: 8

## 2022-04-04 ASSESSMENT — PAIN DESCRIPTION - LOCATION
LOCATION: BACK
LOCATION: LEG
LOCATION: COCCYX

## 2022-04-04 ASSESSMENT — PAIN DESCRIPTION - PAIN TYPE
TYPE: ACUTE PAIN
TYPE: ACUTE PAIN

## 2022-04-04 NOTE — PROGRESS NOTES
Occupational Therapy   Occupational Therapy Initial Assessment  Date: 2022   Patient Name: Antonette Gomez  MRN: [id number]     : 1945    Date of Service: 2022    Discharge Recommendations:  Home with Home health OT  OT Equipment Recommendations  Equipment Needed: No    Assessment   Performance deficits / Impairments: Decreased functional mobility ; Decreased balance;Decreased ADL status; Decreased endurance;Decreased strength;Decreased high-level IADLs  Assessment: Pt agreeable to OT services. Pt come to sit at EOB with MOD I. Pt come to stand at EOB with CGA using RW for support. Pt ambulated 50 feet maintaining weight bearing precautions. Pt came to bathroom and transferred to toilet with CGA<>SBA. Pt toileted with SBA. Pt ambulated to EOB and transferred to sitting position with CGA. OT NINO recliner with cushion. Pt ambulated to recliner and NINO with SBA<>CGA. Pt left with call light in reach. Pt will benefit from skilled OT services while IP. Prognosis: Good  Decision Making: Medium Complexity  REQUIRES OT FOLLOW UP: Yes  Activity Tolerance  Activity Tolerance: Patient Tolerated treatment well           Patient Diagnosis(es): There were no encounter diagnoses. has a past medical history of Colitis, GERD (gastroesophageal reflux disease), Glaucoma, and PAD (peripheral artery disease) ([facility] has a past surgical history that includes Tubal ligation; eye surgery; cyst removal; skin biopsy; and Total hip arthroplasty (Left, 2019).            Restrictions  Restrictions/Precautions  Restrictions/Precautions: Weight Bearing,Fall Risk (NWB L LE)  Required Braces or Orthoses?: Yes (ELS brace locked in extension)  Lower Extremity Weight Bearing Restrictions  Left Lower Extremity Weight Bearing: Non Weight Bearing  Required Braces or Orthoses  Left Lower Extremity Brace:  (ELS brace locked in extension)    Subjective   General  Chart Reviewed: Yes  Patient assessed for rehabilitation services?: Yes  Family / Caregiver Present: No  Referring Practitioner: Anshu Small PA-C  Diagnosis: Declining functional status; S/P ORIF left sacrum  Subjective  Subjective: Pt agreeable to OT services. Pt states she was at home and fell when she slipped out of her shoe.   Patient Currently in Pain: Yes  Pain Assessment  Pain Level: 10  Vital Signs  BP: (!) 140/75  Patient Currently in Pain: Yes  Social/Functional History  Social/Functional History  Lives With: Alone  Type of Home: House  Home Layout: Two level,Laundry in basement  Home Access: Ramped entrance  Bathroom Shower/Tub: Tub/Shower unit  Bathroom Toilet: Standard  Bathroom Equipment: 3-in-1 commode,Tub transfer bench,Toilet raiser  Bathroom Accessibility: Walker accessible  Home Equipment: Rolling walker,Lift chair  Receives Help From: Home health,Family,Friend(s)  ADL Assistance: Independent  Homemaking Assistance: Independent  Homemaking Responsibilities: Yes  Ambulation Assistance: Independent  Transfer Assistance: Independent  Active : Yes       Objective   Vision: Impaired  Vision Exceptions: Wears glasses at all times  Hearing: Within functional limits    Orientation  Overall Orientation Status: Within Functional Limits  Observation/Palpation  Posture: Fair  Observation: Pt lying in bed, ELS brace, room air, NAD  Functional Mobility  Functional - Mobility Device: Rolling Walker  Assist Level: Contact guard assistance  Functional Mobility Comments: 50 feet CGA , RW  ADL  Toileting: Contact guard assistance  Tone RUE  RUE Tone: Normotonic  Tone LUE  LUE Tone: Normotonic  Coordination  Movements Are Fluid And Coordinated: Yes     Bed mobility  Supine to Sit: Modified independent  Sit to Supine: Contact guard assistance  Scooting: Modified independent  Transfers  Stand Pivot Transfers: Contact guard assistance  Sit to stand: Contact guard assistance     Cognition  Overall Cognitive Status: WFL        Sensation  Overall Sensation Status: WFL        LUE AROM (degrees)  LUE AROM : WFL  RUE AROM (degrees)  RUE AROM : WFL  LUE Strength  L Shoulder Flex: 4/5  L Elbow Flex: 4/5  L Elbow Ext: 4/5  L Hand General: 4/5  RUE Strength  R Shoulder Flex: 3+/5  R Elbow Flex: 4/5  R Elbow Ext: 4/5  R Hand General: 4/5                   Plan   Plan  Times per week: 3-5  Times per day: Daily  Plan weeks: 1-2  Current Treatment Recommendations: Strengthening,Endurance Training,Balance Training,Functional Mobility Training,Self-Care / ADL,Equipment Evaluation, Education, & procurement,Safety Education & Training    Goals  Short term goals  Time Frame for Short term goals: 2 weeks  Short term goal 1: Pt to complete bathing with MOD I. Short term goal 2: Pt to complete dressing with MOD I. Short term goal 3: Pt to tolerate x15 minutes of activity to increase functional activity tolerance. Short term goal 4: Pt to complete toileting with MOD I. Short term goal 5: Pt to complete oral hygiene standing at sink with no LOB MOD I. Therapy Time   Individual Concurrent Group Co-treatment   Time In 0957         Time Out 1026         Minutes 29              This note serves as a DC summary in the event of pt discharge.      Carolyn Robbins, OTR/L

## 2022-04-04 NOTE — FLOWSHEET NOTE
04/03/22 2114   Assessment   Charting Type Shift assessment   Neurological   Neuro (WDL) WDL   Level of Consciousness Alert (0)   Swallow Screening   Is the patient unable to remain alert for testing? No   Hye Coma Scale   Eye Opening 4   Best Verbal Response 5   Best Motor Response 6   Hye Coma Scale Score 15   NIHSS Stroke Scale   NIHSS Stroke Scale Assessed No   HEENT   HEENT (WDL) X   Right Eye Impaired vision   Left Eye Impaired vision   Nose Intact   Tongue Pink & moist   Voice Normal   Mucous Membrane Moist;Pink; Intact   Teeth Dentures lower;Dentures upper   Respiratory   Respiratory (WDL) WDL   Respiratory Pattern Regular   Respiratory Depth Normal   Respiratory Quality/Effort Unlabored   Chest Assessment Chest expansion symmetrical;Trachea midline   L Breath Sounds Clear   R Breath Sounds Clear   Breath Sounds   Right Upper Lobe Clear   Right Middle Lobe Clear   Right Lower Lobe Clear   Left Upper Lobe Clear   Left Lower Lobe Clear   Cardiac   Cardiac (WDL) WDL   Cardiac Monitor   Telemetry Monitor On No   Gastrointestinal   Abdominal (WDL) WDL   RUQ Bowel Sounds Active   LUQ Bowel Sounds Active   RLQ Bowel Sounds Active   LLQ Bowel Sounds Active   Abdomen Inspection Flat;Soft   Last BM (including prior to admit) 04/03/22   Tenderness Nontender   Peripheral Vascular   Peripheral Vascular (WDL) X   Edema Left lower extremity   LLE Edema +2;Pitting   LLE Neurovascular Assessment   Capillary Refill Less than/equal to 3 seconds   Color Pink   Temperature Cool   L Pedal Pulse +2   Skin Color/Condition   Skin Color/Condition (WDL) X   Skin Color Pale   Skin Condition/Temp Warm;Dry   Skin Integrity   Skin Integrity (WDL) X   Skin Integrity Other (Comment)  (surgical incision)   Location left hip   Preventative Dressing Yes   Assessed this shift? Yes   Skin Integrity Site 2   Skin Integrity Location 2 Abrasion   Location 2 left inner ankle   Preventative Dressing No   Assessed this shift?  Yes Musculoskeletal   Musculoskeletal (WDL) X   RUE Full movement   LUE Full movement   RL Extremity Full movement   LL Extremity Limited movement   Musculoskeletal Details   L Knee Brace   Genitourinary   Genitourinary (WDL) WDL   Urine Assessment   Incontinence No   Urine Color Yellow/straw   Urine Appearance Clear   Urine Odor No odor   Psychosocial   Psychosocial (WDL) WDL

## 2022-04-04 NOTE — PROGRESS NOTES
Medication Reconciliation  Med rec performed utilizing fill history from [facility], [address] [place] [address] and per the pt. See notes below:    -Removed the following meds per fill history and per the pt. Aspirin      Colestipol      Benadryl Cream      Glycolax      Senokot-S    -Changed       Oxycodone to Oxycodone/APAP 5/325 mg      -Of notes      I spoke with the pt to confirm all meds the pt is currently taking at home. She stated that she gets all of her vitamins OTC. Catherine Carr MA/Danuta Aggarwal, Pharm. D.

## 2022-04-04 NOTE — CARE COORDINATION
04/02/22 1344   Discharge Planning   Current Residence Private Residence; Independent Living   Living Arrangements Family Members   Support Systems Family Members   Current Services Prior To Admission Durable Medical Equipment;Home Care   DME Bedside Commode; Wheelchair;Walker  (tub transfer bench, BSC, RW, lift chair, WC)   [facility]   ([place])   [address] Medications No   Type of Home Care Services OT;PT;Nursing Services   Patient expects to be discharged to: [facility]   Expected Discharge Date 04/15/22   Follow Up Appointment: Best Day/Time    (any)     Lives alone. Family assists. Has tub transfer bench, BSC, RW, lift chair, WC. [facility].

## 2022-04-04 NOTE — PROGRESS NOTES
Physical Therapy    Facility/Department: [facility] MED SURG  Initial Assessment    NAME: Xochitl Tan  : 1945  MRN: [id number]    Date of Service: 2022    Discharge Recommendations:  Continue to assess pending progress        Assessment   Body structures, Functions, Activity limitations: Decreased functional mobility ; Decreased endurance;Decreased ROM; Decreased strength;Decreased balance;Decreased safe awareness; Increased pain  Assessment: PT eval completed. Pt has remove L tib/fib fx and is NWB L LE with ESL brace locked in extension. Also with more recent ORIF of sacral fx. She is able to perform bed mobility with modified independence and sit to stand, transfers, and ambulate 24'x2 and 12'x2 with RW, NWB L LE, and CGA. She presents with deficits in strength, ROM, balance, activity tolerance safety awarenss and independence but is expected to benefit from continued skiled PT intervention to improve her mobility status prior to D/C. Prognosis: Good  Decision Making: Low Complexity  REQUIRES PT FOLLOW UP: Yes  Activity Tolerance  Activity Tolerance: Patient limited by fatigue;Patient limited by endurance; Patient limited by pain       Patient Diagnosis(es): There were no encounter diagnoses. has a past medical history of Colitis, GERD (gastroesophageal reflux disease), Glaucoma, and PAD (peripheral artery disease) ([facility] [address] has a past surgical history that includes Tubal ligation; eye surgery; cyst removal; skin biopsy; and Total hip arthroplasty (Left, 2019).     Restrictions  Restrictions/Precautions  Restrictions/Precautions: Weight Bearing,Fall Risk (NWB L LE)  Required Braces or Orthoses?: Yes (ELS brace locked in extension)  Lower Extremity Weight Bearing Restrictions  Left Lower Extremity Weight Bearing: Non Weight Bearing  Required Braces or Orthoses  Left Lower Extremity Brace:  (ELS brace locked in extension)  Vision/Hearing  Vision: Impaired  Vision Exceptions: Wears glasses at all times  Hearing: Within functional limits     Subjective  General  Chart Reviewed: Yes  Patient assessed for rehabilitation services?: Yes  Additional Pertinent Hx: L Tib/fix fx and s/p L sacrum ORIF  Family / Caregiver Present: No  Referring Practitioner: KALIE Laws  Follows Commands: Within Functional Limits  Subjective  Subjective: NAD. Patient received supine in bed. ELS brace in place.   Pain Screening  Patient Currently in Pain: Yes  Pain Assessment  Pain Level: 9  Pain Location: Leg  Vital Signs  Patient Currently in Pain: Yes  Pre Treatment Pain Screening  Intervention List: Patient able to continue with treatment    Orientation  Orientation  Overall Orientation Status: Within Functional Limits  Social/Functional History  Social/Functional History  Lives With: Alone  Type of Home: House  Home Layout: Two level,Laundry in basement  Home Access: Ramped entrance  Bathroom Shower/Tub: Tub/Shower unit  Bathroom Toilet: Standard  Bathroom Equipment: 3-in-1 commode,Tub transfer bench,Toilet raiser  Bathroom Accessibility: Walker accessible  Home Equipment: Rolling walker,Lift chair  Receives Help From: Home health,Family,Friend(s)  ADL Assistance: Independent  Homemaking Assistance: Independent  Homemaking Responsibilities: Yes  Ambulation Assistance: Independent  Transfer Assistance: Independent  Active : Yes  Cognition        Objective     Observation/Palpation  Posture: Fair    PROM RLE (degrees)  RLE PROM: WFL  AROM RLE (degrees)  RLE AROM: WFL  PROM LLE (degrees)  LLE PROM: Exceptions  LLE General PROM: ELS brace locked in extension  AROM LLE (degrees)  LLE AROM : Exceptions  LLE General AROM: ELS brace locked in extension  PROM RUE (degrees)  RUE PROM: WFL  AROM RUE (degrees)  RUE AROM : WFL  PROM LUE (degrees)  LUE PROM: WFL  AROM LUE (degrees)  LUE AROM : WFL  Strength RLE  Strength RLE: WFL  Strength LLE  Strength LLE: Exception  Comment: n/t  Strength RUE  Strength RUE: WFL  Strength LUE  Strength LUE: WFL        Bed mobility  Supine to Sit: Modified independent  Sit to Supine: Contact guard assistance  Scooting: Modified independent  Transfers  Sit to Stand: Contact guard assistance  Stand to sit: Contact guard assistance  Bed to Chair: Contact guard assistance  Ambulation  Ambulation?: Yes  WB Status: NWB L LE  Ambulation 1  Surface: level tile  Device: Rolling Walker;Standard Walker  Other Apparatus:  (ELS brace on L LE)  Assistance: Contact guard assistance  Distance: 24'x1 and 12'x1     Balance  Posture: Fair  Sitting - Static: Good  Sitting - Dynamic: Good  Standing - Static: Good;-  Standing - Dynamic: Good;-        Plan   Plan  Times per week: 4-5x/week  Times per day: Daily  Current Treatment Recommendations: Strengthening,Transfer Training,Endurance Training,Patient/Caregiver Education & Training,ROM,Balance Training,Gait Training,Home Exercise Program,Functional Mobility Training,Safety Education & Training  Safety Devices  Type of devices: Call light within reach,Left in chair,Nurse notified    G-Code       OutComes Score                                                  AM-PAC Score             Goals  Long term goals  Time Frame for Long term goals : 10 days  Long term goal 1: Patient will perform all bed mobility with independence. Long term goal 2: Patient will perform sit to stand and transfers, NWB L LE, with RW and SBA. Long term goal 3: Patient will ambulate 50'x2, NWB L LE, with RW and SBA. Long term goal 4: Patient will demonstrate independence with HEP. Patient Goals   Patient goals : Return home.        Therapy Time   Individual Concurrent Group Co-treatment   Time In 0957         Time Out 1025         Minutes [address]

## 2022-04-04 NOTE — ACP (ADVANCE CARE PLANNING)
Advance Care Planning     General Advance Care Planning (ACP) Conversation    Date of Conversation: 4/4/2022  Conducted with: Patient with Decision Making Capacity    Healthcare Decision Maker:    Primary Decision Maker: Alissa Soto - Brother/Sister - [phone]    Secondary Decision Maker: Mariola Wyatt - Brother/Sister - [phone]  Click here to complete Healthcare Decision Makers including selection of the Healthcare Decision Maker Relationship (ie \"Primary\"). Today we documented Decision Maker(s) consistent with Legal Next of Kin hierarchy.     Content/Action Overview:  Has NO ACP documents/care preferences - information provided, considering goals and options  Reviewed DNR/DNI and patient elects Full Code (Attempt Resuscitation)  artificial nutrition, ventilation preferences and resuscitation preferences      Length of Voluntary ACP Conversation in minutes:  <16 minutes (Non-Billable)    Mickeal Starbuck

## 2022-04-04 NOTE — PROGRESS NOTES
4/4/2222    I certify that the skilled nursing and/or rehabilitation services are required to be given on a daily basis, which as a practical matter can only be provided in a skilled nursing unit on an inpatient basis.     Electronically signed by Vikas Chavez RN on 4/4/2022 at 9:36 AM

## 2022-04-05 ENCOUNTER — APPOINTMENT (OUTPATIENT)
Dept: GENERAL RADIOLOGY | Facility: HOSPITAL | Age: 77
DRG: 560 | End: 2022-04-05
Attending: INTERNAL MEDICINE
Payer: MEDICARE

## 2022-04-05 PROCEDURE — 97535 SELF CARE MNGMENT TRAINING: CPT

## 2022-04-05 PROCEDURE — 6370000000 HC RX 637 (ALT 250 FOR IP): Performed by: PHYSICIAN ASSISTANT

## 2022-04-05 PROCEDURE — 6360000002 HC RX W HCPCS: Performed by: PHYSICIAN ASSISTANT

## 2022-04-05 PROCEDURE — 97116 GAIT TRAINING THERAPY: CPT

## 2022-04-05 PROCEDURE — 97530 THERAPEUTIC ACTIVITIES: CPT

## 2022-04-05 PROCEDURE — 77080 DXA BONE DENSITY AXIAL: CPT

## 2022-04-05 PROCEDURE — 1200000002 HC SEMI PRIVATE SWING BED

## 2022-04-05 RX ADMIN — ENOXAPARIN SODIUM 30 MG: 100 INJECTION SUBCUTANEOUS at 20:54

## 2022-04-05 RX ADMIN — OXYCODONE 5 MG: 5 TABLET ORAL at 08:18

## 2022-04-05 RX ADMIN — ENOXAPARIN SODIUM 30 MG: 100 INJECTION SUBCUTANEOUS at 08:15

## 2022-04-05 RX ADMIN — FERROUS SULFATE TAB EC 324 MG (65 MG FE EQUIVALENT) 324 MG: 324 (65 FE) TABLET DELAYED RESPONSE at 08:14

## 2022-04-05 RX ADMIN — FAMOTIDINE 20 MG: 20 TABLET, FILM COATED ORAL at 08:14

## 2022-04-05 RX ADMIN — FAMOTIDINE 20 MG: 20 TABLET, FILM COATED ORAL at 20:55

## 2022-04-05 RX ADMIN — OXYCODONE 5 MG: 5 TABLET ORAL at 20:55

## 2022-04-05 RX ADMIN — FOLIC ACID TAB 400 MCG 0.8 MG: 400 TAB at 08:14

## 2022-04-05 RX ADMIN — ACETAMINOPHEN 650 MG: 325 TABLET, FILM COATED ORAL at 08:14

## 2022-04-05 RX ADMIN — Medication 400 MG: at 08:14

## 2022-04-05 RX ADMIN — MULTIPLE VITAMINS W/ MINERALS TAB 1 TABLET: TAB at 08:14

## 2022-04-05 RX ADMIN — Medication 1 CAPSULE: at 08:14

## 2022-04-05 RX ADMIN — Medication 10 MG: at 20:55

## 2022-04-05 RX ADMIN — OXYCODONE HYDROCHLORIDE AND ACETAMINOPHEN 500 MG: 500 TABLET ORAL at 08:14

## 2022-04-05 RX ADMIN — CALCIUM 500 MG: 500 TABLET ORAL at 08:14

## 2022-04-05 RX ADMIN — ACETAMINOPHEN 650 MG: 325 TABLET, FILM COATED ORAL at 20:54

## 2022-04-05 RX ADMIN — GABAPENTIN 100 MG: 100 CAPSULE ORAL at 20:55

## 2022-04-05 ASSESSMENT — PAIN SCALES - GENERAL
PAINLEVEL_OUTOF10: 7
PAINLEVEL_OUTOF10: 8
PAINLEVEL_OUTOF10: 8

## 2022-04-05 NOTE — PROGRESS NOTES
Occupational Therapy  Facility/Department: [address] [place] [address] MED SURG  Daily Treatment Note  NAME: Sejal Reddy  : 1945  MRN: [id number]    Date of Service: 2022    Discharge Recommendations:  Home with Home health OT       Assessment   Assessment: Pt agreeable to OT services. Pt come to sit at EOB with MOD I. Pt transferred to standing with CGA. Pt ambulated to bathroom with RW CGA. Pt toileted with CGA OT educated pt to transfer closer to toilet and use grab bar for stability. Pt doffed clothing with CGA LB. Pt transferred to shower with CGA. Pt required CGA<>SBA for LB bathing and min assist for LB dressing. Pt completed jabari hygiene seated on shower chair. Pt ambulated to EOB with CGA and transferred to supine with MOD I. All needs met and call light in reach. Patient Diagnosis(es): There were no encounter diagnoses. has a past medical history of Colitis, GERD (gastroesophageal reflux disease), Glaucoma, and PAD (peripheral artery disease) ([facility] [address] has a past surgical history that includes Tubal ligation; eye surgery; cyst removal; skin biopsy; and Total hip arthroplasty (Left, 2019). Restrictions  Restrictions/Precautions  Restrictions/Precautions: Weight Bearing,Fall Risk (NWB L LE)  Required Braces or Orthoses?: Yes (ELS brace locked in extension)  Lower Extremity Weight Bearing Restrictions  Left Lower Extremity Weight Bearing: Non Weight Bearing  Required Braces or Orthoses  Left Lower Extremity Brace:  (ELS brace locked in extension)  Subjective   General  Chart Reviewed: Yes  Patient assessed for rehabilitation services?: Yes  Family / Caregiver Present: No  Referring Practitioner: Skinny Sexton PA-C  Diagnosis: Declining functional status; S/P ORIF left sacrum  Subjective  Subjective: Pt agreeable to OT services. Pt states she was at home and fell when she slipped out of her shoe.       Orientation     Objective    ADL  Grooming: Setup  UE Bathing: Setup  LE Bathing: Contact guard assistance  UE Dressing: Setup  LE Dressing: Minimal assistance  Toileting: Contact guard assistance        Functional Mobility  Functional - Mobility Device: Rolling Walker  Activity: To/from bathroom  Assist Level: Contact guard assistance         Plan   Plan  Times per week: 3-5  Times per day: Daily  Plan weeks: 1-2  Current Treatment Recommendations: Strengthening,Endurance Training,Balance Training,Functional Mobility Training,Self-Care / ADL,Equipment Evaluation, Education, & procurement,Safety Education & Training    Goals  Short term goals  Time Frame for Short term goals: 2 weeks  Short term goal 1: Pt to complete bathing with MOD I. Short term goal 2: Pt to complete dressing with MOD I. Short term goal 3: Pt to tolerate x15 minutes of activity to increase functional activity tolerance. Short term goal 4: Pt to complete toileting with MOD I. Short term goal 5: Pt to complete oral hygiene standing at sink with no LOB MOD I. Therapy Time   Individual Concurrent Group Co-treatment   Time In 4077         Time Out 1324         Minutes 38              This note serves as a DC summary in the event of pt discharge.      Carolyn Robbins OTR/L

## 2022-04-05 NOTE — PLAN OF CARE
Problem: Falls - Risk of:  Goal: Will remain free from falls  Description: Will remain free from falls  Outcome: Ongoing  Goal: Absence of physical injury  Description: Absence of physical injury  Outcome: Ongoing     Problem: Skin Integrity:  Goal: Will show no infection signs and symptoms  Description: Will show no infection signs and symptoms  Outcome: Ongoing  Goal: Absence of new skin breakdown  Description: Absence of new skin breakdown  Outcome: Ongoing     Problem: Pain:  Goal: Pain level will decrease  Description: Pain level will decrease  Outcome: Ongoing  Goal: Control of acute pain  Description: Control of acute pain  Outcome: Ongoing  Goal: Control of chronic pain  Description: Control of chronic pain  Outcome: Ongoing     Problem: Neurological  Goal: Maximum potential motor/sensory/cognitive function  Outcome: Ongoing     Problem: Cardiovascular  Goal: No DVT, peripheral vascular complications  Outcome: Ongoing  Goal: Hemodynamic stability  Outcome: Ongoing  Goal: Anticoagulate/Hct stable  Outcome: Ongoing  Goal: Agreement to quit smoking  Outcome: Ongoing  Goal: Weight maintained or lost  Outcome: Ongoing  Goal: Understanding of dietary restrictions  Outcome: Ongoing

## 2022-04-05 NOTE — PROGRESS NOTES
Spoke to Angela Roa at [facility], they want us to do the Dexa scan at our facility. Will relay this to [address].

## 2022-04-06 PROCEDURE — 1200000002 HC SEMI PRIVATE SWING BED

## 2022-04-06 PROCEDURE — 6360000002 HC RX W HCPCS: Performed by: PHYSICIAN ASSISTANT

## 2022-04-06 PROCEDURE — 6370000000 HC RX 637 (ALT 250 FOR IP): Performed by: PHYSICIAN ASSISTANT

## 2022-04-06 RX ADMIN — OXYCODONE 5 MG: 5 TABLET ORAL at 20:32

## 2022-04-06 RX ADMIN — OXYCODONE HYDROCHLORIDE AND ACETAMINOPHEN 500 MG: 500 TABLET ORAL at 10:51

## 2022-04-06 RX ADMIN — FAMOTIDINE 20 MG: 20 TABLET, FILM COATED ORAL at 20:32

## 2022-04-06 RX ADMIN — OXYCODONE 5 MG: 5 TABLET ORAL at 10:51

## 2022-04-06 RX ADMIN — DICLOFENAC SODIUM 4 G: 10 GEL TOPICAL at 20:38

## 2022-04-06 RX ADMIN — FAMOTIDINE 20 MG: 20 TABLET, FILM COATED ORAL at 10:55

## 2022-04-06 RX ADMIN — Medication 10 MG: at 20:31

## 2022-04-06 RX ADMIN — OXYCODONE 5 MG: 5 TABLET ORAL at 16:26

## 2022-04-06 RX ADMIN — Medication 400 MG: at 10:51

## 2022-04-06 RX ADMIN — ENOXAPARIN SODIUM 30 MG: 100 INJECTION SUBCUTANEOUS at 20:32

## 2022-04-06 RX ADMIN — ACETAMINOPHEN 650 MG: 325 TABLET, FILM COATED ORAL at 20:31

## 2022-04-06 RX ADMIN — Medication 1 CAPSULE: at 10:52

## 2022-04-06 RX ADMIN — ACETAMINOPHEN 650 MG: 325 TABLET, FILM COATED ORAL at 10:52

## 2022-04-06 RX ADMIN — MULTIPLE VITAMINS W/ MINERALS TAB 1 TABLET: TAB at 10:51

## 2022-04-06 RX ADMIN — CALCIUM 500 MG: 500 TABLET ORAL at 10:51

## 2022-04-06 RX ADMIN — ACETAMINOPHEN 650 MG: 325 TABLET, FILM COATED ORAL at 15:43

## 2022-04-06 RX ADMIN — ENOXAPARIN SODIUM 30 MG: 100 INJECTION SUBCUTANEOUS at 10:51

## 2022-04-06 RX ADMIN — OXYCODONE 5 MG: 5 TABLET ORAL at 05:59

## 2022-04-06 RX ADMIN — DICLOFENAC SODIUM 4 G: 10 GEL TOPICAL at 06:00

## 2022-04-06 RX ADMIN — FOLIC ACID TAB 400 MCG 0.8 MG: 400 TAB at 10:52

## 2022-04-06 RX ADMIN — GABAPENTIN 100 MG: 100 CAPSULE ORAL at 20:32

## 2022-04-06 ASSESSMENT — PAIN SCALES - GENERAL
PAINLEVEL_OUTOF10: 0
PAINLEVEL_OUTOF10: 0
PAINLEVEL_OUTOF10: 8
PAINLEVEL_OUTOF10: 8
PAINLEVEL_OUTOF10: 9
PAINLEVEL_OUTOF10: 0
PAINLEVEL_OUTOF10: 7
PAINLEVEL_OUTOF10: 10

## 2022-04-06 ASSESSMENT — PAIN DESCRIPTION - ORIENTATION
ORIENTATION: LEFT
ORIENTATION: LEFT

## 2022-04-06 ASSESSMENT — PAIN DESCRIPTION - LOCATION
LOCATION: COCCYX;SACRUM
LOCATION: COCCYX;HIP;SACRUM

## 2022-04-06 ASSESSMENT — PAIN DESCRIPTION - PAIN TYPE
TYPE: ACUTE PAIN
TYPE: ACUTE PAIN

## 2022-04-06 NOTE — FLOWSHEET NOTE
04/06/22 1046   Vital Signs   Temp 97.7 °F (36.5 °C)   Temp Source Oral   Pulse 72   Heart Rate Source Radial   Resp 18   /69   BP Location Right upper arm   MAP (mmHg) 84   Patient Position Sitting   Oxygen Therapy   SpO2 100 %   O2 Device None (Room air)

## 2022-04-06 NOTE — FLOWSHEET NOTE
04/05/22 2030   Assessment   Charting Type Shift assessment   Neurological   Neuro (WDL) WDL   Level of Consciousness Alert (0)   Denzel Coma Scale   Eye Opening 4   Best Verbal Response 5   Best Motor Response 6   Denzel Coma Scale Score 15   HEENT   HEENT (WDL) X   Right Eye Impaired vision   Left Eye Impaired vision   Nose Intact   Tongue Pink & moist   Voice Normal   Mucous Membrane Moist;Pink; Intact   Teeth Dentures lower;Dentures upper   Respiratory   Respiratory (WDL) WDL   Respiratory Pattern Regular   Respiratory Depth Normal   Respiratory Quality/Effort Unlabored   Chest Assessment Chest expansion symmetrical;Trachea midline   L Breath Sounds Clear   R Breath Sounds Clear   Breath Sounds   Right Upper Lobe Clear   Right Middle Lobe Clear   Right Lower Lobe Clear   Left Upper Lobe Clear   Left Lower Lobe Clear   Cardiac   Cardiac (WDL) WDL   Cardiac Monitor   Telemetry Monitor On No   Gastrointestinal   Abdominal (WDL) WDL   RUQ Bowel Sounds Active   LUQ Bowel Sounds Active   RLQ Bowel Sounds Active   LLQ Bowel Sounds Active   Abdomen Inspection Flat;Soft   Tenderness Nontender   Peripheral Vascular   Peripheral Vascular (WDL) X   Edema Left lower extremity   LLE Edema +2;Pitting   LLE Neurovascular Assessment   Capillary Refill Less than/equal to 3 seconds   Color Pink   Temperature Warm   L Pedal Pulse +2   Skin Color/Condition   Skin Color/Condition (WDL) WDL   Skin Integrity   Skin Integrity (WDL) X  (surgical incision)   Location L hip   Skin Integrity Site 2   Skin Integrity Location 2 Abrasion   Location 2 L inner ankle   Musculoskeletal   Musculoskeletal (WDL) X   RUE Full movement   LUE Full movement   RL Extremity Full movement   LL Extremity Limited movement   Musculoskeletal Details   L Knee Brace   Genitourinary   Genitourinary (WDL) WDL   Urine Assessment   Incontinence No   Urine Color Yellow/straw   Urine Appearance Clear   Urine Odor No odor   Anus/Rectum   Anus/Rectum (WDL) X Evaluation Hemorrhoids   Psychosocial   Psychosocial (WDL) WDL   Pt resting well in bed. Request that staff makes sure that is she awake by 5am to get ready for her appt in [place].

## 2022-04-06 NOTE — FLOWSHEET NOTE
04/06/22 1050   Assessment   Charting Type Shift assessment   Neurological   Neuro (WDL) WDL   Level of Consciousness Alert (0)   Swallow Screening   Is the patient unable to remain alert for testing? No   Is the patient on a modified diet (thickened liquids) due to pre-existing dysphagia? No   Is there presence of existing enteral tube feeding via the stomach or nose? No   Is there presence of head-of-bed restrictions (less than 30 degrees)? No   Is there presence of tracheotomy tube? No   Is the patient ordered nothing-by-mouth status? No   Delta Coma Scale   Eye Opening 4   Best Verbal Response 5   Best Motor Response 6   Denzel Coma Scale Score 15   NIHSS Stroke Scale   NIHSS Stroke Scale Assessed No   HEENT   HEENT (WDL) X   Right Eye Impaired vision   Left Eye Impaired vision   Nose Intact   Tongue Pink & moist   Voice Normal   Mucous Membrane Moist;Pink; Intact   Teeth Dentures lower;Dentures upper   Respiratory   Respiratory (WDL) WDL   Respiratory Pattern Regular   Respiratory Depth Normal   Respiratory Quality/Effort Unlabored   Chest Assessment Chest expansion symmetrical;Trachea midline   L Breath Sounds Clear   R Breath Sounds Clear   Breath Sounds   Right Upper Lobe Clear   Right Middle Lobe Clear   Right Lower Lobe Clear   Left Upper Lobe Clear   Left Lower Lobe Clear   Cardiac   Cardiac (WDL) WDL   Cardiac Monitor   Telemetry Monitor On No   Gastrointestinal   Abdominal (WDL) WDL   RUQ Bowel Sounds Active   LUQ Bowel Sounds Active   RLQ Bowel Sounds Active   LLQ Bowel Sounds Active   Abdomen Inspection Flat;Soft   Tenderness Nontender   Peripheral Vascular   Peripheral Vascular (WDL) X   Edema Left lower extremity   LLE Edema +2;Pitting   LLE Neurovascular Assessment   Capillary Refill Less than/equal to 3 seconds   Color Pink   Temperature Warm   L Pedal Pulse +2   Skin Color/Condition   Skin Color/Condition (WDL) WDL   Skin Color Pale   Skin Condition/Temp Dry; Warm   Skin Integrity   Skin Integrity (WDL) X  (surgical incision)   Skin Integrity   (surgical incision)   Location left hip   Preventative Dressing Yes   Assessed this shift? Yes   Multiple Skin Integrity Sites Yes   Skin Integrity Site 2   Skin Integrity Location 2 Abrasion   Location 2 Left inner ankle   Preventative Dressing No   Assessed this shift?  Yes   Musculoskeletal   Musculoskeletal (WDL) X   RUE Full movement   LUE Full movement   RL Extremity Full movement   LL Extremity Limited movement   Musculoskeletal Details   L Knee Brace   Genitourinary   Genitourinary (WDL) WDL   Urine Assessment   Incontinence No   Urine Color Yellow/straw   Urine Appearance Clear   Urine Odor No odor   Anus/Rectum   Anus/Rectum (WDL) X   Evaluation Hemorrhoids   Psychosocial   Psychosocial (WDL) WDL

## 2022-04-06 NOTE — PLAN OF CARE
Problem: Falls - Risk of:  Goal: Will remain free from falls  Description: Will remain free from falls  4/6/2022 1131 by Trip Schuster RN  Outcome: Ongoing  4/5/2022 2204 by Deonte Celestin RN  Outcome: Ongoing     Problem: Skin Integrity:  Goal: Will show no infection signs and symptoms  Description: Will show no infection signs and symptoms  4/6/2022 1131 by Trip Schuster RN  Outcome: Ongoing  4/5/2022 2204 by Deonte Celestin RN  Outcome: Ongoing

## 2022-04-06 NOTE — PROGRESS NOTES
Pt returned from doctor appointment -with [facility]- Vital signs taken -WNL's - AM medications given per [facility]- Am assessment completed- Breathing equal and unlabored- Brace intact LLE- pedal pulse positive-Dressing -Covaderm clean dry and intact left hip area -No signs of infection noted at site-Call bell at bedside-Pt told to call out with any needs- Will monitor

## 2022-04-07 ENCOUNTER — OUTSIDE FACILITY SERVICE (OUTPATIENT)
Dept: INTERNAL MEDICINE | Facility: CLINIC | Age: 77
End: 2022-04-07

## 2022-04-07 LAB
ANION GAP SERPL CALCULATED.3IONS-SCNC: 8 MMOL/L (ref 3–16)
BUN BLDV-MCNC: 18 MG/DL (ref 6–20)
CALCIUM SERPL-MCNC: 9.2 MG/DL (ref 8.5–10.5)
CHLORIDE BLD-SCNC: 106 MMOL/L (ref 98–107)
CO2: 26 MMOL/L (ref 20–30)
CREAT SERPL-MCNC: 0.6 MG/DL (ref 0.4–1.2)
GFR AFRICAN AMERICAN: >59
GFR NON-AFRICAN AMERICAN: >60
GLUCOSE BLD-MCNC: 89 MG/DL (ref 74–106)
HCT VFR BLD CALC: 35.6 % (ref 37–47)
HEMOGLOBIN: 11.1 G/DL (ref 11.5–16.5)
MCH RBC QN AUTO: 30.1 PG (ref 27–32)
MCHC RBC AUTO-ENTMCNC: 31.2 G/DL (ref 31–35)
MCV RBC AUTO: 96.5 FL (ref 80–100)
PDW BLD-RTO: 13.4 % (ref 11–16)
PLATELET # BLD: 287 K/UL (ref 150–400)
PMV BLD AUTO: 9.3 FL (ref 6–10)
POTASSIUM SERPL-SCNC: 4 MMOL/L (ref 3.4–5.1)
RBC # BLD: 3.69 M/UL (ref 3.8–5.8)
SODIUM BLD-SCNC: 140 MMOL/L (ref 136–145)
VITAMIN D 25-HYDROXY: 38.4 (ref 32–100)
WBC # BLD: 5.1 K/UL (ref 4–11)

## 2022-04-07 PROCEDURE — 82306 VITAMIN D 25 HYDROXY: CPT

## 2022-04-07 PROCEDURE — 6360000002 HC RX W HCPCS: Performed by: PHYSICIAN ASSISTANT

## 2022-04-07 PROCEDURE — 80048 BASIC METABOLIC PNL TOTAL CA: CPT

## 2022-04-07 PROCEDURE — 97535 SELF CARE MNGMENT TRAINING: CPT

## 2022-04-07 PROCEDURE — 85027 COMPLETE CBC AUTOMATED: CPT

## 2022-04-07 PROCEDURE — 36415 COLL VENOUS BLD VENIPUNCTURE: CPT

## 2022-04-07 PROCEDURE — 99308 SBSQ NF CARE LOW MDM 20: CPT | Performed by: INTERNAL MEDICINE

## 2022-04-07 PROCEDURE — 1200000002 HC SEMI PRIVATE SWING BED

## 2022-04-07 PROCEDURE — OUTSIDEPOS PR OUTSIDE POS PLACEHOLDER: Performed by: INTERNAL MEDICINE

## 2022-04-07 PROCEDURE — 6370000000 HC RX 637 (ALT 250 FOR IP): Performed by: PHYSICIAN ASSISTANT

## 2022-04-07 RX ADMIN — ACETAMINOPHEN 650 MG: 325 TABLET, FILM COATED ORAL at 14:23

## 2022-04-07 RX ADMIN — ACETAMINOPHEN 650 MG: 325 TABLET, FILM COATED ORAL at 20:24

## 2022-04-07 RX ADMIN — DICLOFENAC SODIUM 4 G: 10 GEL TOPICAL at 08:24

## 2022-04-07 RX ADMIN — CALCIUM 500 MG: 500 TABLET ORAL at 08:17

## 2022-04-07 RX ADMIN — ENOXAPARIN SODIUM 30 MG: 100 INJECTION SUBCUTANEOUS at 08:16

## 2022-04-07 RX ADMIN — GABAPENTIN 100 MG: 100 CAPSULE ORAL at 20:24

## 2022-04-07 RX ADMIN — FAMOTIDINE 20 MG: 20 TABLET, FILM COATED ORAL at 08:18

## 2022-04-07 RX ADMIN — Medication 400 MG: at 08:17

## 2022-04-07 RX ADMIN — OXYCODONE 5 MG: 5 TABLET ORAL at 14:27

## 2022-04-07 RX ADMIN — MULTIPLE VITAMINS W/ MINERALS TAB 1 TABLET: TAB at 08:18

## 2022-04-07 RX ADMIN — OXYCODONE HYDROCHLORIDE AND ACETAMINOPHEN 500 MG: 500 TABLET ORAL at 08:17

## 2022-04-07 RX ADMIN — FAMOTIDINE 20 MG: 20 TABLET, FILM COATED ORAL at 20:24

## 2022-04-07 RX ADMIN — ACETAMINOPHEN 650 MG: 325 TABLET, FILM COATED ORAL at 02:48

## 2022-04-07 RX ADMIN — Medication 10 MG: at 20:24

## 2022-04-07 RX ADMIN — OXYCODONE 5 MG: 5 TABLET ORAL at 02:48

## 2022-04-07 RX ADMIN — FOLIC ACID TAB 400 MCG 0.8 MG: 400 TAB at 08:17

## 2022-04-07 RX ADMIN — OXYCODONE 5 MG: 5 TABLET ORAL at 08:17

## 2022-04-07 RX ADMIN — ACETAMINOPHEN 650 MG: 325 TABLET, FILM COATED ORAL at 08:16

## 2022-04-07 RX ADMIN — FERROUS SULFATE TAB EC 324 MG (65 MG FE EQUIVALENT) 324 MG: 324 (65 FE) TABLET DELAYED RESPONSE at 08:17

## 2022-04-07 RX ADMIN — ENOXAPARIN SODIUM 30 MG: 100 INJECTION SUBCUTANEOUS at 20:24

## 2022-04-07 RX ADMIN — Medication 1 CAPSULE: at 08:17

## 2022-04-07 RX ADMIN — OXYCODONE 5 MG: 5 TABLET ORAL at 20:25

## 2022-04-07 ASSESSMENT — PAIN SCALES - GENERAL
PAINLEVEL_OUTOF10: 0
PAINLEVEL_OUTOF10: 8
PAINLEVEL_OUTOF10: 0
PAINLEVEL_OUTOF10: 0
PAINLEVEL_OUTOF10: 8
PAINLEVEL_OUTOF10: 8
PAINLEVEL_OUTOF10: 0
PAINLEVEL_OUTOF10: 8
PAINLEVEL_OUTOF10: 8
PAINLEVEL_OUTOF10: 0

## 2022-04-07 ASSESSMENT — PAIN DESCRIPTION - LOCATION: LOCATION: COCCYX;HIP;SACRUM

## 2022-04-07 ASSESSMENT — PAIN DESCRIPTION - PAIN TYPE: TYPE: ACUTE PAIN

## 2022-04-07 NOTE — PROGRESS NOTES
Progress Note      Subjective:   Chief complaint: Declining functional status    Interval History:   Patient is sitting up in bed today talking on the phone. No acute distress. Had an appointment with bone mineral density clinic at [facility]. Patient states the Ortho appointment she was supposed to have yesterday to change her weightbearing status has been moved to 4/18. States the trip was difficult for her yesterday and she had worsening pain in LLE however her pain has been better controlled otherwise with her current regimen. Denies sacral pain. Having regular BMs. Appetite stable. Review of systems:   Constitutional:  Denies fever or chills   Eyes:  Denies eye pain or redness  HENT:  Denies nasal congestion or sore throat   Respiratory:  Denies cough or shortness of breath   Cardiovascular:  Denies chest pain or edema   GI:  Denies abdominal pain, nausea, vomiting, bloody stools or diarrhea   :  Denies dysuria or frequency  Musculoskeletal:  Denies acute neck pain or body aches. Positive for occasional LLE pain. denies sacral pain. Integument:  Denies rash or itching  Neurologic:  Denies headache, dizziness, numbness, tingling or unilateral weakness  Psychiatric:  Denies acute depression or acute anxiety    Past medical history, surgical history, family history and social history reviewed and unchanged compared to H&P earlier this admission.     Medications:   Scheduled Meds:   oxyCODONE  5 mg Oral BID    enoxaparin  30 mg SubCUTAneous BID    acetaminophen  650 mg Oral Q6H    budesonide  9 mg Oral QAM    calcium elemental  500 mg Oral Daily    famotidine  20 mg Oral BID    folic acid  0.8 mg Oral Daily    gabapentin  100 mg Oral Nightly    magnesium oxide  400 mg Oral Daily    melatonin  10 mg Oral Nightly    therapeutic multivitamin-minerals  1 tablet Oral Daily    lactobacillus  1 capsule Oral Daily    vitamin C  500 mg Oral Daily    ferrous sulfate  324 mg Oral Every Other Day Continuous Infusions:    Objective:     Vital Signs  Temp: 98.6 °F (37 °C)  Pulse: 79  Resp: 16  BP: (!) 141/66  SpO2: 100 %  O2 Device: None (Room air)       Vital signs reviewed in electronic charts. Physical exam  Constitutional:  Well developed, well nourished, thin, elderly female sitting upright in bed in no acute distress  Eyes:  no scleral icterus, conjunctiva normal   HENT:  Atraumatic, external ears normal, nose normal, oropharynx moist, no pharyngeal exudates. Neck- supple, no JVD, no lymphadenopathy  Respiratory:  No respiratory distress, no wheezing, rales or rhonchi detected  Cardiovascular:  Normal rate, normal rhythm, no murmurs, no gallops, no rubs, no edema BLE  GI:  Soft, nondistended, normal bowel sounds, nontender, no voluntary guarding  Musculoskeletal:  No cyanosis or obvious acute deformity. Moving all extremities . LLE in ELS locked in extension. Integument:  Warm and dry. Neurologic:  Alert & oriented x 3, no apparent focal deficits noted   Psychiatric:  Speech and behavior appropriate        Results:     Lab Results   Component Value Date    WBC 5.1 04/07/2022    HGB 11.1 (L) 04/07/2022    HCT 35.6 (L) 04/07/2022    MCV 96.5 04/07/2022     04/07/2022       Lab Results   Component Value Date     04/07/2022    K 4.0 04/07/2022    K 3.7 04/03/2022     04/07/2022    CO2 26 04/07/2022    BUN 18 04/07/2022    CREATININE 0.6 04/07/2022    GLUCOSE 89 04/07/2022    CALCIUM 9.2 04/07/2022        Assessment and Plan:      Osteoporosis [M81.0]  - Patient was initially scheduled for Dexa scan on 4/4 at [facility] with another appointment on 4/6 at [facility]. With sacral fractures, NWB LLE, pain felt it would benefit the patient to have these scheduled on same day since >1hr travel. Also possible to do Dexa at this facility.  called BMD clinic and they recommended obtaining DEXA scan at this facility which was completed 4/5.  Report sent with patient to her BMD appointment 4/6.  - reviewed BMD note from Dr. Carl Silverio 4/6 and patient to be started on Forteo. Dexa scan result included in this clinic note. Patient reports she was told they would be working on PA and it may take up to 2 weeks to get it filled at her pharmacy. Will plan to follow up on this next week. - continue calcium  - of note, per chart review vitamin d discontinued due to elevated vit d level 98 on 2/18/22. Will repeat vit d level. - patient to follow up with BMD clinic 6 weeks after starting [facility]   04/03/2022    Closed minimally displaced zone III fracture of sacrum ([facility] [address] [S32.131A]  -s/p CRPP 3/30 by [facility]  -NWB LLE ELS locked in extension, WBAT RLE  -DVT ppx: lovenox 30mg BID until 4/30  -PRN Pain meds as ordered. Continue low dose scheduled oxycodone as well. -PT OT consulted  -CM consulted for dc planning assistance  -follow up with Pat Gallo on 4/18/22 at 8:10 AM  -Fall precautions    04/03/2022    Closed fracture of left tibial plateau [E60.983A]  -s/p left tibial plateau fracture status post ORIF (2/15/2022)  -NWB LLE ELS locked in extension  -continue pain medications as prescribed   -PT OT consulted  -CM consulted for dc planning assistance  -Fall precautions    02/21/2022    Microscopic colitis [K52.839]  -history of chronic lymphocytic colitis on budesonide  - per BMD note from 4/6 budesonide was discontinued due to concern for bone loss however per dc summary it had been resumed. - follow up with BMD and GI as scheduled    02/21/2022    Anemia [D64.9]  -hemoglobin at baseline     02/21/2022    Declining functional status [R53.81]  -Patient with multiple recent fragility fractures including humeral fracture, left tibial plateau fracture and minimally displaced zone 3 sacral fracture. -PT OT consulted and following  -Management consulted for discharge planning assistance          Patient was seen and examined with Dr. Heron Snowden.  After reviewing patient data and diagnostic testing the plan of care was established in conjunction with Dr. Nemo Holder.     Electronically signed by KALIE Pablo on 4/7/2022 at 10:12 AM

## 2022-04-07 NOTE — CARE COORDINATION
Patient declines participation with PT this date citing increased L LE pain after going to the doctor yesterday and having a shower earlier. PT encouraged patient to ambulate with RN staff later or to get up to the bedside chair this evening if she feels able to do so. Patient verbalized understanding.

## 2022-04-07 NOTE — PROGRESS NOTES
Occupational Therapy  Facility/Department: [address] [place] [address] MED SURG  Daily Treatment Note  NAME: Sejal Reddy  : 1945  MRN: [id number]    Date of Service: 2022    Discharge Recommendations:  Home with Home health OT       Assessment   Assessment: Pt agreeable to OT services. Pt come to sit at EOB with MOD I. Pt ambulated to bathroom with CGA using RW maintaining WB status. Pt doffed LB clothing with min assist. Pt completed shower transfer with CGA with min verbal cuing for proper positioning. Pt completed bathing tasks seated on shower chair with NINO for UB and CGA in standing for LB. Pt required min assist to don LB clothing in seated position. Pt come to stand from shower and completed shower transfer with CGA. Pt ambulated to EOB and transfered to supine with MOD I. Call light in reach and visitor present in room. Patient Diagnosis(es): There were no encounter diagnoses. has a past medical history of Colitis, GERD (gastroesophageal reflux disease), Glaucoma, and PAD (peripheral artery disease) ([facility] [address] has a past surgical history that includes Tubal ligation; eye surgery; cyst removal; skin biopsy; and Total hip arthroplasty (Left, 2019). Restrictions  Restrictions/Precautions  Restrictions/Precautions: Weight Bearing,Fall Risk (NWB L LE)  Required Braces or Orthoses?: Yes  Lower Extremity Weight Bearing Restrictions  Left Lower Extremity Weight Bearing: Non Weight Bearing  Required Braces or Orthoses  Left Lower Extremity Brace:  (ELS brace locked in extension)  Subjective   General  Chart Reviewed: Yes  Patient assessed for rehabilitation services?: Yes  Family / Caregiver Present: No  Referring Practitioner: Skinny Sexton PA-C  Diagnosis: Declining functional status; S/P ORIF left sacrum  Subjective  Subjective: Pt agreeable to OT services. Pt states she was at home and fell when she slipped out of her shoe.       Orientation     Objective    ADL  Grooming: Setup  UE Bathing: Setup  LE Bathing: Contact guard assistance  UE Dressing: Setup  LE Dressing: Minimal assistance        Functional Mobility  Functional - Mobility Device: Rolling Walker  Activity: To/from bathroom  Assist Level: Contact guard assistance  Bed mobility  Supine to Sit: Modified independent  Sit to Supine: Modified independent  Scooting: Modified independent  Transfers  Stand Pivot Transfers: Contact guard assistance  Sit to stand: Contact guard assistance    Plan   Plan  Times per week: 3-5  Times per day: Daily  Plan weeks: 1-2  Current Treatment Recommendations: Strengthening,Endurance Training,Balance Training,Functional Mobility Training,Self-Care / ADL,Equipment Evaluation, Education, & procurement,Safety Education & Training    Goals  Short term goals  Time Frame for Short term goals: 2 weeks  Short term goal 1: Pt to complete bathing with MOD I. Short term goal 2: Pt to complete dressing with MOD I. Short term goal 3: Pt to tolerate x15 minutes of activity to increase functional activity tolerance. Short term goal 4: Pt to complete toileting with MOD I. Short term goal 5: Pt to complete oral hygiene standing at sink with no LOB MOD I. Therapy Time   Individual Concurrent Group Co-treatment   Time In 7066         Time Out 8845         Minutes 44              This note serves as a DC summary in the event of pt discharge.      Carolyn Robbins OTR/L

## 2022-04-07 NOTE — FLOWSHEET NOTE
04/07/22 0811   Vital Signs   Temp 98.6 °F (37 °C)   Pulse 79   Resp 16   BP (!) 141/66   MAP (mmHg) 86   Oxygen Therapy   SpO2 100 %   O2 Device None (Room air)

## 2022-04-07 NOTE — FLOWSHEET NOTE
04/07/22 0816   Assessment   Charting Type Shift assessment   Neurological   Neuro (WDL) WDL   Level of Consciousness Alert (0)   Swallow Screening   Is the patient unable to remain alert for testing? No   Is the patient on a modified diet (thickened liquids) due to pre-existing dysphagia? No   Is there presence of existing enteral tube feeding via the stomach or nose? No   Is there presence of head-of-bed restrictions (less than 30 degrees)? No   Is there presence of tracheotomy tube? No   Is the patient ordered nothing-by-mouth status? No   Leisenring Coma Scale   Eye Opening 4   Best Verbal Response 5   Best Motor Response 6   Denzel Coma Scale Score 15   NIHSS Stroke Scale   NIHSS Stroke Scale Assessed No   HEENT   HEENT (WDL) X   Right Eye Impaired vision   Left Eye Impaired vision   Nose Intact   Tongue Pink & moist   Voice Normal   Mucous Membrane Moist;Pink; Intact   Teeth Dentures lower;Dentures upper   Respiratory   Respiratory (WDL) WDL   Respiratory Pattern Regular   Respiratory Depth Normal   Respiratory Quality/Effort Unlabored   Chest Assessment Chest expansion symmetrical;Trachea midline   L Breath Sounds Clear   R Breath Sounds Clear   Cardiac   Cardiac (WDL) WDL   Cardiac Monitor   Telemetry Monitor On No   Gastrointestinal   Abdominal (WDL) WDL   RUQ Bowel Sounds Active   LUQ Bowel Sounds Active   RLQ Bowel Sounds Active   LLQ Bowel Sounds Active   Abdomen Inspection Flat;Soft   GI Symptoms Diarrhea  (chronic hx of colitis)   Tenderness Nontender   Peripheral Vascular   Peripheral Vascular (WDL) X   Edema Left lower extremity   LLE Edema +2;Pitting   LLE Neurovascular Assessment   Capillary Refill Less than/equal to 3 seconds   Color Pink   Temperature Warm   L Pedal Pulse +2   Skin Color/Condition   Skin Color/Condition (WDL) WDL   Skin Color Pale   Skin Condition/Temp Dry; Warm   Skin Integrity   Skin Integrity (WDL) X  (surgical incision)   Location left hip   Preventative Dressing Yes Assessed this shift? Yes   Multiple Skin Integrity Sites Yes   Skin Integrity Site 2   Skin Integrity Location 2 Abrasion   Location 2 Left inner ankle   Preventative Dressing No   Assessed this shift? Yes   Skin Integrity Site 3   Skin Integrity Location 3 Other (Comment)    Location 3 Left knee   Preventative Dressing   (brace)   Assessed this shift?  Yes   Musculoskeletal   Musculoskeletal (WDL) X   RUE Full movement   LUE Full movement   RL Extremity Full movement   LL Extremity Limited movement   Musculoskeletal Details   L Knee Brace   Genitourinary   Genitourinary (WDL) WDL   Urine Assessment   Incontinence No   Urine Color Yellow/straw   Urine Appearance Clear   Urine Odor No odor   Anus/Rectum   Anus/Rectum (WDL) X   Evaluation Hemorrhoids   Psychosocial   Psychosocial (WDL) WDL   Pt alert times 4-Pt able to take am medications well per [facility]- Am assessment completed- Breathing equal and unlabored- Left knee brace voltaren gel applied to left knee for pain scheduled pain medication given- left hip covaderm clean dry intact- No signs of infection noted at site- Call bell at bedside- Will monitor

## 2022-04-07 NOTE — FLOWSHEET NOTE
04/06/22 2039   Assessment   Charting Type Shift assessment   Neurological   Neuro (WDL) WDL   Level of Consciousness Alert (0)   Denzel Coma Scale   Eye Opening 4   Best Verbal Response 5   Best Motor Response 6   Denzel Coma Scale Score 15   HEENT   HEENT (WDL) X   Right Eye Impaired vision   Left Eye Impaired vision   Nose Intact   Tongue Pink & moist   Voice Normal   Mucous Membrane Moist;Pink; Intact   Teeth Dentures lower;Dentures upper   Respiratory   Respiratory (WDL) WDL   Respiratory Pattern Regular   Respiratory Depth Normal   Respiratory Quality/Effort Unlabored   Chest Assessment Chest expansion symmetrical;Trachea midline   L Breath Sounds Clear   R Breath Sounds Clear   Cardiac   Cardiac (WDL) WDL   Cardiac Monitor   Telemetry Monitor On No   Gastrointestinal   Abdominal (WDL) WDL   RUQ Bowel Sounds Active   LUQ Bowel Sounds Active   RLQ Bowel Sounds Active   LLQ Bowel Sounds Active   Abdomen Inspection Flat;Soft   GI Symptoms Diarrhea  (chronic hx of colitis)   Tenderness Nontender   Peripheral Vascular   Peripheral Vascular (WDL) X   Edema Left lower extremity   LLE Edema +2;Pitting   LLE Neurovascular Assessment   Capillary Refill Less than/equal to 3 seconds   Color Pink   Temperature Warm   L Pedal Pulse +2   Skin Color/Condition   Skin Color/Condition (WDL) WDL   Skin Color Pale   Skin Condition/Temp Warm;Dry   Skin Integrity   Skin Integrity (WDL) X  (surgical incision)   Location lt hip   Preventative Dressing Yes   Musculoskeletal   Musculoskeletal (WDL) X   RUE Full movement   LUE Full movement   RL Extremity Full movement   LL Extremity Limited movement   Musculoskeletal Details   L Knee Brace   Genitourinary   Genitourinary (WDL) WDL   Urine Assessment   Incontinence No   Urine Color Yellow/straw   Urine Appearance Clear   Urine Odor No odor   Anus/Rectum   Anus/Rectum (WDL) X   Evaluation Hemorrhoids   Psychosocial   Psychosocial (WDL) WDL

## 2022-04-08 PROCEDURE — 97116 GAIT TRAINING THERAPY: CPT

## 2022-04-08 PROCEDURE — 6370000000 HC RX 637 (ALT 250 FOR IP): Performed by: PHYSICIAN ASSISTANT

## 2022-04-08 PROCEDURE — 1200000002 HC SEMI PRIVATE SWING BED

## 2022-04-08 PROCEDURE — 6360000002 HC RX W HCPCS: Performed by: PHYSICIAN ASSISTANT

## 2022-04-08 PROCEDURE — 97535 SELF CARE MNGMENT TRAINING: CPT

## 2022-04-08 RX ADMIN — Medication 400 MG: at 08:05

## 2022-04-08 RX ADMIN — FOLIC ACID TAB 400 MCG 0.8 MG: 400 TAB at 08:06

## 2022-04-08 RX ADMIN — ACETAMINOPHEN 650 MG: 325 TABLET, FILM COATED ORAL at 13:49

## 2022-04-08 RX ADMIN — OXYCODONE HYDROCHLORIDE AND ACETAMINOPHEN 500 MG: 500 TABLET ORAL at 08:06

## 2022-04-08 RX ADMIN — CALCIUM 500 MG: 500 TABLET ORAL at 08:06

## 2022-04-08 RX ADMIN — ENOXAPARIN SODIUM 30 MG: 100 INJECTION SUBCUTANEOUS at 08:07

## 2022-04-08 RX ADMIN — OXYCODONE 5 MG: 5 TABLET ORAL at 08:05

## 2022-04-08 RX ADMIN — FAMOTIDINE 20 MG: 20 TABLET, FILM COATED ORAL at 20:06

## 2022-04-08 RX ADMIN — OXYCODONE 5 MG: 5 TABLET ORAL at 20:05

## 2022-04-08 RX ADMIN — ENOXAPARIN SODIUM 30 MG: 100 INJECTION SUBCUTANEOUS at 20:05

## 2022-04-08 RX ADMIN — MULTIPLE VITAMINS W/ MINERALS TAB 1 TABLET: TAB at 08:06

## 2022-04-08 RX ADMIN — OXYCODONE 5 MG: 5 TABLET ORAL at 01:03

## 2022-04-08 RX ADMIN — Medication 1 CAPSULE: at 08:05

## 2022-04-08 RX ADMIN — Medication 10 MG: at 20:05

## 2022-04-08 RX ADMIN — DICLOFENAC SODIUM 4 G: 10 GEL TOPICAL at 00:58

## 2022-04-08 RX ADMIN — FAMOTIDINE 20 MG: 20 TABLET, FILM COATED ORAL at 08:06

## 2022-04-08 RX ADMIN — ACETAMINOPHEN 650 MG: 325 TABLET, FILM COATED ORAL at 20:06

## 2022-04-08 RX ADMIN — ACETAMINOPHEN 650 MG: 325 TABLET, FILM COATED ORAL at 01:03

## 2022-04-08 RX ADMIN — OXYCODONE 5 MG: 5 TABLET ORAL at 15:37

## 2022-04-08 RX ADMIN — ACETAMINOPHEN 650 MG: 325 TABLET, FILM COATED ORAL at 08:05

## 2022-04-08 RX ADMIN — GABAPENTIN 100 MG: 100 CAPSULE ORAL at 20:06

## 2022-04-08 ASSESSMENT — PAIN SCALES - GENERAL
PAINLEVEL_OUTOF10: 8
PAINLEVEL_OUTOF10: 5
PAINLEVEL_OUTOF10: 8

## 2022-04-08 NOTE — FLOWSHEET NOTE
04/07/22 2029   Assessment   Charting Type Shift assessment   Neurological   Neuro (WDL) WDL   Level of Consciousness Alert (0)   Denzel Coma Scale   Eye Opening 4   Best Verbal Response 5   Best Motor Response 6   Denzel Coma Scale Score 15   HEENT   HEENT (WDL) X   Right Eye Impaired vision   Left Eye Impaired vision   Tongue Pink & moist   Voice Normal   Mucous Membrane Moist;Pink; Intact   Teeth Dentures lower;Dentures upper   Respiratory   Respiratory (WDL) WDL   Respiratory Pattern Regular   Respiratory Depth Normal   Respiratory Quality/Effort Unlabored   Chest Assessment Chest expansion symmetrical;Trachea midline   L Breath Sounds Clear   R Breath Sounds Clear   Cardiac   Cardiac (WDL) WDL   Cardiac Monitor   Telemetry Monitor On No   Gastrointestinal   Abdominal (WDL) WDL   RUQ Bowel Sounds Active   LUQ Bowel Sounds Active   RLQ Bowel Sounds Active   LLQ Bowel Sounds Active   Abdomen Inspection Flat;Soft   GI Symptoms Diarrhea  (chronic hx of colitis)   Tenderness Nontender   Peripheral Vascular   Peripheral Vascular (WDL) X   Edema Left lower extremity   LLE Edema +2;Pitting   LLE Neurovascular Assessment   Capillary Refill Less than/equal to 3 seconds   Color Pink   Temperature Warm   L Pedal Pulse +2   Skin Color/Condition   Skin Color/Condition (WDL) WDL   Skin Color Pale   Skin Condition/Temp Warm;Dry   Skin Integrity   Skin Integrity (WDL) X  (surgical incision)   Location lt hip   Preventative Dressing Yes   Skin Integrity Site 3   Skin Integrity Location 3 Other (Comment)    Location 3 lt knee   Preventative Dressing   (brace)   Musculoskeletal   Musculoskeletal (WDL) X   RUE Full movement   LUE Full movement   RL Extremity Full movement   LL Extremity Limited movement   Musculoskeletal Details   L Knee Brace   Genitourinary   Genitourinary (WDL) WDL   Urine Assessment   Incontinence No   Urine Color Yellow/straw   Urine Appearance Clear   Urine Odor No odor   Anus/Rectum   Anus/Rectum (WDL) X Evaluation Hemorrhoids   Psychosocial   Psychosocial (WDL) WDL

## 2022-04-08 NOTE — FLOWSHEET NOTE
04/08/22 0800   Assessment   Charting Type Shift assessment   Neurological   Neuro (WDL) WDL   Level of Consciousness Alert (0)   Denzel Coma Scale   Eye Opening 4   Best Verbal Response 5   Best Motor Response 6   Denzel Coma Scale Score 15   HEENT   HEENT (WDL) X   Right Eye Impaired vision   Left Eye Impaired vision   Tongue Pink & moist   Voice Normal   Mucous Membrane Moist;Pink; Intact   Teeth Dentures lower;Dentures upper   Respiratory   Respiratory (WDL) WDL   Respiratory Pattern Regular   Respiratory Depth Normal   Respiratory Quality/Effort Unlabored   Chest Assessment Chest expansion symmetrical;Trachea midline   L Breath Sounds Clear   R Breath Sounds Clear   Cardiac   Cardiac (WDL) WDL   Cardiac Monitor   Telemetry Monitor On No   Gastrointestinal   Abdominal (WDL) WDL   RUQ Bowel Sounds Active   LUQ Bowel Sounds Active   RLQ Bowel Sounds Active   LLQ Bowel Sounds Active   Abdomen Inspection Flat;Soft   GI Symptoms Diarrhea  (chronic hx of colitis)   Tenderness Nontender   Peripheral Vascular   Peripheral Vascular (WDL) X   Edema Left lower extremity   LLE Edema +1   LLE Neurovascular Assessment   Capillary Refill Less than/equal to 3 seconds   Color Pink   Temperature Warm   L Pedal Pulse +2   Skin Color/Condition   Skin Color/Condition (WDL) WDL   Skin Color Pale   Skin Condition/Temp Warm;Dry   Skin Integrity   Skin Integrity (WDL) X  (surgical incision)   Skin Integrity   (sx incision)   Location lt hip   Preventative Dressing Yes   Musculoskeletal   Musculoskeletal (WDL) X   RUE Full movement   LUE Full movement   RL Extremity Full movement   LL Extremity Limited movement   Musculoskeletal Details   L Knee Brace   Genitourinary   Genitourinary (WDL) WDL   Urine Assessment   Incontinence No   Urine Color Yellow/straw   Urine Appearance Clear   Urine Odor No odor   Anus/Rectum   Anus/Rectum (WDL) X   Evaluation Hemorrhoids   Psychosocial   Psychosocial (WDL) WDL

## 2022-04-08 NOTE — PROGRESS NOTES
Occupational Therapy  Facility/Department: [facility] MED SURG  Daily Treatment Note  NAME: Parag Ragsdale  : 1945  MRN: [id number]    Date of Service: 2022    Discharge Recommendations:  Home with Home health OT       Assessment   Assessment: Pt agreeable to OT services. Pt sat upright at EOB with MOD I. Pt reports soreness on this date. Pt ambulated to bathroom using RW. Pt completed toileting with CGA. Pt t/f to shower with CGA. Pt required min assist to doff pants and Independently doffed UB clothing. Pt completed shower with NINO for UB and CGA for LB bathing. Pt able to don UB clothing with NINO and required very minimal assist with LB dressing to don clothing on LLE. Pt assisted with donning ELS brace. Pt come to stand with CGA and ambulated to bed and transfered to supine with MOD I. Patient Diagnosis(es): There were no encounter diagnoses. has a past medical history of Colitis, GERD (gastroesophageal reflux disease), Glaucoma, and PAD (peripheral artery disease) ([facility] [address] has a past surgical history that includes Tubal ligation; eye surgery; cyst removal; skin biopsy; and Total hip arthroplasty (Left, 2019). Restrictions  Restrictions/Precautions  Restrictions/Precautions: Weight Bearing,Fall Risk (NWB L LE)  Required Braces or Orthoses?: Yes  Lower Extremity Weight Bearing Restrictions  Left Lower Extremity Weight Bearing: Non Weight Bearing  Required Braces or Orthoses  Left Lower Extremity Brace:  (ELS brace locked in extension)  Subjective   General  Chart Reviewed: Yes  Patient assessed for rehabilitation services?: Yes  Family / Caregiver Present: No  Referring Practitioner: Jaci Guerrier PA-C  Diagnosis: Declining functional status; S/P ORIF left sacrum  Subjective  Subjective: Pt agreeable to OT services. Pt states she was at home and fell when she slipped out of her shoe.       Orientation     Objective    ADL  Grooming: Setup  UE Bathing: Setup  LE Bathing: Contact guard assistance  UE Dressing: Setup  LE Dressing: Minimal assistance  Toileting: Contact guard assistance        Functional Mobility  Functional - Mobility Device: Rolling Walker  Activity: To/from bathroom  Assist Level: Contact guard assistance  Bed mobility  Supine to Sit: Modified independent  Sit to Supine: Modified independent  Scooting: Modified independent     Plan   Plan  Times per week: 3-5  Times per day: Daily  Plan weeks: 1-2  Current Treatment Recommendations: Strengthening,Endurance Training,Balance Training,Functional Mobility Training,Self-Care / ADL,Equipment Evaluation, Education, & procurement,Safety Education & Training    Goals  Short term goals  Time Frame for Short term goals: 2 weeks  Short term goal 1: Pt to complete bathing with MOD I. Short term goal 2: Pt to complete dressing with MOD I. Short term goal 3: Pt to tolerate x15 minutes of activity to increase functional activity tolerance. Short term goal 4: Pt to complete toileting with MOD I. Short term goal 5: Pt to complete oral hygiene standing at sink with no LOB MOD I. Therapy Time   Individual Concurrent Group Co-treatment   Time In 0804         Time Out 0848         Minutes 44              This note serves as a DC summary in the event of pt discharge.      Carolyn Robbins, OTR/L

## 2022-04-08 NOTE — PLAN OF CARE
Problem: Skin Integrity:  Goal: Absence of new skin breakdown  Description: Absence of new skin breakdown  Outcome: Met This Shift     Problem: Cardiovascular  Goal: No DVT, peripheral vascular complications  Outcome: Met This Shift  Goal: Hemodynamic stability  Outcome: Met This Shift     Problem: Falls - Risk of:  Goal: Will remain free from falls  Description: Will remain free from falls  4/8/2022 3484 by Yuridia Boyle RN  Outcome: Ongoing  4/7/2022 2146 by Erwin Morales RN  Outcome: Ongoing  Goal: Absence of physical injury  Description: Absence of physical injury  Outcome: Ongoing     Problem: Skin Integrity:  Goal: Will show no infection signs and symptoms  Description: Will show no infection signs and symptoms  4/8/2022 0922 by Yuridia Boyle RN  Outcome: Ongoing  4/7/2022 2146 by Erwin Morales RN  Outcome: Ongoing     Problem: Pain:  Goal: Pain level will decrease  Description: Pain level will decrease  4/8/2022 0922 by Yuridia Boyle RN  Outcome: Ongoing  4/7/2022 2146 by Erwin Morales RN  Outcome: Ongoing  Goal: Control of acute pain  Description: Control of acute pain  Outcome: Ongoing  Goal: Control of chronic pain  Description: Control of chronic pain  Outcome: Ongoing     Problem: Neurological  Goal: Maximum potential motor/sensory/cognitive function  Outcome: Ongoing     Problem: Cardiovascular  Goal: Anticoagulate/Hct stable  Outcome: Ongoing  Goal: Agreement to quit smoking  Outcome: Ongoing  Goal: Weight maintained or lost  Outcome: Ongoing  Goal: Understanding of dietary restrictions  Outcome: Ongoing

## 2022-04-08 NOTE — PROGRESS NOTES
Physical Therapy  Facility/Department: [facility] MED SURG  Daily Treatment Note  NAME: Shayna Hedrick  : 1945  MRN: [id number]    Date of Service: 2022    Discharge Recommendations:  Continue to assess pending progress        Assessment   Assessment: Patient continues to report increased L medial knee pain after her trip to [facility] but is agreeable to participate with PT. Mod I for bed mobility and SBA for sit to stand. Able to ambulate 48' with RW, NWB L LE, and SBA/CGA. Cont to outlined goals. Prognosis: Good  Activity Tolerance  Activity Tolerance: Patient limited by fatigue;Patient limited by endurance     Patient Diagnosis(es): There were no encounter diagnoses. has a past medical history of Colitis, GERD (gastroesophageal reflux disease), Glaucoma, and PAD (peripheral artery disease) ([facility] [address] has a past surgical history that includes Tubal ligation; eye surgery; cyst removal; skin biopsy; and Total hip arthroplasty (Left, 2019).     Restrictions  Restrictions/Precautions  Restrictions/Precautions: Weight Bearing,Fall Risk (NWB L LE)  Required Braces or Orthoses?: Yes  Lower Extremity Weight Bearing Restrictions  Left Lower Extremity Weight Bearing: Non Weight Bearing  Required Braces or Orthoses  Left Lower Extremity Brace:  (ELS brace locked in extension)  Subjective              Orientation     Cognition      Objective   Bed mobility  Supine to Sit: Modified independent  Sit to Supine: Modified independent  Scooting: Modified independent  Transfers  Sit to Stand: Stand by assistance  Stand to sit: Stand by assistance  Bed to Chair: Contact guard assistance  Ambulation  Ambulation?: Yes  WB Status: NWB L LE  Ambulation 1  Surface: level tile  Device: Rolling Walker  Assistance: Contact guard assistance  Distance: 50'x1     Balance  Posture: Fair  Sitting - Static: Good  Sitting - Dynamic: Good  Standing - Static: Good  Standing - Dynamic: Good;-                           G-Code     OutComes Score                                                     AM-PAC Score             Goals  Long term goals  Time Frame for Long term goals : 10 days  Long term goal 1: Patient will perform all bed mobility with independence. Long term goal 2: Patient will perform sit to stand and transfers, NWB L LE, with RW and SBA. Long term goal 3: Patient will ambulate 50'x2, NWB L LE, with RW and SBA. Long term goal 4: Patient will demonstrate independence with HEP. Patient Goals   Patient goals : Return home.     Plan    Plan  Times per week: 4-5x/week  Times per day: Daily  Current Treatment Recommendations: Teresa Pop Training,Patient/Caregiver Education & Training,ROM,Balance Training,Gait Training,Home Exercise Program,Functional Mobility Training,Safety Education & Training  Safety Devices  Type of devices: Call light within reach,Left in bed,Nurse notified     Therapy Time   Individual Concurrent Group Co-treatment   Time In 1005         Time Out 1022         Minutes Damien Duque PT

## 2022-04-09 PROCEDURE — 6370000000 HC RX 637 (ALT 250 FOR IP): Performed by: PHYSICIAN ASSISTANT

## 2022-04-09 PROCEDURE — 86480 TB TEST CELL IMMUN MEASURE: CPT

## 2022-04-09 PROCEDURE — 1200000002 HC SEMI PRIVATE SWING BED

## 2022-04-09 PROCEDURE — 6360000002 HC RX W HCPCS: Performed by: PHYSICIAN ASSISTANT

## 2022-04-09 RX ADMIN — MULTIPLE VITAMINS W/ MINERALS TAB 1 TABLET: TAB at 08:06

## 2022-04-09 RX ADMIN — Medication 400 MG: at 08:07

## 2022-04-09 RX ADMIN — FOLIC ACID TAB 400 MCG 0.8 MG: 400 TAB at 08:07

## 2022-04-09 RX ADMIN — DICLOFENAC SODIUM 4 G: 10 GEL TOPICAL at 08:11

## 2022-04-09 RX ADMIN — ACETAMINOPHEN 650 MG: 325 TABLET, FILM COATED ORAL at 08:07

## 2022-04-09 RX ADMIN — Medication 1 CAPSULE: at 08:07

## 2022-04-09 RX ADMIN — CALCIUM 500 MG: 500 TABLET ORAL at 08:06

## 2022-04-09 RX ADMIN — ACETAMINOPHEN 650 MG: 325 TABLET, FILM COATED ORAL at 13:51

## 2022-04-09 RX ADMIN — FAMOTIDINE 20 MG: 20 TABLET, FILM COATED ORAL at 08:07

## 2022-04-09 RX ADMIN — OXYCODONE 5 MG: 5 TABLET ORAL at 12:54

## 2022-04-09 RX ADMIN — ENOXAPARIN SODIUM 30 MG: 100 INJECTION SUBCUTANEOUS at 08:08

## 2022-04-09 RX ADMIN — Medication 10 MG: at 20:35

## 2022-04-09 RX ADMIN — ACETAMINOPHEN 650 MG: 325 TABLET, FILM COATED ORAL at 20:35

## 2022-04-09 RX ADMIN — FAMOTIDINE 20 MG: 20 TABLET, FILM COATED ORAL at 20:35

## 2022-04-09 RX ADMIN — ENOXAPARIN SODIUM 30 MG: 100 INJECTION SUBCUTANEOUS at 20:36

## 2022-04-09 RX ADMIN — FERROUS SULFATE TAB EC 324 MG (65 MG FE EQUIVALENT) 324 MG: 324 (65 FE) TABLET DELAYED RESPONSE at 08:07

## 2022-04-09 RX ADMIN — OXYCODONE HYDROCHLORIDE AND ACETAMINOPHEN 500 MG: 500 TABLET ORAL at 08:07

## 2022-04-09 RX ADMIN — GABAPENTIN 100 MG: 100 CAPSULE ORAL at 20:35

## 2022-04-09 RX ADMIN — OXYCODONE 5 MG: 5 TABLET ORAL at 20:34

## 2022-04-09 RX ADMIN — OXYCODONE 5 MG: 5 TABLET ORAL at 08:06

## 2022-04-09 ASSESSMENT — PAIN DESCRIPTION - PAIN TYPE
TYPE: ACUTE PAIN
TYPE: ACUTE PAIN

## 2022-04-09 ASSESSMENT — PAIN SCALES - GENERAL
PAINLEVEL_OUTOF10: 8
PAINLEVEL_OUTOF10: 9
PAINLEVEL_OUTOF10: 0
PAINLEVEL_OUTOF10: 8
PAINLEVEL_OUTOF10: 7
PAINLEVEL_OUTOF10: 0

## 2022-04-09 ASSESSMENT — PAIN DESCRIPTION - LOCATION
LOCATION: COCCYX;HIP;SACRUM
LOCATION: COCCYX;HIP;SACRUM

## 2022-04-09 ASSESSMENT — PAIN DESCRIPTION - ORIENTATION
ORIENTATION: LEFT
ORIENTATION: LEFT

## 2022-04-09 NOTE — PROGRESS NOTES
Pt. able to sit in chair for 3 hours today brace on left leg with pillow support- Pt assisted x 1 back to bed using walker with brace on left leg- Pt tolerated well - left leg placed on pillow for support- call bell at bedside- will monitor

## 2022-04-09 NOTE — FLOWSHEET NOTE
04/09/22 0807   Assessment   Charting Type Shift assessment   Neurological   Neuro (WDL) WDL   Level of Consciousness Alert (0)   Swallow Screening   Is the patient unable to remain alert for testing? No   Is the patient on a modified diet (thickened liquids) due to pre-existing dysphagia? No   Is there presence of existing enteral tube feeding via the stomach or nose? No   Is there presence of head-of-bed restrictions (less than 30 degrees)? No   Is there presence of tracheotomy tube? No   Is the patient ordered nothing-by-mouth status? No   [place] Coma Scale   Eye Opening 4   Best Verbal Response 5   Best Motor Response 6   [place] Coma Scale Score 15   NIHSS Stroke Scale   NIHSS Stroke Scale Assessed No   HEENT   HEENT (WDL) X   Right Eye Impaired vision   Left Eye Impaired vision   Tongue Pink & moist   Voice Normal   Mucous Membrane Moist;Pink; Intact   Teeth Dentures lower;Dentures upper   Respiratory   Respiratory (WDL) WDL   Respiratory Pattern Regular   Respiratory Depth Normal   Respiratory Quality/Effort Unlabored   Chest Assessment Chest expansion symmetrical;Trachea midline   L Breath Sounds Clear   R Breath Sounds Clear   Breath Sounds   Right Upper Lobe Clear   Right Middle Lobe Clear   Right Lower Lobe Clear   Left Upper Lobe Clear   Left Lower Lobe Clear   Cardiac   Cardiac (WDL) WDL   Cardiac Monitor   Telemetry Monitor On No   Gastrointestinal   Abdominal (WDL) WDL   RUQ Bowel Sounds Active   LUQ Bowel Sounds Active   RLQ Bowel Sounds Active   LLQ Bowel Sounds Active   Abdomen Inspection Flat;Soft   GI Symptoms Diarrhea  (chronic hx of colitis)   Tenderness Nontender   Peripheral Vascular   Peripheral Vascular (WDL) X   Edema Left lower extremity   LLE Edema +1   LLE Neurovascular Assessment   Capillary Refill Less than/equal to 3 seconds   Color Pink   Temperature Warm   L Pedal Pulse +2   Skin Color/Condition   Skin Color/Condition (WDL) WDL   Skin Color Pale   Skin Condition/Temp Dry; Warm Skin Integrity   Skin Integrity (WDL) X  (surgical incision)   Location left hip    Preventative Dressing Yes   Assessed this shift? Yes   Multiple Skin Integrity Sites Yes   Skin Integrity Site 2   Skin Integrity Location 2 Abrasion   Location 2 Left inner ankle   Preventative Dressing No   Assessed this shift? Yes   Skin Integrity Site 3   Skin Integrity Location 3 Other (Comment)    Location 3 Left knee   Preventative Dressing   (brace)   Assessed this shift?  Yes   Musculoskeletal   Musculoskeletal (WDL) X   RUE Full movement   LUE Full movement   RL Extremity Full movement   LL Extremity Limited movement   Musculoskeletal Details   L Knee Brace   Genitourinary   Genitourinary (WDL) WDL   Urine Assessment   Incontinence No   Urine Color Yellow/straw   Urine Appearance Clear   Urine Odor No odor   Anus/Rectum   Anus/Rectum (WDL) X   Evaluation Hemorrhoids   Psychosocial   Psychosocial (WDL) WDL   Pt alert times 4-Pt able to take am medications well whole-Scheduled blood pressure medications given for blood pressure-scheduled pain medications given and pain gel to left knee--Am assessment completed-Breathing equal and unlabored- Left knee brace intact -pedal pulse positive-Pt pivoted to BSC with 1 assist-Pt tolerated well- Pt assisted back to bed- pillow support left knee- Left hip dressing-covaderm -clean, dry, and intact- No signs of infection noted at dressing site- Pt told to call out with any needs- Call bell at side- Will monitor

## 2022-04-09 NOTE — FLOWSHEET NOTE
04/09/22 0719   Vital Signs   Temp 97.9 °F (36.6 °C)   Pulse 69   Resp 18   BP (!) 155/74   MAP (mmHg) 94   Oxygen Therapy   SpO2 97 %   O2 Device None (Room air)

## 2022-04-10 PROCEDURE — 6360000002 HC RX W HCPCS: Performed by: PHYSICIAN ASSISTANT

## 2022-04-10 PROCEDURE — 6370000000 HC RX 637 (ALT 250 FOR IP): Performed by: PHYSICIAN ASSISTANT

## 2022-04-10 PROCEDURE — 1200000002 HC SEMI PRIVATE SWING BED

## 2022-04-10 RX ADMIN — ACETAMINOPHEN 650 MG: 325 TABLET, FILM COATED ORAL at 14:42

## 2022-04-10 RX ADMIN — FAMOTIDINE 20 MG: 20 TABLET, FILM COATED ORAL at 07:33

## 2022-04-10 RX ADMIN — Medication 400 MG: at 07:32

## 2022-04-10 RX ADMIN — OXYCODONE 5 MG: 5 TABLET ORAL at 20:41

## 2022-04-10 RX ADMIN — Medication 10 MG: at 20:41

## 2022-04-10 RX ADMIN — ENOXAPARIN SODIUM 30 MG: 100 INJECTION SUBCUTANEOUS at 20:39

## 2022-04-10 RX ADMIN — FOLIC ACID TAB 400 MCG 0.8 MG: 400 TAB at 07:32

## 2022-04-10 RX ADMIN — OXYCODONE 5 MG: 5 TABLET ORAL at 07:32

## 2022-04-10 RX ADMIN — OXYCODONE 5 MG: 5 TABLET ORAL at 02:06

## 2022-04-10 RX ADMIN — GABAPENTIN 100 MG: 100 CAPSULE ORAL at 20:40

## 2022-04-10 RX ADMIN — ACETAMINOPHEN 650 MG: 325 TABLET, FILM COATED ORAL at 07:31

## 2022-04-10 RX ADMIN — CALCIUM 500 MG: 500 TABLET ORAL at 07:32

## 2022-04-10 RX ADMIN — ACETAMINOPHEN 650 MG: 325 TABLET, FILM COATED ORAL at 20:40

## 2022-04-10 RX ADMIN — ENOXAPARIN SODIUM 30 MG: 100 INJECTION SUBCUTANEOUS at 07:31

## 2022-04-10 RX ADMIN — Medication 1 CAPSULE: at 07:32

## 2022-04-10 RX ADMIN — OXYCODONE 5 MG: 5 TABLET ORAL at 14:43

## 2022-04-10 RX ADMIN — MULTIPLE VITAMINS W/ MINERALS TAB 1 TABLET: TAB at 07:31

## 2022-04-10 RX ADMIN — OXYCODONE HYDROCHLORIDE AND ACETAMINOPHEN 500 MG: 500 TABLET ORAL at 07:31

## 2022-04-10 RX ADMIN — FAMOTIDINE 20 MG: 20 TABLET, FILM COATED ORAL at 20:41

## 2022-04-10 RX ADMIN — ACETAMINOPHEN 650 MG: 325 TABLET, FILM COATED ORAL at 02:06

## 2022-04-10 ASSESSMENT — PAIN SCALES - GENERAL
PAINLEVEL_OUTOF10: 9
PAINLEVEL_OUTOF10: 10
PAINLEVEL_OUTOF10: 8
PAINLEVEL_OUTOF10: 5
PAINLEVEL_OUTOF10: 5

## 2022-04-10 ASSESSMENT — PAIN DESCRIPTION - PAIN TYPE
TYPE: ACUTE PAIN
TYPE: ACUTE PAIN

## 2022-04-10 ASSESSMENT — PAIN DESCRIPTION - LOCATION
LOCATION: BACK;COCCYX
LOCATION: BACK;COCCYX

## 2022-04-10 NOTE — FLOWSHEET NOTE
04/09/22 2221   Assessment   Charting Type Shift assessment   Neurological   Neuro (WDL) WDL   Level of Consciousness Alert (0)   Swallow Screening   Is the patient unable to remain alert for testing? No   Is the patient on a modified diet (thickened liquids) due to pre-existing dysphagia? No   Is there presence of existing enteral tube feeding via the stomach or nose? No   Is there presence of head-of-bed restrictions (less than 30 degrees)? No   Is there presence of tracheotomy tube? No   Is the patient ordered nothing-by-mouth status? No   [place] Coma Scale   Eye Opening 4   Best Verbal Response 5   Best Motor Response 6   Denzel Coma Scale Score 15   NIHSS Stroke Scale   NIHSS Stroke Scale Assessed No   HEENT   HEENT (WDL) X   Right Eye Impaired vision   Left Eye Impaired vision   Nose Intact   Tongue Pink & moist   Voice Normal   Mucous Membrane Moist;Pink; Intact   Teeth Dentures lower;Dentures upper   Respiratory   Respiratory (WDL) WDL   Respiratory Pattern Regular   Respiratory Depth Normal   Respiratory Quality/Effort Unlabored   Chest Assessment Chest expansion symmetrical;Trachea midline   L Breath Sounds Clear   R Breath Sounds Clear   Breath Sounds   Right Upper Lobe Clear   Right Middle Lobe Clear   Right Lower Lobe Clear   Left Upper Lobe Clear   Left Lower Lobe Clear   Cardiac   Cardiac (WDL) WDL   Cardiac Monitor   Telemetry Monitor On No   Gastrointestinal   Abdominal (WDL) WDL   RUQ Bowel Sounds Active   LUQ Bowel Sounds Active   RLQ Bowel Sounds Active   LLQ Bowel Sounds Active   Abdomen Inspection Flat;Soft   GI Symptoms Diarrhea   Last BM (including prior to admit) 04/09/22   Tenderness Nontender   Peripheral Vascular   Peripheral Vascular (WDL) X   Edema Left lower extremity   LLE Edema +1   LLE Neurovascular Assessment   Capillary Refill Less than/equal to 3 seconds   Color Pink   Temperature Warm   L Pedal Pulse +2   Skin Color/Condition   Skin Color/Condition (WDL) WDL   Skin Color Pale   Skin Condition/Temp Warm;Dry   Skin Integrity   Location lt hip   Preventative Dressing Yes   Assessed this shift? Yes   Multiple Skin Integrity Sites Yes   Skin Integrity Site 2   Skin Integrity Location 2 Abrasion   Location 2 lt inner ankle   Preventative Dressing No   Assessed this shift? Yes   Skin Integrity Site 3    Location 3 lt knee   Assessed this shift?  Yes   Musculoskeletal   Musculoskeletal (WDL) X   RUE Full movement   LUE Full movement   RL Extremity Full movement   LL Extremity Limited movement   Musculoskeletal Details   L Knee Brace   Genitourinary   Genitourinary (WDL) WDL   Urine Assessment   Incontinence No   Urine Color Yellow/straw   Psychosocial   Psychosocial (WDL) WDL

## 2022-04-11 PROCEDURE — 97110 THERAPEUTIC EXERCISES: CPT

## 2022-04-11 PROCEDURE — 97530 THERAPEUTIC ACTIVITIES: CPT

## 2022-04-11 PROCEDURE — 6370000000 HC RX 637 (ALT 250 FOR IP): Performed by: PHYSICIAN ASSISTANT

## 2022-04-11 PROCEDURE — 97535 SELF CARE MNGMENT TRAINING: CPT

## 2022-04-11 PROCEDURE — 1200000002 HC SEMI PRIVATE SWING BED

## 2022-04-11 PROCEDURE — 6360000002 HC RX W HCPCS: Performed by: PHYSICIAN ASSISTANT

## 2022-04-11 PROCEDURE — 97116 GAIT TRAINING THERAPY: CPT

## 2022-04-11 RX ORDER — BUDESONIDE 3 MG/1
9 CAPSULE, COATED PELLETS ORAL EVERY MORNING
Qty: 30 CAPSULE | Refills: 0 | Status: SHIPPED | OUTPATIENT
Start: 2022-04-11 | End: 2022-06-13

## 2022-04-11 RX ADMIN — FAMOTIDINE 20 MG: 20 TABLET, FILM COATED ORAL at 08:19

## 2022-04-11 RX ADMIN — Medication 10 MG: at 21:11

## 2022-04-11 RX ADMIN — ACETAMINOPHEN 650 MG: 325 TABLET, FILM COATED ORAL at 08:19

## 2022-04-11 RX ADMIN — Medication 1 CAPSULE: at 08:19

## 2022-04-11 RX ADMIN — OXYCODONE HYDROCHLORIDE AND ACETAMINOPHEN 500 MG: 500 TABLET ORAL at 08:20

## 2022-04-11 RX ADMIN — OXYCODONE 5 MG: 5 TABLET ORAL at 21:11

## 2022-04-11 RX ADMIN — OXYCODONE 5 MG: 5 TABLET ORAL at 08:20

## 2022-04-11 RX ADMIN — ACETAMINOPHEN 650 MG: 325 TABLET, FILM COATED ORAL at 21:11

## 2022-04-11 RX ADMIN — FAMOTIDINE 20 MG: 20 TABLET, FILM COATED ORAL at 21:11

## 2022-04-11 RX ADMIN — MULTIPLE VITAMINS W/ MINERALS TAB 1 TABLET: TAB at 08:20

## 2022-04-11 RX ADMIN — GABAPENTIN 100 MG: 100 CAPSULE ORAL at 21:11

## 2022-04-11 RX ADMIN — ACETAMINOPHEN 650 MG: 325 TABLET, FILM COATED ORAL at 01:55

## 2022-04-11 RX ADMIN — CALCIUM 500 MG: 500 TABLET ORAL at 08:20

## 2022-04-11 RX ADMIN — FOLIC ACID TAB 400 MCG 0.8 MG: 400 TAB at 08:20

## 2022-04-11 RX ADMIN — ENOXAPARIN SODIUM 30 MG: 100 INJECTION SUBCUTANEOUS at 10:23

## 2022-04-11 RX ADMIN — FERROUS SULFATE TAB EC 324 MG (65 MG FE EQUIVALENT) 324 MG: 324 (65 FE) TABLET DELAYED RESPONSE at 08:19

## 2022-04-11 RX ADMIN — Medication 400 MG: at 08:19

## 2022-04-11 RX ADMIN — ENOXAPARIN SODIUM 30 MG: 100 INJECTION SUBCUTANEOUS at 21:12

## 2022-04-11 ASSESSMENT — PAIN SCALES - GENERAL
PAINLEVEL_OUTOF10: 5
PAINLEVEL_OUTOF10: 8
PAINLEVEL_OUTOF10: 8

## 2022-04-11 ASSESSMENT — PAIN DESCRIPTION - LOCATION
LOCATION: BACK
LOCATION: BACK;LEG;SHOULDER

## 2022-04-11 ASSESSMENT — PAIN DESCRIPTION - PAIN TYPE: TYPE: ACUTE PAIN

## 2022-04-11 NOTE — PROGRESS NOTES
Physical Therapy  Facility/Department: [facility] MED SURG  Daily Treatment Note  NAME: Joselito Baird  : 1945  MRN: [id number]    Date of Service: 2022    Discharge Recommendations:  Continue to assess pending progress      Assessment   Assessment: Patient completed bed mobility tasks with Mod I.  Stood with SBA and ambulated 60 feet with RW, L ELS knee brace, NWB on L, and SBA. Patient transferred to the recliner and performed B LE therex in semi-reclined and seated positions. REQUIRES PT FOLLOW UP: Yes  Activity Tolerance  Activity Tolerance: Patient Tolerated treatment well     Patient Diagnosis(es): There were no encounter diagnoses. has a past medical history of Colitis, GERD (gastroesophageal reflux disease), Glaucoma, and PAD (peripheral artery disease) ([facility] [address] has a past surgical history that includes Tubal ligation; eye surgery; cyst removal; skin biopsy; and Total hip arthroplasty (Left, 2019). Restrictions  Restrictions/Precautions  Restrictions/Precautions: Weight Bearing,Fall Risk (NWB L LE)  Required Braces or Orthoses?: Yes  Lower Extremity Weight Bearing Restrictions  Left Lower Extremity Weight Bearing: Non Weight Bearing  Required Braces or Orthoses  Left Lower Extremity Brace:  (ELS brace locked in extension)  Subjective   General  Chart Reviewed: Yes  Family / Caregiver Present: No  Subjective  Subjective: Patient states she's just \"miserable\" but will do what she can.   Pain Screening  Patient Currently in Pain: Yes  Pain Assessment  Pain Location: Back;Leg;Shoulder  Vital Signs  Patient Currently in Pain: Yes       Objective   Bed mobility  Rolling to Left: Modified independent  Supine to Sit: Modified independent  Scooting: Modified independent  Transfers  Sit to Stand: Stand by assistance  Stand to sit: Stand by assistance  Ambulation  Ambulation?: Yes  WB Status: NWB L LE  Ambulation 1  Surface: level tile  Device: Rolling Walker  Other Apparatus:  (L ELS knee brace)  Assistance: Stand by assistance  Distance: 60 feet     Balance  Posture: Good  Sitting - Static: Good  Sitting - Dynamic: Good  Standing - Static: Good  Standing - Dynamic: Good;-  Exercises  Quad Sets: 15  Gluteal Sets: 15  Hip Flexion: 15  Hip Abduction: 15 and 15 adduction squeezes  Knee Active Range of Motion: 15  Ankle Pumps: 15      Goals  Long term goals  Time Frame for Long term goals : 10 days  Long term goal 1: Patient will perform all bed mobility with independence. Long term goal 2: Patient will perform sit to stand and transfers, NWB L LE, with RW and SBA. Long term goal 3: Patient will ambulate 50'x2, NWB L LE, with RW and SBA. Long term goal 4: Patient will demonstrate independence with HEP. Patient Goals   Patient goals : Return home. Plan    Plan  Times per week: 4-5x/week  Times per day: Daily  Current Treatment Recommendations: Cara Sal Training,Patient/Caregiver Education & Training,ROM,Balance Training,Gait Training,Home Exercise Program,Functional Mobility Training,Safety Education & Training  Safety Devices  Type of devices: Left in chair,Call light within reach     Therapy Time   Individual Concurrent Group Co-treatment   Time In 1000         Time Out 3447         Minutes 44              This note serves as D/C summary if patient is discharged prior to next visit.   Soo Puente, PTA

## 2022-04-11 NOTE — PROGRESS NOTES
Patient called out with c/o a \"lump\" on her neck. Assessed patient neck and there is a large rounded soft lump on the right side of her face on her lower jaw just in front of her right earlobe. She denies pain or tenderness, the area is not bruised, patient denies hitting the area, denies being bit by anything, states that she just noticed the lump while she was eating. Placed picture of area under media tab in chart for review by providers with patient's permission. Informed [facility] Dates of finding, orders given for CBC and CMP in the AM and stated that she would assess tomorrow. Patient denies difficulty breathing or swallowing. Advised patient to inform nursing staff if the area became painful, continued growing or she experienced and difficulty with breathing or swallowing, patient stated understanding.

## 2022-04-11 NOTE — PROGRESS NOTES
Occupational Therapy  Facility/Department: [address] [place] [address] MED SURG  Daily Treatment Note  NAME: Norah Gaines  : 1945  MRN: [id number]    Date of Service: 2022    Discharge Recommendations:  Home with Home health OT       Assessment   Assessment: Pt agreeable to OT services. Pt ambulated to bathroom from recliner with RW CGA. Pt transferred to shower chair with SBA using RW. Pt doffed pants with MIN A. Pt able to complete UB bathing tasks with NINO and LB bathing with CGA<>SBA. Pt requires min assist to initiate LB dressing for threading pants around feet. Pt assisted with donning brace. Pt come to stand with SBA and ambulated to bed transferring to supine with MOD I. Patient Diagnosis(es): There were no encounter diagnoses. has a past medical history of Colitis, GERD (gastroesophageal reflux disease), Glaucoma, and PAD (peripheral artery disease) ([facility] [address] has a past surgical history that includes Tubal ligation; eye surgery; cyst removal; skin biopsy; and Total hip arthroplasty (Left, 2019). Restrictions  Restrictions/Precautions  Restrictions/Precautions: Weight Bearing,Fall Risk (NWB L LE)  Required Braces or Orthoses?: Yes  Lower Extremity Weight Bearing Restrictions  Left Lower Extremity Weight Bearing: Non Weight Bearing  Required Braces or Orthoses  Left Lower Extremity Brace:  (ELS brace locked in extension)  Subjective   General  Chart Reviewed: Yes  Patient assessed for rehabilitation services?: Yes  Family / Caregiver Present: No  Referring Practitioner: Anupam Lee PA-C  Diagnosis: Declining functional status; S/P ORIF left sacrum  Subjective  Subjective: Pt agreeable to OT services. Pt states she was at home and fell when she slipped out of her shoe.       Orientation     Objective    ADL  Grooming: Setup  LE Bathing: Contact guard assistance  UE Dressing: Setup  LE Dressing: Minimal assistance           Plan   Plan  Times per week: 3-5  Times per day: Daily  Plan weeks: 1-2  Current Treatment Recommendations: Strengthening,Endurance Training,Balance Training,Functional Mobility Training,Self-Care / ADL,Equipment Evaluation, Education, & procurement,Safety Education & Training    Goals  Short term goals  Time Frame for Short term goals: 2 weeks  Short term goal 1: Pt to complete bathing with MOD I. Short term goal 2: Pt to complete dressing with MOD I. Short term goal 3: Pt to tolerate x15 minutes of activity to increase functional activity tolerance. Short term goal 4: Pt to complete toileting with MOD I. Short term goal 5: Pt to complete oral hygiene standing at sink with no LOB MOD I. Therapy Time   Individual Concurrent Group Co-treatment   Time In 1244         Time Out 2069         Minutes 38              This note serves as a DC summary in the event of pt discharge.      Carolyn Robbins OTR/L

## 2022-04-11 NOTE — PLAN OF CARE
Problem: Pain:  Goal: Patient's pain/discomfort is manageable  Description: Patient's pain/discomfort is manageable  Outcome: Ongoing     Problem: Daily Care:  Goal: Daily care needs are met  Description: Daily care needs are met  Outcome: Ongoing     Problem: Discharge Planning:  Goal: Patients continuum of care needs are met  Description: Patients continuum of care needs are met  Outcome: Ongoing

## 2022-04-12 LAB
A/G RATIO: 1.3 (ref 0.8–2)
ALBUMIN SERPL-MCNC: 3.2 G/DL (ref 3.4–4.8)
ALP BLD-CCNC: 155 U/L (ref 25–100)
ALT SERPL-CCNC: 29 U/L (ref 4–36)
ANION GAP SERPL CALCULATED.3IONS-SCNC: 10 MMOL/L (ref 3–16)
AST SERPL-CCNC: 30 U/L (ref 8–33)
BASOPHILS ABSOLUTE: 0.1 K/UL (ref 0–0.1)
BASOPHILS RELATIVE PERCENT: 1.4 %
BILIRUB SERPL-MCNC: 0.3 MG/DL (ref 0.3–1.2)
BUN BLDV-MCNC: 11 MG/DL (ref 6–20)
CALCIUM SERPL-MCNC: 9 MG/DL (ref 8.5–10.5)
CHLORIDE BLD-SCNC: 107 MMOL/L (ref 98–107)
CO2: 24 MMOL/L (ref 20–30)
CREAT SERPL-MCNC: 0.6 MG/DL (ref 0.4–1.2)
EOSINOPHILS ABSOLUTE: 0.4 K/UL (ref 0–0.4)
EOSINOPHILS RELATIVE PERCENT: 8.5 %
GFR AFRICAN AMERICAN: >59
GFR NON-AFRICAN AMERICAN: >60
GLOBULIN: 2.4 G/DL
GLUCOSE BLD-MCNC: 90 MG/DL (ref 74–106)
HCT VFR BLD CALC: 37.3 % (ref 37–47)
HEMOGLOBIN: 11.6 G/DL (ref 11.5–16.5)
IMMATURE GRANULOCYTES #: 0 K/UL
IMMATURE GRANULOCYTES %: 0.4 % (ref 0–5)
LYMPHOCYTES ABSOLUTE: 1.4 K/UL (ref 1.5–4)
LYMPHOCYTES RELATIVE PERCENT: 26.6 %
MCH RBC QN AUTO: 29.7 PG (ref 27–32)
MCHC RBC AUTO-ENTMCNC: 31.1 G/DL (ref 31–35)
MCV RBC AUTO: 95.6 FL (ref 80–100)
MONOCYTES ABSOLUTE: 0.4 K/UL (ref 0.2–0.8)
MONOCYTES RELATIVE PERCENT: 7.8 %
NEUTROPHILS ABSOLUTE: 2.9 K/UL (ref 2–7.5)
NEUTROPHILS RELATIVE PERCENT: 55.3 %
PDW BLD-RTO: 13.4 % (ref 11–16)
PLATELET # BLD: 277 K/UL (ref 150–400)
PMV BLD AUTO: 8.6 FL (ref 6–10)
POTASSIUM SERPL-SCNC: 3.5 MMOL/L (ref 3.4–5.1)
QUANTI TB GOLD PLUS: NEGATIVE
QUANTI TB1 MINUS NIL: 0.01 IU/ML (ref 0–0.34)
QUANTI TB2 MINUS NIL: 0 IU/ML (ref 0–0.34)
QUANTIFERON MITOGEN: >10 IU/ML
QUANTIFERON NIL: 0.04 IU/ML
RBC # BLD: 3.9 M/UL (ref 3.8–5.8)
SODIUM BLD-SCNC: 141 MMOL/L (ref 136–145)
TOTAL PROTEIN: 5.6 G/DL (ref 6.4–8.3)
WBC # BLD: 5.2 K/UL (ref 4–11)

## 2022-04-12 PROCEDURE — 97110 THERAPEUTIC EXERCISES: CPT

## 2022-04-12 PROCEDURE — 1200000002 HC SEMI PRIVATE SWING BED

## 2022-04-12 PROCEDURE — 6360000002 HC RX W HCPCS: Performed by: PHYSICIAN ASSISTANT

## 2022-04-12 PROCEDURE — 97802 MEDICAL NUTRITION INDIV IN: CPT

## 2022-04-12 PROCEDURE — 99308 SBSQ NF CARE LOW MDM 20: CPT | Performed by: INTERNAL MEDICINE

## 2022-04-12 PROCEDURE — 6370000000 HC RX 637 (ALT 250 FOR IP): Performed by: PHYSICIAN ASSISTANT

## 2022-04-12 PROCEDURE — 85025 COMPLETE CBC W/AUTO DIFF WBC: CPT

## 2022-04-12 PROCEDURE — 80053 COMPREHEN METABOLIC PANEL: CPT

## 2022-04-12 PROCEDURE — 97535 SELF CARE MNGMENT TRAINING: CPT

## 2022-04-12 PROCEDURE — 36415 COLL VENOUS BLD VENIPUNCTURE: CPT

## 2022-04-12 RX ADMIN — MULTIPLE VITAMINS W/ MINERALS TAB 1 TABLET: TAB at 09:11

## 2022-04-12 RX ADMIN — Medication 1 CAPSULE: at 09:12

## 2022-04-12 RX ADMIN — FERROUS SULFATE TAB EC 324 MG (65 MG FE EQUIVALENT) 324 MG: 324 (65 FE) TABLET DELAYED RESPONSE at 09:13

## 2022-04-12 RX ADMIN — OXYCODONE 5 MG: 5 TABLET ORAL at 13:51

## 2022-04-12 RX ADMIN — FOLIC ACID TAB 400 MCG 0.8 MG: 400 TAB at 09:12

## 2022-04-12 RX ADMIN — ACETAMINOPHEN 650 MG: 325 TABLET, FILM COATED ORAL at 13:51

## 2022-04-12 RX ADMIN — OXYCODONE HYDROCHLORIDE AND ACETAMINOPHEN 500 MG: 500 TABLET ORAL at 09:12

## 2022-04-12 RX ADMIN — Medication 10 MG: at 20:51

## 2022-04-12 RX ADMIN — ENOXAPARIN SODIUM 30 MG: 100 INJECTION SUBCUTANEOUS at 09:11

## 2022-04-12 RX ADMIN — Medication 400 MG: at 09:11

## 2022-04-12 RX ADMIN — ACETAMINOPHEN 650 MG: 325 TABLET, FILM COATED ORAL at 09:12

## 2022-04-12 RX ADMIN — ENOXAPARIN SODIUM 30 MG: 100 INJECTION SUBCUTANEOUS at 20:51

## 2022-04-12 RX ADMIN — ACETAMINOPHEN 650 MG: 325 TABLET, FILM COATED ORAL at 02:07

## 2022-04-12 RX ADMIN — OXYCODONE 5 MG: 5 TABLET ORAL at 09:12

## 2022-04-12 RX ADMIN — CALCIUM 500 MG: 500 TABLET ORAL at 09:12

## 2022-04-12 RX ADMIN — OXYCODONE 5 MG: 5 TABLET ORAL at 20:51

## 2022-04-12 RX ADMIN — FAMOTIDINE 20 MG: 20 TABLET, FILM COATED ORAL at 20:51

## 2022-04-12 RX ADMIN — DICLOFENAC SODIUM 4 G: 10 GEL TOPICAL at 02:10

## 2022-04-12 RX ADMIN — GABAPENTIN 100 MG: 100 CAPSULE ORAL at 20:51

## 2022-04-12 RX ADMIN — ACETAMINOPHEN 650 MG: 325 TABLET, FILM COATED ORAL at 20:51

## 2022-04-12 RX ADMIN — FAMOTIDINE 20 MG: 20 TABLET, FILM COATED ORAL at 09:12

## 2022-04-12 RX ADMIN — OXYCODONE 5 MG: 5 TABLET ORAL at 02:07

## 2022-04-12 ASSESSMENT — PAIN SCALES - GENERAL
PAINLEVEL_OUTOF10: 8
PAINLEVEL_OUTOF10: 8
PAINLEVEL_OUTOF10: 6
PAINLEVEL_OUTOF10: 9

## 2022-04-12 NOTE — PLAN OF CARE
Problem: Falls - Risk of:  Goal: Will remain free from falls  Outcome: Ongoing  Goal: Absence of physical injury  Outcome: Ongoing     Problem: Skin Integrity:  Goal: Will show no infection signs and symptoms  Outcome: Ongoing  Goal: Absence of new skin breakdown  Outcome: Ongoing     Problem: Pain:  Goal: Pain level will decrease  Outcome: Ongoing  Goal: Control of acute pain  Outcome: Ongoing  Goal: Control of chronic pain  Outcome: Ongoing  Goal: Patient's pain/discomfort is manageable  Outcome: Ongoing     Problem: Neurological  Goal: Maximum potential motor/sensory/cognitive function  Outcome: Ongoing     Problem: Cardiovascular  Goal: No DVT, peripheral vascular complications  Outcome: Ongoing  Goal: Hemodynamic stability  Outcome: Ongoing  Goal: Anticoagulate/Hct stable  Outcome: Ongoing  Goal: Agreement to quit smoking  Outcome: Ongoing  Goal: Weight maintained or lost  Outcome: Ongoing  Goal: Understanding of dietary restrictions  Outcome: Ongoing     Problem: Infection:  Goal: Will remain free from infection  Outcome: Ongoing     Problem: Safety:  Goal: Free from accidental physical injury  4/12/2022 1237 by Eben Ballesteros RN  Outcome: Ongoing  4/12/2022 0145 by Jerrold Alpers, RN  Outcome: Ongoing  Goal: Free from intentional harm  Outcome: Ongoing     Problem: Daily Care:  Goal: Daily care needs are met  4/12/2022 1237 by Ebne Ballesteros RN  Outcome: Ongoing  4/12/2022 0145 by Jerrold Alpers, RN  Outcome: Ongoing     Problem: Skin Integrity:  Goal: Skin integrity will stabilize  4/12/2022 1237 by Eben Ballesteros RN  Outcome: Ongoing  4/12/2022 0145 by Jerrold Alpers, RN  Outcome: Ongoing     Problem: Discharge Planning:  Goal: Patients continuum of care needs are met  Outcome: Ongoing

## 2022-04-12 NOTE — CONSULTS
Comprehensive Nutrition Assessment    Type and Reason for Visit:  Initial,Consult    Nutrition Recommendations/Plan:   1. Recommend to continue with current diet. 2. Recommend starting ONS BID. 3. Encourage intakes of meals and ONS. Nutrition Assessment:  Pt at low-moderate risk for ongoing nutritional compromise r/t good intakes, chronic colitis, and healing incision. Malnutrition Assessment:  Malnutrition Status: At risk for malnutrition (Comment) (related to hx of colitis)    Context:  Chronic Illness     Findings of the 6 clinical characteristics of malnutrition:  Energy Intake:  No significant decrease in energy intake  Weight Loss:  No significant weight loss     Body Fat Loss:  No significant body fat loss     Muscle Mass Loss:  Unable to assess    Fluid Accumulation:  1 - Mild Extremities   Strength:       Estimated Daily Nutrient Needs:  Energy (kcal):  3345-2132 (27-30 kcal/kg cbw); Weight Used for Energy Requirements:  Current     Protein (g):  68-79 (1.2-1.4 gm/kg cbw); Weight Used for Protein Requirements:  Current        Fluid (ml/day):  4385-4445; Method Used for Fluid Requirements:  1 ml/kcal      Nutrition Related Findings:  Pt is underweight, but states that her and her family has always been tall and thin.  she states she also has had chronic colitis which has led to osteo and her fractures. Has abrasion on ankle. Has brace on left leg and has +1 edema LLE. PMH: GERD. Has Vit C, MVI, folic acid, and Mg ordered. Wounds:  Surgical Incision (incision of hip, healing from January, tibia fracture.)       Current Nutrition Therapies:    ADULT DIET;  Regular    Anthropometric Measures:  · Height: 5' 7\" (170.2 cm)  · Current Body Weight: 124 lb (56.2 kg)   · Admission Body Weight:      · Usual Body Weight:       · Ideal Body Weight: 135 lbs; % Ideal Body Weight 91.9 %   · BMI: 19.4  · Adjusted Body Weight:  ; No Adjustment   · Adjusted BMI:      · BMI Categories: Underweight (BMI less than 22) age over 72       Nutrition Diagnosis:   · Predicted inadequate energy intake related to altered GI function as evidenced by BMI,wounds,lab values      Nutrition Interventions:   Food and/or Nutrient Delivery:  Continue Current Diet,Start Oral Nutrition Supplement,Mineral Supplement,Vitamin Supplement  Nutrition Education/Counseling:  Education completed (spoke with patient about the importance of ONS with condition and she did understand.)   Coordination of Nutrition Care:  Continue to monitor while inpatient    Goals:  to meet est needs for age/condition       Nutrition Monitoring and Evaluation:   Behavioral-Environmental Outcomes:      Food/Nutrient Intake Outcomes:  Food and Nutrient Intake,Supplement Intake,Vitamin/Mineral Intake  Physical Signs/Symptoms Outcomes:  Biochemical Data,GI Status,Skin,Weight     Discharge Planning:    Continue current diet,Continue Oral Nutrition Supplement     Electronically signed by Musa Rodas MS, RD, LD on 4/12/22 at 3:46 PM EDT

## 2022-04-12 NOTE — PROGRESS NOTES
Physical Therapy  Facility/Department: [facility] MED SURG  Daily Treatment Note  NAME: Shakira Duvall  : 1945  MRN: [id number]    Date of Service: 2022    Discharge Recommendations:  Continue to assess pending progress      Assessment   Assessment: Patient reports not sleeping well but is agreeable to therapy and to get up to the recliner for lunch. Patient completes bed mobility tasks with Mod I.  Stood with SBA and ambulated 60 feet with RW, L ELS knee brace, NWB on L, and SBA. Patient transferred to the recliner and performed B LE therex in semi-reclined and seated positions. Activity Tolerance  Activity Tolerance: Patient Tolerated treatment well     Patient Diagnosis(es): There were no encounter diagnoses. has a past medical history of Colitis, GERD (gastroesophageal reflux disease), Glaucoma, and PAD (peripheral artery disease) ([facility] [address] has a past surgical history that includes Tubal ligation; eye surgery; cyst removal; skin biopsy; and Total hip arthroplasty (Left, 2019). Restrictions  Restrictions/Precautions  Restrictions/Precautions: Weight Bearing,Fall Risk (NWB L LE)  Required Braces or Orthoses?: Yes  Lower Extremity Weight Bearing Restrictions  Left Lower Extremity Weight Bearing: Non Weight Bearing  Required Braces or Orthoses  Left Lower Extremity Brace:  (ELS brace locked in extension)  Subjective   General  Chart Reviewed: Yes  Family / Caregiver Present: No  Subjective  Subjective: Patient reports not getting much sleep last night but is ok otherwise.   Pain Screening  Patient Currently in Pain: Yes  Vital Signs  Patient Currently in Pain: Yes       Objective   Bed mobility  Rolling to Left: Modified independent  Supine to Sit: Modified independent  Transfers  Sit to Stand: Stand by assistance  Stand to sit: Stand by assistance  Ambulation  Ambulation?: Yes  WB Status: NWB L LE  Ambulation 1  Surface: level tile  Device: Rolling Walker  Assistance: Stand by assistance  Distance: 60 feet     Balance  Posture: Good  Sitting - Static: Good  Sitting - Dynamic: Good  Standing - Static: Good  Standing - Dynamic: Good;-  Exercises  Quad Sets: 15  Gluteal Sets: 15  Hip Flexion: 15  Hip Abduction: 15 and 15 adduction squeezes  Knee Active Range of Motion: 15  Ankle Pumps: 15      Goals  Long term goals  Time Frame for Long term goals : 10 days  Long term goal 1: Patient will perform all bed mobility with independence. Long term goal 2: Patient will perform sit to stand and transfers, NWB L LE, with RW and SBA. Long term goal 3: Patient will ambulate 50'x2, NWB L LE, with RW and SBA. Long term goal 4: Patient will demonstrate independence with HEP. Patient Goals   Patient goals : Return home. Plan    Plan  Times per week: 4-5x/week  Times per day: Daily  Current Treatment Recommendations: Gilmar Drake Training,Patient/Caregiver Education & Training,ROM,Balance Training,Gait Training,Home Exercise Program,Functional Mobility Training,Safety Education & Training  Safety Devices  Type of devices: Left in chair,Call light within reach     Therapy Time   Individual Concurrent Group Co-treatment   Time In 1101         Time Out 7279         Minutes 20              This note serves as D/C summary if patient is discharged prior to next visit.   Willis Auguste, PTA

## 2022-04-12 NOTE — PROGRESS NOTES
Progress Note      Subjective:   Chief complaint:   Declining functional status    Interval History:   Patient seen and examined multiple times this week. Had acute onset of left periauricular swelling yesterday after eating that quickly resolved. Picture under media. Suspect patient passed a stone to her parotid gland. No residual symptoms this morning. Review of systems:   Constitutional:  Denies fever or chills. Positive for declining functional status. Eyes:  Denies change in visual acuity or discharge. HENT:  Denies nasal congestion or sore throat. Respiratory:  Denies cough or shortness of breath. Cardiovascular:  Denies chest pain, palpitation or swelling in LEs. GI:  Denies abdominal pain, nausea, vomiting, bloody stools or diarrhea. :  Denies dysuria or frequency. Musculoskeletal:  Denies joint pain. Positive for sacral pain and occasional left lower extremity pain. Integument:  Denies rash or itching. Neurologic:  Denies headache, focal weakness or sensory changes. Endocrine:  Denies polyuria or polydipsia. Lymphatic:  Denies swollen glands or night sweats. Psychiatric:  Denies depression or anxiety. Past medical history, surgical history, family history and social history reviewed and unchanged compared to H&P earlier this admission.     Medications:   Scheduled Meds:   oxyCODONE  5 mg Oral BID    enoxaparin  30 mg SubCUTAneous BID    acetaminophen  650 mg Oral Q6H    calcium elemental  500 mg Oral Daily    famotidine  20 mg Oral BID    folic acid  0.8 mg Oral Daily    gabapentin  100 mg Oral Nightly    magnesium oxide  400 mg Oral Daily    melatonin  10 mg Oral Nightly    therapeutic multivitamin-minerals  1 tablet Oral Daily    lactobacillus  1 capsule Oral Daily    vitamin C  500 mg Oral Daily    ferrous sulfate  324 mg Oral Every Other Day     Continuous Infusions:    Objective:     Vital Signs  Temp: 98.1 °F (36.7 °C)  Pulse: 66  Resp: 18  BP: (!) 145/80  SpO2: 97 %  O2 Device: None (Room air)       Vital signs reviewed in electronic charts. Physical exam  Constitutional:  Well developed, well nourished, thin, elderly female sitting upright in bed in no acute distress  Eyes:  no scleral icterus, conjunctiva normal   HENT:  Atraumatic, external ears normal, nose normal, oropharynx moist, no pharyngeal exudates. Neck- supple, no JVD, no lymphadenopathy  Respiratory:  No respiratory distress, no wheezing, rales or rhonchi detected  Cardiovascular:  Normal rate, normal rhythm, no murmurs, no gallops, no rubs, no edema BLE  GI:  Soft, nondistended, normal bowel sounds, nontender, no voluntary guarding  Musculoskeletal:  No cyanosis or obvious acute deformity. Moving all extremities . LLE in ELS locked in extension. Integument:  Warm and dry. Neurologic:  Alert & oriented x 3, no apparent focal deficits noted   Psychiatric:  Speech and behavior appropriate        Results:     Lab Results   Component Value Date    WBC 5.2 04/12/2022    HGB 11.6 04/12/2022    HCT 37.3 04/12/2022    MCV 95.6 04/12/2022     04/12/2022       Lab Results   Component Value Date     04/12/2022    K 3.5 04/12/2022    K 3.7 04/03/2022     04/12/2022    CO2 24 04/12/2022    BUN 11 04/12/2022    CREATININE 0.6 04/12/2022    GLUCOSE 90 04/12/2022    CALCIUM 9.0 04/12/2022        Assessment and Plan: Active Hospital Problems    Diagnosis Date Noted    Osteoporosis [M81.0]  -DEXA scab here 4/4 with findings of osteoporosis  -had appt with [facility]  on 4/6 with recs for .  [facility] working on [place] for [employer].  -continue oscal  -vitamin d 38.4  - of note, per chart review vitamin d discontinued due to elevated vit d level 98 on 2/18/22  - patient to follow up with BMD clinic 6 weeks after starting [employer]   04/03/2022    Closed minimally displaced zone III fracture of sacrum ([address] [S32.131A]  -s/p CRPP 3/30 by [facility]  -NWTINO LLE ELS locked in extension, WBAT RLE  -DVT ppx: lovenox 30mg BID until 4/30  -PRN Pain meds as ordered. Continue low dose scheduled oxycodone as well. -PT OT consulted  -CM consulted for dc planning assistance  -follow up with Monse Morales on 4/18/22 at 8:10 AM  -Fall precautions   04/03/2022    Closed fracture of left tibial plateau [B88.517A]  -s/p left tibial plateau fracture status post ORIF (2/15/2022)  -NWB LLE ELS locked in extension  -continue pain medications as prescribed   -PT OT following  -CM consulted for dc planning assistance  -Fall precautions   02/21/2022    Microscopic colitis [K52.839]  --history of chronic lymphocytic colitis on budesonide  - per BMD note from 4/6 budesonide was discontinued due to concern for bone loss however per dc summary it had been resumed. - follow up with BMD and GI as scheduled    02/21/2022    Anemia [D64.9]  -hgb at baseline  -monitor and transfuse for hgb <7.0   02/21/2022    Declining functional status [R53.81]  -Patient with multiple recent fragility fractures including humeral fracture, left tibial plateau fracture and minimally displaced zone 3 sacral fracture secondary to falls at home  -PT OT following  -Case management consulted for discharge planning assistance 12/31/2019     Patient was seen and examined by Dr. Baldwin Record and plan of care reviewed. Treatment plan was formulated collaboratively.       Electronically signed by KALIE Ley on 4/12/2022 at 12:53 PM

## 2022-04-12 NOTE — PLAN OF CARE
Problem: Safety:  Goal: Free from accidental physical injury  Description: Free from accidental physical injury  Outcome: Ongoing     Problem: Daily Care:  Goal: Daily care needs are met  Description: Daily care needs are met  Outcome: Ongoing     Problem: Skin Integrity:  Goal: Skin integrity will stabilize  Description: Skin integrity will stabilize  Outcome: Ongoing

## 2022-04-12 NOTE — FLOWSHEET NOTE
04/12/22 1234   Assessment   Charting Type Shift assessment   Neurological   Neuro (WDL) WDL   Level of Consciousness Alert (0)   Denzel Coma Scale   Eye Opening 4   Best Verbal Response 5   Best Motor Response 6   Denzel Coma Scale Score 15   HEENT   HEENT (WDL) X   Right Eye Impaired vision   Left Eye Impaired vision   Nose Intact   Tongue Pink & moist   Voice Normal   Mucous Membrane Moist;Pink; Intact   Teeth Dentures lower;Dentures upper   Respiratory   Respiratory (WDL) WDL   Respiratory Pattern Regular   Respiratory Depth Normal   Respiratory Quality/Effort Unlabored   Chest Assessment Chest expansion symmetrical;Trachea midline   L Breath Sounds Clear   R Breath Sounds Clear   Breath Sounds   Right Upper Lobe Clear   Right Middle Lobe Clear   Right Lower Lobe Clear   Left Upper Lobe Clear   Left Lower Lobe Clear   Cardiac   Cardiac (WDL) WDL   Cardiac Monitor   Telemetry Monitor On No   Gastrointestinal   Abdominal (WDL) WDL   RUQ Bowel Sounds Active   LUQ Bowel Sounds Active   RLQ Bowel Sounds Active   LLQ Bowel Sounds Active   Abdomen Inspection Flat;Soft   Tenderness Nontender   Peripheral Vascular   Peripheral Vascular (WDL) X   Edema Left lower extremity   LLE Edema Non-pitting;Trace   LLE Neurovascular Assessment   Capillary Refill Less than/equal to 3 seconds   Color Pink   Temperature Warm   L Pedal Pulse +2   Skin Color/Condition   Skin Color/Condition (WDL) WDL   Skin Integrity   Skin Integrity (WDL) X  (surgical incision)   Musculoskeletal   Musculoskeletal (WDL) X   RUE Full movement   LUE Full movement   RL Extremity Full movement   LL Extremity Limited movement   Musculoskeletal Details   L Knee Brace  (tibia fx)   Genitourinary   Genitourinary (WDL) WDL   Urine Assessment   Incontinence No   Urine Color Yellow/straw   Urine Appearance Clear   Urine Odor No odor   Psychosocial   Psychosocial (WDL) WDL   Pt up to rest room with therapy.  Pt doing well with walker and adhering to activity restrictions. Pt pain controlled with current po pain medication. No complications noted at incision site. Pt wears leg immobilizer. Pt returned to bed.

## 2022-04-12 NOTE — PROGRESS NOTES
Occupational Therapy  Facility/Department: [address] [place] [address] MED SURG  Daily Treatment Note  NAME: Ismael May  : 1945  MRN: [id number]    Date of Service: 2022    Discharge Recommendations:  Home with Home health OT       Assessment   Assessment: Pt agreeable to OT services. Pt transferred to sitting at EOB with MOD I. Pt ambulated to bathroom and toileted with CGA<>SBA using grab bar without verbal cuing. Pt reports increased pain on this date and fatigue from not sleeping well. Pt transferred to shower with SBA<>CGA. Pt demo good safety with transfer. Pt requires min assist to doff LB clothing. Pt completed shower seated on shower chair with CGA for UB in standing. Pt holds onto grab bar during standing tasks for safety. Pt required min assist to initiate donning pants and brief. Pt has difficulty with bending to attempt LB dressing tasks. Pt come to stand from shower chair and ambulated to bed. Pt transferred to supine with MOD I. Patient Diagnosis(es): There were no encounter diagnoses. has a past medical history of Colitis, GERD (gastroesophageal reflux disease), Glaucoma, and PAD (peripheral artery disease) ([facility] [address] has a past surgical history that includes Tubal ligation; eye surgery; cyst removal; skin biopsy; and Total hip arthroplasty (Left, 2019). Restrictions  Restrictions/Precautions  Restrictions/Precautions: Weight Bearing,Fall Risk (NWB L LE)  Required Braces or Orthoses?: Yes  Lower Extremity Weight Bearing Restrictions  Left Lower Extremity Weight Bearing: Non Weight Bearing  Required Braces or Orthoses  Left Lower Extremity Brace:  (ELS brace locked in extension)  Subjective   General  Chart Reviewed: Yes  Patient assessed for rehabilitation services?: Yes  Family / Caregiver Present: No  Referring Practitioner: Rosemary Gordon PA-C  Diagnosis: Declining functional status; S/P ORIF left sacrum  Subjective  Subjective: Pt agreeable to OT services.  Pt states she was at home and fell when she slipped out of her shoe. Orientation     Objective    ADL  Grooming: Setup  UE Bathing: Setup  LE Bathing: Contact guard assistance  UE Dressing: Setup  LE Dressing: Minimal assistance  Toileting: Stand by assistance;Contact guard assistance     Plan   Plan  Times per week: 3-5  Times per day: Daily  Plan weeks: 1-2  Current Treatment Recommendations: Strengthening,Endurance Training,Balance Training,Functional Mobility Training,Self-Care / ADL,Equipment Evaluation, Education, & procurement,Safety Education & Training    Goals  Short term goals  Time Frame for Short term goals: 2 weeks  Short term goal 1: Pt to complete bathing with MOD I. Short term goal 2: Pt to complete dressing with MOD I. Short term goal 3: Pt to tolerate x15 minutes of activity to increase functional activity tolerance. Short term goal 4: Pt to complete toileting with MOD I. Short term goal 5: Pt to complete oral hygiene standing at sink with no LOB MOD I. Therapy Time   Individual Concurrent Group Co-treatment   Time In 0906         Time Out 6108         Minutes 43              This note serves as a DC summary in the event of pt discharge.      Carolyn Robbins OTR/L

## 2022-04-13 PROCEDURE — 6370000000 HC RX 637 (ALT 250 FOR IP): Performed by: PHYSICIAN ASSISTANT

## 2022-04-13 PROCEDURE — 97535 SELF CARE MNGMENT TRAINING: CPT

## 2022-04-13 PROCEDURE — 97110 THERAPEUTIC EXERCISES: CPT

## 2022-04-13 PROCEDURE — 6360000002 HC RX W HCPCS: Performed by: PHYSICIAN ASSISTANT

## 2022-04-13 PROCEDURE — 1200000002 HC SEMI PRIVATE SWING BED

## 2022-04-13 PROCEDURE — 97530 THERAPEUTIC ACTIVITIES: CPT

## 2022-04-13 RX ADMIN — Medication 400 MG: at 08:51

## 2022-04-13 RX ADMIN — FAMOTIDINE 20 MG: 20 TABLET, FILM COATED ORAL at 21:12

## 2022-04-13 RX ADMIN — OXYCODONE 5 MG: 5 TABLET ORAL at 08:55

## 2022-04-13 RX ADMIN — CALCIUM 500 MG: 500 TABLET ORAL at 08:51

## 2022-04-13 RX ADMIN — OXYCODONE 5 MG: 5 TABLET ORAL at 21:12

## 2022-04-13 RX ADMIN — GABAPENTIN 100 MG: 100 CAPSULE ORAL at 21:11

## 2022-04-13 RX ADMIN — FAMOTIDINE 20 MG: 20 TABLET, FILM COATED ORAL at 08:52

## 2022-04-13 RX ADMIN — Medication 10 MG: at 21:12

## 2022-04-13 RX ADMIN — ACETAMINOPHEN 650 MG: 325 TABLET, FILM COATED ORAL at 21:12

## 2022-04-13 RX ADMIN — ACETAMINOPHEN 650 MG: 325 TABLET, FILM COATED ORAL at 08:51

## 2022-04-13 RX ADMIN — OXYCODONE 5 MG: 5 TABLET ORAL at 13:41

## 2022-04-13 RX ADMIN — OXYCODONE 5 MG: 5 TABLET ORAL at 05:06

## 2022-04-13 RX ADMIN — FERROUS SULFATE TAB EC 324 MG (65 MG FE EQUIVALENT) 324 MG: 324 (65 FE) TABLET DELAYED RESPONSE at 08:51

## 2022-04-13 RX ADMIN — ENOXAPARIN SODIUM 30 MG: 100 INJECTION SUBCUTANEOUS at 21:11

## 2022-04-13 RX ADMIN — ACETAMINOPHEN 650 MG: 325 TABLET, FILM COATED ORAL at 13:37

## 2022-04-13 RX ADMIN — OXYCODONE HYDROCHLORIDE AND ACETAMINOPHEN 500 MG: 500 TABLET ORAL at 08:52

## 2022-04-13 RX ADMIN — ACETAMINOPHEN 650 MG: 325 TABLET, FILM COATED ORAL at 01:43

## 2022-04-13 RX ADMIN — FOLIC ACID TAB 400 MCG 0.8 MG: 400 TAB at 08:52

## 2022-04-13 RX ADMIN — Medication 1 CAPSULE: at 08:51

## 2022-04-13 RX ADMIN — MULTIPLE VITAMINS W/ MINERALS TAB 1 TABLET: TAB at 08:52

## 2022-04-13 RX ADMIN — ENOXAPARIN SODIUM 30 MG: 100 INJECTION SUBCUTANEOUS at 08:52

## 2022-04-13 ASSESSMENT — PAIN SCALES - GENERAL
PAINLEVEL_OUTOF10: 8
PAINLEVEL_OUTOF10: 7
PAINLEVEL_OUTOF10: 8
PAINLEVEL_OUTOF10: 9
PAINLEVEL_OUTOF10: 8
PAINLEVEL_OUTOF10: 5
PAINLEVEL_OUTOF10: 4

## 2022-04-13 NOTE — FLOWSHEET NOTE
04/12/22 2225   Assessment   Charting Type Shift assessment   Neurological   Neuro (WDL) WDL   Level of Consciousness Alert (0)   Denzel Coma Scale   Eye Opening 4   Best Verbal Response 5   Best Motor Response 6   Denzel Coma Scale Score 15   HEENT   HEENT (WDL) X   Right Eye Impaired vision   Left Eye Impaired vision   Nose Intact   Tongue Pink & moist   Voice Normal   Mucous Membrane Moist;Pink; Intact   Teeth Dentures lower;Dentures upper   Respiratory   Respiratory (WDL) WDL   Respiratory Pattern Regular   Respiratory Depth Normal   Respiratory Quality/Effort Unlabored   Chest Assessment Chest expansion symmetrical;Trachea midline   L Breath Sounds Clear   R Breath Sounds Clear   Breath Sounds   Right Upper Lobe Clear   Right Middle Lobe Clear   Right Lower Lobe Clear   Left Upper Lobe Clear   Left Lower Lobe Clear   Cardiac   Cardiac (WDL) WDL   Cardiac Monitor   Telemetry Monitor On No   Gastrointestinal   Abdominal (WDL) WDL   RUQ Bowel Sounds Active   LUQ Bowel Sounds Active   RLQ Bowel Sounds Active   LLQ Bowel Sounds Active   Abdomen Inspection Flat;Soft   Tenderness Nontender   Peripheral Vascular   Peripheral Vascular (WDL) X   Edema Left lower extremity   LLE Edema Non-pitting;Trace   LLE Neurovascular Assessment   Capillary Refill Less than/equal to 3 seconds   Color Pink   Temperature Warm   L Pedal Pulse +2   Skin Color/Condition   Skin Color/Condition (WDL) WDL   Skin Integrity   Skin Integrity (WDL) X  (surgical incision)   Musculoskeletal   Musculoskeletal (WDL) X   RUE Full movement   LUE Full movement   RL Extremity Full movement   LL Extremity Limited movement   Musculoskeletal Details   L Knee Brace  (tibia fx)   Genitourinary   Genitourinary (WDL) WDL   Urine Assessment   Incontinence No   Urine Color Yellow/straw   Urine Appearance Clear   Urine Odor No odor   Psychosocial   Psychosocial (WDL) WDL

## 2022-04-13 NOTE — PROGRESS NOTES
Physical Therapy  Facility/Department: [facility] MED SURG  Daily Treatment Note  NAME: Jaci Howard  : 1945  MRN: [id number]    Date of Service: 2022    Discharge Recommendations:  Continue to assess pending progress      Assessment   Assessment: Engaged patient in B LE therex in supine. Patient completed bed mobility tasks with Mod I.  Ambulated 60 feet with RW, ELS knee brace, NWB on L LE, and SBA. Patient transferred back to bed and then requested to get on the [facility]. States she's afraid of colitis coming back. Requested to stay on [facility] and then call for aide to help her when finished. REQUIRES PT FOLLOW UP: Yes  Activity Tolerance  Activity Tolerance: Patient Tolerated treatment well     Patient Diagnosis(es): There were no encounter diagnoses. has a past medical history of Colitis, GERD (gastroesophageal reflux disease), Glaucoma, and PAD (peripheral artery disease) ([address] has a past surgical history that includes Tubal ligation; eye surgery; cyst removal; skin biopsy; and Total hip arthroplasty (Left, 2019). Restrictions  Restrictions/Precautions  Restrictions/Precautions: Weight Bearing,Fall Risk (NWB L LE)  Required Braces or Orthoses?: Yes  Lower Extremity Weight Bearing Restrictions  Left Lower Extremity Weight Bearing: Non Weight Bearing  Required Braces or Orthoses  Left Lower Extremity Brace:  (ELS brace locked in extension)  Subjective   General  Chart Reviewed: Yes  Family / Caregiver Present: No  Subjective  Subjective: Patient states her stomach is starting to feel like she may have colitis again. States she wants to exercise and walk but wants to get back in bed rather than sitting in the recliner in case she needs to get up to the [facility].   Pain Screening  Patient Currently in Pain: Yes  Vital Signs  Patient Currently in Pain: Yes       Objective   Bed mobility  Rolling to Left: Modified independent  Rolling to Right: Modified independent  Supine to Sit: Modified independent  Sit to Supine: Modified independent  Scooting: Modified independent  Transfers  Sit to Stand: Stand by assistance  Stand to sit: Stand by assistance  Ambulation  Ambulation?: Yes  WB Status: NWB L LE  Ambulation 1  Surface: level tile  Device: Rolling Walker  Assistance: Stand by assistance  Distance: 65 feet     Balance  Posture: Good  Sitting - Static: Good  Sitting - Dynamic: Good  Standing - Static: Good  Standing - Dynamic: Good;-  Exercises  Quad Sets: 15  Gluteal Sets: 15  Hip Flexion: 15  Hip Abduction: 15 and 15 adduction squeezes  Knee Short Arc Quad: 15  Knee Active Range of Motion: 15  Ankle Pumps: 15      Goals  Long term goals  Time Frame for Long term goals : 10 days  Long term goal 1: Patient will perform all bed mobility with independence. Long term goal 2: Patient will perform sit to stand and transfers, NWB L LE, with RW and SBA. Long term goal 3: Patient will ambulate 50'x2, NWB L LE, with RW and SBA. Long term goal 4: Patient will demonstrate independence with HEP. Patient Goals   Patient goals : Return home. Plan    Plan  Times per week: 4-5x/week  Times per day: Daily  Current Treatment Recommendations: Cara Sal Training,Patient/Caregiver Education & Training,ROM,Balance Training,Gait Training,Home Exercise Program,Functional Mobility Training,Safety Education & Training  Safety Devices  Type of devices: Left in bed,Call light within reach     Therapy Time   Individual Concurrent Group Co-treatment   Time In 1300         Time Out 1324         Minutes 24              This note serves as D/C summary if patient is discharged prior to next visit.   Soo Puente, PTA

## 2022-04-13 NOTE — PROGRESS NOTES
Occupational Therapy  Facility/Department: [facility] MED SURG  Daily Treatment Note  NAME: Vida Fowler  : 1945  MRN: [id number]    Date of Service: 2022    Discharge Recommendations:  Home with Home health OT       Assessment   Assessment: Pt agreeable to OT services. Pt transferred to sitting at EOB with MOD I. Pt ambulated to bathroom and toileted with CGA<>SBA using grab bar without verbal cuing. Pt transferred to shower with SBA. Pt demo good safety with transfer. Pt requires min assist to doff LB clothing. Pt completed shower seated on shower chair with CGA<>SBA for LB in standing. Pt holds onto grab bar during standing tasks for safety. Pt required CGA to initiate donning pants with c/o pain with bending. Pt has difficulty with bending to attempt LB dressing tasks. Pt come to stand from shower chair and ambulated to bed. Pt transferred to supine with MOD I. Patient Diagnosis(es): There were no encounter diagnoses. has a past medical history of Colitis, GERD (gastroesophageal reflux disease), Glaucoma, and PAD (peripheral artery disease) ([facility] [address] has a past surgical history that includes Tubal ligation; eye surgery; cyst removal; skin biopsy; and Total hip arthroplasty (Left, 2019). Restrictions  Restrictions/Precautions  Restrictions/Precautions: Weight Bearing,Fall Risk (NWB L LE)  Required Braces or Orthoses?: Yes  Lower Extremity Weight Bearing Restrictions  Left Lower Extremity Weight Bearing: Non Weight Bearing  Required Braces or Orthoses  Left Lower Extremity Brace:  (ELS brace locked in extension)  Subjective   General  Chart Reviewed: Yes  Patient assessed for rehabilitation services?: Yes  Family / Caregiver Present: No  Referring Practitioner: Magnus Girard PA-C  Diagnosis: Declining functional status; S/P ORIF left sacrum  Subjective  Subjective: Pt agreeable to OT services. Pt states she was at home and fell when she slipped out of her shoe.       Orientation Objective    ADL  UE Bathing: Setup  LE Bathing: Contact guard assistance  UE Dressing: Setup  LE Dressing: Contact guard assistance     Plan   Plan  Times per week: 3-5  Times per day: Daily  Plan weeks: 1-2  Current Treatment Recommendations: Strengthening,Endurance Training,Balance Training,Functional Mobility Training,Self-Care / ADL,Equipment Evaluation, Education, & procurement,Safety Education & Training    Goals  Short term goals  Time Frame for Short term goals: 2 weeks  Short term goal 1: Pt to complete bathing with MOD I. Short term goal 2: Pt to complete dressing with MOD I. Short term goal 3: Pt to tolerate x15 minutes of activity to increase functional activity tolerance. Short term goal 4: Pt to complete toileting with MOD I. Short term goal 5: Pt to complete oral hygiene standing at sink with no LOB MOD I. Therapy Time   Individual Concurrent Group Co-treatment   Time In 0933         Time Out 5657         Minutes 41              This note serves as a DC summary in the event of pt discharge.      Carolyn Robbins OTR/L

## 2022-04-14 PROCEDURE — 97116 GAIT TRAINING THERAPY: CPT

## 2022-04-14 PROCEDURE — 97535 SELF CARE MNGMENT TRAINING: CPT

## 2022-04-14 PROCEDURE — 97110 THERAPEUTIC EXERCISES: CPT

## 2022-04-14 PROCEDURE — 1200000002 HC SEMI PRIVATE SWING BED

## 2022-04-14 PROCEDURE — 6370000000 HC RX 637 (ALT 250 FOR IP): Performed by: PHYSICIAN ASSISTANT

## 2022-04-14 PROCEDURE — 6360000002 HC RX W HCPCS: Performed by: PHYSICIAN ASSISTANT

## 2022-04-14 RX ADMIN — Medication 1 CAPSULE: at 07:40

## 2022-04-14 RX ADMIN — OXYCODONE 5 MG: 5 TABLET ORAL at 20:44

## 2022-04-14 RX ADMIN — ENOXAPARIN SODIUM 30 MG: 100 INJECTION SUBCUTANEOUS at 20:43

## 2022-04-14 RX ADMIN — ACETAMINOPHEN 650 MG: 325 TABLET, FILM COATED ORAL at 20:43

## 2022-04-14 RX ADMIN — ENOXAPARIN SODIUM 30 MG: 100 INJECTION SUBCUTANEOUS at 07:42

## 2022-04-14 RX ADMIN — FAMOTIDINE 20 MG: 20 TABLET, FILM COATED ORAL at 20:43

## 2022-04-14 RX ADMIN — GABAPENTIN 100 MG: 100 CAPSULE ORAL at 20:43

## 2022-04-14 RX ADMIN — ACETAMINOPHEN 650 MG: 325 TABLET, FILM COATED ORAL at 13:51

## 2022-04-14 RX ADMIN — ACETAMINOPHEN 650 MG: 325 TABLET, FILM COATED ORAL at 07:41

## 2022-04-14 RX ADMIN — Medication 400 MG: at 07:41

## 2022-04-14 RX ADMIN — OXYCODONE HYDROCHLORIDE AND ACETAMINOPHEN 500 MG: 500 TABLET ORAL at 07:41

## 2022-04-14 RX ADMIN — ACETAMINOPHEN 650 MG: 325 TABLET, FILM COATED ORAL at 02:32

## 2022-04-14 RX ADMIN — Medication 10 MG: at 20:43

## 2022-04-14 RX ADMIN — CALCIUM 500 MG: 500 TABLET ORAL at 07:40

## 2022-04-14 RX ADMIN — FAMOTIDINE 20 MG: 20 TABLET, FILM COATED ORAL at 07:41

## 2022-04-14 RX ADMIN — FOLIC ACID TAB 400 MCG 0.8 MG: 400 TAB at 07:40

## 2022-04-14 RX ADMIN — MULTIPLE VITAMINS W/ MINERALS TAB 1 TABLET: TAB at 07:40

## 2022-04-14 RX ADMIN — OXYCODONE 5 MG: 5 TABLET ORAL at 02:33

## 2022-04-14 RX ADMIN — OXYCODONE 5 MG: 5 TABLET ORAL at 07:41

## 2022-04-14 ASSESSMENT — PAIN DESCRIPTION - LOCATION
LOCATION: BACK;LEG
LOCATION: BACK;LEG

## 2022-04-14 ASSESSMENT — PAIN SCALES - GENERAL
PAINLEVEL_OUTOF10: 7

## 2022-04-14 ASSESSMENT — PAIN DESCRIPTION - PAIN TYPE
TYPE: ACUTE PAIN
TYPE: ACUTE PAIN

## 2022-04-14 NOTE — CARE COORDINATION
Interdisciplinary rounding completed. Radha (provider rep), Kvng Orozco (), Nichole Roberts (nursing), Zakiya (PT), Carolyn (OT), Noman Veloz (pharmacy), and Radha (dietitian) all involved. Activities reviewed with OT. Interdisciplinary rounding completed. Radha (provider rep), Kvng Orozco (), Nichole Roberts (nursing), Zakiya (PT), Carolyn (OT), Noman Veloz (pharmacy), and Radha (dietitian) all involved. Activities reviewed with OT. Lives alone. Family assists. Has tub transfers bench, BSC,  RW, lift chair,  WC. [place] at [facility]. Pt toileted at [facility] with SBA. pt demo good safety awareness. Pt come to stand from [facility] with SBA and ambulated with RW CGA to shower chair. Pt transferred with CGA to shower chair using grab bar for stability. Pt completed UB bathing with NINO and LB bathing with SBA. Pt tolerated well. Pt required min A to initiate LB dressing on this date and CGA to complete task. Objective   Bed mobility  Rolling to Left: Modified independent  Rolling to Right: Modified independent  Supine to Sit: Modified independent  Scooting: Modified independent  Transfers  Sit to Stand: Supervision  Stand to sit: Supervision  Ambulation  Ambulation?: Yes  WB Status: NWB L LE  Ambulation 1  Surface: level tile  Device: Rolling Walker  Assistance: Stand by assistance  Distance: 65 feet wt: 124lb; Regular diet and has ONS ordered BID. Has brace on left leg, +1 edema on LLE. Pt has MVI ordered. Has abrasion noted on left ankle. Eating % of meals. Need to follow up on plan for Forteo injection with [employer].

## 2022-04-14 NOTE — PROGRESS NOTES
Physical Therapy  Facility/Department: [facility] MED SURG  Daily Treatment Note  NAME: Kayla South  : 1945  MRN: [id number]    Date of Service: 2022    Discharge Recommendations:  Continue to assess pending progress      Assessment   Assessment: Engaged patient in B LE therex in supine. Patient completed bed mobility tasks with Mod I.  Ambulated 65 feet with RW, ELS knee brace, NWB on L LE, and SBA. Patient transferred to the recliner with SBA after ambulation. REQUIRES PT FOLLOW UP: Yes  Activity Tolerance  Activity Tolerance: Patient Tolerated treatment well     Patient Diagnosis(es): There were no encounter diagnoses. has a past medical history of Colitis, GERD (gastroesophageal reflux disease), Glaucoma, and PAD (peripheral artery disease) ([facility] [address] has a past surgical history that includes Tubal ligation; eye surgery; cyst removal; skin biopsy; and Total hip arthroplasty (Left, 2019). Restrictions  Restrictions/Precautions  Restrictions/Precautions: Weight Bearing,Fall Risk (NWB L LE)  Required Braces or Orthoses?: Yes  Lower Extremity Weight Bearing Restrictions  Left Lower Extremity Weight Bearing: Non Weight Bearing  Required Braces or Orthoses  Left Lower Extremity Brace:  (ELS brace locked in extension)  Subjective   General  Chart Reviewed: Yes  Family / Caregiver Present: No  Subjective  Subjective: Patient states she hopes to get rid of the knee brace after she sees the orthopedist Monday.   Pain Screening  Patient Currently in Pain: Yes  Vital Signs  Patient Currently in Pain: Yes       Objective   Bed mobility  Rolling to Left: Modified independent  Rolling to Right: Modified independent  Supine to Sit: Modified independent  Scooting: Modified independent  Transfers  Sit to Stand: Supervision  Stand to sit: Supervision  Ambulation  Ambulation?: Yes  WB Status: NWB L LE  Ambulation 1  Surface: level tile  Device: Rolling Walker  Assistance: Stand by assistance  Distance: 65 feet     Balance  Posture: Good  Sitting - Static: Good  Sitting - Dynamic: Good  Standing - Static: Good  Standing - Dynamic: Good;-  Exercises  Quad Sets: 15  Gluteal Sets: 15  Hip Flexion: 15  Hip Abduction: 15 and 15 adduction squeezes  Knee Short Arc Quad: 15  Knee Active Range of Motion: 15  Ankle Pumps: 15      Goals  Long term goals  Time Frame for Long term goals : 10 days  Long term goal 1: Patient will perform all bed mobility with independence. Long term goal 2: Patient will perform sit to stand and transfers, NWB L LE, with RW and SBA. Long term goal 3: Patient will ambulate 50'x2, NWB L LE, with RW and SBA. Long term goal 4: Patient will demonstrate independence with HEP. Patient Goals   Patient goals : Return home. Plan    Plan  Times per week: 4-5x/week  Times per day: Daily  Current Treatment Recommendations: Janette Multani Training,Patient/Caregiver Education & Training,ROM,Balance Training,Gait Training,Home Exercise Program,Functional Mobility Training,Safety Education & Training  Safety Devices  Type of devices: Left in chair,Call light within reach     Therapy Time   Individual Concurrent Group Co-treatment   Time In 0932         Time Out 7333         Minutes 30              This note serves as D/C summary if patient is discharged prior to next visit.   Pradip Adams, PTA

## 2022-04-14 NOTE — PROGRESS NOTES
Occupational Therapy  Facility/Department: [address] [place] [address] MED SURG  Daily Treatment Note  NAME: Aleksey Spencer  : 1945  MRN: [id number]    Date of Service: 2022    Discharge Recommendations:  Home with Home health OT       Assessment   Assessment: Pt agreeable to OT services. Pt toileted at [facility] with SBA. pt demo good safety awareness. Pt come to stand from [facility] with SBA and ambulated with RW CGA to shower chair. Pt transferred with CGA to shower chair using grab bar for stability. Pt completed UB bathing with NINO and LB bathing with SBA. Pt tolerated well. Pt required min A to initiate LB dressing on this date and CGA to complete task. Pt returned to bed with call light in reach. Patient Diagnosis(es): There were no encounter diagnoses. has a past medical history of Colitis, GERD (gastroesophageal reflux disease), Glaucoma, and PAD (peripheral artery disease) ([facility] [address] has a past surgical history that includes Tubal ligation; eye surgery; cyst removal; skin biopsy; and Total hip arthroplasty (Left, 2019). Restrictions  Restrictions/Precautions  Restrictions/Precautions: Weight Bearing,Fall Risk (NWB L LE)  Required Braces or Orthoses?: Yes  Lower Extremity Weight Bearing Restrictions  Left Lower Extremity Weight Bearing: Non Weight Bearing  Required Braces or Orthoses  Left Lower Extremity Brace:  (ELS brace locked in extension)  Subjective   General  Chart Reviewed: Yes  Patient assessed for rehabilitation services?: Yes  Family / Caregiver Present: No  Referring Practitioner: Clayton Davidson PA-C  Diagnosis: Declining functional status; S/P ORIF left sacrum  Subjective  Subjective: Pt agreeable to OT services. Pt states she was at home and fell when she slipped out of her shoe.       Orientation     Objective    ADL  Grooming: Setup  UE Bathing: Setup  LE Bathing: Contact guard assistance;Minimal assistance  UE Dressing: Setup  LE Dressing: Contact guard assistance  Toileting: Stand by assistance           Bed mobility  Supine to Sit: Modified independent  Sit to Supine: Modified independent  Scooting: Modified independent  Transfers  Stand Pivot Transfers: Contact guard assistance  Sit to stand: Contact guard assistance        Plan   Plan  Times per week: 3-5  Times per day: Daily  Plan weeks: 1-2  Current Treatment Recommendations: Strengthening,Endurance Training,Balance Training,Functional Mobility Training,Self-Care / ADL,Equipment Evaluation, Education, & procurement,Safety Education & Training    Goals  Short term goals  Time Frame for Short term goals: 2 weeks  Short term goal 1: Pt to complete bathing with MOD I. Short term goal 2: Pt to complete dressing with MOD I. Short term goal 3: Pt to tolerate x15 minutes of activity to increase functional activity tolerance. Short term goal 4: Pt to complete toileting with MOD I. Short term goal 5: Pt to complete oral hygiene standing at sink with no LOB MOD I. Therapy Time   Individual Concurrent Group Co-treatment   Time In 3761         Time Out 1329         Minutes 33              This note serves as a DC summary in the event of pt discharge.      Carolyn Robbins OTR/L

## 2022-04-15 PROCEDURE — 6370000000 HC RX 637 (ALT 250 FOR IP): Performed by: PHYSICIAN ASSISTANT

## 2022-04-15 PROCEDURE — 6360000002 HC RX W HCPCS: Performed by: PHYSICIAN ASSISTANT

## 2022-04-15 PROCEDURE — 97535 SELF CARE MNGMENT TRAINING: CPT

## 2022-04-15 PROCEDURE — 97110 THERAPEUTIC EXERCISES: CPT

## 2022-04-15 PROCEDURE — 1200000002 HC SEMI PRIVATE SWING BED

## 2022-04-15 RX ADMIN — Medication 1 CAPSULE: at 08:19

## 2022-04-15 RX ADMIN — ENOXAPARIN SODIUM 30 MG: 100 INJECTION SUBCUTANEOUS at 08:20

## 2022-04-15 RX ADMIN — ACETAMINOPHEN 650 MG: 325 TABLET, FILM COATED ORAL at 08:17

## 2022-04-15 RX ADMIN — ACETAMINOPHEN 650 MG: 325 TABLET, FILM COATED ORAL at 22:02

## 2022-04-15 RX ADMIN — Medication 10 MG: at 22:01

## 2022-04-15 RX ADMIN — FAMOTIDINE 20 MG: 20 TABLET, FILM COATED ORAL at 22:02

## 2022-04-15 RX ADMIN — MULTIPLE VITAMINS W/ MINERALS TAB 1 TABLET: TAB at 08:19

## 2022-04-15 RX ADMIN — Medication 400 MG: at 08:18

## 2022-04-15 RX ADMIN — ACETAMINOPHEN 650 MG: 325 TABLET, FILM COATED ORAL at 16:40

## 2022-04-15 RX ADMIN — OXYCODONE HYDROCHLORIDE AND ACETAMINOPHEN 500 MG: 500 TABLET ORAL at 08:19

## 2022-04-15 RX ADMIN — FAMOTIDINE 20 MG: 20 TABLET, FILM COATED ORAL at 08:19

## 2022-04-15 RX ADMIN — OXYCODONE 5 MG: 5 TABLET ORAL at 08:18

## 2022-04-15 RX ADMIN — GABAPENTIN 100 MG: 100 CAPSULE ORAL at 22:03

## 2022-04-15 RX ADMIN — OXYCODONE 5 MG: 5 TABLET ORAL at 03:28

## 2022-04-15 RX ADMIN — FOLIC ACID TAB 400 MCG 0.8 MG: 400 TAB at 08:19

## 2022-04-15 RX ADMIN — OXYCODONE 5 MG: 5 TABLET ORAL at 22:02

## 2022-04-15 RX ADMIN — CALCIUM 500 MG: 500 TABLET ORAL at 08:19

## 2022-04-15 RX ADMIN — ACETAMINOPHEN 650 MG: 325 TABLET, FILM COATED ORAL at 03:27

## 2022-04-15 RX ADMIN — FERROUS SULFATE TAB EC 324 MG (65 MG FE EQUIVALENT) 324 MG: 324 (65 FE) TABLET DELAYED RESPONSE at 08:17

## 2022-04-15 RX ADMIN — ENOXAPARIN SODIUM 30 MG: 100 INJECTION SUBCUTANEOUS at 22:01

## 2022-04-15 ASSESSMENT — PAIN SCALES - GENERAL
PAINLEVEL_OUTOF10: 7
PAINLEVEL_OUTOF10: 8

## 2022-04-15 NOTE — PROGRESS NOTES
Occupational Therapy  Facility/Department: [address] [place] [address] MED SURG  Daily Treatment Note  NAME: Franchesca Romero  : 1945  MRN: [id number]    Date of Service: 4/15/2022    Discharge Recommendations:  Home with Home health OT       Assessment   Assessment: Pt agreeable to OT services. Pt doffed ELS brace and transferred to sitting at EOB MOD I. Pt come to stand at EOB with SBA<>CGA and ambulated to bathroom using RW maintaining WB status. Pt transferred to shower chair with CGA and completed UB bathing with NINO. Pt demo ability to complete LB bathing with SBA<>CGA in standing/seated using grab bar for stability. Pt donned UB clothing with NINO and LB dressing with CGA and min to initiate task. Pt come to stand with CGA using grab bar and ambulated to bed. Pt assisted with donning ELS brace. Pt left in bed with call light in reach. Patient Diagnosis(es): There were no encounter diagnoses. has a past medical history of Colitis, GERD (gastroesophageal reflux disease), Glaucoma, and PAD (peripheral artery disease) ([facility] [address] has a past surgical history that includes Tubal ligation; eye surgery; cyst removal; skin biopsy; and Total hip arthroplasty (Left, 2019). Restrictions  Restrictions/Precautions  Restrictions/Precautions: Weight Bearing,Fall Risk (NWB L LE)  Required Braces or Orthoses?: Yes  Lower Extremity Weight Bearing Restrictions  Left Lower Extremity Weight Bearing: Non Weight Bearing  Required Braces or Orthoses  Left Lower Extremity Brace:  (ELS brace locked in extension)  Subjective   General  Chart Reviewed: Yes  Patient assessed for rehabilitation services?: Yes  Family / Caregiver Present: No  Referring Practitioner: Reginald Chaudhari PA-C  Diagnosis: Declining functional status; S/P ORIF left sacrum  Subjective  Subjective: Pt agreeable to OT services. Pt states she was at home and fell when she slipped out of her shoe.       Orientation     Objective    ADL  Grooming: Setup  UE Bathing: Setup  LE Bathing: Contact guard assistance;Stand by assistance  UE Dressing: Setup  LE Dressing: Contact guard assistance;Minimal assistance           Bed mobility  Supine to Sit: Modified independent  Sit to Supine: Modified independent  Scooting: Modified independent  Transfers  Stand Pivot Transfers: Contact guard assistance  Sit to stand: Contact guard assistance        Plan   Plan  Times per week: 3-5  Times per day: Daily  Plan weeks: 1-2  Current Treatment Recommendations: Strengthening,Endurance Training,Balance Training,Functional Mobility Training,Self-Care / ADL,Equipment Evaluation, Education, & procurement,Safety Education & Training    Goals  Short term goals  Time Frame for Short term goals: 2 weeks  Short term goal 1: Pt to complete bathing with MOD I. Short term goal 2: Pt to complete dressing with MOD I. Short term goal 3: Pt to tolerate x15 minutes of activity to increase functional activity tolerance. Short term goal 4: Pt to complete toileting with MOD I. Short term goal 5: Pt to complete oral hygiene standing at sink with no LOB MOD I. Therapy Time   Individual Concurrent Group Co-treatment   Time In 0915         Time Out 4508         Minutes 34              This note serves as a DC summary in the event of pt discharge.      Carolyn Robbins, OTR/L

## 2022-04-15 NOTE — FLOWSHEET NOTE
Pt resting quietly in bed. Am assessment complete, respirations equal and unlabored. Pt took am medications without difficulty. Pt instructed to call out with any needs or concerns, none at this time. Reviewed plan of care with pt, verbalized understanding. Call bell within pt reach. 04/15/22 0830   Assessment   Charting Type Shift assessment   Neurological   Neuro (WDL) WDL   Level of Consciousness Alert (0)   Denzel Coma Scale   Eye Opening 4   Best Verbal Response 5   Best Motor Response 6   Denzel Coma Scale Score 15   HEENT   HEENT (WDL) X   Right Eye Impaired vision   Left Eye Impaired vision   Nose Intact   Tongue Pink & moist   Voice Normal   Mucous Membrane Moist;Pink; Intact   Teeth Dentures lower;Dentures upper   Respiratory   Respiratory (WDL) WDL   Respiratory Pattern Regular   Respiratory Depth Normal   Respiratory Quality/Effort Unlabored   Chest Assessment Chest expansion symmetrical;Trachea midline   L Breath Sounds Clear   R Breath Sounds Clear   Breath Sounds   Right Upper Lobe Clear   Right Middle Lobe Clear   Right Lower Lobe Clear   Left Upper Lobe Clear   Left Lower Lobe Clear   Cardiac   Cardiac (WDL) WDL   Cardiac Monitor   Telemetry Monitor On No   Gastrointestinal   Abdominal (WDL) WDL   RUQ Bowel Sounds Active   LUQ Bowel Sounds Active   RLQ Bowel Sounds Active   LLQ Bowel Sounds Active   Abdomen Inspection Flat;Soft   Tenderness Nontender   Peripheral Vascular   Peripheral Vascular (WDL) X   Edema Left lower extremity   LLE Edema Non-pitting;Trace   LLE Neurovascular Assessment   Capillary Refill Less than/equal to 3 seconds   Color Pink   Temperature Warm   L Pedal Pulse +2   Skin Color/Condition   Skin Color/Condition (WDL) WDL   Skin Color Pale   Skin Condition/Temp Warm;Dry   Skin Integrity   Skin Integrity (WDL) X  (surgical incision)   Skin Integrity Other (Comment)  (surgical incision)   Location left hip   Skin Integrity Site 2   Skin Integrity Location 2 Abrasion Location 2 left ankle   Preventative Dressing No   Skin Integrity Site 3   Skin Integrity Location 3 Other (Comment)  (healed surgical incision)    Location 3 left knee   Musculoskeletal   Musculoskeletal (WDL) X   RUE Full movement   LUE Full movement   RL Extremity Full movement   LL Extremity Limited movement   Musculoskeletal Details   L Knee Brace  (tibia fx)   Genitourinary   Genitourinary (WDL) WDL   Urine Assessment   Incontinence No   Urine Color Yellow/straw   Urine Appearance Clear   Urine Odor No odor   Psychosocial   Psychosocial (WDL) WDL

## 2022-04-15 NOTE — PROGRESS NOTES
Physical Therapy  Facility/Department: [facility] MED SURG  Daily Treatment Note  NAME: Sheela Amezquita  : 1945  MRN: [id number]    Date of Service: 4/15/2022    Discharge Recommendations:  Continue to assess pending progress      Assessment   Assessment: Engaged patient in B LE therex in supine. Patient completed bed mobility tasks with Mod I.  Ambulated 25 feet with RW, ELS knee brace, NWB on L LE, and SBA. Patient transferred to the recliner and lunch tray was brought in.  REQUIRES PT FOLLOW UP: Yes  Activity Tolerance  Activity Tolerance: Patient Tolerated treatment well     Patient Diagnosis(es): There were no encounter diagnoses. has a past medical history of Colitis, GERD (gastroesophageal reflux disease), Glaucoma, and PAD (peripheral artery disease) ([facility] [address] has a past surgical history that includes Tubal ligation; eye surgery; cyst removal; skin biopsy; and Total hip arthroplasty (Left, 2019). Restrictions  Restrictions/Precautions  Restrictions/Precautions: Weight Bearing,Fall Risk (NWB L LE)  Required Braces or Orthoses?: Yes  Lower Extremity Weight Bearing Restrictions  Left Lower Extremity Weight Bearing: Non Weight Bearing  Required Braces or Orthoses  Left Lower Extremity Brace:  (ELS brace locked in extension)  Subjective   General  Chart Reviewed: Yes  Family / Caregiver Present: No  Subjective  Subjective: Patient offers no new c/o. States she will get up and sit in the recliner for lunch.   Pain Screening  Patient Currently in Pain: Yes  Vital Signs  Patient Currently in Pain: Yes       Objective   Bed mobility  Rolling to Left: Modified independent  Supine to Sit: Modified independent  Scooting: Modified independent  Transfers  Sit to Stand: Supervision  Stand to sit: Supervision  Ambulation  Ambulation?: Yes  WB Status: NWB L LE  Ambulation 1  Surface: level tile  Device: Rolling Walker  Assistance: Stand by assistance  Distance: 25 feet     Balance  Posture: Good  Sitting - Static: Good  Sitting - Dynamic: Good  Standing - Static: Good  Standing - Dynamic: Good;-  Exercises  Quad Sets: 15  Gluteal Sets: 15  Hip Flexion: 15  Hip Abduction: 15 and 15 adduction squeezes  Knee Active Range of Motion: 15  Ankle Pumps: 15      Goals  Long term goals  Time Frame for Long term goals : 10 days  Long term goal 1: Patient will perform all bed mobility with independence. Long term goal 2: Patient will perform sit to stand and transfers, NWB L LE, with RW and SBA. Long term goal 3: Patient will ambulate 50'x2, NWB L LE, with RW and SBA. Long term goal 4: Patient will demonstrate independence with HEP. Patient Goals   Patient goals : Return home. Plan    Plan  Times per week: 4-5x/week  Times per day: Daily  Current Treatment Recommendations: [place] Training,Patient/Caregiver Education & Training,ROM,Balance Training,Gait Training,Home Exercise Program,Functional Mobility Training,Safety Education & Training  Safety Devices  Type of devices: Left in chair,Call light within reach (brother present)     Therapy Time   Individual Concurrent Group Co-treatment   Time In 18         Time Out 6520         Minutes 18              This note serves as D/C summary if patient is discharged prior to next visit.   Hayde Dillon, PTA

## 2022-04-16 PROCEDURE — 1200000002 HC SEMI PRIVATE SWING BED

## 2022-04-16 PROCEDURE — 6370000000 HC RX 637 (ALT 250 FOR IP): Performed by: PHYSICIAN ASSISTANT

## 2022-04-16 PROCEDURE — 6360000002 HC RX W HCPCS: Performed by: PHYSICIAN ASSISTANT

## 2022-04-16 RX ADMIN — FAMOTIDINE 20 MG: 20 TABLET, FILM COATED ORAL at 21:17

## 2022-04-16 RX ADMIN — OXYCODONE 5 MG: 5 TABLET ORAL at 21:18

## 2022-04-16 RX ADMIN — OXYCODONE 5 MG: 5 TABLET ORAL at 08:12

## 2022-04-16 RX ADMIN — FAMOTIDINE 20 MG: 20 TABLET, FILM COATED ORAL at 08:14

## 2022-04-16 RX ADMIN — CALCIUM 500 MG: 500 TABLET ORAL at 08:13

## 2022-04-16 RX ADMIN — OXYCODONE HYDROCHLORIDE AND ACETAMINOPHEN 500 MG: 500 TABLET ORAL at 08:14

## 2022-04-16 RX ADMIN — ACETAMINOPHEN 650 MG: 325 TABLET, FILM COATED ORAL at 13:50

## 2022-04-16 RX ADMIN — Medication 10 MG: at 21:17

## 2022-04-16 RX ADMIN — ENOXAPARIN SODIUM 30 MG: 100 INJECTION SUBCUTANEOUS at 21:16

## 2022-04-16 RX ADMIN — GABAPENTIN 100 MG: 100 CAPSULE ORAL at 21:17

## 2022-04-16 RX ADMIN — Medication 1 CAPSULE: at 08:13

## 2022-04-16 RX ADMIN — MULTIPLE VITAMINS W/ MINERALS TAB 1 TABLET: TAB at 08:13

## 2022-04-16 RX ADMIN — OXYCODONE 5 MG: 5 TABLET ORAL at 13:53

## 2022-04-16 RX ADMIN — ENOXAPARIN SODIUM 30 MG: 100 INJECTION SUBCUTANEOUS at 08:12

## 2022-04-16 RX ADMIN — FOLIC ACID TAB 400 MCG 0.8 MG: 400 TAB at 08:13

## 2022-04-16 RX ADMIN — ACETAMINOPHEN 650 MG: 325 TABLET, FILM COATED ORAL at 08:13

## 2022-04-16 RX ADMIN — Medication 400 MG: at 08:13

## 2022-04-16 RX ADMIN — ACETAMINOPHEN 650 MG: 325 TABLET, FILM COATED ORAL at 21:17

## 2022-04-16 ASSESSMENT — PAIN SCALES - GENERAL
PAINLEVEL_OUTOF10: 3
PAINLEVEL_OUTOF10: 7
PAINLEVEL_OUTOF10: 8
PAINLEVEL_OUTOF10: 4
PAINLEVEL_OUTOF10: 7

## 2022-04-16 NOTE — PLAN OF CARE
Problem: Skin Integrity:  Goal: Will show no infection signs and symptoms  Description: Will show no infection signs and symptoms  Outcome: Ongoing     Problem: Safety:  Goal: Free from accidental physical injury  Description: Free from accidental physical injury  Outcome: Ongoing     Problem: Daily Care:  Goal: Daily care needs are met  Description: Daily care needs are met  Outcome: Ongoing

## 2022-04-16 NOTE — FLOWSHEET NOTE
04/16/22 0828   Assessment   Charting Type Shift assessment   Neurological   Neuro (WDL) WDL   Level of Consciousness Alert (0)   Denzel Coma Scale   Eye Opening 4   Best Verbal Response 5   Best Motor Response 6   Denzel Coma Scale Score 15   HEENT   HEENT (WDL) X   Right Eye Impaired vision   Left Eye Impaired vision   Nose Intact   Tongue Pink & moist   Voice Normal   Mucous Membrane Moist;Pink; Intact   Teeth Dentures lower;Dentures upper   Respiratory   Respiratory (WDL) WDL   Respiratory Pattern Regular   Respiratory Depth Normal   Respiratory Quality/Effort Unlabored   Chest Assessment Chest expansion symmetrical;Trachea midline   L Breath Sounds Clear   R Breath Sounds Clear   Breath Sounds   Right Upper Lobe Clear   Right Middle Lobe Clear   Right Lower Lobe Clear   Left Upper Lobe Clear   Left Lower Lobe Clear   Cardiac   Cardiac (WDL) WDL   Cardiac Monitor   Telemetry Monitor On No   Gastrointestinal   Abdominal (WDL) WDL   RUQ Bowel Sounds Active   LUQ Bowel Sounds Active   RLQ Bowel Sounds Active   LLQ Bowel Sounds Active   Abdomen Inspection Flat;Soft   Tenderness Nontender   Peripheral Vascular   Peripheral Vascular (WDL) X   Edema Left lower extremity   LLE Edema Non-pitting;Trace   LLE Neurovascular Assessment   Capillary Refill Less than/equal to 3 seconds   Color Pink   Temperature Warm   L Pedal Pulse +2   Skin Color/Condition   Skin Color/Condition (WDL) WDL   Skin Integrity   Skin Integrity (WDL) X  (surgical incision)   Skin Integrity Intact  (surg incision)   Location left hip   Preventative Dressing Yes  (steri-strip)   Assessed this shift? Yes   Skin Integrity Site 2   Skin Integrity Location 2 Abrasion   Location 2 left ankle   Preventative Dressing No   Assessed this shift?  Yes   Musculoskeletal   Musculoskeletal (WDL) X   RUE Full movement   LUE Full movement   RL Extremity Full movement   LL Extremity Limited movement   Musculoskeletal Details   L Knee Immobilizer  (tibia fx) HPI Comments: Shama Gill is a 23year old male who presents to the ED with a c/o sudden onset of N&V, dizziness, and \"seeing spots. \"  Pt admits to a Hx of seizures, and has seen spots in the past.  Mother states his physican has advised her to give another \"half tab\" of medication when he sees spots, which she did. Medics deny seizure and post ictal state on arrival.  They did administer 4 mg of Zofran en route. Pt states nothing has made better or worse. Denies current pain. Patient is a 23 y.o. male presenting with vomiting. The history is provided by the patient and the EMS personnel. History limited by: No communication barrier. Vomiting    This is a new problem. The current episode started 3 to 5 hours ago. The problem has not changed since onset. The emesis has an appearance of stomach contents. There has been no fever. Past Medical History:   Diagnosis Date    Convulsions, epileptic ([address]     Hallucination, visual     Learning difficulty     Refractory epilepsy ([address] 2003       History reviewed. No pertinent surgical history. Family History:   Problem Relation Age of Onset    No Known Problems Mother     No Known Problems Sister     No Known Problems Brother     Cancer Maternal Uncle     Seizures Maternal Uncle     Seizures Paternal Uncle     Cancer Maternal Grandmother     Diabetes Maternal Grandfather     High Cholesterol Maternal Grandfather     Seizures Other      Paternal Cousin, male    No Known Problems Sister     Cancer Other        Social History     Social History    Marital status: SINGLE     Spouse name: N/A    Number of children: N/A    Years of education: N/A     Occupational History    Not on file.      Social History Main Topics    Smoking status: Never Smoker    Smokeless tobacco: Never Used    Alcohol use No    Drug use: No    Sexual activity: Not Currently     Other Topics Concern    Not on file     Social History Narrative         ALLERGIES: Genitourinary   Genitourinary (WDL) WDL   Urine Assessment   Incontinence No   Urine Color Yellow/straw   Urine Appearance Clear   Urine Odor No odor   Psychosocial   Psychosocial (WDL) WDL Review of patient's allergies indicates no known allergies. Review of Systems   Constitutional: Negative. HENT: Negative. Eyes: Negative. Respiratory: Negative. Cardiovascular: Negative. Gastrointestinal: Positive for vomiting. Endocrine: Negative. Genitourinary: Negative. Musculoskeletal: Negative. Skin: Negative. Allergic/Immunologic: Negative. Neurological: Negative. Hematological: Negative. Psychiatric/Behavioral: Negative. Vitals:    11/23/17 0129 11/23/17 0145   BP: (!) 118/95 112/67   Pulse: (!) 115    Resp: 18    Temp: 98 °F (36.7 °C)    SpO2: 98% 95%            Physical Exam   Constitutional: He is oriented to person, place, and time. He appears well-developed and well-nourished. No distress. HENT:   Head: Normocephalic and atraumatic. Eyes: Pupils are equal, round, and reactive to light. No nystagmus. Neck: Normal range of motion. Neck supple. Cardiovascular: Normal rate and regular rhythm. Pulmonary/Chest: Effort normal and breath sounds normal. He has no wheezes. He has no rales. Abdominal: Soft. Bowel sounds are normal. There is no tenderness. There is no rebound. Genitourinary:   Genitourinary Comments: NE   Musculoskeletal: Normal range of motion. Neurological: He is alert and oriented to person, place, and time. No cranial nerve deficit. He exhibits normal muscle tone. Coordination normal.   No facial drooping or slurring of speech. appropriate neuromotor response in bilateral upper and lower extremities. Skin: Skin is warm and dry. Psychiatric: He has a normal mood and affect. Pt appears to be at his baseline. Nursing note and vitals reviewed. MDM  Number of Diagnoses or Management Options  History of seizure:   Nausea and vomiting in adult patient:   Diagnosis management comments: PROGRESS NOTE:B  Pt has had no emesis events. States he feels better. Will send home with Zofran and follow up with PCP.   Gwendolynn Klinefelter Joanie Lopes NP  3:18 AM           Amount and/or Complexity of Data Reviewed  Clinical lab tests: ordered and reviewed    Risk of Complications, Morbidity, and/or Mortality  Presenting problems: low  Diagnostic procedures: low  Management options: low    Patient Progress  Patient progress: stable    ED Course       Procedures    Diagnosis:   1. Nausea and vomiting in adult patient    2. History of seizure          Disposition:   Discharged to Home. Follow-up Information     Follow up With Details Comments Contact Info    Ani Schaeffer MD Call on Friday for an appointment. [phone]  [phone]            Patient's Medications   Start Taking    ONDANSETRON (ZOFRAN ODT) 4 MG DISINTEGRATING TABLET    Take 1 Tab by mouth every eight (8) hours as needed for Nausea. Continue Taking    CARBAMAZEPINE XR (TEGRETOL XR) 400 MG SR TABLET    Take 1 Tab by mouth two (2) times a day. LAMOTRIGINE (LAMICTAL) 150 MG TABLET    Take 2 Tabs by mouth two (2) times a day. LEVETIRACETAM (KEPPRA) 500 MG TABLET    Take 1 Tab by mouth two (2) times a day.    These Medications have changed    No medications on file   Stop Taking    No medications on file

## 2022-04-17 PROCEDURE — 6360000002 HC RX W HCPCS: Performed by: PHYSICIAN ASSISTANT

## 2022-04-17 PROCEDURE — 6370000000 HC RX 637 (ALT 250 FOR IP): Performed by: PHYSICIAN ASSISTANT

## 2022-04-17 PROCEDURE — 1200000002 HC SEMI PRIVATE SWING BED

## 2022-04-17 RX ADMIN — ACETAMINOPHEN 650 MG: 325 TABLET, FILM COATED ORAL at 20:54

## 2022-04-17 RX ADMIN — ACETAMINOPHEN 650 MG: 325 TABLET, FILM COATED ORAL at 06:40

## 2022-04-17 RX ADMIN — ACETAMINOPHEN 650 MG: 325 TABLET, FILM COATED ORAL at 15:44

## 2022-04-17 RX ADMIN — MULTIPLE VITAMINS W/ MINERALS TAB 1 TABLET: TAB at 08:26

## 2022-04-17 RX ADMIN — CALCIUM 500 MG: 500 TABLET ORAL at 08:26

## 2022-04-17 RX ADMIN — OXYCODONE 5 MG: 5 TABLET ORAL at 08:30

## 2022-04-17 RX ADMIN — Medication 1 CAPSULE: at 08:27

## 2022-04-17 RX ADMIN — ENOXAPARIN SODIUM 30 MG: 100 INJECTION SUBCUTANEOUS at 08:26

## 2022-04-17 RX ADMIN — ENOXAPARIN SODIUM 30 MG: 100 INJECTION SUBCUTANEOUS at 20:55

## 2022-04-17 RX ADMIN — FAMOTIDINE 20 MG: 20 TABLET, FILM COATED ORAL at 20:55

## 2022-04-17 RX ADMIN — GABAPENTIN 100 MG: 100 CAPSULE ORAL at 20:55

## 2022-04-17 RX ADMIN — Medication 400 MG: at 08:26

## 2022-04-17 RX ADMIN — FERROUS SULFATE TAB EC 324 MG (65 MG FE EQUIVALENT) 324 MG: 324 (65 FE) TABLET DELAYED RESPONSE at 08:26

## 2022-04-17 RX ADMIN — Medication 10 MG: at 20:55

## 2022-04-17 RX ADMIN — FAMOTIDINE 20 MG: 20 TABLET, FILM COATED ORAL at 08:26

## 2022-04-17 RX ADMIN — OXYCODONE 5 MG: 5 TABLET ORAL at 20:55

## 2022-04-17 RX ADMIN — FOLIC ACID TAB 400 MCG 0.8 MG: 400 TAB at 08:26

## 2022-04-17 RX ADMIN — OXYCODONE HYDROCHLORIDE AND ACETAMINOPHEN 500 MG: 500 TABLET ORAL at 08:26

## 2022-04-17 ASSESSMENT — PAIN SCALES - GENERAL
PAINLEVEL_OUTOF10: 8
PAINLEVEL_OUTOF10: 8
PAINLEVEL_OUTOF10: 7

## 2022-04-17 NOTE — FLOWSHEET NOTE
04/17/22 0845   Assessment   Charting Type Shift assessment   Neurological   Neuro (WDL) WDL   Level of Consciousness Alert (0)   Denzel Coma Scale   Eye Opening 4   Best Verbal Response 5   Best Motor Response 6   Denzel Coma Scale Score 15   HEENT   HEENT (WDL) WDL   Right Eye Impaired vision   Left Eye Impaired vision   Nose Intact   Tongue Pink & moist   Voice Normal   Mucous Membrane Moist;Pink; Intact   Teeth Dentures lower;Dentures upper   Respiratory   Respiratory (WDL) WDL   Respiratory Pattern Regular   Respiratory Depth Normal   Respiratory Quality/Effort Unlabored   Chest Assessment Chest expansion symmetrical;Trachea midline   L Breath Sounds Clear   R Breath Sounds Clear   Breath Sounds   Right Upper Lobe Clear   Right Middle Lobe Clear   Right Lower Lobe Clear   Left Upper Lobe Clear   Left Lower Lobe Clear   Cardiac   Cardiac (WDL) WDL   Cardiac Monitor   Telemetry Monitor On No   Gastrointestinal   Abdominal (WDL) WDL   RUQ Bowel Sounds Active   LUQ Bowel Sounds Active   RLQ Bowel Sounds Active   LLQ Bowel Sounds Active   Abdomen Inspection Flat;Soft   GI Symptoms Diarrhea   Tenderness Nontender   Peripheral Vascular   Peripheral Vascular (WDL) X   Edema Left lower extremity   LLE Edema Non-pitting;Trace   LLE Neurovascular Assessment   Capillary Refill Less than/equal to 3 seconds   Color Pink   Temperature Warm   L Pedal Pulse +2   Skin Color/Condition   Skin Color/Condition (WDL) WDL   Skin Color Pale   Skin Condition/Temp Dry; Warm   Skin Integrity   Skin Integrity (WDL) X  (surgical incision)   Skin Integrity Intact  (surg incision)   Location left hip   Preventative Dressing   (steri-strip)   Assessed this shift? Yes   Skin Integrity Site 2   Skin Integrity Location 2 Abrasion   Location 2 left ankle   Preventative Dressing No   Assessed this shift?  Yes   Musculoskeletal   Musculoskeletal (WDL) X   RUE Full movement   LUE Full movement   RL Extremity Full movement   LL Extremity Limited movement   Musculoskeletal Details   L Knee Immobilizer  (tibia fx)   Genitourinary   Genitourinary (WDL) WDL   Urine Assessment   Incontinence No   Urine Color Yellow/straw   Urine Appearance Clear   Urine Odor No odor   Anus/Rectum   Anus/Rectum (WDL) X   Evaluation Hemorrhoids   Psychosocial   Psychosocial (WDL) WDL

## 2022-04-17 NOTE — PLAN OF CARE
Problem: Falls - Risk of:  Goal: Will remain free from falls  Description: Will remain free from falls  Outcome: Ongoing  Goal: Absence of physical injury  Description: Absence of physical injury  Outcome: Ongoing     Problem: Skin Integrity:  Goal: Will show no infection signs and symptoms  Description: Will show no infection signs and symptoms  Outcome: Ongoing  Goal: Absence of new skin breakdown  Description: Absence of new skin breakdown  Outcome: Ongoing     Problem: Pain:  Goal: Pain level will decrease  Description: Pain level will decrease  Outcome: Ongoing  Goal: Control of acute pain  Description: Control of acute pain  4/16/2022 2207 by Jayjay Robles RN  Outcome: Ongoing  4/16/2022 0901 by Jil Iverson RN  Outcome: Met This Shift  Goal: Control of chronic pain  Description: Control of chronic pain  Outcome: Ongoing  Goal: Patient's pain/discomfort is manageable  Description: Patient's pain/discomfort is manageable  Outcome: Ongoing     Problem: Neurological  Goal: Maximum potential motor/sensory/cognitive function  Outcome: Ongoing     Problem: Cardiovascular  Goal: No DVT, peripheral vascular complications  Outcome: Ongoing  Goal: Hemodynamic stability  Outcome: Ongoing  Goal: Anticoagulate/Hct stable  Outcome: Ongoing  Goal: Agreement to quit smoking  Outcome: Ongoing  Goal: Weight maintained or lost  Outcome: Ongoing  Goal: Understanding of dietary restrictions  Outcome: Ongoing     Problem: Infection:  Goal: Will remain free from infection  Description: Will remain free from infection  Outcome: Ongoing     Problem: Safety:  Goal: Free from accidental physical injury  Description: Free from accidental physical injury  Outcome: Ongoing  Goal: Free from intentional harm  Description: Free from intentional harm  Outcome: Ongoing     Problem: Daily Care:  Goal: Daily care needs are met  Description: Daily care needs are met  Outcome: Ongoing     Problem: Skin Integrity:  Goal: Skin integrity will stabilize  Description: Skin integrity will stabilize  Outcome: Ongoing     Problem: Discharge Planning:  Goal: Patients continuum of care needs are met  Description: Patients continuum of care needs are met  Outcome: Ongoing

## 2022-04-18 LAB
A/G RATIO: 1.6 (ref 0.8–2)
ALBUMIN SERPL-MCNC: 3.4 G/DL (ref 3.4–4.8)
ALP BLD-CCNC: 138 U/L (ref 25–100)
ALT SERPL-CCNC: 27 U/L (ref 4–36)
ANION GAP SERPL CALCULATED.3IONS-SCNC: 9 MMOL/L (ref 3–16)
AST SERPL-CCNC: 25 U/L (ref 8–33)
BILIRUB SERPL-MCNC: 0.3 MG/DL (ref 0.3–1.2)
BUN BLDV-MCNC: 14 MG/DL (ref 6–20)
CALCIUM SERPL-MCNC: 9.1 MG/DL (ref 8.5–10.5)
CHLORIDE BLD-SCNC: 104 MMOL/L (ref 98–107)
CO2: 27 MMOL/L (ref 20–30)
CREAT SERPL-MCNC: 0.6 MG/DL (ref 0.4–1.2)
GFR AFRICAN AMERICAN: >59
GFR NON-AFRICAN AMERICAN: >60
GLOBULIN: 2.1 G/DL
GLUCOSE BLD-MCNC: 94 MG/DL (ref 74–106)
HCT VFR BLD CALC: 36.6 % (ref 37–47)
HEMOGLOBIN: 11.6 G/DL (ref 11.5–16.5)
MAGNESIUM: 1.9 MG/DL (ref 1.7–2.4)
MCH RBC QN AUTO: 30.1 PG (ref 27–32)
MCHC RBC AUTO-ENTMCNC: 31.7 G/DL (ref 31–35)
MCV RBC AUTO: 95.1 FL (ref 80–100)
PDW BLD-RTO: 13.3 % (ref 11–16)
PLATELET # BLD: 241 K/UL (ref 150–400)
PMV BLD AUTO: 8.5 FL (ref 6–10)
POTASSIUM REFLEX MAGNESIUM: 3.2 MMOL/L (ref 3.4–5.1)
RBC # BLD: 3.85 M/UL (ref 3.8–5.8)
SODIUM BLD-SCNC: 140 MMOL/L (ref 136–145)
TOTAL PROTEIN: 5.5 G/DL (ref 6.4–8.3)
WBC # BLD: 4.7 K/UL (ref 4–11)

## 2022-04-18 PROCEDURE — 6360000002 HC RX W HCPCS: Performed by: PHYSICIAN ASSISTANT

## 2022-04-18 PROCEDURE — 97116 GAIT TRAINING THERAPY: CPT

## 2022-04-18 PROCEDURE — 1200000002 HC SEMI PRIVATE SWING BED

## 2022-04-18 PROCEDURE — 6370000000 HC RX 637 (ALT 250 FOR IP): Performed by: PHYSICIAN ASSISTANT

## 2022-04-18 PROCEDURE — 97110 THERAPEUTIC EXERCISES: CPT

## 2022-04-18 PROCEDURE — 83735 ASSAY OF MAGNESIUM: CPT

## 2022-04-18 PROCEDURE — 36415 COLL VENOUS BLD VENIPUNCTURE: CPT

## 2022-04-18 PROCEDURE — 97803 MED NUTRITION INDIV SUBSEQ: CPT

## 2022-04-18 PROCEDURE — 85027 COMPLETE CBC AUTOMATED: CPT

## 2022-04-18 PROCEDURE — 97530 THERAPEUTIC ACTIVITIES: CPT

## 2022-04-18 PROCEDURE — 80053 COMPREHEN METABOLIC PANEL: CPT

## 2022-04-18 RX ORDER — POTASSIUM CHLORIDE 750 MG/1
40 CAPSULE, EXTENDED RELEASE ORAL ONCE
Status: COMPLETED | OUTPATIENT
Start: 2022-04-18 | End: 2022-04-18

## 2022-04-18 RX ADMIN — OXYCODONE 5 MG: 5 TABLET ORAL at 21:05

## 2022-04-18 RX ADMIN — ACETAMINOPHEN 650 MG: 325 TABLET, FILM COATED ORAL at 05:13

## 2022-04-18 RX ADMIN — Medication 10 MG: at 21:04

## 2022-04-18 RX ADMIN — POTASSIUM CHLORIDE 40 MEQ: 750 CAPSULE, EXTENDED RELEASE ORAL at 11:04

## 2022-04-18 RX ADMIN — OXYCODONE HYDROCHLORIDE AND ACETAMINOPHEN 500 MG: 500 TABLET ORAL at 11:03

## 2022-04-18 RX ADMIN — CALCIUM 500 MG: 500 TABLET ORAL at 11:03

## 2022-04-18 RX ADMIN — DOCUSATE SODIUM 50MG AND SENNOSIDES 8.6MG 1 TABLET: 8.6; 5 TABLET, FILM COATED ORAL at 11:04

## 2022-04-18 RX ADMIN — GABAPENTIN 100 MG: 100 CAPSULE ORAL at 21:04

## 2022-04-18 RX ADMIN — ENOXAPARIN SODIUM 30 MG: 100 INJECTION SUBCUTANEOUS at 11:04

## 2022-04-18 RX ADMIN — OXYCODONE 5 MG: 5 TABLET ORAL at 05:13

## 2022-04-18 RX ADMIN — MULTIPLE VITAMINS W/ MINERALS TAB 1 TABLET: TAB at 11:03

## 2022-04-18 RX ADMIN — ACETAMINOPHEN 650 MG: 325 TABLET, FILM COATED ORAL at 21:04

## 2022-04-18 RX ADMIN — Medication 400 MG: at 11:03

## 2022-04-18 RX ADMIN — FAMOTIDINE 20 MG: 20 TABLET, FILM COATED ORAL at 11:04

## 2022-04-18 RX ADMIN — ACETAMINOPHEN 650 MG: 325 TABLET, FILM COATED ORAL at 13:46

## 2022-04-18 RX ADMIN — Medication 1 CAPSULE: at 11:04

## 2022-04-18 RX ADMIN — FOLIC ACID TAB 400 MCG 0.8 MG: 400 TAB at 11:03

## 2022-04-18 RX ADMIN — ENOXAPARIN SODIUM 30 MG: 100 INJECTION SUBCUTANEOUS at 21:04

## 2022-04-18 RX ADMIN — FAMOTIDINE 20 MG: 20 TABLET, FILM COATED ORAL at 21:04

## 2022-04-18 RX ADMIN — ACETAMINOPHEN 650 MG: 325 TABLET, FILM COATED ORAL at 02:10

## 2022-04-18 ASSESSMENT — PAIN SCALES - GENERAL
PAINLEVEL_OUTOF10: 2
PAINLEVEL_OUTOF10: 0
PAINLEVEL_OUTOF10: 7
PAINLEVEL_OUTOF10: 7
PAINLEVEL_OUTOF10: 2

## 2022-04-18 NOTE — PROGRESS NOTES
Nutrition Assessment     Type and Reason for Visit:  (follow up)    Nutrition Recommendations/Plan:   1. Will add ONS at breakfast, and try clear Ensure while patient with GI issues. 2. Continue with current diet and encourage intakes as appropriate. Nutrition Assessment:  Pt at moderate risk for ongoing nutritional compromise r/t her colitis has started back up and intakes decreased slightly. Pt states as soon as she eats having to run to bathroom. Malnutrition Assessment:  Malnutrition Status: At risk for malnutrition (Comment) (related to hx of colitis)    Estimated Daily Nutrient Needs:  Energy (kcal): 8229-1624 (27-30 kcal/kg cbw); Weight Used for Energy Requirements:  Current     Protein (g): 68-79 (1.2-1.4 gm/kg cbw); Weight Used for Protein Requirements:  Current        Fluid (ml/day): 6227-2027; Weight Used for Fluid Requirements:  1 ml/kcal      Nutrition Related Findings: Pt continues underweight, and has non pitting trace edema LLE. Bottom sore from having to use bathroom. Pt has MVI, Vit C, Mg-ox, Fe, Chris, folic acid  ordered. Labs: Potassium decreased, alk phos elevated. Current Nutrition Therapies:    ADULT DIET;  Regular  ADULT ORAL NUTRITION SUPPLEMENT; Lunch, Dinner, Breakfast; Standard High Calorie/High Protein Oral Supplement    Anthropometric Measures:  · Height: 5' 7\" (170.2 cm)  · Current Body Wt: 126 lb 4.8 oz (57.3 kg)   · BMI: 19.8    Nutrition Diagnosis:   · Predicted inadequate energy intake related to altered GI function as evidenced by intake 51-75%,BMI,other (comment) (colitis, large loose stools)      Nutrition Interventions:   Food and/or Nutrient Delivery:  Continue Current Diet,Modify Oral Nutrition Supplement  Nutrition Education/Counseling:  Education completed (spoke with patient about the importance of ONS with condition and she did understand.)   Coordination of Nutrition Care:  Interdisciplinary Rounds    Goals:  to meet est needs for age/condition Nutrition Monitoring and Evaluation:   Behavioral-Environmental Outcomes:      Food/Nutrient Intake Outcomes:  Food and Nutrient Intake,Supplement Intake,Vitamin/Mineral Intake  Physical Signs/Symptoms Outcomes:  Biochemical Data,GI Status,Fluid Status or Edema,Skin,Weight     Discharge Planning:    Continue Oral Nutrition Supplement,Continue current diet     Electronically signed by Eddie Olmedo MS, RD, LD on 4/18/22 at 5:39 PM EDT

## 2022-04-18 NOTE — CARE COORDINATION
OT attempted to provide skilled interventions prior to lunch & at 3:06p; pt refused participation in services reporting fatigue from apt earlier in day & is having an upset stomach. Will attempt to provide services at later date.

## 2022-04-18 NOTE — PLAN OF CARE
Problem: Falls - Risk of:  Goal: Will remain free from falls  Description: Will remain free from falls  Outcome: Ongoing  Goal: Absence of physical injury  Description: Absence of physical injury  Outcome: Ongoing     Problem: Skin Integrity:  Goal: Will show no infection signs and symptoms  Description: Will show no infection signs and symptoms  Outcome: Ongoing  Goal: Absence of new skin breakdown  Description: Absence of new skin breakdown  Outcome: Ongoing     Problem: Pain:  Goal: Pain level will decrease  Description: Pain level will decrease  Outcome: Ongoing  Goal: Control of acute pain  Description: Control of acute pain  Outcome: Ongoing  Goal: Control of chronic pain  Description: Control of chronic pain  Outcome: Ongoing  Goal: Patient's pain/discomfort is manageable  Description: Patient's pain/discomfort is manageable  Outcome: Ongoing     Problem: Neurological  Goal: Maximum potential motor/sensory/cognitive function  Outcome: Ongoing     Problem: Cardiovascular  Goal: No DVT, peripheral vascular complications  Outcome: Ongoing  Goal: Hemodynamic stability  Outcome: Ongoing  Goal: Anticoagulate/Hct stable  Outcome: Ongoing  Goal: Agreement to quit smoking  Outcome: Ongoing  Goal: Weight maintained or lost  Outcome: Ongoing  Goal: Understanding of dietary restrictions  Outcome: Ongoing     Problem: Infection:  Goal: Will remain free from infection  Description: Will remain free from infection  Outcome: Ongoing     Problem: Safety:  Goal: Free from accidental physical injury  Description: Free from accidental physical injury  Outcome: Ongoing  Goal: Free from intentional harm  Description: Free from intentional harm  Outcome: Ongoing     Problem: Daily Care:  Goal: Daily care needs are met  Description: Daily care needs are met  Outcome: Ongoing     Problem: Skin Integrity:  Goal: Skin integrity will stabilize  Description: Skin integrity will stabilize  Outcome: Ongoing     Problem: Discharge Planning:  Goal: Patients continuum of care needs are met  Description: Patients continuum of care needs are met  Outcome: Ongoing

## 2022-04-18 NOTE — PROGRESS NOTES
Physical Therapy  Facility/Department: [facility] MED SURG  Daily Treatment Note  NAME: Ruth Jolley  : 1945  MRN: [id number]    Date of Service: 2022    Discharge Recommendations:  Continue to assess pending progress      Assessment   Assessment: Patient saw MD this morning and had L knee ELS brace d/c'd and is now TTWB L LE. Engaged patient in B LE therex in supine and sitting with focus on L knee AROM. Patient completes bed mobility tasks with Mod I.  Ambulated 60 feet x 2 with RW, L LE TTWB, and SBA. Patient transferred back to bed. States her bottom is very sore and she doesn't want to sit up in the recliner. REQUIRES PT FOLLOW UP: Yes  Activity Tolerance  Activity Tolerance: Patient Tolerated treatment well     Patient Diagnosis(es): There were no encounter diagnoses. has a past medical history of Colitis, GERD (gastroesophageal reflux disease), Glaucoma, and PAD (peripheral artery disease) ([facility] [address] has a past surgical history that includes Tubal ligation; eye surgery; cyst removal; skin biopsy; and Total hip arthroplasty (Left, 2019). Restrictions  Restrictions/Precautions  Restrictions/Precautions: Weight Bearing  Required Braces or Orthoses?: No (ELS knee brace d/c'd 22)  Lower Extremity Weight Bearing Restrictions  Left Lower Extremity Weight Bearing: Toe Touch Weight Bearing  Required Braces or Orthoses  Left Lower Extremity Brace:  (ELS brace locked in extension)  Subjective   General  Chart Reviewed: Yes  Family / Caregiver Present: No  Subjective  Subjective: Patient saw MD this morning and was cleared for L LE TTWB. States she's tired from the trip and sore in her hips and shoulders.      Objective   Bed mobility  Rolling to Left: Modified independent  Rolling to Right: Modified independent  Supine to Sit: Modified independent  Sit to Supine: Modified independent  Scooting: Modified independent  Transfers  Sit to Stand: Supervision  Stand to sit: Supervision  Ambulation  Ambulation?: Yes  WB Status: TTWB L LE  Ambulation 1  Surface: level tile  Device: Rolling Walker  Assistance: Stand by assistance  Distance: 60 feet x 2     Balance  Posture: Good  Sitting - Static: Good  Sitting - Dynamic: Good  Standing - Static: Good  Standing - Dynamic: Good;-  Exercises  Quad Sets: 15  Hip Flexion: 15  Hip Abduction: 15 and 15 adduction squeezes  Knee Long Arc Quad: 15  Knee Active Range of Motion: 15  Ankle Pumps: 15      Goals  Long term goals  Time Frame for Long term goals : 10 days  Long term goal 1: Patient will perform all bed mobility with independence. Long term goal 2: Patient will perform sit to stand and transfers, NWB L LE, with RW and SBA. Long term goal 3: Patient will ambulate 50'x2, NWB L LE, with RW and SBA. Long term goal 4: Patient will demonstrate independence with HEP. Patient Goals   Patient goals : Return home. Plan    Plan  Times per week: 4-5x/week  Times per day: Daily  Current Treatment Recommendations: Gerda Peppers Training,Patient/Caregiver Education & Training,ROM,Balance Training,Gait Training,Home Exercise Program,Functional Mobility Training,Safety Education & Training  Safety Devices  Type of devices: Left in bed,Call light within reach     Therapy Time   Individual Concurrent Group Co-treatment   Time In 2129         Time Out 9745         Minutes 40              This note serves as D/C summary if patient is discharged prior to next visit.   Larry Toure, PTA

## 2022-04-19 PROCEDURE — 97116 GAIT TRAINING THERAPY: CPT

## 2022-04-19 PROCEDURE — 6370000000 HC RX 637 (ALT 250 FOR IP): Performed by: PHYSICIAN ASSISTANT

## 2022-04-19 PROCEDURE — 6360000002 HC RX W HCPCS: Performed by: PHYSICIAN ASSISTANT

## 2022-04-19 PROCEDURE — 97535 SELF CARE MNGMENT TRAINING: CPT

## 2022-04-19 PROCEDURE — 97530 THERAPEUTIC ACTIVITIES: CPT

## 2022-04-19 PROCEDURE — 97110 THERAPEUTIC EXERCISES: CPT

## 2022-04-19 PROCEDURE — 1200000002 HC SEMI PRIVATE SWING BED

## 2022-04-19 PROCEDURE — 99308 SBSQ NF CARE LOW MDM 20: CPT | Performed by: INTERNAL MEDICINE

## 2022-04-19 RX ORDER — OXYCODONE HYDROCHLORIDE 5 MG/1
5 TABLET ORAL EVERY 6 HOURS PRN
Status: DISCONTINUED | OUTPATIENT
Start: 2022-04-19 | End: 2022-05-02 | Stop reason: HOSPADM

## 2022-04-19 RX ADMIN — GABAPENTIN 100 MG: 100 CAPSULE ORAL at 21:15

## 2022-04-19 RX ADMIN — CALCIUM 500 MG: 500 TABLET ORAL at 07:41

## 2022-04-19 RX ADMIN — ACETAMINOPHEN 650 MG: 325 TABLET, FILM COATED ORAL at 07:41

## 2022-04-19 RX ADMIN — Medication 1 CAPSULE: at 07:40

## 2022-04-19 RX ADMIN — FERROUS SULFATE TAB EC 324 MG (65 MG FE EQUIVALENT) 324 MG: 324 (65 FE) TABLET DELAYED RESPONSE at 07:41

## 2022-04-19 RX ADMIN — OXYCODONE 5 MG: 5 TABLET ORAL at 21:15

## 2022-04-19 RX ADMIN — ACETAMINOPHEN 650 MG: 325 TABLET, FILM COATED ORAL at 21:16

## 2022-04-19 RX ADMIN — Medication 400 MG: at 07:41

## 2022-04-19 RX ADMIN — ENOXAPARIN SODIUM 30 MG: 100 INJECTION SUBCUTANEOUS at 07:40

## 2022-04-19 RX ADMIN — Medication 10 MG: at 21:16

## 2022-04-19 RX ADMIN — ENOXAPARIN SODIUM 30 MG: 100 INJECTION SUBCUTANEOUS at 21:16

## 2022-04-19 RX ADMIN — FAMOTIDINE 20 MG: 20 TABLET, FILM COATED ORAL at 07:41

## 2022-04-19 RX ADMIN — OXYCODONE 5 MG: 5 TABLET ORAL at 07:42

## 2022-04-19 RX ADMIN — OXYCODONE HYDROCHLORIDE AND ACETAMINOPHEN 500 MG: 500 TABLET ORAL at 07:41

## 2022-04-19 RX ADMIN — ACETAMINOPHEN 650 MG: 325 TABLET, FILM COATED ORAL at 13:27

## 2022-04-19 RX ADMIN — FAMOTIDINE 20 MG: 20 TABLET, FILM COATED ORAL at 21:16

## 2022-04-19 RX ADMIN — FOLIC ACID TAB 400 MCG 0.8 MG: 400 TAB at 07:40

## 2022-04-19 RX ADMIN — MULTIPLE VITAMINS W/ MINERALS TAB 1 TABLET: TAB at 07:41

## 2022-04-19 ASSESSMENT — PAIN SCALES - GENERAL
PAINLEVEL_OUTOF10: 7
PAINLEVEL_OUTOF10: 8

## 2022-04-19 ASSESSMENT — PAIN DESCRIPTION - LOCATION
LOCATION: BACK;LEG
LOCATION: BACK;LEG

## 2022-04-19 ASSESSMENT — PAIN DESCRIPTION - PAIN TYPE
TYPE: ACUTE PAIN
TYPE: ACUTE PAIN

## 2022-04-19 NOTE — PROGRESS NOTES
Physical Therapy  Facility/Department: [facility] MED SURG  Daily Treatment Note  NAME: Norah Gaines  : 1945  MRN: [id number]    Date of Service: 2022    Discharge Recommendations:  Continue to assess pending progress      Assessment   Assessment: Patient completed bed mobility tasks with Mod I.  Stood from bedside with supervision and ambulated 120 feet with RW, L TTWB, and SBA. Patient very cautious with L LE TTWB and still wants to be NWB at times during standing and gait training. Patient toileted with supervision. Completed B LE therex in sitting and supine with focus on L knee flex/ext. Discussed possible use of padded hipster underwear to decrease patient's discomfort when she is sitting or lying down. States her only relief is when she sleeps on her stomach. REQUIRES PT FOLLOW UP: Yes  Activity Tolerance  Activity Tolerance: Patient Tolerated treatment well     Patient Diagnosis(es): There were no encounter diagnoses. has a past medical history of Colitis, GERD (gastroesophageal reflux disease), Glaucoma, and PAD (peripheral artery disease) ([facility] [address] has a past surgical history that includes Tubal ligation; eye surgery; cyst removal; skin biopsy; and Total hip arthroplasty (Left, 2019). Restrictions  Restrictions/Precautions  Restrictions/Precautions: Weight Bearing  Required Braces or Orthoses?: No (ELS knee brace d/c'd 22)  Lower Extremity Weight Bearing Restrictions  Left Lower Extremity Weight Bearing: Toe Touch Weight Bearing  Required Braces or Orthoses  Left Lower Extremity Brace:  (ELS brace locked in extension)  Subjective   General  Response To Previous Treatment: Patient with no complaints from previous session. Family / Caregiver Present: No  Subjective  Subjective: Patient states she's doing ok. Reports having a sore bottom (ischial bones) and having difficulty with sitting or lying in bed.   Pain Screening  Patient Currently in Pain: Yes  Vital Signs  Patient Currently in Pain: Yes       Objective   Bed mobility  Rolling to Left: Modified independent  Rolling to Right: Modified independent  Supine to Sit: Modified independent  Sit to Supine: Modified independent  Scooting: Modified independent  Transfers  Sit to Stand: Supervision  Stand to sit: Supervision  Bed to Chair: Supervision (bed to [facility])  Ambulation  Ambulation?: Yes  WB Status: TTWB L LE  Ambulation 1  Surface: level tile  Device: Rolling Walker  Assistance: Stand by assistance  Distance: 120 feet     Balance  Posture: Good  Sitting - Static: Good  Sitting - Dynamic: Good  Standing - Static: Good  Standing - Dynamic: Good;-  Exercises  Quad Sets: 15  Gluteal Sets: 15  Hip Flexion: 15  Hip Abduction: 15 and 15 adduction squeezes  Knee Long Arc Quad: 15  Knee Active Range of Motion: 15  Ankle Pumps: 15      Goals  Long term goals  Time Frame for Long term goals : 10 days  Long term goal 1: Patient will perform all bed mobility with independence. Long term goal 2: Patient will perform sit to stand and transfers, NWB L LE, with RW and SBA. Long term goal 3: Patient will ambulate 50'x2, NWB L LE, with RW and SBA. Long term goal 4: Patient will demonstrate independence with HEP. Patient Goals   Patient goals : Return home. Plan    Plan  Times per week: 4-5x/week  Times per day: Daily  Current Treatment Recommendations: Schafer  Training,Patient/Caregiver Education & Training,ROM,Balance Training,Gait Training,Home Exercise Program,Functional Mobility Training,Safety Education & Training  Safety Devices  Type of devices: Left in bed,Call light within reach     Therapy Time   Individual Concurrent Group Co-treatment   Time In 1310         Time Out 7389         Minutes 39              This note serves as D/C summary if patient is discharged prior to next visit.   Willis Auguste, JENNIFER

## 2022-04-19 NOTE — PROGRESS NOTES
Progress Note      Subjective:   Chief complaint: declining functional status    Interval History:   Yves in good spirits this morning. Talking on the phone in her room. Had ortho f/u yesterday and is now permitted to bear weight on LLE and she is excited about this. States pain has greatly improved and no longer needs scheduled pain medication. Reports having regular BMs. Patient received paperwork in the mail regarding forteo denial and I reassured her that our pharmacy team and [address] [place] [address] team are both aware of this and continue to work with insurance to get forteo approved. Review of systems:   Constitutional:  Denies fever or chills. Eyes:  Denies change in visual acuity or discharge. HENT:  Denies nasal congestion or sore throat. Respiratory:  Denies cough or shortness of breath. Cardiovascular:  Denies chest pain, palpitation or swelling in LEs. GI:  Denies abdominal pain, nausea, vomiting, bloody stools or diarrhea. :  Denies dysuria or frequency. Musculoskeletal:  Denies back pain. Positive for occasional left knee pain. Integument:  Denies rash or itching. Neurologic:  Denies headache, focal weakness or sensory changes. Psychiatric:  Denies depression or anxiety. Past medical history, surgical history, family history and social history reviewed and unchanged compared to H&P earlier this admission.     Medications:   Scheduled Meds:   oxyCODONE  5 mg Oral BID    enoxaparin  30 mg SubCUTAneous BID    acetaminophen  650 mg Oral Q6H    calcium elemental  500 mg Oral Daily    famotidine  20 mg Oral BID    folic acid  0.8 mg Oral Daily    gabapentin  100 mg Oral Nightly    magnesium oxide  400 mg Oral Daily    melatonin  10 mg Oral Nightly    therapeutic multivitamin-minerals  1 tablet Oral Daily    lactobacillus  1 capsule Oral Daily    vitamin C  500 mg Oral Daily    ferrous sulfate  324 mg Oral Every Other Day     Continuous Infusions:    Objective:     Vital Signs  Temp: 97.5 °F (36.4 °C)  Pulse: 72  Resp: 18  BP: 115/64  SpO2: 98 %  O2 Device: None (Room air)       Vital signs reviewed in electronic charts. Physical exam  Constitutional:  Well developed, well nourished, no acute distress. Eyes:  PERRL, conjunctiva normal, EOMI. HENT:  Atraumatic, external ears normal, external nose/nares normal, oropharynx moist, no pharyngeal exudates. Neck:  Supple. No JVD or thyromegaly. Respiratory:  No respiratory distress, normal breath sounds, no rales, no wheezing. Cardiovascular:  Normal rate, normal rhythm, no murmurs, no gallops, no rubs. GI:  Soft, nondistended, normal bowel sounds, nontender, no organomegaly, no mass. :  No costovertebral angle tenderness. Musculoskeletal:  No edema, no tenderness, no obvious deformities. Patient is moving all extremities. Integument:  Well hydrated, no rash. Well healed incisions lateral aspect of left knee and at left hip. Neurologic:  Alert & oriented x 3,  no focal deficits noted. Strength is equal throughout. Psychiatric:  Speech and behavior appropriate. Results:     Lab Results   Component Value Date    WBC 4.7 04/18/2022    HGB 11.6 04/18/2022    HCT 36.6 (L) 04/18/2022    MCV 95.1 04/18/2022     04/18/2022       Lab Results   Component Value Date     04/18/2022    K 3.2 04/18/2022     04/18/2022    CO2 27 04/18/2022    BUN 14 04/18/2022    CREATININE 0.6 04/18/2022    GLUCOSE 94 04/18/2022    CALCIUM 9.1 04/18/2022        Assessment and Plan:      Osteoporosis [M81.0]  -DEXA scnb here 4/4 with findings of osteoporosis  -had appt with [facility]  on 4/6 with recs for Forteo.  [facility] working on [place] for TRISTAN [place].  -continue oscal  -vitamin d 38.4  - of note, per chart review vitamin d discontinued due to elevated vit d level 98 on 2/18/22  - patient to follow up with BMD clinic 6 weeks after starting Forteo    04/03/2022    Closed minimally displaced zone III fracture of sacrum ([facility] [address] [J56.595N]  -s/p CRPP 3/30 by [facility]  -NWB LLE ELS locked in extension, WBAT RLE  -DVT ppx: lovenox 30mg BID until 4/30  -PRN Pain meds as ordered.     -PT OT consulted  -CM consulted for dc planning assistance    04/03/2022    Closed fracture of left tibial plateau [P11.641K]  -s/p left tibial plateau fracture status post ORIF (2/15/2022)  - on arrival NWB LLE ELS locked in extension  -continue pain medications as prescribed   -PT OT following  -CM consulted for dc planning assistance  -Fall precautions  - Per ortho 4/18 patient can start weightbearing LLE     02/21/2022    Microscopic colitis [K52.839]  --history of chronic lymphocytic colitis on budesonide  - per BMD note from 4/6 budesonide was discontinued due to concern for bone loss however per dc summary it had been resumed. - follow up with BMD and GI as scheduled     02/21/2022    Anemia [D64.9]  -hgb at baseline  -monitor and transfuse for hgb <7.0    02/21/2022    Declining functional status [R53.81]  -Patient with multiple recent fragility fractures including humeral fracture, left tibial plateau fracture and minimally displaced zone 3 sacral fracture secondary to falls at home  -PT OT following  -Case management consulted for discharge planning assistance 12/31/2019         Patient was seen and examined with Dr. Jonathan Tai. After reviewing patient data and diagnostic testing the plan of care was established in conjunction with Dr. Jonathan Tai.     Electronically signed by KALIE Mike on 4/19/2022 at 11:18 AM

## 2022-04-19 NOTE — PROGRESS NOTES
Occupational Therapy  Facility/Department: [address] [place] [address] MED SURG  Daily Treatment Note  NAME: Manaas Wynn  : 1945  MRN: [id number]    Date of Service: 2022    Discharge Recommendations:  Home with Home health OT       Assessment   Assessment: Pt agreeable to OT services. Pt come to sit at EOB with MOD I. Pt come to stand with RW SBA. Pt ambulated to bathroom and transferred to shower chair with CGA. Pt required min assist to doff LB clothing. Pt completed UB bathing and dressing with NINO. Pt demo ability complete LB bathing with CGA in standing otherwise SBA. Pt demo ability to don pants with CGA on this date seated on shower chair. Pt returned to bed maintaining TTWB using RW with CGA<>SBA. Patient Diagnosis(es): There were no encounter diagnoses. has a past medical history of Colitis, GERD (gastroesophageal reflux disease), Glaucoma, and PAD (peripheral artery disease) ([facility] [address] has a past surgical history that includes Tubal ligation; eye surgery; cyst removal; skin biopsy; and Total hip arthroplasty (Left, 2019). Restrictions  Restrictions/Precautions  Restrictions/Precautions: Weight Bearing  Required Braces or Orthoses?: No (ELS knee brace d/c'd 22)  Lower Extremity Weight Bearing Restrictions  Left Lower Extremity Weight Bearing: Toe Touch Weight Bearing  Required Braces or Orthoses  Left Lower Extremity Brace:  (ELS brace locked in extension)  Subjective   General  Chart Reviewed: Yes  Patient assessed for rehabilitation services?: Yes  Family / Caregiver Present: No  Referring Practitioner: Ky Iraheta PA-C  Diagnosis: Declining functional status; S/P ORIF left sacrum  Subjective  Subjective: Pt agreeable to OT services. Pt states she was at home and fell when she slipped out of her shoe.       Orientation     Objective    ADL  Grooming: Setup  UE Bathing: Setup  LE Bathing: Stand by assistance;Contact guard assistance  UE Dressing: Setup  LE Dressing: Contact guard assistance        Functional Mobility  Functional - Mobility Device: Rolling Walker  Activity: To/from bathroom  Assist Level: Contact guard assistance  Functional Mobility Comments: Pt demo ability to complete TTWB during ambulation     Plan   Plan  Times per week: 3-5  Times per day: Daily  Plan weeks: 1-2  Current Treatment Recommendations: Strengthening,Endurance Training,Balance Training,Functional Mobility Training,Self-Care / ADL,Equipment Evaluation, Education, & procurement,Safety Education & Training    Goals  Short term goals  Time Frame for Short term goals: 2 weeks  Short term goal 1: Pt to complete bathing with MOD I. Short term goal 2: Pt to complete dressing with MOD I. Short term goal 3: Pt to tolerate x15 minutes of activity to increase functional activity tolerance. Short term goal 4: Pt to complete toileting with MOD I. Short term goal 5: Pt to complete oral hygiene standing at sink with no LOB MOD I. Therapy Time   Individual Concurrent Group Co-treatment   Time In 7575         Time Out 1516         Minutes 27              This note serves as a DC summary in the event of pt discharge.      Carolyn Robbins OTR/L

## 2022-04-19 NOTE — PLAN OF CARE
Problem: Falls - Risk of:  Goal: Will remain free from falls  Description: Will remain free from falls  4/19/2022 0139 by Wilton Garcia RN  Outcome: Ongoing  4/18/2022 1536 by Nicki Carey RN  Outcome: Ongoing  Goal: Absence of physical injury  Description: Absence of physical injury  4/18/2022 1536 by Nicki Carey RN  Outcome: Ongoing     Problem: Skin Integrity:  Goal: Will show no infection signs and symptoms  Description: Will show no infection signs and symptoms  4/18/2022 1536 by Nicki Carey RN  Outcome: Ongoing  Goal: Absence of new skin breakdown  Description: Absence of new skin breakdown  4/19/2022 0139 by Wilton Garcia RN  Outcome: Ongoing  4/18/2022 1536 by Nicki Carey RN  Outcome: Ongoing     Problem: Pain:  Goal: Pain level will decrease  Description: Pain level will decrease  4/18/2022 1536 by Nicki Carey RN  Outcome: Ongoing  Goal: Control of acute pain  Description: Control of acute pain  4/18/2022 1536 by Nicki Carey RN  Outcome: Ongoing  Goal: Control of chronic pain  Description: Control of chronic pain  4/18/2022 1536 by Nicki Carey RN  Outcome: Ongoing  Goal: Patient's pain/discomfort is manageable  Description: Patient's pain/discomfort is manageable  4/18/2022 1536 by Nicki Carey RN  Outcome: Ongoing     Problem: Neurological  Goal: Maximum potential motor/sensory/cognitive function  4/18/2022 1536 by Nicki Carey RN  Outcome: Ongoing     Problem: Cardiovascular  Goal: No DVT, peripheral vascular complications  6/61/9306 1536 by Nicki Carey RN  Outcome: Ongoing  Goal: Hemodynamic stability  4/18/2022 1536 by Nicki Carey RN  Outcome: Ongoing  Goal: Anticoagulate/Hct stable  4/18/2022 1536 by Nicki Carey RN  Outcome: Ongoing  Goal: Agreement to quit smoking  4/18/2022 1536 by Nicki Carey RN  Outcome: Ongoing  Goal: Weight maintained or lost  4/18/2022 1536 by Nicki Carey RN  Outcome: Ongoing  Goal: Understanding of dietary restrictions  4/18/2022 1536 by Tracy Kaplan RN  Outcome: Ongoing     Problem: Infection:  Goal: Will remain free from infection  Description: Will remain free from infection  4/18/2022 1536 by Tracy Kaplan RN  Outcome: Ongoing     Problem: Safety:  Goal: Free from accidental physical injury  Description: Free from accidental physical injury  4/18/2022 1536 by Tracy Kaplan RN  Outcome: Ongoing  Goal: Free from intentional harm  Description: Free from intentional harm  4/18/2022 1536 by Tracy Kaplan RN  Outcome: Ongoing     Problem: Daily Care:  Goal: Daily care needs are met  Description: Daily care needs are met  4/19/2022 0139 by Breanna Bay RN  Outcome: Ongoing  4/18/2022 1536 by Tracy Kaplan RN  Outcome: Ongoing     Problem: Skin Integrity:  Goal: Skin integrity will stabilize  Description: Skin integrity will stabilize  4/18/2022 1536 by Tracy Kaplan RN  Outcome: Ongoing     Problem: Discharge Planning:  Goal: Patients continuum of care needs are met  Description: Patients continuum of care needs are met  4/18/2022 1536 by Tracy Kaplan RN  Outcome: Ongoing

## 2022-04-20 PROCEDURE — 1200000002 HC SEMI PRIVATE SWING BED

## 2022-04-20 PROCEDURE — 97116 GAIT TRAINING THERAPY: CPT

## 2022-04-20 PROCEDURE — 6360000002 HC RX W HCPCS: Performed by: PHYSICIAN ASSISTANT

## 2022-04-20 PROCEDURE — 97535 SELF CARE MNGMENT TRAINING: CPT

## 2022-04-20 PROCEDURE — 97110 THERAPEUTIC EXERCISES: CPT

## 2022-04-20 PROCEDURE — 6370000000 HC RX 637 (ALT 250 FOR IP): Performed by: PHYSICIAN ASSISTANT

## 2022-04-20 RX ADMIN — CALCIUM 500 MG: 500 TABLET ORAL at 08:29

## 2022-04-20 RX ADMIN — ACETAMINOPHEN 650 MG: 325 TABLET, FILM COATED ORAL at 14:17

## 2022-04-20 RX ADMIN — ACETAMINOPHEN 650 MG: 325 TABLET, FILM COATED ORAL at 04:06

## 2022-04-20 RX ADMIN — FOLIC ACID TAB 400 MCG 0.8 MG: 400 TAB at 08:29

## 2022-04-20 RX ADMIN — ENOXAPARIN SODIUM 30 MG: 100 INJECTION SUBCUTANEOUS at 08:29

## 2022-04-20 RX ADMIN — GABAPENTIN 100 MG: 100 CAPSULE ORAL at 20:45

## 2022-04-20 RX ADMIN — ENOXAPARIN SODIUM 30 MG: 100 INJECTION SUBCUTANEOUS at 20:46

## 2022-04-20 RX ADMIN — OXYCODONE 5 MG: 5 TABLET ORAL at 04:06

## 2022-04-20 RX ADMIN — ACETAMINOPHEN 650 MG: 325 TABLET, FILM COATED ORAL at 20:45

## 2022-04-20 RX ADMIN — ACETAMINOPHEN 650 MG: 325 TABLET, FILM COATED ORAL at 08:28

## 2022-04-20 RX ADMIN — MULTIPLE VITAMINS W/ MINERALS TAB 1 TABLET: TAB at 08:29

## 2022-04-20 RX ADMIN — Medication 400 MG: at 08:29

## 2022-04-20 RX ADMIN — OXYCODONE HYDROCHLORIDE AND ACETAMINOPHEN 500 MG: 500 TABLET ORAL at 08:29

## 2022-04-20 RX ADMIN — FAMOTIDINE 20 MG: 20 TABLET, FILM COATED ORAL at 20:45

## 2022-04-20 RX ADMIN — OXYCODONE 5 MG: 5 TABLET ORAL at 20:50

## 2022-04-20 RX ADMIN — Medication 1 CAPSULE: at 08:29

## 2022-04-20 RX ADMIN — Medication 10 MG: at 20:45

## 2022-04-20 RX ADMIN — FAMOTIDINE 20 MG: 20 TABLET, FILM COATED ORAL at 08:29

## 2022-04-20 ASSESSMENT — PAIN SCALES - GENERAL
PAINLEVEL_OUTOF10: 8
PAINLEVEL_OUTOF10: 7
PAINLEVEL_OUTOF10: 8
PAINLEVEL_OUTOF10: 8

## 2022-04-20 ASSESSMENT — PAIN DESCRIPTION - LOCATION
LOCATION: LEG;KNEE
LOCATION: BACK;LEG
LOCATION: LEG;HIP

## 2022-04-20 ASSESSMENT — PAIN DESCRIPTION - ORIENTATION
ORIENTATION: LEFT
ORIENTATION: LEFT

## 2022-04-20 ASSESSMENT — PAIN DESCRIPTION - PAIN TYPE: TYPE: CHRONIC PAIN

## 2022-04-20 NOTE — PLAN OF CARE
Problem: Pain:  Goal: Pain level will decrease  Description: Pain level will decrease  Outcome: Ongoing     Problem: Daily Care:  Goal: Daily care needs are met  Description: Daily care needs are met  Outcome: Ongoing     Problem: Discharge Planning:  Goal: Patients continuum of care needs are met  Description: Patients continuum of care needs are met  Outcome: Ongoing

## 2022-04-20 NOTE — PROGRESS NOTES
Occupational Therapy  Facility/Department: [address] [place] [address] MED SURG  Daily Treatment Note  NAME: Quincy Lefort  : 1945  MRN: [id number]    Date of Service: 2022    Discharge Recommendations:  Home with Home health OT    Patient Diagnosis(es): There were no encounter diagnoses. Assessment    Assessment: Pt agreeable to OT services. Focus on ADL retraining on this date. Pt come to sit at EOB with MOD I. Pt transferred to standing with SBA. Pt ambulated to bathroom with verbal cuing to use LLE TTWB. Pt continues to attempt NWB. Pt transferred to shower chair with CGA using grab bar for stability. Pt completed UB bathing and dressing with NINO. Pt demo improvement in LB doffing of clothing on this date. Pt able to complete LB bathing standing in shower with SBA. Pt donned clothing with min VC to initiate on LLE to thread clothing. Pt completed task with CGA. Pt returned to chair to NINO with call light in reach. Activity Tolerance: Patient tolerated treatment well      Plan         Subjective   Subjective  Subjective: I didn't sleep well last night, Just feeling tired today           Objective    Vitals     ADL  Grooming: Setup  UE Bathing: Setup  LE Bathing: Stand by assistance  UE Dressing: Setup  LE Dressing: Contact guard assistance  LE Dressing Skilled Clinical Factors: Pt demo improved ability to initiate and decreased assist needed with doffing clothing. Pt requiring CGA in standing with LB clothing management. Goals  Short Term Goals  Time Frame for Short term goals: 2 weeks  Short Term Goal 1: Pt to complete bathing with MOD I. Short Term Goal 2: Pt to complete dressing with MOD I. Short Term Goal 3: Pt to tolerate x15 minutes of activity to increase functional activity tolerance. Short Term Goal 4: Pt to complete toileting with MOD I. Short Term Goal 5: Pt to complete oral hygiene standing at sink with no LOB MOD I.        Therapy Time   Individual Concurrent Group Co-treatment   Time In 1030 Subjective:      Patient ID: Nohemy Deal is a 58 y.o. female.    Chief Complaint: Follow-up (fungus pcp Dr. Trejo )    Nohemy is a 57 y.o. female who returns to the clinic for follow up of itchy, dry rash to plantar midfoot bilaterally.  Patient relates her feet swear a lot.  She is on her feet 12-14 hours per day in a compressions stocking and a black sock.  She relates significant improvement with PO Lamisil,     Current shoe gear: casual     Patient Active Problem List   Diagnosis    Angiolipoma of kidney    Hematuria    Leg cramps    GERD (gastroesophageal reflux disease)    Cervical polyp    Transient elevated blood pressure       Current Outpatient Prescriptions on File Prior to Visit   Medication Sig Dispense Refill    azelastine (ASTELIN) 137 mcg (0.1 %) nasal spray 1 spray (137 mcg total) by Nasal route 2 (two) times daily. 30 mL 0    calcium-vitamin D3 (CALCIUM 500 + D) 500 mg(1,250mg) -200 unit per tablet Take 1 tablet by mouth 2 (two) times daily with meals.      clotrimazole-betamethasone 1-0.05% (LOTRISONE) cream Apply topically 2 (two) times daily. 45 g 5    fish oil-omega-3 fatty acids 300-1,000 mg capsule Take 2 g by mouth once daily.        fluticasone (FLONASE) 50 mcg/actuation nasal spray Use 2 sprays each nostril daily as needed for nasal congestion 16 g 0    levocetirizine (XYZAL) 5 MG tablet Take 1 tablet (5 mg total) by mouth every evening. 30 tablet 0    multivitamin with minerals tablet Take 1 tablet by mouth once daily.        omeprazole (PRILOSEC) 20 MG capsule Take 1 capsule (20 mg total) by mouth once daily. 90 capsule 1    terbinafine HCl (LAMISIL) 250 mg tablet Take 1 tablet (250 mg total) by mouth once daily. 1 pill by mouth x 30 days. Avoid alcohol intake while taking medication 30 tablet 0    tretinoin (RETIN-A) 0.025 % cream APPLY SMALL AMOUNT TO FACE EVERY NIGHT AT BEDTIME AS TOLERATED  3     No current facility-administered medications on file prior to  "visit.        Review of patient's allergies indicates:   Allergen Reactions    Sulfa (sulfonamide antibiotics)        Past Surgical History:   Procedure Laterality Date     SECTION      x2    cyxtoscopy         History reviewed. No pertinent family history.    Social History     Social History    Marital status:      Spouse name: N/A    Number of children: N/A    Years of education: N/A     Occupational History     [employer]     Social History Main Topics    Smoking status: Never Smoker    Smokeless tobacco: Not on file    Alcohol use No    Drug use: No    Sexual activity: Not Currently     Other Topics Concern    Not on file     Social History Narrative       Review of Systems   Constitution: Negative for chills, fever and weakness.   Cardiovascular: Negative for claudication and leg swelling.   Respiratory: Negative for cough and shortness of breath.    Skin: Positive for dry skin and nail changes. Negative for itching and rash.        Midfoot tinea bilaterally; L>R   Musculoskeletal: Positive for joint pain. Negative for falls, joint swelling and muscle weakness.   Gastrointestinal: Negative for diarrhea, nausea and vomiting.   Neurological: Positive for numbness and paresthesias. Negative for tremors.   Psychiatric/Behavioral: Negative for altered mental status and hallucinations.           Objective:       Vitals:    17 1612   BP: 132/76   Pulse: 68   Weight: 59.4 kg (131 lb)   Height: 4' 10" (1.473 m)   PainSc: 0-No pain     Physical Exam   Constitutional:  Non-toxic appearance. She does not have a sickly appearance. No distress.   Cardiovascular:   Pulses:       Dorsalis pedis pulses are 2+ on the right side, and 2+ on the left side.        Posterior tibial pulses are 2+ on the right side, and 2+ on the left side.   dorsalis pedis and posterior tibial pulses are palpable bilaterally. Capillary refill time is within normal limits.   Pulmonary/Chest: No respiratory " Time Out 1100         Minutes 30              This note serves as a DC summary in the event of pt discharge.      Carolyn Robbins, OTR/L distress.   Musculoskeletal:        Right ankle: She exhibits normal range of motion and no swelling. No tenderness. No lateral malleolus, no medial malleolus, no AITFL, no CF ligament and no posterior TFL tenderness found. Achilles tendon exhibits no pain, no defect and normal Larios's test results.        Left ankle: She exhibits normal range of motion and no swelling. No tenderness. No lateral malleolus, no medial malleolus, no AITFL, no CF ligament and no posterior TFL tenderness found. Achilles tendon exhibits no pain, no defect and normal Larios's test results.        Right foot: There is no tenderness and no bony tenderness.        Left foot: There is no tenderness and no bony tenderness.   Neurological: She has normal reflexes. She displays no atrophy and no tremor. No sensory deficit. She exhibits normal muscle tone.   [place] 5.07 monofilament is intact bilateral feet. Sharp/dull sensation is also intact Bilateral feet.     Skin: Skin is warm, dry and intact. Rash noted. No bruising, no burn, no laceration and no lesion noted. She is not diaphoretic. There is erythema. No cyanosis. No pallor. Nails show no clubbing.   Scaling dryness in a moccasin distribution is noted to the bilateral lower extremities with associated erythema (improved).             Psychiatric: Her mood appears not anxious. Her affect is not inappropriate. Her speech is not slurred. She is not combative. She is communicative. She is attentive.   Nursing note reviewed.            Assessment:       Encounter Diagnoses   Name Primary?    Tinea pedis of both feet Yes    Itching          Plan:       Nohemy was seen today for follow-up.    Diagnoses and all orders for this visit:    Tinea pedis of both feet  -     Hepatic function panel; Future    Itching  -     Hepatic function panel; Future    I counseled the patient on her conditions, their implications and medical management.    We discussed continuing using Lamisil for  tinea pedis. An additional 4 week treatment course is recommended. The patient is aware that rare cases of liver injury have been reported; and agrees to have liver function tests firs t. The symptoms of liver disease have been discussed; call if such occurs. In addition, some insurance plans do not cover the expense of this drug for treating a cosmetic condition, and the patient understands they may have to pay for the medication. Other side effects, such as headaches and rashes, have also been discussed.    Instructed patient on the importance of keeping feet dry. Patient instructed to use absorbent cotton socks and change them if they become sweaty; or wear an open-toe shoe or sandal. Wash the feet at least once a day with soap and water. Apply the antifungal gel as prescribed. Instructed patient that it takes time for symptoms to completely dissipate. Patient instructed to use lysol or over-the-counter antifungal powders or sprays to shoes daily and allow them to air dry, switching shoes from every other day would be optimal. Patient is to avoid barefoot walking in  high-risk environments (public showers, gyms and locker rooms) may prevent future infections.     Patient to RTC if:  Increasing redness or swelling of the foot   Pus draining from cracks in the skin   Fever of 100.4ºF (38ºC) or higher    RTC in 6 weeks, sooner PRN

## 2022-04-20 NOTE — PROGRESS NOTES
Physical Therapy  Facility/Department: [facility] MED SURG  Daily Treatment Note  NAME: Jayla Hammond  : 1945  MRN: [id number]    Date of Service: 2022    Discharge Recommendations:  Continue to assess pending progress      Patient Diagnosis(es): There were no encounter diagnoses. Assessment   Assessment: Patient up in recliner when PTA arrived. Stood with supervision and ambulated 150 feet with RW, L LE TTWB, and SBA. Patient returned to bed after ambulation and completed B LE therex in supine. Written and illustrated copy of exercises provided to patient. Activity Tolerance: Patient tolerated treatment well     Subjective    Subjective  Subjective: Patient states she is doing better. Reports no pain from transitioning from NWB to L LE TTWB. Objective     Transfer Training  Transfer Training: Yes  Sit to Stand: Supervision  Stand to Sit: Supervision  Gait Training  Gait Training: Yes     PT Exercises  A/AROM Exercises: 15 in supine. Provided patient with written and illustrated copy of exercises. Goals  Long Term Goals  Time Frame for Long term goals : 10 days  Long term goal 1: Patient will perform all bed mobility with independence. Long term goal 2: Patient will perform sit to stand and transfers, NWB L LE, with RW and SBA. Long term goal 3: Patient will ambulate 50'x2, NWB L LE, with RW and SBA. Long term goal 4: Patient will demonstrate independence with HEP. Patient Goals   Patient goals : Return home. Therapy Time   Individual Concurrent Group Co-treatment   Time In 7484         Time Out 1303         Minutes 30              This note serves as D/C summary if patient is discharged prior to next visit.   Chaparro Terrazas PTA

## 2022-04-21 PROCEDURE — 6360000002 HC RX W HCPCS: Performed by: PHYSICIAN ASSISTANT

## 2022-04-21 PROCEDURE — 97535 SELF CARE MNGMENT TRAINING: CPT

## 2022-04-21 PROCEDURE — 6370000000 HC RX 637 (ALT 250 FOR IP): Performed by: PHYSICIAN ASSISTANT

## 2022-04-21 PROCEDURE — 97110 THERAPEUTIC EXERCISES: CPT

## 2022-04-21 PROCEDURE — 97116 GAIT TRAINING THERAPY: CPT

## 2022-04-21 PROCEDURE — 1200000002 HC SEMI PRIVATE SWING BED

## 2022-04-21 RX ADMIN — GABAPENTIN 100 MG: 100 CAPSULE ORAL at 20:33

## 2022-04-21 RX ADMIN — Medication 1 CAPSULE: at 08:31

## 2022-04-21 RX ADMIN — ACETAMINOPHEN 650 MG: 325 TABLET, FILM COATED ORAL at 20:32

## 2022-04-21 RX ADMIN — Medication 400 MG: at 08:31

## 2022-04-21 RX ADMIN — OXYCODONE 5 MG: 5 TABLET ORAL at 02:49

## 2022-04-21 RX ADMIN — OXYCODONE HYDROCHLORIDE AND ACETAMINOPHEN 500 MG: 500 TABLET ORAL at 08:30

## 2022-04-21 RX ADMIN — ACETAMINOPHEN 650 MG: 325 TABLET, FILM COATED ORAL at 02:49

## 2022-04-21 RX ADMIN — ENOXAPARIN SODIUM 30 MG: 100 INJECTION SUBCUTANEOUS at 08:31

## 2022-04-21 RX ADMIN — Medication 10 MG: at 20:32

## 2022-04-21 RX ADMIN — ENOXAPARIN SODIUM 30 MG: 100 INJECTION SUBCUTANEOUS at 20:33

## 2022-04-21 RX ADMIN — OXYCODONE 5 MG: 5 TABLET ORAL at 20:33

## 2022-04-21 RX ADMIN — FOLIC ACID TAB 400 MCG 0.8 MG: 400 TAB at 08:30

## 2022-04-21 RX ADMIN — ACETAMINOPHEN 650 MG: 325 TABLET, FILM COATED ORAL at 08:31

## 2022-04-21 RX ADMIN — FAMOTIDINE 20 MG: 20 TABLET, FILM COATED ORAL at 20:32

## 2022-04-21 RX ADMIN — FERROUS SULFATE TAB EC 324 MG (65 MG FE EQUIVALENT) 324 MG: 324 (65 FE) TABLET DELAYED RESPONSE at 08:31

## 2022-04-21 RX ADMIN — MULTIPLE VITAMINS W/ MINERALS TAB 1 TABLET: TAB at 08:30

## 2022-04-21 RX ADMIN — CALCIUM 500 MG: 500 TABLET ORAL at 08:31

## 2022-04-21 RX ADMIN — ACETAMINOPHEN 650 MG: 325 TABLET, FILM COATED ORAL at 13:53

## 2022-04-21 RX ADMIN — FAMOTIDINE 20 MG: 20 TABLET, FILM COATED ORAL at 08:31

## 2022-04-21 ASSESSMENT — PAIN SCALES - GENERAL
PAINLEVEL_OUTOF10: 9
PAINLEVEL_OUTOF10: 8
PAINLEVEL_OUTOF10: 7
PAINLEVEL_OUTOF10: 7

## 2022-04-21 ASSESSMENT — PAIN DESCRIPTION - PAIN TYPE: TYPE: CHRONIC PAIN

## 2022-04-21 ASSESSMENT — PAIN DESCRIPTION - LOCATION: LOCATION: LEG;HIP;SHOULDER

## 2022-04-21 NOTE — PLAN OF CARE
Problem: Falls - Risk of:  Goal: Will remain free from falls  Description: Will remain free from falls  4/21/2022 1031 by Caridad Harris RN  Outcome: Progressing  4/21/2022 1030 by Caridad Harris RN  Outcome: Not Progressing     Problem: Skin Integrity:  Goal: Will show no infection signs and symptoms  Description: Will show no infection signs and symptoms  4/21/2022 1031 by Caridad Harris RN  Outcome: Progressing  4/21/2022 1030 by Caridad Harris RN  Outcome: Not Progressing     Problem: Pain:  Goal: Pain level will decrease  Description: Pain level will decrease  4/21/2022 1031 by Caridad Harris RN  Outcome: Progressing  4/21/2022 1030 by Caridad Harris RN  Outcome: Not Progressing

## 2022-04-21 NOTE — FLOWSHEET NOTE
04/21/22 1031   Assessment   Charting Type Shift assessment   Psychosocial   Psychosocial (WDL) WDL   Neurological   Neuro (WDL) WDL   Level of Consciousness Alert (0)   [place]l Coma Scale   Eye Opening 4   Best Verbal Response 5   Best Motor Response 6   Denzel Coma Scale Score 15   HEENT (Head, Ears, Eyes, Nose, & Throat)   HEENT (WDL) WDL   Right Eye Impaired vision   Left Eye Impaired vision   Teeth Dentures lower;Dentures upper   Respiratory   Respiratory (WDL) WDL   Respiratory Pattern Regular   Respiratory Depth Normal   Respiratory Quality/Effort Unlabored   Chest Assessment Chest expansion symmetrical;Trachea midline   L Breath Sounds Clear   R Breath Sounds Clear   Breath Sounds   Right Upper Lobe Clear   Right Middle Lobe Clear   Right Lower Lobe Clear   Left Upper Lobe Clear   Left Lower Lobe Clear   Cardiac   Cardiac (WDL) WDL   Gastrointestinal   Abdominal (WDL) WDL   RUQ Bowel Sounds Active   LUQ Bowel Sounds Active   RLQ Bowel Sounds Active   LLQ Bowel Sounds Active   Abdomen Inspection Flat;Soft   Tenderness Nontender   Anus/Rectum Characteristics Hemorrhoids   Genitourinary   Genitourinary (WDL) WDL   Urine Assessment   Urine Color Yellow/straw   Urine Appearance Clear   Urine Odor No odor   Peripheral Vascular   Peripheral Vascular (WDL) WDL   Edema None   Skin Integumentary    Skin Integumentary (WDL) X   Skin Color Pink   Skin Condition/Temp Dry; Warm   Skin Integrity Other (comment)  (healing sx incision)   Location lt hip   Skin Integrity Site 2   Skin Integrity Location 2 Abrasion   Location 2 lt ankle   Preventative Dressing No   Musculoskeletal   Musculoskeletal (WDL) X   LL Extremity Limited movement   Genitourinary   Incontinence No

## 2022-04-21 NOTE — FLOWSHEET NOTE
04/20/22 2242   Assessment   Charting Type Shift assessment   Psychosocial   Psychosocial (WDL) WDL   Neurological   Neuro (WDL) WDL   Level of Consciousness Alert (0)   Swallow Screening   Is the patient unable to remain alert for testing? No   Is the patient on a modified diet (thickened liquids) due to pre-existing dysphagia? No   Is there presence of existing enteral tube feeding via the stomach or nose? No   Is there presence of head-of-bed restrictions (less than 30 degrees)? No   Is there presence of tracheotomy tube? No   Is the patient ordered nothing-by-mouth status?  No   Denzel Coma Scale   Eye Opening 4   Best Verbal Response 5   Best Motor Response 6   [place] Coma Scale Score 15   NIHSS Stroke Scale   NIHSS Stroke Scale Assessed No   HEENT (Head, Ears, Eyes, Nose, & Throat)   HEENT (WDL) WDL   Right Eye Impaired vision   Left Eye Impaired vision   Teeth Dentures lower;Dentures upper   Respiratory   Respiratory (WDL) WDL   Respiratory Pattern Regular   Respiratory Depth Normal   Respiratory Quality/Effort Unlabored   Chest Assessment Chest expansion symmetrical;Trachea midline   L Breath Sounds Clear   R Breath Sounds Clear   Breath Sounds   Right Upper Lobe Clear   Right Middle Lobe Clear   Right Lower Lobe Clear   Left Upper Lobe Clear   Left Lower Lobe Clear   Cardiac   Cardiac (WDL) WDL   Gastrointestinal   Abdominal (WDL) WDL   RUQ Bowel Sounds Active   LUQ Bowel Sounds Active   RLQ Bowel Sounds Active   LLQ Bowel Sounds Active   Abdomen Inspection Flat;Soft   Last BM (including prior to admit) 04/19/22   Tenderness Nontender   Genitourinary   Genitourinary (WDL) WDL   Urine Assessment   Urine Color Yellow/straw   Urine Appearance Clear   Urine Odor No odor   Peripheral Vascular   Peripheral Vascular (WDL) WDL   Edema None   LLE Edema Non-pitting;Trace   RUE Neurovascular Assessment   Capillary Refill Less than/Equal to 3 seconds   Color Pink   LUE Neurovascular Assessment   Capillary Refill Less than/Equal to 3 seconds   Color Okay   RLE Neurovascular Assessment   Capillary Refill Less than/Equal to 3 seconds   Color Pink   Temperature Warm   R Pedal Pulse +2   LLE Neurovascular Assessment   Capillary Refill Less than/Equal to 3 seconds   Color Pink   Temperature Warm   L Pedal Pulse +2   Skin Integumentary    Skin Integumentary (WDL) X   Skin Color Pink   Skin Condition/Temp Warm;Dry   Skin Integrity Other (comment)  (healing sx incision)   Location lt hip   Wound Prevention/Protection Method No   Skin Integrity Site 2   Skin Integrity Location 2 Abrasion   Location 2 lt ankle   Assessed this shift? Yes   Skin Integrity Site 3    Location 3 left   Assessed this shift?  Yes   Musculoskeletal   Musculoskeletal (WDL) X   LL Extremity Limited movement   Genitourinary   Incontinence No

## 2022-04-21 NOTE — PROGRESS NOTES
Occupational Therapy  Facility/Department: [facility] MED [facility]  Occupational Therapy Initial Assessment    Name: Aleksey Spencer  : 1945  MRN: [id number]  Date of Service: 2022    Discharge Recommendations:  Home with Home health OT          Patient Diagnosis(es): There were no encounter diagnoses. Past Medical History:  has a past medical history of Colitis, GERD (gastroesophageal reflux disease), Glaucoma, and PAD (peripheral artery disease) ([facility] [address] Past Surgical History:  has a past surgical history that includes Tubal ligation; eye surgery; cyst removal; skin biopsy; and Total hip arthroplasty (Left, 2019). Assessment   Assessment: Pt agreeable to OT services. Pt come to sit at EOB with MOD I. Pt transferred to standing with SBA. Pt required verbal cuing to place LLE down onto floor to assist with weight bearing. Pt ambulated to bathroom with SBA. Pt completed bathing with NINO and LB bathing with SBA using grab bar for stability. Pt demo improved independence with LB dressing on this date without use of AE. Pt completed oral hygiene in standing on this date with SBA demo good standing balance. Pt returned to chair with SBA.            Plan   Plan  Times per Week: 3-5  Times per Day: Daily  Plan Weeks: 1-2  Current Treatment Recommendations: Strengthening,Endurance Training,Balance Training,Functional Mobility Training,Self-Care / ADL,Equipment Evaluation, Education, & procurement,Safety Education & Training     Restrictions  Restrictions/Precautions  Restrictions/Precautions: Weight Bearing  Required Braces or Orthoses?: No (ELS knee brace d/c'd 22)  Lower Extremity Weight Bearing Restrictions  Left Lower Extremity Weight Bearing: Toe Touch Weight Bearing  Required Braces or Orthoses  Left Lower Extremity Brace:  (ELS brace locked in extension)    Subjective   General  Chart Reviewed: Yes  Patient assessed for rehabilitation services?: Yes  Family / Caregiver Present: No  Referring Practitioner: Nikki Burton PA-C  Diagnosis: Declining functional status; S/P ORIF left sacrum  Subjective  Subjective: Pt agreeable to OT services. Pt states she was at home and fell when she slipped out of her shoe. Pain Assessment  Pain Assessment: 0-10  Pain Level: 7  Pain Location: Leg;Hip; Shoulder  Pain Type: Chronic pain  Social/Functional History  Social/Functional History  Lives With: Alone  Type of Home: House  Home Layout: Two level,Laundry in basement  Home Access: Ramped entrance  Bathroom Shower/Tub: Tub/Shower unit  Bathroom Toilet: Standard  Bathroom Equipment: 3-in-1 commode,Tub transfer bench,Toilet raiser  Bathroom Accessibility: Walker accessible  Home Equipment: Rolling walker,Lift chair  Receives Help From: Home health,Family,Friend(s)  ADL Assistance: Independent  Homemaking Assistance: Independent  Homemaking Responsibilities: Yes  Ambulation Assistance: Independent  Transfer Assistance: Independent  Active : Yes       Objective           Observation/Palpation  Posture: Good  Observation: Patient presents awake in bed, NAD     ADL  Grooming: Setup  UE Bathing: Setup  LE Bathing: Stand by assistance  UE Dressing: Setup  LE Dressing: Stand by assistance     Activity Tolerance  Activity Tolerance: Patient tolerated treatment well    Goals  Short Term Goals  Time Frame for Short term goals: 2 weeks  Short Term Goal 1: Pt to complete bathing with MOD I. Short Term Goal 2: Pt to complete dressing with MOD I. Short Term Goal 3: Pt to tolerate x15 minutes of activity to increase functional activity tolerance. Short Term Goal 4: Pt to complete toileting with MOD I. Short Term Goal 5: Pt to complete oral hygiene standing at sink with no LOB MOD I. Therapy Time   Individual Concurrent Group Co-treatment   Time In 1011         Time Out 3000         Minutes 38              This note serves as a DC summary in the event of pt discharge.      Carolyn Robbins, OTR/L

## 2022-04-21 NOTE — PROGRESS NOTES
Physical Therapy  Facility/Department: [facility]  Physical Therapy Daily Treatment Note    Name: Ruth Jolley  : 1945  MRN: [id number]  Date of Service: 2022    Discharge Recommendations:  Continue to assess pending progress      Patient Diagnosis(es): There were no encounter diagnoses. Past Medical History:  has a past medical history of Colitis, GERD (gastroesophageal reflux disease), Glaucoma, and PAD (peripheral artery disease) ([facility] [address] Past Surgical History:  has a past surgical history that includes Tubal ligation; eye surgery; cyst removal; skin biopsy; and Total hip arthroplasty (Left, 2019). Assessment   Assessment: Patient completed bed mobility tasks with Mod I.  Stood from bedside with supervision and ambulated 150 feet x 2 with RW, L TTWB, and SBA. Patient becoming more comfortable with L LE TTWB during ambulation. Completed B LE therex in sitting with focus on increasing L knee flex/ext.   Requires PT Follow-Up: Yes  Activity Tolerance  Activity Tolerance: Patient tolerated treatment well     Plan   Plan  Current Treatment Recommendations: Faina Basket Training,Patient/Caregiver Education & Training,ROM,Balance Training,Gait Training,Home Exercise Program,Functional Mobility Training,Safety Education & Training  Safety Devices  Type of Devices: Left in bed,Call light within reach     Restrictions  Restrictions/Precautions  Restrictions/Precautions: Weight Bearing  Required Braces or Orthoses?: No (ELS knee brace d/c'd 22)  Lower Extremity Weight Bearing Restrictions  Left Lower Extremity Weight Bearing: Toe Touch Weight Bearing  Required Braces or Orthoses  Left Lower Extremity Brace:  (ELS brace locked in extension)     Subjective   General  Chart Reviewed: Yes  Patient assessed for rehabilitation services?: Yes  Additional Pertinent Hx: L Tib/fix fx and s/p L sacrum ORIF  Response To Previous Treatment: Patient with no complaints from previous session. Family / Caregiver Present: No  Subjective  Subjective: Patient offers no new c/o. Reports she has been keeping the pillow under her while in bed as suggested to her yesterday. Pain Assessment  Pain Assessment: 0-10  Pain Level: 7  Pain Location: Leg;Hip; Shoulder  Pain Type: Chronic pain      Social/Functional History  Social/Functional History  Lives With: Alone  Type of Home: House  Home Layout: Two level,Laundry in basement  Home Access: Ramped entrance  Bathroom Shower/Tub: Tub/Shower unit  Bathroom Toilet: Standard  Bathroom Equipment: 3-in-1 commode,Tub transfer bench,Toilet raiser  Bathroom Accessibility: Walker accessible  Home Equipment: Rolling walker,Lift chair  Receives Help From: Home health,Family,Friend(s)  ADL Assistance: [address] [place] [address] Avenue: Independent  Homemaking Responsibilities: Yes  Ambulation Assistance: Independent  Transfer Assistance: Independent  Active : Yes    Objective      Observation/Palpation  Posture: Good  Observation: Patient presents awake in bed, NAD      Bed mobility  Rolling to Left: Modified independent  Rolling to Right: Modified independent  Supine to Sit: Modified independent  Sit to Supine: Modified independent  Scooting: Modified independent  Transfers  Sit to Stand: Supervision  Stand to sit: Supervision  Ambulation  WB Status: TTWB L LE  Ambulation  Surface: level tile  Device: Rolling Walker  Assistance: Stand by assistance  Distance: 150 feet x 2     Balance  Posture: Good  Sitting - Static: Good  Sitting - Dynamic: Good  Standing - Static: Good  Standing - Dynamic: Good;-  A/AROM Exercises: 15 each of B LE therex in sitting with focus on increasing L knee AROM      Goals  Long Term Goals  Time Frame for Long term goals : 10 days  Long term goal 1: Patient will perform all bed mobility with independence. Long term goal 2: Patient will perform sit to stand and transfers, NWB L LE, with RW and SBA.   Long term goal 3: Patient will ambulate 50'x2, NWB L LE, with RW and SBA. Long term goal 4: Patient will demonstrate independence with HEP. Patient Goals   Patient goals : Return home. Therapy Time   Individual Concurrent Group Co-treatment   Time In 0945         Time Out 1012         Minutes 27              This note serves as D/C summary if patient is discharged prior to next visit.   Willis Auguste, PTA

## 2022-04-22 LAB
ANION GAP SERPL CALCULATED.3IONS-SCNC: 10 MMOL/L (ref 3–16)
BUN BLDV-MCNC: 13 MG/DL (ref 6–20)
CALCIUM SERPL-MCNC: 9.5 MG/DL (ref 8.5–10.5)
CHLORIDE BLD-SCNC: 106 MMOL/L (ref 98–107)
CO2: 26 MMOL/L (ref 20–30)
CREAT SERPL-MCNC: 0.6 MG/DL (ref 0.4–1.2)
GFR AFRICAN AMERICAN: >59
GFR NON-AFRICAN AMERICAN: >60
GLUCOSE BLD-MCNC: 96 MG/DL (ref 74–106)
POTASSIUM SERPL-SCNC: 3.6 MMOL/L (ref 3.4–5.1)
SODIUM BLD-SCNC: 142 MMOL/L (ref 136–145)

## 2022-04-22 PROCEDURE — 97530 THERAPEUTIC ACTIVITIES: CPT

## 2022-04-22 PROCEDURE — 36415 COLL VENOUS BLD VENIPUNCTURE: CPT

## 2022-04-22 PROCEDURE — 6370000000 HC RX 637 (ALT 250 FOR IP): Performed by: PHYSICIAN ASSISTANT

## 2022-04-22 PROCEDURE — 80048 BASIC METABOLIC PNL TOTAL CA: CPT

## 2022-04-22 PROCEDURE — 97535 SELF CARE MNGMENT TRAINING: CPT

## 2022-04-22 PROCEDURE — 1200000002 HC SEMI PRIVATE SWING BED

## 2022-04-22 PROCEDURE — 97803 MED NUTRITION INDIV SUBSEQ: CPT

## 2022-04-22 PROCEDURE — 6360000002 HC RX W HCPCS: Performed by: PHYSICIAN ASSISTANT

## 2022-04-22 RX ADMIN — Medication 10 MG: at 19:45

## 2022-04-22 RX ADMIN — ACETAMINOPHEN 650 MG: 325 TABLET, FILM COATED ORAL at 03:13

## 2022-04-22 RX ADMIN — ACETAMINOPHEN 650 MG: 325 TABLET, FILM COATED ORAL at 19:45

## 2022-04-22 RX ADMIN — CALCIUM 500 MG: 500 TABLET ORAL at 09:26

## 2022-04-22 RX ADMIN — ACETAMINOPHEN 650 MG: 325 TABLET, FILM COATED ORAL at 09:27

## 2022-04-22 RX ADMIN — OXYCODONE 5 MG: 5 TABLET ORAL at 03:13

## 2022-04-22 RX ADMIN — DICLOFENAC SODIUM 4 G: 10 GEL TOPICAL at 22:26

## 2022-04-22 RX ADMIN — OXYCODONE 5 MG: 5 TABLET ORAL at 22:24

## 2022-04-22 RX ADMIN — FOLIC ACID TAB 400 MCG 0.8 MG: 400 TAB at 09:26

## 2022-04-22 RX ADMIN — Medication 400 MG: at 09:27

## 2022-04-22 RX ADMIN — OXYCODONE HYDROCHLORIDE AND ACETAMINOPHEN 500 MG: 500 TABLET ORAL at 09:26

## 2022-04-22 RX ADMIN — ENOXAPARIN SODIUM 30 MG: 100 INJECTION SUBCUTANEOUS at 09:27

## 2022-04-22 RX ADMIN — FAMOTIDINE 20 MG: 20 TABLET, FILM COATED ORAL at 19:45

## 2022-04-22 RX ADMIN — ACETAMINOPHEN 650 MG: 325 TABLET, FILM COATED ORAL at 14:34

## 2022-04-22 RX ADMIN — ENOXAPARIN SODIUM 30 MG: 100 INJECTION SUBCUTANEOUS at 19:45

## 2022-04-22 RX ADMIN — MULTIPLE VITAMINS W/ MINERALS TAB 1 TABLET: TAB at 09:26

## 2022-04-22 RX ADMIN — FAMOTIDINE 20 MG: 20 TABLET, FILM COATED ORAL at 09:26

## 2022-04-22 RX ADMIN — Medication 1 CAPSULE: at 09:26

## 2022-04-22 RX ADMIN — GABAPENTIN 100 MG: 100 CAPSULE ORAL at 19:45

## 2022-04-22 ASSESSMENT — PAIN DESCRIPTION - ORIENTATION
ORIENTATION: RIGHT;LEFT
ORIENTATION: RIGHT;LEFT

## 2022-04-22 ASSESSMENT — PAIN DESCRIPTION - LOCATION
LOCATION: SHOULDER;KNEE;BACK
LOCATION: BUTTOCKS
LOCATION: LEG;FOOT;SHOULDER

## 2022-04-22 ASSESSMENT — PAIN SCALES - GENERAL
PAINLEVEL_OUTOF10: 9
PAINLEVEL_OUTOF10: 7
PAINLEVEL_OUTOF10: 10
PAINLEVEL_OUTOF10: 7
PAINLEVEL_OUTOF10: 8

## 2022-04-22 NOTE — FLOWSHEET NOTE
04/21/22 2229   Assessment   Charting Type Shift assessment   Psychosocial   Psychosocial (WDL) WDL   Neurological   Neuro (WDL) WDL   Level of Consciousness Alert (0)   Swallow Screening   Is the patient unable to remain alert for testing? No   Is the patient on a modified diet (thickened liquids) due to pre-existing dysphagia? No   Is there presence of existing enteral tube feeding via the stomach or nose? No   Is there presence of head-of-bed restrictions (less than 30 degrees)? No   Is there presence of tracheotomy tube? No   Is the patient ordered nothing-by-mouth status? No   Denzel Coma Scale   Eye Opening 4   Best Verbal Response 5   Best Motor Response 6   Denzel Coma Scale Score 15   NIHSS Stroke Scale   NIHSS Stroke Scale Assessed No   HEENT (Head, Ears, Eyes, Nose, & Throat)   HEENT (WDL) WDL   Right Eye Impaired vision   Left Eye Impaired vision   Teeth Dentures lower;Dentures upper   Respiratory   Respiratory (WDL) WDL   Respiratory Pattern Regular   Respiratory Depth Normal   Respiratory Quality/Effort Unlabored   Chest Assessment Chest expansion symmetrical;Trachea midline   L Breath Sounds Clear   R Breath Sounds Clear   Breath Sounds   Right Upper Lobe Clear   Right Middle Lobe Clear   Right Lower Lobe Clear   Left Upper Lobe Clear   Left Lower Lobe Clear   Cardiac   Cardiac (WDL) WDL   Gastrointestinal   Abdominal (WDL) WDL   RUQ Bowel Sounds Active   LUQ Bowel Sounds Active   RLQ Bowel Sounds Active   LLQ Bowel Sounds Active   Abdomen Inspection Soft;Flat   Last BM (including prior to admit) 04/21/22   Tenderness Nontender   Genitourinary   Genitourinary (WDL) WDL   Peripheral Vascular   Peripheral Vascular (WDL) WDL   Edema None   LLE Edema Trace; Non-pitting   RUE Neurovascular Assessment   Capillary Refill Less than/Equal to 3 seconds   Color Pink   LUE Neurovascular Assessment   Capillary Refill Less than/Equal to 3 seconds   Color Stout   RLE Neurovascular Assessment   Capillary Refill Less than/Equal to 3 seconds   Color Pink   Temperature Warm   R Pedal Pulse +2   LLE Neurovascular Assessment   Capillary Refill Less than/Equal to 3 seconds   Color Pink   Temperature Warm   L Pedal Pulse +2   Skin Integumentary    Skin Integumentary (WDL) X   Skin Color Pink   Skin Condition/Temp Warm;Dry   Skin Integrity Other (comment)  (healing sx incision)   Location lt hip   Wound Prevention/Protection Method No   Skin Assessed Underneath Dressing This Shift Yes   Skin Integrity Site 2   Skin Integrity Location 2 Abrasion   Location 2 lt ankle   Preventative Dressing No   Assessed this shift?  Yes   Musculoskeletal   Musculoskeletal (WDL) X   LL Extremity Limited movement   RASS   Davila Agitation Sedation Scale (RASS) 0   Genitourinary   Incontinence No

## 2022-04-22 NOTE — PROGRESS NOTES
Physical Therapy  Facility/Department: [facility]  Physical Therapy Initial Assessment    Name: Ginger Fernandez  : 1945  MRN: [id number]  Date of Service: 2022    Discharge Recommendations:  Continue to assess pending progress      Patient Diagnosis(es): There were no encounter diagnoses. Past Medical History:  has a past medical history of Colitis, GERD (gastroesophageal reflux disease), Glaucoma, and PAD (peripheral artery disease) ([address] Past Surgical History:  has a past surgical history that includes Tubal ligation; eye surgery; cyst removal; skin biopsy; and Total hip arthroplasty (Left, 2019). Assessment   Assessment: Patient completed bed mobility tasks with Mod I. Donned socks and shoes without assistance. Patient stood from bedside with supervision and ambulated 200 feet with RW, L TTWB, and SBA. Patient transferred to the recliner and completed B LE therex in sitting with focus on increasing L knee flex/ext. Activity Tolerance  Activity Tolerance: Patient tolerated treatment well     Plan   Plan  Current Treatment Recommendations: Yamel Spanner Training,Patient/Caregiver Education & Training,ROM,Balance Training,Gait Training,Home Exercise Program,Functional Mobility Training,Safety Education & Training  Safety Devices  Type of Devices: Left in chair,Call light within reach     Restrictions  Restrictions/Precautions  Restrictions/Precautions: Weight Bearing  Required Braces or Orthoses?: No (ELS knee brace d/c'd 22)  Lower Extremity Weight Bearing Restrictions  Left Lower Extremity Weight Bearing: Toe Touch Weight Bearing  Required Braces or Orthoses  Left Lower Extremity Brace:  (ELS brace locked in extension)     Subjective   General  Chart Reviewed: Yes  Patient assessed for rehabilitation services?: Yes  Response To Previous Treatment: Patient with no complaints from previous session.   Family / Caregiver Present: No  Subjective  Subjective: Patient states she's doing ok. Her brother brought her shoes to try. Social/Functional History  Social/Functional History  Lives With: Alone  Type of Home: House  Home Layout: Two level,Laundry in basement  Home Access: Ramped entrance  Bathroom Shower/Tub: Tub/Shower unit  Bathroom Toilet: Standard  Bathroom Equipment: 3-in-1 commode,Tub transfer bench,Toilet raiser  Bathroom Accessibility: Walker accessible  Home Equipment: Rolling walker,Lift chair  Receives Help From: Home health,Family,Friend(s)  ADL Assistance: Independent  Homemaking Assistance: Independent  Homemaking Responsibilities: Yes  Ambulation Assistance: Independent  Transfer Assistance: Independent  Active : Yes    Objective      Observation/Palpation  Posture: Good  Observation: Patient presents awake in bed, NAD      Bed mobility  Rolling to Left: Modified independent  Supine to Sit: Modified independent  Scooting: Modified independent  Transfers  Sit to Stand: Supervision  Stand to sit: Supervision  Ambulation  WB Status: TTWB L LE  Ambulation  Surface: level tile  Device: Rolling Walker  Assistance: Stand by assistance  Distance: 200 feet     Balance  Posture: Good  Sitting - Static: Good  Sitting - Dynamic: Good  Standing - Static: Good  Standing - Dynamic: Good;-  A/AROM Exercises: 15 each of B LE therex in sitting with focus on increasing L knee AROM      Goals  Long Term Goals  Time Frame for Long term goals : 10 days  Long term goal 1: Patient will perform all bed mobility with independence. Long term goal 2: Patient will perform sit to stand and transfers, NWB L LE, with RW and SBA. Long term goal 3: Patient will ambulate 50'x2, NWB L LE, with RW and SBA. Long term goal 4: Patient will demonstrate independence with HEP. Patient Goals   Patient goals : Return home.      Therapy Time   Individual Concurrent Group Co-treatment   Time In 6624         Time Out 1103         Minutes 19              This note serves as D/C summary if patient is discharged prior to next visit.   Vasyl Medina, JENNIFER

## 2022-04-22 NOTE — PROGRESS NOTES
Nutrition Assessment     Type and Reason for Visit:  (follow up)    Nutrition Recommendations/Plan:   1. Recommend continuing with current diet, ONS, MVI. Malnutrition Assessment:  Malnutrition Status: At risk for malnutrition (Comment) (related to hx of colitis)    Nutrition Assessment:  Pt at low-moderate risk for ongoing nutritional compromise r/t colitis, good intakes. Estimated Daily Nutrient Needs:  Energy (kcal):  8587-0402 (27-30 kcal/kg cbw) Weight Used for Energy Requirements: Current     Protein (g):  68-79 (1.2-1.4 gm/kg cbw) Weight Used for Protein Requirements: Current        Fluid (ml/day):  9487-4366 Method Used for Fluid Requirements: 1 ml/kcal    Nutrition Related Findings:   Pt continues to be underweight, but Pt states always has been tall and thin and family members same. Pt's intakes good again and has MVI, ordered. No pertinent labs. Pt seems to be getting stronger and was in good spirits. Wound Type:  (surgical wounds healed, has abrasion on ankle)    Current Nutrition Therapies:    ADULT DIET;  Regular  ADULT ORAL NUTRITION SUPPLEMENT; Lunch, Dinner, Breakfast; Standard High Calorie/High Protein Oral Supplement    Anthropometric Measures:  · Height: 5' 7\" (170.2 cm)  · Current Body Wt: 126 lb 5.2 oz (57.3 kg)   · BMI: 19.8    Nutrition Diagnosis:   · Predicted inadequate energy intake related to altered GI function as evidenced by Josh Gracia      Nutrition Interventions:   Food and/or Nutrient Delivery: Continue Current Diet,Continue Oral Nutrition Supplement,Mineral Supplement,Vitamin Supplement  Nutrition Education/Counseling: Education completed (spoke with patient about the importance of ONS with condition and she did understand.)  Coordination of Nutrition Care: Continue to monitor while inpatient,Interdisciplinary Rounds  Plan of Care discussed with: interdisciplinary rounds, RN,    Goals:  Previous Goal Met: Progressing toward Goal(s)          Nutrition Monitoring and Evaluation:      Food/Nutrient Intake Outcomes: Food and Nutrient Intake,Supplement Intake  Physical Signs/Symptoms Outcomes: Biochemical Data,Diarrhea,GI Status,Fluid Status or Edema,Skin,Weight    Discharge Planning:    Continue current diet,Continue Oral Nutrition Supplement     Sho Sanabria MS, RD, LD

## 2022-04-22 NOTE — PROGRESS NOTES
Occupational Therapy  Facility/Department: [address] [place] [address]  Occupational Therapy Initial Assessment    Name: Earle Frazier  : 1945  MRN: [id number]  Date of Service: 2022    Discharge Recommendations:  Home with Home health OT          Patient Diagnosis(es): There were no encounter diagnoses. Past Medical History:  has a past medical history of Colitis, GERD (gastroesophageal reflux disease), Glaucoma, and PAD (peripheral artery disease) ([facility] [address] Past Surgical History:  has a past surgical history that includes Tubal ligation; eye surgery; cyst removal; skin biopsy; and Total hip arthroplasty (Left, 2019). Assessment   Assessment: Pt agreeable to OT services. Pt demo ability to maintain weight bearing status tolerating TTWB well with ambulation using RW. Pt transferred to shower chair without difficulty. Pt completed bathing with good safety awareness. Pt demo ability to doff and don LB clothing without physical assist. Pt demo ability to tolerate standing at sink ~10 minutes with no LOB SUP. Pt tolerated all activity well without c/o fatigue.            Plan   Plan  Times per Week: 3-5  Times per Day: Daily  Plan Weeks: 1-2  Current Treatment Recommendations: Strengthening,Endurance Training,Balance Training,Functional Mobility Training,Self-Care / ADL,Equipment Evaluation, Education, & procurement,Safety Education & Training     Restrictions  Restrictions/Precautions  Restrictions/Precautions: Weight Bearing  Required Braces or Orthoses?: No (ELS knee brace d/c'd 22)  Lower Extremity Weight Bearing Restrictions  Left Lower Extremity Weight Bearing: Toe Touch Weight Bearing  Required Braces or Orthoses  Left Lower Extremity Brace:  (ELS brace locked in extension)    Subjective   General  Chart Reviewed: Yes  Patient assessed for rehabilitation services?: Yes  Family / Caregiver Present: No  Referring Practitioner: Angeles Aggarwal PA-C  Diagnosis: Declining functional status; S/P ORIF left sacrum  Subjective  Subjective: Pt agreeable to OT services. Pt states she was at home and fell when she slipped out of her shoe. Pain Assessment  Pain Assessment: 0-10  Pain Level: 7  Pain Location: Shoulder;Knee;Back  Pain Orientation: Right;Left  Response to Pain Intervention: Patient satisfied  Vital Signs  Level of Consciousness: Alert (0)  Social/Functional History  Social/Functional History  Lives With: Alone  Type of Home: House  Home Layout: Two level,Laundry in basement  Home Access: Ramped entrance  Bathroom Shower/Tub: Tub/Shower unit  Bathroom Toilet: Standard  Bathroom Equipment: 3-in-1 commode,Tub transfer bench,Toilet raiser  Bathroom Accessibility: Walker accessible  Home Equipment: Rolling walker,Lift chair  Receives Help From: Home health,Family,Friend(s)  ADL Assistance: Independent  Homemaking Assistance: Independent  Homemaking Responsibilities: Yes  Ambulation Assistance: Independent  Transfer Assistance: Independent  Active : Yes       Objective              Balance  Sitting Balance: Independent  Standing Balance: Supervision  Functional Mobility  Functional - Mobility Device: Rolling Walker  Activity: To/from bathroom  Assist Level: Stand by assistance  Functional Mobility Comments: Pt demo ability to maintain TTWB during ambulation  ADL  Grooming: Setup (in standing)  UE Bathing: Setup  LE Bathing: Supervision  UE Dressing: Setup  LE Dressing: Stand by assistance;Supervision        Bed mobility  Rolling to Left: Modified independent  Rolling to Right: Modified independent  Supine to Sit: Modified independent  Sit to Supine: Modified independent  Scooting: Modified independent  Transfers  Stand Pivot Transfers: Supervision  Sit to stand: Supervision    Goals  Short Term Goals  Time Frame for Short term goals: 2 weeks  Short Term Goal 1: Pt to complete bathing with MOD I. Short Term Goal 2: Pt to complete dressing with MOD I.   Short Term Goal 3: Pt to tolerate x15 minutes of activity to increase functional activity tolerance. Short Term Goal 4: Pt to complete toileting with MOD I. Short Term Goal 5: Pt to complete oral hygiene standing at sink with no LOB MOD I. Therapy Time   Individual Concurrent Group Co-treatment   Time In 0328         Time Out 0913         Minutes 38              This note serves as a DC summary in the event of pt discharge.      Carolyn Robibns, OTR/L

## 2022-04-23 PROCEDURE — 6370000000 HC RX 637 (ALT 250 FOR IP): Performed by: PHYSICIAN ASSISTANT

## 2022-04-23 PROCEDURE — 6360000002 HC RX W HCPCS: Performed by: PHYSICIAN ASSISTANT

## 2022-04-23 PROCEDURE — 1200000002 HC SEMI PRIVATE SWING BED

## 2022-04-23 RX ADMIN — FAMOTIDINE 20 MG: 20 TABLET, FILM COATED ORAL at 20:21

## 2022-04-23 RX ADMIN — FERROUS SULFATE TAB EC 324 MG (65 MG FE EQUIVALENT) 324 MG: 324 (65 FE) TABLET DELAYED RESPONSE at 08:07

## 2022-04-23 RX ADMIN — Medication 1 CAPSULE: at 08:06

## 2022-04-23 RX ADMIN — CALCIUM 500 MG: 500 TABLET ORAL at 08:07

## 2022-04-23 RX ADMIN — ENOXAPARIN SODIUM 30 MG: 100 INJECTION SUBCUTANEOUS at 20:22

## 2022-04-23 RX ADMIN — MULTIPLE VITAMINS W/ MINERALS TAB 1 TABLET: TAB at 08:07

## 2022-04-23 RX ADMIN — FAMOTIDINE 20 MG: 20 TABLET, FILM COATED ORAL at 08:07

## 2022-04-23 RX ADMIN — FOLIC ACID TAB 400 MCG 0.8 MG: 400 TAB at 08:07

## 2022-04-23 RX ADMIN — ENOXAPARIN SODIUM 30 MG: 100 INJECTION SUBCUTANEOUS at 08:07

## 2022-04-23 RX ADMIN — Medication 10 MG: at 20:21

## 2022-04-23 RX ADMIN — ACETAMINOPHEN 650 MG: 325 TABLET, FILM COATED ORAL at 08:06

## 2022-04-23 RX ADMIN — OXYCODONE HYDROCHLORIDE AND ACETAMINOPHEN 500 MG: 500 TABLET ORAL at 08:07

## 2022-04-23 RX ADMIN — ACETAMINOPHEN 650 MG: 325 TABLET, FILM COATED ORAL at 02:07

## 2022-04-23 RX ADMIN — Medication 400 MG: at 08:07

## 2022-04-23 RX ADMIN — GABAPENTIN 100 MG: 100 CAPSULE ORAL at 20:21

## 2022-04-23 RX ADMIN — ACETAMINOPHEN 650 MG: 325 TABLET, FILM COATED ORAL at 14:01

## 2022-04-23 RX ADMIN — ACETAMINOPHEN 650 MG: 325 TABLET, FILM COATED ORAL at 20:21

## 2022-04-23 ASSESSMENT — PAIN SCALES - GENERAL
PAINLEVEL_OUTOF10: 8
PAINLEVEL_OUTOF10: 7

## 2022-04-23 ASSESSMENT — PAIN DESCRIPTION - LOCATION
LOCATION: HIP;LEG;SHOULDER
LOCATION: HIP;LEG;SHOULDER

## 2022-04-23 ASSESSMENT — PAIN DESCRIPTION - ORIENTATION
ORIENTATION: RIGHT;LEFT
ORIENTATION: RIGHT;LEFT
ORIENTATION: LEFT

## 2022-04-23 ASSESSMENT — PAIN DESCRIPTION - DESCRIPTORS: DESCRIPTORS: ACHING

## 2022-04-23 NOTE — PLAN OF CARE
Problem: Falls - Risk of:  Goal: Absence of physical injury  Description: Absence of physical injury  Outcome: Progressing     Problem: Safety:  Goal: Free from accidental physical injury  Description: Free from accidental physical injury  Outcome: Progressing     Problem: Daily Care:  Goal: Daily care needs are met  Description: Daily care needs are met  Outcome: Progressing

## 2022-04-24 PROCEDURE — 6370000000 HC RX 637 (ALT 250 FOR IP): Performed by: PHYSICIAN ASSISTANT

## 2022-04-24 PROCEDURE — 1200000002 HC SEMI PRIVATE SWING BED

## 2022-04-24 PROCEDURE — 6360000002 HC RX W HCPCS: Performed by: PHYSICIAN ASSISTANT

## 2022-04-24 RX ADMIN — OXYCODONE 5 MG: 5 TABLET ORAL at 22:13

## 2022-04-24 RX ADMIN — ACETAMINOPHEN 650 MG: 325 TABLET, FILM COATED ORAL at 07:42

## 2022-04-24 RX ADMIN — CALCIUM 500 MG: 500 TABLET ORAL at 07:42

## 2022-04-24 RX ADMIN — MULTIPLE VITAMINS W/ MINERALS TAB 1 TABLET: TAB at 07:42

## 2022-04-24 RX ADMIN — Medication 400 MG: at 07:42

## 2022-04-24 RX ADMIN — Medication 10 MG: at 20:25

## 2022-04-24 RX ADMIN — FOLIC ACID TAB 400 MCG 0.8 MG: 400 TAB at 07:42

## 2022-04-24 RX ADMIN — OXYCODONE 5 MG: 5 TABLET ORAL at 02:02

## 2022-04-24 RX ADMIN — ENOXAPARIN SODIUM 30 MG: 100 INJECTION SUBCUTANEOUS at 07:44

## 2022-04-24 RX ADMIN — FAMOTIDINE 20 MG: 20 TABLET, FILM COATED ORAL at 20:25

## 2022-04-24 RX ADMIN — ENOXAPARIN SODIUM 30 MG: 100 INJECTION SUBCUTANEOUS at 20:25

## 2022-04-24 RX ADMIN — ACETAMINOPHEN 650 MG: 325 TABLET, FILM COATED ORAL at 20:25

## 2022-04-24 RX ADMIN — ACETAMINOPHEN 650 MG: 325 TABLET, FILM COATED ORAL at 02:02

## 2022-04-24 RX ADMIN — GABAPENTIN 100 MG: 100 CAPSULE ORAL at 20:25

## 2022-04-24 RX ADMIN — OXYCODONE HYDROCHLORIDE AND ACETAMINOPHEN 500 MG: 500 TABLET ORAL at 07:42

## 2022-04-24 RX ADMIN — FAMOTIDINE 20 MG: 20 TABLET, FILM COATED ORAL at 07:42

## 2022-04-24 RX ADMIN — Medication 1 CAPSULE: at 07:42

## 2022-04-24 ASSESSMENT — PAIN DESCRIPTION - LOCATION
LOCATION: LEG
LOCATION: LEG;SHOULDER
LOCATION: ARM
LOCATION: LEG;SHOULDER

## 2022-04-24 ASSESSMENT — PAIN SCALES - GENERAL
PAINLEVEL_OUTOF10: 9
PAINLEVEL_OUTOF10: 10

## 2022-04-24 ASSESSMENT — PAIN DESCRIPTION - ORIENTATION
ORIENTATION: LEFT
ORIENTATION: RIGHT;LEFT

## 2022-04-24 ASSESSMENT — PAIN DESCRIPTION - DESCRIPTORS: DESCRIPTORS: ACHING

## 2022-04-24 NOTE — FLOWSHEET NOTE
04/23/22 2000   Assessment   Charting Type Shift assessment   Psychosocial   Psychosocial (WDL) WDL   Neurological   Neuro (WDL) WDL   Level of Consciousness Alert (0)   [place] Coma Scale   Eye Opening 4   Best Verbal Response 5   Best Motor Response 6   Denzel Coma Scale Score 15   HEENT (Head, Ears, Eyes, Nose, & Throat)   HEENT (WDL) WDL   Right Eye Impaired vision   Left Eye Impaired vision   Teeth Dentures lower;Dentures upper   Respiratory   Respiratory (WDL) WDL   Respiratory Pattern Regular   Respiratory Depth Normal   Respiratory Quality/Effort Unlabored   Chest Assessment Chest expansion symmetrical;Trachea midline   L Breath Sounds Clear   R Breath Sounds Clear   Breath Sounds   Right Upper Lobe Clear   Right Middle Lobe Clear   Right Lower Lobe Clear   Left Upper Lobe Clear   Left Lower Lobe Clear   Cardiac   Cardiac (WDL) WDL   Gastrointestinal   Abdominal (WDL) X   RUQ Bowel Sounds Active   LUQ Bowel Sounds Active   RLQ Bowel Sounds Active   LLQ Bowel Sounds Active   Abdomen Inspection Soft;Flat   GI Symptoms Diarrhea  (at times, chronic)   Tenderness Nontender   Anus/Rectum Characteristics Hemorrhoids   Genitourinary   Genitourinary (WDL) WDL   Urine Assessment   Urine Color Yellow/straw   Urine Appearance Clear   Urine Odor No odor   Peripheral Vascular   Peripheral Vascular (WDL) WDL   Edema None   RUE Neurovascular Assessment   Capillary Refill Less than/Equal to 3 seconds   Color Pink   LUE Neurovascular Assessment   Capillary Refill Less than/Equal to 3 seconds   Color [place]   RLE Neurovascular Assessment   Capillary Refill Less than/Equal to 3 seconds   Color Pink   R Pedal Pulse +2   LLE Neurovascular Assessment   Capillary Refill Less than/Equal to 3 seconds   Color Pink   L Pedal Pulse +2   Skin Integumentary    Skin Integumentary (WDL) X   Skin Color Pink   Skin Condition/Temp Dry; Warm   Skin Integrity Other (comment)  (Healing Surgical incision )   Location Left Hip   Skin Integrity Site 2   Skin Integrity Location 2 Abrasion   Location 2 Left Ankle    Musculoskeletal   Musculoskeletal (WDL) X   LL Extremity Limited movement   Genitourinary   Incontinence No

## 2022-04-25 PROCEDURE — 1200000002 HC SEMI PRIVATE SWING BED

## 2022-04-25 PROCEDURE — 97535 SELF CARE MNGMENT TRAINING: CPT

## 2022-04-25 PROCEDURE — 6360000002 HC RX W HCPCS: Performed by: PHYSICIAN ASSISTANT

## 2022-04-25 PROCEDURE — 97116 GAIT TRAINING THERAPY: CPT

## 2022-04-25 PROCEDURE — 6370000000 HC RX 637 (ALT 250 FOR IP): Performed by: PHYSICIAN ASSISTANT

## 2022-04-25 RX ADMIN — MULTIPLE VITAMINS W/ MINERALS TAB 1 TABLET: TAB at 08:13

## 2022-04-25 RX ADMIN — FERROUS SULFATE TAB EC 324 MG (65 MG FE EQUIVALENT) 324 MG: 324 (65 FE) TABLET DELAYED RESPONSE at 08:14

## 2022-04-25 RX ADMIN — FAMOTIDINE 20 MG: 20 TABLET, FILM COATED ORAL at 20:29

## 2022-04-25 RX ADMIN — Medication 1 CAPSULE: at 08:14

## 2022-04-25 RX ADMIN — ACETAMINOPHEN 650 MG: 325 TABLET, FILM COATED ORAL at 08:14

## 2022-04-25 RX ADMIN — FOLIC ACID TAB 400 MCG 0.8 MG: 400 TAB at 08:13

## 2022-04-25 RX ADMIN — OXYCODONE HYDROCHLORIDE AND ACETAMINOPHEN 500 MG: 500 TABLET ORAL at 08:14

## 2022-04-25 RX ADMIN — ACETAMINOPHEN 650 MG: 325 TABLET, FILM COATED ORAL at 13:43

## 2022-04-25 RX ADMIN — ENOXAPARIN SODIUM 30 MG: 100 INJECTION SUBCUTANEOUS at 20:29

## 2022-04-25 RX ADMIN — Medication 10 MG: at 20:29

## 2022-04-25 RX ADMIN — CALCIUM 500 MG: 500 TABLET ORAL at 08:13

## 2022-04-25 RX ADMIN — GABAPENTIN 100 MG: 100 CAPSULE ORAL at 20:30

## 2022-04-25 RX ADMIN — ACETAMINOPHEN 650 MG: 325 TABLET, FILM COATED ORAL at 02:22

## 2022-04-25 RX ADMIN — FAMOTIDINE 20 MG: 20 TABLET, FILM COATED ORAL at 08:14

## 2022-04-25 RX ADMIN — ENOXAPARIN SODIUM 30 MG: 100 INJECTION SUBCUTANEOUS at 08:14

## 2022-04-25 RX ADMIN — Medication 400 MG: at 08:14

## 2022-04-25 RX ADMIN — ACETAMINOPHEN 650 MG: 325 TABLET, FILM COATED ORAL at 20:29

## 2022-04-25 ASSESSMENT — PAIN SCALES - GENERAL
PAINLEVEL_OUTOF10: 8
PAINLEVEL_OUTOF10: 8
PAINLEVEL_OUTOF10: 9
PAINLEVEL_OUTOF10: 8

## 2022-04-25 ASSESSMENT — PAIN DESCRIPTION - DESCRIPTORS
DESCRIPTORS: ACHING
DESCRIPTORS: BURNING;ACHING;SORE

## 2022-04-25 ASSESSMENT — PAIN DESCRIPTION - ORIENTATION
ORIENTATION: LEFT;RIGHT
ORIENTATION: LEFT

## 2022-04-25 ASSESSMENT — PAIN DESCRIPTION - LOCATION: LOCATION: LEG;SHOULDER

## 2022-04-25 NOTE — PROGRESS NOTES
Physical Therapy  Facility/Department: [facility]  Physical Therapy Daily Treatment Note    Name: Valentino Sandifer  : 1945  MRN: [id number]  Date of Service: 2022    Discharge Recommendations:  Continue to assess pending progress      Patient Diagnosis(es): There were no encounter diagnoses. Past Medical History:  has a past medical history of Colitis, GERD (gastroesophageal reflux disease), Glaucoma, and PAD (peripheral artery disease) ([facility] [address] Past Surgical History:  has a past surgical history that includes Tubal ligation; eye surgery; cyst removal; skin biopsy; and Total hip arthroplasty (Left, 2019). Assessment   Assessment: Patient completed bed mobility tasks with Mod I and donned socks and shoes without assistance. Patient stood from bedside with supervision and ambulated 200 feet with RW, L TTWB, and SBA. Patient left in care of OT. Requires PT Follow-Up: Yes  Activity Tolerance  Activity Tolerance: Patient tolerated treatment well     Plan   Plan  Current Treatment Recommendations: [place] Training,Patient/Caregiver Education & Training,ROM,Balance Training,Gait Training,Home Exercise Program,Functional Mobility Training,Safety Education & Training  Safety Devices  Type of Devices:  (Left in care of OT)     Restrictions  Restrictions/Precautions  Restrictions/Precautions: Weight Bearing  Required Braces or Orthoses?: No ([facility] knee brace d/c'd 22)  Lower Extremity Weight Bearing Restrictions  Left Lower Extremity Weight Bearing: Toe Touch Weight Bearing  Required Braces or Orthoses  Left Lower Extremity Brace:  (ELS brace locked in extension)     Subjective   General  Chart Reviewed: Yes  Patient assessed for rehabilitation services?: Yes  Family / Caregiver Present: No  Subjective  Subjective: Patient offers no new c/o.      Social/Functional History  Social/Functional History  Lives With: Alone  Type of Home: House  Home Layout: Two level,Laundry in basement  Home Access: Ramped entrance  Bathroom Shower/Tub: Tub/Shower unit  Bathroom Toilet: Standard  Bathroom Equipment: 3-in-1 commode,Tub transfer bench,Toilet raiser  Bathroom Accessibility: Walker accessible  Home Equipment: Rolling walker,Lift chair  Receives Help From: Home health,Family,Friend(s)  ADL Assistance: Independent  Homemaking Assistance: Independent  Homemaking Responsibilities: Yes  Ambulation Assistance: Independent  Transfer Assistance: Independent  Active : Yes    Objective      Observation/Palpation  Posture: Good  Observation: Patient presents awake in bed, NAD      Bed mobility  Rolling to Left: Modified independent  Supine to Sit: Modified independent  Scooting: Modified independent  Transfers  Sit to Stand: Supervision  Stand to sit: Supervision  Ambulation  WB Status: TTWB L LE  Ambulation  Surface: level tile  Device: Rolling Walker  Assistance: Stand by assistance  Distance: 200 feet     Balance  Posture: Good  Sitting - Static: Good  Sitting - Dynamic: Good  Standing - Static: Good  Standing - Dynamic: Good;-     Goals  Long Term Goals  Time Frame for Long term goals : 10 days  Long term goal 1: Patient will perform all bed mobility with independence. Long term goal 2: Patient will perform sit to stand and transfers, NWB L LE, with RW and SBA. Long term goal 3: Patient will ambulate 50'x2, NWB L LE, with RW and SBA. Long term goal 4: Patient will demonstrate independence with HEP. Patient Goals   Patient goals : Return home. Therapy Time   Individual Concurrent Group Co-treatment   Time In 1250         Time Out 0969         Minutes 17              This note serves as D/C summary if patient is discharged prior to next visit.   Rimma Fry, PTA

## 2022-04-25 NOTE — PROGRESS NOTES
Occupational Therapy  Facility/Department: [facility]  Occupational Therapy Daily treatment    Name: Shayna Hedrick  : 1945  MRN: [id number]  Date of Service: 2022    Discharge Recommendations:  Home with Home health OT          Patient Diagnosis(es): There were no encounter diagnoses. Past Medical History:  has a past medical history of Colitis, GERD (gastroesophageal reflux disease), Glaucoma, and PAD (peripheral artery disease) ([facility] [address] Past Surgical History:  has a past surgical history that includes Tubal ligation; eye surgery; cyst removal; skin biopsy; and Total hip arthroplasty (Left, 2019). Assessment   Assessment: Pt seen for OT services to focus on ADL retraining. Pt come to bathroom with RW SBA. Pt transferred to shower chair with SBA demo good safety awareness. Pt completed bathing with NINO and LB with SUP. Pt donned LB clothing with NINO. Pt demo good safety using grab bar as needed. Pt come to stand with SBA and ambulated to bed transferring to supine with MOD I. Pt let in bed with call light in reach.            Plan   Plan  Times per Week: 3-5  Times per Day: Daily  Plan Weeks: 1-2  Current Treatment Recommendations: Strengthening,Endurance Training,Balance Training,Functional Mobility Training,Self-Care / ADL,Equipment Evaluation, Education, & procurement,Safety Education & Training     Restrictions  Restrictions/Precautions  Restrictions/Precautions: Weight Bearing  Required Braces or Orthoses?: No (ELS knee brace d/c'd 22)  Lower Extremity Weight Bearing Restrictions  Left Lower Extremity Weight Bearing: Toe Touch Weight Bearing  Required Braces or Orthoses  Left Lower Extremity Brace:  (ELS brace locked in extension)    Subjective   General  Chart Reviewed: Yes  Patient assessed for rehabilitation services?: Yes  Family / Caregiver Present: No  Referring Practitioner: May Flores PA-C  Diagnosis: Declining functional status; S/P ORIF left sacrum  Subjective  Subjective: Pt agreeable to OT services. Pt states she was at home and fell when she slipped out of her shoe. Social/Functional History  Social/Functional History  Lives With: Alone  Type of Home: House  Home Layout: Two level,Laundry in basement  Home Access: Ramped entrance  Bathroom Shower/Tub: Tub/Shower unit  Bathroom Toilet: Standard  Bathroom Equipment: 3-in-1 commode,Tub transfer bench,Toilet raiser  Bathroom Accessibility: Walker accessible  Home Equipment: Rolling walker,Lift chair  Receives Help From: Home health,Family,Friend(s)  ADL Assistance: Independent  Homemaking Assistance: Independent  Homemaking Responsibilities: Yes  Ambulation Assistance: Independent  Transfer Assistance: Independent  Active : Yes       Objective                 Functional Mobility  Functional - Mobility Device: Rolling Walker  Activity: To/from bathroom  Assist Level: Stand by assistance     ADL  Grooming: Setup  UE Bathing: Setup  LE Bathing: Supervision  UE Dressing: Setup  LE Dressing: Supervision      Goals  Short Term Goals  Time Frame for Short term goals: 2 weeks  Short Term Goal 1: Pt to complete bathing with MOD I. Short Term Goal 2: Pt to complete dressing with MOD I. Short Term Goal 3: Pt to tolerate x15 minutes of activity to increase functional activity tolerance. Short Term Goal 4: Pt to complete toileting with MOD I. Short Term Goal 5: Pt to complete oral hygiene standing at sink with no LOB MOD I. Therapy Time   Individual Concurrent Group Co-treatment   Time In 0100         Time Out 0133         Minutes 33              This note serves as a DC summary in the event of pt discharge.      Carolyn Robbins OTR/L

## 2022-04-25 NOTE — PROGRESS NOTES
Pt complaining of 2 bouts of diarrhea today after breakfast and after lunch- pt stated she has been having incontinent episodes in her brief- pt. stated she has had diarrhea for past 5 days- Notified [facility] - New order received for C-dif  Sample and GI panel PCR- Radha leslie pt currently on Culturelle pill since 4/22/22- Notified Jerald Burns -will monitor

## 2022-04-25 NOTE — FLOWSHEET NOTE
04/24/22 2000   Assessment   Charting Type Shift assessment   Psychosocial   Psychosocial (WDL) WDL   Neurological   Neuro (WDL) WDL   Level of Consciousness Alert (0)   [place] Coma Scale   Eye Opening 4   Best Verbal Response 5   Best Motor Response 6   Denzel Coma Scale Score 15   HEENT (Head, Ears, Eyes, Nose, & Throat)   HEENT (WDL) WDL   Right Eye Impaired vision   Left Eye Impaired vision   Teeth Dentures lower;Dentures upper   Respiratory   Respiratory (WDL) WDL   Respiratory Pattern Regular   Respiratory Depth Normal   Respiratory Quality/Effort Unlabored   Chest Assessment Chest expansion symmetrical;Trachea midline   L Breath Sounds Clear   R Breath Sounds Clear   Breath Sounds   Right Upper Lobe Clear   Right Middle Lobe Clear   Right Lower Lobe Clear   Left Upper Lobe Clear   Left Lower Lobe Clear   Cardiac   Cardiac (WDL) WDL   Gastrointestinal   Abdominal (WDL) X   RUQ Bowel Sounds Active   LUQ Bowel Sounds Active   RLQ Bowel Sounds Active   LLQ Bowel Sounds Active   Abdomen Inspection Soft;Flat   GI Symptoms Diarrhea  (at times, chronic)   Tenderness Nontender   Anus/Rectum Characteristics Hemorrhoids   Genitourinary   Genitourinary (WDL) WDL   Urine Assessment   Urine Color Yellow/straw   Urine Appearance Clear   Urine Odor No odor   Peripheral Vascular   Peripheral Vascular (WDL) WDL   RUE Neurovascular Assessment   Capillary Refill Less than/Equal to 3 seconds   Color Pink   LUE Neurovascular Assessment   Capillary Refill Less than/Equal to 3 seconds   Color [place]   RLE Neurovascular Assessment   Capillary Refill Less than/Equal to 3 seconds   Color [place]   LLE Neurovascular Assessment   Capillary Refill Less than/Equal to 3 seconds   Color Pink   Skin Integumentary    Skin Integumentary (WDL) X   Skin Color Pink   Skin Condition/Temp Dry; Warm   Skin Integrity Other (comment)  (Healing Surgical Incision)   Location Left Hip    Skin Integrity Site 2   Skin Integrity Location 2 Abrasion   Location 2 Left Ankle    Musculoskeletal   Musculoskeletal (WDL) X   LL Extremity Limited movement

## 2022-04-25 NOTE — FLOWSHEET NOTE
04/25/22 0763   Assessment   Charting Type Shift assessment   Psychosocial   Psychosocial (WDL) WDL   Neurological   Neuro (WDL) WDL   Level of Consciousness Alert (0)   Swallow Screening   Is the patient unable to remain alert for testing? No   Is the patient on a modified diet (thickened liquids) due to pre-existing dysphagia? No   Is there presence of existing enteral tube feeding via the stomach or nose? No   Is there presence of head-of-bed restrictions (less than 30 degrees)? No   Is there presence of tracheotomy tube? No   Is the patient ordered nothing-by-mouth status?  No   Denzel Coma Scale   Eye Opening 4   Best Verbal Response 5   Best Motor Response 6   [place] Coma Scale Score 15   NIHSS Stroke Scale   NIHSS Stroke Scale Assessed No   HEENT (Head, Ears, Eyes, Nose, & Throat)   HEENT (WDL) WDL   Right Eye Impaired vision   Left Eye Impaired vision   Teeth Dentures lower;Dentures upper   Respiratory   Respiratory (WDL) WDL   Respiratory Pattern Regular   Respiratory Depth Normal   Respiratory Quality/Effort Unlabored   Chest Assessment Chest expansion symmetrical;Trachea midline   L Breath Sounds Clear   R Breath Sounds Clear   Breath Sounds   Right Upper Lobe Clear   Right Middle Lobe Clear   Right Lower Lobe Clear   Left Upper Lobe Clear   Left Lower Lobe Clear   Cardiac   Cardiac (WDL) WDL   Gastrointestinal   Abdominal (WDL) X   RUQ Bowel Sounds Active   LUQ Bowel Sounds Active   RLQ Bowel Sounds Active   LLQ Bowel Sounds Active   Abdomen Inspection Soft;Flat   GI Symptoms Diarrhea  (at times, chronic)   Tenderness Nontender   Anus/Rectum Characteristics Hemorrhoids   Genitourinary   Genitourinary (WDL) WDL   Urine Assessment   Urine Color Yellow/straw   Urine Appearance Clear   Urine Odor No odor   Peripheral Vascular   Peripheral Vascular (WDL) WDL   Edema None   RLE Neurovascular Assessment   R Pedal Pulse +2   LLE Neurovascular Assessment   L Pedal Pulse +2   Skin Integumentary    Skin Integumentary (WDL) X   Skin Color Pink   Skin Condition/Temp Dry; Warm   Skin Integrity Other (comment)  (healing surgical incision)   Location Left hip   Wound Prevention/Protection Method No   Skin Assessed Underneath Dressing This Shift Yes   Skin Integrity Site 2   Skin Integrity Location 2 Abrasion   Location 2 Left Ankle   Preventative Dressing No   Assessed this shift?  Yes   Musculoskeletal   Musculoskeletal (WDL) X   RUE   (full movement)   LUE   (full movement)   RL Extremity   (full movment)   LL Extremity Limited movement   Pt alert times 4-Pt able to take am medications well per [facility]- Am assessment completed- Breathing equal and unlabored- left leg pedal pulse positive-scheduled pain medication given for pain- Call bell at bedside-Will continue to monitor

## 2022-04-25 NOTE — PLAN OF CARE
Problem: Falls - Risk of:  Goal: Will remain free from falls  Description: Will remain free from falls  4/25/2022 1014 by Gianni Smith RN  Outcome: Progressing  4/24/2022 2113 by Floresita Cope LPN  Outcome: Progressing     Problem: Skin Integrity:  Goal: Will show no infection signs and symptoms  Description: Will show no infection signs and symptoms  Outcome: Progressing

## 2022-04-25 NOTE — FLOWSHEET NOTE
04/25/22 0729   Vital Signs   Temp 97.7 °F (36.5 °C)   Temp Source Oral   Pulse 56   Heart Rate Source Monitor   Resp 18   BP (!) 142/69   BP Location Right upper arm   MAP (Calculated) 93.33   MAP (mmHg) 91   Oxygen Therapy   SpO2 99 %   O2 Device None (Room air)

## 2022-04-26 LAB — C DIFF TOXIN/ANTIGEN: NORMAL

## 2022-04-26 PROCEDURE — 87493 C DIFF AMPLIFIED PROBE: CPT

## 2022-04-26 PROCEDURE — 97110 THERAPEUTIC EXERCISES: CPT

## 2022-04-26 PROCEDURE — 6360000002 HC RX W HCPCS: Performed by: PHYSICIAN ASSISTANT

## 2022-04-26 PROCEDURE — 97116 GAIT TRAINING THERAPY: CPT

## 2022-04-26 PROCEDURE — 87505 NFCT AGENT DETECTION GI: CPT

## 2022-04-26 PROCEDURE — 87324 CLOSTRIDIUM AG IA: CPT

## 2022-04-26 PROCEDURE — 97535 SELF CARE MNGMENT TRAINING: CPT

## 2022-04-26 PROCEDURE — 1200000002 HC SEMI PRIVATE SWING BED

## 2022-04-26 PROCEDURE — 87449 NOS EACH ORGANISM AG IA: CPT

## 2022-04-26 PROCEDURE — 97530 THERAPEUTIC ACTIVITIES: CPT

## 2022-04-26 PROCEDURE — 6370000000 HC RX 637 (ALT 250 FOR IP): Performed by: PHYSICIAN ASSISTANT

## 2022-04-26 RX ADMIN — FAMOTIDINE 20 MG: 20 TABLET, FILM COATED ORAL at 08:07

## 2022-04-26 RX ADMIN — Medication 1 CAPSULE: at 08:07

## 2022-04-26 RX ADMIN — GABAPENTIN 100 MG: 100 CAPSULE ORAL at 20:29

## 2022-04-26 RX ADMIN — ACETAMINOPHEN 650 MG: 325 TABLET, FILM COATED ORAL at 20:29

## 2022-04-26 RX ADMIN — OXYCODONE 5 MG: 5 TABLET ORAL at 02:14

## 2022-04-26 RX ADMIN — FOLIC ACID TAB 400 MCG 0.8 MG: 400 TAB at 08:07

## 2022-04-26 RX ADMIN — ACETAMINOPHEN 650 MG: 325 TABLET, FILM COATED ORAL at 02:14

## 2022-04-26 RX ADMIN — ACETAMINOPHEN 650 MG: 325 TABLET, FILM COATED ORAL at 08:08

## 2022-04-26 RX ADMIN — MULTIPLE VITAMINS W/ MINERALS TAB 1 TABLET: TAB at 08:10

## 2022-04-26 RX ADMIN — CALCIUM 500 MG: 500 TABLET ORAL at 08:07

## 2022-04-26 RX ADMIN — ENOXAPARIN SODIUM 30 MG: 100 INJECTION SUBCUTANEOUS at 20:28

## 2022-04-26 RX ADMIN — Medication 400 MG: at 08:07

## 2022-04-26 RX ADMIN — OXYCODONE HYDROCHLORIDE AND ACETAMINOPHEN 500 MG: 500 TABLET ORAL at 08:08

## 2022-04-26 RX ADMIN — Medication 10 MG: at 20:29

## 2022-04-26 RX ADMIN — OXYCODONE 5 MG: 5 TABLET ORAL at 22:35

## 2022-04-26 RX ADMIN — FAMOTIDINE 20 MG: 20 TABLET, FILM COATED ORAL at 20:29

## 2022-04-26 RX ADMIN — ENOXAPARIN SODIUM 30 MG: 100 INJECTION SUBCUTANEOUS at 08:10

## 2022-04-26 RX ADMIN — ACETAMINOPHEN 650 MG: 325 TABLET, FILM COATED ORAL at 13:52

## 2022-04-26 RX ADMIN — FERROUS SULFATE TAB EC 324 MG (65 MG FE EQUIVALENT) 324 MG: 324 (65 FE) TABLET DELAYED RESPONSE at 08:10

## 2022-04-26 ASSESSMENT — PAIN SCALES - GENERAL
PAINLEVEL_OUTOF10: 10
PAINLEVEL_OUTOF10: 2
PAINLEVEL_OUTOF10: 6
PAINLEVEL_OUTOF10: 7
PAINLEVEL_OUTOF10: 8

## 2022-04-26 ASSESSMENT — PAIN DESCRIPTION - LOCATION: LOCATION: BUTTOCKS;ARM;LEG

## 2022-04-26 ASSESSMENT — PAIN DESCRIPTION - DESCRIPTORS: DESCRIPTORS: ACHING

## 2022-04-26 NOTE — FLOWSHEET NOTE
04/26/22 0957   Assessment   Charting Type Shift assessment   Psychosocial   Psychosocial (WDL) WDL   Neurological   Neuro (WDL) WDL   Level of Consciousness Alert (0)   [place] Coma Scale   Eye Opening 4   Best Verbal Response 5   Best Motor Response 6   Denzel Coma Scale Score 15   HEENT (Head, Ears, Eyes, Nose, & Throat)   HEENT (WDL) WDL   Right Eye Impaired vision   Left Eye Impaired vision   Teeth Dentures lower;Dentures upper   Respiratory   Respiratory (WDL) WDL   Respiratory Pattern Regular   Respiratory Depth Normal   Respiratory Quality/Effort Unlabored   Chest Assessment Chest expansion symmetrical;Trachea midline   L Breath Sounds Clear   R Breath Sounds Clear   Breath Sounds   Right Upper Lobe Clear   Right Middle Lobe Clear   Right Lower Lobe Clear   Left Upper Lobe Clear   Left Lower Lobe Clear   Cardiac   Cardiac (WDL) WDL   Gastrointestinal   Abdominal (WDL) X   RUQ Bowel Sounds Active   LUQ Bowel Sounds Active   RLQ Bowel Sounds Active   LLQ Bowel Sounds Active   Abdomen Inspection Soft;Flat   GI Symptoms Diarrhea  (at times, chronic)   Tenderness Nontender   Anus/Rectum Characteristics Hemorrhoids   Genitourinary   Genitourinary (WDL) WDL   Urine Assessment   Urine Color Yellow/straw   Urine Appearance Clear   Urine Odor No odor   Peripheral Vascular   Peripheral Vascular (WDL) WDL   Edema None   RLE Neurovascular Assessment   R Pedal Pulse +2   LLE Neurovascular Assessment   L Pedal Pulse +2   Skin Integumentary    Skin Integumentary (WDL) X   Musculoskeletal   Musculoskeletal (WDL) X   RUE   (full movement)   LUE   (full movement)   RL Extremity   (full movment)   LL Extremity Limited movement   Pt up in bed. Denies diarrhea this morning. No complaints of pain or discomfort at this time. Pt anticipating dc home this week.

## 2022-04-26 NOTE — PROGRESS NOTES
Physical Therapy  Facility/Department: [facility]  Physical Therapy Daily Treatment Note    Name: Manasa Wynn  : 1945  MRN: [id number]  Date of Service: 2022    Discharge Recommendations:  Continue to assess pending progress      Patient Diagnosis(es): There were no encounter diagnoses. Past Medical History:  has a past medical history of Colitis, GERD (gastroesophageal reflux disease), Glaucoma, and PAD (peripheral artery disease) ([facility] [address] Past Surgical History:  has a past surgical history that includes Tubal ligation; eye surgery; cyst removal; skin biopsy; and Total hip arthroplasty (Left, 2019). Assessment   Assessment: Patient completed bed mobility tasks with Mod I and donned socks and shoes without assistance. Patient stood from bedside with supervision and ambulated 250 feet with RW, L TTWB, and SBA. Engaged patient in standing exercises for L LE. Patient ambulated to and from the bathroom with RW and SBA and completed toileting without assistance.   Requires PT Follow-Up: Yes  Activity Tolerance  Activity Tolerance: Patient tolerated evaluation without incident     Plan   Plan  Current Treatment Recommendations: Strengthening,Transfer Training,Endurance Training,Patient/Caregiver Education & Training,ROM,Balance Training,Gait Training,Home Exercise Program,Functional Mobility Training,Safety Education & Training  Safety Devices  Type of Devices: Left in bed,Call light within reach     Restrictions  Restrictions/Precautions  Restrictions/Precautions: Weight Bearing  Required Braces or Orthoses?: No (ELS knee brace d/c'd 22)  Lower Extremity Weight Bearing Restrictions  Left Lower Extremity Weight Bearing: Toe Touch Weight Bearing  Required Braces or Orthoses  Left Lower Extremity Brace:  (ELS brace locked in extension)     Subjective   General  Chart Reviewed: Yes  Patient assessed for rehabilitation services?: Yes  Response To Previous Treatment: Patient with no complaints from previous session. Family / Caregiver Present: No  Subjective  Subjective: Patient states she's doing ok but reports R shoulder is still hurting quite a bit. Social/Functional History  Social/Functional History  Lives With: Alone  Type of Home: House  Home Layout: Two level,Laundry in basement  Home Access: Ramped entrance  Bathroom Shower/Tub: Tub/Shower unit  Bathroom Toilet: Standard  Bathroom Equipment: 3-in-1 commode,Tub transfer bench,Toilet raiser  Bathroom Accessibility: Walker accessible  Home Equipment: Rolling walker,Lift chair  Receives Help From: Home health,Family,Friend(s)  ADL Assistance: Independent  Homemaking Assistance: Independent  Homemaking Responsibilities: Yes  Ambulation Assistance: Independent  Transfer Assistance: Independent  Active : Yes    Objective      Observation/Palpation  Posture: Good  Observation: Patient presents awake in bed, NAD      Bed mobility  Rolling to Left: Modified independent  Rolling to Right: Modified independent  Supine to Sit: Modified independent  Sit to Supine: Modified independent  Scooting: Modified independent  Transfers  Sit to Stand: Supervision  Stand to sit: Supervision  Ambulation  WB Status: TTWB L LE  Ambulation  Surface: level tile  Device: Rolling Walker  Assistance: Stand by assistance  Distance: 250 feet and to and from the bathroom     Balance  Posture: Good  Sitting - Static: Good  Sitting - Dynamic: Good  Standing - Static: Good  Standing - Dynamic: Good;-  A/AROM Exercises: Standing:  L hip flex, abd, and hip/knee flex      Goals  Long Term Goals  Time Frame for Long term goals : 10 days  Long term goal 1: Patient will perform all bed mobility with independence. Long term goal 2: Patient will perform sit to stand and transfers, NWB L LE, with RW and SBA. Long term goal 3: Patient will ambulate 50'x2, NWB L LE, with RW and SBA. Long term goal 4: Patient will demonstrate independence with HEP.   Patient Goals Patient goals : Return home. Therapy Time   Individual Concurrent Group Co-treatment   Time In 0848         Time Out 7720         Minutes 25             This note serves as D/C summary if patient is discharged prior to next visit.   Nicola Artis, JENNIFER

## 2022-04-26 NOTE — FLOWSHEET NOTE
04/25/22 2000   Assessment   Charting Type Shift assessment   Psychosocial   Psychosocial (WDL) WDL   Neurological   Neuro (WDL) WDL   Level of Consciousness Alert (0)   [place] Coma Scale   Eye Opening 4   Best Verbal Response 5   Best Motor Response 6   D[place] Coma Scale Score 15   HEENT (Head, Ears, Eyes, Nose, & Throat)   HEENT (WDL) WDL   Right Eye Impaired vision   Left Eye Impaired vision   Teeth Dentures lower;Dentures upper   Respiratory   Respiratory (WDL) WDL   Respiratory Pattern Regular   Respiratory Depth Normal   Respiratory Quality/Effort Unlabored   Chest Assessment Chest expansion symmetrical;Trachea midline   L Breath Sounds Clear   R Breath Sounds Clear   Breath Sounds   Right Upper Lobe Clear   Right Middle Lobe Clear   Right Lower Lobe Clear   Left Upper Lobe Clear   Left Lower Lobe Clear   Cardiac   Cardiac (WDL) WDL   Gastrointestinal   Abdominal (WDL) X   RUQ Bowel Sounds Active   LUQ Bowel Sounds Active   RLQ Bowel Sounds Active   LLQ Bowel Sounds Active   Abdomen Inspection Soft;Flat   GI Symptoms Diarrhea  (at times, chronic)   Tenderness Nontender   Anus/Rectum Characteristics Hemorrhoids   Genitourinary   Genitourinary (WDL) WDL   Urine Assessment   Urine Color Yellow/straw   Urine Appearance Clear   Urine Odor No odor   Peripheral Vascular   Peripheral Vascular (WDL) WDL   Edema None   RUE Neurovascular Assessment   Capillary Refill Less than/Equal to 3 seconds   Color Pink   LUE Neurovascular Assessment   Capillary Refill Less than/Equal to 3 seconds   Color [place]   RLE Neurovascular Assessment   Capillary Refill Less than/Equal to 3 seconds   Color Pink   R Pedal Pulse +2   LLE Neurovascular Assessment   Capillary Refill Less than/Equal to 3 seconds   Color Pink   Temperature Warm   L Pedal Pulse +2   Skin Integumentary    Skin Integumentary (WDL) X   Skin Color Pink   Skin Condition/Temp Dry; Warm   Skin Integrity Other (comment)  (Healing Surgical Site )   Location Left Hip Wound Prevention/Protection Method No   Skin Integrity Site 2   Skin Integrity Location 2 Abrasion   Location 2 Left Ankle   Skin Integrity Site 3   Skin Integrity Location 3 Bruising    Location 3 Scattered    Musculoskeletal   Musculoskeletal (WDL) X   RUE   (full movement)   LUE   (full movement)   RL Extremity   (full movment)   LL Extremity Limited movement

## 2022-04-26 NOTE — PLAN OF CARE
Problem: Falls - Risk of:  Goal: Will remain free from falls  4/26/2022 1001 by Oliver Tapia RN  Outcome: Progressing  4/25/2022 2134 by Sarath Archuleta LPN  Outcome: Progressing  Goal: Absence of physical injury  4/26/2022 1001 by Oliver Tapia RN  Outcome: Progressing  4/25/2022 2134 by Sarath Archuleta LPN  Outcome: Progressing     Problem: Skin Integrity:  Goal: Will show no infection signs and symptoms  Outcome: Progressing  Goal: Absence of new skin breakdown  Outcome: Progressing     Problem: Pain:  Goal: Pain level will decrease  Outcome: Progressing  Goal: Control of acute pain  Outcome: Progressing  Goal: Control of chronic pain  Outcome: Progressing  Goal: Patient's pain/discomfort is manageable  Outcome: Progressing     Problem: Neurological  Goal: Maximum potential motor/sensory/cognitive function  Outcome: Progressing     Problem: Cardiovascular  Goal: No DVT, peripheral vascular complications  Outcome: Progressing  Goal: Hemodynamic stability  Outcome: Progressing  Goal: Anticoagulate/Hct stable  Outcome: Progressing  Goal: Agreement to quit smoking  Outcome: Progressing  Goal: Weight maintained or lost  Outcome: Progressing  Goal: Understanding of dietary restrictions  Outcome: Progressing     Problem: Infection:  Goal: Will remain free from infection  Outcome: Progressing     Problem: Safety:  Goal: Free from accidental physical injury  Outcome: Progressing  Goal: Free from intentional harm  Outcome: Progressing     Problem: Daily Care:  Goal: Daily care needs are met  Outcome: Progressing     Problem: Skin Integrity:  Goal: Skin integrity will stabilize  Outcome: Progressing     Problem: Discharge Planning:  Goal: Patients continuum of care needs are met  Outcome: Progressing     Problem: Discharge Planning  Goal: Discharge to home or other facility with appropriate resources  Outcome: Progressing     Problem: ABCDS Injury Assessment  Goal: Absence of physical injury  Outcome: Progressing

## 2022-04-26 NOTE — PROGRESS NOTES
Occupational Therapy  Facility/Department: [facility] MED [facility]  Occupational Therapy Daily Note    Name: Jayla Hammond  : 1945  MRN: [id number]  Date of Service: 2022    Discharge Recommendations:  Home with Home health OT          Patient Diagnosis(es): There were no encounter diagnoses. Past Medical History:  has a past medical history of Colitis, GERD (gastroesophageal reflux disease), Glaucoma, and PAD (peripheral artery disease) ([facility] [address] Past Surgical History:  has a past surgical history that includes Tubal ligation; eye surgery; cyst removal; skin biopsy; and Total hip arthroplasty (Left, 2019). Assessment   Assessment: Pt seen for OT services to focus on ADL retraining. Pt come to bathroom with RW SBA. Pt transferred to shower chair with SUP demo good safety awareness. Pt completed bathing with NINO and LB with SUP. Pt donned LB clothing with NINO. Pt demo good safety using grab bar as needed. Pt come to stand with SBA and ambulated to bed transferring to supine with MOD I. Pt let in bed with call light in reach. Pt ambulated in room with RW SBA. Pt tolerated well. Pt completed functional reaching with SBA in closet and drawers of room. No LOB noted. Pt transferred to recliner and NINO with call light in reach.            Plan   Plan  Times per Week: 3-5  Times per Day: Daily  Plan Weeks: 1-2  Current Treatment Recommendations: Strengthening,Endurance Training,Balance Training,Functional Mobility Training,Self-Care / ADL,Equipment Evaluation, Education, & procurement,Safety Education & Training     Restrictions  Restrictions/Precautions  Restrictions/Precautions: Weight Bearing  Required Braces or Orthoses?: No (ELS knee brace d/c'd 22)  Lower Extremity Weight Bearing Restrictions  Left Lower Extremity Weight Bearing: Toe Touch Weight Bearing  Required Braces or Orthoses  Left Lower Extremity Brace:  (ELS brace locked in extension)    Subjective   General  Chart Reviewed: Yes  Patient assessed for rehabilitation services?: Yes  Family / Caregiver Present: No  Referring Practitioner: Dustin Banks PA-C  Diagnosis: Declining functional status; S/P ORIF left sacrum  Subjective  Subjective: Pt agreeable to OT services. Pt states she was at home and fell when she slipped out of her shoe. Social/Functional History  Social/Functional History  Lives With: Alone  Type of Home: House  Home Layout: Two level,Laundry in basement  Home Access: Ramped entrance  Bathroom Shower/Tub: Tub/Shower unit  Bathroom Toilet: Standard  Bathroom Equipment: 3-in-1 commode,Tub transfer bench,Toilet raiser  Bathroom Accessibility: Walker accessible  Home Equipment: Rolling walker,Lift chair  Receives Help From: Home health,Family,Friend(s)  ADL Assistance: Independent  Homemaking Assistance: Independent  Homemaking Responsibilities: Yes  Ambulation Assistance: Independent  Transfer Assistance: Independent  Active : Yes       Objective           Observation/Palpation  Posture: Good  Observation: Patient presents awake in bed, NAD     Standing Balance  Time: 4 minutes  Activity: Functional reaching in drawers and closet. Functional Mobility  Functional - Mobility Device: Rolling Walker  Assist Level: Stand by assistance  Functional Mobility Comments: ambulates throughout room with RW SBA occasional verbal cuing needed to place LLE on floor to improve weight bearing. ADL  Grooming: Setup  UE Bathing: Setup  LE Bathing: Supervision  UE Dressing: Setup  LE Dressing: Setup    Goals  Short Term Goals  Time Frame for Short term goals: 2 weeks  Short Term Goal 1: Pt to complete bathing with MOD I. Short Term Goal 2: Pt to complete dressing with MOD I. Short Term Goal 3: Pt to tolerate x15 minutes of activity to increase functional activity tolerance. Short Term Goal 4: Pt to complete toileting with MOD I. Short Term Goal 5: Pt to complete oral hygiene standing at sink with no LOB MOD I.        Therapy Time   Individual Concurrent Group Co-treatment   Time In 9483         Time Out 1118         Minutes 41              This note serves as a DC summary in the event of pt discharge.      Carolyn Robbins, OTR/L

## 2022-04-27 LAB
C. DIFFICILE TOXIN MOLECULAR: ABNORMAL
GI BACTERIAL PATHOGENS BY PCR: NORMAL
ORGANISM: ABNORMAL

## 2022-04-27 PROCEDURE — 6360000002 HC RX W HCPCS: Performed by: PHYSICIAN ASSISTANT

## 2022-04-27 PROCEDURE — 1200000002 HC SEMI PRIVATE SWING BED

## 2022-04-27 PROCEDURE — 6370000000 HC RX 637 (ALT 250 FOR IP): Performed by: PHYSICIAN ASSISTANT

## 2022-04-27 PROCEDURE — 97530 THERAPEUTIC ACTIVITIES: CPT

## 2022-04-27 PROCEDURE — 6370000000 HC RX 637 (ALT 250 FOR IP): Performed by: INTERNAL MEDICINE

## 2022-04-27 RX ORDER — METRONIDAZOLE 500 MG/1
500 TABLET ORAL EVERY 6 HOURS SCHEDULED
Status: DISCONTINUED | OUTPATIENT
Start: 2022-04-27 | End: 2022-05-02

## 2022-04-27 RX ADMIN — METRONIDAZOLE 500 MG: 500 TABLET ORAL at 08:22

## 2022-04-27 RX ADMIN — ENOXAPARIN SODIUM 30 MG: 100 INJECTION SUBCUTANEOUS at 08:23

## 2022-04-27 RX ADMIN — Medication 1 CAPSULE: at 08:22

## 2022-04-27 RX ADMIN — ACETAMINOPHEN 650 MG: 325 TABLET, FILM COATED ORAL at 02:24

## 2022-04-27 RX ADMIN — ACETAMINOPHEN 650 MG: 325 TABLET, FILM COATED ORAL at 08:22

## 2022-04-27 RX ADMIN — GABAPENTIN 100 MG: 100 CAPSULE ORAL at 20:00

## 2022-04-27 RX ADMIN — FOLIC ACID TAB 400 MCG 0.8 MG: 400 TAB at 08:22

## 2022-04-27 RX ADMIN — FAMOTIDINE 20 MG: 20 TABLET, FILM COATED ORAL at 20:00

## 2022-04-27 RX ADMIN — METRONIDAZOLE 500 MG: 500 TABLET ORAL at 13:36

## 2022-04-27 RX ADMIN — FERROUS SULFATE TAB EC 324 MG (65 MG FE EQUIVALENT) 324 MG: 324 (65 FE) TABLET DELAYED RESPONSE at 08:22

## 2022-04-27 RX ADMIN — Medication 400 MG: at 08:22

## 2022-04-27 RX ADMIN — METRONIDAZOLE 500 MG: 500 TABLET ORAL at 17:22

## 2022-04-27 RX ADMIN — OXYCODONE 5 MG: 5 TABLET ORAL at 22:26

## 2022-04-27 RX ADMIN — CALCIUM 500 MG: 500 TABLET ORAL at 08:22

## 2022-04-27 RX ADMIN — ACETAMINOPHEN 650 MG: 325 TABLET, FILM COATED ORAL at 20:00

## 2022-04-27 RX ADMIN — Medication 10 MG: at 20:00

## 2022-04-27 RX ADMIN — ACETAMINOPHEN 650 MG: 325 TABLET, FILM COATED ORAL at 13:36

## 2022-04-27 RX ADMIN — ENOXAPARIN SODIUM 30 MG: 100 INJECTION SUBCUTANEOUS at 20:01

## 2022-04-27 RX ADMIN — METRONIDAZOLE 500 MG: 500 TABLET ORAL at 22:25

## 2022-04-27 RX ADMIN — OXYCODONE HYDROCHLORIDE AND ACETAMINOPHEN 500 MG: 500 TABLET ORAL at 08:22

## 2022-04-27 RX ADMIN — FAMOTIDINE 20 MG: 20 TABLET, FILM COATED ORAL at 08:22

## 2022-04-27 RX ADMIN — MULTIPLE VITAMINS W/ MINERALS TAB 1 TABLET: TAB at 08:22

## 2022-04-27 ASSESSMENT — PAIN SCALES - GENERAL
PAINLEVEL_OUTOF10: 7
PAINLEVEL_OUTOF10: 9
PAINLEVEL_OUTOF10: 10
PAINLEVEL_OUTOF10: 3

## 2022-04-27 ASSESSMENT — PAIN DESCRIPTION - DESCRIPTORS: DESCRIPTORS: ACHING

## 2022-04-27 ASSESSMENT — PAIN DESCRIPTION - LOCATION
LOCATION: LEG
LOCATION: BUTTOCKS

## 2022-04-27 ASSESSMENT — PAIN DESCRIPTION - ORIENTATION
ORIENTATION: RIGHT;LEFT
ORIENTATION: LEFT;RIGHT

## 2022-04-27 NOTE — PLAN OF CARE
Problem: Falls - Risk of:  Goal: Will remain free from falls  Description: Will remain free from falls  4/26/2022 2211 by Sommer Aparicio RN  Outcome: Progressing  4/26/2022 1001 by Sveta Blackman RN  Outcome: Progressing  Goal: Absence of physical injury  Description: Absence of physical injury  4/26/2022 2211 by Sommer Aparicio RN  Outcome: Progressing  4/26/2022 1001 by Sveta Blackman RN  Outcome: Progressing     Problem: Skin Integrity:  Goal: Will show no infection signs and symptoms  Description: Will show no infection signs and symptoms  4/26/2022 2211 by Sommer Aparicio RN  Outcome: Progressing  4/26/2022 1001 by Sveta Blackman RN  Outcome: Progressing  Goal: Absence of new skin breakdown  Description: Absence of new skin breakdown  4/26/2022 2211 by Sommer Aparicio RN  Outcome: Progressing  4/26/2022 1001 by Sveta Blackman RN  Outcome: Progressing     Problem: Pain:  Goal: Pain level will decrease  Description: Pain level will decrease  4/26/2022 2211 by Sommer Aparicio RN  Outcome: Progressing  4/26/2022 1001 by Sveta Blackman RN  Outcome: Progressing  Goal: Control of acute pain  Description: Control of acute pain  4/26/2022 2211 by Sommer Aparicio RN  Outcome: Progressing  4/26/2022 1001 by Sveta Blackman RN  Outcome: Progressing  Goal: Control of chronic pain  Description: Control of chronic pain  4/26/2022 2211 by Sommer Aparicio RN  Outcome: Progressing  4/26/2022 1001 by Sveta Blackman RN  Outcome: Progressing  Goal: Patient's pain/discomfort is manageable  Description: Patient's pain/discomfort is manageable  4/26/2022 2211 by Sommer Aparicio RN  Outcome: Progressing  4/26/2022 1001 by Sveta Blackman RN  Outcome: Progressing     Problem: Neurological  Goal: Maximum potential motor/sensory/cognitive function  4/26/2022 2211 by Sommer Aparicio RN  Outcome: Progressing  4/26/2022 1001 by Sveta Blackman RN  Outcome: Progressing     Problem: Cardiovascular  Goal: No DVT, peripheral vascular complications  5/09/2537 2211 by Johanna Beatty RN  Outcome: Progressing  4/26/2022 1001 by Eben Ballesteros RN  Outcome: Progressing  Goal: Hemodynamic stability  4/26/2022 2211 by Johanna Beatty RN  Outcome: Progressing  4/26/2022 1001 by Eben Ballesteros RN  Outcome: Progressing  Goal: Anticoagulate/Hct stable  4/26/2022 2211 by Johanna Beatty RN  Outcome: Progressing  4/26/2022 1001 by Eben Ballesteros RN  Outcome: Progressing  Goal: Agreement to quit smoking  4/26/2022 2211 by Johanna Beatty RN  Outcome: Progressing  4/26/2022 1001 by Eben Ballesterso RN  Outcome: Progressing  Goal: Weight maintained or lost  4/26/2022 2211 by Johanna Beatty RN  Outcome: Progressing  4/26/2022 1001 by Eben Ballesteros RN  Outcome: Progressing  Goal: Understanding of dietary restrictions  4/26/2022 2211 by Johanna Beatty RN  Outcome: Progressing  4/26/2022 1001 by Eben Ballesteros RN  Outcome: Progressing     Problem: Infection:  Goal: Will remain free from infection  Description: Will remain free from infection  4/26/2022 2211 by Johanna Beatty RN  Outcome: Progressing  4/26/2022 1001 by Eben Ballesteros RN  Outcome: Progressing     Problem: Safety:  Goal: Free from accidental physical injury  Description: Free from accidental physical injury  4/26/2022 2211 by Johanna Beatty RN  Outcome: Progressing  4/26/2022 1001 by Eben Ballesteros RN  Outcome: Progressing  Goal: Free from intentional harm  Description: Free from intentional harm  4/26/2022 2211 by Johanna Beatty RN  Outcome: Progressing  4/26/2022 1001 by Eben Ballesteros RN  Outcome: Progressing     Problem: Daily Care:  Goal: Daily care needs are met  Description: Daily care needs are met  4/26/2022 2211 by Johanna Beatty RN  Outcome: Progressing  4/26/2022 1001 by Eben Ballesteros RN  Outcome: Progressing     Problem: Skin Integrity:  Goal: Skin integrity will stabilize  Description: Skin integrity will stabilize  4/26/2022 2211 by Johanna Beatty RN  Outcome: Progressing  4/26/2022 1001 by Katerina Aldrich RN  Outcome: Progressing     Problem: Discharge Planning:  Goal: Patients continuum of care needs are met  Description: Patients continuum of care needs are met  4/26/2022 2211 by Carmelo Hooks RN  Outcome: Progressing  4/26/2022 1001 by Katerina Aldrich RN  Outcome: Progressing     Problem: Discharge Planning  Goal: Discharge to home or other facility with appropriate resources  4/26/2022 2211 by Carmelo Hooks RN  Outcome: Progressing  4/26/2022 1001 by Katerina Aldrich RN  Outcome: Progressing     Problem: ABCDS Injury Assessment  Goal: Absence of physical injury  4/26/2022 2211 by Carmelo Hooks RN  Outcome: Progressing  4/26/2022 1001 by Katerina Aldrich RN  Outcome: Progressing

## 2022-04-27 NOTE — PROGRESS NOTES
Occupational Therapy  [facility] MED SURG: Daily Note    Name: Joselito Baird  : 1945  MRN: [id number]  Date of Service: 2022    Discharge Recommendations:  Home with Home health OT     Patient Diagnosis(es): There were no encounter diagnoses. Past Medical History:  has a past medical history of Colitis, GERD (gastroesophageal reflux disease), Glaucoma, and PAD (peripheral artery disease) ([facility] [address] Past Surgical History:  has a past surgical history that includes Tubal ligation; eye surgery; cyst removal; skin biopsy; and Total hip arthroplasty (Left, 2019). Assessment  Pt seen for skilled OT services. Pt able to perform bed mobility w Mod I; perform sit to stand & ambulate 250 ft w R w SBA. Pt supine in bed, call bell in reach, all needs met. Plan  Times per Week: 3-5  Times per Day: Daily  Plan Weeks: 1-2  Current Treatment Recommendations: Strengthening,Endurance Training,Balance Training,Functional Mobility Training,Self-Care / ADL,Equipment Evaluation, Education, & procurement,Safety Education & Training     Restrictions  Restrictions/Precautions: Weight Bearing  Required Braces or Orthoses?: No (ELS knee brace d/c'd 22)  Lower Extremity Weight Bearing Restrictions  Left Lower Extremity Weight Bearing: Toe Touch Weight Bearing  Left Lower Extremity Brace:  (ELS brace locked in extension)    Subjective  Chart Reviewed: Yes  Patient assessed for rehabilitation services?: Yes  Family / Caregiver Present: No  Referring Practitioner: Jhonny Snyder PA-C  Diagnosis: Declining functional status; S/P ORIF left sacrum  Subjective: Pt agreeable to OT services. Pt states she was at home and fell when she slipped out of her shoe.     Objective  Bed Mobility: Mod I   Posture: Good   Sitting Balance: Good  Standing Balance: Fair  Sit to Stand: SBA  Functional Mobility: SBA w RW; 250ft    Goals  Short Term Goals  Time Frame for Short term goals: 2 weeks  Short Term Goal 1: Pt to complete bathing with MOD I.  Short Term Goal 2: Pt to complete dressing with MOD I. Short Term Goal 3: Pt to tolerate x15 minutes of activity to increase functional activity tolerance. Short Term Goal 4: Pt to complete toileting with MOD I. Short Term Goal 5: Pt to complete oral hygiene standing at sink with no LOB MOD I. Therapy Time   Individual   Time In 327   Time Out 345   Minutes 18     This note serves as a DC summary in the event of pt discharge.    Claudette Louis OTR/L

## 2022-04-27 NOTE — PROGRESS NOTES
Physical Therapy  Facility/Department: [facility] MED [facility]  Physical Therapy Daily Treatment Note    Name: Quincy Lefort  : 1945  MRN: [id number]  Date of Service: 2022    Discharge Recommendations:  Continue to assess pending progress      Patient Diagnosis(es): There were no encounter diagnoses. Past Medical History:  has a past medical history of Colitis, GERD (gastroesophageal reflux disease), Glaucoma, and PAD (peripheral artery disease) ([facility] [address] Past Surgical History:  has a past surgical history that includes Tubal ligation; eye surgery; cyst removal; skin biopsy; and Total hip arthroplasty (Left, 2019). Assessment   Assessment: Patient completed bed mobility tasks with Mod I.  Ambulated with RW to the bathroom and toileted with supervision and then transferred to shower bench and completed bathing with supervision and donning clothing with setup. Patient ambulated to the recliner to sit up for lunch. Requires PT Follow-Up: Yes  Activity Tolerance  Activity Tolerance: Patient tolerated treatment well     Plan   Plan  Current Treatment Recommendations: Nelida Prost Training,Patient/Caregiver Education & Training,ROM,Balance Training,Gait Training,Home Exercise Program,Functional Mobility Training,Safety Education & Training  Safety Devices  Type of Devices: Left in bed,Call light within reach     Restrictions  Restrictions/Precautions  Restrictions/Precautions: Weight Bearing  Required Braces or Orthoses?: No (ELS knee brace d/c'd 22)  Lower Extremity Weight Bearing Restrictions  Left Lower Extremity Weight Bearing: Toe Touch Weight Bearing  Required Braces or Orthoses  Left Lower Extremity Brace:  (ELS brace locked in extension)     Subjective   General  Chart Reviewed: Yes  Patient assessed for rehabilitation services?: Yes  Response To Previous Treatment: Patient with no complaints from previous session.   Family / Caregiver Present: No  Subjective  Subjective: Patient states she wants to take a shower. Social/Functional History  Social/Functional History  Lives With: Alone  Type of Home: House  Home Layout: Two level,Laundry in basement  Home Access: Ramped entrance  Bathroom Shower/Tub: Tub/Shower unit  Bathroom Toilet: Standard  Bathroom Equipment: 3-in-1 commode,Tub transfer bench,Toilet raiser  Bathroom Accessibility: Walker accessible  Home Equipment: Rolling walker,Lift chair  Receives Help From: Home health,Family,Friend(s)  ADL Assistance: Independent  Homemaking Assistance: Independent  Homemaking Responsibilities: Yes  Ambulation Assistance: Independent  Transfer Assistance: Independent  Active : Yes    Objective   Pulse: 72  BP: 127/64  MAP (Calculated): 85  Resp: 18  SpO2: 98 %     Observation/Palpation  Posture: Good  Observation: Patient presents awake in bed, NAD      Bed mobility  Rolling to Left: Modified independent  Rolling to Right: Modified independent  Supine to Sit: Modified independent  Scooting: Modified independent  Transfers  Sit to Stand: Supervision  Stand to sit: Supervision  Ambulation  WB Status: TTWB L LE  Ambulation  Surface: level tile  Device: Rolling Walker  Assistance: Stand by assistance  Distance: to and from the bathroom     Balance  Posture: Good  Sitting - Static: Good  Sitting - Dynamic: Good  Standing - Static: Good  Standing - Dynamic: Good;-     Goals  Long Term Goals  Time Frame for Long term goals : 10 days  Long term goal 1: Patient will perform all bed mobility with independence. Long term goal 2: Patient will perform sit to stand and transfers, NWB L LE, with RW and SBA. Long term goal 3: Patient will ambulate 50'x2, NWB L LE, with RW and SBA. Long term goal 4: Patient will demonstrate independence with HEP. Patient Goals   Patient goals : Return home.      Therapy Time   Individual Concurrent Group Co-treatment   Time In 1055         Time Out 1125         Minutes 30 This note serves as D/C summary if patient is discharged prior to next visit.   Osvaldo Chow, PTA

## 2022-04-27 NOTE — PROGRESS NOTES
Patient states that she has already had a BM this AM, states that the BM was solid and not diarrhea.

## 2022-04-28 PROBLEM — A04.72 C. DIFFICILE DIARRHEA: Status: ACTIVE | Noted: 2022-04-28

## 2022-04-28 PROCEDURE — 1200000002 HC SEMI PRIVATE SWING BED

## 2022-04-28 PROCEDURE — 6370000000 HC RX 637 (ALT 250 FOR IP): Performed by: PHYSICIAN ASSISTANT

## 2022-04-28 PROCEDURE — 6370000000 HC RX 637 (ALT 250 FOR IP): Performed by: INTERNAL MEDICINE

## 2022-04-28 PROCEDURE — 97116 GAIT TRAINING THERAPY: CPT

## 2022-04-28 PROCEDURE — 6360000002 HC RX W HCPCS: Performed by: PHYSICIAN ASSISTANT

## 2022-04-28 PROCEDURE — 97110 THERAPEUTIC EXERCISES: CPT

## 2022-04-28 PROCEDURE — 99309 SBSQ NF CARE MODERATE MDM 30: CPT | Performed by: INTERNAL MEDICINE

## 2022-04-28 PROCEDURE — 97535 SELF CARE MNGMENT TRAINING: CPT

## 2022-04-28 RX ADMIN — ACETAMINOPHEN 650 MG: 325 TABLET, FILM COATED ORAL at 13:26

## 2022-04-28 RX ADMIN — ENOXAPARIN SODIUM 30 MG: 100 INJECTION SUBCUTANEOUS at 08:03

## 2022-04-28 RX ADMIN — METRONIDAZOLE 500 MG: 500 TABLET ORAL at 06:32

## 2022-04-28 RX ADMIN — MULTIPLE VITAMINS W/ MINERALS TAB 1 TABLET: TAB at 08:02

## 2022-04-28 RX ADMIN — OXYCODONE 5 MG: 5 TABLET ORAL at 22:33

## 2022-04-28 RX ADMIN — ACETAMINOPHEN 650 MG: 325 TABLET, FILM COATED ORAL at 19:57

## 2022-04-28 RX ADMIN — Medication 1 CAPSULE: at 08:02

## 2022-04-28 RX ADMIN — METRONIDAZOLE 500 MG: 500 TABLET ORAL at 22:33

## 2022-04-28 RX ADMIN — Medication 10 MG: at 19:56

## 2022-04-28 RX ADMIN — FAMOTIDINE 20 MG: 20 TABLET, FILM COATED ORAL at 19:56

## 2022-04-28 RX ADMIN — Medication 400 MG: at 08:02

## 2022-04-28 RX ADMIN — ENOXAPARIN SODIUM 30 MG: 100 INJECTION SUBCUTANEOUS at 19:56

## 2022-04-28 RX ADMIN — OXYCODONE HYDROCHLORIDE AND ACETAMINOPHEN 500 MG: 500 TABLET ORAL at 08:02

## 2022-04-28 RX ADMIN — FAMOTIDINE 20 MG: 20 TABLET, FILM COATED ORAL at 08:02

## 2022-04-28 RX ADMIN — METRONIDAZOLE 500 MG: 500 TABLET ORAL at 18:01

## 2022-04-28 RX ADMIN — GABAPENTIN 100 MG: 100 CAPSULE ORAL at 19:56

## 2022-04-28 RX ADMIN — ACETAMINOPHEN 650 MG: 325 TABLET, FILM COATED ORAL at 08:02

## 2022-04-28 RX ADMIN — METRONIDAZOLE 500 MG: 500 TABLET ORAL at 13:21

## 2022-04-28 RX ADMIN — FOLIC ACID TAB 400 MCG 0.8 MG: 400 TAB at 08:02

## 2022-04-28 RX ADMIN — CALCIUM 500 MG: 500 TABLET ORAL at 08:02

## 2022-04-28 ASSESSMENT — PAIN SCALES - GENERAL
PAINLEVEL_OUTOF10: 8
PAINLEVEL_OUTOF10: 10
PAINLEVEL_OUTOF10: 8

## 2022-04-28 ASSESSMENT — PAIN DESCRIPTION - DESCRIPTORS: DESCRIPTORS: ACHING;SHARP

## 2022-04-28 ASSESSMENT — PAIN DESCRIPTION - ORIENTATION
ORIENTATION: RIGHT;LEFT
ORIENTATION: RIGHT;LEFT

## 2022-04-28 ASSESSMENT — PAIN DESCRIPTION - LOCATION: LOCATION: BUTTOCKS

## 2022-04-28 NOTE — PROGRESS NOTES
Physical Therapy  Facility/Department: [facility]  Physical Therapy Daily Treatment Note    Name: Sejal Reddy  : 1945  MRN: [id number]  Date of Service: 2022    Discharge Recommendations:  Continue to assess pending progress      Patient Diagnosis(es): There were no encounter diagnoses. Past Medical History:  has a past medical history of Colitis, GERD (gastroesophageal reflux disease), Glaucoma, and PAD (peripheral artery disease) ([address] Past Surgical History:  has a past surgical history that includes Tubal ligation; eye surgery; cyst removal; skin biopsy; and Total hip arthroplasty (Left, 2019). Assessment   Assessment: Patient completed bed mobility tasks with Mod I. Completed B LE therex while seated EOB. Patient has gradually progressed to [facility] on L LE. Ambulated with  feet with SBA and then transferred to the recliner. Requires PT Follow-Up: Yes  Activity Tolerance  Activity Tolerance: Patient tolerated treatment well     Plan   Plan  Current Treatment Recommendations: Kae Hutchisn Training,Patient/Caregiver Education & Training,ROM,Balance Training,Gait Training,Home Exercise Program,Functional Mobility Training,Safety Education & Training  Safety Devices  Type of Devices: Left in bed,Call light within reach     Restrictions  Restrictions/Precautions  Restrictions/Precautions: Weight Bearing  Required Braces or Orthoses?: No (ELS knee brace d/c'd 22)  Lower Extremity Weight Bearing Restrictions  Left Lower Extremity Weight Bearing: Partial Weight Bearing  Required Braces or Orthoses  Left Lower Extremity Brace:  (ELS brace locked in extension)     Subjective   General  Chart Reviewed: Yes  Patient assessed for rehabilitation services?: Yes  Response To Previous Treatment: Patient with no complaints from previous session.   Family / Caregiver Present: No  Subjective  Subjective: Patient reports having C-diff now and will probably stay in the hospital until sometime early next week. Social/Functional History  Social/Functional History  Lives With: Alone  Type of Home: House  Home Layout: Two level,Laundry in basement  Home Access: Ramped entrance  Bathroom Shower/Tub: Tub/Shower unit  Bathroom Toilet: Standard  Bathroom Equipment: 3-in-1 commode,Tub transfer bench,Toilet raiser  Bathroom Accessibility: Walker accessible  Home Equipment: Rolling walker,Lift chair  Receives Help From: Home health,Family,Friend(s)  ADL Assistance: Independent  Homemaking Assistance: Independent  Homemaking Responsibilities: Yes  Ambulation Assistance: Independent  Transfer Assistance: Independent  Active : Yes    Objective     Transfers  Sit to Stand: Supervision  Stand to sit: Supervision  Ambulation  WB Status: PWB L LE  Ambulation  Surface: level tile  Device: Rolling Walker  Assistance: Stand by assistance  Distance: 300 feet     Balance  Posture: Good  Sitting - Static: Good  Sitting - Dynamic: Good  Standing - Static: Good  Standing - Dynamic: Good;-     Goals  Long Term Goals  Time Frame for Long term goals : 10 days  Long term goal 1: Patient will perform all bed mobility with independence. Long term goal 2: Patient will perform sit to stand and transfers, NWB L LE, with RW and SBA. Long term goal 3: Patient will ambulate 50'x2, NWB L LE, with RW and SBA. Long term goal 4: Patient will demonstrate independence with HEP. Patient Goals   Patient goals : Return home. Therapy Time   Individual Concurrent Group Co-treatment   Time In 1119         Time Out 5445         Minutes 24              This note serves as D/C summary if patient is discharged prior to next visit.   Tacos Price, PTA

## 2022-04-28 NOTE — PROGRESS NOTES
Occupational Therapy  Facility/Department: [facility] MED [facility]  Occupational Therapy Daily Note    Name: Patricia Abreu  : 1945  MRN: [id number]  Date of Service: 2022    Discharge Recommendations:  Home w Home health OT    Patient Diagnosis(es): There were no encounter diagnoses. Past Medical History:  has a past medical history of Colitis, GERD (gastroesophageal reflux disease), Glaucoma, and PAD (peripheral artery disease) ([facility] [address] Past Surgical History:  has a past surgical history that includes Tubal ligation; eye surgery; cyst removal; skin biopsy; and Total hip arthroplasty (Left, 2019). Assessment  Pt seen for skilled OT tx for ADL retraining. Pt nati to perform bed mobility w Mod I; CGA w RW needed for functional mobility in room demo good safety awareness & techniques. Pt able to perform ADLs w Setup<>Supervision. Pt supine in bed, call bell in reach, all needs met. Plan  Times per Week: 3-5  Times per Day: Daily  Plan Weeks: 1-2  Current Treatment Recommendations: Strengthening,Endurance Training,Balance Training,Functional Mobility Training,Self-Care / ADL,Equipment Evaluation, Education, & procurement,Safety Education & Training     Restrictions  Restrictions/Precautions: Weight Bearing  Required Braces or Orthoses?: No (ELS knee brace d/c'd 22)  Left Lower Extremity Weight Bearing: Partial Weight Bearing  Left Lower Extremity Brace:  (ELS brace locked in extension)    Subjective  Chart Reviewed: Yes  Patient assessed for rehabilitation services?: Yes  Family / Caregiver Present: No  Referring Practitioner: Darren Kendall PA-C  Diagnosis: Declining functional status; S/P ORIF left sacrum  Subjective: Pt agreeable to OT services. Pt states she was at home and fell when she slipped out of her shoe.     Objective  Observation: Pt presents awake in bed, NAD, RA, Agreeable to OT services    Posture: Good  Sitting Balance: Good   Standing Balance: Good  Bed Mobility: Modified Independent Functional Mobility: Standby assistance w rolling walker    Grooming: Setup  Toileting: Supervision  Shower transfer: Supervision  Dressing: Setup    Goals  Short Term Goals  Time Frame for Short term goals: 2 weeks  Short Term Goal 1: Pt to complete bathing with MOD I. Short Term Goal 2: Pt to complete dressing with MOD I. Short Term Goal 3: Pt to tolerate x15 minutes of activity to increase functional activity tolerance. Short Term Goal 4: Pt to complete toileting with MOD I. Short Term Goal 5: Pt to complete oral hygiene standing at sink with no LOB MOD I. Therapy Time   Individual   Time In 845   Time Out 924   Minutes 39     This note serves as a DC summary in the event of pt discharge.    Rola Shelby OTR/L

## 2022-04-28 NOTE — PROGRESS NOTES
Progress Note      Subjective:   Chief complaint:   Declining functional status    Interval History:   Patient seen and examined multiple times this week. She is doing well with therapy. Weightbearing status advanced last week by [facility]. Patient had diarrhea earlier in hospital stay and stool specimen was collected which revealed C. difficile. Patient initiated on course of Flagyl on 4/27. Since then, stools have improved. Review of systems:   Constitutional:  Denies fever or chills. Eyes:  Denies change in visual acuity or discharge. HENT:  Denies nasal congestion or sore throat. Respiratory:  Denies cough or shortness of breath. Cardiovascular:  Denies chest pain, palpitation or swelling in LEs. GI:  Denies abdominal pain, nausea, vomiting, bloody stools. Positive for diarrhea earlier, now resolved. :  Denies dysuria or frequency. Musculoskeletal:  Denies back pain. Positive for occasional left knee pain. Integument:  Denies rash or itching. Neurologic:  Denies headache, focal weakness or sensory changes. Psychiatric:  Denies depression or anxiety. Past medical history, surgical history, family history and social history reviewed and unchanged compared to H&P earlier this admission.     Medications:   Scheduled Meds:   metroNIDAZOLE  500 mg Oral 4 times per day    enoxaparin  30 mg SubCUTAneous BID    acetaminophen  650 mg Oral Q6H    calcium elemental  500 mg Oral Daily    famotidine  20 mg Oral BID    folic acid  0.8 mg Oral Daily    gabapentin  100 mg Oral Nightly    magnesium oxide  400 mg Oral Daily    melatonin  10 mg Oral Nightly    therapeutic multivitamin-minerals  1 tablet Oral Daily    lactobacillus  1 capsule Oral Daily    vitamin C  500 mg Oral Daily    ferrous sulfate  324 mg Oral Every Other Day     Continuous Infusions:    Objective:     Vital Signs  Temp: 98.1 °F (36.7 °C)  Pulse: 67  Resp: 18  BP: 128/62  SpO2: 95 %  O2 Device: None (Room air)       Vital signs reviewed in electronic charts. Physical exam  Constitutional:  Well developed, well nourished, no acute distress. Eyes:  PERRL, conjunctiva normal, EOMI. HENT:  Atraumatic, external ears normal, external nose/nares normal, oropharynx moist, no pharyngeal exudates. Neck:  Supple. No JVD or thyromegaly. Respiratory:  No respiratory distress, normal breath sounds, no rales, no wheezing. Cardiovascular:  Normal rate, normal rhythm, no murmurs, no gallops, no rubs. GI:  Soft, nondistended, normal bowel sounds, nontender, no organomegaly, no mass. :  No costovertebral angle tenderness. Musculoskeletal:  No edema, no tenderness, no obvious deformities. Patient is moving all extremities. Integument:  Well hydrated, no rash. Well healed incisions lateral aspect of left knee and at left hip. Neurologic:  Alert & oriented x 3,  no focal deficits noted. Strength is equal throughout. Psychiatric:  Speech and behavior appropriate. Results:     Lab Results   Component Value Date    WBC 4.7 04/18/2022    HGB 11.6 04/18/2022    HCT 36.6 (L) 04/18/2022    MCV 95.1 04/18/2022     04/18/2022       Lab Results   Component Value Date     04/22/2022    K 3.6 04/22/2022    K 3.2 04/18/2022     04/22/2022    CO2 26 04/22/2022    BUN 13 04/22/2022    CREATININE 0.6 04/22/2022    GLUCOSE 96 04/22/2022    CALCIUM 9.5 04/22/2022        Assessment and Plan: Active Hospital Problems    Diagnosis Date Noted    C. difficile diarrhea [A04.72]  -stool positive for c.diff on 4/27; treating with 10 day course of flagyl   04/28/2022    Osteoporosis [M81.0]  -DEXA scnb here 4/4 with findings of osteoporosis  -had appt with [facility]  on 4/6 with recs for [facility] working on [address] for TRISTAN [place].  -continue oscal  -vitamin d 38.4  - of note, per chart review vitamin d discontinued due to elevated vit d level 98 on 2/18/22  - patient to follow up with BMD clinic 6 weeks after starting Forteo    04/03/2022  Closed minimally displaced zone III fracture of sacrum ([address] [S32.131A]  -s/p CRPP 3/30 by [facility]  -NWB LLE ELS locked in extension, WBAT RLE  -DVT ppx: lovenox 30mg BID until 4/30  -PRN Pain meds as ordered.     -PT OT following  -CM consulted for dc planning assistance   04/03/2022    Closed fracture of left tibial plateau [Q31.851B]  -s/p left tibial plateau fracture status post ORIF (2/15/2022)  - on arrival NWB LLE ELS locked in extension  -continue pain medications as prescribed   -PT OT following  -CM consulted for dc planning assistance  -Fall precautions  - Per ortho 4/18 patient can start weightbearing LLE    02/21/2022    Microscopic colitis [K52.839]  -history of chronic lymphocytic colitis on budesonide  - per BMD note from 4/6 budesonide was discontinued due to concern for bone loss however per dc summary it had been resumed. - follow up with BMD and GI as scheduled    02/21/2022    Anemia [D64.9]  -hgb at baseline  -monitor and transfuse for hgb <7.0   02/21/2022    Declining functional status [R53.81]  -Patient with multiple recent fragility fractures including humeral fracture, left tibial plateau fracture and minimally displaced zone 3 sacral fracture secondary to falls at home  -PT OT following  -Case management consulted for discharge planning assistance 12/31/2019     Patient was seen and examined by Dr. Rolando Benites and plan of care reviewed. Treatment plan was formulated collaboratively.       Electronically signed by KALIE Nunez on 4/28/2022 at 11:39 AM

## 2022-04-29 LAB
A/G RATIO: 1.3 (ref 0.8–2)
ALBUMIN SERPL-MCNC: 3.1 G/DL (ref 3.4–4.8)
ALP BLD-CCNC: 94 U/L (ref 25–100)
ALT SERPL-CCNC: 26 U/L (ref 4–36)
ANION GAP SERPL CALCULATED.3IONS-SCNC: 9 MMOL/L (ref 3–16)
AST SERPL-CCNC: 25 U/L (ref 8–33)
BILIRUB SERPL-MCNC: 0.3 MG/DL (ref 0.3–1.2)
BUN BLDV-MCNC: 10 MG/DL (ref 6–20)
CALCIUM SERPL-MCNC: 9.6 MG/DL (ref 8.5–10.5)
CHLORIDE BLD-SCNC: 105 MMOL/L (ref 98–107)
CO2: 26 MMOL/L (ref 20–30)
CREAT SERPL-MCNC: 0.6 MG/DL (ref 0.4–1.2)
GFR AFRICAN AMERICAN: >59
GFR NON-AFRICAN AMERICAN: >60
GLOBULIN: 2.4 G/DL
GLUCOSE BLD-MCNC: 99 MG/DL (ref 74–106)
HCT VFR BLD CALC: 37.1 % (ref 37–47)
HEMOGLOBIN: 11.5 G/DL (ref 11.5–16.5)
MAGNESIUM: 1.8 MG/DL (ref 1.7–2.4)
MCH RBC QN AUTO: 29.6 PG (ref 27–32)
MCHC RBC AUTO-ENTMCNC: 31 G/DL (ref 31–35)
MCV RBC AUTO: 95.4 FL (ref 80–100)
PDW BLD-RTO: 13.1 % (ref 11–16)
PLATELET # BLD: 224 K/UL (ref 150–400)
PMV BLD AUTO: 9.2 FL (ref 6–10)
POTASSIUM REFLEX MAGNESIUM: 3.2 MMOL/L (ref 3.4–5.1)
RBC # BLD: 3.89 M/UL (ref 3.8–5.8)
SODIUM BLD-SCNC: 140 MMOL/L (ref 136–145)
TOTAL PROTEIN: 5.5 G/DL (ref 6.4–8.3)
WBC # BLD: 4.8 K/UL (ref 4–11)

## 2022-04-29 PROCEDURE — 85027 COMPLETE CBC AUTOMATED: CPT

## 2022-04-29 PROCEDURE — 6370000000 HC RX 637 (ALT 250 FOR IP): Performed by: INTERNAL MEDICINE

## 2022-04-29 PROCEDURE — 80053 COMPREHEN METABOLIC PANEL: CPT

## 2022-04-29 PROCEDURE — 6360000002 HC RX W HCPCS: Performed by: PHYSICIAN ASSISTANT

## 2022-04-29 PROCEDURE — 97530 THERAPEUTIC ACTIVITIES: CPT

## 2022-04-29 PROCEDURE — 83735 ASSAY OF MAGNESIUM: CPT

## 2022-04-29 PROCEDURE — 6370000000 HC RX 637 (ALT 250 FOR IP): Performed by: PHYSICIAN ASSISTANT

## 2022-04-29 PROCEDURE — 97110 THERAPEUTIC EXERCISES: CPT

## 2022-04-29 PROCEDURE — 97116 GAIT TRAINING THERAPY: CPT

## 2022-04-29 PROCEDURE — 36415 COLL VENOUS BLD VENIPUNCTURE: CPT

## 2022-04-29 PROCEDURE — 1200000002 HC SEMI PRIVATE SWING BED

## 2022-04-29 RX ORDER — POTASSIUM CHLORIDE 750 MG/1
40 CAPSULE, EXTENDED RELEASE ORAL ONCE
Status: COMPLETED | OUTPATIENT
Start: 2022-04-29 | End: 2022-04-29

## 2022-04-29 RX ADMIN — POTASSIUM CHLORIDE 40 MEQ: 10 CAPSULE, COATED, EXTENDED RELEASE ORAL at 10:22

## 2022-04-29 RX ADMIN — ACETAMINOPHEN 650 MG: 325 TABLET, FILM COATED ORAL at 07:59

## 2022-04-29 RX ADMIN — FERROUS SULFATE TAB EC 324 MG (65 MG FE EQUIVALENT) 324 MG: 324 (65 FE) TABLET DELAYED RESPONSE at 08:00

## 2022-04-29 RX ADMIN — GABAPENTIN 100 MG: 100 CAPSULE ORAL at 19:52

## 2022-04-29 RX ADMIN — METRONIDAZOLE 500 MG: 500 TABLET ORAL at 17:44

## 2022-04-29 RX ADMIN — ENOXAPARIN SODIUM 30 MG: 100 INJECTION SUBCUTANEOUS at 07:58

## 2022-04-29 RX ADMIN — FOLIC ACID TAB 400 MCG 0.8 MG: 400 TAB at 08:01

## 2022-04-29 RX ADMIN — OXYCODONE 5 MG: 5 TABLET ORAL at 08:05

## 2022-04-29 RX ADMIN — ACETAMINOPHEN 650 MG: 325 TABLET, FILM COATED ORAL at 19:52

## 2022-04-29 RX ADMIN — ENOXAPARIN SODIUM 30 MG: 100 INJECTION SUBCUTANEOUS at 19:52

## 2022-04-29 RX ADMIN — CALCIUM 500 MG: 500 TABLET ORAL at 08:00

## 2022-04-29 RX ADMIN — OXYCODONE HYDROCHLORIDE AND ACETAMINOPHEN 500 MG: 500 TABLET ORAL at 08:00

## 2022-04-29 RX ADMIN — METRONIDAZOLE 500 MG: 500 TABLET ORAL at 22:29

## 2022-04-29 RX ADMIN — FAMOTIDINE 20 MG: 20 TABLET, FILM COATED ORAL at 08:00

## 2022-04-29 RX ADMIN — ACETAMINOPHEN 650 MG: 325 TABLET, FILM COATED ORAL at 03:21

## 2022-04-29 RX ADMIN — Medication 400 MG: at 08:00

## 2022-04-29 RX ADMIN — OXYCODONE 5 MG: 5 TABLET ORAL at 16:23

## 2022-04-29 RX ADMIN — Medication 1 CAPSULE: at 07:58

## 2022-04-29 RX ADMIN — OXYCODONE 5 MG: 5 TABLET ORAL at 22:29

## 2022-04-29 RX ADMIN — MULTIPLE VITAMINS W/ MINERALS TAB 1 TABLET: TAB at 08:00

## 2022-04-29 RX ADMIN — METRONIDAZOLE 500 MG: 500 TABLET ORAL at 12:08

## 2022-04-29 RX ADMIN — FAMOTIDINE 20 MG: 20 TABLET, FILM COATED ORAL at 19:52

## 2022-04-29 RX ADMIN — ACETAMINOPHEN 650 MG: 325 TABLET, FILM COATED ORAL at 16:19

## 2022-04-29 RX ADMIN — Medication 10 MG: at 19:51

## 2022-04-29 RX ADMIN — METRONIDAZOLE 500 MG: 500 TABLET ORAL at 06:04

## 2022-04-29 ASSESSMENT — PAIN SCALES - GENERAL: PAINLEVEL_OUTOF10: 10

## 2022-04-29 NOTE — PROGRESS NOTES
Occupational Therapy  [facility] MED SURG: Daily Note    Name: Norah Gaines  : 1945  MRN: [id number]  Date of Service: 2022    Discharge Recommendations:  Home with Home health OT     Patient Diagnosis(es): There were no encounter diagnoses. Past Medical History:  has a past medical history of Colitis, GERD (gastroesophageal reflux disease), Glaucoma, and PAD (peripheral artery disease) ([facility] [address] Past Surgical History:  has a past surgical history that includes Tubal ligation; eye surgery; cyst removal; skin biopsy; and Total hip arthroplasty (Left, 2019). Assessment  Pt seen for skilled OT services. Pt able to perform bed mobility w Mod I; perform sit to stand & ambulate 250 ft w R w SBA. Toilet transfer performed w Supervision. OT led UE AROM therex; pt tolerated fair w decreased ROM due to prior shoulder injuries. Pt supine in bed, call bell in reach, all needs met. Plan  Times per Week: 3-5  Times per Day: Daily  Plan Weeks: 1-2  Current Treatment Recommendations: Strengthening,Endurance Training,Balance Training,Functional Mobility Training,Self-Care / ADL,Equipment Evaluation, Education, & procurement,Safety Education & Training     Restrictions  Restrictions/Precautions: Weight Bearing  Required Braces or Orthoses?: No (ELS knee brace d/c'd 22)  Lower Extremity Weight Bearing Restrictions  Left Lower Extremity Weight Bearing: Toe Touch Weight Bearing  Left Lower Extremity Brace:  (ELS brace locked in extension)    Subjective  Chart Reviewed: Yes  Patient assessed for rehabilitation services?: Yes  Family / Caregiver Present: No  Referring Practitioner: Anupam Lee PA-C  Diagnosis: Declining functional status; S/P ORIF left sacrum  Subjective: Pt agreeable to OT services. Pt states she was at home and fell when she slipped out of her shoe.     Objective  Bed Mobility: Mod I   Posture: Good   Sitting Balance: Good  Standing Balance: Fair  Sit to Stand: SBA  Functional Mobility: SBA w RW; 250ft  Toileting: Supervision     UE AROM Therex x15 reps  Shoulder flexion/extension  Shoulder add/abduction  Horizontal add/abduction  Internal/external rotation  Forward punch outs  Elbow flexion/extension   Forearm supination/pronation  Wrist flexion/extension  Wrist radial/ulnar deviation  Hand flexion/extension    Goals  Short Term Goals  Time Frame for Short term goals: 2 weeks  Short Term Goal 1: Pt to complete bathing with MOD I. Short Term Goal 2: Pt to complete dressing with MOD I. Short Term Goal 3: Pt to tolerate x15 minutes of activity to increase functional activity tolerance. Short Term Goal 4: Pt to complete toileting with MOD I. Short Term Goal 5: Pt to complete oral hygiene standing at sink with no LOB MOD I. Therapy Time   Individual   Time In 229   Time Out 300   Minutes 31     This note serves as a DC summary in the event of pt discharge.    Donn Rolle OTR/L

## 2022-04-29 NOTE — PROGRESS NOTES
Physical Therapy  Facility/Department: [facility] MED SURG  Physical Therapy Daily Treatment Note    Name: Valentino Sandifer  : 1945  MRN: [id number]  Date of Service: 2022    Discharge Recommendations:  Continue to assess pending progress       Patient Diagnosis(es): There were no encounter diagnoses. Past Medical History:  has a past medical history of Colitis, GERD (gastroesophageal reflux disease), Glaucoma, and PAD (peripheral artery disease) ([facility] [address] Past Surgical History:  has a past surgical history that includes Tubal ligation; eye surgery; cyst removal; skin biopsy; and Total hip arthroplasty (Left, 2019). Assessment   Assessment: Patient transitioned supine to sit with Mod I.  Ambulated 300 feet with RW and SBA. Patient transferred to the recliner and completed B LE therex in sitting. Activity Tolerance  Activity Tolerance: Patient tolerated treatment well     Plan   Plan  Current Treatment Recommendations: [place] Training,Patient/Caregiver Education & Training,ROM,Balance Training,Gait Training,Home Exercise Program,Functional Mobility Training,Safety Education & Training  Safety Devices  Type of Devices: Left in chair,Call light within reach     Restrictions  Restrictions/Precautions  Restrictions/Precautions: Weight Bearing  Required Braces or Orthoses?: No (ELS knee brace d/c'd 22)  Lower Extremity Weight Bearing Restrictions  Left Lower Extremity Weight Bearing: Partial Weight Bearing  Required Braces or Orthoses  Left Lower Extremity Brace:  (ELS brace locked in extension)     Subjective   General  Chart Reviewed: Yes  Patient assessed for rehabilitation services?: Yes  Response To Previous Treatment: Patient with no complaints from previous session. Family / Caregiver Present: No  Subjective  Subjective: Patient states she's ready for her shower.        Social/Functional History  Social/Functional History  Lives With: Alone  Type of Home: House  Home Layout: Two level,Laundry in basement  Home Access: Ramped entrance  Bathroom Shower/Tub: Tub/Shower unit  Bathroom Toilet: Standard  Bathroom Equipment: 3-in-1 commode,Tub transfer bench,Toilet raiser  Bathroom Accessibility: Walker accessible  Home Equipment: Rolling walker,Lift chair  Receives Help From: Home health,Family,Friend(s)  ADL Assistance: Independent  Homemaking Assistance: Independent  Homemaking Responsibilities: Yes  Ambulation Assistance: Independent  Transfer Assistance: Independent  Active : Yes    Objective     Bed mobility  Rolling to Left: Modified independent  Rolling to Right: Modified independent  Supine to Sit: Modified independent  Sit to Supine: Modified independent  Scooting: Modified independent  Transfers  Sit to Stand: Supervision  Stand to sit: Supervision  Ambulation  WB Status: PWB L LE  Ambulation  Surface: level tile  Device: Rolling Walker  Assistance: Supervision  Distance: 300 feet     Balance  Posture: Good  Sitting - Static: Good  Sitting - Dynamic: Good  Standing - Static: Good  Standing - Dynamic: Good;-     Goals  Long Term Goals  Time Frame for Long term goals : 10 days  Long term goal 1: Patient will perform all bed mobility with independence. Long term goal 2: Patient will perform sit to stand and transfers, NWB L LE, with RW and SBA. Long term goal 3: Patient will ambulate 50'x2, NWB L LE, with RW and SBA. Long term goal 4: Patient will demonstrate independence with HEP. Patient Goals   Patient goals : Return home. Therapy Time   Individual Concurrent Group Co-treatment   Time In 1054         Time Out 1117         Minutes 23              This note serves as D/C summary if patient is discharged prior to next visit.   Nicola Artis, PTA

## 2022-04-29 NOTE — FLOWSHEET NOTE
04/29/22 0820   Assessment   Charting Type Shift assessment   Psychosocial   Psychosocial (WDL) WDL   Neurological   Neuro (WDL) WDL   Level of Consciousness Alert (0)   [place] Coma Scale   Eye Opening 4   Best Verbal Response 5   Best Motor Response 6   [place] Coma Scale Score 15   HEENT (Head, Ears, Eyes, Nose, & Throat)   HEENT (WDL) WDL   Right Eye Impaired vision   Left Eye Impaired vision   Teeth Dentures lower;Dentures upper   Respiratory   Respiratory (WDL) WDL   Respiratory Pattern Regular   Respiratory Depth Normal   Respiratory Quality/Effort Unlabored   Chest Assessment Chest expansion symmetrical;Trachea midline   L Breath Sounds Clear   R Breath Sounds Clear   Breath Sounds   Right Upper Lobe Clear   Right Middle Lobe Clear   Right Lower Lobe Clear   Left Upper Lobe Clear   Left Lower Lobe Clear   Cardiac   Cardiac (WDL) WDL   Rhythm Interpretation   Pulse 77   Gastrointestinal   Abdominal (WDL) WDL   RUQ Bowel Sounds Active   LUQ Bowel Sounds Active   RLQ Bowel Sounds Active   LLQ Bowel Sounds Active   Abdomen Inspection Soft;Flat   Tenderness Nontender   Anus/Rectum Characteristics Hemorrhoids   Genitourinary   Genitourinary (WDL) WDL   Urine Assessment   Urine Color Yellow/straw   Urine Appearance Clear   Urine Odor No odor   Peripheral Vascular   Peripheral Vascular (WDL) WDL   Edema None   Skin Integumentary    Skin Integumentary (WDL) X   Skin Color Pink   Skin Condition/Temp Dry; Warm  (healing surgical incision)   Musculoskeletal   Musculoskeletal (WDL) X   RUE   (full movement)   LUE   (full movement)   RL Extremity   (full movment)   LL Extremity Limited movement   Musculoskeletal Details   L Knee   (tibia fx)   RASS   Davila Agitation Sedation Scale (RASS) 0   Genitourinary   Incontinence No

## 2022-04-29 NOTE — PLAN OF CARE
Problem: Neurological  Goal: Maximum potential motor/sensory/cognitive function  Outcome: Progressing

## 2022-04-29 NOTE — PROGRESS NOTES
Physical Therapy  Facility/Department: [facility] MED [facility]  Physical Therapy Daily Treatment Note    Name: Kayla South  : 1945  MRN: [id number]  Date of Service: 2022    Discharge Recommendations:  Continue to assess pending progress      Patient Diagnosis(es): There were no encounter diagnoses. Past Medical History:  has a past medical history of Colitis, GERD (gastroesophageal reflux disease), Glaucoma, and PAD (peripheral artery disease) ([facility] [address] Past Surgical History:  has a past surgical history that includes Tubal ligation; eye surgery; cyst removal; skin biopsy; and Total hip arthroplasty (Left, 2019). Assessment   Assessment: Patient Mod I with bed mobility. Stood with supervision and ambulated with RW to the bathroom and transferred to the shower bench. Patient able to complete bathing unassisted except for her back and L foot. Donned clothing with setup. Patient returned to bed after shower. Plan to see patient for BID session. Activity Tolerance  Activity Tolerance: Patient tolerated treatment well     Plan   Plan  Current Treatment Recommendations: Carolynn Benavidez Training,Patient/Caregiver Education & Training,ROM,Balance Training,Gait Training,Home Exercise Program,Functional Mobility Training,Safety Education & Training  Safety Devices  Type of Devices: Left in bed,Call light within reach     Restrictions  Restrictions/Precautions  Restrictions/Precautions: Weight Bearing  Required Braces or Orthoses?: No (ELS knee brace d/c'd 22)  Lower Extremity Weight Bearing Restrictions  Left Lower Extremity Weight Bearing: Partial Weight Bearing  Required Braces or Orthoses  Left Lower Extremity Brace:  (ELS brace locked in extension)     Subjective   General  Chart Reviewed: Yes  Patient assessed for rehabilitation services?: Yes  Response To Previous Treatment: Patient with no complaints from previous session.   Family / Caregiver Present: No  Subjective  Subjective: Patient states she's ready for her shower. Social/Functional History  Social/Functional History  Lives With: Alone  Type of Home: House  Home Layout: Two level,Laundry in basement  Home Access: Ramped entrance  Bathroom Shower/Tub: Tub/Shower unit  Bathroom Toilet: Standard  Bathroom Equipment: 3-in-1 commode,Tub transfer bench,Toilet raiser  Bathroom Accessibility: Walker accessible  Home Equipment: Rolling walker,Lift chair  Receives Help From: Home health,Family,Friend(s)  ADL Assistance: Independent  Homemaking Assistance: Independent  Homemaking Responsibilities: Yes  Ambulation Assistance: Independent  Transfer Assistance: Independent  Active : Yes    Objective     Bed mobility  Rolling to Left: Modified independent  Rolling to Right: Modified independent  Supine to Sit: Modified independent  Sit to Supine: Modified independent  Scooting: Modified independent  Transfers  Sit to Stand: Supervision  Stand to sit: Supervision  Ambulation  WB Status: PWB L LE  Ambulation  Surface: level tile  Device: Rolling Walker  Assistance: Supervision  Distance: to and from the bathroom     Balance  Posture: Good  Sitting - Static: Good  Sitting - Dynamic: Good  Standing - Static: Good  Standing - Dynamic: Good;-     Goals  Long Term Goals  Time Frame for Long term goals : 10 days  Long term goal 1: Patient will perform all bed mobility with independence. Long term goal 2: Patient will perform sit to stand and transfers, NWB L LE, with RW and SBA. Long term goal 3: Patient will ambulate 50'x2, NWB L LE, with RW and SBA. Long term goal 4: Patient will demonstrate independence with HEP. Patient Goals   Patient goals : Return home. Therapy Time   Individual Concurrent Group Co-treatment   Time In 2088         Time Out 1009         Minutes 27              This note serves as D/C summary if patient is discharged prior to next visit.   Willis Auguste, JENNIFER

## 2022-04-30 PROCEDURE — 6360000002 HC RX W HCPCS: Performed by: PHYSICIAN ASSISTANT

## 2022-04-30 PROCEDURE — 6370000000 HC RX 637 (ALT 250 FOR IP): Performed by: PHYSICIAN ASSISTANT

## 2022-04-30 PROCEDURE — 1200000002 HC SEMI PRIVATE SWING BED

## 2022-04-30 PROCEDURE — 6370000000 HC RX 637 (ALT 250 FOR IP): Performed by: INTERNAL MEDICINE

## 2022-04-30 RX ADMIN — FAMOTIDINE 20 MG: 20 TABLET, FILM COATED ORAL at 20:21

## 2022-04-30 RX ADMIN — METRONIDAZOLE 500 MG: 500 TABLET ORAL at 17:17

## 2022-04-30 RX ADMIN — ENOXAPARIN SODIUM 30 MG: 100 INJECTION SUBCUTANEOUS at 20:22

## 2022-04-30 RX ADMIN — ACETAMINOPHEN 650 MG: 325 TABLET, FILM COATED ORAL at 12:15

## 2022-04-30 RX ADMIN — FOLIC ACID TAB 400 MCG 0.8 MG: 400 TAB at 07:56

## 2022-04-30 RX ADMIN — ACETAMINOPHEN 650 MG: 325 TABLET, FILM COATED ORAL at 20:21

## 2022-04-30 RX ADMIN — Medication 10 MG: at 20:21

## 2022-04-30 RX ADMIN — METRONIDAZOLE 500 MG: 500 TABLET ORAL at 22:27

## 2022-04-30 RX ADMIN — METRONIDAZOLE 500 MG: 500 TABLET ORAL at 12:13

## 2022-04-30 RX ADMIN — MULTIPLE VITAMINS W/ MINERALS TAB 1 TABLET: TAB at 07:57

## 2022-04-30 RX ADMIN — Medication 1 CAPSULE: at 07:57

## 2022-04-30 RX ADMIN — Medication 400 MG: at 07:57

## 2022-04-30 RX ADMIN — CALCIUM 500 MG: 500 TABLET ORAL at 07:57

## 2022-04-30 RX ADMIN — METRONIDAZOLE 500 MG: 500 TABLET ORAL at 06:56

## 2022-04-30 RX ADMIN — GABAPENTIN 100 MG: 100 CAPSULE ORAL at 20:21

## 2022-04-30 RX ADMIN — FAMOTIDINE 20 MG: 20 TABLET, FILM COATED ORAL at 07:56

## 2022-04-30 RX ADMIN — ACETAMINOPHEN 650 MG: 325 TABLET, FILM COATED ORAL at 07:56

## 2022-04-30 RX ADMIN — OXYCODONE HYDROCHLORIDE AND ACETAMINOPHEN 500 MG: 500 TABLET ORAL at 07:57

## 2022-04-30 RX ADMIN — ENOXAPARIN SODIUM 30 MG: 100 INJECTION SUBCUTANEOUS at 07:57

## 2022-04-30 RX ADMIN — OXYCODONE 5 MG: 5 TABLET ORAL at 22:27

## 2022-04-30 ASSESSMENT — PAIN DESCRIPTION - LOCATION
LOCATION: LEG;BACK;HIP
LOCATION: LEG;FOOT;BACK
LOCATION: LEG;HIP

## 2022-04-30 ASSESSMENT — PAIN SCALES - GENERAL
PAINLEVEL_OUTOF10: 10
PAINLEVEL_OUTOF10: 10
PAINLEVEL_OUTOF10: 7
PAINLEVEL_OUTOF10: 8

## 2022-04-30 ASSESSMENT — PAIN DESCRIPTION - DESCRIPTORS: DESCRIPTORS: ACHING

## 2022-04-30 NOTE — FLOWSHEET NOTE
04/30/22 0913   Assessment   Charting Type Shift assessment   Psychosocial   Psychosocial (WDL) WDL   Neurological   Neuro (WDL) WDL   Level of Consciousness Alert (0)   [place] Coma Scale   Eye Opening 4   Best Verbal Response 5   Best Motor Response 6   [place] Coma Scale Score 15   HEENT (Head, Ears, Eyes, Nose, & Throat)   HEENT (WDL) WDL   Right Eye Impaired vision   Left Eye Impaired vision   Teeth Dentures lower;Dentures upper   Respiratory   Respiratory (WDL) WDL   Respiratory Pattern Regular   Respiratory Depth Normal   Respiratory Quality/Effort Unlabored   Chest Assessment Chest expansion symmetrical;Trachea midline   L Breath Sounds Clear   R Breath Sounds Clear   Breath Sounds   Right Upper Lobe Clear   Right Middle Lobe Clear   Right Lower Lobe Clear   Left Upper Lobe Clear   Left Lower Lobe Clear   Cardiac   Cardiac (WDL) WDL   Gastrointestinal   Abdominal (WDL) WDL   RUQ Bowel Sounds Active   LUQ Bowel Sounds Active   RLQ Bowel Sounds Active   LLQ Bowel Sounds Active   Abdomen Inspection Soft;Flat   Tenderness Nontender   Anus/Rectum Characteristics Hemorrhoids   Genitourinary   Genitourinary (WDL) WDL   Urine Assessment   Urine Color Yellow/straw   Urine Appearance Clear   Urine Odor No odor   Peripheral Vascular   Peripheral Vascular (WDL) WDL   Edema None   RLE Neurovascular Assessment   R Pedal Pulse +2   LLE Neurovascular Assessment   L Pedal Pulse +2   Skin Integumentary    Skin Integumentary (WDL) X   Skin Color Pink   Musculoskeletal   Musculoskeletal (WDL) X   RUE   (full movement)   LUE   (full movement)   RL Extremity   (full movment)   LL Extremity Limited movement   Genitourinary   Incontinence No

## 2022-04-30 NOTE — PLAN OF CARE
Problem: Falls - Risk of:  Goal: Will remain free from falls  Description: Will remain free from falls  Outcome: Progressing     Problem: Skin Integrity:  Goal: Will show no infection signs and symptoms  Description: Will show no infection signs and symptoms  Outcome: Progressing     Problem: Pain:  Goal: Pain level will decrease  Description: Pain level will decrease  Outcome: Progressing

## 2022-05-01 PROCEDURE — 6370000000 HC RX 637 (ALT 250 FOR IP): Performed by: PHYSICIAN ASSISTANT

## 2022-05-01 PROCEDURE — 1200000002 HC SEMI PRIVATE SWING BED

## 2022-05-01 PROCEDURE — 6360000002 HC RX W HCPCS: Performed by: PHYSICIAN ASSISTANT

## 2022-05-01 PROCEDURE — 6370000000 HC RX 637 (ALT 250 FOR IP): Performed by: INTERNAL MEDICINE

## 2022-05-01 RX ADMIN — FOLIC ACID TAB 400 MCG 0.8 MG: 400 TAB at 08:21

## 2022-05-01 RX ADMIN — ENOXAPARIN SODIUM 30 MG: 100 INJECTION SUBCUTANEOUS at 08:22

## 2022-05-01 RX ADMIN — METRONIDAZOLE 500 MG: 500 TABLET ORAL at 11:36

## 2022-05-01 RX ADMIN — METRONIDAZOLE 500 MG: 500 TABLET ORAL at 16:33

## 2022-05-01 RX ADMIN — OXYCODONE HYDROCHLORIDE AND ACETAMINOPHEN 500 MG: 500 TABLET ORAL at 08:21

## 2022-05-01 RX ADMIN — FAMOTIDINE 20 MG: 20 TABLET, FILM COATED ORAL at 20:37

## 2022-05-01 RX ADMIN — Medication 400 MG: at 08:21

## 2022-05-01 RX ADMIN — ACETAMINOPHEN 650 MG: 325 TABLET, FILM COATED ORAL at 15:55

## 2022-05-01 RX ADMIN — METRONIDAZOLE 500 MG: 500 TABLET ORAL at 05:57

## 2022-05-01 RX ADMIN — ENOXAPARIN SODIUM 30 MG: 100 INJECTION SUBCUTANEOUS at 20:38

## 2022-05-01 RX ADMIN — CALCIUM 500 MG: 500 TABLET ORAL at 08:21

## 2022-05-01 RX ADMIN — OXYCODONE 5 MG: 5 TABLET ORAL at 22:27

## 2022-05-01 RX ADMIN — FAMOTIDINE 20 MG: 20 TABLET, FILM COATED ORAL at 08:21

## 2022-05-01 RX ADMIN — METRONIDAZOLE 500 MG: 500 TABLET ORAL at 22:27

## 2022-05-01 RX ADMIN — Medication 10 MG: at 20:37

## 2022-05-01 RX ADMIN — Medication 1 CAPSULE: at 08:21

## 2022-05-01 RX ADMIN — GABAPENTIN 100 MG: 100 CAPSULE ORAL at 20:37

## 2022-05-01 RX ADMIN — ACETAMINOPHEN 650 MG: 325 TABLET, FILM COATED ORAL at 20:37

## 2022-05-01 RX ADMIN — MULTIPLE VITAMINS W/ MINERALS TAB 1 TABLET: TAB at 08:21

## 2022-05-01 RX ADMIN — ACETAMINOPHEN 650 MG: 325 TABLET, FILM COATED ORAL at 02:09

## 2022-05-01 RX ADMIN — FERROUS SULFATE TAB EC 324 MG (65 MG FE EQUIVALENT) 324 MG: 324 (65 FE) TABLET DELAYED RESPONSE at 08:21

## 2022-05-01 RX ADMIN — ACETAMINOPHEN 650 MG: 325 TABLET, FILM COATED ORAL at 08:21

## 2022-05-01 ASSESSMENT — PAIN SCALES - GENERAL
PAINLEVEL_OUTOF10: 5
PAINLEVEL_OUTOF10: 7
PAINLEVEL_OUTOF10: 6
PAINLEVEL_OUTOF10: 10
PAINLEVEL_OUTOF10: 8

## 2022-05-01 ASSESSMENT — PAIN DESCRIPTION - LOCATION
LOCATION: BUTTOCKS
LOCATION: KNEE;LEG

## 2022-05-01 NOTE — FLOWSHEET NOTE
05/01/22 0939   Assessment   Charting Type Shift assessment   Psychosocial   Psychosocial (WDL) WDL   Neurological   Neuro (WDL) WDL   Level of Consciousness Alert (0)   [place] Coma Scale   Eye Opening 4   Best Verbal Response 5   Best Motor Response 6   Denzel Coma Scale Score 15   HEENT (Head, Ears, Eyes, Nose, & Throat)   HEENT (WDL) WDL   Right Eye Impaired vision   Left Eye Impaired vision   Teeth Dentures lower;Dentures upper   Respiratory   Respiratory (WDL) WDL   Respiratory Pattern Regular   Respiratory Depth Normal   Respiratory Quality/Effort Unlabored   Chest Assessment Chest expansion symmetrical;Trachea midline   L Breath Sounds Clear   R Breath Sounds Clear   Breath Sounds   Right Upper Lobe Clear   Right Middle Lobe Clear   Right Lower Lobe Clear   Left Upper Lobe Clear   Left Lower Lobe Clear   Cardiac   Cardiac (WDL) WDL   Gastrointestinal   Abdominal (WDL) WDL   RUQ Bowel Sounds Active   LUQ Bowel Sounds Active   RLQ Bowel Sounds Active   LLQ Bowel Sounds Active   Abdomen Inspection Soft;Flat   GI Symptoms   (at times, chronic)   Tenderness Nontender   Anus/Rectum Characteristics Hemorrhoids   Genitourinary   Genitourinary (WDL) WDL   Urine Assessment   Urine Color Yellow/straw   Urine Appearance Clear   Urine Odor No odor   Peripheral Vascular   Peripheral Vascular (WDL) WDL   Edema None   Skin Integumentary    Skin Integumentary (WDL) X   Skin Color Pink   Skin Condition/Temp Dry; Warm   Musculoskeletal   Musculoskeletal (WDL) X   RUE   (full movement)   LUE   (full movement)   RL Extremity   (full movment)   LL Extremity Limited movement   Musculoskeletal Details   L Knee   (tibia fx)   Genitourinary   Incontinence No

## 2022-05-01 NOTE — PLAN OF CARE
Problem: Falls - Risk of:  Goal: Will remain free from falls  Description: Will remain free from falls  5/1/2022 0939 by Raoul Cabrales RN  Outcome: Progressing  4/30/2022 2204 by Len Palomino LPN  Outcome: Progressing  Goal: Absence of physical injury  Description: Absence of physical injury  4/30/2022 2204 by Len Palomino LPN  Outcome: Progressing     Problem: Skin Integrity:  Goal: Will show no infection signs and symptoms  Description: Will show no infection signs and symptoms  Outcome: Progressing     Problem: Pain:  Goal: Pain level will decrease  Description: Pain level will decrease  Outcome: Progressing

## 2022-05-01 NOTE — FLOWSHEET NOTE
04/30/22 2000   Assessment   Charting Type Shift assessment   Psychosocial   Psychosocial (WDL) WDL   Neurological   Neuro (WDL) WDL   Level of Consciousness Alert (0)   Center Coma Scale   Eye Opening 4   Best Verbal Response 5   Best Motor Response 6   Denzel Coma Scale Score 15   HEENT (Head, Ears, Eyes, Nose, & Throat)   HEENT (WDL) WDL   Right Eye Impaired vision   Left Eye Impaired vision   Teeth Dentures lower;Dentures upper   Respiratory   Respiratory (WDL) WDL   Respiratory Pattern Regular   Respiratory Depth Normal   Respiratory Quality/Effort Unlabored   Chest Assessment Chest expansion symmetrical;Trachea midline   L Breath Sounds Clear   R Breath Sounds Clear   Breath Sounds   Right Upper Lobe Clear   Right Middle Lobe Clear   Right Lower Lobe Clear   Left Upper Lobe Clear   Left Lower Lobe Clear   Cardiac   Cardiac (WDL) WDL   Gastrointestinal   Abdominal (WDL) WDL   RUQ Bowel Sounds Active   LUQ Bowel Sounds Active   RLQ Bowel Sounds Active   LLQ Bowel Sounds Active   Abdomen Inspection Soft;Flat   Tenderness Nontender   Anus/Rectum Characteristics Hemorrhoids   Genitourinary   Genitourinary (WDL) WDL   Urine Assessment   Urine Color Yellow/straw   Urine Appearance Clear   Urine Odor No odor   Peripheral Vascular   Peripheral Vascular (WDL) WDL   Edema None   RLE Neurovascular Assessment   R Pedal Pulse +2   LLE Neurovascular Assessment   L Pedal Pulse +2   Skin Integumentary    Skin Integumentary (WDL) X   Musculoskeletal   Musculoskeletal (WDL) X   RUE   (full movement)   LUE   (full movement)   RL Extremity   (full movment)   LL Extremity Limited movement   Genitourinary   Incontinence No

## 2022-05-02 ENCOUNTER — READMISSION MANAGEMENT (OUTPATIENT)
Dept: CALL CENTER | Facility: HOSPITAL | Age: 77
End: 2022-05-02

## 2022-05-02 ENCOUNTER — TELEPHONE (OUTPATIENT)
Dept: INTERNAL MEDICINE | Facility: CLINIC | Age: 77
End: 2022-05-02

## 2022-05-02 VITALS
WEIGHT: 129 LBS | DIASTOLIC BLOOD PRESSURE: 79 MMHG | BODY MASS INDEX: 20.25 KG/M2 | HEIGHT: 67 IN | OXYGEN SATURATION: 95 % | RESPIRATION RATE: 18 BRPM | SYSTOLIC BLOOD PRESSURE: 133 MMHG | TEMPERATURE: 97.3 F | HEART RATE: 72 BPM

## 2022-05-02 LAB
A/G RATIO: 1.9 (ref 0.8–2)
ALBUMIN SERPL-MCNC: 3.6 G/DL (ref 3.4–4.8)
ALP BLD-CCNC: 99 U/L (ref 25–100)
ALT SERPL-CCNC: 75 U/L (ref 4–36)
ANION GAP SERPL CALCULATED.3IONS-SCNC: 8 MMOL/L (ref 3–16)
AST SERPL-CCNC: 91 U/L (ref 8–33)
BASOPHILS ABSOLUTE: 0.1 K/UL (ref 0–0.1)
BASOPHILS RELATIVE PERCENT: 0.9 %
BILIRUB SERPL-MCNC: <0.2 MG/DL (ref 0.3–1.2)
BUN BLDV-MCNC: 11 MG/DL (ref 6–20)
CALCIUM SERPL-MCNC: 9.6 MG/DL (ref 8.5–10.5)
CHLORIDE BLD-SCNC: 104 MMOL/L (ref 98–107)
CO2: 27 MMOL/L (ref 20–30)
CREAT SERPL-MCNC: 0.6 MG/DL (ref 0.4–1.2)
EOSINOPHILS ABSOLUTE: 0.1 K/UL (ref 0–0.4)
EOSINOPHILS RELATIVE PERCENT: 2.4 %
GFR AFRICAN AMERICAN: >59
GFR NON-AFRICAN AMERICAN: >60
GLOBULIN: 1.9 G/DL
GLUCOSE BLD-MCNC: 112 MG/DL (ref 74–106)
HCT VFR BLD CALC: 39.7 % (ref 37–47)
HEMOGLOBIN: 12.2 G/DL (ref 11.5–16.5)
IMMATURE GRANULOCYTES #: 0 K/UL
IMMATURE GRANULOCYTES %: 0.4 % (ref 0–5)
LYMPHOCYTES ABSOLUTE: 1 K/UL (ref 1.5–4)
LYMPHOCYTES RELATIVE PERCENT: 17.7 %
MCH RBC QN AUTO: 29.7 PG (ref 27–32)
MCHC RBC AUTO-ENTMCNC: 30.7 G/DL (ref 31–35)
MCV RBC AUTO: 96.6 FL (ref 80–100)
MONOCYTES ABSOLUTE: 0.4 K/UL (ref 0.2–0.8)
MONOCYTES RELATIVE PERCENT: 6.9 %
NEUTROPHILS ABSOLUTE: 3.8 K/UL (ref 2–7.5)
NEUTROPHILS RELATIVE PERCENT: 71.7 %
PDW BLD-RTO: 13.1 % (ref 11–16)
PLATELET # BLD: 232 K/UL (ref 150–400)
PMV BLD AUTO: 9 FL (ref 6–10)
POTASSIUM SERPL-SCNC: 4.2 MMOL/L (ref 3.4–5.1)
RBC # BLD: 4.11 M/UL (ref 3.8–5.8)
SODIUM BLD-SCNC: 139 MMOL/L (ref 136–145)
TOTAL PROTEIN: 5.5 G/DL (ref 6.4–8.3)
VITAMIN D 25-HYDROXY: 44.2 (ref 32–100)
WBC # BLD: 5.4 K/UL (ref 4–11)

## 2022-05-02 PROCEDURE — 80053 COMPREHEN METABOLIC PANEL: CPT

## 2022-05-02 PROCEDURE — 97530 THERAPEUTIC ACTIVITIES: CPT

## 2022-05-02 PROCEDURE — 99315 NF DSCHRG MGMT 30 MIN/LESS: CPT | Performed by: INTERNAL MEDICINE

## 2022-05-02 PROCEDURE — 6370000000 HC RX 637 (ALT 250 FOR IP): Performed by: INTERNAL MEDICINE

## 2022-05-02 PROCEDURE — 6360000002 HC RX W HCPCS: Performed by: PHYSICIAN ASSISTANT

## 2022-05-02 PROCEDURE — 6370000000 HC RX 637 (ALT 250 FOR IP): Performed by: PHYSICIAN ASSISTANT

## 2022-05-02 PROCEDURE — 36415 COLL VENOUS BLD VENIPUNCTURE: CPT

## 2022-05-02 PROCEDURE — 82306 VITAMIN D 25 HYDROXY: CPT

## 2022-05-02 PROCEDURE — 97803 MED NUTRITION INDIV SUBSEQ: CPT

## 2022-05-02 PROCEDURE — 97116 GAIT TRAINING THERAPY: CPT

## 2022-05-02 PROCEDURE — 85025 COMPLETE CBC W/AUTO DIFF WBC: CPT

## 2022-05-02 RX ORDER — CYANOCOBALAMIN (VITAMIN B-12) 500 MCG
0.8 TABLET ORAL DAILY
Qty: 30 CAPSULE | Refills: 0 | Status: SHIPPED | OUTPATIENT
Start: 2022-05-02

## 2022-05-02 RX ORDER — CALCIUM CARBONATE 500(1250)
500 TABLET ORAL DAILY
Qty: 30 TABLET | Refills: 0 | Status: SHIPPED | OUTPATIENT
Start: 2022-05-02

## 2022-05-02 RX ORDER — FAMOTIDINE 20 MG/1
TABLET, FILM COATED ORAL
Qty: 180 TABLET | Refills: 1 | OUTPATIENT
Start: 2022-05-02

## 2022-05-02 RX ORDER — CHOLECALCIFEROL (VITAMIN D3) 125 MCG
1 CAPSULE ORAL DAILY
Qty: 30 CAPSULE | Refills: 0 | Status: SHIPPED | OUTPATIENT
Start: 2022-05-02

## 2022-05-02 RX ORDER — FERROUS SULFATE TAB EC 324 MG (65 MG FE EQUIVALENT) 324 (65 FE) MG
324 TABLET DELAYED RESPONSE ORAL EVERY OTHER DAY
Qty: 30 TABLET | Refills: 0 | Status: SHIPPED | OUTPATIENT
Start: 2022-05-02

## 2022-05-02 RX ORDER — GABAPENTIN 100 MG/1
100 CAPSULE ORAL NIGHTLY
Qty: 7 CAPSULE | Refills: 0 | Status: SHIPPED | OUTPATIENT
Start: 2022-05-02 | End: 2022-05-09

## 2022-05-02 RX ORDER — OXYCODONE HYDROCHLORIDE 5 MG/1
5 TABLET ORAL 2 TIMES DAILY PRN
Qty: 10 TABLET | Refills: 0 | Status: SHIPPED | OUTPATIENT
Start: 2022-05-02 | End: 2022-05-07

## 2022-05-02 RX ORDER — FAMOTIDINE 20 MG/1
20 TABLET, FILM COATED ORAL 2 TIMES DAILY
Qty: 60 TABLET | Refills: 0 | Status: SHIPPED | OUTPATIENT
Start: 2022-05-02

## 2022-05-02 RX ORDER — PHENOL 1.4 %
10 AEROSOL, SPRAY (ML) MUCOUS MEMBRANE NIGHTLY
Qty: 30 TABLET | Refills: 0 | Status: SHIPPED | OUTPATIENT
Start: 2022-05-02

## 2022-05-02 RX ORDER — MAGNESIUM OXIDE 400 MG/1
400 TABLET ORAL DAILY
Qty: 30 TABLET | Refills: 0 | Status: SHIPPED | OUTPATIENT
Start: 2022-05-02

## 2022-05-02 RX ORDER — ELECTROLYTES/DEXTROSE
1 SOLUTION, ORAL ORAL DAILY
Qty: 30 TABLET | Refills: 0 | Status: SHIPPED | OUTPATIENT
Start: 2022-05-02

## 2022-05-02 RX ORDER — METRONIDAZOLE 500 MG/1
500 TABLET ORAL EVERY 8 HOURS SCHEDULED
Status: DISCONTINUED | OUTPATIENT
Start: 2022-05-02 | End: 2022-05-02

## 2022-05-02 RX ADMIN — OXYCODONE HYDROCHLORIDE AND ACETAMINOPHEN 500 MG: 500 TABLET ORAL at 08:54

## 2022-05-02 RX ADMIN — FOLIC ACID TAB 400 MCG 0.8 MG: 400 TAB at 08:54

## 2022-05-02 RX ADMIN — ACETAMINOPHEN 650 MG: 325 TABLET, FILM COATED ORAL at 08:54

## 2022-05-02 RX ADMIN — VANCOMYCIN HYDROCHLORIDE 125 MG: KIT at 12:33

## 2022-05-02 RX ADMIN — METRONIDAZOLE 500 MG: 500 TABLET ORAL at 05:28

## 2022-05-02 RX ADMIN — FAMOTIDINE 20 MG: 20 TABLET, FILM COATED ORAL at 08:54

## 2022-05-02 RX ADMIN — Medication 1 CAPSULE: at 08:54

## 2022-05-02 RX ADMIN — MULTIPLE VITAMINS W/ MINERALS TAB 1 TABLET: TAB at 08:54

## 2022-05-02 RX ADMIN — CALCIUM 500 MG: 500 TABLET ORAL at 08:54

## 2022-05-02 RX ADMIN — ACETAMINOPHEN 650 MG: 325 TABLET, FILM COATED ORAL at 13:44

## 2022-05-02 RX ADMIN — Medication 400 MG: at 08:54

## 2022-05-02 ASSESSMENT — PAIN SCALES - GENERAL: PAINLEVEL_OUTOF10: 8

## 2022-05-02 NOTE — PROGRESS NOTES
Pt given discharge instructions at this time. Instructed to  medications and when next dose was due on each. Voiced understanding. Pt educated on follow up appointments. Voiced understanding. No complaints at discharge.

## 2022-05-02 NOTE — PROGRESS NOTES
Physical Therapy  Facility/Department: [facility]  Physical Therapy Daily Treatment Note    Name: Leandra Hancock  : 1945  MRN: [id number]  Date of Service: 2022    Discharge Recommendations:  Continue to assess pending progress      Patient Diagnosis(es): The primary encounter diagnosis was Closed minimally displaced zone III fracture of sacrum with routine healing, subsequent encounter. Diagnoses of Closed fracture of neck of left fibula with routine healing, subsequent encounter, Closed fracture of left tibial plateau with routine healing, subsequent encounter, C. difficile diarrhea, and Anemia, unspecified type were also pertinent to this visit. Past Medical History:  has a past medical history of Colitis, GERD (gastroesophageal reflux disease), Glaucoma, and PAD (peripheral artery disease) ([facility] [address] Past Surgical History:  has a past surgical history that includes Tubal ligation; eye surgery; cyst removal; skin biopsy; and Total hip arthroplasty (Left, 2019). Assessment   Assessment: Patient getting ready to go home this afternoon and requested to walk again. Mod I with bed mobility and sit to stand. Ambulated 325 feet with RW and Mod I. Went over HEP with patient.   Activity Tolerance  Activity Tolerance: Patient tolerated treatment well     Plan   Plan  Current Treatment Recommendations: Anaid Kelsey Training,Patient/Caregiver Education & Training,ROM,Balance Training,Gait Training,Home Exercise Program,Functional Mobility Training,Safety Education & Training  Safety Devices  Type of Devices: Left in bed,Call light within reach     Restrictions  Restrictions/Precautions  Restrictions/Precautions: Weight Bearing  Required Braces or Orthoses?: No (ELS knee brace d/c'd 22)  Lower Extremity Weight Bearing Restrictions  Left Lower Extremity Weight Bearing: Partial Weight Bearing  Required Braces or Orthoses  Left Lower Extremity Brace:  (ELS brace locked in extension)     Subjective   General  Chart Reviewed: Yes  Patient assessed for rehabilitation services?: Yes  Response To Previous Treatment: Not applicable  Family / Caregiver Present: No  Subjective  Subjective: Patient getting ready for d/c this afternoon, requested to walk. Social/Functional History  Social/Functional History  Lives With: Alone  Type of Home: House  Home Layout: Two level,Laundry in basement  Home Access: Ramped entrance  Bathroom Shower/Tub: Tub/Shower unit  Bathroom Toilet: Standard  Bathroom Equipment: 3-in-1 commode,Tub transfer bench,Toilet raiser  Bathroom Accessibility: Walker accessible  Home Equipment: Rolling walker,Lift chair  Receives Help From: Home health,Family,Friend(s)  ADL Assistance: Independent  Homemaking Assistance: Independent  Homemaking Responsibilities: Yes  Ambulation Assistance: Independent  Transfer Assistance: Independent  Active : Yes    Objective   Observation/Palpation  Posture: Good  Observation: Patient presents awake in bed, NAD      Bed mobility  Rolling to Left: Modified independent  Rolling to Right: Modified independent  Supine to Sit: Modified independent  Sit to Supine: Modified independent  Scooting: Modified independent  Transfers  Sit to Stand: Modified independent  Stand to sit: Modified independent  Ambulation  WB Status: PWB L LE  Ambulation  Surface: level tile  Device: Rolling Walker  Assistance: Modified Independent  Distance: 325 feet     Balance  Posture: Good  Sitting - Static: Good  Sitting - Dynamic: Good  Standing - Static: Good  Standing - Dynamic: Good;-     Goals  Long Term Goals  Time Frame for Long term goals : 10 days  Long term goal 1: Patient will perform all bed mobility with independence. Long term goal 2: Patient will perform sit to stand and transfers, NWB L LE, with RW and SBA. Long term goal 3: Patient will ambulate 50'x2, NWB L LE, with RW and SBA.   Long term goal 4: Patient will demonstrate independence with HEP.  Patient Goals   Patient goals : Return home. Therapy Time   Individual Concurrent Group Co-treatment   Time In 1309         Time Out 1324         Minutes 15              This note serves as D/C summary if patient is discharged prior to next visit.   Hakeem Laurent, JENNIFER

## 2022-05-02 NOTE — CARE COORDINATION
05/02/22 Miriam Palacios 107 Discharge   Transition of Care Consult (CM Consult) [facility] No   Services At/After Discharge Home Health;OT;PT;Nursing services   [place] Resource Information Provided? No   Confirm Follow Up Transport Self   Condition of Participation: Discharge Planning   The Plan for Transition of Care is related to the following treatment goals: home with [facility]   The Patient and/or Patient Representative was provided with a Choice of Provider? Patient   The Patient and/Or Patient Representative agree with the Discharge Plan? Yes   Freedom of Choice list was provided with basic dialogue that supports the patient's individualized plan of care/goals, treatment preferences, and shares the quality data associated with the providers? Yes     Pt has opted for [facility] home health. No DME needs noted at this time. Orders for [facility] sent to [facility].

## 2022-05-02 NOTE — TELEPHONE ENCOUNTER
Caller: Odalys Miles    Relationship to patient: Self    Best call back number: [phone]    New or established patient?  [] New  [x] Established    Date of discharge: 05/02/2022  Facility discharged from: [facility]     Diagnosis/Symptoms: BROKEN LEFT LEG  AND SCAPULA     Length of stay (If applicable): 02/13/ 2022 -05/02/2022       NOTE: PATIENT WAS ORIGONALLY IN  FOR SURGERY ON LEG AND SCAPULA THEN IN [facility] FOR REHABILITATION

## 2022-05-02 NOTE — DISCHARGE SUMMARY
Discharge Summary      Patient ID: Cameron Morales      Patient's PCP: Matt Ramirez MD    Admit Date: 4/2/2022     Discharge Date:  5/2/2022    Admitting Provider: Ansley Will MD    Discharging Provider: KALIE Miller     Reason for this admission:   Declining functional status    Discharge Diagnoses: Active Hospital Problems    Diagnosis Date Noted    C. difficile diarrhea [A04.72] 04/28/2022    Osteoporosis [M81.0] 04/03/2022    Closed minimally displaced zone III fracture of sacrum ([address] [S32.131A] 04/03/2022    Closed fracture of left tibial plateau [G34.815E] 21/56/9267    Microscopic colitis [K52.839] 02/21/2022    Anemia [D64.9] 02/21/2022    Declining functional status [R53.81] 12/31/2019       Procedures:  DEXA scan    Consults:   IP CONSULT TO DIETITIAN  IP CONSULT TO HOME CARE NEEDS  PT/OT    Briefly:   Ms. Hiral Blankenship is a 65 yo female with PMH of osteoporosis, lymphocytic colitis, anemia who was admitted due to declining functional status following sacral fractures and left tibial plateau fracture. See details of swing bed course below. Hospital Course: Active Hospital Problems    Diagnosis Date Noted    C. difficile diarrhea [A04.72]  - Per chart review patient with history of Cdiff Dec 2021 at [facility] and patient reports completing treatment with improvement   - Patient with diarrea last week and Cdiff +. Initially treated with PO flagyl. However reports stools have not improved much and also this is recurrent episode for her so will change to oral vancomycin for 10 day course. Continue culturelle. 04/28/2022    Osteoporosis [M81.0]  -DEXA scan here 4/4 with findings of osteoporosis  -had appt with [facility]  on 4/6 with recs for [facility] [place] [facility] [place] for [employer] and patient states it has been approved and delivered tomorrow.   -continue oscal  -vitamin d 40   - of note, per chart review vitamin d discontinued due to elevated vit d level 98 on 2/18/22  - patient to follow up with BMD clinic 6 weeks after starting [facility]   04/03/2022    Closed minimally displaced zone III fracture of sacrum ([address] [S32.131A]  -s/p CRPP 3/30 by [facility]  -NWB LLE ELS locked in extension, WBAT RLE  -DVT ppx: lovenox 30mg BID until 4/30  -PRN Pain meds as ordered.     -PT OT following  -CM consulted for dc planning assistance  - follow up with ortho as outpatient   04/03/2022    Closed fracture of left tibial plateau [E48.855V]  -s/p left tibial plateau fracture status post ORIF (2/15/2022)  - on arrival NWB LLE ELS locked in extension  -continue pain medications as prescribed   -PT OT following  -CM consulted for dc planning assistance  -Fall precautions  - Per ortho 4/18 patient can start weightbearing LLE  - follow up with ortho as outpatient    02/21/2022    Microscopic colitis [K52.839]  - history of lymphocytic colitis on budesonide  - per BMD note from 4/6 budesonide was discontinued due to concern for bone loss however per dc summary it had been resumed. - follow up with BMD and GI as scheduled     02/21/2022    Anemia [D64.9]  -hgb at baseline  -monitor and transfuse for hgb <7.0  - on gi ppx   - continue po iron    02/21/2022    Declining functional status [R53.81]  -Patient with multiple recent fragility fractures including humeral fracture, left tibial plateau fracture and minimally displaced zone 3 sacral fracture secondary to falls at home  -PT OT following  -Case management consulted for discharge planning assistance  - patient stable for dc home today with home health    12/31/2019   Of note, patient ALT and AST mildly elevated prior to discharge. Suspect may be related to diarrhea and flagyl since no other new meds. Repeat labs ordered as outpatient. Follow up with PCP. Vital Signs  Temp: 97.3 °F (36.3 °C)  Pulse: 72  Resp: 18  BP: 133/79  SpO2: 95 %  O2 Device: None (Room air)       Vital signs reviewed in electronic chart.     Physical exam  Constitutional: Well developed, well nourished, no acute distress. Eyes:  PERRL, conjunctiva normal, sclera without icterus. HENT:  Atraumatic, external ears normal, nose normal, oropharynx moist, no pharyngeal exudates. Neck- supple, no JVD, no lymphadenopathy. Respiratory:  No respiratory distress, no wheezing, rales or rhonchi detected. Cardiovascular:  Normal rate, normal rhythm, no murmurs, no gallops, no rubs. GI:  Soft, nondistended, normal bowel sounds, nontender, no hepatosplenomegaly appreciated. Musculoskeletal:  No edema, cyanosis or obvious acute deformity. Moving all extremities. Integument:  Warm and dry. No rash. Neurologic:  Alert & oriented x 3, no apparent focal deficits noted. Psychiatric:  Speech and behavior appropriate. Disposition: home    Discharged Condition: Stable    Activity: activity as tolerated  Diet: regular diet  Follow Up:     Primary Care Physician Dr. Joel Beebe on 5/5 at 9:30.      Transition of Care with Abbe Cannon MD May 31 at 12:10   [address] Nephrology, Bone and Mineral Metabolism      [address] [place] [address],  [address] [place] [address]3     Post op with Obey Mendez MD - 5/9 at [address] [facility]      [address] [place] [address], 1st floor [facility], [address], [address]   [address]   June 13 at 4:20     Labs:  For convenience and continuity at follow-up the following most recent labs are provided:    CBC:   Lab Results   Component Value Date    WBC 5.4 05/02/2022    HGB 12.2 05/02/2022    HCT 39.7 05/02/2022     05/02/2022       RENAL:   Lab Results   Component Value Date     05/02/2022    K 4.2 05/02/2022    K 3.2 04/29/2022     05/02/2022    CO2 27 05/02/2022    BUN 11 05/02/2022    CREATININE 0.6 05/02/2022     Lab Results   Component Value Date    ALKPHOS 99 05/02/2022    ALT 75 05/02/2022    AST 91 05/02/2022    PROT 5.5 05/02/2022 BILITOT <0.2 05/02/2022    LABALBU 3.6 05/02/2022       Discharge Medications:     Current Discharge Medication List           Details   vancomycin [facility]) 50 MG/ML SOLR oral solution Take 2.5 mLs by mouth every 6 hours for 10 days  Qty: 100 mL, Refills: 0              Details   famotidine (PEPCID) 20 MG tablet Take 1 tablet by mouth 2 times daily  Qty: 60 tablet, Refills: 0      calcium carbonate (OSCAL) 500 MG TABS tablet Take 1 tablet by mouth daily  Qty: 30 tablet, Refills: 0      diclofenac sodium (VOLTAREN) 1 % GEL Apply 4 g topically 4 times daily as needed for Pain  Qty: 100 g, Refills: 0      ferrous sulfate 324 (65 Fe) MG EC tablet Take 1 tablet by mouth every other day  Qty: 30 tablet, Refills: 0      Folic Acid 0.8 MG CAPS Take 0.8 mg by mouth daily  Qty: 30 capsule, Refills: 0      gabapentin (NEURONTIN) 100 MG capsule Take 1 capsule by mouth nightly for 7 days. Qty: 7 capsule, Refills: 0    Associated Diagnoses: Closed fracture of neck of left fibula with routine healing, subsequent encounter; Closed fracture of left tibial plateau with routine healing, subsequent encounter      Lactobacillus (PROBIOTIC ACIDOPHILUS) CAPS Take 1 capsule by mouth daily  Qty: 30 capsule, Refills: 0      magnesium oxide (MAG-OX) 400 MG tablet Take 1 tablet by mouth daily  Qty: 30 tablet, Refills: 0      Melatonin 10 MG TABS Take 10 mg by mouth at bedtime  Qty: 30 tablet, Refills: 0      Multiple Vitamin (MULTIVITAMIN ADULT) TABS Take 1 tablet by mouth daily  Qty: 30 tablet, Refills: 0      oxyCODONE (ROXICODONE) 5 MG immediate release tablet Take 1 tablet by mouth 2 times daily as needed for Pain for up to 5 days.   Qty: 10 tablet, Refills: 0    Comments: Reduce doses taken as pain becomes manageable  Associated Diagnoses: Closed minimally displaced zone III fracture of sacrum with routine healing, subsequent encounter              Details   White Petrolatum-Mineral Oil ([address] [place] [address] PETROL-MINERAL OIL-LANOLIN) 0.1-0.1 % OINT Apply 1 drop to eye as needed      NONFORMULARY Take 1 capsule by mouth in the morning and at bedtime EZ Tears supplement      acetaminophen (TYLENOL) 325 MG tablet Take 650 mg by mouth every 6 hours as needed for Pain       vitamin C (ASCORBIC ACID) 500 MG tablet Take 500 mg by mouth daily       bimatoprost (LUMIGAN) 0.01 % SOLN ophthalmic drops Place 1 drop into both eyes nightly       budesonide (ENTOCORT EC) 3 MG extended release capsule Take 9 mg by mouth every morning       brinzolamide-brimonidine (SIMBRINZA) 1-0.2 % SUSP Instill one drop into both eyes twice daily            Patient was seen and examined with Dr. Olga Christie. After reviewing patient data and diagnostic testing the plan of care was established in conjunction with Dr. Olga Christie. Signed:  Electronically signed by KALIE Dewitt on 5/2/2022 at 1:18 PM       Thank you Antoine Goldberg MD for the opportunity to be involved in this patient's care. If you have any questions or concerns please feel free to contact me at [phone].

## 2022-05-02 NOTE — PROGRESS NOTES
Nutrition Assessment     Type and Reason for Visit:  (follow up)    Nutrition Recommendations/Plan:   1. Recommend to continue with current diet, ONS and MVI. Malnutrition Assessment:  Malnutrition Status: At risk for malnutrition (Comment) (related to hx of colitis)    Nutrition Assessment:  Pt at low-moderate risk for ongoing nutritional compromise r/t chronic colitis, D. diff and decreased intakes. Estimated Daily Nutrient Needs:  Energy (kcal):  8753-0136 (27-30 kcal/kg cbw) Weight Used for Energy Requirements: Current     Protein (g):  68-79 (1.2-1.4 gm/kg cbw) Weight Used for Protein Requirements: Current        Fluid (ml/day):  3795-0198 Method Used for Fluid Requirements: 1 ml/kcal    Nutrition Related Findings:   Pt continues to be underwieght but this is her normal per patient. Pt states she has osteo r/t chronic colitis. Pt intakes have gone down last couple meals, says she is going home and colitis has kept her from wanting to ea. MVI ordered. Fair intakes of ONS. Wound Type:  (surgical wounds healed, has abrasion on ankle)    Current Nutrition Therapies:    ADULT DIET;  Regular  ADULT ORAL NUTRITION SUPPLEMENT; Lunch, Dinner, Breakfast; Standard High Calorie/High Protein Oral Supplement    Anthropometric Measures:  · Height: 5' 7\" (170.2 cm)  · Current Body Wt: 129 lb (58.5 kg)   · BMI: 20.2    Nutrition Diagnosis:   · Predicted inadequate energy intake related to altered GI function as evidenced by diarrhea      Nutrition Interventions:   Food and/or Nutrient Delivery: Continue Current Diet,Continue Oral Nutrition Supplement,Vitamin Supplement,Mineral Supplement  Nutrition Education/Counseling: Education completed (spoke with patient about the importance of ONS with condition and she did understand.)  Coordination of Nutrition Care: Continue to monitor while inpatient,Interdisciplinary Rounds  Plan of Care discussed with: interdisciplinary rounds, RN,    Goals:  Previous Goal Met: Progressing toward Goal(s)          Nutrition Monitoring and Evaluation:      Food/Nutrient Intake Outcomes: Food and Nutrient Intake,Supplement Intake,Vitamin/Mineral Intake  Physical Signs/Symptoms Outcomes: Biochemical Data,Weight,Skin,Diarrhea    Discharge Planning:    Continue Oral Nutrition Supplement,Continue current diet     Ellen Moreland MS, RD, LD

## 2022-05-02 NOTE — PROGRESS NOTES
Physical Therapy  Facility/Department: [facility]  Physical Therapy Daily Treatment Note    Name: Kendra Sarmiento  : 1945  MRN: [id number]  Date of Service: 2022    Discharge Recommendations:  Continue to assess pending progress      Patient Diagnosis(es): There were no encounter diagnoses. Past Medical History:  has a past medical history of Colitis, GERD (gastroesophageal reflux disease), Glaucoma, and PAD (peripheral artery disease) ([facility] [address] Past Surgical History:  has a past surgical history that includes Tubal ligation; eye surgery; cyst removal; skin biopsy; and Total hip arthroplasty (Left, 2019). Assessment   Assessment: Patient Mod I with bed mobility. Stood with Mod I and ambulated with RW to the bathroom and transferred to the shower bench. Completed bathing and dressing without assistance. Patient ambulated back to the bed after shower. Activity Tolerance  Activity Tolerance: Patient tolerated treatment well     Plan   Plan  Current Treatment Recommendations: Burnetta Mariougie Training,Patient/Caregiver Education & Training,ROM,Balance Training,Gait Training,Home Exercise Program,Functional Mobility Training,Safety Education & Training  Safety Devices  Type of Devices: Left in bed,Call light within reach     Restrictions  Restrictions/Precautions  Restrictions/Precautions: Weight Bearing  Required Braces or Orthoses?: No (ELS knee brace d/c'd 22)  Lower Extremity Weight Bearing Restrictions  Left Lower Extremity Weight Bearing: Partial Weight Bearing  Required Braces or Orthoses  Left Lower Extremity Brace:  (ELS brace locked in extension)     Subjective   General  Chart Reviewed: Yes  Patient assessed for rehabilitation services?: Yes  Response To Previous Treatment: Not applicable  Family / Caregiver Present: No  Subjective  Subjective: Patient reports she's going home today. Requests a shower.      Social/Functional History  Social/Functional History  Lives With: Alone  Type of Home: House  Home Layout: Two level,Laundry in basement  Home Access: Ramped entrance  Bathroom Shower/Tub: Tub/Shower unit  Bathroom Toilet: Standard  Bathroom Equipment: 3-in-1 commode,Tub transfer bench,Toilet raiser  Bathroom Accessibility: Walker accessible  Home Equipment: Rolling walker,Lift chair  Receives Help From: Home health,Family,Friend(s)  ADL Assistance: Independent  Homemaking Assistance: Independent  Homemaking Responsibilities: Yes  Ambulation Assistance: Independent  Transfer Assistance: Independent  Active : Yes    Objective   Observation/Palpation  Posture: Good  Observation: Patient presents awake in bed, NAD      Bed mobility  Rolling to Left: Modified independent  Rolling to Right: Modified independent  Supine to Sit: Modified independent  Sit to Supine: Modified independent  Scooting: Modified independent  Transfers  Sit to Stand: Modified independent  Stand to sit: Modified independent  Ambulation  WB Status: PWB L LE  Ambulation  Surface: level tile  Device: Rolling Walker  Assistance: Modified Independent  Distance: to and from the bathroom     Balance  Posture: Good  Sitting - Static: Good  Sitting - Dynamic: Good  Standing - Static: Good  Standing - Dynamic: Good;-     Goals  Long Term Goals  Time Frame for Long term goals : 10 days  Long term goal 1: Patient will perform all bed mobility with independence. Long term goal 2: Patient will perform sit to stand and transfers, NWB L LE, with RW and SBA. Long term goal 3: Patient will ambulate 50'x2, NWB L LE, with RW and SBA. Long term goal 4: Patient will demonstrate independence with HEP. Patient Goals   Patient goals : Return home. Therapy Time   Individual Concurrent Group Co-treatment   Time In 8239         Time Out 1030         Minutes 28              This note serves as D/C summary if patient is discharged prior to next visit.   Claudia Andujar, PTA

## 2022-05-02 NOTE — PLAN OF CARE
Problem: Falls - Risk of:  Goal: Will remain free from falls  Description: Will remain free from falls  Outcome: Completed  Goal: Absence of physical injury  Description: Absence of physical injury  Outcome: Completed     Problem: Skin Integrity:  Goal: Will show no infection signs and symptoms  Description: Will show no infection signs and symptoms  Outcome: Completed  Goal: Absence of new skin breakdown  Description: Absence of new skin breakdown  Outcome: Completed     Problem: Pain:  Goal: Pain level will decrease  Description: Pain level will decrease  Outcome: Completed  Goal: Control of acute pain  Description: Control of acute pain  Outcome: Completed  Goal: Control of chronic pain  Description: Control of chronic pain  Outcome: Completed  Goal: Patient's pain/discomfort is manageable  Description: Patient's pain/discomfort is manageable  Outcome: Completed     Problem: Neurological  Goal: Maximum potential motor/sensory/cognitive function  Outcome: Completed     Problem: Cardiovascular  Goal: No DVT, peripheral vascular complications  Outcome: Completed  Goal: Hemodynamic stability  Outcome: Completed  Goal: Anticoagulate/Hct stable  Outcome: Completed  Goal: Agreement to quit smoking  Outcome: Completed  Goal: Weight maintained or lost  Outcome: Completed  Goal: Understanding of dietary restrictions  Outcome: Completed     Problem: Infection:  Goal: Will remain free from infection  Description: Will remain free from infection  Outcome: Completed     Problem: Safety:  Goal: Free from accidental physical injury  Description: Free from accidental physical injury  Outcome: Completed  Goal: Free from intentional harm  Description: Free from intentional harm  Outcome: Completed     Problem: Daily Care:  Goal: Daily care needs are met  Description: Daily care needs are met  Outcome: Completed     Problem: Skin Integrity:  Goal: Skin integrity will stabilize  Description: Skin integrity will stabilize  Outcome: Completed     Problem: Discharge Planning:  Goal: Patients continuum of care needs are met  Description: Patients continuum of care needs are met  Outcome: Completed     Problem: Discharge Planning  Goal: Discharge to home or other facility with appropriate resources  Outcome: Completed     Problem: ABCDS Injury Assessment  Goal: Absence of physical injury  Outcome: Completed

## 2022-05-02 NOTE — FLOWSHEET NOTE
05/01/22 2020   Assessment   Charting Type Shift assessment   Psychosocial   Psychosocial (WDL) WDL   Neurological   Neuro (WDL) WDL   Level of Consciousness Alert (0)   Sandston Coma Scale   Eye Opening 4   Best Verbal Response 5   Best Motor Response 6   Denzel Coma Scale Score 15   HEENT (Head, Ears, Eyes, Nose, & Throat)   HEENT (WDL) WDL   Right Eye Impaired vision   Left Eye Impaired vision   Teeth Dentures lower;Dentures upper   Respiratory   Respiratory (WDL) WDL   Respiratory Pattern Regular   Respiratory Depth Normal   Respiratory Quality/Effort Unlabored   Chest Assessment Chest expansion symmetrical;Trachea midline   L Breath Sounds Clear   R Breath Sounds Clear   Breath Sounds   Right Upper Lobe Clear   Right Middle Lobe Clear   Right Lower Lobe Clear   Left Upper Lobe Clear   Left Lower Lobe Clear   Cardiac   Cardiac (WDL) WDL   Gastrointestinal   Abdominal (WDL) WDL   RUQ Bowel Sounds Active   LUQ Bowel Sounds Active   RLQ Bowel Sounds Active   LLQ Bowel Sounds Active   Abdomen Inspection Soft;Flat   GI Symptoms   (at times, chronic)   Tenderness Nontender   Anus/Rectum Characteristics Hemorrhoids   Genitourinary   Genitourinary (WDL) WDL   Urine Assessment   Urine Color Yellow/straw   Urine Appearance Clear   Urine Odor No odor   Peripheral Vascular   Peripheral Vascular (WDL) WDL   Edema None   Skin Integumentary    Skin Integumentary (WDL) X   Skin Color Pink   Skin Condition/Temp Dry; Warm   Skin Integrity Site 2   Skin Integrity Location 2 Abrasion   Location 2 Left Ankle    Skin Integrity Site 3   Skin Integrity Location 3 Bruising    Location 3 Scattered    Musculoskeletal   Musculoskeletal (WDL) X   RUE   (full movement)   LUE   (full movement)   RL Extremity   (full movment)   LL Extremity Limited movement   Musculoskeletal Details   L Knee   (tibia fx)   Genitourinary   Incontinence No

## 2022-05-03 ENCOUNTER — TRANSITIONAL CARE MANAGEMENT TELEPHONE ENCOUNTER (OUTPATIENT)
Dept: CALL CENTER | Facility: HOSPITAL | Age: 77
End: 2022-05-03

## 2022-05-03 NOTE — OUTREACH NOTE
Call Center TCM Note    Flowsheet Row Responses   [facility] patient discharged from? Non-   Does the patient have one of the following disease processes/diagnoses(primary or secondary)? Other   TCM attempt successful? Yes   Call start time 1120   Call end time 1123   Discharge diagnosis At  for surgery on leg and scapula then [facility] for [facility]hab   Meds reviewed with patient/caregiver? Yes   Is the patient having any side effects they believe may be caused by any medication additions or changes? No   Does the patient have all medications ordered at discharge? Yes   Prescription comments Brother picking up the rest of the medication prescriptions today    Is the patient taking all medications as directed (includes completed medication regime)? Yes   Does the patient have a primary care provider?  Yes   Does the patient have an appointment with their PCP within 7 days of discharge? Yes   Comments regarding PCP [facility] on 5/5/22 with PCP    Has the patient kept scheduled appointments due by today? N/A   Psychosocial issues? No   Did the patient receive a copy of their discharge instructions? Yes   Nursing interventions Reviewed instructions with patient   What is the patient's perception of their health status since discharge? Improving   Is the patient/caregiver able to teach back signs and symptoms related to disease process for when to call PCP? Yes   Is the patient/caregiver able to teach back signs and symptoms related to disease process for when to call 911? Yes   Is the patient/caregiver able to teach back the hierarchy of who to call/visit for symptoms/problems? PCP, Specialist, Home health nurse, Urgent Care, ED, 911 Yes   If the patient is a current smoker, are they able to teach back resources for cessation? Not a smoker   TCM call completed? Yes          Miley Jain RN    5/3/2022, 11:24 EDT

## 2022-05-05 ENCOUNTER — OFFICE VISIT (OUTPATIENT)
Dept: INTERNAL MEDICINE | Facility: CLINIC | Age: 77
End: 2022-05-05

## 2022-05-05 VITALS
WEIGHT: 115 LBS | BODY MASS INDEX: 18.05 KG/M2 | DIASTOLIC BLOOD PRESSURE: 64 MMHG | SYSTOLIC BLOOD PRESSURE: 112 MMHG | TEMPERATURE: 97.3 F | HEART RATE: 74 BPM | HEIGHT: 67 IN | OXYGEN SATURATION: 98 %

## 2022-05-05 DIAGNOSIS — S82.192D OTHER CLOSED FRACTURE OF PROXIMAL END OF LEFT TIBIA WITH ROUTINE HEALING, SUBSEQUENT ENCOUNTER: ICD-10-CM

## 2022-05-05 DIAGNOSIS — M81.0 OSTEOPOROSIS, UNSPECIFIED OSTEOPOROSIS TYPE, UNSPECIFIED PATHOLOGICAL FRACTURE PRESENCE: ICD-10-CM

## 2022-05-05 DIAGNOSIS — E55.9 VITAMIN D DEFICIENCY, UNSPECIFIED: ICD-10-CM

## 2022-05-05 DIAGNOSIS — R74.8 ABNORMAL LIVER ENZYMES: ICD-10-CM

## 2022-05-05 DIAGNOSIS — R93.89 ABNORMAL FINDINGS ON DIAGNOSTIC IMAGING OF OTHER SPECIFIED BODY STRUCTURES: ICD-10-CM

## 2022-05-05 DIAGNOSIS — A04.72 CLOSTRIDIUM DIFFICILE COLITIS: Primary | ICD-10-CM

## 2022-05-05 DIAGNOSIS — R53.83 OTHER FATIGUE: ICD-10-CM

## 2022-05-05 DIAGNOSIS — R79.9 ABNORMAL FINDING OF BLOOD CHEMISTRY, UNSPECIFIED: ICD-10-CM

## 2022-05-05 PROBLEM — M81.8 OTHER OSTEOPOROSIS WITHOUT CURRENT PATHOLOGICAL FRACTURE: Status: ACTIVE | Noted: 2017-01-11

## 2022-05-05 PROBLEM — S82.102A CLOSED FRACTURE OF PROXIMAL END OF LEFT TIBIA: Status: ACTIVE | Noted: 2022-05-05

## 2022-05-05 PROCEDURE — 1111F DSCHRG MED/CURRENT MED MERGE: CPT | Performed by: INTERNAL MEDICINE

## 2022-05-05 PROCEDURE — 99495 TRANSJ CARE MGMT MOD F2F 14D: CPT | Performed by: INTERNAL MEDICINE

## 2022-05-05 RX ORDER — FAMOTIDINE 20 MG/1
1 TABLET, FILM COATED ORAL 2 TIMES DAILY
COMMUNITY
Start: 2022-03-18 | End: 2022-09-19

## 2022-05-05 RX ORDER — FERROUS SULFATE 324(65)MG
1 TABLET, DELAYED RELEASE (ENTERIC COATED) ORAL EVERY OTHER DAY
COMMUNITY
Start: 2022-02-20

## 2022-05-05 RX ORDER — OXYCODONE HYDROCHLORIDE 5 MG/1
5 TABLET ORAL
COMMUNITY
Start: 2022-05-02 | End: 2022-05-07

## 2022-05-05 RX ORDER — GABAPENTIN 100 MG/1
100 CAPSULE ORAL AS NEEDED
COMMUNITY
Start: 2022-04-02 | End: 2022-09-19

## 2022-05-05 RX ORDER — TERIPARATIDE 250 UG/ML
20 INJECTION, SOLUTION SUBCUTANEOUS DAILY
COMMUNITY
Start: 2022-04-18 | End: 2023-02-14 | Stop reason: SDUPTHER

## 2022-05-05 NOTE — PROGRESS NOTES
Transitional Care Follow Up Visit  Subjective     Odalys Mayuri Miles is a 76 y.o. female who presents for a transitional care management visit.    Within 48 business hours after discharge our office contacted her via telephone to coordinate her care and needs.      I reviewed and discussed the details of that call along with the discharge summary, hospital problems, inpatient lab results, inpatient diagnostic studies, and consultation reports with Odalys.     Current outpatient and discharge medications have been reconciled for the patient.  Reviewed by: Marty Cheung MD      Date of TCM Phone Call 5/2/2022   [facility]    Date of Discharge 5/2/2022     Risk for Readmission (LACE) No data recorded    History of Present Illness   Course During Hospital Stay:Patient here for transitional care. Patient was recently discharged from rehab Hospital  5/2 discharged from hospital after PT for left tibia fracture  And sacrum fracture. Now C.diff colitis. Diarrhea no more, left leg pain still on pain med     The following portions of the patient's history were reviewed and updated as appropriate: allergies, current medications, past family history, past medical history, past social history, past surgical history and problem list.    Review of Systems   Constitutional: Negative.    Respiratory: Negative.    Cardiovascular: Negative.    Gastrointestinal: Negative.    Musculoskeletal: Positive for back pain.        Tibia pain    Skin: Negative.    Neurological: Negative.    Psychiatric/Behavioral: Negative.        Objective   Physical Exam  Constitutional:       Appearance: Normal appearance.   Cardiovascular:      Rate and Rhythm: Normal rate and regular rhythm.      Heart sounds: Normal heart sounds.   Pulmonary:      Effort: Pulmonary effort is normal.      Breath sounds: Normal breath sounds.   Abdominal:      General: Bowel sounds are normal.   Musculoskeletal:         General: Tenderness present.       Cervical back: Neck supple.   Skin:     General: Skin is warm.   Neurological:      Mental Status: She is alert and oriented to person, place, and time.         Assessment/Plan   Diagnoses and all orders for this visit:    1. Clostridium difficile colitis (Primary) status post the vancomycin treatment diarrhea is much improved    2. Osteoporosis, unspecified osteoporosis type, unspecified pathological fracture presence continue medication    3. Other closed fracture of proximal end of left tibia with routine healing, subsequent encounter  -     Ambulatory Referral to Home Health    4. Abnormal liver enzymes check lab  -     CBC & Differential  -     Comprehensive Metabolic Panel  -     TSH    5. Abnormal findings on diagnostic imaging of other specified body structures   -     TSH    6. Other fatigue check lab  -     Iron Profile  -     Magnesium  -     Folate  -     Vitamin D 25 Hydroxy    7. Abnormal finding of blood chemistry, unspecified   -     Iron Profile    8. Vitamin D deficiency, unspecified   -     Vitamin D 25 Hydroxy        As scheduled

## 2022-05-10 ENCOUNTER — TELEPHONE (OUTPATIENT)
Dept: INTERNAL MEDICINE | Facility: CLINIC | Age: 77
End: 2022-05-10

## 2022-05-10 LAB
25(OH)D3+25(OH)D2 SERPL-MCNC: 52.1 NG/ML (ref 30–100)
ALBUMIN SERPL-MCNC: 4.2 G/DL (ref 3.5–5.2)
ALBUMIN/GLOB SERPL: 2.1 G/DL
ALP SERPL-CCNC: 91 U/L (ref 39–117)
ALT SERPL-CCNC: 38 U/L (ref 1–33)
AST SERPL-CCNC: 22 U/L (ref 1–32)
BASOPHILS # BLD AUTO: 0.06 10*3/MM3 (ref 0–0.2)
BASOPHILS NFR BLD AUTO: 0.9 % (ref 0–1.5)
BILIRUB SERPL-MCNC: 0.2 MG/DL (ref 0–1.2)
BUN SERPL-MCNC: 15 MG/DL (ref 8–23)
BUN/CREAT SERPL: 26.8 (ref 7–25)
CALCIUM SERPL-MCNC: 9.6 MG/DL (ref 8.6–10.5)
CHLORIDE SERPL-SCNC: 104 MMOL/L (ref 98–107)
CO2 SERPL-SCNC: 25.7 MMOL/L (ref 22–29)
CREAT SERPL-MCNC: 0.56 MG/DL (ref 0.57–1)
EGFRCR SERPLBLD CKD-EPI 2021: 93.5 ML/MIN/1.73
EOSINOPHIL # BLD AUTO: 0.04 10*3/MM3 (ref 0–0.4)
EOSINOPHIL NFR BLD AUTO: 0.6 % (ref 0.3–6.2)
ERYTHROCYTE [DISTWIDTH] IN BLOOD BY AUTOMATED COUNT: 12.1 % (ref 12.3–15.4)
FOLATE SERPL-MCNC: >20 NG/ML (ref 4.78–24.2)
GLOBULIN SER CALC-MCNC: 2 GM/DL
GLUCOSE SERPL-MCNC: 77 MG/DL (ref 65–99)
HCT VFR BLD AUTO: 37.8 % (ref 34–46.6)
HGB BLD-MCNC: 12.4 G/DL (ref 12–15.9)
IMM GRANULOCYTES # BLD AUTO: 0.01 10*3/MM3 (ref 0–0.05)
IMM GRANULOCYTES NFR BLD AUTO: 0.1 % (ref 0–0.5)
IRON SATN MFR SERPL: 18 % (ref 20–50)
IRON SERPL-MCNC: 61 MCG/DL (ref 37–145)
LYMPHOCYTES # BLD AUTO: 1.01 10*3/MM3 (ref 0.7–3.1)
LYMPHOCYTES NFR BLD AUTO: 14.7 % (ref 19.6–45.3)
MAGNESIUM SERPL-MCNC: 2.1 MG/DL (ref 1.6–2.4)
MCH RBC QN AUTO: 30.2 PG (ref 26.6–33)
MCHC RBC AUTO-ENTMCNC: 32.8 G/DL (ref 31.5–35.7)
MCV RBC AUTO: 92 FL (ref 79–97)
MONOCYTES # BLD AUTO: 0.34 10*3/MM3 (ref 0.1–0.9)
MONOCYTES NFR BLD AUTO: 4.9 % (ref 5–12)
NEUTROPHILS # BLD AUTO: 5.42 10*3/MM3 (ref 1.7–7)
NEUTROPHILS NFR BLD AUTO: 78.8 % (ref 42.7–76)
NRBC BLD AUTO-RTO: 0 /100 WBC (ref 0–0.2)
PLATELET # BLD AUTO: 325 10*3/MM3 (ref 140–450)
POTASSIUM SERPL-SCNC: 3.7 MMOL/L (ref 3.5–5.2)
PROT SERPL-MCNC: 6.2 G/DL (ref 6–8.5)
RBC # BLD AUTO: 4.11 10*6/MM3 (ref 3.77–5.28)
SODIUM SERPL-SCNC: 141 MMOL/L (ref 136–145)
TIBC SERPL-MCNC: 341 MCG/DL
TSH SERPL DL<=0.005 MIU/L-ACNC: 0.74 UIU/ML (ref 0.27–4.2)
UIBC SERPL-MCNC: 280 MCG/DL (ref 112–346)
WBC # BLD AUTO: 6.88 10*3/MM3 (ref 3.4–10.8)

## 2022-05-10 NOTE — TELEPHONE ENCOUNTER
Contacted home health and they reported that patient declined OT but did accept PT; referral updated.

## 2022-05-10 NOTE — TELEPHONE ENCOUNTER
Sarah with [facility] left vm that she had questions on home health referral that was sent to her. [phone]

## 2022-05-16 ENCOUNTER — TELEPHONE (OUTPATIENT)
Dept: INTERNAL MEDICINE | Facility: CLINIC | Age: 77
End: 2022-05-16

## 2022-05-16 NOTE — TELEPHONE ENCOUNTER
Caller: LILI     Relationship: [facility]     Best call back number: [phone]    Additional notes: LILI IS ASKING FOR A VERBAL ORDER FOR PLAN OF CARE:   PHYSICAL THERAPY WEEKLY FOR GAIT AND BALANCE TRAINING FOR 8 WEEKS.  LILI ASKED IF RESISTANCE TRAINING WOULD BE SAFE FOR THE PATIENT'S LEFT LEG.

## 2022-05-16 NOTE — TELEPHONE ENCOUNTER
Attempted to contact home health; left detailed message giving verbal orders for plan of care and provider can sign off on faxed report.

## 2022-05-17 ENCOUNTER — HOSPITAL ENCOUNTER (OUTPATIENT)
Facility: HOSPITAL | Age: 77
Discharge: HOME | End: 2022-05-17
Payer: MEDICARE

## 2022-05-17 LAB
ALBUMIN SERPL-MCNC: 4 G/DL (ref 3.4–4.8)
ANION GAP SERPL CALCULATED.3IONS-SCNC: 10 MMOL/L (ref 3–16)
BUN BLDV-MCNC: 12 MG/DL (ref 6–20)
CALCIUM SERPL-MCNC: 9.5 MG/DL (ref 8.5–10.5)
CHLORIDE BLD-SCNC: 106 MMOL/L (ref 98–107)
CO2: 28 MMOL/L (ref 20–30)
CREAT SERPL-MCNC: 0.6 MG/DL (ref 0.4–1.2)
GFR AFRICAN AMERICAN: >59
GFR NON-AFRICAN AMERICAN: >60
GLUCOSE BLD-MCNC: 92 MG/DL (ref 74–106)
PHOSPHORUS: 3.5 MG/DL (ref 2.5–4.5)
POTASSIUM SERPL-SCNC: 3.5 MMOL/L (ref 3.4–5.1)
SODIUM BLD-SCNC: 144 MMOL/L (ref 136–145)

## 2022-05-17 PROCEDURE — 36415 COLL VENOUS BLD VENIPUNCTURE: CPT

## 2022-05-17 PROCEDURE — 80069 RENAL FUNCTION PANEL: CPT

## 2022-05-25 RX ORDER — CYANOCOBALAMIN (VITAMIN B-12) 500 MCG
TABLET ORAL
Qty: 30 CAPSULE | Refills: 0 | OUTPATIENT
Start: 2022-05-25

## 2022-05-25 RX ORDER — LACTOBACILLUS COMBINATION NO.4 3B CELL
CAPSULE ORAL
Qty: 30 CAPSULE | OUTPATIENT
Start: 2022-05-25

## 2022-05-25 RX ORDER — FERROUS SULFATE TAB EC 324 MG (65 MG FE EQUIVALENT) 324 (65 FE) MG
324 TABLET DELAYED RESPONSE ORAL EVERY OTHER DAY
Qty: 15 TABLET | Refills: 1 | OUTPATIENT
Start: 2022-05-25

## 2022-05-25 RX ORDER — CALCIUM CARBONATE 500(1250)
TABLET ORAL
Qty: 30 TABLET | OUTPATIENT
Start: 2022-05-25

## 2022-05-25 RX ORDER — MULTIVITAMIN
TABLET ORAL
Qty: 30 TABLET | Refills: 0 | OUTPATIENT
Start: 2022-05-25

## 2022-05-25 RX ORDER — BIOTIN 10000 MCG
TABLET,CHEWABLE ORAL
Qty: 30 CAPSULE | OUTPATIENT
Start: 2022-05-25

## 2022-05-27 ENCOUNTER — OUTSIDE FACILITY SERVICE (OUTPATIENT)
Dept: INTERNAL MEDICINE | Facility: CLINIC | Age: 77
End: 2022-05-27

## 2022-05-27 PROCEDURE — OUTSIDEPOS PR OUTSIDE POS PLACEHOLDER: Performed by: INTERNAL MEDICINE

## 2022-06-13 RX ORDER — BUDESONIDE 3 MG/1
6 CAPSULE, COATED PELLETS ORAL EVERY MORNING
Qty: 60 CAPSULE | Refills: 0 | Status: SHIPPED | OUTPATIENT
Start: 2022-06-13 | End: 2022-06-23 | Stop reason: SDUPTHER

## 2022-06-16 ENCOUNTER — OFFICE VISIT (OUTPATIENT)
Dept: INTERNAL MEDICINE | Facility: CLINIC | Age: 77
End: 2022-06-16

## 2022-06-16 VITALS
BODY MASS INDEX: 17.74 KG/M2 | DIASTOLIC BLOOD PRESSURE: 66 MMHG | HEIGHT: 67 IN | TEMPERATURE: 97.3 F | WEIGHT: 113 LBS | OXYGEN SATURATION: 98 % | SYSTOLIC BLOOD PRESSURE: 124 MMHG | HEART RATE: 74 BPM

## 2022-06-16 DIAGNOSIS — S82.192D OTHER CLOSED FRACTURE OF PROXIMAL END OF LEFT TIBIA WITH ROUTINE HEALING, SUBSEQUENT ENCOUNTER: ICD-10-CM

## 2022-06-16 DIAGNOSIS — K52.832 LYMPHOCYTIC COLITIS: ICD-10-CM

## 2022-06-16 DIAGNOSIS — I83.90 ASYMPTOMATIC VARICOSE VEINS: ICD-10-CM

## 2022-06-16 DIAGNOSIS — G47.00 INSOMNIA, UNSPECIFIED TYPE: ICD-10-CM

## 2022-06-16 DIAGNOSIS — R74.8 ABNORMAL LIVER ENZYMES: ICD-10-CM

## 2022-06-16 DIAGNOSIS — M81.8 OTHER OSTEOPOROSIS WITHOUT CURRENT PATHOLOGICAL FRACTURE: ICD-10-CM

## 2022-06-16 DIAGNOSIS — E55.9 VITAMIN D DEFICIENCY: ICD-10-CM

## 2022-06-16 DIAGNOSIS — E78.5 HYPERLIPIDEMIA, UNSPECIFIED HYPERLIPIDEMIA TYPE: Primary | ICD-10-CM

## 2022-06-16 PROBLEM — A04.72 CLOSTRIDIUM DIFFICILE COLITIS: Status: RESOLVED | Noted: 2022-05-05 | Resolved: 2022-06-16

## 2022-06-16 PROCEDURE — 1159F MED LIST DOCD IN RCRD: CPT | Performed by: INTERNAL MEDICINE

## 2022-06-16 PROCEDURE — G0439 PPPS, SUBSEQ VISIT: HCPCS | Performed by: INTERNAL MEDICINE

## 2022-06-16 PROCEDURE — 1170F FXNL STATUS ASSESSED: CPT | Performed by: INTERNAL MEDICINE

## 2022-06-16 RX ORDER — LACTOBACILLUS COMBINATION NO.4 3B CELL
1 CAPSULE ORAL DAILY
COMMUNITY
Start: 2022-05-02 | End: 2022-06-23

## 2022-06-16 RX ORDER — ACETAMINOPHEN 325 MG/1
650 TABLET ORAL EVERY 6 HOURS PRN
COMMUNITY

## 2022-06-16 NOTE — PROGRESS NOTES
The ABCs of the Annual Wellness Visit  Subsequent Medicare Wellness Visit    Chief Complaint   Patient presents with   • Medicare Wellness-subsequent   • Annual Exam   • Hyperlipidemia      Subjective    History of Present Illness:  Odalys Miles is a 76 y.o. female who presents for a Subsequent Medicare Wellness Visit.    The following portions of the patient's history were reviewed and   updated as appropriate: allergies, current medications, past family history, past medical history, past social history, past surgical history and problem list.    Compared to one year ago, the patient feels her physical   health is the same.    Compared to one year ago, the patient feels her mental   health is the same.    Recent Hospitalizations:  She was not admitted to the hospital during the last year.       Current Medical Providers:  Patient Care Team:  Marty Cheung MD as PCP - General (Internal Medicine)    Outpatient Medications Prior to Visit   Medication Sig Dispense Refill   • acetaminophen (TYLENOL) 325 MG tablet Take 650 mg by mouth Every 6 (Six) Hours As Needed for Mild Pain .     • Budesonide (ENTOCORT EC) 3 MG 24 hr capsule Take 2 capsules by mouth Every Morning. Needs appointment for further refills. 60 capsule 0   • calcium (OS-KAYLYN) 600 MG tablet Take 600 mg by mouth Daily.     • Diclofenac Sodium (VOLTAREN) 1 % gel gel Apply 4 g topically to the appropriate area as directed.     • famotidine (PEPCID) 20 MG tablet Take 1 tablet by mouth 2 (Two) Times a Day.     • folic acid (FOLVITE) 800 MCG tablet Take 800 mcg by mouth Daily.     • LUMIGAN 0.01 % ophthalmic drops Administer  to both eyes Every Night.     • MAGNESIUM OXIDE 400 PO Take 400 mg by mouth Daily.     • Melatonin 10-10 MG tablet controlled-release Take  by mouth Every Night.     • Multiple Vitamin (MULTI-VITAMIN DAILY PO) Take 1 tablet by mouth Daily.     • NON FORMULARY Ez tears     • Probiotic Product (PROBIOTIC ADVANCED PO) Take  by mouth.    "  • SIMBRINZA 1-0.2 % suspension INSTILL 1 DROP INTO BOTH EYES TWICE A DAY  3   • vitamin C (ASCORBIC ACID) 500 MG tablet Take 1,000 mg by mouth Daily.     • vitamin D3 125 MCG (5000 UT) capsule capsule Take 5,000 Units by mouth Daily.     • ferrous sulfate 324 MG tablet delayed-release Take 1 tablet by mouth Every Other Day.     • gabapentin (NEURONTIN) 100 MG capsule Take 100 mg by mouth.     • loperamide (IMODIUM) 2 MG capsule Take 2 mg by mouth 4 (Four) Times a Day As Needed for Diarrhea.     • Probiotic Product ([employer] Probiotic) capsule Take 1 capsule by mouth Daily.     • Teriparatide, Recombinant, (Forteo) 600 MCG/2.4ML injection Inject 20 mcg under the skin into the appropriate area as directed Daily.       No facility-administered medications prior to visit.       No opioid medication identified on active medication list. I have reviewed chart for other potential  high risk medication/s and harmful drug interactions in the elderly.          Aspirin is not on active medication list.  Aspirin use is not indicated based on review of current medical condition/s. Risk of harm outweighs potential benefits.  .    Patient Active Problem List   Diagnosis   • Glaucoma   • Hyperlipidemia   • Insomnia   • Lymphocytic colitis   • Tinnitus   • Asymptomatic varicose veins   • Vitamin D deficiency   • Other osteoporosis without current pathological fracture   • History of left hip replacement   • Personal history of colonic polyps   • Closed fracture of proximal end of left tibia   • Abnormal liver enzymes     Advance Care Planning  Advance Directive is not on file.  ACP discussion was held with the patient during this visit. Patient does not have an advance directive, declines further assistance.          Objective    Vitals:    06/16/22 1059   BP: 124/66   Pulse: 74   Temp: 97.3 °F (36.3 °C)   SpO2: 98%   Weight: 51.3 kg (113 lb)   Height: 170.2 cm (67.01\")     Estimated body mass index is 17.69 kg/m² as calculated from " "the following:    Height as of this encounter: 170.2 cm (67.01\").    Weight as of this encounter: 51.3 kg (113 lb).    BMI is below normal parameters (malnutrition). Recommendations: baseline      Does the patient have evidence of cognitive impairment? No    Physical Exam  Lab Results   Component Value Date     (H) 04/01/2022            HEALTH RISK ASSESSMENT    Smoking Status:  Social History     Tobacco Use   Smoking Status Former Smoker   • Packs/day: 0.25   • Years: 45.00   • Pack years: 11.25   • Types: Cigarettes   • Start date: 1960   • Quit date: 2007   • Years since quitting: 15.4   Smokeless Tobacco Never Used   Tobacco Comment    was a \"closet\" smoker     Alcohol Consumption:  Social History     Substance and Sexual Activity   Alcohol Use Yes    Comment: rare     Fall Risk Screen:    STEADI Fall Risk Assessment was completed, and patient is at HIGH risk for falls. Assessment completed on:6/16/2022    Depression Screening:  PHQ-2/PHQ-9 Depression Screening 6/16/2022   Retired PHQ-9 Total Score -   Retired Total Score -   Little Interest or Pleasure in Doing Things 0-->not at all   Feeling Down, Depressed or Hopeless 1-->several days   PHQ-9: Brief Depression Severity Measure Score 1       Health Habits and Functional and Cognitive Screening:  Functional & Cognitive Status 6/16/2022   Do you have difficulty preparing food and eating? No   Do you have difficulty bathing yourself, getting dressed or grooming yourself? No   Do you have difficulty using the toilet? No   Do you have difficulty moving around from place to place? No   Do you have trouble with steps or getting out of a bed or a chair? No   Current Diet Well Balanced Diet   Dental Exam Not up to date        Dental Exam Comment patient has dentures   Eye Exam Up to date        Eye Exam Comment -   Exercise (times per week) 0 times per week   Current Exercises Include No Regular Exercise   Current Exercise Activities Include -   Do you need " help using the phone?  No   Are you deaf or do you have serious difficulty hearing?  No   Do you need help with transportation? No   Do you need help shopping? No   Do you need help preparing meals?  No   Do you need help with housework?  No   Do you need help with laundry? No   Do you need help taking your medications? No   Do you need help managing money? No   Do you ever drive or ride in a car without wearing a seat belt? No   Have you felt unusual stress, anger or loneliness in the last month? Yes   Who do you live with? Alone   If you need help, do you have trouble finding someone available to you? No   Have you been bothered in the last four weeks by sexual problems? No   Do you have difficulty concentrating, remembering or making decisions? No       Age-appropriate Screening Schedule:  Refer to the list below for future screening recommendations based on patient's age, sex and/or medical conditions. Orders for these recommended tests are listed in the plan section. The patient has been provided with a written plan.    Health Maintenance   Topic Date Due   • LIPID PANEL  06/11/2022   • TDAP/TD VACCINES (2 - Tdap) 05/05/2026 (Originally 4/12/2022)   • MAMMOGRAM  09/28/2022   • DXA SCAN  04/05/2024   • ZOSTER VACCINE  Completed              Assessment & Plan   CMS Preventative Services Quick Reference  Risk Factors Identified During Encounter  Inactivity/Sedentary  The above risks/problems have been discussed with the patient.  Follow up actions/plans if indicated are seen below in the Assessment/Plan Section.  Pertinent information has been shared with the patient in the After Visit Summary.    Diagnoses and all orders for this visit:    1. Hyperlipidemia, unspecified hyperlipidemia type (Primary)    2. Vitamin D deficiency    3. Lymphocytic colitis    4. Abnormal liver enzymes    5. Other osteoporosis without current pathological fracture    6. Insomnia, unspecified type    7. Asymptomatic varicose veins    8.  Other closed fracture of proximal end of left tibia with routine healing, subsequent encounter        Follow Up:   No follow-ups on file.     An After Visit Summary and PPPS were made available to the patient.

## 2022-06-20 LAB
BASOPHILS # BLD AUTO: 0.07 10*3/MM3 (ref 0–0.2)
BASOPHILS NFR BLD AUTO: 1 % (ref 0–1.5)
CHOLEST SERPL-MCNC: 210 MG/DL (ref 0–200)
EOSINOPHIL # BLD AUTO: 0.18 10*3/MM3 (ref 0–0.4)
EOSINOPHIL NFR BLD AUTO: 2.7 % (ref 0.3–6.2)
ERYTHROCYTE [DISTWIDTH] IN BLOOD BY AUTOMATED COUNT: 11.9 % (ref 12.3–15.4)
HCT VFR BLD AUTO: 42.4 % (ref 34–46.6)
HDLC SERPL-MCNC: 92 MG/DL (ref 40–60)
HGB BLD-MCNC: 14.4 G/DL (ref 12–15.9)
IMM GRANULOCYTES # BLD AUTO: 0.02 10*3/MM3 (ref 0–0.05)
IMM GRANULOCYTES NFR BLD AUTO: 0.3 % (ref 0–0.5)
IRON SATN MFR SERPL: 17 % (ref 20–50)
IRON SERPL-MCNC: 67 MCG/DL (ref 37–145)
LDLC SERPL CALC-MCNC: 98 MG/DL (ref 0–100)
LYMPHOCYTES # BLD AUTO: 1.77 10*3/MM3 (ref 0.7–3.1)
LYMPHOCYTES NFR BLD AUTO: 26.5 % (ref 19.6–45.3)
MCH RBC QN AUTO: 29.9 PG (ref 26.6–33)
MCHC RBC AUTO-ENTMCNC: 34 G/DL (ref 31.5–35.7)
MCV RBC AUTO: 88.1 FL (ref 79–97)
MONOCYTES # BLD AUTO: 0.55 10*3/MM3 (ref 0.1–0.9)
MONOCYTES NFR BLD AUTO: 8.2 % (ref 5–12)
NEUTROPHILS # BLD AUTO: 4.1 10*3/MM3 (ref 1.7–7)
NEUTROPHILS NFR BLD AUTO: 61.3 % (ref 42.7–76)
NRBC BLD AUTO-RTO: 0 /100 WBC (ref 0–0.2)
PLATELET # BLD AUTO: 280 10*3/MM3 (ref 140–450)
RBC # BLD AUTO: 4.81 10*6/MM3 (ref 3.77–5.28)
TIBC SERPL-MCNC: 392 MCG/DL
TRIGL SERPL-MCNC: 120 MG/DL (ref 0–150)
UIBC SERPL-MCNC: 325 MCG/DL (ref 112–346)
VLDLC SERPL CALC-MCNC: 20 MG/DL (ref 5–40)
WBC # BLD AUTO: 6.69 10*3/MM3 (ref 3.4–10.8)

## 2022-06-23 ENCOUNTER — OFFICE VISIT (OUTPATIENT)
Dept: GASTROENTEROLOGY | Facility: CLINIC | Age: 77
End: 2022-06-23

## 2022-06-23 VITALS
BODY MASS INDEX: 17.74 KG/M2 | HEIGHT: 67 IN | WEIGHT: 113 LBS | DIASTOLIC BLOOD PRESSURE: 64 MMHG | SYSTOLIC BLOOD PRESSURE: 116 MMHG | TEMPERATURE: 97.4 F

## 2022-06-23 DIAGNOSIS — Z86.19 HISTORY OF CLOSTRIDIUM DIFFICILE INFECTION: ICD-10-CM

## 2022-06-23 DIAGNOSIS — R19.7 DIARRHEA, UNSPECIFIED TYPE: ICD-10-CM

## 2022-06-23 DIAGNOSIS — K52.832 LYMPHOCYTIC COLITIS: Primary | ICD-10-CM

## 2022-06-23 PROCEDURE — 99213 OFFICE O/P EST LOW 20 MIN: CPT | Performed by: NURSE PRACTITIONER

## 2022-06-23 RX ORDER — BUDESONIDE 3 MG/1
6 CAPSULE, COATED PELLETS ORAL EVERY MORNING
Qty: 60 CAPSULE | Refills: 3 | Status: SHIPPED | OUTPATIENT
Start: 2022-06-23 | End: 2022-09-21

## 2022-06-23 NOTE — PROGRESS NOTES
Follow Up Note     Date: 2022   Patient Name: Odalys Miles  MRN: [id number]  : 1945     Primary Care Provider: Marty Cheung MD     Chief Complaint   Patient presents with   • Follow-up   • Diarrhea     History of present illness:   2022  Odalys Miles is a 76 y.o. female who is here today for follow up for diarrhea.     Diarrhea is unchanged. She needs refills of her Budesonide. She is taking 2 capsules a day right now. She is having 1-2 semi-formed bowel movements per day, but she is having urgency associated with bowel movements and has difficulty making it to the bathroom at times. No history of rectal bleeding. No abdominal pain. Denies nausea or vomiting.    Interval History:   2019  Ms. Miles returns to the office.  She is doing better at this time after beginning the Entocort medication again.  She is currently taking 3 capsules daily.  Ms. Miles states that when she tried to decrease the dosage and stop the medication there was an increased frequency of bowel movements.  There is no history of blood in the stool.  She denies any current abdominal pain.  There is no history of nausea or vomiting.  She denies any night sweats, fever chills.    Subjective      Past Medical History:   Diagnosis Date   • Abnormal liver enzymes    • Basal cell carcinoma     under left eye   • Body mass index (BMI) 20.0-20.9, adult    • BPPV (benign paroxysmal positional vertigo)    • COVID-19 vaccine series completed     Moderna   • Elevated cholesterol    • Fracture     as a child   • GERD (gastroesophageal reflux disease)    • Glaucoma    • Glaucoma    • Hip fracture (HCC) 10/11/2019    Hip repalced in    • Ingrowing toenail    • Mammogram abnormal    • Microscopic colitis 2014   • Onychomycosis    • Osteoporosis    • Seasonal allergies    • Sebaceous cyst    • Syncope, near     pt doesn't recall   • TIA (transient ischemic attack) 2014   • Vascular abnormality      "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; chronic wound present    • Wears dentures     full upper plate, partial on the lower   • Wears glasses      Past Surgical History:   Procedure Laterality Date   • CATARACT EXTRACTION, BILATERAL     • COLONOSCOPY      DR JAIMES   • COLONOSCOPY N/A 10/12/2021    Procedure: COLONOSCOPY with biopsy and APC sigmoid AVM cauterization;  Surgeon: Jose Mayer MD;  Location: [facility] ENDOSCOPY;  Service: Gastroenterology;  Laterality: N/A;   • HIP SURGERY Left 12/27/2019    secondary to a fracture   • ORIF HUMERUS FRACTURE Left 11/3/2021    Procedure: HUMERUS PROXIMAL OPEN REDUCTION INTERNAL FIXATION WITH IMTRAMEDULLARY NAIL FIXATION LEFT;  Surgeon: Linden Domingo MD;  Location: [facility] OR;  Service: Orthopedics;  Laterality: Left;   • SKIN BIOPSY     • TUBAL ABDOMINAL LIGATION       Family History   Problem Relation Age of Onset   • Heart attack Mother    • Diabetes Mother    • Stroke Father    • Breast cancer Neg Hx    • Ovarian cancer Neg Hx      Social History     Socioeconomic History   • Marital status:    Tobacco Use   • Smoking status: Former Smoker     Packs/day: 0.25     Years: 45.00     Pack years: 11.25     Types: Cigarettes     Start date: 1960     Quit date: 2007     Years since quitting: 15.4   • Smokeless tobacco: Never Used   • Tobacco comment: was a \"closet\" smoker   Vaping Use   • Vaping Use: Never used   Substance and Sexual Activity   • Alcohol use: Yes     Comment: rare   • Drug use: No   • Sexual activity: Defer       Current Outpatient Medications:   •  acetaminophen (TYLENOL) 325 MG tablet, Take 650 mg by mouth Every 6 (Six) Hours As Needed for Mild Pain ., Disp: , Rfl:   •  Budesonide (ENTOCORT EC) 3 MG 24 hr capsule, Take 2 capsules by mouth Every Morning. Needs appointment for further refills., Disp: 60 capsule, Rfl: 3  •  calcium (OS-KAYLYN) 600 MG tablet, Take 600 mg by mouth Daily., Disp: , Rfl:   •  Diclofenac Sodium " (VOLTAREN) 1 % gel gel, Apply 4 g topically to the appropriate area as directed., Disp: , Rfl:   •  famotidine (PEPCID) 20 MG tablet, Take 1 tablet by mouth 2 (Two) Times a Day., Disp: , Rfl:   •  ferrous sulfate 324 MG tablet delayed-release, Take 1 tablet by mouth Every Other Day., Disp: , Rfl:   •  folic acid (FOLVITE) 800 MCG tablet, Take 800 mcg by mouth Daily., Disp: , Rfl:   •  gabapentin (NEURONTIN) 100 MG capsule, Take 100 mg by mouth As Needed., Disp: , Rfl:   •  LUMIGAN 0.01 % ophthalmic drops, Administer  to both eyes Every Night., Disp: , Rfl:   •  MAGNESIUM OXIDE 400 PO, Take 400 mg by mouth Daily., Disp: , Rfl:   •  Melatonin 10-10 MG tablet controlled-release, Take  by mouth Every Night., Disp: , Rfl:   •  Multiple Vitamin (MULTI-VITAMIN DAILY PO), Take 1 tablet by mouth Daily., Disp: , Rfl:   •  NON FORMULARY, Ez tears, Disp: , Rfl:   •  Probiotic Product (PROBIOTIC ADVANCED PO), Take  by mouth., Disp: , Rfl:   •  SIMBRINZA 1-0.2 % suspension, INSTILL 1 DROP INTO BOTH EYES TWICE A DAY, Disp: , Rfl: 3  •  Teriparatide, Recombinant, (Forteo) 600 MCG/2.4ML injection, Inject 20 mcg under the skin into the appropriate area as directed Daily., Disp: , Rfl:   •  vitamin C (ASCORBIC ACID) 500 MG tablet, Take 1,000 mg by mouth Daily., Disp: , Rfl:   •  vitamin D3 125 MCG (5000 UT) capsule capsule, Take 5,000 Units by mouth Daily., Disp: , Rfl:      No Known Allergies     The following portions of the patient's history were reviewed and updated as appropriate: allergies, current medications, past family history, past medical history, past social history, past surgical history and problem list.  Objective     Physical Exam  Vitals and nursing note reviewed.   Constitutional:       General: She is not in acute distress.     Appearance: Normal appearance. She is well-developed.   HENT:      Head: Normocephalic and atraumatic.      Mouth/Throat:      Mouth: Mucous membranes are not pale, not dry and not cyanotic.  "  Eyes:      General: Lids are normal.   Neck:      Trachea: Trachea normal.   Cardiovascular:      Rate and Rhythm: Normal rate.   Pulmonary:      Effort: Pulmonary effort is normal. No respiratory distress.      Breath sounds: Normal breath sounds.   Abdominal:      Tenderness: There is no abdominal tenderness.   Skin:     General: Skin is warm and dry.   Neurological:      Mental Status: She is alert and oriented to person, place, and time.      Gait: Gait abnormal (uses walker).   Psychiatric:         Mood and Affect: Mood normal.         Speech: Speech normal.         Behavior: Behavior normal. Behavior is cooperative.       Vitals:    06/23/22 1409   BP: 116/64   Temp: 97.4 °F (36.3 °C)   Weight: 51.3 kg (113 lb)   Height: 170.2 cm (67\")     Body mass index is 17.7 kg/m².     Results Review:   I reviewed the patient's new clinical results.    Office Visit on 06/16/2022   Component Date Value Ref Range Status   • Total Cholesterol 06/20/2022 210 (A) 0 - 200 mg/dL Final    Comment: Cholesterol Reference Ranges  (U.S. Department of Health and Human Services ATP III  Classifications)  Desirable          <200 mg/dL  Borderline High    200-239 mg/dL  High Risk          >240 mg/dL  Triglyceride Reference Ranges  (U.S. Department of Health and Human Services ATP III  Classifications)  Normal           <150 mg/dL  Borderline High  150-199 mg/dL  High             200-499 mg/dL  Very High        >500 mg/dL  HDL Reference Ranges  (U.S. Department of Health and Human Services ATP III  Classifications)  Low     <40 mg/dl (major risk factor for CHD)  High    >60 mg/dl ('negative' risk factor for CHD)  LDL Reference Ranges  (U.S. Department of Health and Human Services ATP III  Classifications)  Optimal          <100 mg/dL  Near Optimal     100-129 mg/dL  Borderline High  130-159 mg/dL  High             160-189 mg/dL  Very High        >189 mg/dL     • Triglycerides 06/20/2022 120  0 - 150 mg/dL Final   • HDL Cholesterol " 06/20/2022 92 (A) 40 - 60 mg/dL Final   • VLDL Cholesterol Behzad 06/20/2022 20  5 - 40 mg/dL Final   • LDL Chol Calc (NIH) 06/20/2022 98  0 - 100 mg/dL Final   • TIBC 06/20/2022 392  mcg/dL Final   • UIBC 06/20/2022 325  112 - 346 mcg/dL Final   • Iron 06/20/2022 67  37 - 145 mcg/dL Final   • Iron Saturation 06/20/2022 17 (A) 20 - 50 % Final   • WBC 06/20/2022 6.69  3.40 - 10.80 10*3/mm3 Final   • RBC 06/20/2022 4.81  3.77 - 5.28 10*6/mm3 Final   • Hemoglobin 06/20/2022 14.4  12.0 - 15.9 g/dL Final   • Hematocrit 06/20/2022 42.4  34.0 - 46.6 % Final   • MCV 06/20/2022 88.1  79.0 - 97.0 fL Final   • MCH 06/20/2022 29.9  26.6 - 33.0 pg Final   • MCHC 06/20/2022 34.0  31.5 - 35.7 g/dL Final   • RDW 06/20/2022 11.9 (A) 12.3 - 15.4 % Final   • Platelets 06/20/2022 280  140 - 450 10*3/mm3 Final   • Neutrophil Rel % 06/20/2022 61.3  42.7 - 76.0 % Final   • Lymphocyte Rel % 06/20/2022 26.5  19.6 - 45.3 % Final   • Monocyte Rel % 06/20/2022 8.2  5.0 - 12.0 % Final   • Eosinophil Rel % 06/20/2022 2.7  0.3 - 6.2 % Final   • Basophil Rel % 06/20/2022 1.0  0.0 - 1.5 % Final   • Neutrophils Absolute 06/20/2022 4.10  1.70 - 7.00 10*3/mm3 Final   • Lymphocytes Absolute 06/20/2022 1.77  0.70 - 3.10 10*3/mm3 Final   • Monocytes Absolute 06/20/2022 0.55  0.10 - 0.90 10*3/mm3 Final   • Eosinophils Absolute 06/20/2022 0.18  0.00 - 0.40 10*3/mm3 Final   • Basophils Absolute 06/20/2022 0.07  0.00 - 0.20 10*3/mm3 Final   • Immature Granulocyte Rel % 06/20/2022 0.3  0.0 - 0.5 % Final   • Immature Grans Absolute 06/20/2022 0.02  0.00 - 0.05 10*3/mm3 Final   • nRBC 06/20/2022 0.0  0.0 - 0.2 /100 WBC Final   Office Visit on 05/05/2022   Component Date Value Ref Range Status   • TIBC 05/09/2022 341  mcg/dL Final   • UIBC 05/09/2022 280  112 - 346 mcg/dL Final   • Iron 05/09/2022 61  37 - 145 mcg/dL Final   • Iron Saturation 05/09/2022 18 (A) 20 - 50 % Final   • Magnesium 05/09/2022 2.1  1.6 - 2.4 mg/dL Final   • Folate 05/09/2022 >20.00  4.78 -  24.20 ng/mL Final    Results may be falsely increased if patient taking Biotin.   • 25 Hydroxy, Vitamin D 05/09/2022 52.1  30.0 - 100.0 ng/ml Final    Comment: Results may be falsely increased if patient taking Biotin.  Reference Range for Total Vitamin D 25(OH)  Deficiency <20.0 ng/mL  Insufficiency 21-29 ng/mL  Sufficiency  ng/mL  Toxicity >100 ng/ml     • WBC 05/09/2022 6.88  3.40 - 10.80 10*3/mm3 Final   • RBC 05/09/2022 4.11  3.77 - 5.28 10*6/mm3 Final   • Hemoglobin 05/09/2022 12.4  12.0 - 15.9 g/dL Final   • Hematocrit 05/09/2022 37.8  34.0 - 46.6 % Final   • MCV 05/09/2022 92.0  79.0 - 97.0 fL Final   • MCH 05/09/2022 30.2  26.6 - 33.0 pg Final   • MCHC 05/09/2022 32.8  31.5 - 35.7 g/dL Final   • RDW 05/09/2022 12.1 (A) 12.3 - 15.4 % Final   • Platelets 05/09/2022 325  140 - 450 10*3/mm3 Final   • Neutrophil Rel % 05/09/2022 78.8 (A) 42.7 - 76.0 % Final   • Lymphocyte Rel % 05/09/2022 14.7 (A) 19.6 - 45.3 % Final   • Monocyte Rel % 05/09/2022 4.9 (A) 5.0 - 12.0 % Final   • Eosinophil Rel % 05/09/2022 0.6  0.3 - 6.2 % Final   • Basophil Rel % 05/09/2022 0.9  0.0 - 1.5 % Final   • Neutrophils Absolute 05/09/2022 5.42  1.70 - 7.00 10*3/mm3 Final   • Lymphocytes Absolute 05/09/2022 1.01  0.70 - 3.10 10*3/mm3 Final   • Monocytes Absolute 05/09/2022 0.34  0.10 - 0.90 10*3/mm3 Final   • Eosinophils Absolute 05/09/2022 0.04  0.00 - 0.40 10*3/mm3 Final   • Basophils Absolute 05/09/2022 0.06  0.00 - 0.20 10*3/mm3 Final   • Immature Granulocyte Rel % 05/09/2022 0.1  0.0 - 0.5 % Final   • Immature Grans Absolute 05/09/2022 0.01  0.00 - 0.05 10*3/mm3 Final   • nRBC 05/09/2022 0.0  0.0 - 0.2 /100 WBC Final   • Glucose 05/09/2022 77  65 - 99 mg/dL Final   • BUN 05/09/2022 15  8 - 23 mg/dL Final   • Creatinine 05/09/2022 0.56 (A) 0.57 - 1.00 mg/dL Final   • EGFR Result 05/09/2022 93.5  >60.0 mL/min/1.73 Final    Comment: National Kidney Foundation and American Society of  Nephrology (ASN) Task Force recommended  calculation based  on the Chronic Kidney Disease Epidemiology Collaboration  (CKD-EPI) equation refit without adjustment for race.  The GFR formula is only valid for adults with stable renal  function between ages 18 and 70.     • BUN/Creatinine Ratio 05/09/2022 26.8 (A) 7.0 - 25.0 Final   • Sodium 05/09/2022 141  136 - 145 mmol/L Final   • Potassium 05/09/2022 3.7  3.5 - 5.2 mmol/L Final    Comment: Slight hemolysis detected by analyzer. Results may be  affected.     • Chloride 05/09/2022 104  98 - 107 mmol/L Final   • Total CO2 05/09/2022 25.7  22.0 - 29.0 mmol/L Final   • Calcium 05/09/2022 9.6  8.6 - 10.5 mg/dL Final   • Total Protein 05/09/2022 6.2  6.0 - 8.5 g/dL Final   • Albumin 05/09/2022 4.20  3.50 - 5.20 g/dL Final   • Globulin 05/09/2022 2.0  gm/dL Final   • A/G Ratio 05/09/2022 2.1  g/dL Final   • Total Bilirubin 05/09/2022 0.2  0.0 - 1.2 mg/dL Final   • Alkaline Phosphatase 05/09/2022 91  39 - 117 U/L Final   • AST (SGOT) 05/09/2022 22  1 - 32 U/L Final   • ALT (SGPT) 05/09/2022 38 (A) 1 - 33 U/L Final   • TSH 05/09/2022 0.740  0.270 - 4.200 uIU/mL Final      CT PELVIS WO CONTRAST   Result Date: 3/27/2022  Bilateral sacral alar fractures.    CT LUMBAR SPINE WO CONTRAST  Result Date: 3/27/2022  No fracture seen.     Colonoscopy 10/20/2021 per Dr. Mayer  - Tortuous colon.  - A single non-bleeding colonic angioectasia. Treated with argon plasma coagulation (APC).  - Non-bleeding internal hemorrhoids.  - The examined portion of the ileum was normal. Biopsied.  - Biopsies performed in the rectum, in the sigmoid colon, in the descending colon and in the transverse colon and  AC, Caecum.  - No endoscopic signs of colitis  - Pathology:   Lymphocytic colitis all biopsies on the colon  Terminal ileum biopsies normal     Assessment / Plan      1. Lymphocytic colitis  2. Diarrhea, unspecified type  3. History of Clostridium difficile infection  She has a history of lymphocytic colitis diagnosed by biopsy and  2014.  She was taking budesonide 9 mg daily, about 2 weeks ago, she decreased to budesonide 6 mg daily.  She is having 1-2 semiformed stools per day.  She has been having some urgency associated with bowel movements, but denies watery stools.  She was diagnosed with C. difficile in April 2022 at [facility] while being hospitalized for an injury requiring lengthy stay, and was treated with vancomycin. She is not symptomatic at this time.  Denies rectal bleeding. Colonoscopy 10/20/2021 with no endoscopic signs of ileitis or colitis, biopsies revealed lymphocytic colitis.  Terminal ileum biopsy normal.  TSH normal.  Avoid NSAIDs.  Budesonide 6 mg daily x3 months.  We will try to decrease to 3 mg daily thereafter.  We will adjust the dose depending on her response, may need anywhere from 3 to 6 mg of budesonide daily for maintenance.  May take Imodium 2 mg 1/2-1 caplet as needed for breakthrough diarrhea.    - Budesonide (ENTOCORT EC) 3 MG 24 hr capsule; Take 2 capsules by mouth Every Morning. Needs appointment for further refills.  Dispense: 60 capsule; Refill: 3    Previous History:  3. Personal history of colon polyps  4. Family history colon CA  5. Angiodysplasia of the colon.  12/16/2021  Colonoscopy done on 10/20/2021 did not reveal any colon polyps. She had a colonic angiectasia which was ablated with APC. Her recent lab work does not reveal any anemia. Will consider high risk screening colonoscopy in 5 years time in 2026 depending on her medical and general condition.     7/26/2021  Her son had a colon cancer at age of 49.  She had a prior history of colon polyps however last colonoscopy in 2014 no polyps removed.  She is due for high risk screening colonoscopy,  will schedule the same.    Patient Instructions   1. Avoid all NSAIDs, including Ibuprofen, Motrin, Advil, Aleve, Naprosyn, etc.   2. Continue Budesonide 3 mg 2 capsules by mouth once per day x 3 months.   3. May take Imodium 2 mg 1/2-1 caplet as  needed for breakthrough diarrhea.   4. Patient is to contact our office if having watery stools.   5. Follow up: 3 months or sooner if needed    Reinaldo Pang, APRN  6/23/2022    Please note that portions of this note may have been completed with a voice recognition program. Efforts were made to edit the dictations, but occasionally words are mistranscribed.

## 2022-06-23 NOTE — PATIENT INSTRUCTIONS
Avoid all NSAIDs, including Ibuprofen, Motrin, Advil, Aleve, Naprosyn, etc.   Continue Budesonide 3 mg 2 capsules by mouth once per day x 3 months.   May take Imodium 2 mg 1/2-1 caplet as needed for breakthrough diarrhea.   Patient is to contact our office if having watery stools.   Follow up: 3 months or sooner if needed

## 2022-06-24 ENCOUNTER — TELEPHONE (OUTPATIENT)
Dept: INTERNAL MEDICINE | Facility: CLINIC | Age: 77
End: 2022-06-24

## 2022-06-24 NOTE — TELEPHONE ENCOUNTER
Caller: Odalys Miles    Relationship: Self    Best call back number: [phone]    What is the best time to reach you: anytime    Who are you requesting to speak with (clinical staff, provider,  specific staff member): Dr. Cheung    Do you know the name of the person who called:     What was the call regarding: patient saw a Nurse Practioner at the Gastro department and they wanted to know if it is ok for the patient to stop taking Magnesium Oxide.    Do you require a callback: YES

## 2022-07-22 ENCOUNTER — TELEPHONE (OUTPATIENT)
Dept: INTERNAL MEDICINE | Facility: CLINIC | Age: 77
End: 2022-07-22

## 2022-07-22 NOTE — TELEPHONE ENCOUNTER
Caller: HOOD    Relationship: HOME HEALTH    Best call back number: [phone]    What orders are you requesting (i.e. lab or imaging): VERBAL ORDERS FOR PLAN OF CARE FOR PATIENT. PT HAS BEEN GOING OUT WEEKLY AND INTRODUCING A NEW THERAPEUTIC FOCUS WITH I.T. BANDS FROM [facility]ED[facility]

## 2022-07-28 ENCOUNTER — OUTSIDE FACILITY SERVICE (OUTPATIENT)
Dept: INTERNAL MEDICINE | Facility: CLINIC | Age: 77
End: 2022-07-28

## 2022-07-28 PROCEDURE — G0179 MD RECERTIFICATION HHA PT: HCPCS | Performed by: INTERNAL MEDICINE

## 2022-09-19 ENCOUNTER — OFFICE VISIT (OUTPATIENT)
Dept: INTERNAL MEDICINE | Facility: CLINIC | Age: 77
End: 2022-09-19

## 2022-09-19 VITALS
WEIGHT: 112 LBS | TEMPERATURE: 96.9 F | HEART RATE: 66 BPM | DIASTOLIC BLOOD PRESSURE: 80 MMHG | HEIGHT: 67 IN | BODY MASS INDEX: 17.58 KG/M2 | SYSTOLIC BLOOD PRESSURE: 124 MMHG | OXYGEN SATURATION: 97 %

## 2022-09-19 DIAGNOSIS — R74.8 ABNORMAL LIVER ENZYMES: ICD-10-CM

## 2022-09-19 DIAGNOSIS — M81.8 OTHER OSTEOPOROSIS WITHOUT CURRENT PATHOLOGICAL FRACTURE: ICD-10-CM

## 2022-09-19 DIAGNOSIS — K52.832 LYMPHOCYTIC COLITIS: ICD-10-CM

## 2022-09-19 DIAGNOSIS — E78.5 HYPERLIPIDEMIA, UNSPECIFIED HYPERLIPIDEMIA TYPE: Primary | ICD-10-CM

## 2022-09-19 PROCEDURE — 99213 OFFICE O/P EST LOW 20 MIN: CPT | Performed by: INTERNAL MEDICINE

## 2022-09-19 NOTE — PROGRESS NOTES
Subjective   Odalys Miles is a 77 y.o. female.     Chief Complaint   Patient presents with   • Follow-up     Recent lab results   • Hyperlipidemia       History of Present Illness   Patient here for follow-up. The hyperlipidemia stable on diet. Lymphocytic colitis improved the after budesonide. Liver enzymes slightly elevated. Osteoporosis needs to be followed up.    Current Outpatient Medications:   •  acetaminophen (TYLENOL) 325 MG tablet, Take 650 mg by mouth Every 6 (Six) Hours As Needed for Mild Pain ., Disp: , Rfl:   •  Budesonide (ENTOCORT EC) 3 MG 24 hr capsule, Take 2 capsules by mouth Every Morning. Needs appointment for further refills., Disp: 60 capsule, Rfl: 3  •  calcium (OS-KAYLYN) 600 MG tablet, Take 600 mg by mouth Daily., Disp: , Rfl:   •  Diclofenac Sodium (VOLTAREN) 1 % gel gel, Apply 4 g topically to the appropriate area as directed., Disp: , Rfl:   •  ferrous sulfate 324 MG tablet delayed-release, Take 1 tablet by mouth Every Other Day., Disp: , Rfl:   •  folic acid (FOLVITE) 800 MCG tablet, Take 800 mcg by mouth Daily., Disp: , Rfl:   •  LUMIGAN 0.01 % ophthalmic drops, Administer  to both eyes Every Night., Disp: , Rfl:   •  Melatonin 10-10 MG tablet controlled-release, Take  by mouth Every Night., Disp: , Rfl:   •  Multiple Vitamin (MULTI-VITAMIN DAILY PO), Take 1 tablet by mouth Daily., Disp: , Rfl:   •  NON FORMULARY, Ez tears, Disp: , Rfl:   •  SIMBRINZA 1-0.2 % suspension, INSTILL 1 DROP INTO BOTH EYES TWICE A DAY, Disp: , Rfl: 3  •  vitamin C (ASCORBIC ACID) 500 MG tablet, Take 1,000 mg by mouth Daily., Disp: , Rfl:   •  vitamin D3 125 MCG (5000 UT) capsule capsule, Take 5,000 Units by mouth Daily., Disp: , Rfl:   •  Teriparatide, Recombinant, (Forteo) 600 MCG/2.4ML injection, Inject 20 mcg under the skin into the appropriate area as directed Daily., Disp: , Rfl:     The following portions of the patient's history were reviewed and updated as appropriate: allergies, current  medications, past family history, past medical history, past social history, past surgical history and problem list.    Review of Systems   Constitutional: Negative.    Respiratory: Negative.    Cardiovascular: Negative.    Gastrointestinal: Negative.    Musculoskeletal: Negative.    Skin: Negative.    Neurological: Negative.    Psychiatric/Behavioral: Negative.        Objective   Physical Exam  Cardiovascular:      Rate and Rhythm: Normal rate and regular rhythm.      Heart sounds: Normal heart sounds.   Pulmonary:      Effort: Pulmonary effort is normal.      Breath sounds: Normal breath sounds.   Abdominal:      General: Bowel sounds are normal.   Musculoskeletal:      Cervical back: Neck supple.   Skin:     General: Skin is warm.   Neurological:      Mental Status: She is alert and oriented to person, place, and time.         All tests have been reviewed.    Assessment & Plan   Diagnoses and all orders for this visit:    Hyperlipidemia, continue good diet    Lymphocytic colitis stable budesonide continue medication    Abnormal liver enzymes slightly elevated continue to watch    Other osteoporosis without current pathological fracture schedule bone density scan  -     DEXA Bone Density Axial; Future    Blood tests reviewed.    Six months follow-up          historical record below  Diarrhea, Microscopic colitis, unspecified microscopic colitis type with sx, off budesinide, seen by GI , cont probiotics and imodium, avoid dairy product. Follow up with GI. 8/2020, cholestyramine causing vit D malabsorption  Sciatica of left side refer to Physical Therapy and aleve improved  Tinnitus seen by ENT  insomnia melatonin continue, decline medicine  tdap  Pneumovax zostavax done. prevnar done  Right shoulder pain rotator cuff tendonitis, cortisone shot improved  colonoscopy showed lymphocytis colitis   Td 3/1/2013

## 2022-09-21 DIAGNOSIS — K52.832 LYMPHOCYTIC COLITIS: ICD-10-CM

## 2022-09-21 RX ORDER — BUDESONIDE 3 MG/1
6 CAPSULE, COATED PELLETS ORAL EVERY MORNING
Qty: 180 CAPSULE | Refills: 0 | Status: SHIPPED | OUTPATIENT
Start: 2022-09-21 | End: 2022-11-30

## 2022-09-27 ENCOUNTER — APPOINTMENT (OUTPATIENT)
Dept: BONE DENSITY | Facility: HOSPITAL | Age: 77
End: 2022-09-27

## 2022-09-27 DIAGNOSIS — M81.8 OTHER OSTEOPOROSIS WITHOUT CURRENT PATHOLOGICAL FRACTURE: ICD-10-CM

## 2022-09-27 PROCEDURE — 77080 DXA BONE DENSITY AXIAL: CPT

## 2022-09-29 ENCOUNTER — OFFICE VISIT (OUTPATIENT)
Dept: GASTROENTEROLOGY | Facility: CLINIC | Age: 77
End: 2022-09-29

## 2022-09-29 VITALS
HEIGHT: 67 IN | HEART RATE: 72 BPM | BODY MASS INDEX: 17.58 KG/M2 | RESPIRATION RATE: 12 BRPM | TEMPERATURE: 97.3 F | DIASTOLIC BLOOD PRESSURE: 65 MMHG | WEIGHT: 112 LBS | SYSTOLIC BLOOD PRESSURE: 144 MMHG

## 2022-09-29 DIAGNOSIS — Z86.19 HISTORY OF CLOSTRIDIOIDES DIFFICILE COLITIS: ICD-10-CM

## 2022-09-29 DIAGNOSIS — K52.832 LYMPHOCYTIC COLITIS: Primary | ICD-10-CM

## 2022-09-29 PROCEDURE — 99213 OFFICE O/P EST LOW 20 MIN: CPT | Performed by: INTERNAL MEDICINE

## 2022-09-29 RX ORDER — MULTIPLE VITAMINS W/ MINERALS TAB 9MG-400MCG
1 TAB ORAL DAILY
COMMUNITY

## 2022-09-29 NOTE — PROGRESS NOTES
Follow Up Note     Date: 2022   Patient Name: Odalys Miles  MRN: [id number]  : 1945     Referring Physician: Marty Cheung MD    Chief Complaint:    Chief Complaint   Patient presents with   • Follow-up   • lymphocytic colitis   • Diarrhea   • HX C diff       Interval History:   2022  Odalys Miles is a 77 y.o. female who is here today for follow up for follow-up on her lymphocytic colitis.  States that she has been having regular bowel movement with the current 6 mg of budesonide daily dose.  She also states that she recently fell and fractured her leg.  Her evaluation done showed severe osteoporosis possibly secondary to steroid use long-term.    2022  Odalys Miles is a 76 y.o. female who is here today for follow up for diarrhea.   Diarrhea is unchanged. She needs refills of her Budesonide. She is taking 2 capsules a day right now. She is having 1-2 semi-formed bowel movements per day, but she is having urgency associated with bowel movements and has difficulty making it to the bathroom at times. No history of rectal bleeding. No abdominal pain. Denies nausea or vomiting.     2019  Ms. Miles returns to the office.  She is doing better at this time after beginning the Entocort medication again.  She is currently taking 3 capsules daily.  Ms. Miles states that when she tried to decrease the dosage and stop the medication there was an increased frequency of bowel movements.  There is no history of blood in the stool.  She denies any current abdominal pain.  There is no history of nausea or vomiting.  She denies any night sweats, fever chills.  Subjective      Past Medical History:   Past Medical History:   Diagnosis Date   • Abnormal liver enzymes    • Basal cell carcinoma     under left eye   • Body mass index (BMI) 20.0-20.9, adult    • BPPV (benign paroxysmal positional vertigo)    • COVID-19 vaccine series completed     Moderna   • Elevated cholesterol    •  Fracture     as a child   • Fracture of leg 02/13/2022   • Fracture of sacrum (HCC)    • GERD (gastroesophageal reflux disease)    • Glaucoma    • Glaucoma    • Hip fracture (HCC) 10/11/2019    Hip repalced in 2020   • Ingrowing toenail    • Mammogram abnormal    • Microscopic colitis 07/23/2014   • Onychomycosis    • Osteoporosis    • Seasonal allergies    • Sebaceous cyst    • Syncope, near     pt doesn't recall   • TIA (transient ischemic attack) 11/07/2014   • Vascular abnormality     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; chronic wound present    • Wears dentures     full upper plate, partial on the lower   • Wears glasses      Past Surgical History:   Past Surgical History:   Procedure Laterality Date   • BACK SURGERY      hardware placed, sacral fracture   • CATARACT EXTRACTION, BILATERAL     • COLONOSCOPY      DR JAIMES   • COLONOSCOPY N/A 10/12/2021    Procedure: COLONOSCOPY with biopsy and APC sigmoid AVM cauterization;  Surgeon: Jose Mayer MD;  Location: [facility] ENDOSCOPY;  Service: Gastroenterology;  Laterality: N/A;   • HIP SURGERY Left 12/27/2019    secondary to a fracture   • LEG SURGERY Left 2022    fracture, hardware placed   • ORIF HUMERUS FRACTURE Left 11/03/2021    Procedure: HUMERUS PROXIMAL OPEN REDUCTION INTERNAL FIXATION WITH IMTRAMEDULLARY NAIL FIXATION LEFT;  Surgeon: Linden Domingo MD;  Location: [facility] OR;  Service: Orthopedics;  Laterality: Left;   • SKIN BIOPSY     • TUBAL ABDOMINAL LIGATION         Family History:   Family History   Problem Relation Age of Onset   • Heart attack Mother    • Diabetes Mother    • Stroke Father    • Breast cancer Neg Hx    • Ovarian cancer Neg Hx    • Colon cancer Neg Hx        Social History:   Social History     Socioeconomic History   • Marital status:    Tobacco Use   • Smoking status: Former Smoker     Packs/day: 0.25     Years: 45.00     Pack years: 11.25     Types: Cigarettes     Start date: 1960     Quit  "date: 2007     Years since quitting: 15.7   • Smokeless tobacco: Never Used   • Tobacco comment: was a \"closet\" smoker   Vaping Use   • Vaping Use: Never used   Substance and Sexual Activity   • Alcohol use: Not Currently   • Drug use: No   • Sexual activity: Defer       Medications:     Current Outpatient Medications:   •  acetaminophen (TYLENOL) 325 MG tablet, Take 650 mg by mouth Every 6 (Six) Hours As Needed for Mild Pain ., Disp: , Rfl:   •  Budesonide (ENTOCORT EC) 3 MG 24 hr capsule, Take 2 capsules by mouth Every Morning., Disp: 180 capsule, Rfl: 0  •  calcium (OS-KAYLYN) 600 MG tablet, Take 600 mg by mouth Daily., Disp: , Rfl:   •  Diclofenac Sodium (VOLTAREN) 1 % gel gel, Apply 4 g topically to the appropriate area as directed., Disp: , Rfl:   •  ferrous sulfate 324 MG tablet delayed-release, Take 1 tablet by mouth Every Other Day., Disp: , Rfl:   •  folic acid (FOLVITE) 800 MCG tablet, Take 800 mcg by mouth Daily., Disp: , Rfl:   •  LUMIGAN 0.01 % ophthalmic drops, Administer  to both eyes Every Night., Disp: , Rfl:   •  Melatonin 10-10 MG tablet controlled-release, Take  by mouth Every Night., Disp: , Rfl:   •  Multiple Vitamin (MULTI-VITAMIN DAILY PO), Take 1 tablet by mouth Daily., Disp: , Rfl:   •  NON FORMULARY, Ez tears, Disp: , Rfl:   •  SIMBRINZA 1-0.2 % suspension, INSTILL 1 DROP INTO BOTH EYES TWICE A DAY, Disp: , Rfl: 3  •  Teriparatide, Recombinant, (Forteo) 600 MCG/2.4ML injection, Inject 20 mcg under the skin into the appropriate area as directed Daily., Disp: , Rfl:   •  vitamin C (ASCORBIC ACID) 500 MG tablet, Take 1,000 mg by mouth Daily., Disp: , Rfl:   •  vitamin D3 125 MCG (5000 UT) capsule capsule, Take 5,000 Units by mouth Daily., Disp: , Rfl:   •  multivitamin with minerals (HAIR/SKIN/NAILS PO), Take 1 tablet by mouth Daily., Disp: , Rfl:     Allergies:   No Known Allergies    Review of Systems:   Review of Systems   Constitutional: Negative for appetite change, fatigue, fever and " "unexpected weight loss.   HENT: Negative for trouble swallowing.    Gastrointestinal: Positive for GERD. Negative for abdominal distention, abdominal pain, anal bleeding, blood in stool, constipation, diarrhea, nausea, rectal pain, vomiting and indigestion.       The following portions of the patient's history were reviewed and updated as appropriate: allergies, current medications, past family history, past medical history, past social history, past surgical history and problem list.    Objective     Physical Exam:  Vital Signs:   Vitals:    09/29/22 1454   BP: 144/65   Pulse: 72   Resp: 12  Comment: with talking   Temp: 97.3 °F (36.3 °C)   TempSrc: Infrared   Weight: 50.8 kg (112 lb)   Height: 170.2 cm (67\")       Physical Exam  Constitutional:       Comments: She is poorly built and nourished   HENT:      Head: Normocephalic and atraumatic.   Eyes:      Conjunctiva/sclera: Conjunctivae normal.   Abdominal:      General: Abdomen is flat. There is no distension.      Palpations: There is no mass.      Tenderness: There is no abdominal tenderness. There is no guarding or rebound.      Hernia: No hernia is present.   Musculoskeletal:      Cervical back: Normal range of motion and neck supple.   Neurological:      Mental Status: She is alert.         Results Review:   I reviewed the patient's new clinical results.    No visits with results within 90 Day(s) from this visit.   Latest known visit with results is:   Office Visit on 06/16/2022   Component Date Value Ref Range Status   • Total Cholesterol 06/20/2022 210 (A) 0 - 200 mg/dL Final    Comment: Cholesterol Reference Ranges  (U.S. Department of Health and Human Services ATP III  Classifications)  Desirable          <200 mg/dL  Borderline High    200-239 mg/dL  High Risk          >240 mg/dL  Triglyceride Reference Ranges  (U.S. Department of Health and Human Services ATP III  Classifications)  Normal           <150 mg/dL  Borderline High  150-199 mg/dL  High        "      200-499 mg/dL  Very High        >500 mg/dL  HDL Reference Ranges  (U.S. Department of Health and Human Services ATP III  Classifications)  Low     <40 mg/dl (major risk factor for CHD)  High    >60 mg/dl ('negative' risk factor for CHD)  LDL Reference Ranges  (U.S. Department of Health and Human Services ATP III  Classifications)  Optimal          <100 mg/dL  Near Optimal     100-129 mg/dL  Borderline High  130-159 mg/dL  High             160-189 mg/dL  Very High        >189 mg/dL     • Triglycerides 06/20/2022 120  0 - 150 mg/dL Final   • HDL Cholesterol 06/20/2022 92 (A) 40 - 60 mg/dL Final   • VLDL Cholesterol Behzad 06/20/2022 20  5 - 40 mg/dL Final   • LDL Chol Calc ([facility]) 06/20/2022 98  0 - 100 mg/dL Final   • TIBC 06/20/2022 392  mcg/dL Final   • UIBC 06/20/2022 325  112 - 346 mcg/dL Final   • Iron 06/20/2022 67  37 - 145 mcg/dL Final   • Iron Saturation 06/20/2022 17 (A) 20 - 50 % Final   • WBC 06/20/2022 6.69  3.40 - 10.80 10*3/mm3 Final   • RBC 06/20/2022 4.81  3.77 - 5.28 10*6/mm3 Final   • Hemoglobin 06/20/2022 14.4  12.0 - 15.9 g/dL Final   • Hematocrit 06/20/2022 42.4  34.0 - 46.6 % Final   • MCV 06/20/2022 88.1  79.0 - 97.0 fL Final   • MCH 06/20/2022 29.9  26.6 - 33.0 pg Final   • MCHC 06/20/2022 34.0  31.5 - 35.7 g/dL Final   • RDW 06/20/2022 11.9 (A) 12.3 - 15.4 % Final   • Platelets 06/20/2022 280  140 - 450 10*3/mm3 Final   • Neutrophil Rel % 06/20/2022 61.3  42.7 - 76.0 % Final   • Lymphocyte Rel % 06/20/2022 26.5  19.6 - 45.3 % Final   • Monocyte Rel % 06/20/2022 8.2  5.0 - 12.0 % Final   • Eosinophil Rel % 06/20/2022 2.7  0.3 - 6.2 % Final   • Basophil Rel % 06/20/2022 1.0  0.0 - 1.5 % Final   • Neutrophils Absolute 06/20/2022 4.10  1.70 - 7.00 10*3/mm3 Final   • Lymphocytes Absolute 06/20/2022 1.77  0.70 - 3.10 10*3/mm3 Final   • Monocytes Absolute 06/20/2022 0.55  0.10 - 0.90 10*3/mm3 Final   • Eosinophils Absolute 06/20/2022 0.18  0.00 - 0.40 10*3/mm3 Final   • Basophils Absolute  06/20/2022 0.07  0.00 - 0.20 10*3/mm3 Final   • Immature Granulocyte Rel % 06/20/2022 0.3  0.0 - 0.5 % Final   • Immature Grans Absolute 06/20/2022 0.02  0.00 - 0.05 10*3/mm3 Final   • nRBC 06/20/2022 0.0  0.0 - 0.2 /100 WBC Final      DEXA Bone Density Axial    Result Date: 9/28/2022   1. Bone mineral density lumbar spine is within the range of osteopenia. 2. Bone mineral density of the total right femur is within the range of osteoporosis.  FRAX score: The 10 year probability of major osteoporotic fracture is calculated at 18.5%. The 10 year probability of hip fracture is calculated at 5.8%.       Images were reviewed, interpreted, and dictated by Dr. Traci Parson MD Transcribed by Inocencia Taylor PA-C.  This report was signed and finalized on 9/28/2022 10:23 AM by Traci Parson MD.    Colonoscopy 10/20/2021 per Dr. Mayer  - Tortuous colon.  - A single non-bleeding colonic angioectasia. Treated with argon plasma coagulation (APC).  - Non-bleeding internal hemorrhoids.  - The examined portion of the ileum was normal. Biopsied.  - Biopsies performed in the rectum, in the sigmoid colon, in the descending colon and in the transverse colon and  AC, Caecum.  - No endoscopic signs of colitis  - Pathology:   Lymphocytic colitis all biopsies on the colon  Terminal ileum biopsies normal    Assessment / Plan        Labs; 6/2022. HGN 14, borderline AST 38  1. Lymphocytic colitis with diarrhea  2. History of Clostridium difficile infection  9/29/2022  Patient is doing very well with budesonide 6 mg p.o. daily with a daily regular firm bowel movement once daily.  She had a recent leg fracture and work-up revealed significant osteoporosis and currently on vitamin D and Forteo.     We will reduce the dose to 3 mg p.o. daily and see how whether we can maintain her with the lowest dose possible.  We also discussed on changing the medication to cholestyramine powder p.o. twice daily.   Now we will continue with the 10  mg budesonide and decide on further course after 6 months.  She currently has a refill for 6 mg budesonide and I have advised to take only 3 mg and if her diarrhea recurs back advised to increase that to 6 mg until next follow-up.  Advised to avoid NSAIDs.    6/23/2022  She has a history of lymphocytic colitis diagnosed by biopsy and 2014.  She was taking budesonide 9 mg daily, about 2 weeks ago, she decreased to budesonide 6 mg daily.  She is having 1-2 semiformed stools per day.  She has been having some urgency associated with bowel movements, but denies watery stools.  She was diagnosed with C. difficile in April 2022 at [facility] while being hospitalized for an injury requiring lengthy stay, and was treated with vancomycin. She is not symptomatic at this time.  Denies rectal bleeding. Colonoscopy 10/20/2021 with no endoscopic signs of ileitis or colitis, biopsies revealed lymphocytic colitis.  Terminal ileum biopsy normal.  TSH normal.       Previous History:  3. Personal history of colon polyps  4. Family history colon CA  5. Angiodysplasia of the colon.  12/16/2021  Colonoscopy done on 10/20/2021 did not reveal any colon polyps. She had a colonic angiectasia which was ablated with APC. Her recent lab work does not reveal any anemia. Will consider high risk screening colonoscopy in 5 years time in 2026 depending on her medical and general condition.  Her dad had colon cancer at the age of 49.       Follow Up:   No follow-ups on file.    Jose Mayer MD  [facility]  9/29/2022  14:56 EDT     Please note that portions of this note may have been completed with a voice recognition program.

## 2022-10-05 DIAGNOSIS — M81.0 OSTEOPOROSIS, UNSPECIFIED OSTEOPOROSIS TYPE, UNSPECIFIED PATHOLOGICAL FRACTURE PRESENCE: Primary | ICD-10-CM

## 2022-11-30 DIAGNOSIS — K52.832 LYMPHOCYTIC COLITIS: ICD-10-CM

## 2022-11-30 RX ORDER — BUDESONIDE 3 MG/1
3 CAPSULE, COATED PELLETS ORAL EVERY MORNING
Qty: 180 CAPSULE | Refills: 0 | Status: SHIPPED | OUTPATIENT
Start: 2022-11-30

## 2022-12-20 ENCOUNTER — HOSPITAL ENCOUNTER (OUTPATIENT)
Facility: HOSPITAL | Age: 77
Discharge: HOME | End: 2022-12-20
Payer: MEDICARE

## 2022-12-20 LAB
ALBUMIN SERPL-MCNC: 4.4 G/DL (ref 3.4–4.8)
ANION GAP SERPL CALCULATED.3IONS-SCNC: 8 MMOL/L (ref 3–16)
BUN BLDV-MCNC: 17 MG/DL (ref 6–20)
CALCIUM SERPL-MCNC: 10.1 MG/DL (ref 8.5–10.5)
CHLORIDE BLD-SCNC: 101 MMOL/L (ref 98–107)
CO2: 31 MMOL/L (ref 20–30)
CREAT SERPL-MCNC: 0.7 MG/DL (ref 0.4–1.2)
GFR SERPL CREATININE-BSD FRML MDRD: >60 ML/MIN/{1.73_M2}
GLUCOSE BLD-MCNC: 130 MG/DL (ref 74–106)
PHOSPHORUS: 3.1 MG/DL (ref 2.5–4.5)
POTASSIUM SERPL-SCNC: 3.2 MMOL/L (ref 3.4–5.1)
SODIUM BLD-SCNC: 140 MMOL/L (ref 136–145)
VITAMIN D 25-HYDROXY: 79.4 (ref 32–100)

## 2022-12-20 PROCEDURE — 80069 RENAL FUNCTION PANEL: CPT

## 2022-12-20 PROCEDURE — 82306 VITAMIN D 25 HYDROXY: CPT

## 2022-12-20 PROCEDURE — 36415 COLL VENOUS BLD VENIPUNCTURE: CPT

## 2022-12-20 PROCEDURE — 82523 COLLAGEN CROSSLINKS: CPT

## 2022-12-20 PROCEDURE — 84080 ASSAY ALKALINE PHOSPHATASES: CPT

## 2022-12-22 LAB — ALK PHOS BONE SPECIFIC: 19.2 UG/L

## 2022-12-23 LAB — C TELOPEPTIDE: 357 PG/ML

## 2023-02-14 ENCOUNTER — OFFICE VISIT (OUTPATIENT)
Dept: INTERNAL MEDICINE | Facility: CLINIC | Age: 78
End: 2023-02-14
Payer: MEDICARE

## 2023-02-14 VITALS
WEIGHT: 109 LBS | OXYGEN SATURATION: 97 % | BODY MASS INDEX: 16.52 KG/M2 | DIASTOLIC BLOOD PRESSURE: 80 MMHG | HEART RATE: 77 BPM | SYSTOLIC BLOOD PRESSURE: 138 MMHG | TEMPERATURE: 98.5 F | HEIGHT: 68 IN

## 2023-02-14 DIAGNOSIS — J30.1 SEASONAL ALLERGIC RHINITIS DUE TO POLLEN: ICD-10-CM

## 2023-02-14 DIAGNOSIS — R00.0 RACING HEART BEAT: ICD-10-CM

## 2023-02-14 DIAGNOSIS — R39.15 URINARY URGENCY: ICD-10-CM

## 2023-02-14 DIAGNOSIS — K52.832 LYMPHOCYTIC COLITIS: ICD-10-CM

## 2023-02-14 DIAGNOSIS — K92.1 BLACK STOOLS: Primary | ICD-10-CM

## 2023-02-14 LAB
BILIRUB BLD-MCNC: NEGATIVE MG/DL
CLARITY, POC: CLEAR
COLOR UR: YELLOW
EXPIRATION DATE: ABNORMAL
GLUCOSE UR STRIP-MCNC: NEGATIVE MG/DL
KETONES UR QL: NEGATIVE
LEUKOCYTE EST, POC: NEGATIVE
Lab: ABNORMAL
NITRITE UR-MCNC: NEGATIVE MG/ML
PH UR: 6 [PH] (ref 5–8)
PROT UR STRIP-MCNC: ABNORMAL MG/DL
RBC # UR STRIP: ABNORMAL /UL
SP GR UR: 1.01 (ref 1–1.03)
UROBILINOGEN UR QL: NORMAL

## 2023-02-14 PROCEDURE — 99214 OFFICE O/P EST MOD 30 MIN: CPT | Performed by: NURSE PRACTITIONER

## 2023-02-14 PROCEDURE — 81003 URINALYSIS AUTO W/O SCOPE: CPT | Performed by: NURSE PRACTITIONER

## 2023-02-14 RX ORDER — TERIPARATIDE 250 UG/ML
20 INJECTION, SOLUTION SUBCUTANEOUS DAILY
COMMUNITY
Start: 2022-12-30

## 2023-02-14 RX ORDER — FLUTICASONE PROPIONATE 50 MCG
2 SPRAY, SUSPENSION (ML) NASAL DAILY
Qty: 16 G | Refills: 11 | Status: SHIPPED | OUTPATIENT
Start: 2023-02-14

## 2023-02-14 RX ORDER — LORATADINE 10 MG/1
10 TABLET ORAL DAILY
Qty: 90 TABLET | Refills: 3 | Status: SHIPPED | OUTPATIENT
Start: 2023-02-14

## 2023-02-14 NOTE — PROGRESS NOTES
Chief Complaint   Patient presents with   • Nasal Congestion     Nasal drip, drainage X 2 weeks + ; requesting referral to cardiology, had rapid heart rate she could hear in her ear X 3 nights, approximately 1 week ago; black stool, diarrhea, different than colitis      Subjective   Odalys Miles is a 77 y.o. female.     History of Present Illness     Odalys Miles presents today for post nasal drainage, rhinorrhea, racing heart rate, black stools, diarrhea, and urinary urgency.    The patient reports increased thin nasal drainage and hoarseness. Worse after being outside with wind. She takes over-the-counter allergy and cold medication. The patient denies any fever, ear pain, chills or body aches. When her symptoms originally began she experienced thick nasal drainage, but, the over-the-counter medication resolved her symptoms.  She has a history of allergies, worse with season changes.     The patient reports a rapid heart rate for 3 nights approximately 2 weeks ago and she denies any ongoing issues since that time. She notes hearing her heartbeat in her bilateral ears when lying down. The patient notes she sleeps on her right side with 2 pillows due to an rotator cuff problems and she is unsure if her sleeping position contributed to her elevated heart rate. She adds she replaced on of her pillows with a thinner pillow and since doing this she has not experienced a rapid heart rate. The patient denies any chest pain, shortness of breath, or feeling like her heart skips a beat.  She denies previously consulting with cardiology.    The patient admits to black stools since 12/22 She has lymphocytic colitis and follows GI. Confirms taking budesonide for colitis. She denies taking aspirin or anti-coagulation therapy. The patient confirms taking an iron supplement every 2 days. .She denies taking Pepto-Bismol.  The patient reports her stools being hard and then she experiences diarrhea.The patient feels urgency  "when she needs to have a bowel movement The patient notes consuming more dark chocolate than normal on approximately 12/25/2022 and attributed her black stools to significant dark chocolate consumption; however, when she stopped eating chocolate her stools remained black in color. She has a history of clostridium difficile. The patient has an upcoming appointment with Dr. Mayer on 03/30/2023. She denies having black stools previously.  Denies any bright red blood in stools, nausea, vomiting. She does have GERD symptoms at times when lying flat.     The patient complaints of urinary frequency and decreased urine output. She confirms experiencing urinary urgency. She denies any burning with urination. The patient now consumes more water in comparison to previously and she notes increased urinary frequency since increasing her water intake.    The following portions of the patient's history were reviewed and updated as appropriate: allergies, current medications, past family history, past medical history, past social history, past surgical history and problem list.    Objective   /80 (BP Location: Left arm, Patient Position: Sitting, Cuff Size: Small Adult)   Pulse 77   Temp 98.5 °F (36.9 °C) (Temporal)   Ht 171.5 cm (67.5\")   Wt 49.4 kg (109 lb)   SpO2 97%   BMI 16.82 kg/m²   Body mass index is 16.82 kg/m².  Physical Exam  Vitals and nursing note reviewed.   Constitutional:       General: She is not in acute distress.     Appearance: Normal appearance. She is not diaphoretic.      Interventions: Face mask in place.   HENT:      Right Ear: External ear normal.      Left Ear: External ear normal.      Ears:      Comments: Bilateral middle ear effusion     Nose: Nose normal.      Comments: She has mild clear rhinorrhea     Mouth/Throat:      Mouth: Mucous membranes are moist.      Pharynx: Oropharynx is clear. No posterior oropharyngeal erythema.   Eyes:      Extraocular Movements: Extraocular movements " intact.      Conjunctiva/sclera: Conjunctivae normal.      Pupils: Pupils are equal, round, and reactive to light.   Cardiovascular:      Rate and Rhythm: Normal rate and regular rhythm.   Pulmonary:      Effort: Pulmonary effort is normal.      Breath sounds: Normal breath sounds.   Abdominal:      General: Abdomen is flat. Bowel sounds are normal. There is no distension.      Palpations: Abdomen is soft.      Tenderness: There is no abdominal tenderness. There is no right CVA tenderness, left CVA tenderness or guarding.   Musculoskeletal:         General: Normal range of motion.      Cervical back: Normal range of motion.   Lymphadenopathy:      Cervical: No cervical adenopathy.   Skin:     General: Skin is warm and dry.      Coloration: Skin is not pale.   Neurological:      Mental Status: She is alert and oriented to person, place, and time.   Psychiatric:         Mood and Affect: Mood normal.         Labs:  Results for orders placed or performed in visit on 02/14/23   POC Urinalysis Dipstick, Automated    Specimen: Urine   Result Value Ref Range    Color Yellow Yellow, Straw, Dark Yellow, Miley    Clarity, UA Clear Clear    Specific Gravity  1.015 1.005 - 1.030    pH, Urine 6.0 5.0 - 8.0    Leukocytes Negative Negative    Nitrite, UA Negative Negative    Protein, POC Trace (A) Negative mg/dL    Glucose, UA Negative Negative mg/dL    Ketones, UA Negative Negative    Urobilinogen, UA Normal Normal, 0.2 E.U./dL    Bilirubin Negative Negative    Blood, UA Trace (A) Negative    Lot Number [id number]     Expiration Date 3,252,024        Assessment & Plan   Odalys Miles is here today and the following problems have been addressed:      Diagnoses and all orders for this visit:    1. Black stools (Primary)  -     Clostridioides difficile Toxin, PCR - Stool, Per Rectum  -     H. Pylori Breath Test - Breath, Lung  -     CBC w AUTO Differential  -     Gastrointestinal Panel, PCR - Stool, Per Rectum  - Labs as  ordered above to rule out infectious process and check for anemia. No acute abdomen on exam.    - Patient does take iron every 2 days - could be the cause but she has been on this medication longer than she has noticed the change in stool color  - She will follow up with Dr. Mayer on 3/30/2023. May need EGD/colonscopy to further evaluate.   - If she develops hematochezia, hematemesis, severe abdominal pain, fever, she is to be seen urgently.     2. Lymphocytic colitis  -     Clostridioides difficile Toxin, PCR - Stool, Per Rectum  -     H. Pylori Breath Test - Breath, Lung  -     CBC w AUTO Differential  -     Gastrointestinal Panel, PCR - Stool, Per Rectum  - See above plan.     3. Urinary urgency  -     POC Urinalysis Dipstick, Automated  -     Urine Culture - Urine, Urine, Clean Catch  - Her urinalysis indicated trace protein and trace blood, no signs of infection.   - Urine culture to ensure no infection since she is having symptoms with trace blood   - She is advised to increase her water intake and avoid caffeine, carbonated beverages in the meantime.       4. Seasonal allergic rhinitis due to pollen  -     loratadine (Claritin) 10 MG tablet; Take 1 tablet by mouth Daily.  Dispense: 90 tablet; Refill: 3  -     fluticasone (FLONASE) 50 MCG/ACT nasal spray; 2 sprays into the nostril(s) as directed by provider Daily.  Dispense: 16 g; Refill: 11  - Loratadine and fluticasone nasal spray as directed above. Demonstrated how to administer nasal spray correctly.   - No signs of infection on exam. Believe this to be seasonal rhinitis. RTC if symptoms persist/worsen.     5. Racing heart beat   -     Ambulatory Referral to Cardiology    -     Referral to Cardiology for further evaluation. She is not symptomatic in clinic today. Pulse RRR.  Happened a few times >2 weeks ago and not since.    -     She was advised to go to the emergency room if she experiences chest pain, shortness of breath, or racing heart rate; the  patient verbalizes understanding.      Follow Up   Return for Next scheduled follow up.  Patient was given instructions and counseling regarding her condition or for health maintenance advice. Please see specific information pulled into the AVS if appropriate.     Transcribed from ambient dictation for ARLET Cheng by Miley Eduardo.  02/14/23   19:32 EST    Patient or patient representative verbalized consent to the visit recording.  I have personally performed the services described in this document as transcribed by the above individual, and it is both accurate and complete.        Marichuy NICE  [facility]

## 2023-02-15 ENCOUNTER — TELEPHONE (OUTPATIENT)
Dept: INTERNAL MEDICINE | Facility: CLINIC | Age: 78
End: 2023-02-15
Payer: MEDICARE

## 2023-02-15 LAB
BASOPHILS # BLD AUTO: 0.09 10*3/MM3 (ref 0–0.2)
BASOPHILS NFR BLD AUTO: 1.2 % (ref 0–1.5)
EOSINOPHIL # BLD AUTO: 0.05 10*3/MM3 (ref 0–0.4)
EOSINOPHIL NFR BLD AUTO: 0.7 % (ref 0.3–6.2)
ERYTHROCYTE [DISTWIDTH] IN BLOOD BY AUTOMATED COUNT: 11.3 % (ref 12.3–15.4)
HCT VFR BLD AUTO: 38.3 % (ref 34–46.6)
HGB BLD-MCNC: 13 G/DL (ref 12–15.9)
IMM GRANULOCYTES # BLD AUTO: 0.02 10*3/MM3 (ref 0–0.05)
IMM GRANULOCYTES NFR BLD AUTO: 0.3 % (ref 0–0.5)
LYMPHOCYTES # BLD AUTO: 1.56 10*3/MM3 (ref 0.7–3.1)
LYMPHOCYTES NFR BLD AUTO: 21.5 % (ref 19.6–45.3)
MCH RBC QN AUTO: 31.4 PG (ref 26.6–33)
MCHC RBC AUTO-ENTMCNC: 33.9 G/DL (ref 31.5–35.7)
MCV RBC AUTO: 92.5 FL (ref 79–97)
MONOCYTES # BLD AUTO: 0.41 10*3/MM3 (ref 0.1–0.9)
MONOCYTES NFR BLD AUTO: 5.7 % (ref 5–12)
NEUTROPHILS # BLD AUTO: 5.12 10*3/MM3 (ref 1.7–7)
NEUTROPHILS NFR BLD AUTO: 70.6 % (ref 42.7–76)
NRBC BLD AUTO-RTO: 0 /100 WBC (ref 0–0.2)
PLATELET # BLD AUTO: 290 10*3/MM3 (ref 140–450)
RBC # BLD AUTO: 4.14 10*6/MM3 (ref 3.77–5.28)
UREA BREATH TEST QL: NEGATIVE
WBC # BLD AUTO: 7.25 10*3/MM3 (ref 3.4–10.8)

## 2023-02-15 NOTE — TELEPHONE ENCOUNTER
Caller: Odalys Miles    Relationship: Self    Best call back number:  [phone]     Who are you requesting to speak with (clinical staff, provider,  specific staff member): CLINICAL, NURSE     What was the call regarding: PATIENT HAS A STOOL SAMPLE AND WANTS TO TALK WITH A NURSE ABOUT THAT     Do you require a callback: YES

## 2023-02-16 NOTE — PROGRESS NOTES
H pylori negative. Cbc is normal, no anemia so this is reassuring. Follow up with gastroenterologist as scheduled about black stools. Pending urine culture and stool cultures.

## 2023-02-18 LAB
BACTERIA UR CULT: ABNORMAL
BACTERIA UR CULT: ABNORMAL
OTHER ANTIBIOTIC SUSC ISLT: ABNORMAL

## 2023-02-19 LAB
ADV 40+41 DNA STL QL NAA+NON-PROBE: NOT DETECTED
ASTRO TYP 1-8 RNA STL QL NAA+NON-PROBE: NOT DETECTED
C CAYETANENSIS DNA STL QL NAA+NON-PROBE: NOT DETECTED
C COLI+JEJ+UPSA DNA STL QL NAA+NON-PROBE: NOT DETECTED
C DIF TOX TCDA+TCDB STL QL NAA+NON-PROBE: NOT DETECTED
C DIFF TOX GENS STL QL NAA+PROBE: NEGATIVE
CRYPTOSP DNA STL QL NAA+NON-PROBE: NOT DETECTED
E COLI O157 DNA STL QL NAA+NON-PROBE: ABNORMAL
E HISTOLYT DNA STL QL NAA+NON-PROBE: NOT DETECTED
EAEC PAA PLAS AGGR+AATA ST NAA+NON-PRB: NOT DETECTED
EC STX1+STX2 GENES STL QL NAA+NON-PROBE: NOT DETECTED
EPEC EAE GENE STL QL NAA+NON-PROBE: NOT DETECTED
ETEC LTA+ST1A+ST1B TOX ST NAA+NON-PROBE: NOT DETECTED
G LAMBLIA DNA STL QL NAA+NON-PROBE: NOT DETECTED
NOROVIRUS GI+II RNA STL QL NAA+NON-PROBE: DETECTED
P SHIGELLOIDES DNA STL QL NAA+NON-PROBE: NOT DETECTED
RVA RNA STL QL NAA+NON-PROBE: NOT DETECTED
S ENT+BONG DNA STL QL NAA+NON-PROBE: NOT DETECTED
SAPO I+II+IV+V RNA STL QL NAA+NON-PROBE: NOT DETECTED
SHIGELLA SP+EIEC IPAH ST NAA+NON-PROBE: NOT DETECTED
V CHOL+PARA+VUL DNA STL QL NAA+NON-PROBE: NOT DETECTED
V CHOLERAE DNA STL QL NAA+NON-PROBE: NOT DETECTED
Y ENTEROCOL DNA STL QL NAA+NON-PROBE: NOT DETECTED

## 2023-02-20 ENCOUNTER — TELEPHONE (OUTPATIENT)
Dept: INTERNAL MEDICINE | Facility: CLINIC | Age: 78
End: 2023-02-20
Payer: MEDICARE

## 2023-02-20 RX ORDER — NITROFURANTOIN 25; 75 MG/1; MG/1
100 CAPSULE ORAL 2 TIMES DAILY
Qty: 14 CAPSULE | Refills: 0 | Status: SHIPPED | OUTPATIENT
Start: 2023-02-20 | End: 2023-02-27

## 2023-02-20 NOTE — TELEPHONE ENCOUNTER
nOTED UNDER THE COMMUNICATION TAB ON REFERRAL TO CARDIOLOGY. PT WOULD RATHER SEE SOMEONE IN [place] INSTEAD OF [place]. PT STATES THAT WHEN THE OFFICE CALLED TO GET HER SCHEDULED (I AM ASSUMING CARDIOLOGY OFFICE), THEY DID NOT TELL HER WHO THEY WERE OR WHY THEY WERE CALLING. JUST SAID SHE NEEDED TO VERIFY HER INFORMATION.

## 2023-02-20 NOTE — TELEPHONE ENCOUNTER
Madeline, please let the patient know that her referral is in for the [place] office. It is not in for [place]. The main office is in [place], but she's in for [place]. She needs to call the office back to schedule. Per protocol, she has to verify her information when they contact her to make an appt.  [phone].

## 2023-02-20 NOTE — PROGRESS NOTES
Call and let pt know urine culture is positive, sending her antibiotics to the pharmacy for this. Her stool culture came back positive for Norovirus. This explains why she was having diarrhea episodes on top of her colitis and may be why she was having some black stools. She needs to drink lots of fluids and eat a bland diet until diarrhea improves, it should only last about 72 hours max but if continues she needs to follow up with GI, she does have an upcoming appointment

## 2023-02-27 ENCOUNTER — TELEPHONE (OUTPATIENT)
Dept: INTERNAL MEDICINE | Facility: CLINIC | Age: 78
End: 2023-02-27

## 2023-02-27 NOTE — TELEPHONE ENCOUNTER
Caller: Odalys Miles    Relationship: Self    Best call back number: [phone]    What is the best time to reach you: ANYTIME, OK TO LEAVE VOICEMAIL.    Who are you requesting to speak with (clinical staff, provider,  specific staff member): CLINICAL STAFF    Do you know the name of the person who called: PATIENT    What was the call regarding: PATIENT ADVISED THAT SHE WAS SUPPOSED TO HAVE AN ORDER PUT IN FOR A BONE DENSITY SCAN AND WOULD LIKE TO KNOW IF SHE IS STILL GOING TO GET ONE. PLEASE ADVISE.    Do you require a callback: YES

## 2023-03-02 ENCOUNTER — TELEPHONE (OUTPATIENT)
Dept: INTERNAL MEDICINE | Facility: CLINIC | Age: 78
End: 2023-03-02
Payer: MEDICARE

## 2023-03-02 NOTE — TELEPHONE ENCOUNTER
Caller: Odalys Miles    Relationship: Self    Best call back number: [phone]    What is the medical concern/diagnosis:   RACING HEART BEAT     What specialty or service is being requested:   CARDIOLOGY     What is the provider, practice or medical service name:   CARDIOLOGY - [facility]     Any additional details:   PATIENT STATED THAT SHE WOULD LIKE FOR HER REFERRAL TO CARDIOLOGY TO BE CHANGED FROM [facility] TO [facility] INSTEAD DUE TO THIS BEING CLOSER TO HOME

## 2023-03-20 ENCOUNTER — OFFICE VISIT (OUTPATIENT)
Dept: INTERNAL MEDICINE | Facility: CLINIC | Age: 78
End: 2023-03-20
Payer: MEDICARE

## 2023-03-20 VITALS
TEMPERATURE: 97.2 F | DIASTOLIC BLOOD PRESSURE: 78 MMHG | SYSTOLIC BLOOD PRESSURE: 120 MMHG | OXYGEN SATURATION: 92 % | HEART RATE: 63 BPM | BODY MASS INDEX: 16.82 KG/M2 | WEIGHT: 111 LBS | HEIGHT: 68 IN

## 2023-03-20 DIAGNOSIS — L94.0 REYNOLDS SYNDROME: ICD-10-CM

## 2023-03-20 DIAGNOSIS — K74.3 REYNOLDS SYNDROME: ICD-10-CM

## 2023-03-20 DIAGNOSIS — E78.2 MIXED HYPERLIPIDEMIA: ICD-10-CM

## 2023-03-20 DIAGNOSIS — M81.8 OTHER OSTEOPOROSIS WITHOUT CURRENT PATHOLOGICAL FRACTURE: ICD-10-CM

## 2023-03-20 DIAGNOSIS — E55.9 VITAMIN D DEFICIENCY: Primary | ICD-10-CM

## 2023-03-20 DIAGNOSIS — R74.8 ABNORMAL LIVER ENZYMES: ICD-10-CM

## 2023-03-20 DIAGNOSIS — K52.832 LYMPHOCYTIC COLITIS: ICD-10-CM

## 2023-03-20 DIAGNOSIS — N39.41 URGE INCONTINENCE: ICD-10-CM

## 2023-03-20 DIAGNOSIS — E87.6 HYPOKALEMIA: ICD-10-CM

## 2023-03-20 PROCEDURE — 99214 OFFICE O/P EST MOD 30 MIN: CPT | Performed by: INTERNAL MEDICINE

## 2023-03-20 RX ORDER — OXYBUTYNIN CHLORIDE 5 MG/1
5 TABLET, EXTENDED RELEASE ORAL DAILY
Qty: 30 TABLET | Refills: 1 | Status: SHIPPED | OUTPATIENT
Start: 2023-03-20

## 2023-03-20 NOTE — PROGRESS NOTES
Subjective   Odalys Miles is a 77 y.o. female presenting for followup of osteoperosis, HPL, microscopic colitis.     Chief Complaint   Patient presents with   • Follow-up     Recent lab results   • Hyperlipidemia       History of Present Illness  Odalys presents today to follow up on her recent lab studies as well as chronic condition management. She states that her colitis and diarrhea have resolved since last visit. She reports compliance with her current medications and is not due for annual labs until June. She states that she is nearing completion of current forteo therapy and is planning to continue per prescribing physician recommendation. She also endorses newly-noticed blanching of her fingers when cold, consistent with Raynaud's phenomenon, but states that she has no pain or discomfort from this.  Patient also complains unable to hold urine with there is urgency sometimes patient has to use pads or even loss control.  Patient is also taking over-the-counter potassium.  Recent history of low potassium.      Current Outpatient Medications:   •  acetaminophen (TYLENOL) 325 MG tablet, Take 2 tablets by mouth Every 6 (Six) Hours As Needed for Mild Pain., Disp: , Rfl:   •  Budesonide (ENTOCORT EC) 3 MG 24 hr capsule, Take 1 capsule by mouth Every Morning., Disp: 180 capsule, Rfl: 0  •  calcium (OS-KAYLYN) 600 MG tablet, Take 1 tablet by mouth Daily., Disp: , Rfl:   •  Diclofenac Sodium (VOLTAREN) 1 % gel gel, Apply 4 g topically to the appropriate area as directed., Disp: , Rfl:   •  ferrous sulfate 324 MG tablet delayed-release, Take 1 tablet by mouth Every Other Day., Disp: , Rfl:   •  fluticasone (FLONASE) 50 MCG/ACT nasal spray, 2 sprays into the nostril(s) as directed by provider Daily., Disp: 16 g, Rfl: 11  •  folic acid (FOLVITE) 800 MCG tablet, Take 1 tablet by mouth Daily., Disp: , Rfl:   •  loratadine (Claritin) 10 MG tablet, Take 1 tablet by mouth Daily., Disp: 90 tablet, Rfl: 3  •  LUMIGAN 0.01 %  ophthalmic drops, Administer  to both eyes Every Night., Disp: , Rfl:   •  Melatonin 10-10 MG tablet controlled-release, Take  by mouth Every Night., Disp: , Rfl:   •  Multiple Vitamin (MULTI-VITAMIN DAILY PO), Take 1 tablet by mouth Daily., Disp: , Rfl:   •  multivitamin with minerals tablet tablet, Take 1 tablet by mouth Daily., Disp: , Rfl:   •  NON FORMULARY, Ez tears, Disp: , Rfl:   •  Potassium 95 MG tablet, Take  by mouth., Disp: , Rfl:   •  SIMBRINZA 1-0.2 % suspension, INSTILL 1 DROP INTO BOTH EYES TWICE A DAY, Disp: , Rfl: 3  •  Teriparatide, Recombinant, (Forteo) 600 MCG/2.4ML injection, Inject 20 mcg under the skin into the appropriate area as directed Daily., Disp: , Rfl:   •  vitamin C (ASCORBIC ACID) 500 MG tablet, Take 2 tablets by mouth Daily., Disp: , Rfl:   •  vitamin D3 125 MCG (5000 UT) capsule capsule, Take 1 capsule by mouth Daily., Disp: , Rfl:   •  oxybutynin XL (Ditropan XL) 5 MG 24 hr tablet, Take 1 tablet by mouth Daily., Disp: 30 tablet, Rfl: 1    The following portions of the patient's history were reviewed and updated as appropriate: past family history, past social history and past surgical history.    Review of Systems   Constitutional: Negative.    HENT: Negative.    Eyes: Negative.    Respiratory: Negative.    Cardiovascular: Negative.    Gastrointestinal: Negative.    Endocrine: Negative.        Objective   Physical Exam  Constitutional:       Appearance: Normal appearance.   HENT:      Head: Normocephalic and atraumatic.      Mouth/Throat:      Mouth: Mucous membranes are dry.   Eyes:      Pupils: Pupils are equal, round, and reactive to light.   Cardiovascular:      Rate and Rhythm: Normal rate and regular rhythm.      Heart sounds: Normal heart sounds.   Pulmonary:      Effort: Pulmonary effort is normal.      Breath sounds: Normal breath sounds.   Skin:     General: Skin is warm and dry.         All tests have been reviewed.    Assessment & Plan   Diagnoses and all orders for  this visit:    Vitamin D deficiency  -     Vitamin D,25-Hydroxy    Mixed hyperlipidemia check lab  -     Lipid Panel  -     TSH    Abnormal liver enzymes check lab  -     Comprehensive Metabolic Panel    Lymphocytic colitis, resolved  -     CBC & Differential    Other osteoporosis without current pathological fracture continue medication follow-up with rheumatology    Hypokalemia repeat possible related to colitis in the past.  -     Basic metabolic panel    calos ([facility]) encourage patient to keep warm if no better we may need to start medication    Urge incontinence initiate medication  -     oxybutynin XL (Ditropan XL) 5 MG 24 hr tablet; Take 1 tablet by mouth Daily.    Other orders  -     Potassium 95 MG tablet; Take  by mouth.    3 weeks BMP today   do wellness lab in 3 mo lab order already placed

## 2023-03-21 LAB
BUN SERPL-MCNC: 18 MG/DL (ref 8–23)
BUN/CREAT SERPL: 23.4 (ref 7–25)
CALCIUM SERPL-MCNC: 10.7 MG/DL (ref 8.6–10.5)
CHLORIDE SERPL-SCNC: 103 MMOL/L (ref 98–107)
CO2 SERPL-SCNC: 33 MMOL/L (ref 22–29)
CREAT SERPL-MCNC: 0.77 MG/DL (ref 0.57–1)
EGFRCR SERPLBLD CKD-EPI 2021: 78.6 ML/MIN/1.73
GLUCOSE SERPL-MCNC: 96 MG/DL (ref 65–99)
POTASSIUM SERPL-SCNC: 3.9 MMOL/L (ref 3.5–5.2)
SODIUM SERPL-SCNC: 142 MMOL/L (ref 136–145)

## 2023-05-17 ENCOUNTER — TELEPHONE (OUTPATIENT)
Dept: INTERNAL MEDICINE | Facility: CLINIC | Age: 78
End: 2023-05-17
Payer: MEDICARE

## 2023-05-17 NOTE — TELEPHONE ENCOUNTER
Caller: Lorena Miles    Relationship: Self    Best call back number: [phone]    What is the best time to reach you: ANY    What was the call regarding: LORENA SAID DR ABIDA HIGGINS MADE ARRANGEMENTS WITH THE OFFICE TO HAVE LABS DONE THERE.  CALL HER TO ADVISE     Do you require a callback: YES

## 2023-06-05 ENCOUNTER — TELEPHONE (OUTPATIENT)
Dept: INTERNAL MEDICINE | Facility: CLINIC | Age: 78
End: 2023-06-05
Payer: MEDICARE

## 2023-06-05 ENCOUNTER — OFFICE VISIT (OUTPATIENT)
Dept: CARDIOLOGY | Facility: CLINIC | Age: 78
End: 2023-06-05
Payer: MEDICARE

## 2023-06-05 VITALS
OXYGEN SATURATION: 96 % | WEIGHT: 105.8 LBS | SYSTOLIC BLOOD PRESSURE: 118 MMHG | BODY MASS INDEX: 16.03 KG/M2 | DIASTOLIC BLOOD PRESSURE: 70 MMHG | HEART RATE: 64 BPM | HEIGHT: 68 IN

## 2023-06-05 DIAGNOSIS — R06.09 EXERTIONAL DYSPNEA: Primary | ICD-10-CM

## 2023-06-05 PROCEDURE — 93000 ELECTROCARDIOGRAM COMPLETE: CPT | Performed by: INTERNAL MEDICINE

## 2023-06-05 PROCEDURE — 99204 OFFICE O/P NEW MOD 45 MIN: CPT | Performed by: INTERNAL MEDICINE

## 2023-06-05 NOTE — TELEPHONE ENCOUNTER
Caller: Lorena Miles    Relationship: Self    Best call back number: [phone]     What orders are you requesting (i.e. lab or imaging): BLOOD WORK    In what timeframe would the patient need to come in: TODAY    Where will you receive your lab/imaging services: OFFICE     Additional notes: LROENA THINKS SHE HAS A STOMACH VIRUS AND WOULD LIKE TO VERIFY IT.  WOULD BLOOD WORK DETERMINE THIS OR DOES DR WASHINGTON DO SOMETHING ELSE?

## 2023-06-05 NOTE — PROGRESS NOTES
[facility] Cardiology OP Consult Note    Odalys Miles  [id number]  2023    Referred By: Marichuy Rahman APRN    Chief Complaint: Exertional dyspnea    History of Present Illness:   Mrs. Odalys Miles is a 77 y.o. female who presents to the Cardiology Clinic for evaluation of mild exertional dyspnea.  The patient has a past medical history significant for hyperlipidemia and Raynaud's syndrome.  She does not have any significant past cardiac history.  She presents to cardiology clinic today for a general cardiac evaluation as she has not previously seen a cardiologist.  Today, the patient's only complaint is mild exertional dyspnea.  She reports with higher levels of activity such as walking uphill from her neighbors barn she will have mild dyspnea.  Her dyspnea does not cause her to stop and rest.  She denies any associated chest pain or chest discomfort.  She denies palpitations.  She does have chronic, mild lower extremity swelling which has not recently been worsening.  No orthopnea or PND.  No other specific complaints today.    Past Cardiac Testin. Last Coronary Angio: None  2. Prior Stress Testing: None  3. Last Echo:    1.  Normal LV systolic function, LVEF 55-60%   2.  Impaired LV diastolic filling pattern   3.  Mild MR and TR  4. Prior Holter Monitor: None    Review of Systems:   Review of Systems   Constitutional:  Negative for activity change, appetite change, chills, diaphoresis, fatigue, fever, unexpected weight gain and unexpected weight loss.   Eyes:  Negative for blurred vision and double vision.   Respiratory:  Positive for shortness of breath. Negative for cough, chest tightness and wheezing.    Cardiovascular:  Negative for chest pain, palpitations and leg swelling.   Gastrointestinal:  Negative for abdominal pain, anal bleeding, blood in stool and GERD.   Endocrine: Negative for cold intolerance and heat intolerance.   Genitourinary:  Negative for  hematuria.   Neurological:  Negative for dizziness, syncope, weakness and light-headedness.   Hematological:  Does not bruise/bleed easily.   Psychiatric/Behavioral:  Negative for depressed mood and stress. The patient is not nervous/anxious.      Past Medical History:   Past Medical History:   Diagnosis Date    Abnormal liver enzymes     Basal cell carcinoma     under left eye    Body mass index (BMI) 20.0-20.9, adult     BPPV (benign paroxysmal positional vertigo)     COVID-19 vaccine series completed     Moderna    Elevated cholesterol     Fracture     as a child    Fracture of leg 02/13/2022    Fracture of sacrum     GERD (gastroesophageal reflux disease)     Glaucoma     Glaucoma     Hip fracture 10/11/2019    Hip repalced in 2020    Ingrowing toenail     Mammogram abnormal     Microscopic colitis 07/23/2014    Onychomycosis     Osteoporosis     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; chronic wound present     Wears dentures     full upper plate, partial on the lower    Wears glasses        Past Surgical History:   Past Surgical History:   Procedure Laterality Date    BACK SURGERY      hardware placed, sacral fracture    CATARACT EXTRACTION, BILATERAL      COLONOSCOPY      DR JAIMES    COLONOSCOPY N/A 10/12/2021    Procedure: COLONOSCOPY with biopsy and APC sigmoid AVM cauterization;  Surgeon: Jose Mayer MD;  Location: [facility] ENDOSCOPY;  Service: Gastroenterology;  Laterality: N/A;    HIP SURGERY Left 12/27/2019    secondary to a fracture    LEG SURGERY Left 2022    fracture, hardware placed    ORIF HUMERUS FRACTURE Left 11/03/2021    Procedure: HUMERUS PROXIMAL OPEN REDUCTION INTERNAL FIXATION WITH IMTRAMEDULLARY NAIL FIXATION LEFT;  Surgeon: Linden Domingo MD;  Location: [facility] OR;  Service: Orthopedics;  Laterality: Left;     "SKIN BIOPSY      TUBAL ABDOMINAL LIGATION         Family History:   Family History   Problem Relation Age of Onset    Heart attack Mother     Diabetes Mother     Stroke Father     Breast cancer Neg Hx     Ovarian cancer Neg Hx     Colon cancer Neg Hx        Social History:   Social History     Socioeconomic History    Marital status:    Tobacco Use    Smoking status: Former     Packs/day: 0.25     Years: 45.00     Pack years: 11.25     Types: Cigarettes     Start date:      Quit date:      Years since quittin.4     Passive exposure: Past    Smokeless tobacco: Former    Tobacco comments:     was a \"closet\" smoker   Vaping Use    Vaping Use: Never used   Substance and Sexual Activity    Alcohol use: Not Currently    Drug use: No    Sexual activity: Defer       Medications:     Current Outpatient Medications:     acetaminophen (TYLENOL) 325 MG tablet, Take 2 tablets by mouth Every 6 (Six) Hours As Needed for Mild Pain., Disp: , Rfl:     Budesonide (ENTOCORT EC) 3 MG 24 hr capsule, Take 1 capsule by mouth Every Morning. (Patient taking differently: Take 1 capsule by mouth Every Morning. PT currently taking two tablets.), Disp: 180 capsule, Rfl: 0    calcium (OS-KAYLYN) 600 MG tablet, Take 1 tablet by mouth Daily., Disp: , Rfl:     ferrous sulfate 324 MG tablet delayed-release, Take 1 tablet by mouth Every Other Day., Disp: , Rfl:     folic acid (FOLVITE) 800 MCG tablet, Take 1 tablet by mouth Daily., Disp: , Rfl:     loratadine (Claritin) 10 MG tablet, Take 1 tablet by mouth Daily., Disp: 90 tablet, Rfl: 3    LUMIGAN 0.01 % ophthalmic drops, Administer  to both eyes Every Night., Disp: , Rfl:     Melatonin 10-10 MG tablet controlled-release, Take  by mouth Every Night., Disp: , Rfl:     Multiple Vitamin (MULTI-VITAMIN DAILY PO), Take 1 tablet by mouth Daily., Disp: , Rfl:     Multiple Vitamins-Minerals (HAIR SKIN NAILS PO), Take  by mouth., Disp: , Rfl:     NON FORMULARY, Ez tears, Disp: , Rfl:     " "Potassium 95 MG tablet, Take  by mouth., Disp: , Rfl:     Probiotic Product (Koalify PO), Take  by mouth., Disp: , Rfl:     SIMBRINZA 1-0.2 % suspension, INSTILL 1 DROP INTO BOTH EYES TWICE A DAY, Disp: , Rfl: 3    Teriparatide, Recombinant, (Forteo) 600 MCG/2.4ML injection, Inject 20 mcg under the skin into the appropriate area as directed Daily., Disp: , Rfl:     vitamin C (ASCORBIC ACID) 500 MG tablet, Take 2 tablets by mouth Daily., Disp: , Rfl:     vitamin D3 125 MCG (5000 UT) capsule capsule, Take 1 capsule by mouth Daily., Disp: , Rfl:     Diclofenac Sodium (VOLTAREN) 1 % gel gel, Apply 4 g topically to the appropriate area as directed As Needed., Disp: , Rfl:     fluticasone (FLONASE) 50 MCG/ACT nasal spray, 2 sprays into the nostril(s) as directed by provider Daily. (Patient not taking: Reported on 6/5/2023), Disp: 16 g, Rfl: 11    multivitamin with minerals tablet tablet, Take 1 tablet by mouth Daily. (Patient not taking: Reported on 6/5/2023), Disp: , Rfl:     oxybutynin XL (Ditropan XL) 5 MG 24 hr tablet, Take 1 tablet by mouth Daily. (Patient not taking: Reported on 6/5/2023), Disp: 30 tablet, Rfl: 1    Allergies:   No Known Allergies    Physical Exam:  Vital Signs:   Vitals:    06/05/23 1332   BP: 118/70   BP Location: Right arm   Patient Position: Sitting   Cuff Size: Adult   Pulse: 64   SpO2: 96%   Weight: 48 kg (105 lb 12.8 oz)   Height: 171.5 cm (67.5\")       Physical Exam  Constitutional:       General: She is not in acute distress.     Appearance: Normal appearance. She is well-developed. She is not diaphoretic.   HENT:      Head: Normocephalic and atraumatic.   Eyes:      General: No scleral icterus.     Pupils: Pupils are equal, round, and reactive to light.   Neck:      Trachea: No tracheal deviation.   Cardiovascular:      Rate and Rhythm: Normal rate and regular rhythm.      Heart sounds: Normal heart sounds. No murmur heard.    No friction rub. No gallop.      Comments: Normal " JVD.  Pulmonary:      Effort: Pulmonary effort is normal. No respiratory distress.      Breath sounds: Normal breath sounds. No stridor. No wheezing or rales.   Chest:      Chest wall: No tenderness.   Abdominal:      General: Bowel sounds are normal. There is no distension.      Palpations: Abdomen is soft.      Tenderness: There is no abdominal tenderness. There is no guarding or rebound.   Musculoskeletal:         General: Normal range of motion.      Cervical back: Neck supple. No tenderness.      Comments: Trace bilateral lower extremity edema   Lymphadenopathy:      Cervical: No cervical adenopathy.   Skin:     General: Skin is warm and dry.      Findings: No erythema.   Neurological:      General: No focal deficit present.      Mental Status: She is alert and oriented to person, place, and time.   Psychiatric:         Mood and Affect: Mood normal.         Behavior: Behavior normal.       Results Review:   I reviewed the patient's new clinical results.  I reviewed the patient's other test results and agree with the interpretation      ECG 12 Lead    Date/Time: 6/5/2023 2:08 PM  Performed by: Robert Lopez MD  Authorized by: Robert Lopez MD   Comparison: not compared with previous ECG   Previous ECG: no previous ECG available  Rhythm: sinus rhythm  Rate: normal  QRS axis: normal  Other findings comments: Nonspecific T wave abnormality  Clinical impression comment: Borderline ECG        Assessment / Plan:     1. Exertional dyspnea   -- No significant past cardiac history  -- Primary complaint today is mild exertional dyspnea, several potential underlying etiologies  --ECG today without evidence of acute or prior ischemia  --- No chest pain or exertional chest discomfort  -- Will obtain baseline echocardiogram to rule out underlying structural valvular abnormalities  -- If echocardiogram unremarkable, would consider referral for PFTs  -- While lower suspicion for exertional dyspnea being anginal, if  echocardiogram and PFTs unremarkable could consider MPS      Follow Up:   Return if symptoms worsen or fail to improve.      Thank you for allowing me to participate in the care of your patient. Please to not hesitate to contact me with additional questions or concerns.     GENARO Lopez MD  Interventional Cardiology   06/05/2023  13:42 EDT

## 2023-06-12 ENCOUNTER — OFFICE VISIT (OUTPATIENT)
Dept: INTERNAL MEDICINE | Facility: CLINIC | Age: 78
End: 2023-06-12
Payer: MEDICARE

## 2023-06-12 VITALS
HEART RATE: 59 BPM | TEMPERATURE: 96.5 F | WEIGHT: 105 LBS | BODY MASS INDEX: 16.48 KG/M2 | OXYGEN SATURATION: 100 % | HEIGHT: 67 IN | DIASTOLIC BLOOD PRESSURE: 80 MMHG | SYSTOLIC BLOOD PRESSURE: 140 MMHG

## 2023-06-12 DIAGNOSIS — E83.42 HYPOMAGNESEMIA: Primary | ICD-10-CM

## 2023-06-12 PROCEDURE — 99214 OFFICE O/P EST MOD 30 MIN: CPT | Performed by: INTERNAL MEDICINE

## 2023-06-12 NOTE — PROGRESS NOTES
Subjective   Odalys Miles is a 77 y.o. female.     Chief Complaint   Patient presents with    Vitamin D Deficiency    Hyperlipidemia       Hyperlipidemia   Patient here for follow-up.  Vitamin D stable on supplemental hyperlipidemia stable on medication.  Last 2 weeks patient again has diarrhea.  Patient has a history of a lymphocytic colitis.  Diarrhea comes and goes.  Recently got slightly worse.  Low potassium and magnesium needs to be repeated calcium slightly elevated.  Urge incontinence is stable on medication.  Osteoporosis is stable on medication.      Current Outpatient Medications:     acetaminophen (TYLENOL) 325 MG tablet, Take 2 tablets by mouth Every 6 (Six) Hours As Needed for Mild Pain., Disp: , Rfl:     Budesonide (ENTOCORT EC) 3 MG 24 hr capsule, Take 1 capsule by mouth Every Morning. (Patient taking differently: Take 1 capsule by mouth Every Morning. PT currently taking two tablets.), Disp: 180 capsule, Rfl: 0    calcium (OS-KAYLYN) 600 MG tablet, Take 1 tablet by mouth Daily., Disp: , Rfl:     Diclofenac Sodium (VOLTAREN) 1 % gel gel, Apply 4 g topically to the appropriate area as directed As Needed., Disp: , Rfl:     ferrous sulfate 324 MG tablet delayed-release, Take 1 tablet by mouth Every Other Day., Disp: , Rfl:     fluticasone (FLONASE) 50 MCG/ACT nasal spray, 2 sprays into the nostril(s) as directed by provider Daily., Disp: 16 g, Rfl: 11    folic acid (FOLVITE) 800 MCG tablet, Take 1 tablet by mouth Daily., Disp: , Rfl:     loratadine (Claritin) 10 MG tablet, Take 1 tablet by mouth Daily., Disp: 90 tablet, Rfl: 3    LUMIGAN 0.01 % ophthalmic drops, Administer  to both eyes Every Night., Disp: , Rfl:     Melatonin 10-10 MG tablet controlled-release, Take  by mouth Every Night., Disp: , Rfl:     Multiple Vitamin (MULTI-VITAMIN DAILY PO), Take 1 tablet by mouth Daily., Disp: , Rfl:     Multiple Vitamins-Minerals (HAIR SKIN NAILS PO), Take  by mouth., Disp: , Rfl:     NON FORMULARY, Ez  tears, Disp: , Rfl:     oxybutynin XL (Ditropan XL) 5 MG 24 hr tablet, Take 1 tablet by mouth Daily., Disp: 30 tablet, Rfl: 1    Potassium 95 MG tablet, Take  by mouth., Disp: , Rfl:     Probiotic Product (Pixate PO), Take  by mouth., Disp: , Rfl:     SIMBRINZA 1-0.2 % suspension, INSTILL 1 DROP INTO BOTH EYES TWICE A DAY, Disp: , Rfl: 3    Teriparatide, Recombinant, (Forteo) 600 MCG/2.4ML injection, Inject 20 mcg under the skin into the appropriate area as directed Daily., Disp: , Rfl:     vitamin C (ASCORBIC ACID) 500 MG tablet, Take 2 tablets by mouth Daily., Disp: , Rfl:     vitamin D3 125 MCG (5000 UT) capsule capsule, Take 1 capsule by mouth Daily., Disp: , Rfl:     multivitamin with minerals tablet tablet, Take 1 tablet by mouth Daily. (Patient not taking: Reported on 6/5/2023), Disp: , Rfl:     The following portions of the patient's history were reviewed and updated as appropriate: allergies, current medications, past family history, past medical history, past social history, past surgical history, and problem list.    Review of Systems   Constitutional: Negative.    Respiratory: Negative.     Cardiovascular: Negative.    Gastrointestinal: Negative.    Musculoskeletal: Negative.    Skin: Negative.    Neurological: Negative.    Psychiatric/Behavioral: Negative.       Objective   Physical Exam  Cardiovascular:      Rate and Rhythm: Normal rate and regular rhythm.      Heart sounds: Normal heart sounds.   Pulmonary:      Effort: Pulmonary effort is normal.      Breath sounds: Normal breath sounds.   Abdominal:      General: Bowel sounds are normal.   Musculoskeletal:      Cervical back: Neck supple.   Skin:     General: Skin is warm.   Neurological:      Mental Status: She is alert and oriented to person, place, and time.       All tests have been reviewed.    Assessment & Plan   Diagnoses and all orders for this visit:    Hypomagnesemia  -     Magnesium    Vitamin D deficiency  -     Vitamin  D,25-Hydroxy     Mixed hyperlipidemia check lab  -     Lipid Panel  -     TSH     Abnormal liver enzymes check lab  -     Comprehensive Metabolic Panel     Lymphocytic colitis, off and on , imodium start , fiber, hydration   -     CBC & Differential     Other osteoporosis without current pathological fracture continue medication follow-up with rheumatology     Hypokalemia repeat possible related to colitis in the past.  -     Basic metabolic panel     calos ([facility]) encourage patient to keep warm if no better we may need to start medication    Hypercalcemia 10.7 repeat      Urge incontinence initiate medication  -     oxybutynin XL (Ditropan XL) 5 MG 24 hr tablet; Take 1 tablet by mouth Daily.

## 2023-06-13 LAB
25(OH)D3+25(OH)D2 SERPL-MCNC: 51.9 NG/ML (ref 30–100)
ALBUMIN SERPL-MCNC: 4.1 G/DL (ref 3.7–4.7)
ALBUMIN/GLOB SERPL: 2 {RATIO} (ref 1.2–2.2)
ALP SERPL-CCNC: 84 IU/L (ref 44–121)
ALT SERPL-CCNC: 29 IU/L (ref 0–32)
AST SERPL-CCNC: 27 IU/L (ref 0–40)
BASOPHILS # BLD AUTO: 0.1 X10E3/UL (ref 0–0.2)
BASOPHILS NFR BLD AUTO: 1 %
BILIRUB SERPL-MCNC: <0.2 MG/DL (ref 0–1.2)
BUN SERPL-MCNC: 17 MG/DL (ref 8–27)
BUN/CREAT SERPL: 24 (ref 12–28)
CALCIUM SERPL-MCNC: 10 MG/DL (ref 8.7–10.3)
CHLORIDE SERPL-SCNC: 105 MMOL/L (ref 96–106)
CHOLEST SERPL-MCNC: 197 MG/DL (ref 100–199)
CO2 SERPL-SCNC: 28 MMOL/L (ref 20–29)
CREAT SERPL-MCNC: 0.7 MG/DL (ref 0.57–1)
EGFRCR SERPLBLD CKD-EPI 2021: 88 ML/MIN/1.73
EOSINOPHIL # BLD AUTO: 0.1 X10E3/UL (ref 0–0.4)
EOSINOPHIL NFR BLD AUTO: 1 %
ERYTHROCYTE [DISTWIDTH] IN BLOOD BY AUTOMATED COUNT: 11.7 % (ref 11.7–15.4)
GLOBULIN SER CALC-MCNC: 2.1 G/DL (ref 1.5–4.5)
GLUCOSE SERPL-MCNC: 97 MG/DL (ref 70–99)
HCT VFR BLD AUTO: 39.7 % (ref 34–46.6)
HDLC SERPL-MCNC: 87 MG/DL
HGB BLD-MCNC: 13.3 G/DL (ref 11.1–15.9)
IMM GRANULOCYTES # BLD AUTO: 0 X10E3/UL (ref 0–0.1)
IMM GRANULOCYTES NFR BLD AUTO: 0 %
LDLC SERPL CALC-MCNC: 85 MG/DL (ref 0–99)
LYMPHOCYTES # BLD AUTO: 1.3 X10E3/UL (ref 0.7–3.1)
LYMPHOCYTES NFR BLD AUTO: 17 %
MAGNESIUM SERPL-MCNC: 1.9 MG/DL (ref 1.6–2.3)
MCH RBC QN AUTO: 30.9 PG (ref 26.6–33)
MCHC RBC AUTO-ENTMCNC: 33.5 G/DL (ref 31.5–35.7)
MCV RBC AUTO: 92 FL (ref 79–97)
MONOCYTES # BLD AUTO: 0.4 X10E3/UL (ref 0.1–0.9)
MONOCYTES NFR BLD AUTO: 6 %
NEUTROPHILS # BLD AUTO: 5.5 X10E3/UL (ref 1.4–7)
NEUTROPHILS NFR BLD AUTO: 75 %
PLATELET # BLD AUTO: 260 X10E3/UL (ref 150–450)
POTASSIUM SERPL-SCNC: 3.2 MMOL/L (ref 3.5–5.2)
PROT SERPL-MCNC: 6.2 G/DL (ref 6–8.5)
RBC # BLD AUTO: 4.31 X10E6/UL (ref 3.77–5.28)
SODIUM SERPL-SCNC: 147 MMOL/L (ref 134–144)
TRIGL SERPL-MCNC: 149 MG/DL (ref 0–149)
TSH SERPL DL<=0.005 MIU/L-ACNC: 0.79 UIU/ML (ref 0.45–4.5)
VLDLC SERPL CALC-MCNC: 25 MG/DL (ref 5–40)
WBC # BLD AUTO: 7.4 X10E3/UL (ref 3.4–10.8)

## 2023-06-15 ENCOUNTER — TELEPHONE (OUTPATIENT)
Dept: CARDIOLOGY | Facility: CLINIC | Age: 78
End: 2023-06-15
Payer: MEDICARE

## 2023-06-15 NOTE — TELEPHONE ENCOUNTER
Caller: Odalys Miles    Relationship: Self    Best call back number: [phone]    What form or medical record are you requesting: HEART ECHO REPORT    Who is requesting this form or medical record from you: DR. ALEIDA WASHINGTON    How would you like to receive the form or medical records (pick-up, mail, fax):   If fax, what is the fax numb  [phone]      Timeframe paperwork needed: ASAP

## 2023-07-12 PROBLEM — S82.102A CLOSED FRACTURE OF PROXIMAL END OF LEFT TIBIA: Status: RESOLVED | Noted: 2022-05-05 | Resolved: 2023-07-12

## 2023-07-24 ENCOUNTER — TELEPHONE (OUTPATIENT)
Dept: INTERNAL MEDICINE | Facility: CLINIC | Age: 78
End: 2023-07-24
Payer: MEDICARE

## 2023-07-24 DIAGNOSIS — R73.03 PREDIABETES: Primary | ICD-10-CM

## 2023-07-24 NOTE — TELEPHONE ENCOUNTER
Caller: Odalys Miles     Relationship: SELF    Best call back number: [phone] -OK TO LEAVE DETAILED MESSAGE IF NO ANSWER    What is your medical concern? WOUND ON INSIDE, LEFT ANKLE, GOING TO [facility], WAS TOLD BY FRIEND WHO IS A NURSE THAT THE WOUND LOOKS LIKE DIABETIC WOUND    How long has this issue been going on? WOUND SHOWED UP 3-4 WEEKS AGO. HAS BEEN GOING TO WOUND CARE FOR AWHILE    Is your provider already aware of this issue? YES, WAS LAST SEEN 7/12/23. TOLD DR WASHINGTON SHE IS ALREADY GOING TO WOUND CARE.    Have you been treated for this issue? YES, BEING TREATED BY [facility] IN [facility].    ASKING IF DR WASHINGTON HAS TESTED HER FOR DIABETES. ASKING FOR A CALL BACK TO DISCUSS THIS WOUND AND ADVISE IF BEING CHECKED FOR DIABETES DUE TO FAMILY HISTORY. ASKING IF LABS NEED TO BE DRAWN TO TEST FOR DIABETES.    WAS TOLD COFFEE WAS PREVENTING WOUNDS FROM HEALING DUE TO COFFEE BEING FULL OF ACID. AND ONCE AT A CERTAIN LEVEL PREVENTS WOUNDS FROM HEALING. PATIENT STOPPED COFFEE AND HEALED UP. IT HAS BEEN 3-4 YEARS SINCE LAST WOUND UNTIL THIS CURRENT WOUND ON LEFT ANKLE.    PLEASE CALL TO ADVISE IS LABS ARE NEEDED TO TEST FOR DIABETES.

## 2023-07-24 NOTE — TELEPHONE ENCOUNTER
Patient las seen by you on 07/12. Please advise. Looks like blood glucose has been elevated in the past.

## 2023-07-26 LAB — HBA1C MFR BLD: 5.7 % (ref 4.8–5.6)

## 2023-07-27 NOTE — TELEPHONE ENCOUNTER
Caller: Lorena Miles    Relationship: Self    Best call back number: [phone]     Caller requesting test results: LORENA    What test was performed: BLOOD WORK    When was the test performed: [id number]     Where was the test performed: OFFICE     Additional notes: CALLING FOR RESULTS

## 2023-08-16 ENCOUNTER — TELEPHONE (OUTPATIENT)
Dept: GASTROENTEROLOGY | Facility: CLINIC | Age: 78
End: 2023-08-16
Payer: MEDICARE

## 2023-08-16 NOTE — TELEPHONE ENCOUNTER
----- Message from Jose Mayer MD sent at 8/15/2023  7:22 PM EDT -----  Regarding: RE: PATIENT QUESTION  She does not have any celiac disease and should not cause any weight loss.   I have ordered a CT scan abdomen pelvis to rule out other pathology in the abdomen        ----- Message -----  From: Steph Palacios  Sent: 8/15/2023   4:26 PM EDT  To: Jose Mayer MD  Subject: PATIENT QUESTION                                 Patient called and states having weight loss - she states she eats a lot of food.    She is asking if gluten intolerance would cause weight loss ?  Is there any testing she needs ?    Please advise.  Thank you

## 2023-08-16 NOTE — TELEPHONE ENCOUNTER
Called patient - gave Dr. Mayer message to her - transferred to Central Scheduling to schedule CT Scan

## 2023-08-23 ENCOUNTER — HOSPITAL ENCOUNTER (OUTPATIENT)
Facility: HOSPITAL | Age: 78
Discharge: HOME | End: 2023-08-23
Payer: MEDICARE

## 2023-08-23 LAB
ALBUMIN SERPL-MCNC: 4 G/DL (ref 3.4–4.8)
ANION GAP SERPL CALCULATED.3IONS-SCNC: 10 MMOL/L (ref 3–16)
BUN SERPL-MCNC: 16 MG/DL (ref 6–20)
CALCIUM SERPL-MCNC: 11.2 MG/DL (ref 8.5–10.5)
CHLORIDE SERPL-SCNC: 103 MMOL/L (ref 98–107)
CO2 SERPL-SCNC: 31 MMOL/L (ref 20–30)
CREAT SERPL-MCNC: 0.8 MG/DL (ref 0.4–1.2)
GFR SERPLBLD CREATININE-BSD FMLA CKD-EPI: >60 ML/MIN/{1.73_M2}
GLUCOSE SERPL-MCNC: 149 MG/DL (ref 74–106)
PHOSPHATE SERPL-MCNC: 2.8 MG/DL (ref 2.5–4.5)
POTASSIUM SERPL-SCNC: 2.8 MMOL/L (ref 3.4–5.1)
SODIUM SERPL-SCNC: 144 MMOL/L (ref 136–145)

## 2023-08-23 PROCEDURE — 80069 RENAL FUNCTION PANEL: CPT

## 2023-08-23 PROCEDURE — 36415 COLL VENOUS BLD VENIPUNCTURE: CPT

## 2023-08-24 ENCOUNTER — HOSPITAL ENCOUNTER (OUTPATIENT)
Dept: CT IMAGING | Facility: HOSPITAL | Age: 78
Discharge: HOME | End: 2023-08-24
Admitting: INTERNAL MEDICINE
Payer: MEDICARE

## 2023-08-24 DIAGNOSIS — R63.4 UNINTENTIONAL WEIGHT LOSS: ICD-10-CM

## 2023-08-24 DIAGNOSIS — D50.9 IRON DEFICIENCY ANEMIA, UNSPECIFIED IRON DEFICIENCY ANEMIA TYPE: ICD-10-CM

## 2023-08-24 PROCEDURE — 25510000001 IOPAMIDOL 61 % SOLUTION: Performed by: INTERNAL MEDICINE

## 2023-08-24 PROCEDURE — 74177 CT ABD & PELVIS W/CONTRAST: CPT

## 2023-08-24 RX ADMIN — IOPAMIDOL 100 ML: 612 INJECTION, SOLUTION INTRAVENOUS at 12:54

## 2023-08-25 DIAGNOSIS — R93.5 ABNORMAL CT OF THE ABDOMEN: Primary | ICD-10-CM

## 2023-08-25 DIAGNOSIS — K86.89 DILATED PANCREATIC DUCT: ICD-10-CM

## 2023-08-27 DIAGNOSIS — K86.89 DILATED PANCREATIC DUCT: Primary | ICD-10-CM

## 2023-08-27 DIAGNOSIS — R93.5 ABNORMAL CT OF THE ABDOMEN: ICD-10-CM

## 2023-08-27 RX ORDER — SODIUM CHLORIDE 9 MG/ML
70 INJECTION, SOLUTION INTRAVENOUS CONTINUOUS PRN
OUTPATIENT
Start: 2023-08-27

## 2023-08-28 ENCOUNTER — TELEPHONE (OUTPATIENT)
Dept: GASTROENTEROLOGY | Facility: CLINIC | Age: 78
End: 2023-08-28
Payer: MEDICARE

## 2023-08-28 NOTE — TELEPHONE ENCOUNTER
----- Message from Jose Mayer MD sent at 8/25/2023  5:24 PM EDT -----  Regarding: FW: abnormal result  Let her know that her CT scan showed some abnormality in the pancreatic duct and needs an MRI scan for further evaluation  MRCP ordered          ----- Message -----  From: Yvonne Jacobo PA-C  Sent: 8/25/2023   9:00 AM EDT  To: Jose Mayer MD  Subject: abnormal result                                  Dilatation of pancreatic duct on CTAP

## 2023-08-29 PROBLEM — R93.5 ABNORMAL CT OF THE ABDOMEN: Status: ACTIVE | Noted: 2023-08-29

## 2023-09-13 ENCOUNTER — TELEPHONE (OUTPATIENT)
Dept: GASTROENTEROLOGY | Facility: CLINIC | Age: 78
End: 2023-09-13
Payer: MEDICARE

## 2023-09-13 NOTE — TELEPHONE ENCOUNTER
----- Message from Jose Mayer MD sent at 9/12/2023  6:52 PM EDT -----    I do not know why we are receiving this message we are not dealing with her potassium.   Call the patient and let her call her PCP and adjust her potassium doses.  We have not prescribed any potassium for her and we will not following that.     ----- Message -----  From: Mia Morrissey MA  Sent: 9/12/2023   1:53 PM EDT  To: Yvonne Jacobo PA-C, MD Galina Darden PA at  called today and left message. States that she is treating patient with Forteo injections for bone health which can cause hypercalcemia. Her calcium levels were elevated so she is currently holding the injections. She states that her labs also showed her potassium level to be at 2.8. Patient reported to her that she is taking OTC potassium 99 mg bid. Wasn't sure if that is sufficient or if we wanted to increase her dosing. Please advise    The phone number to reach Galina is [phone]

## 2023-09-14 ENCOUNTER — TELEPHONE (OUTPATIENT)
Dept: INTERNAL MEDICINE | Facility: CLINIC | Age: 78
End: 2023-09-14
Payer: MEDICARE

## 2023-09-14 DIAGNOSIS — E78.5 HYPERLIPIDEMIA, UNSPECIFIED HYPERLIPIDEMIA TYPE: Primary | ICD-10-CM

## 2023-09-14 DIAGNOSIS — Z86.39 HISTORY OF HYPOKALEMIA: ICD-10-CM

## 2023-09-14 NOTE — TELEPHONE ENCOUNTER
Caller: Odalys Miles    Relationship: Self    Best call back number: [phone]     What is the best time to reach you: ASAP    Who are you requesting to speak with (clinical staff, provider,  specific staff member): CLINICAL      What was the call regarding: PATIENT CALLED STATING HER POTASSIUM WAS LOW AND AT TIMES ITS NORMAL. PATIENT NEEDS TO KNOW IF SHE NEEDS A SUPPLEMENT AS SHE USE TO GET CRAMPS LIKE [employer]ORSES, IT HAS GOTTEN BETTER SINCE EATING BANANAS. LAST TIME IT WAS 2.4

## 2023-09-19 LAB
ALBUMIN SERPL-MCNC: 4.3 G/DL (ref 3.5–5.2)
ALBUMIN/GLOB SERPL: 2 G/DL
ALP SERPL-CCNC: 70 U/L (ref 39–117)
ALT SERPL-CCNC: 24 U/L (ref 1–33)
AST SERPL-CCNC: 29 U/L (ref 1–32)
BILIRUB SERPL-MCNC: 0.4 MG/DL (ref 0–1.2)
BUN SERPL-MCNC: 14 MG/DL (ref 8–23)
BUN/CREAT SERPL: 24.6 (ref 7–25)
CALCIUM SERPL-MCNC: 9.6 MG/DL (ref 8.6–10.5)
CHLORIDE SERPL-SCNC: 107 MMOL/L (ref 98–107)
CHOLEST SERPL-MCNC: 198 MG/DL (ref 0–200)
CO2 SERPL-SCNC: 30.6 MMOL/L (ref 22–29)
CREAT SERPL-MCNC: 0.57 MG/DL (ref 0.57–1)
EGFRCR SERPLBLD CKD-EPI 2021: 92 ML/MIN/1.73
ERYTHROCYTE [DISTWIDTH] IN BLOOD BY AUTOMATED COUNT: 11.7 % (ref 12.3–15.4)
GLOBULIN SER CALC-MCNC: 2.2 GM/DL
GLUCOSE SERPL-MCNC: 88 MG/DL (ref 65–99)
HCT VFR BLD AUTO: 37.8 % (ref 34–46.6)
HDLC SERPL-MCNC: 96 MG/DL (ref 40–60)
HGB BLD-MCNC: 12.7 G/DL (ref 12–15.9)
LDLC SERPL CALC-MCNC: 92 MG/DL (ref 0–100)
MCH RBC QN AUTO: 30.7 PG (ref 26.6–33)
MCHC RBC AUTO-ENTMCNC: 33.6 G/DL (ref 31.5–35.7)
MCV RBC AUTO: 91.3 FL (ref 79–97)
PLATELET # BLD AUTO: 246 10*3/MM3 (ref 140–450)
POTASSIUM SERPL-SCNC: 3.5 MMOL/L (ref 3.5–5.2)
PROT SERPL-MCNC: 6.5 G/DL (ref 6–8.5)
RBC # BLD AUTO: 4.14 10*6/MM3 (ref 3.77–5.28)
SODIUM SERPL-SCNC: 147 MMOL/L (ref 136–145)
TRIGL SERPL-MCNC: 52 MG/DL (ref 0–150)
VLDLC SERPL CALC-MCNC: 10 MG/DL (ref 5–40)
WBC # BLD AUTO: 6.84 10*3/MM3 (ref 3.4–10.8)

## 2023-09-26 ENCOUNTER — HOSPITAL ENCOUNTER (OUTPATIENT)
Facility: HOSPITAL | Age: 78
Discharge: HOME | End: 2023-09-26
Payer: MEDICARE

## 2023-09-26 LAB
ALBUMIN SERPL-MCNC: 4.3 G/DL (ref 3.4–4.8)
ANION GAP SERPL CALCULATED.3IONS-SCNC: 7 MMOL/L (ref 3–16)
BUN SERPL-MCNC: 15 MG/DL (ref 6–20)
CALCIUM SERPL-MCNC: 9.9 MG/DL (ref 8.5–10.5)
CHLORIDE SERPL-SCNC: 101 MMOL/L (ref 98–107)
CO2 SERPL-SCNC: 32 MMOL/L (ref 20–30)
CREAT SERPL-MCNC: 0.6 MG/DL (ref 0.4–1.2)
GFR SERPLBLD CREATININE-BSD FMLA CKD-EPI: >60 ML/MIN/{1.73_M2}
GLUCOSE SERPL-MCNC: 88 MG/DL (ref 74–106)
PHOSPHATE SERPL-MCNC: 3.7 MG/DL (ref 2.5–4.5)
POTASSIUM SERPL-SCNC: 3.4 MMOL/L (ref 3.4–5.1)
SODIUM SERPL-SCNC: 140 MMOL/L (ref 136–145)

## 2023-09-26 PROCEDURE — 36415 COLL VENOUS BLD VENIPUNCTURE: CPT

## 2023-09-26 PROCEDURE — 80069 RENAL FUNCTION PANEL: CPT

## 2023-10-11 RX ORDER — DIPHENHYDRAMINE HYDROCHLORIDE 25 MG/1
TABLET ORAL 2 TIMES DAILY
COMMUNITY

## 2023-10-17 ENCOUNTER — HOSPITAL ENCOUNTER (OUTPATIENT)
Dept: MRI IMAGING | Facility: HOSPITAL | Age: 78
Discharge: HOME | End: 2023-10-17
Admitting: INTERNAL MEDICINE
Payer: MEDICARE

## 2023-10-17 DIAGNOSIS — K86.89 DILATED PANCREATIC DUCT: ICD-10-CM

## 2023-10-17 DIAGNOSIS — R93.5 ABNORMAL CT OF THE ABDOMEN: ICD-10-CM

## 2023-10-17 PROCEDURE — 74181 MRI ABDOMEN W/O CONTRAST: CPT

## 2023-10-18 ENCOUNTER — ANESTHESIA EVENT (OUTPATIENT)
Dept: GASTROENTEROLOGY | Facility: HOSPITAL | Age: 78
End: 2023-10-18
Payer: MEDICARE

## 2023-10-18 ENCOUNTER — ANESTHESIA (OUTPATIENT)
Dept: GASTROENTEROLOGY | Facility: HOSPITAL | Age: 78
End: 2023-10-18
Payer: MEDICARE

## 2023-10-18 ENCOUNTER — HOSPITAL ENCOUNTER (OUTPATIENT)
Facility: HOSPITAL | Age: 78
Setting detail: HOSPITAL OUTPATIENT SURGERY
Discharge: HOME | End: 2023-10-18
Attending: INTERNAL MEDICINE | Admitting: INTERNAL MEDICINE
Payer: MEDICARE

## 2023-10-18 VITALS
TEMPERATURE: 97.4 F | BODY MASS INDEX: 14.91 KG/M2 | DIASTOLIC BLOOD PRESSURE: 68 MMHG | RESPIRATION RATE: 16 BRPM | SYSTOLIC BLOOD PRESSURE: 137 MMHG | OXYGEN SATURATION: 95 % | HEIGHT: 67 IN | HEART RATE: 60 BPM | WEIGHT: 95 LBS

## 2023-10-18 DIAGNOSIS — R93.5 ABNORMAL CT OF THE ABDOMEN: ICD-10-CM

## 2023-10-18 PROCEDURE — 43239 EGD BIOPSY SINGLE/MULTIPLE: CPT | Performed by: INTERNAL MEDICINE

## 2023-10-18 PROCEDURE — 88305 TISSUE EXAM BY PATHOLOGIST: CPT

## 2023-10-18 PROCEDURE — 25810000003 SODIUM CHLORIDE 0.9 % SOLUTION: Performed by: INTERNAL MEDICINE

## 2023-10-18 PROCEDURE — 25810000003 SODIUM CHLORIDE 0.9 % SOLUTION: Performed by: NURSE ANESTHETIST, CERTIFIED REGISTERED

## 2023-10-18 PROCEDURE — 25010000002 PROPOFOL 200 MG/20ML EMULSION: Performed by: NURSE ANESTHETIST, CERTIFIED REGISTERED

## 2023-10-18 RX ORDER — SODIUM CHLORIDE 9 MG/ML
INJECTION, SOLUTION INTRAVENOUS CONTINUOUS PRN
Status: DISCONTINUED | OUTPATIENT
Start: 2023-10-18 | End: 2023-10-18 | Stop reason: SURG

## 2023-10-18 RX ORDER — FAMOTIDINE 20 MG/1
20 TABLET, FILM COATED ORAL 2 TIMES DAILY
Qty: 60 TABLET | Refills: 5 | Status: SHIPPED | OUTPATIENT
Start: 2023-10-18

## 2023-10-18 RX ORDER — PROPOFOL 10 MG/ML
INJECTION, EMULSION INTRAVENOUS CONTINUOUS PRN
Status: DISCONTINUED | OUTPATIENT
Start: 2023-10-18 | End: 2023-10-18 | Stop reason: SURG

## 2023-10-18 RX ORDER — LIDOCAINE HCL/PF 100 MG/5ML
SYRINGE (ML) INJECTION AS NEEDED
Status: DISCONTINUED | OUTPATIENT
Start: 2023-10-18 | End: 2023-10-18 | Stop reason: SURG

## 2023-10-18 RX ORDER — SODIUM CHLORIDE 9 MG/ML
70 INJECTION, SOLUTION INTRAVENOUS CONTINUOUS PRN
Status: DISCONTINUED | OUTPATIENT
Start: 2023-10-18 | End: 2023-10-18 | Stop reason: HOSPADM

## 2023-10-18 RX ADMIN — PROPOFOL 150 MCG/KG/MIN: 10 INJECTION, EMULSION INTRAVENOUS at 14:14

## 2023-10-18 RX ADMIN — SODIUM CHLORIDE: 9 INJECTION, SOLUTION INTRAVENOUS at 14:10

## 2023-10-18 RX ADMIN — SODIUM CHLORIDE 70 ML/HR: 9 INJECTION, SOLUTION INTRAVENOUS at 12:41

## 2023-10-18 RX ADMIN — Medication 50 MG: at 14:14

## 2023-10-18 NOTE — DISCHARGE INSTRUCTIONS
- Discharge patient to home (ambulatory).   - Resume previous diet.   - Follow an antireflux regimen.   - Avoid coffee  - Pepcid BID  - Follow up office 8 weeks

## 2023-10-18 NOTE — ANESTHESIA POSTPROCEDURE EVALUATION
Patient: Odalys Miles    Procedure Summary       Date: 10/18/23 Room / Location: [facility] ENDOSCOPY 2 / [facility] ENDOSCOPY    Anesthesia Start: 1410 Anesthesia Stop: 1422    Procedure: ESOPHAGOGASTRODUODENOSCOPY WITH BIOPSY (Esophagus) Diagnosis:       Abnormal CT of the abdomen      (Abnormal CT of the abdomen [R93.5])    Surgeons: Jose Mayer MD Provider: Adelfo Mueller CRNA    Anesthesia Type: MAC ASA Status: 3            Anesthesia Type: MAC    Vitals  No vitals data found for the desired time range.          Post Anesthesia Care and Evaluation    Patient location during evaluation: PHASE II  Patient participation: complete - patient participated  Level of consciousness: awake and alert  Pain score: 0  Pain management: satisfactory to patient    Airway patency: patent  Anesthetic complications: No anesthetic complications  PONV Status: none  Cardiovascular status: acceptable and stable  Respiratory status: acceptable and spontaneous ventilation  Hydration status: acceptable    Comments: Vitals signs as noted in nursing documentation as per protocol.

## 2023-10-18 NOTE — H&P
[facility]  HISTORY AND PHYSICAL    Patient Name: Odalys Miles  : 1945  MRN: [id number]    Chief Complaint:   For EGD    History Of Presenting Illness:      Abnormal CTAP         Past Medical History:   Diagnosis Date    Abnormal liver enzymes     Basal cell carcinoma     under left eye    Body mass index (BMI) 20.0-20.9, adult     BPPV (benign paroxysmal positional vertigo)     COVID-19 vaccine series completed     Moderna    Elevated cholesterol     Fracture     as a child    Fracture of leg 2022    Fracture of sacrum     GERD (gastroesophageal reflux disease)     Glaucoma     Glaucoma     Hip fracture 10/11/2019    Hip repalced in     Ingrowing toenail     Mammogram abnormal     Microscopic colitis 2014    Onychomycosis     Open wound of left ankle     Osteoporosis     Seasonal allergies     Sebaceous cyst     Syncope, near     pt doesn't recall    TIA (transient ischemic attack) 2014    Vascular abnormality     Per patient accident in  caused poor circulation to LLE; chronic wound present     Wears dentures     full upper plate, partial on the lower    Wears glasses        Past Surgical History:   Procedure Laterality Date    BACK SURGERY      hardware placed, sacral fracture    CATARACT EXTRACTION, BILATERAL      COLONOSCOPY      DR JAIMES    COLONOSCOPY N/A 10/12/2021    Procedure: COLONOSCOPY with biopsy and APC sigmoid AVM cauterization;  Surgeon: Jose Mayer MD;  Location: [facility] ENDOSCOPY;  Service: Gastroenterology;  Laterality: N/A;    HIP SURGERY Left 2019    secondary to a fracture    LEG SURGERY Left     fracture, hardware placed    ORIF HUMERUS FRACTURE Left 2021    Procedure: HUMERUS PROXIMAL OPEN REDUCTION INTERNAL FIXATION WITH IMTRAMEDULLARY NAIL FIXATION LEFT;  Surgeon: Linden Domingo MD;  Location: [facility] OR;  Service: Orthopedics;  Laterality: Left;    SKIN BIOPSY      TUBAL ABDOMINAL LIGATION         Social  History     Socioeconomic History    Marital status:    Tobacco Use    Smoking status: Former     Packs/day: 0.25     Years: 45.00     Additional pack years: 0.00     Total pack years: 11.25     Types: Cigarettes     Start date:      Quit date:      Years since quittin.8     Passive exposure: Past    Smokeless tobacco: Never   Vaping Use    Vaping Use: Never used   Substance and Sexual Activity    Alcohol use: Not Currently    Drug use: No    Sexual activity: Defer       Family History   Problem Relation Age of Onset    Heart attack Mother     Diabetes Mother     Stroke Father     Breast cancer Neg Hx     Ovarian cancer Neg Hx     Colon cancer Neg Hx        Prior to Admission Medications:  Medications Prior to Admission   Medication Sig Dispense Refill Last Dose    ACETAMINOPHEN PO Take 650 mg by mouth Every 6 (Six) Hours As Needed for Mild Pain.   10/17/2023    ascorbic acid (VITAMIN C) 1000 MG tablet Take 1 tablet by mouth Daily.   10/17/2023    Biotin (Biotin 5000) 5 MG capsule Take  by mouth 2 (Two) Times a Day.   10/17/2023    Brompheniramine-Phenylephrine (COLD & ALLERGY PO) Take  by mouth Daily As Needed.   10/17/2023    Budesonide (ENTOCORT EC) 3 MG 24 hr capsule Take 1 capsule by mouth Every Morning. PT currently taking two tablets. 90 capsule 3 10/17/2023    ferrous sulfate 324 MG tablet delayed-release Take 1 tablet by mouth Every Other Day.   10/17/2023    folic acid (FOLVITE) 800 MCG tablet Take 1 tablet by mouth Daily.   10/17/2023    LUMIGAN 0.01 % ophthalmic drops Administer  to both eyes Every Night.   10/17/2023    Melatonin 10 MG capsule Take  by mouth Every Night.   10/17/2023    Multiple Vitamins-Minerals (MULTIVITAMIN ADULT EXTRA C PO) Take 1 tablet by mouth Daily.   10/17/2023    multivitamin with minerals (Hair Skin and Nails Formula) tablet tablet Take 1 tablet by mouth Daily.   10/17/2023    NON FORMULARY 2 (Two) Times a Day. Ez tears   10/17/2023    Potassium 95 MG  tablet Take  by mouth Daily.   10/17/2023    Probiotic Product (Evolution Nutrition PO) Take  by mouth Daily.   10/17/2023    SIMBRINZA 1-0.2 % suspension INSTILL 1 DROP INTO BOTH EYES TWICE A DAY  3 10/17/2023    vitamin D3 125 MCG (5000 UT) capsule capsule Take 1 capsule by mouth Daily.   10/17/2023    calcium (OS-KAYLYN) 600 MG tablet Take 1 tablet by mouth Daily. (Patient not taking: Reported on 10/11/2023)   Not Taking    cephalexin (Keflex) 500 MG capsule Take 1 capsule by mouth 3 (Three) Times a Day. (Patient not taking: Reported on 10/11/2023) 21 capsule 0 Not Taking    Diclofenac Sodium (VOLTAREN) 1 % gel gel Apply 4 g topically to the appropriate area as directed As Needed. (Patient not taking: Reported on 10/11/2023)   Not Taking    fluticasone (FLONASE) 50 MCG/ACT nasal spray 2 sprays into the nostril(s) as directed by provider Daily. (Patient not taking: Reported on 10/11/2023) 16 g 11 Not Taking    loratadine (Claritin) 10 MG tablet Take 1 tablet by mouth Daily. (Patient not taking: Reported on 10/11/2023) 90 tablet 3 Not Taking    Melatonin 10-10 MG tablet controlled-release Take  by mouth Every Night. (Patient not taking: Reported on 10/11/2023)   Not Taking    oxybutynin XL (Ditropan XL) 5 MG 24 hr tablet Take 1 tablet by mouth Daily. 30 tablet 1     Teriparatide, Recombinant, (Forteo) 600 MCG/2.4ML injection Inject 0.08 mL under the skin into the appropriate area as directed Daily.   Unknown       Allergies:  No Known Allergies     Vitals: Temp:  [98.7 °F (37.1 °C)] 98.7 °F (37.1 °C)  Heart Rate:  [58] 58  Resp:  [16] 16  BP: (142)/(65) 142/65    Review Of Systems:  Constitutional:  Negative for chills, fever, and unexpected weight change.  Respiratory:  Negative for cough, chest tightness, shortness of breath, and wheezing.  Cardiovascular:  Negative for chest pain, palpitations, and leg swelling.  Gastrointestinal:  Negative for abdominal distention, abdominal pain, nausea,  vomiting.  Neurological:  Negative for weakness, numbness, and headaches.     Physical Exam:    General Appearance:  Alert, cooperative, in no acute distress.   Lungs:   Clear to auscultation, respirations regular, even and                 unlabored.   Heart:  Regular rhythm and normal rate.   Abdomen:   Normal bowel sounds, no masses, no organomegaly. Soft, nontender, nondistended   Neurologic: Alert and oriented x 3. Moves all four limbs equally       Assessment & Plan     Assessment:  Principal Problem:    Abnormal CT of the abdomen      Plan: ESOPHAGOGASTRODUODENOSCOPY (N/A)     Jose Mayer MD  10/18/2023

## 2023-10-19 ENCOUNTER — OFFICE VISIT (OUTPATIENT)
Dept: INTERNAL MEDICINE | Facility: CLINIC | Age: 78
End: 2023-10-19
Payer: MEDICARE

## 2023-10-19 VITALS
SYSTOLIC BLOOD PRESSURE: 160 MMHG | BODY MASS INDEX: 16.64 KG/M2 | WEIGHT: 106 LBS | OXYGEN SATURATION: 97 % | RESPIRATION RATE: 14 BRPM | HEIGHT: 67 IN | DIASTOLIC BLOOD PRESSURE: 86 MMHG | HEART RATE: 57 BPM

## 2023-10-19 DIAGNOSIS — E55.9 VITAMIN D DEFICIENCY: ICD-10-CM

## 2023-10-19 DIAGNOSIS — K21.00 GASTROESOPHAGEAL REFLUX DISEASE WITH ESOPHAGITIS WITHOUT HEMORRHAGE: ICD-10-CM

## 2023-10-19 DIAGNOSIS — K76.0 FATTY LIVER: ICD-10-CM

## 2023-10-19 DIAGNOSIS — K74.3 REYNOLDS SYNDROME: ICD-10-CM

## 2023-10-19 DIAGNOSIS — G47.00 INSOMNIA, UNSPECIFIED TYPE: ICD-10-CM

## 2023-10-19 DIAGNOSIS — L94.0 REYNOLDS SYNDROME: ICD-10-CM

## 2023-10-19 DIAGNOSIS — E78.2 MIXED HYPERLIPIDEMIA: Primary | ICD-10-CM

## 2023-10-19 PROBLEM — R93.5 ABNORMAL CT OF THE ABDOMEN: Status: RESOLVED | Noted: 2023-08-29 | Resolved: 2023-10-19

## 2023-10-19 PROBLEM — K21.9 GASTROESOPHAGEAL REFLUX DISEASE WITHOUT ESOPHAGITIS: Status: ACTIVE | Noted: 2023-10-19

## 2023-10-19 PROBLEM — R74.8 ABNORMAL LIVER ENZYMES: Status: RESOLVED | Noted: 2022-05-05 | Resolved: 2023-10-19

## 2023-10-19 PROBLEM — K21.9 GASTROESOPHAGEAL REFLUX DISEASE WITHOUT ESOPHAGITIS: Status: RESOLVED | Noted: 2023-10-19 | Resolved: 2023-10-19

## 2023-10-19 NOTE — ASSESSMENT & PLAN NOTE
Stable on current medication and dosage. Will continue current management. Refill medication as necessary.  On pepcid

## 2023-10-19 NOTE — PROGRESS NOTES
Office Note     Name: Odalys Miles    : 1945     MRN: [id number]     Chief Complaint  Establish Care (Offboarding Dr. Cheung, hyperlipidemia) and Follow-up (ESOPHAGOGASTRODUODENOSCOPY WITH BIOPSY)    Subjective     History of Present Illness:  Odalys Miles is a 78 y.o. female who presents today for chronic conditions.     Follows with nephrology and bone clinic: on Forteo and vitamin D3  High bp: 100/60 with home health this morning. High in clinic and pt claims that she had a rough schedule today causing her to have a high BP     DXA scan: osteoporosis, hx of esophagitis, unable to take alendronate, on vitamin D3  EGD done yesterday, showed mild esophagitis, now started on pepcid, continue to avoid coffee   S.p left leg surgery w/ rodolfo: 2022    Family History:   Family History   Problem Relation Age of Onset    Heart attack Mother     Diabetes Mother     Stroke Father     Breast cancer Neg Hx     Ovarian cancer Neg Hx     Colon cancer Neg Hx        Social History:   Social History     Socioeconomic History    Marital status:    Tobacco Use    Smoking status: Former     Packs/day: 0.25     Years: 45.00     Additional pack years: 0.00     Total pack years: 11.25     Types: Cigarettes     Start date:      Quit date:      Years since quittin.8     Passive exposure: Past    Smokeless tobacco: Never   Vaping Use    Vaping Use: Never used   Substance and Sexual Activity    Alcohol use: Not Currently    Drug use: No    Sexual activity: Defer       Health Maintenance   Topic Date Due    COVID-19 Vaccine (2023- season) 2023 (Originally 10/18/2023)    Hepatitis B (1 of 3 - Risk 3-dose series) 2024 (Originally 2003)    ANNUAL WELLNESS VISIT  2024    BMI FOLLOWUP  2024    LIPID PANEL  2024    DXA SCAN  2024    COLORECTAL CANCER SCREENING  10/12/2031    TDAP/TD VACCINES (3 - Td or Tdap) 04/10/2033    Pneumococcal Vaccine 65+  Completed     "ZOSTER VACCINE  Completed       Objective     Vital Signs  /86   Pulse 57   Resp 14   Ht 170.2 cm (67.01\")   Wt 48.1 kg (106 lb)   SpO2 97%   BMI 16.60 kg/m²   Estimated body mass index is 16.6 kg/m² as calculated from the following:    Height as of this encounter: 170.2 cm (67.01\").    Weight as of this encounter: 48.1 kg (106 lb).        Physical Exam  Vitals and nursing note reviewed.   Constitutional:       Appearance: Normal appearance.   HENT:      Head: Normocephalic and atraumatic.   Cardiovascular:      Rate and Rhythm: Normal rate and regular rhythm.      Pulses: Normal pulses.      Heart sounds: Normal heart sounds.   Pulmonary:      Effort: Pulmonary effort is normal. No respiratory distress.      Breath sounds: Normal breath sounds. No wheezing, rhonchi or rales.   Abdominal:      General: Abdomen is flat. Bowel sounds are normal.      Palpations: Abdomen is soft.      Tenderness: There is no abdominal tenderness. There is no guarding.   Musculoskeletal:      Cervical back: Neck supple.   Skin:     General: Skin is warm.      Capillary Refill: Capillary refill takes less than 2 seconds.   Neurological:      General: No focal deficit present.      Mental Status: She is alert. Mental status is at baseline.   Psychiatric:         Mood and Affect: Mood normal.         Behavior: Behavior normal.          Procedures     Assessment and Plan     Diagnoses and all orders for this visit:    1. Mixed hyperlipidemia (Primary)  Assessment & Plan:  Not on meds  Diet controlled  LDL and Tg normal      2. Fatty liver  Assessment & Plan:  Stable, asymptomatic      3. Vitamin D deficiency  Assessment & Plan:  Stable on current medication and dosage. Will continue current management. Refill medication as necessary.  On vitamin D3  Follows with nephrology      4. Insomnia, unspecified type    5. Gastroesophageal reflux disease with esophagitis without hemorrhage  Assessment & Plan:  Stable on current medication " and dosage. Will continue current management. Refill medication as necessary.  On pepcid      6. Araujo syndrome  Assessment & Plan:  Symptomatic, worse with exposure to cold  Not on meds  Consider amlodipine if bothering her           Counseling was given to patient for the following topics: instructions for management, risks and benefits of treatment options, and importance of treatment compliance.    Follow Up  Return in about 3 months (around 1/20/2024) for 6 month follow up.    MD NEPTALI [facility]  [address] [place] [id number] [phone]

## 2023-10-19 NOTE — ASSESSMENT & PLAN NOTE
Stable on current medication and dosage. Will continue current management. Refill medication as necessary.  On vitamin D3  Follows with nephrology

## 2023-10-20 LAB — REF LAB TEST METHOD: NORMAL

## 2023-11-17 ENCOUNTER — TELEPHONE (OUTPATIENT)
Dept: GASTROENTEROLOGY | Facility: CLINIC | Age: 78
End: 2023-11-17
Payer: MEDICARE

## 2023-11-17 NOTE — TELEPHONE ENCOUNTER
----- Message from Mia Morrissey MA sent at 11/17/2023  8:08 AM EST -----    ----- Message -----  From: Jose Mayer MD  Sent: 11/16/2023   7:17 PM EST  To: Mia Morrissey MA    Let her know that her MRI scan Demott well without any major abnormality to worry about    ----- Message -----  From: Mia Morrissey MA  Sent: 11/16/2023   1:16 PM EST  To: Jose Mayer MD    Patient called and left voicemail. She states that she has not heard anything regarding the results of her MRI. Please review and advise.

## 2023-12-08 ENCOUNTER — HOSPITAL ENCOUNTER (EMERGENCY)
Facility: HOSPITAL | Age: 78
Discharge: HOME | End: 2023-12-08
Attending: EMERGENCY MEDICINE
Payer: MEDICARE

## 2023-12-08 ENCOUNTER — APPOINTMENT (OUTPATIENT)
Dept: CT IMAGING | Facility: HOSPITAL | Age: 78
End: 2023-12-08
Attending: EMERGENCY MEDICINE
Payer: MEDICARE

## 2023-12-08 VITALS
DIASTOLIC BLOOD PRESSURE: 85 MMHG | HEIGHT: 67 IN | WEIGHT: 100 LBS | HEART RATE: 81 BPM | RESPIRATION RATE: 20 BRPM | OXYGEN SATURATION: 92 % | BODY MASS INDEX: 15.7 KG/M2 | SYSTOLIC BLOOD PRESSURE: 167 MMHG | TEMPERATURE: 98.2 F
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
## 2023-12-08 DIAGNOSIS — S01.81XA FACIAL LACERATION, INITIAL ENCOUNTER: Primary | ICD-10-CM

## 2023-12-08 PROCEDURE — 70486 CT MAXILLOFACIAL W/O DYE: CPT

## 2023-12-08 PROCEDURE — 6370000000 HC RX 637 (ALT 250 FOR IP): Performed by: EMERGENCY MEDICINE

## 2023-12-08 PROCEDURE — 94761 N-INVAS EAR/PLS OXIMETRY MLT: CPT

## 2023-12-08 PROCEDURE — 90715 TDAP VACCINE 7 YRS/> IM: CPT | Performed by: EMERGENCY MEDICINE

## 2023-12-08 PROCEDURE — 6360000002 HC RX W HCPCS: Performed by: EMERGENCY MEDICINE

## 2023-12-08 PROCEDURE — 99284 EMERGENCY DEPT VISIT MOD MDM: CPT

## 2023-12-08 PROCEDURE — 90471 IMMUNIZATION ADMIN: CPT | Performed by: EMERGENCY MEDICINE

## 2023-12-08 PROCEDURE — 72125 CT NECK SPINE W/O DYE: CPT

## 2023-12-08 PROCEDURE — 12014 RPR F/E/E/N/L/M 5.1-7.5 CM: CPT

## 2023-12-08 PROCEDURE — 70450 CT HEAD/BRAIN W/O DYE: CPT

## 2023-12-08 PROCEDURE — 2500000003 HC RX 250 WO HCPCS

## 2023-12-08 RX ORDER — IBUPROFEN 200 MG
TABLET ORAL
Status: DISCONTINUED
Start: 2023-12-08 | End: 2023-12-08 | Stop reason: HOSPADM

## 2023-12-08 RX ORDER — IBUPROFEN 200 MG
CAPSULE ORAL 4 TIMES DAILY
Status: DISCONTINUED | OUTPATIENT
Start: 2023-12-08 | End: 2023-12-08 | Stop reason: HOSPADM

## 2023-12-08 RX ORDER — LIDOCAINE HYDROCHLORIDE AND EPINEPHRINE 10; 10 MG/ML; UG/ML
INJECTION, SOLUTION INFILTRATION; PERINEURAL
Status: COMPLETED
Start: 2023-12-08 | End: 2023-12-08

## 2023-12-08 RX ORDER — HYDROCODONE BITARTRATE AND ACETAMINOPHEN 5; 325 MG/1; MG/1
1 TABLET ORAL ONCE
Status: COMPLETED | OUTPATIENT
Start: 2023-12-08 | End: 2023-12-08

## 2023-12-08 RX ORDER — LIDOCAINE HYDROCHLORIDE AND EPINEPHRINE 10; 10 MG/ML; UG/ML
20 INJECTION, SOLUTION INFILTRATION; PERINEURAL ONCE
Status: COMPLETED | OUTPATIENT
Start: 2023-12-08 | End: 2023-12-08

## 2023-12-08 RX ADMIN — HYDROCODONE BITARTRATE AND ACETAMINOPHEN 1 TABLET: 5; 325 TABLET ORAL at 17:36

## 2023-12-08 RX ADMIN — LIDOCAINE HYDROCHLORIDE,EPINEPHRINE BITARTRATE 20 ML: 10; .01 INJECTION, SOLUTION INFILTRATION; PERINEURAL at 18:41

## 2023-12-08 RX ADMIN — TETANUS TOXOID, REDUCED DIPHTHERIA TOXOID AND ACELLULAR PERTUSSIS VACCINE, ADSORBED 0.5 ML: 5; 2.5; 8; 8; 2.5 SUSPENSION INTRAMUSCULAR at 18:35

## 2023-12-08 RX ADMIN — LIDOCAINE HYDROCHLORIDE AND EPINEPHRINE 20 ML: 10; 10 INJECTION, SOLUTION INFILTRATION; PERINEURAL at 18:41

## 2023-12-08 ASSESSMENT — ENCOUNTER SYMPTOMS
SHORTNESS OF BREATH: 0
EYE PAIN: 0
CONSTIPATION: 0
VOMITING: 0
COUGH: 0
RHINORRHEA: 0
NAUSEA: 0
DIARRHEA: 0
EYE REDNESS: 0
BACK PAIN: 0
ABDOMINAL PAIN: 0

## 2023-12-08 ASSESSMENT — PAIN - FUNCTIONAL ASSESSMENT: PAIN_FUNCTIONAL_ASSESSMENT: 0-10

## 2023-12-08 ASSESSMENT — PAIN DESCRIPTION - PAIN TYPE: TYPE: ACUTE PAIN

## 2023-12-08 ASSESSMENT — PAIN DESCRIPTION - DESCRIPTORS: DESCRIPTORS: ACHING

## 2023-12-08 ASSESSMENT — PAIN SCALES - GENERAL: PAINLEVEL_OUTOF10: 8

## 2023-12-08 ASSESSMENT — PAIN DESCRIPTION - LOCATION: LOCATION: HEAD

## 2023-12-08 ASSESSMENT — PAIN DESCRIPTION - FREQUENCY: FREQUENCY: CONTINUOUS

## 2023-12-08 NOTE — ED PROVIDER NOTES
[address] [facility] ENCOUNTER        Pt Name: Ann Moreno  MRN: [id number]  [address] [facility] [place] 1945  Date of evaluation: 12/8/2023  Provider: Dario Webb DO  PCP: Neelima Shah MD  Note Started: 5:02 PM EST 12/8/23    CHIEF COMPLAINT      Fall, Facial Lacerations    HISTORY OF PRESENT ILLNESS: 1 or more Elements     Chief Complaint   Patient presents with    Fall     Pt fell at [facility] and hit head on ground. Denies LOC. History from : Patient  Limitations to history : None    Ann Moreno is a 78 y.o. female who presents to the emergency department secondary to concern for facial lacerations after a fall. [place] and fell on her face, breaking her glasses on her face. Sustained several lacerations to her face. Denies any loss of consciousness. Denies taking any blood thinners. She is currently alert and oriented x 4 at presentation to the ED. Past medical history noted below. Aside from what is stated above denies any other symptoms or modifying factors. reports that she has quit smoking. She has never used smokeless tobacco. She reports that she does not drink alcohol and does not use drugs. Nursing Notes were all reviewed and agreed with or any disagreements addressed in HPI/MDM. REVIEW OF SYSTEMS :    Review of Systems   Constitutional:  Negative for chills and fever. HENT:  Negative for congestion and rhinorrhea. Eyes:  Negative for pain and redness. Respiratory:  Negative for cough and shortness of breath. Cardiovascular:  Negative for chest pain and leg swelling. Gastrointestinal:  Negative for abdominal pain, constipation, diarrhea, nausea and vomiting. Genitourinary:  Negative for frequency and urgency. Musculoskeletal:  Negative for back pain and neck pain. Skin:  Positive for wound. Negative for rash. Neurological:  Negative for facial asymmetry and weakness.

## 2023-12-09 NOTE — ED NOTES
Pt informed of d/c instructions, Pt verbalizes understanding.       Lisbeth Orourke RN  12/08/23 [id number]

## 2023-12-13 ENCOUNTER — CLINICAL SUPPORT (OUTPATIENT)
Dept: INTERNAL MEDICINE | Facility: CLINIC | Age: 78
End: 2023-12-13
Payer: MEDICARE

## 2023-12-19 ENCOUNTER — TELEPHONE (OUTPATIENT)
Dept: GASTROENTEROLOGY | Facility: CLINIC | Age: 78
End: 2023-12-19
Payer: MEDICARE

## 2023-12-19 NOTE — TELEPHONE ENCOUNTER
Communicated provider note to [facility] provider. She stated that she had strongly encouraged pt to go to ER and had also called in a potassium supplement. She said that pt refused to go to ED. However, she would attempt to contact her again to further urge pt to seek assistance at ED due to critical levels.

## 2023-12-19 NOTE — TELEPHONE ENCOUNTER
----- Message from Yvonne Jacobo PA-C sent at 12/19/2023 12:11 PM EST -----  Regarding: RE: Critical Potassium  Pt should report to ER for management of critical potassium    ----- Message -----  From: Ben Hyde MA  Sent: 12/19/2023  11:15 AM EST  To: Yvonne Jacobo PA-C  Subject: Critical Potassium                                [facility] provider, Galina ESTEVEZ, called to advise of critical value -Potassium: 2.9.   She sees that pt is currently taking a potassium supplement, however is concerned that it may be insufficient. PA is requesting advice on if additional potassium is appropriate.

## 2024-01-22 ENCOUNTER — OFFICE VISIT (OUTPATIENT)
Dept: INTERNAL MEDICINE | Facility: CLINIC | Age: 79
End: 2024-01-22
Payer: MEDICARE

## 2024-01-22 ENCOUNTER — OFFICE VISIT (OUTPATIENT)
Dept: GASTROENTEROLOGY | Facility: CLINIC | Age: 79
End: 2024-01-22
Payer: MEDICARE

## 2024-01-22 VITALS
TEMPERATURE: 97.7 F | HEIGHT: 67 IN | DIASTOLIC BLOOD PRESSURE: 54 MMHG | HEART RATE: 61 BPM | SYSTOLIC BLOOD PRESSURE: 140 MMHG | WEIGHT: 107 LBS | BODY MASS INDEX: 16.79 KG/M2

## 2024-01-22 VITALS
HEIGHT: 67 IN | WEIGHT: 106 LBS | DIASTOLIC BLOOD PRESSURE: 70 MMHG | SYSTOLIC BLOOD PRESSURE: 142 MMHG | HEART RATE: 62 BPM | RESPIRATION RATE: 16 BRPM | BODY MASS INDEX: 16.64 KG/M2

## 2024-01-22 DIAGNOSIS — R42 DIZZINESS: ICD-10-CM

## 2024-01-22 DIAGNOSIS — K52.832 LYMPHOCYTIC COLITIS: ICD-10-CM

## 2024-01-22 DIAGNOSIS — E44.1 MILD PROTEIN-CALORIE MALNUTRITION: ICD-10-CM

## 2024-01-22 DIAGNOSIS — M81.8 OTHER OSTEOPOROSIS WITHOUT CURRENT PATHOLOGICAL FRACTURE: ICD-10-CM

## 2024-01-22 DIAGNOSIS — E87.6 HYPOKALEMIA: Primary | ICD-10-CM

## 2024-01-22 DIAGNOSIS — Z86.2 HISTORY OF IRON DEFICIENCY ANEMIA: ICD-10-CM

## 2024-01-22 DIAGNOSIS — E44.0 MODERATE PROTEIN-CALORIE MALNUTRITION: Primary | ICD-10-CM

## 2024-01-22 PROCEDURE — 99214 OFFICE O/P EST MOD 30 MIN: CPT | Performed by: STUDENT IN AN ORGANIZED HEALTH CARE EDUCATION/TRAINING PROGRAM

## 2024-01-22 PROCEDURE — 99214 OFFICE O/P EST MOD 30 MIN: CPT | Performed by: INTERNAL MEDICINE

## 2024-01-22 PROCEDURE — 1160F RVW MEDS BY RX/DR IN RCRD: CPT | Performed by: INTERNAL MEDICINE

## 2024-01-22 PROCEDURE — 1159F MED LIST DOCD IN RCRD: CPT | Performed by: INTERNAL MEDICINE

## 2024-01-22 RX ORDER — BUDESONIDE 3 MG/1
6 CAPSULE, COATED PELLETS ORAL EVERY MORNING
Qty: 60 CAPSULE | Refills: 5 | Status: SHIPPED | OUTPATIENT
Start: 2024-01-22

## 2024-01-22 NOTE — PROGRESS NOTES
"    Office Note     Name: Odalys Miles    : 1945     MRN: [id number]     Chief Complaint  Follow-up (Chronic condition management)    Subjective     History of Present Illness:  Odalys Miles is a 78 y.o. female who presents today for chronic conditions.     Follows with nephrology and rheumatology clinic: on Forteo and vitamin D3  Hx of hypokalemia in 2023 eventually improved  She was noted to have critically low potassium levels to 2.9 in Osteoporosis clinic. She was advised to go to the ER but refused. She was eventually started on Kcl 20meq BID.   Notes that she has episodes of near faint, and \"Spells like she is about to pass out\", she notes dizziness while walking. Does not occur when she sits from lying nor standing after sitting. Denies spinning sensation.      DXA scan: osteoporosis, hx of esophagitis, unable to take alendronate, on vitamin D3  EGD showed mild esophagitis, on pepcid, continue to avoid coffee. Colonoscopy advised to be repeated on   S.p left leg surgery w/ rodolfo: 2022    Family History:   Family History   Problem Relation Age of Onset    Heart attack Mother     Diabetes Mother     Stroke Father     Breast cancer Neg Hx     Ovarian cancer Neg Hx     Colon cancer Neg Hx        Social History:   Social History     Socioeconomic History    Marital status:    Tobacco Use    Smoking status: Former     Packs/day: 0.25     Years: 45.00     Additional pack years: 0.00     Total pack years: 11.25     Types: Cigarettes     Start date:      Quit date:      Years since quittin.0     Passive exposure: Past    Smokeless tobacco: Never   Vaping Use    Vaping Use: Never used   Substance and Sexual Activity    Alcohol use: Not Currently    Drug use: No    Sexual activity: Defer       Health Maintenance   Topic Date Due    Hepatitis B (1 of 3 - Risk 3-dose series) 2024 (Originally 2003)    COVID-19 Vaccine ( season) 2025 (Originally " "10/18/2023)    ANNUAL WELLNESS VISIT  07/12/2024    LIPID PANEL  09/18/2024    DXA SCAN  09/27/2024    BMI FOLLOWUP  01/22/2025    COLORECTAL CANCER SCREENING  10/12/2031    TDAP/TD VACCINES (4 - Td or Tdap) 12/08/2033    Pneumococcal Vaccine 65+  Completed    ZOSTER VACCINE  Completed       Objective     Vital Signs  /70 (BP Location: Left arm, Patient Position: Standing, Cuff Size: Pediatric)   Pulse 62   Resp 16   Ht 170.2 cm (67.01\")   Wt 48.1 kg (106 lb)   BMI 16.60 kg/m²   Estimated body mass index is 16.6 kg/m² as calculated from the following:    Height as of this encounter: 170.2 cm (67.01\").    Weight as of this encounter: 48.1 kg (106 lb).  BMI is below normal parameters (malnutrition). Recommendations: referral to dietitian    Physical Exam  Vitals and nursing note reviewed.   Constitutional:       Appearance: Normal appearance.   HENT:      Head: Normocephalic and atraumatic.   Cardiovascular:      Rate and Rhythm: Normal rate and regular rhythm.      Pulses: Normal pulses.      Heart sounds: Normal heart sounds.   Pulmonary:      Effort: Pulmonary effort is normal. No respiratory distress.      Breath sounds: Normal breath sounds. No wheezing, rhonchi or rales.   Abdominal:      General: Abdomen is flat. Bowel sounds are normal.      Palpations: Abdomen is soft.      Tenderness: There is no abdominal tenderness. There is no guarding.   Musculoskeletal:      Cervical back: Neck supple.   Skin:     General: Skin is warm.      Capillary Refill: Capillary refill takes less than 2 seconds.   Neurological:      General: No focal deficit present.      Mental Status: She is alert. Mental status is at baseline.   Psychiatric:         Mood and Affect: Mood normal.         Behavior: Behavior normal.          Procedures     Assessment and Plan     Diagnoses and all orders for this visit:    1. Hypokalemia (Primary)  Assessment & Plan:  Likely secondary to diarrhea and colitis, chronic.  Recently seen in " osteoporosis clinic and was noted to have K of 2.9.  She did refuse to go to the emergency room and was advised to start KCl 20 twice a day which she finished a day or 2 ago.  Will recheck potassium today.    Orders:  -     Basic metabolic panel    2. Dizziness  Assessment & Plan:  Differential diagnoses include hypoglycemia, hypotension, orthostatic hypotension, cardiac etiology such as arrhythmias among others.  I have discussed this with the patient.  Orthostatics in office was normal  Advised to increase water intake daily  Advised to monitor blood pressure at home daily  referral to physical therapy and nutrition services    Orders:  -     Ambulatory Referral to Nutrition Services  -     Ambulatory Referral to Home Health    3. Other osteoporosis without current pathological fracture  Assessment & Plan:  On Forteo and vitamin D3, follows with rheumatology      4. Mild protein-calorie malnutrition  -     Ambulatory Referral to Nutrition Services  -     Ambulatory Referral to Home Health         Counseling was given to patient for the following topics: instructions for management, risks and benefits of treatment options, and importance of treatment compliance.    Follow Up  Return in about 3 months (around 4/22/2024) for Recheck dizziness.    MD NEPTALI [facility]  [address] [place] [address] [address]  [facility] [id number] [phone]

## 2024-01-22 NOTE — PROGRESS NOTES
Follow Up Note     Date: 2024   Patient Name: Odalys Miles  MRN: [id number]  : 1945     Referring Physician: Cordelia Hinton MD    Chief Complaint:    Chief Complaint   Patient presents with    Anemia     SPARKLE      Lymphocytic colitis    Weight Loss       Interval History:   2024  Odalys Miles is a 78 y.o. female who is here today for follow up for her lymphocytic colitis.  She states that her weight has been stable.  Bowel movement good now 1 or 2/day mostly soft with budesonide 3 mg p.o. daily dose.  Denies any abdominal pain.     2022  Odalys Miles is a 76 y.o. female who is here today for follow up for diarrhea.   Diarrhea is unchanged. She needs refills of her Budesonide. She is taking 2 capsules a day right now. She is having 1-2 semi-formed bowel movements per day, but she is having urgency associated with bowel movements and has difficulty making it to the bathroom at times. No history of rectal bleeding. No abdominal pain. Denies nausea or vomiting.     2019  Ms. Miles returns to the office.  She is doing better at this time after beginning the Entocort medication again.  She is currently taking 3 capsules daily.  Ms. Miles states that when she tried to decrease the dosage and stop the medication there was an increased frequency of bowel movements.  There is no history of blood in the stool.  She denies any current abdominal pain.  There is no history of nausea or vomiting.  She denies any night sweats, fever chills.    Subjective      Past Medical History:   Past Medical History:   Diagnosis Date    Abnormal liver enzymes     Basal cell carcinoma     under left eye    Body mass index (BMI) 20.0-20.9, adult     BPPV (benign paroxysmal positional vertigo)     COVID-19 vaccine series completed     Moderna    Elevated cholesterol     Fracture     as a child-wrist    Fracture of leg 2022    Fracture of sacrum     GERD (gastroesophageal reflux  disease)     Glaucoma     Glaucoma     Hip fracture 10/11/2019    Hip repalced in 2020    Humerus fracture     Lt arm    Ingrowing toenail     Mammogram abnormal     Microscopic colitis 07/23/2014    Onychomycosis     Open wound of left ankle     Osteoporosis     Personal history of fall 12/2023    Hit forehead and had to have stitches.    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; chronic wound present     Wears dentures     full upper plate, partial on the lower    Wears glasses      Past Surgical History:   Past Surgical History:   Procedure Laterality Date    BACK SURGERY      hardware placed, sacral fracture    CATARACT EXTRACTION, BILATERAL      COLONOSCOPY      DR JAIMES    COLONOSCOPY N/A 10/12/2021    Procedure: COLONOSCOPY with biopsy and APC sigmoid AVM cauterization;  Surgeon: Jose Mayer MD;  Location: [facility] ENDOSCOPY;  Service: Gastroenterology;  Laterality: N/A;    ENDOSCOPY N/A 10/18/2023    Procedure: ESOPHAGOGASTRODUODENOSCOPY WITH BIOPSY;  Surgeon: Jose Mayer MD;  Location: [facility] ENDOSCOPY;  Service: Gastroenterology;  Laterality: N/A;    HIP SURGERY Left 12/27/2019    secondary to a fracture    LEG SURGERY Left 2022    fracture, hardware placed    ORIF HUMERUS FRACTURE Left 11/03/2021    Procedure: HUMERUS PROXIMAL OPEN REDUCTION INTERNAL FIXATION WITH IMTRAMEDULLARY NAIL FIXATION LEFT;  Surgeon: Linden Domingo MD;  Location: [facility] OR;  Service: Orthopedics;  Laterality: Left;    SKIN BIOPSY      TUBAL ABDOMINAL LIGATION         Family History:   Family History   Problem Relation Age of Onset    Heart attack Mother     Diabetes Mother     Stroke Father     Breast cancer Neg Hx     Ovarian cancer Neg Hx     Colon cancer Neg Hx        Social History:   Social History     Socioeconomic History    Marital  status:    Tobacco Use    Smoking status: Former     Packs/day: 0.25     Years: 45.00     Additional pack years: 0.00     Total pack years: 11.25     Types: Cigarettes     Start date:      Quit date:      Years since quittin.0     Passive exposure: Past    Smokeless tobacco: Never   Vaping Use    Vaping Use: Never used   Substance and Sexual Activity    Alcohol use: Not Currently    Drug use: No    Sexual activity: Defer       Medications:     Current Outpatient Medications:     ACETAMINOPHEN PO, Take 650 mg by mouth Every 6 (Six) Hours As Needed for Mild Pain., Disp: , Rfl:     ascorbic acid (VITAMIN C) 1000 MG tablet, Take 1 tablet by mouth Daily., Disp: , Rfl:     Budesonide (ENTOCORT EC) 3 MG 24 hr capsule, Take 1 capsule by mouth Every Morning. PT currently taking two tablets., Disp: 90 capsule, Rfl: 3    ferrous sulfate 324 MG tablet delayed-release, Take 1 tablet by mouth Every Other Day., Disp: , Rfl:     fluticasone (FLONASE) 50 MCG/ACT nasal spray, 2 sprays into the nostril(s) as directed by provider Daily., Disp: 16 g, Rfl: 11    folic acid (FOLVITE) 800 MCG tablet, Take 1 tablet by mouth Daily., Disp: , Rfl:     LUMIGAN 0.01 % ophthalmic drops, Administer  to both eyes Every Night., Disp: , Rfl:     Melatonin 10 MG capsule, Take  by mouth Every Night., Disp: , Rfl:     Multiple Vitamins-Minerals (MULTIVITAMIN ADULT EXTRA C PO), Take 1 tablet by mouth Daily., Disp: , Rfl:     multivitamin with minerals (Hair Skin and Nails Formula) tablet tablet, Take 1 tablet by mouth Daily., Disp: , Rfl:     NON FORMULARY, 2 (Two) Times a Day. Ez tears, Disp: , Rfl:     Potassium 95 MG tablet, Take  by mouth Daily., Disp: , Rfl:     Probiotic Product (Akademos PO), Take  by mouth Daily., Disp: , Rfl:     SIMBRINZA 1-0.2 % suspension, INSTILL 1 DROP INTO BOTH EYES TWICE A DAY, Disp: , Rfl: 3    Teriparatide, Recombinant, (Forteo) 600 MCG/2.4ML injection, Inject 0.08 mL under the skin into  "the appropriate area as directed Daily., Disp: , Rfl:     vitamin D3 125 MCG (5000 UT) capsule capsule, Take 1 capsule by mouth Daily., Disp: , Rfl:     Allergies:   No Known Allergies    Review of Systems:   Review of Systems   Constitutional:  Negative for appetite change, fatigue, fever and unexpected weight loss.   HENT:  Negative for trouble swallowing.    Gastrointestinal:  Positive for diarrhea. Negative for abdominal distention, abdominal pain, anal bleeding, blood in stool, constipation, nausea, rectal pain, vomiting, GERD and indigestion.   Skin:  Positive for wound (ankle).   Neurological:  Positive for dizziness (seeing PCP for this.).       The following portions of the patient's history were reviewed and updated as appropriate: allergies, current medications, past family history, past medical history, past social history, past surgical history and problem list.    Objective     Physical Exam:  Vital Signs:   Vitals:    01/22/24 1529   BP: 140/54   Pulse: 61   Temp: 97.7 °F (36.5 °C)   TempSrc: Infrared   Weight: 48.5 kg (107 lb)   Height: 170.2 cm (67\")  Comment: per EPIC       Physical Exam  Constitutional:       Comments: Poorly built and nourished   HENT:      Head: Normocephalic and atraumatic.   Eyes:      Conjunctiva/sclera: Conjunctivae normal.   Abdominal:      General: Abdomen is flat. There is no distension.      Palpations: There is no mass.      Tenderness: There is no abdominal tenderness. There is no guarding or rebound.      Hernia: No hernia is present.   Musculoskeletal:      Cervical back: Normal range of motion and neck supple.   Neurological:      Mental Status: She is alert.         Results Review:   I reviewed the patient's new clinical results.    No visits with results within 90 Day(s) from this visit.   Latest known visit with results is:   Admission on 10/18/2023, Discharged on 10/18/2023   Component Date Value Ref Range Status    Reference Lab Report 10/18/2023    Final       "              Value:[facility]  [address] [place] [address]  Phone: [phone] or [phone]  Fax: [phone]  Min Amato M.D., Medical Director    PATIENT NAME                                     LABORATORY [address].  [id number]   LORENA BELLAMY.                             [id number]  [id number]                                 AGE                    SEX   SSN              CLIENT REF #  [facility]                    78        1945      F     [id number]      [id number]    [address]                        REQUESTING M.D.           ATTENDING M.D.         COPY TO.  [address]                                [place] [address]                         JAGANNATH                 TUSHAR  DATE COLLECTED            DATE RECEIVED          DATE REPORTED  10/18/2023                10/19/2023             10/20/2023    DIAGNOSIS:  A.     GASTRIC ANTRUM, BIOPSY:  Reactive gastropathy  No Helicobacter pylori                           like organisms identified on H&E stained slide  No intestinal metaplasia or dysplasia identified  B.     ESOPHAGUS, BIOPSY, DISTAL:  Columnar mucosa with reactive changes  No squamous mucosa identified  No increased intraepithelial eosinophils, intestinal metaplasia or  dysplasia identified  C.     DUODENUM, BIOPSY:  Small bowel mucosa with no significant pathologic abnormality    CLINICAL HISTORY:  Abnormal CT of the abdomen    SPECIMENS RECEIVED:  A.    GASTRIC ANTRUM, BIOPSY  B.    ESOPHAGUS, BIOPSY, DISTAL  C.    DUODENUM, BIOPSY    MICROSCOPIC DESCRIPTION:  Tissue blocks are prepared and slides are examined microscopically on all  specimens. See diagnosis for details.    Professional interpretation rendered by Cecilia Ahmadi D.O., F.C.A.P. at  [facility], [address] [place] [address].    GROSS DESCRIPTION:  A.    Labeled gastric antrum and body biopsy are 3 portions of  tan soft tissue  measuring 0.8 x 0.4 x 0.2 cm in aggregate.  Submitted entirely in 1                           block.  BW  B.    Labeled distal esophagus biopsy is a 0.2 x 0.1 x 0.1 cm portion of tan soft  tissue which may not survive processing.  Submitted entirely 1 block.  C.    Labeled duodenal biopsy are 3 portions of tan soft tissue measuring 0.5 x  0.4 x 0.2 cm in aggregate.  Submitted entirely 1 block.    REVIEWED, DIAGNOSED AND ELECTRONICALLY  SIGNED BY:    Cecilia Ahmadi D.O., F.C.A.P.  CPT CODES:  88305x3        CT Cervical Spine Without Contrast    Result Date: 12/8/2023  Multilevel degenerative disc disease and hypertrophic degenerative changes throughout the cervical spine. No acute findings. Electronically signed by Brittney Gutierrez MD    CT Head Without Contrast    Result Date: 12/8/2023  No acute intracranial findings. Anterior frontal and nasal soft tissue swelling, with fractures of the nasal bones. Electronically signed by Brittney Gutierrez MD    CT FACIAL BONES WO CONTRAST    Result Date: 12/8/2023  Linear fractures of the nasal bones, nondisplaced. No other facial fracture seen. Anterior frontal and left greater than right nasal soft tissue swelling with scattered air densities over the nose suggesting lacerations. No soft tissue foreign bodies are seen. Electronically signed by Brittney Gutierrez MD     Result Date: 9/28/2022   1. Bone mineral density lumbar spine is within the range of osteopenia. 2. Bone mineral density of the total right femur is within the range of osteoporosis.  FRAX score: The 10 year probability of major osteoporotic fracture is calculated at 18.5%. The 10 year probability of hip fracture is calculated at 5.8%.       Images were reviewed, interpreted, and dictated by Dr. Traci Parson MD Transcribed by Inocencia Taylor PA-C.  This report was signed and finalized on 9/28/2022 10:23 AM by Traci Parson MD.     Colonoscopy 10/20/2021 per Dr. Mayer  - Antony  colon.  - A single non-bleeding colonic angioectasia. Treated with argon plasma coagulation (APC).  - Non-bleeding internal hemorrhoids.  - The examined portion of the ileum was normal. Biopsied.  - Biopsies performed in the rectum, in the sigmoid colon, in the descending colon and in the transverse colon and AC, Caecum.  - No endoscopic signs of colitis  - Pathology:   Lymphocytic colitis all biopsies on the colon  Terminal ileum biopsies normal    EGD 10/18/2023  - Normal oropharynx.  - Z-line variable, 34 cm from the incisors. Biopsied to rule out Ndiaye's  - 3 cm hiatal hernia.  - Mild diffuse distal esophagitis  - Mild erythematous mucosa in the posterior wall of the stomach, antrum and prepyloric region of the stomach. Biopsied.  - Normal duodenal bulb, first portion of the duodenum, second portion of the duodenum and third portion of the duodenum. Biopsied.    DIAGNOSIS:  A. GASTRIC ANTRUM, BIOPSY:  Reactive gastropathy  No Helicobacter pylori like organisms identified on H&E stained slide  No intestinal metaplasia or dysplasia identified  B. ESOPHAGUS, BIOPSY, DISTAL:  Columnar mucosa with reactive changes  No squamous mucosa identified  No increased intraepithelial eosinophils, intestinal metaplasia or dysplasia identified  C. DUODENUM, BIOPSY:  Small bowel mucosa with no significant pathologic abnormality    Assessment / Plan      1. Lymphocytic colitis with diarrhea  2. History of Clostridium difficile infection  3.  Protein calorie malnutrition with BMI 16.76  4.  History of iron deficiency anemia  1/22/2024  Patient is clinically doing well with 1 or 2 soft bowel movement daily with present dose of 3 mg p.o. daily and occasional 2 mg p.o. daily dose.  Patient has been thin build since she was young.  She in fact weighing around 100 pounds for many years.  Gained few pounds recently.  CT abdomen pelvis done with contrast on 8/15/2023 revealed fatty liver, distal esophageal wall thickening and gastric  wall thickening and dilatation of the pancreatic duct..  Subsequently she had a MRI scan done without contrast with MRCP that revealed pancreatic duct upper limit of normal but without any filling defect or mass lesion.  EGD done on 10/18/2023 revealed esophagitis with a hiatal hernia and gastritis.  Patient likely had a nutritional iron deficiency anemia.  Currently on iron pills every other day.  Lab work done in September 2023 revealed a normal hemoglobin of 12.7 g/dL.  CMP was normal except sodium of 147.  She has a CMP drawn this morning at PCPs office will follow    Patient was already referred by PCP for a nutritional evaluation  Continue iron pills every other day as before  We will keep her on budesonide 3 mg p.o. daily and patient can take 6 mg if there is any bowel movement more than 3/day.  Given her osteopenia will try avoiding budesonide near future and will possibly consider bismuth subsalicylate.     6/23/2022  She has a history of lymphocytic colitis diagnosed by biopsy and 2014.  Denies rectal bleeding. Colonoscopy 10/20/2021 with no endoscopic signs of ileitis or colitis, biopsies revealed lymphocytic colitis.  Terminal ileum biopsy normal.  TSH normal. She was taking budesonide 9 mg daily, about 2 weeks ago, she decreased to budesonide 6 mg daily.  She is having 1-2 semiformed stools per day.  She has been having some urgency associated with bowel movements, but denies watery stools.  She was diagnosed with C. difficile in April 2022 at [facility] while being hospitalized for an injury requiring lengthy stay, and was treated with vancomycin. She is not symptomatic at this time.       Previous History:  5. Personal history of colon polyps  6. Family history colon CA  7. Angiodysplasia of the colon.  12/16/2021  Colonoscopy done on 10/20/2021 did not reveal any colon polyps. She had a colonic angiectasia which was ablated with APC. Her recent lab work does not reveal any anemia. Will consider high  risk screening colonoscopy in 5 years time in 2026 depending on her medical and general condition.  Her dad had colon cancer at the age of 49.        Follow Up:   No follow-ups on file.    Jose Mayer MD  [facility]  1/22/2024  15:31 EST     Please note that portions of this note may have been completed with a voice recognition program.

## 2024-01-22 NOTE — ASSESSMENT & PLAN NOTE
Differential diagnoses include hypoglycemia, hypotension, orthostatic hypotension, cardiac etiology such as arrhythmias among others.  I have discussed this with the patient.  Orthostatics in office was normal  Advised to increase water intake daily  Advised to monitor blood pressure at home daily  referral to physical therapy and nutrition services

## 2024-01-22 NOTE — ASSESSMENT & PLAN NOTE
Likely secondary to diarrhea and colitis, chronic.  Recently seen in osteoporosis clinic and was noted to have K of 2.9.  She did refuse to go to the emergency room and was advised to start KCl 20 twice a day which she finished a day or 2 ago.  Will recheck potassium today.

## 2024-01-23 ENCOUNTER — TELEPHONE (OUTPATIENT)
Dept: INTERNAL MEDICINE | Facility: CLINIC | Age: 79
End: 2024-01-23
Payer: MEDICARE

## 2024-01-23 ENCOUNTER — PRIOR AUTHORIZATION (OUTPATIENT)
Dept: GASTROENTEROLOGY | Facility: CLINIC | Age: 79
End: 2024-01-23
Payer: MEDICARE

## 2024-01-23 DIAGNOSIS — M81.8 OTHER OSTEOPOROSIS WITHOUT CURRENT PATHOLOGICAL FRACTURE: ICD-10-CM

## 2024-01-23 DIAGNOSIS — E87.6 HYPOKALEMIA: ICD-10-CM

## 2024-01-23 DIAGNOSIS — Z96.642 HISTORY OF LEFT HIP REPLACEMENT: ICD-10-CM

## 2024-01-23 DIAGNOSIS — E44.1 MILD PROTEIN-CALORIE MALNUTRITION: Primary | ICD-10-CM

## 2024-01-23 DIAGNOSIS — R42 DIZZINESS: ICD-10-CM

## 2024-01-23 LAB
BUN SERPL-MCNC: 20 MG/DL (ref 8–23)
BUN/CREAT SERPL: 26.7 (ref 7–25)
CALCIUM SERPL-MCNC: 10.3 MG/DL (ref 8.6–10.5)
CHLORIDE SERPL-SCNC: 104 MMOL/L (ref 98–107)
CO2 SERPL-SCNC: 32.4 MMOL/L (ref 22–29)
CREAT SERPL-MCNC: 0.75 MG/DL (ref 0.57–1)
EGFRCR SERPLBLD CKD-EPI 2021: 80.6 ML/MIN/1.73
GLUCOSE SERPL-MCNC: 107 MG/DL (ref 65–99)
POTASSIUM SERPL-SCNC: 3.7 MMOL/L (ref 3.5–5.2)
SODIUM SERPL-SCNC: 144 MMOL/L (ref 136–145)

## 2024-01-23 NOTE — TELEPHONE ENCOUNTER
Caller: Odalys Miles    Relationship: Self    Best call back number: [phone]     What is the best time to reach you: ANY    Who are you requesting to speak with (clinical staff, provider,  specific staff member): NURSE    Do you know the name of the person who called: PATIENT    What was the call regarding:  PATIENT STATES PHYSICAL THERAPY WAS TO BE ORDERED BUT HAS HOME HEALTH AND CAN'T DO PHYSICAL THERAPY WHILE DOING HOME HEALTH.  PT WILL HAVE TO WAIT.      Is it okay if the provider responds through MyChart: CALL IF NEEDED

## 2024-01-29 ENCOUNTER — TELEPHONE (OUTPATIENT)
Dept: INTERNAL MEDICINE | Facility: CLINIC | Age: 79
End: 2024-01-29
Payer: MEDICARE

## 2024-01-29 DIAGNOSIS — E87.6 HYPOKALEMIA: Primary | ICD-10-CM

## 2024-01-29 NOTE — TELEPHONE ENCOUNTER
Caller: Lorena Bellamy    Relationship: Self    Best call back number:     What is the best time to reach you: ANYTIME    Who are you requesting to speak with (clinical staff, provider,  specific staff member): DR OR CLINICAL STAFF     Do you know the name of the person who called: LORENA BELLAMY THE PATIENT    What was the call regarding: THE PATIENT WOULD LIKE TO GO OVER HER POTASSIUM RESULTS

## 2024-01-30 NOTE — TELEPHONE ENCOUNTER
Spoke with patient, advised of provider recommendation to discontinue potassium. Patient is agreeable and will have labs drawn the first week of March.

## 2024-02-29 NOTE — OUTREACH NOTE
Prep Survey    Flowsheet Row Responses   [facility] facility patient discharged from? Non-[facility]   Is LACE score < 7 ? Non-BH Discharge   Emergency Room discharge w/ pulse ox? No   Eligibility [facility]    Date of Discharge 05/02/22   Discharge diagnosis At  for surgery on leg and scapula then [facility]   Does the patient have one of the following disease processes/diagnoses(primary or secondary)? Other   Prep survey completed? Yes          DINORA VARELA - Registered Nurse         #1:

## 2024-03-05 LAB
BUN SERPL-MCNC: 15 MG/DL (ref 8–23)
BUN/CREAT SERPL: 23.1 (ref 7–25)
CALCIUM SERPL-MCNC: 9.6 MG/DL (ref 8.6–10.5)
CHLORIDE SERPL-SCNC: 105 MMOL/L (ref 98–107)
CO2 SERPL-SCNC: 27.2 MMOL/L (ref 22–29)
CREAT SERPL-MCNC: 0.65 MG/DL (ref 0.57–1)
EGFRCR SERPLBLD CKD-EPI 2021: 89.1 ML/MIN/1.73
GLUCOSE SERPL-MCNC: 63 MG/DL (ref 65–99)
MAGNESIUM SERPL-MCNC: 1.9 MG/DL (ref 1.6–2.4)
POTASSIUM SERPL-SCNC: 3.6 MMOL/L (ref 3.5–5.2)
SODIUM SERPL-SCNC: 142 MMOL/L (ref 136–145)

## 2024-03-25 ENCOUNTER — HOSPITAL ENCOUNTER (OUTPATIENT)
Facility: HOSPITAL | Age: 79
Discharge: HOME | End: 2024-03-25
Payer: MEDICARE

## 2024-03-25 LAB
ALBUMIN SERPL-MCNC: 4.1 G/DL (ref 3.4–4.8)
ANION GAP SERPL CALCULATED.3IONS-SCNC: 10 MMOL/L (ref 3–16)
BUN SERPL-MCNC: 15 MG/DL (ref 6–20)
CALCIUM SERPL-MCNC: 9.9 MG/DL (ref 8.5–10.5)
CHLORIDE SERPL-SCNC: 104 MMOL/L (ref 98–107)
CO2 SERPL-SCNC: 28 MMOL/L (ref 20–30)
CREAT SERPL-MCNC: 0.7 MG/DL (ref 0.4–1.2)
GFR SERPLBLD CREATININE-BSD FMLA CKD-EPI: 87 ML/MIN/{1.73_M2}
GLUCOSE SERPL-MCNC: 183 MG/DL (ref 74–106)
PHOSPHATE SERPL-MCNC: 3.2 MG/DL (ref 2.5–4.5)
POTASSIUM SERPL-SCNC: 3 MMOL/L (ref 3.4–5.1)
SODIUM SERPL-SCNC: 142 MMOL/L (ref 136–145)

## 2024-03-25 PROCEDURE — 84080 ASSAY ALKALINE PHOSPHATASES: CPT

## 2024-03-25 PROCEDURE — 36415 COLL VENOUS BLD VENIPUNCTURE: CPT

## 2024-03-25 PROCEDURE — 80069 RENAL FUNCTION PANEL: CPT

## 2024-03-28 LAB — ALP BONE SERPL-MCNC: 9.9 UG/L

## 2024-05-31 ENCOUNTER — TELEPHONE (OUTPATIENT)
Dept: INTERNAL MEDICINE | Facility: CLINIC | Age: 79
End: 2024-05-31
Payer: MEDICARE

## 2024-05-31 NOTE — TELEPHONE ENCOUNTER
Caller: Odalys Miles    Relationship: Self    Best call back number: [phone]     What orders are you requesting (i.e. lab or imaging): BONE DENSITY    In what timeframe would the patient need to come in: ASAP    Where will you receive your lab/imaging services: OFFICE    Additional notes: WANTS IN SEPTEMBER

## 2024-06-03 DIAGNOSIS — Z78.0 POSTMENOPAUSAL: Primary | ICD-10-CM

## 2024-07-29 ENCOUNTER — TELEPHONE (OUTPATIENT)
Dept: INTERNAL MEDICINE | Facility: CLINIC | Age: 79
End: 2024-07-29
Payer: MEDICARE

## 2024-07-29 ENCOUNTER — OFFICE VISIT (OUTPATIENT)
Dept: GASTROENTEROLOGY | Facility: CLINIC | Age: 79
End: 2024-07-29
Payer: MEDICARE

## 2024-07-29 VITALS
TEMPERATURE: 98.2 F | HEART RATE: 62 BPM | SYSTOLIC BLOOD PRESSURE: 158 MMHG | BODY MASS INDEX: 17.11 KG/M2 | DIASTOLIC BLOOD PRESSURE: 81 MMHG | WEIGHT: 109 LBS | HEIGHT: 67 IN

## 2024-07-29 DIAGNOSIS — K52.832 LYMPHOCYTIC COLITIS: Primary | ICD-10-CM

## 2024-07-29 DIAGNOSIS — D50.9 IRON DEFICIENCY ANEMIA, UNSPECIFIED IRON DEFICIENCY ANEMIA TYPE: ICD-10-CM

## 2024-07-29 PROCEDURE — 1160F RVW MEDS BY RX/DR IN RCRD: CPT | Performed by: INTERNAL MEDICINE

## 2024-07-29 PROCEDURE — 99214 OFFICE O/P EST MOD 30 MIN: CPT | Performed by: INTERNAL MEDICINE

## 2024-07-29 PROCEDURE — 1159F MED LIST DOCD IN RCRD: CPT | Performed by: INTERNAL MEDICINE

## 2024-07-29 RX ORDER — OLIVE OIL
OIL (ML) MISCELLANEOUS AS NEEDED
COMMUNITY

## 2024-07-29 NOTE — TELEPHONE ENCOUNTER
"LVM for pt to return call to our office      Relay     \"LVM for patient to call the office. Patient needs to schedule annual medicare wellness visit anytime between now and October   \"                  "

## 2024-07-29 NOTE — PROGRESS NOTES
Follow Up Note     Date: 2024   Patient Name: Odalys Miles  MRN: [id number]  : 1945     Referring Physician: Cordelia Hinton MD    Chief Complaint:    Chief Complaint   Patient presents with    Anemia     Hx SPARKLE      Lymphocytic Colitis       Interval History:   2024  Odalys Miles is a 78 y.o. female who is here today for follow up for her lymphocytic colitis.  She has been taking budesonide 6 mg p.o. daily with decent control of her symptoms.  She will still gets episodes of few loose stools.    2022  Odalys Miles is a 76 y.o. female who is here today for follow up for diarrhea.   Diarrhea is unchanged. She needs refills of her Budesonide. She is taking 2 capsules a day right now. She is having 1-2 semi-formed bowel movements per day, but she is having urgency associated with bowel movements and has difficulty making it to the bathroom at times. No history of rectal bleeding. No abdominal pain. Denies nausea or vomiting.     2019  Ms. Miles returns to the office.  She is doing better at this time after beginning the Entocort medication again.  She is currently taking 3 capsules daily.  Ms. Miles states that when she tried to decrease the dosage and stop the medication there was an increased frequency of bowel movements.  There is no history of blood in the stool.  She denies any current abdominal pain.  There is no history of nausea or vomiting.  She denies any night sweats, fever chill      Subjective      Past Medical History:   Past Medical History:   Diagnosis Date    Abnormal liver enzymes     Basal cell carcinoma     under left eye    Body mass index (BMI) 20.0-20.9, adult     BPPV (benign paroxysmal positional vertigo)     COVID-19 vaccine series completed     Moderna    Elevated cholesterol     Fracture     as a child-wrist    Fracture of leg 2022    Fracture of sacrum     GERD (gastroesophageal reflux disease)     Glaucoma     Glaucoma      Hip fracture 10/11/2019    Hip repalced in 2020    Humerus fracture     Lt arm    Ingrowing toenail     Mammogram abnormal     Microscopic colitis 07/23/2014    Onychomycosis     Open wound of left ankle     Osteoporosis     Personal history of fall 12/2023    Hit forehead and had to have stitches.    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; chronic wound present     Wears dentures     full upper plate, partial on the lower    Wears glasses      Past Surgical History:   Past Surgical History:   Procedure Laterality Date    BACK SURGERY      hardware placed, sacral fracture    CATARACT EXTRACTION, BILATERAL      COLONOSCOPY      DR JAIMES    COLONOSCOPY N/A 10/12/2021    Procedure: COLONOSCOPY with biopsy and APC sigmoid AVM cauterization;  Surgeon: Jose Mayer MD;  Location: [facility] ENDOSCOPY;  Service: Gastroenterology;  Laterality: N/A;    ENDOSCOPY N/A 10/18/2023    Procedure: ESOPHAGOGASTRODUODENOSCOPY WITH BIOPSY;  Surgeon: Jose Mayer MD;  Location: [facility] ENDOSCOPY;  Service: Gastroenterology;  Laterality: N/A;    HIP SURGERY Left 12/27/2019    secondary to a fracture    LEG SURGERY Left 2022    fracture, hardware placed    ORIF HUMERUS FRACTURE Left 11/03/2021    Procedure: HUMERUS PROXIMAL OPEN REDUCTION INTERNAL FIXATION WITH IMTRAMEDULLARY NAIL FIXATION LEFT;  Surgeon: Linden Domingo MD;  Location: [facility] OR;  Service: Orthopedics;  Laterality: Left;    SKIN BIOPSY      TUBAL ABDOMINAL LIGATION         Family History:   Family History   Problem Relation Age of Onset    Heart attack Mother     Diabetes Mother     Stroke Father     Breast cancer Neg Hx     Ovarian cancer Neg Hx     Colon cancer Neg Hx        Social History:   Social History     Socioeconomic History    Marital status:    Tobacco Use    Smoking  status: Former     Current packs/day: 0.00     Average packs/day: 0.3 packs/day for 47.0 years (11.8 ttl pk-yrs)     Types: Cigarettes     Start date:      Quit date:      Years since quittin.5     Passive exposure: Past    Smokeless tobacco: Never   Vaping Use    Vaping status: Never Used   Substance and Sexual Activity    Alcohol use: Not Currently    Drug use: No    Sexual activity: Defer       Medications:     Current Outpatient Medications:     ACETAMINOPHEN PO, Take 500 mg by mouth Every 6 (Six) Hours As Needed for Mild Pain., Disp: , Rfl:     ascorbic acid (VITAMIN C) 1000 MG tablet, Take 1 tablet by mouth Daily., Disp: , Rfl:     Budesonide (ENTOCORT EC) 3 MG 24 hr capsule, Take 2 capsules by mouth Every Morning. PT currently taking two tablets., Disp: 60 capsule, Rfl: 5    ferrous sulfate 324 MG tablet delayed-release, Take 1 tablet by mouth Every Other Day., Disp: , Rfl:     folic acid (FOLVITE) 800 MCG tablet, Take 1 tablet by mouth Daily., Disp: , Rfl:     LUMIGAN 0.01 % ophthalmic drops, Administer  to both eyes Every Night., Disp: , Rfl:     Melatonin 10 MG capsule, Take  by mouth Every Night., Disp: , Rfl:     Multiple Vitamins-Minerals (MULTIVITAMIN ADULT EXTRA C PO), Take 1 tablet by mouth Daily., Disp: , Rfl:     Multiple Vitamins-Minerals (ZINC PO), Take  by mouth Daily., Disp: , Rfl:     multivitamin with minerals (Hair Skin and Nails Formula) tablet tablet, Take 1 tablet by mouth Daily., Disp: , Rfl:     NON FORMULARY, 2 (Two) Times a Day. Ez tears, Disp: , Rfl:     olive oil external oil, Apply  topically to the appropriate area as directed As Needed. 1 TSP at bedtime, Disp: , Rfl:     POTASSIUM GLUCONATE PO, Take 650 mg by mouth Daily., Disp: , Rfl:     Probiotic Product ([facility] PO), Take  by mouth Daily., Disp: , Rfl:     SIMBRINZA 1-0.2 % suspension, INSTILL 1 DROP INTO BOTH EYES TWICE A DAY, Disp: , Rfl: 3    Teriparatide, Recombinant, (Forteo) 600 MCG/2.4ML  "injection, Inject 0.08 mL under the skin into the appropriate area as directed Daily. 5 days per week, Disp: , Rfl:     vitamin D3 125 MCG (5000 UT) capsule capsule, Take 1 capsule by mouth Daily., Disp: , Rfl:     Bismuth Subsalicylate 262 MG tablet, Take 262 mg by mouth 3 (Three) Times a Day., Disp: 90 each, Rfl: 2    Allergies:   No Known Allergies    Review of Systems:   Review of Systems   Constitutional:  Negative for appetite change, fatigue, fever and unexpected weight loss.   HENT:  Negative for trouble swallowing.    Gastrointestinal:  Positive for diarrhea. Negative for abdominal distention, abdominal pain, anal bleeding, blood in stool, constipation, nausea, rectal pain, vomiting, GERD and indigestion.       The following portions of the patient's history were reviewed and updated as appropriate: allergies, current medications, past family history, past medical history, past social history, past surgical history and problem list.    Objective     Physical Exam:  Vital Signs:   Vitals:    07/29/24 1445   BP: 158/81   Pulse: 62   Temp: 98.2 °F (36.8 °C)   TempSrc: Infrared   Weight: 49.4 kg (109 lb)  Comment: with clothing/shoes   Height: 170.2 cm (67\")  Comment: per EPIC       Physical Exam  Constitutional:       Comments: Poorly built and nourished   HENT:      Head: Normocephalic and atraumatic.   Eyes:      Conjunctiva/sclera: Conjunctivae normal.   Abdominal:      General: Abdomen is flat. There is no distension.      Palpations: There is no mass.      Tenderness: There is no abdominal tenderness. There is no guarding or rebound.      Hernia: No hernia is present.   Musculoskeletal:      Cervical back: Normal range of motion and neck supple.   Neurological:      Mental Status: She is alert.         Results Review:   I reviewed the patient's new clinical results.    No visits with results within 90 Day(s) from this visit.   Latest known visit with results is:   Orders Only on 01/29/2024   Component " Date Value Ref Range Status    Glucose 03/04/2024 63 (L)  65 - 99 mg/dL Final    BUN 03/04/2024 15  8 - 23 mg/dL Final    Creatinine 03/04/2024 0.65  0.57 - 1.00 mg/dL Final    EGFR Result 03/04/2024 89.1  >60.0 mL/min/1.73 Final    Comment: GFR Normal >60  Chronic Kidney Disease <60  Kidney Failure <15  The GFR formula is only valid for adults with stable renal  function between ages 18 and 70.      BUN/Creatinine Ratio 03/04/2024 23.1  7.0 - 25.0 Final    Sodium 03/04/2024 142  136 - 145 mmol/L Final    Potassium 03/04/2024 3.6  3.5 - 5.2 mmol/L Final    Chloride 03/04/2024 105  98 - 107 mmol/L Final    Total CO2 03/04/2024 27.2  22.0 - 29.0 mmol/L Final    Calcium 03/04/2024 9.6  8.6 - 10.5 mg/dL Final    Magnesium 03/04/2024 1.9  1.6 - 2.4 mg/dL Final      No radiology results for the last 90 days.    CT FACIAL BONES WO CONTRAST     Result Date: 12/8/2023  Linear fractures of the nasal bones, nondisplaced. No other facial fracture seen. Anterior frontal and left greater than right nasal soft tissue swelling with scattered air densities over the nose suggesting lacerations. No soft tissue foreign bodies are seen. Electronically signed by Brittney Gutierrez MD      Result Date: 9/28/2022   1. Bone mineral density lumbar spine is within the range of osteopenia. 2. Bone mineral density of the total right femur is within the range of osteoporosis.  FRAX score: The 10 year probability of major osteoporotic fracture is calculated at 18.5%. The 10 year probability of hip fracture is calculated at 5.8%.       Images were reviewed, interpreted, and dictated by Dr. Traci Parson MD Transcribed by Inocencia Taylor PA-C.  This report was signed and finalized on 9/28/2022 10:23 AM by Traci Parson MD.     Colonoscopy 10/20/2021 per Dr. Mayer  - Tortuous colon.  - A single non-bleeding colonic angioectasia. Treated with argon plasma coagulation (APC).  - Non-bleeding internal hemorrhoids.  - The examined portion of  the ileum was normal. Biopsied.  - Biopsies performed in the rectum, in the sigmoid colon, in the descending colon and in the transverse colon and AC, Caecum.  - No endoscopic signs of colitis  - Pathology:   Lymphocytic colitis all biopsies on the colon  Terminal ileum biopsies normal     EGD 10/18/2023  - Normal oropharynx.  - Z-line variable, 34 cm from the incisors. Biopsied to rule out Ndiaye's  - 3 cm hiatal hernia.  - Mild diffuse distal esophagitis  - Mild erythematous mucosa in the posterior wall of the stomach, antrum and prepyloric region of the stomach. Biopsied.  - Normal duodenal bulb, first portion of the duodenum, second portion of the duodenum and third portion of the duodenum. Biopsied.     DIAGNOSIS:  A. GASTRIC ANTRUM, BIOPSY:  Reactive gastropathy  No Helicobacter pylori like organisms identified on H&E stained slide  No intestinal metaplasia or dysplasia identified  B. ESOPHAGUS, BIOPSY, DISTAL:  Columnar mucosa with reactive changes  No squamous mucosa identified  No increased intraepithelial eosinophils, intestinal metaplasia or dysplasia identified  C. DUODENUM, BIOPSY:  Small bowel mucosa with no significant pathologic abnormality    Assessment / Plan      1. Lymphocytic colitis with diarrhea  2. History of Clostridium difficile infection  3.  History of iron deficiency anemia  7/29/2024  Lab work on on 3/25/2024 revealed borderline potassium of 3 glucose 183.  No recent H&H.  She has been taking budesonide 6 mg p.o. daily.  Patient has osteopenia and also currently has a nonhealing ulcer in the left ankle.  With all these we will avoid steroids instead we will start her on bismuth subsalicylate 262 mg p.o. 3 times daily for 8 to 12 weeks and reassess.  We could consider episodic budesonide as well.    Bismuth TID   Continue iron pills daily  Labs before next visit     1/22/2024   Patient has been thin build since she was young.  She in fact weighing around 100 pounds for many years.   Gained few pounds recently.  CT abdomen pelvis done with contrast on 8/15/2023 revealed fatty liver, distal esophageal wall thickening and gastric wall thickening and dilatation of the pancreatic duct..  Subsequently she had a MRI scan done without contrast with MRCP that revealed pancreatic duct upper limit of normal but without any filling defect or mass lesion.  EGD done on 10/18/2023 revealed esophagitis with a hiatal hernia and gastritis.  Patient likely had a nutritional iron deficiency anemia.  Currently on iron pills every other day.  Lab work done in September 2023 revealed a normal hemoglobin of 12.7 g/dL.  CMP was normal except sodium of 147.  She has a CMP drawn this morning at PCPs office will follow     6/23/2022  She has a history of lymphocytic colitis diagnosed by biopsy and 2014.  Denies rectal bleeding. Colonoscopy 10/20/2021 with no endoscopic signs of ileitis or colitis, biopsies revealed lymphocytic colitis.  She was diagnosed with C. difficile in April 2022 at [facility] while being hospitalized for an injury requiring lengthy stay, and was treated with vancomycin. She is not symptomatic at this time.       Previous History:  5. Personal history of colon polyps  6. Family history colon CA  7. Angiodysplasia of the colon.  12/16/2021  Colonoscopy done on 10/20/2021 did not reveal any colon polyps. She had a colonic angiectasia which was ablated with APC. Her recent lab work does not reveal any anemia. Will consider high risk screening colonoscopy in 5 years time in 2026 depending on her medical and general condition.  Her dad had colon cancer at the age of 49.    Follow Up:   Return in about 3 months (around 10/29/2024).    Jose Mayer MD  [facility]  7/29/2024  17:06 EDT     Please note that portions of this note may have been completed with a voice recognition program.

## 2024-08-01 ENCOUNTER — TELEPHONE (OUTPATIENT)
Dept: GASTROENTEROLOGY | Facility: CLINIC | Age: 79
End: 2024-08-01
Payer: MEDICARE

## 2024-08-01 DIAGNOSIS — E87.6 HYPOKALEMIA: Primary | ICD-10-CM

## 2024-08-01 LAB
ALBUMIN SERPL-MCNC: 3.7 G/DL (ref 3.7–4.7)
ALP SERPL-CCNC: 58 IU/L (ref 44–121)
ALT SERPL-CCNC: 15 IU/L (ref 0–32)
AST SERPL-CCNC: 25 IU/L (ref 0–40)
BILIRUB SERPL-MCNC: 0.5 MG/DL (ref 0–1.2)
BUN SERPL-MCNC: 10 MG/DL (ref 8–27)
BUN/CREAT SERPL: 14 (ref 12–28)
CALCIUM SERPL-MCNC: 9.2 MG/DL (ref 8.7–10.3)
CHLORIDE SERPL-SCNC: 103 MMOL/L (ref 96–106)
CO2 SERPL-SCNC: 29 MMOL/L (ref 20–29)
CREAT SERPL-MCNC: 0.7 MG/DL (ref 0.57–1)
EGFRCR SERPLBLD CKD-EPI 2021: 87 ML/MIN/1.73
GLOBULIN SER CALC-MCNC: 2.2 G/DL (ref 1.5–4.5)
GLUCOSE SERPL-MCNC: 94 MG/DL (ref 70–99)
POTASSIUM SERPL-SCNC: 2.5 MMOL/L (ref 3.5–5.2)
PROT SERPL-MCNC: 5.9 G/DL (ref 6–8.5)
SODIUM SERPL-SCNC: 146 MMOL/L (ref 134–144)

## 2024-08-01 RX ORDER — POTASSIUM CHLORIDE 20 MEQ/1
20 TABLET, EXTENDED RELEASE ORAL DAILY
Qty: 14 TABLET | Refills: 0 | Status: SHIPPED | OUTPATIENT
Start: 2024-08-01

## 2024-08-01 NOTE — TELEPHONE ENCOUNTER
----- Message from Mai MCNEILL sent at 8/1/2024 12:54 PM EDT -----    ----- Message -----  From: Jose Mayer MD  Sent: 8/1/2024  12:41 PM EDT  To: [facility]    Let her know that her potassium level is low.  I have sent a potassium pills to take daily for 2 weeks.  I have also ordered lab work to get it done in 2 weeks time.

## 2024-08-22 DIAGNOSIS — K52.832 LYMPHOCYTIC COLITIS: ICD-10-CM

## 2024-08-22 RX ORDER — BUDESONIDE 3 MG/1
6 CAPSULE, COATED PELLETS ORAL EVERY MORNING
Qty: 60 CAPSULE | Refills: 5 | Status: SHIPPED | OUTPATIENT
Start: 2024-08-22

## 2024-08-23 ENCOUNTER — TELEPHONE (OUTPATIENT)
Dept: GASTROENTEROLOGY | Facility: CLINIC | Age: 79
End: 2024-08-23
Payer: MEDICARE

## 2024-08-23 NOTE — TELEPHONE ENCOUNTER
----- Message from Jose Mayer sent at 8/22/2024  8:36 PM EDT -----  Sent a prescription for budesonide 2 capsules p.o. daily with 5 refill  ----- Message -----  From: Mia Morrissey MA  Sent: 8/22/2024   8:59 AM EDT  To: Jose Mayer MD    She does not feel like it is fully helping. Sometimes she has had to take 2 budesonide instead of 1  ----- Message -----  From: Jose Mayer MD  Sent: 8/21/2024  10:40 PM EDT  To: Mia Morrissey MA    If she thinks that it is not helping , will send her budesonide  Let me know  ----- Message -----  From: Mia Morrissey MA  Sent: 8/21/2024  11:17 AM EDT  To: Jose Mayer MD    She just started the Bismuth on 8/13/24  ----- Message -----  From: Jose Mayer MD  Sent: 8/20/2024   6:01 PM EDT  To: Mia Morrissey MA      I have prescribed a bismuth subsalicylate to take 3 times daily for 2 months  If she taking it?   We are trying to cut down and avoid budesonide due to her osteoporosis  If she is not taking the bismuth then I will refill the budesonide, let me know  ----- Message -----  From: Mia Morrissey MA  Sent: 8/20/2024   2:51 PM EDT  To: Jose Mayer MD    Patient called today requesting refill on Budesonide. She said that she is currently taking 1 per day and almost out. She does state that sometimes while taking the one she feels symptoms coming back and has to temporarily increase back to 2 per day. Please advise.

## 2024-08-28 ENCOUNTER — OFFICE VISIT (OUTPATIENT)
Dept: INTERNAL MEDICINE | Facility: CLINIC | Age: 79
End: 2024-08-28
Payer: MEDICARE

## 2024-08-28 VITALS
BODY MASS INDEX: 16.48 KG/M2 | HEART RATE: 68 BPM | RESPIRATION RATE: 16 BRPM | DIASTOLIC BLOOD PRESSURE: 62 MMHG | OXYGEN SATURATION: 99 % | HEIGHT: 67 IN | SYSTOLIC BLOOD PRESSURE: 124 MMHG | WEIGHT: 105 LBS

## 2024-08-28 DIAGNOSIS — M81.8 OTHER OSTEOPOROSIS WITHOUT CURRENT PATHOLOGICAL FRACTURE: ICD-10-CM

## 2024-08-28 DIAGNOSIS — R19.5 DARK STOOLS: ICD-10-CM

## 2024-08-28 DIAGNOSIS — L94.0 REYNOLDS SYNDROME: ICD-10-CM

## 2024-08-28 DIAGNOSIS — E87.6 HYPOKALEMIA: ICD-10-CM

## 2024-08-28 DIAGNOSIS — K74.3 REYNOLDS SYNDROME: ICD-10-CM

## 2024-08-28 DIAGNOSIS — K76.0 FATTY LIVER: ICD-10-CM

## 2024-08-28 DIAGNOSIS — E55.9 VITAMIN D DEFICIENCY: ICD-10-CM

## 2024-08-28 DIAGNOSIS — K21.00 GASTROESOPHAGEAL REFLUX DISEASE WITH ESOPHAGITIS WITHOUT HEMORRHAGE: ICD-10-CM

## 2024-08-28 DIAGNOSIS — E78.2 MIXED HYPERLIPIDEMIA: Primary | ICD-10-CM

## 2024-08-28 DIAGNOSIS — E44.1 MILD PROTEIN-CALORIE MALNUTRITION: ICD-10-CM

## 2024-08-28 RX ORDER — PEN NEEDLE, DIABETIC 32GX 5/32"
NEEDLE, DISPOSABLE MISCELLANEOUS
COMMUNITY
Start: 2024-08-27

## 2024-08-28 NOTE — PROGRESS NOTES
Subjective   The ABCs of the Annual Wellness Visit  Medicare Wellness Visit      Odalys Miles is a 79 y.o. patient who presents for a Medicare Wellness Visit.    Follows with nephrology on 14 vitamin D  GERD diet controlled  Still with occasional diarrhea    EGD mild esophagitis,continue to avoid coffee   Colonoscopy 2021 tortuous colon, follow-up if with any symptoms  S.p left leg surgery w/ rodolfo: 2/2022  DXA scan: osteoporosis, hx of esophagitis, unable to take alendronate, on vitamin D3, scheduled on 9/3    The following portions of the patient's history were reviewed and   updated as appropriate: allergies, current medications, past family history, past medical history, past social history, past surgical history, and problem list.    Compared to one year ago, the patient's physical   health is better.  Compared to one year ago, the patient's mental   health is better.    Recent Hospitalizations:  She was not admitted to the hospital during the last year.     Current Medical Providers:  Patient Care Team:  Cordelia Hinton MD as PCP - General (Family Medicine)  Jose Mayer MD as Consulting Physician (Gastroenterology)    Outpatient Medications Prior to Visit   Medication Sig Dispense Refill    ACETAMINOPHEN PO Take 500 mg by mouth Every 6 (Six) Hours As Needed for Mild Pain.      ascorbic acid (VITAMIN C) 1000 MG tablet Take 1 tablet by mouth Daily.      BD Pen Needle Chasity U/F 32G X 4 MM misc       Bismuth Subsalicylate 262 MG tablet Take 262 mg by mouth 3 (Three) Times a Day. 90 each 2    Budesonide (ENTOCORT EC) 3 MG 24 hr capsule Take 2 capsules by mouth Every Morning. PT currently taking two tablets. 60 capsule 5    ferrous sulfate 324 MG tablet delayed-release Take 1 tablet by mouth Every Other Day.      folic acid (FOLVITE) 800 MCG tablet Take 1 tablet by mouth Daily.      LUMIGAN 0.01 % ophthalmic drops Administer  to both eyes Every Night.      Melatonin 10 MG capsule Take  by mouth  Every Night.      Multiple Vitamins-Minerals (MULTIVITAMIN ADULT EXTRA C PO) Take 1 tablet by mouth Daily.      Multiple Vitamins-Minerals (ZINC PO) Take  by mouth Daily.      multivitamin with minerals (Hair Skin and Nails Formula) tablet tablet Take 1 tablet by mouth Daily.      NON FORMULARY 2 (Two) Times a Day. Ez tears      olive oil external oil Apply  topically to the appropriate area as directed As Needed. 1 TSP at bedtime      potassium chloride (KLOR-CON M20) 20 MEQ CR tablet Take 1 tablet by mouth Daily. 14 tablet 0    POTASSIUM GLUCONATE PO Take 650 mg by mouth Daily.      Probiotic Product ([employer] PO) Take  by mouth Daily.      SIMBRINZA 1-0.2 % suspension INSTILL 1 DROP INTO BOTH EYES TWICE A DAY  3    Teriparatide, Recombinant, (Forteo) 600 MCG/2.4ML injection Inject 0.08 mL under the skin into the appropriate area as directed Daily. 5 days per week      vitamin D3 125 MCG (5000 UT) capsule capsule Take 1 capsule by mouth Daily.       No facility-administered medications prior to visit.     No opioid medication identified on active medication list. I have reviewed chart for other potential  high risk medication/s and harmful drug interactions in the elderly.      Aspirin is not on active medication list.  Aspirin use is not indicated based on review of current medical condition/s. Risk of harm outweighs potential benefits.  .    Patient Active Problem List   Diagnosis    Glaucoma    Hyperlipidemia    Insomnia    Lymphocytic colitis    Tinnitus    Asymptomatic varicose veins    Vitamin D deficiency    Other osteoporosis without current pathological fracture    History of left hip replacement    Personal history of colonic polyps    Araujo syndrome    Fatty liver    Gastroesophageal reflux disease with esophagitis without hemorrhage    Hypokalemia    Dizziness    Mild protein-calorie malnutrition    Dark stools     Advance Care Planning Advance Directive is not on file.  ACP discussion  "was held with the patient during this visit. Patient does not have an advance directive, information provided.            Objective   Vitals:    24 1536   BP: 124/62   Pulse: 68   Resp: 16   SpO2: 99%   Weight: 47.6 kg (105 lb)   Height: 170.2 cm (67.01\")       Estimated body mass index is 16.44 kg/m² as calculated from the following:    Height as of this encounter: 170.2 cm (67.01\").    Weight as of this encounter: 47.6 kg (105 lb).            Does the patient have evidence of cognitive impairment? No                                                                                                Health  Risk Assessment    Smoking Status:  Social History     Tobacco Use   Smoking Status Former    Current packs/day: 0.00    Average packs/day: 0.3 packs/day for 47.0 years (11.8 ttl pk-yrs)    Types: Cigarettes    Start date:     Quit date:     Years since quittin.6    Passive exposure: Past   Smokeless Tobacco Never     Alcohol Consumption:  Social History     Substance and Sexual Activity   Alcohol Use Not Currently       Fall Risk Screen  STEADI Fall Risk Assessment was completed, and patient is at LOW risk for falls.Assessment completed on:2024    Depression Screenin/28/2024     3:35 PM   PHQ-2/PHQ-9 Depression Screening   Little Interest or Pleasure in Doing Things 0-->not at all   Feeling Down, Depressed or Hopeless 0-->not at all   PHQ-9: Brief Depression Severity Measure Score 0     Health Habits and Functional and Cognitive Screenin/28/2024     3:34 PM   Functional & Cognitive Status   Do you have difficulty preparing food and eating? No   Do you have difficulty bathing yourself, getting dressed or grooming yourself? No   Do you have difficulty using the toilet? No   Do you have difficulty moving around from place to place? No   Do you have trouble with steps or getting out of a bed or a chair? No   Current Diet Limited Junk Food   Dental Exam Not up to date   Eye Exam " Up to date   Exercise (times per week) 0 times per week   Current Exercises Include No Regular Exercise   Do you need help using the phone?  No   Are you deaf or do you have serious difficulty hearing?  No   Do you need help to go to places out of walking distance? No   Do you need help shopping? No   Do you need help preparing meals?  No   Do you need help with housework?  No   Do you need help with laundry? No   Do you need help taking your medications? No   Do you need help managing money? No   Do you ever drive or ride in a car without wearing a seat belt? No   Have you felt unusual stress, anger or loneliness in the last month? No   Who do you live with? Alone   If you need help, do you have trouble finding someone available to you? No   Have you been bothered in the last four weeks by sexual problems? No   Do you have difficulty concentrating, remembering or making decisions? No           Age-appropriate Screening Schedule:  Refer to the list below for future screening recommendations based on patient's age, sex and/or medical conditions. Orders for these recommended tests are listed in the plan section. The patient has been provided with a written plan.    Health Maintenance List  Health Maintenance   Topic Date Due    Hepatitis B (1 of 3 - Risk 3-dose series) Never done    MAMMOGRAM  09/28/2022    ANNUAL WELLNESS VISIT  07/12/2024    LIPID PANEL  09/18/2024    DXA SCAN  09/27/2024    COVID-19 Vaccine (8 - 2023-24 season) 08/28/2025 (Originally 5/30/2024)    BMI FOLLOWUP  01/22/2025    COLORECTAL CANCER SCREENING  10/12/2031    TDAP/TD VACCINES (4 - Td or Tdap) 12/08/2033    RSV Vaccine - Adults  Completed    Pneumococcal Vaccine 65+  Completed    ZOSTER VACCINE  Completed                                                                                                                                                CMS Preventative Services Quick Reference  Risk Factors Identified During Encounter  Fall  "Risk-High or Moderate: Discussed Fall Prevention in the home  Dental Screening Recommended  Vision Screening Recommended    The above risks/problems have been discussed with the patient.  Pertinent information has been shared with the patient in the After Visit Summary.  An After Visit Summary and PPPS were made available to the patient.    Follow Up:   Next Medicare Wellness visit to be scheduled in 1 year.         Additional E&M Note during same encounter follows:  Patient has additional, significant, and separately identifiable condition(s)/problem(s) that require work above and beyond the Medicare Wellness Visit     Chief Complaint  Medicare Wellness-subsequent (Annual MWV)    Subjective   HPI  Odalys is also being seen today for additional medical problem/s.  Hypokalemia, still with diarrhea from colitis. Stools noted to be dark, she is taking iron tablets every other day                Objective   Vital Signs:  /62   Pulse 68   Resp 16   Ht 170.2 cm (67.01\")   Wt 47.6 kg (105 lb)   SpO2 99%   BMI 16.44 kg/m²   Physical Exam  Vitals and nursing note reviewed.   Constitutional:       Appearance: Normal appearance.   HENT:      Head: Normocephalic and atraumatic.   Cardiovascular:      Rate and Rhythm: Normal rate and regular rhythm.      Pulses: Normal pulses.      Heart sounds: Normal heart sounds.   Pulmonary:      Effort: Pulmonary effort is normal. No respiratory distress.      Breath sounds: Normal breath sounds. No wheezing, rhonchi or rales.   Abdominal:      General: Abdomen is flat. Bowel sounds are normal.      Palpations: Abdomen is soft.      Tenderness: There is no abdominal tenderness. There is no guarding.   Musculoskeletal:      Cervical back: Neck supple.   Skin:     General: Skin is warm.      Capillary Refill: Capillary refill takes less than 2 seconds.   Neurological:      General: No focal deficit present.      Mental Status: She is alert. Mental status is at baseline. "   Psychiatric:         Mood and Affect: Mood normal.         Behavior: Behavior normal.                 Assessment and Plan               Mixed hyperlipidemia   stable not on meds, check labs  Mild protein-calorie malnutrition  Patient's Body mass index is 16.44 kg/m².  BMI indicates mild protein-calorie malnutrition with health conditions related to an underlying condition that include clitis and diarrhea . Weight issue is unchanged. BMI is is below average; BMI management plan is completed.  BMI management for patient includes the following: treating the underlying disease process.  Vitamin D deficiency  Will monitor have labs done  Fatty liver  Will monitor have labs done  Hypokalemia  Recheck labs, continue KCL 20 daily for now  Araujo syndrome  Stable at this time asymptomatic  Other osteoporosis without current pathological fracture  Stable, for dexa scan on sept  Gastroesophageal reflux disease with esophagitis without hemorrhage  Stable diet controlled  Dark stools  Likely due to iron tablets every other day, will obtain FOBT, follows with GI    Orders Placed This Encounter   Procedures    Comprehensive Metabolic Panel     Order Specific Question:   Release to patient     Answer:   Routine Release [[id number]]    Lipid Panel     Order Specific Question:   Release to patient     Answer:   Routine Release [[id number]]    TSH Rfx On Abnormal To Free T4     Order Specific Question:   Release to patient     Answer:   Routine Release [[id number]]    Vitamin D,25-Hydroxy     Order Specific Question:   Release to patient     Answer:   Routine Release [[id number]]    Occult Blood X 1, Stool - Stool, Per Rectum     Standing Status:   Future     Standing Expiration Date:   8/28/2025     Order Specific Question:   Is this occult blood testing for Screening purposes?     Answer:   Yes     Order Specific Question:   Release to patient     Answer:   Routine Release [[id number]]    CBC & Differential     Order Specific  Question:   Manual Differential     Answer:   No     Order Specific Question:   Release to patient     Answer:   Routine Release [[id number]]             Follow Up   Return in about 6 months (around 2/28/2025) for 6 month follow up.  Patient was given instructions and counseling regarding her condition or for health maintenance advice. Please see specific information pulled into the AVS if appropriate.

## 2024-08-28 NOTE — ASSESSMENT & PLAN NOTE
Patient's Body mass index is 16.44 kg/m².  BMI indicates mild protein-calorie malnutrition with health conditions related to an underlying condition that include clitis and diarrhea . Weight issue is unchanged. BMI is is below average; BMI management plan is completed.  BMI management for patient includes the following: treating the underlying disease process.

## 2024-08-29 ENCOUNTER — CLINICAL SUPPORT (OUTPATIENT)
Dept: INTERNAL MEDICINE | Facility: CLINIC | Age: 79
End: 2024-08-29
Payer: MEDICARE

## 2024-08-29 DIAGNOSIS — R19.5 DARK STOOLS: ICD-10-CM

## 2024-08-29 LAB — HEMOCCULT STL QL IA: NEGATIVE

## 2024-08-29 PROCEDURE — 82274 ASSAY TEST FOR BLOOD FECAL: CPT | Performed by: STUDENT IN AN ORGANIZED HEALTH CARE EDUCATION/TRAINING PROGRAM

## 2024-08-30 LAB
ALBUMIN SERPL-MCNC: 4.4 G/DL (ref 3.5–5.2)
ALBUMIN/GLOB SERPL: 2 G/DL
ALP SERPL-CCNC: 71 U/L (ref 39–117)
ALT SERPL-CCNC: 22 U/L (ref 1–33)
AST SERPL-CCNC: 27 U/L (ref 1–32)
BASOPHILS # BLD AUTO: 0.08 10*3/MM3 (ref 0–0.2)
BASOPHILS NFR BLD AUTO: 1.2 % (ref 0–1.5)
BILIRUB SERPL-MCNC: 0.5 MG/DL (ref 0–1.2)
BUN SERPL-MCNC: 16 MG/DL (ref 8–23)
BUN/CREAT SERPL: 20 (ref 7–25)
CALCIUM SERPL-MCNC: 10 MG/DL (ref 8.6–10.5)
CHLORIDE SERPL-SCNC: 102 MMOL/L (ref 98–107)
CHOLEST SERPL-MCNC: 190 MG/DL (ref 0–200)
CO2 SERPL-SCNC: 29.6 MMOL/L (ref 22–29)
CREAT SERPL-MCNC: 0.8 MG/DL (ref 0.57–1)
EGFRCR SERPLBLD CKD-EPI 2021: 74.1 ML/MIN/1.73
EOSINOPHIL # BLD AUTO: 0.07 10*3/MM3 (ref 0–0.4)
EOSINOPHIL NFR BLD AUTO: 1.1 % (ref 0.3–6.2)
ERYTHROCYTE [DISTWIDTH] IN BLOOD BY AUTOMATED COUNT: 11.8 % (ref 12.3–15.4)
GLOBULIN SER CALC-MCNC: 2.2 GM/DL
GLUCOSE SERPL-MCNC: 93 MG/DL (ref 65–99)
HCT VFR BLD AUTO: 42 % (ref 34–46.6)
HDLC SERPL-MCNC: 94 MG/DL (ref 40–60)
HGB BLD-MCNC: 13.7 G/DL (ref 12–15.9)
IMM GRANULOCYTES # BLD AUTO: 0.02 10*3/MM3 (ref 0–0.05)
IMM GRANULOCYTES NFR BLD AUTO: 0.3 % (ref 0–0.5)
LDLC SERPL CALC-MCNC: 85 MG/DL (ref 0–100)
LYMPHOCYTES # BLD AUTO: 1.69 10*3/MM3 (ref 0.7–3.1)
LYMPHOCYTES NFR BLD AUTO: 25.7 % (ref 19.6–45.3)
MCH RBC QN AUTO: 30.9 PG (ref 26.6–33)
MCHC RBC AUTO-ENTMCNC: 32.6 G/DL (ref 31.5–35.7)
MCV RBC AUTO: 94.6 FL (ref 79–97)
MONOCYTES # BLD AUTO: 0.39 10*3/MM3 (ref 0.1–0.9)
MONOCYTES NFR BLD AUTO: 5.9 % (ref 5–12)
NEUTROPHILS # BLD AUTO: 4.33 10*3/MM3 (ref 1.7–7)
NEUTROPHILS NFR BLD AUTO: 65.8 % (ref 42.7–76)
NRBC BLD AUTO-RTO: 0 /100 WBC (ref 0–0.2)
PLATELET # BLD AUTO: 244 10*3/MM3 (ref 140–450)
POTASSIUM SERPL-SCNC: 4.1 MMOL/L (ref 3.5–5.2)
PROT SERPL-MCNC: 6.6 G/DL (ref 6–8.5)
RBC # BLD AUTO: 4.44 10*6/MM3 (ref 3.77–5.28)
SODIUM SERPL-SCNC: 141 MMOL/L (ref 136–145)
TRIGL SERPL-MCNC: 60 MG/DL (ref 0–150)
TSH SERPL DL<=0.005 MIU/L-ACNC: 0.64 UIU/ML (ref 0.27–4.2)
VLDLC SERPL CALC-MCNC: 11 MG/DL (ref 5–40)
WBC # BLD AUTO: 6.58 10*3/MM3 (ref 3.4–10.8)

## 2024-09-03 ENCOUNTER — APPOINTMENT (OUTPATIENT)
Dept: BONE DENSITY | Facility: HOSPITAL | Age: 79
End: 2024-09-03
Payer: MEDICARE

## 2024-09-03 DIAGNOSIS — Z78.0 POSTMENOPAUSAL: ICD-10-CM

## 2024-09-03 DIAGNOSIS — K21.00 GASTROESOPHAGEAL REFLUX DISEASE WITH ESOPHAGITIS WITHOUT HEMORRHAGE: ICD-10-CM

## 2024-09-03 DIAGNOSIS — E78.2 MIXED HYPERLIPIDEMIA: Primary | ICD-10-CM

## 2024-09-03 DIAGNOSIS — E55.9 VITAMIN D DEFICIENCY: ICD-10-CM

## 2024-09-03 DIAGNOSIS — K76.0 FATTY LIVER: ICD-10-CM

## 2024-09-03 DIAGNOSIS — R73.9 HYPERGLYCEMIA: ICD-10-CM

## 2024-09-03 DIAGNOSIS — E87.6 HYPOKALEMIA: ICD-10-CM

## 2024-09-03 PROCEDURE — 77080 DXA BONE DENSITY AXIAL: CPT

## 2024-09-26 ENCOUNTER — TELEPHONE (OUTPATIENT)
Dept: INTERNAL MEDICINE | Facility: CLINIC | Age: 79
End: 2024-09-26

## 2024-09-26 NOTE — TELEPHONE ENCOUNTER
Caller: [facility]    Relationship: Home Health    Best call back number: [phone]     What was the call regarding: BRANDON FROM [facility] IS JUST CALLING TO NOTIFY THAT PATIENTS RE CERTIFICATION FOR HOME HEALTH HAS BEEN COMPLETED AND THEY ARE NOT CHANGING ANYTHING.

## 2024-10-30 ENCOUNTER — OFFICE VISIT (OUTPATIENT)
Dept: GASTROENTEROLOGY | Facility: CLINIC | Age: 79
End: 2024-10-30
Payer: MEDICARE

## 2024-10-30 VITALS
BODY MASS INDEX: 17.42 KG/M2 | SYSTOLIC BLOOD PRESSURE: 133 MMHG | DIASTOLIC BLOOD PRESSURE: 67 MMHG | HEART RATE: 76 BPM | WEIGHT: 111 LBS | HEIGHT: 67 IN | TEMPERATURE: 98.2 F

## 2024-10-30 DIAGNOSIS — K52.9 CHRONIC DIARRHEA: ICD-10-CM

## 2024-10-30 DIAGNOSIS — Z86.2 HISTORY OF IRON DEFICIENCY ANEMIA: ICD-10-CM

## 2024-10-30 DIAGNOSIS — K52.832 LYMPHOCYTIC COLITIS: Primary | ICD-10-CM

## 2024-10-30 PROCEDURE — 1160F RVW MEDS BY RX/DR IN RCRD: CPT | Performed by: INTERNAL MEDICINE

## 2024-10-30 PROCEDURE — 99213 OFFICE O/P EST LOW 20 MIN: CPT | Performed by: INTERNAL MEDICINE

## 2024-10-30 PROCEDURE — 1159F MED LIST DOCD IN RCRD: CPT | Performed by: INTERNAL MEDICINE

## 2024-10-30 RX ORDER — ASPIRIN 81 MG/1
81 TABLET ORAL DAILY
COMMUNITY

## 2024-10-30 NOTE — PROGRESS NOTES
Follow Up Note     Date: 10/30/2024   Patient Name: Odalys Miles  MRN: [id number]  : 1945     Referring Physician: Cordelia Hinton MD    Chief Complaint:    Chief Complaint   Patient presents with    Anemia    Lymphocytic colitis       Interval History:   10/30/2024  Odalys Miles is a 79 y.o. female who is here today for follow up for her lymphocytic colitis.  She states that she is mostly doing well with 1 capsule of budesonide daily.  She will have mostly once daily bowel movements soft formed.  Occasionally she will get a more loose stools at that time she will take 2 budesonide capsules.  She gained few pounds recently.    2022  Odalys Miles is a 76 y.o. female who is here today for follow up for diarrhea.   Diarrhea is unchanged. She needs refills of her Budesonide. She is taking 2 capsules a day right now. She is having 1-2 semi-formed bowel movements per day, but she is having urgency associated with bowel movements and has difficulty making it to the bathroom at times. No history of rectal bleeding. No abdominal pain. Denies nausea or vomiting.     2019  Ms. Miles returns to the office.  She is doing better at this time after beginning the Entocort medication again.  She is currently taking 3 capsules daily.  Ms. Miles states that when she tried to decrease the dosage and stop the medication there was an increased frequency of bowel movements.  There is no history of blood in the stool.  She denies any current abdominal pain.  There is no history of nausea or vomiting.  She denies any night sweats, fever chill      Subjective      Past Medical History:   Past Medical History:   Diagnosis Date    Abnormal liver enzymes     Basal cell carcinoma     under left eye    Body mass index (BMI) 20.0-20.9, adult     BPPV (benign paroxysmal positional vertigo)     COVID-19 vaccine series completed     Moderna    Elevated cholesterol     Fracture     as a child-wrist     Fracture of leg 02/13/2022    Fracture of sacrum     GERD (gastroesophageal reflux disease)     Glaucoma     Glaucoma     Hip fracture 10/11/2019    Hip repalced in 2020    Humerus fracture     Lt arm    Ingrowing toenail     Mammogram abnormal     Microscopic colitis 07/23/2014    Onychomycosis     Open wound of left ankle     Osteoporosis     Personal history of fall 12/2023    Hit forehead and had to have stitches.    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; chronic wound present     Wears dentures     full upper plate, partial on the lower    Wears glasses      Past Surgical History:   Past Surgical History:   Procedure Laterality Date    BACK SURGERY      hardware placed, sacral fracture    CATARACT EXTRACTION, BILATERAL      COLONOSCOPY      DR JAIMES    COLONOSCOPY N/A 10/12/2021    Procedure: COLONOSCOPY with biopsy and APC sigmoid AVM cauterization;  Surgeon: Jose Mayer MD;  Location: [facility] ENDOSCOPY;  Service: Gastroenterology;  Laterality: N/A;    ENDOSCOPY N/A 10/18/2023    Procedure: ESOPHAGOGASTRODUODENOSCOPY WITH BIOPSY;  Surgeon: Jose Mayer MD;  Location: [facility] ENDOSCOPY;  Service: Gastroenterology;  Laterality: N/A;    HIP SURGERY Left 12/27/2019    secondary to a fracture    LEG SURGERY Left 2022    fracture, hardware placed    ORIF HUMERUS FRACTURE Left 11/03/2021    Procedure: HUMERUS PROXIMAL OPEN REDUCTION INTERNAL FIXATION WITH IMTRAMEDULLARY NAIL FIXATION LEFT;  Surgeon: Linden Domingo MD;  Location: [facility] OR;  Service: Orthopedics;  Laterality: Left;    SKIN BIOPSY      TUBAL ABDOMINAL LIGATION         Family History:   Family History   Problem Relation Age of Onset    Heart attack Mother     Diabetes Mother     Stroke Father     Breast cancer Neg Hx     Ovarian cancer Neg Hx     Colon cancer Neg  Hx        Social History:   Social History     Socioeconomic History    Marital status:    Tobacco Use    Smoking status: Former     Current packs/day: 0.00     Average packs/day: 0.3 packs/day for 47.0 years (11.8 ttl pk-yrs)     Types: Cigarettes     Start date:      Quit date:      Years since quittin.8     Passive exposure: Past    Smokeless tobacco: Never   Vaping Use    Vaping status: Never Used   Substance and Sexual Activity    Alcohol use: Not Currently    Drug use: No    Sexual activity: Defer       Medications:     Current Outpatient Medications:     ascorbic acid (VITAMIN C) 1000 MG tablet, Take 1 tablet by mouth Daily., Disp: , Rfl:     aspirin (Aspirin Low Dose) 81 MG EC tablet, Take 1 tablet by mouth Daily., Disp: , Rfl:     BD Pen Needle Chasity U/F 32G X 4 MM misc, , Disp: , Rfl:     bismuth subsalicylate (PEPTO BISMOL) 262 MG/15ML suspension, Take 15 mL by mouth Daily As Needed for Indigestion., Disp: , Rfl:     Budesonide (ENTOCORT EC) 3 MG 24 hr capsule, Take 2 capsules by mouth Every Morning. PT currently taking two tablets. (Patient taking differently: Take 2 capsules by mouth Every Morning. PT currently taking one-two tablet daily.), Disp: 60 capsule, Rfl: 5    ferrous sulfate 324 MG tablet delayed-release, Take 1 tablet by mouth Every Other Day., Disp: , Rfl:     folic acid (FOLVITE) 800 MCG tablet, Take 1 tablet by mouth Daily., Disp: , Rfl:     LUMIGAN 0.01 % ophthalmic drops, Administer  to both eyes Every Night., Disp: , Rfl:     Melatonin 10 MG capsule, Take  by mouth Every Night., Disp: , Rfl:     Multiple Vitamins-Minerals (MULTIVITAMIN ADULT EXTRA C PO), Take 1 tablet by mouth Daily., Disp: , Rfl:     Multiple Vitamins-Minerals (ZINC PO), Take  by mouth Daily., Disp: , Rfl:     NON FORMULARY, 2 (Two) Times a Day. Ez tears, Disp: , Rfl:     olive oil external oil, Apply  topically to the appropriate area as directed As Needed. 1 TSP at bedtime, Disp: , Rfl:      "potassium chloride (KLOR-CON M20) 20 MEQ CR tablet, Take 1 tablet by mouth Daily., Disp: 14 tablet, Rfl: 0    POTASSIUM GLUCONATE PO, Take 650 mg by mouth Daily., Disp: , Rfl:     Probiotic Product (Clipmarks PO), Take  by mouth Daily., Disp: , Rfl:     SIMBRINZA 1-0.2 % suspension, INSTILL 1 DROP INTO BOTH EYES TWICE A DAY, Disp: , Rfl: 3    vitamin D3 125 MCG (5000 UT) capsule capsule, Take 1 capsule by mouth Daily., Disp: , Rfl:     Allergies:   No Known Allergies    Review of Systems:   Review of Systems   Constitutional:  Negative for appetite change, fatigue, fever and unexpected weight loss.   HENT:  Negative for trouble swallowing.    Gastrointestinal:  Negative for abdominal distention, abdominal pain, anal bleeding, blood in stool, constipation, diarrhea, nausea, rectal pain, vomiting, GERD and indigestion.       The following portions of the patient's history were reviewed and updated as appropriate: allergies, current medications, past family history, past medical history, past social history, past surgical history and problem list.    Objective     Physical Exam:  Vital Signs:   Vitals:    10/30/24 1516   BP: 133/67   Pulse: 76   Temp: 98.2 °F (36.8 °C)   TempSrc: Infrared   Weight: 50.3 kg (111 lb)  Comment: with clothing/shoes   Height: 170.2 cm (67\")  Comment: per EPIC       Physical Exam  Constitutional:       Comments: Poorly built and nourished   HENT:      Head: Normocephalic and atraumatic.   Eyes:      Conjunctiva/sclera: Conjunctivae normal.   Abdominal:      General: Abdomen is flat. There is no distension.      Palpations: There is no mass.      Tenderness: There is no abdominal tenderness. There is no guarding or rebound.      Hernia: No hernia is present.   Musculoskeletal:      Cervical back: Normal range of motion and neck supple.   Neurological:      Mental Status: She is alert.         Results Review:   I reviewed the patient's new clinical results.    Clinical Support on " 08/29/2024   Component Date Value Ref Range Status    POC OCCULT BLOOD BY IMMUNOASSAY 08/29/2024 Negative  Negative Final   Office Visit on 08/28/2024   Component Date Value Ref Range Status    WBC 08/29/2024 6.58  3.40 - 10.80 10*3/mm3 Final    RBC 08/29/2024 4.44  3.77 - 5.28 10*6/mm3 Final    Hemoglobin 08/29/2024 13.7  12.0 - 15.9 g/dL Final    Hematocrit 08/29/2024 42.0  34.0 - 46.6 % Final    MCV 08/29/2024 94.6  79.0 - 97.0 fL Final    MCH 08/29/2024 30.9  26.6 - 33.0 pg Final    MCHC 08/29/2024 32.6  31.5 - 35.7 g/dL Final    RDW 08/29/2024 11.8 (L)  12.3 - 15.4 % Final    Platelets 08/29/2024 244  140 - 450 10*3/mm3 Final    Neutrophil Rel % 08/29/2024 65.8  42.7 - 76.0 % Final    Lymphocyte Rel % 08/29/2024 25.7  19.6 - 45.3 % Final    Monocyte Rel % 08/29/2024 5.9  5.0 - 12.0 % Final    Eosinophil Rel % 08/29/2024 1.1  0.3 - 6.2 % Final    Basophil Rel % 08/29/2024 1.2  0.0 - 1.5 % Final    Neutrophils Absolute 08/29/2024 4.33  1.70 - 7.00 10*3/mm3 Final    Lymphocytes Absolute 08/29/2024 1.69  0.70 - 3.10 10*3/mm3 Final    Monocytes Absolute 08/29/2024 0.39  0.10 - 0.90 10*3/mm3 Final    Eosinophils Absolute 08/29/2024 0.07  0.00 - 0.40 10*3/mm3 Final    Basophils Absolute 08/29/2024 0.08  0.00 - 0.20 10*3/mm3 Final    Immature Granulocyte Rel % 08/29/2024 0.3  0.0 - 0.5 % Final    Immature Grans Absolute 08/29/2024 0.02  0.00 - 0.05 10*3/mm3 Final    nRBC 08/29/2024 0.0  0.0 - 0.2 /100 WBC Final    Glucose 08/29/2024 93  65 - 99 mg/dL Final    BUN 08/29/2024 16  8 - 23 mg/dL Final    Creatinine 08/29/2024 0.80  0.57 - 1.00 mg/dL Final    EGFR Result 08/29/2024 74.1  >60.0 mL/min/1.73 Final    Comment: GFR Normal >60  Chronic Kidney Disease <60  Kidney Failure <15  The GFR formula is only valid for adults with stable renal  function between ages 18 and 70.      BUN/Creatinine Ratio 08/29/2024 20.0  7.0 - 25.0 Final    Sodium 08/29/2024 141  136 - 145 mmol/L Final    Potassium 08/29/2024 4.1  3.5 - 5.2  mmol/L Final    Chloride 08/29/2024 102  98 - 107 mmol/L Final    Total CO2 08/29/2024 29.6 (H)  22.0 - 29.0 mmol/L Final    Calcium 08/29/2024 10.0  8.6 - 10.5 mg/dL Final    Total Protein 08/29/2024 6.6  6.0 - 8.5 g/dL Final    Albumin 08/29/2024 4.4  3.5 - 5.2 g/dL Final    Globulin 08/29/2024 2.2  gm/dL Final    A/G Ratio 08/29/2024 2.0  g/dL Final    Total Bilirubin 08/29/2024 0.5  0.0 - 1.2 mg/dL Final    Alkaline Phosphatase 08/29/2024 71  39 - 117 U/L Final    AST (SGOT) 08/29/2024 27  1 - 32 U/L Final    ALT (SGPT) 08/29/2024 22  1 - 33 U/L Final    Total Cholesterol 08/29/2024 190  0 - 200 mg/dL Final    Comment: Cholesterol Reference Ranges  (U.S. Department of Health and Human Services ATP III  Classifications)  Desirable          <200 mg/dL  Borderline High    200-239 mg/dL  High Risk          >240 mg/dL  Triglyceride Reference Ranges  (U.S. Department of Health and Human Services ATP III  Classifications)  Normal           <150 mg/dL  Borderline High  150-199 mg/dL  High             200-499 mg/dL  Very High        >500 mg/dL  HDL Reference Ranges  (U.S. Department of Health and Human Services ATP III  Classifications)  Low     <40 mg/dl (major risk factor for CHD)  High    >60 mg/dl ('negative' risk factor for CHD)  LDL Reference Ranges  (U.S. Department of Health and Human Services ATP III  Classifications)  Optimal          <100 mg/dL  Near Optimal     100-129 mg/dL  Borderline High  130-159 mg/dL  High             160-189 mg/dL  Very High        >189 mg/dL      Triglycerides 08/29/2024 60  0 - 150 mg/dL Final    HDL Cholesterol 08/29/2024 94 (H)  40 - 60 mg/dL Final    VLDL Cholesterol Behzad 08/29/2024 11  5 - 40 mg/dL Final    LDL Chol Calc (NIH) 08/29/2024 85  0 - 100 mg/dL Final    TSH 08/29/2024 0.640  0.270 - 4.200 uIU/mL Final      DEXA Bone Density Axial    Result Date: 9/3/2024   1. Bone mineral density of the lumbar spine is within the range of normal. 2. Bone mineral density of the right  forearm 1/3 is within the range of osteoporosis.     Images were reviewed, interpreted, and dictated by Dr. Luisa Moreira MD Transcribed by Inocencia Taylor PA-C.   This report was signed and finalized on 9/3/2024 3:58 PM by Luisa Moreira MD.       Colonoscopy 10/20/2021 per Dr. Mayer  - Tortuous colon.  - A single non-bleeding colonic angioectasia. Treated with argon plasma coagulation (APC).  - Non-bleeding internal hemorrhoids.  - The examined portion of the ileum was normal. Biopsied.  - Biopsies performed in the rectum, in the sigmoid colon, in the descending colon and in the transverse colon and AC, Caecum.  - No endoscopic signs of colitis  - Pathology:   Lymphocytic colitis all biopsies on the colon  Terminal ileum biopsies normal     EGD 10/18/2023  - Normal oropharynx.  - Z-line variable, 34 cm from the incisors. Biopsied to rule out Ndiaye's  - 3 cm hiatal hernia.  - Mild diffuse distal esophagitis  - Mild erythematous mucosa in the posterior wall of the stomach, antrum and prepyloric region of the stomach. Biopsied.  - Normal duodenal bulb, first portion of the duodenum, second portion of the duodenum and third portion of the duodenum. Biopsied.     DIAGNOSIS:  A. GASTRIC ANTRUM, BIOPSY:  Reactive gastropathy  No Helicobacter pylori like organisms identified on H&E stained slide  No intestinal metaplasia or dysplasia identified  B. ESOPHAGUS, BIOPSY, DISTAL:  Columnar mucosa with reactive changes  No squamous mucosa identified  No increased intraepithelial eosinophils, intestinal metaplasia or dysplasia identified  C. DUODENUM, BIOPSY:  Small bowel mucosa with no significant pathologic abnormality    Assessment / Plan      1. Lymphocytic colitis with diarrhea  2.  History of iron deficiency anemia  10/30/2024  Patient is clinically doing well with mostly 1 soft formed bowel movement daily.  Her symptoms controlled with the 1 budesonide Sure.  Occasionally she will get flareups and takes 2 budesonide  capsules with good symptom relief.  As per patient bismuth sulfate did not help her in the past.  Lab work on 8/29/2024 reveals normal CMP.  CBC reveals a normal hemoglobin 13.7 normal platelets and WBCs.  Given her history of anemia there is some concern whether patient has any overlapping disease like Crohn's disease or EPI.    Will discontinue bismuth  She feels that she tried the colestipol before which did not help.  We will keep her on budesonide 3 mg p.o. daily.  Patient is high risk for osteoporosis and fracture risk that has been discussed with her.   Advised to take Imodium 1 tablets with any long travel, shopping, dining, flying  Continue probiotic daily  Continue iron pills every other day  We will get a stool calprotectin and if positive will get a small bowel PillCam study  Fecal elastase  Follow-up in 3 months    1/22/2024   Patient has been thin build since she was young.  She in fact weighing around 100 pounds for many years.  Gained few pounds recently.  CT abdomen pelvis done with contrast on 8/15/2023 revealed fatty liver, distal esophageal wall thickening and gastric wall thickening and dilatation of the pancreatic duct..  Subsequently she had a MRI scan done without contrast with MRCP that revealed pancreatic duct upper limit of normal but without any filling defect or mass lesion.  EGD done on 10/18/2023 revealed esophagitis with a hiatal hernia and gastritis.  Patient likely had a nutritional iron deficiency anemia.  Currently on iron pills every other day.  Lab work done in September 2023 revealed a normal hemoglobin of 12.7 g/dL.  CMP was normal except sodium of 147.  She has a CMP drawn this morning at PCPs office will follow     6/23/2022  She has a history of lymphocytic colitis diagnosed by biopsy and 2014.  Denies rectal bleeding. Colonoscopy 10/20/2021 with no endoscopic signs of ileitis or colitis, biopsies revealed lymphocytic colitis.  She was diagnosed with C. difficile in April  2022 at [facility] while being hospitalized for an injury requiring lengthy stay, and was treated with vancomycin. She is not symptomatic at this time.       Previous History:  3. History of Clostridium difficile infection  4. Personal history of colon polyps  5. Family history colon CA-her dad at the age of 49  6. Angiodysplasia of the colon.  12/16/2021  Colonoscopy done on 10/20/2021 did not reveal any colon polyps. She had a colonic angiectasia which was ablated with APC. Her recent lab work does not reveal any anemia. Will consider high risk screening colonoscopy in 5 years time in 2026 depending on her medical and general condition.  Her dad had colon cancer at the age of 49.      Follow Up:   No follow-ups on file.    Jose Mayer MD  [facility] [place]  10/30/2024  15:18 EDT     Please note that portions of this note may have been completed with a voice recognition program.

## 2024-10-31 ENCOUNTER — TELEPHONE (OUTPATIENT)
Dept: CARDIOLOGY | Facility: CLINIC | Age: 79
End: 2024-10-31

## 2024-10-31 PROBLEM — I49.9 IRREGULAR HEART BEAT: Status: ACTIVE | Noted: 2024-10-31

## 2024-10-31 NOTE — TELEPHONE ENCOUNTER
Caller: Odalys Miles     Relationship: SELF    Best call back number: [phone]    What is your medical concern? IRREGULAR HEART BEAT    How long has this issue been going on? WORSE THAN NORMAL YESTERDAY    Is your provider already aware of this issue? NO    Have you been treated for this issue? PATIENT SAW DR. SOLIS YESTERDAY AND WAS TOLD TO GET AN APPOINTMENT WITH DR. SALOMON. HUB HAS NO TIME FRAME FOR SCHEDULING, PLEASE REACH OUT.

## 2024-11-05 ENCOUNTER — OFFICE VISIT (OUTPATIENT)
Dept: CARDIOLOGY | Facility: CLINIC | Age: 79
End: 2024-11-05
Payer: MEDICARE

## 2024-11-05 VITALS
HEIGHT: 67 IN | OXYGEN SATURATION: 99 % | SYSTOLIC BLOOD PRESSURE: 102 MMHG | BODY MASS INDEX: 17.39 KG/M2 | RESPIRATION RATE: 17 BRPM | WEIGHT: 110.8 LBS | DIASTOLIC BLOOD PRESSURE: 70 MMHG | HEART RATE: 67 BPM

## 2024-11-05 DIAGNOSIS — I49.9 IRREGULAR HEART BEAT: Primary | ICD-10-CM

## 2024-11-05 DIAGNOSIS — K52.9 CHRONIC DIARRHEA: ICD-10-CM

## 2024-11-05 PROBLEM — Z09 HOSPITAL DISCHARGE FOLLOW-UP: Status: ACTIVE | Noted: 2024-11-05

## 2024-11-05 PROBLEM — Z09 HOSPITAL DISCHARGE FOLLOW-UP: Status: RESOLVED | Noted: 2024-11-05 | Resolved: 2024-11-05

## 2024-11-05 PROCEDURE — 99213 OFFICE O/P EST LOW 20 MIN: CPT | Performed by: NURSE PRACTITIONER

## 2024-11-05 PROCEDURE — 93000 ELECTROCARDIOGRAM COMPLETE: CPT | Performed by: NURSE PRACTITIONER

## 2024-11-05 PROCEDURE — 83993 ASSAY FOR CALPROTECTIN FECAL: CPT | Performed by: INTERNAL MEDICINE

## 2024-11-05 PROCEDURE — 1160F RVW MEDS BY RX/DR IN RCRD: CPT | Performed by: NURSE PRACTITIONER

## 2024-11-05 PROCEDURE — 1159F MED LIST DOCD IN RCRD: CPT | Performed by: NURSE PRACTITIONER

## 2024-11-05 PROCEDURE — 82653 EL-1 FECAL QUANTITATIVE: CPT | Performed by: INTERNAL MEDICINE

## 2024-11-05 NOTE — ASSESSMENT & PLAN NOTE
-Will place holter monitor for 14 days to assess for any arrhythmias  -EKG noted sinus bradycardia with premature superventricular complexes  -Echo with stable EF 2023    Orders:    ECG 12 Lead    Holter Monitor - 72 Hour Up To 15 Days; Future

## 2024-11-05 NOTE — PROGRESS NOTES
[facility] Cardiology    Office Consult     Odalys Miles  [id number]  10/31/2024    Referred By: No ref. provider found    Chief Complaint   Patient presents with    Follow-up    Irregular Heart Beat       Subjective     History of Present Illness:   Odalys Miles is a 79 y.o. female who presents to the Cardiology Clinic for evaluation of her heartbeat sounding irregular on exam by gastroenterology at a routine office visit yesterday.  Patient with no significant cardiac history.  Patient was evaluated in this office in 2023 with complaints of dyspnea on exertion.  She had an echocardiogram completed which noted  normal left ventricular size and systolic function, LVEF 60-65% and normal LV diastolic filling pattern.      Patient states she has been having palpitations/ irregular heartbeat for the past 2 months or so.  States that she feels these irregular heartbeats most days of the week.  States she does feel a little bit short of breath with it.  Denies any chest pain.  States the palpitation episodes are usually short in duration.  Denies any dizziness.  States she drinks one caffeine drink in the morning.       Past Cardiac Testin. Last Coronary Angio: None  2. Prior Stress Testing: None  3. Last Echo: 2023--LVEF-60-65%  4. Prior Holter Monitor: None    Review of Systems   Constitutional:  Negative for activity change and fatigue.   Respiratory:  Positive for shortness of breath. Negative for chest tightness.    Cardiovascular:  Positive for palpitations. Negative for chest pain and leg swelling.   Neurological:  Negative for dizziness.   All other systems reviewed and are negative.       Past Medical History:   Diagnosis Date    Abnormal liver enzymes     Basal cell carcinoma     under left eye    Body mass index (BMI) 20.0-20.9, adult     BPPV (benign paroxysmal positional vertigo)     COVID-19 vaccine series completed     Moderna    Elevated cholesterol     Fracture      as a child-wrist    Fracture of leg 02/13/2022    Fracture of sacrum     GERD (gastroesophageal reflux disease)     Glaucoma     Glaucoma     Hip fracture 10/11/2019    Hip repalced in 2020    Humerus fracture     Lt arm    Ingrowing toenail     Mammogram abnormal     Microscopic colitis 07/23/2014    Onychomycosis     Open wound of left ankle     Osteoporosis     Personal history of fall 12/2023    Hit forehead and had to have stitches.    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; chronic wound present     Wears dentures     full upper plate, partial on the lower    Wears glasses        Past Surgical History:   Procedure Laterality Date    BACK SURGERY      hardware placed, sacral fracture    CATARACT EXTRACTION, BILATERAL      COLONOSCOPY      DR JAIMES    COLONOSCOPY N/A 10/12/2021    Procedure: COLONOSCOPY with biopsy and APC sigmoid AVM cauterization;  Surgeon: Jose Mayer MD;  Location: [facility] ENDOSCOPY;  Service: Gastroenterology;  Laterality: N/A;    ENDOSCOPY N/A 10/18/2023    Procedure: ESOPHAGOGASTRODUODENOSCOPY WITH BIOPSY;  Surgeon: Jose Mayer MD;  Location: [facility] ENDOSCOPY;  Service: Gastroenterology;  Laterality: N/A;    HIP SURGERY Left 12/27/2019    secondary to a fracture    LEG SURGERY Left 2022    fracture, hardware placed    ORIF HUMERUS FRACTURE Left 11/03/2021    Procedure: HUMERUS PROXIMAL OPEN REDUCTION INTERNAL FIXATION WITH IMTRAMEDULLARY NAIL FIXATION LEFT;  Surgeon: Linden Domingo MD;  Location: [facility] OR;  Service: Orthopedics;  Laterality: Left;    SKIN BIOPSY      TUBAL ABDOMINAL LIGATION         Family History   Problem Relation Age of Onset    Heart attack Mother     Diabetes Mother     Stroke Father     Breast cancer Neg Hx     Ovarian cancer Neg Hx     Colon cancer Neg Hx        Social  History     Socioeconomic History    Marital status:    Tobacco Use    Smoking status: Former     Current packs/day: 0.00     Average packs/day: 0.3 packs/day for 47.0 years (11.8 ttl pk-yrs)     Types: Cigarettes     Start date:      Quit date:      Years since quittin.8     Passive exposure: Past    Smokeless tobacco: Never   Vaping Use    Vaping status: Never Used   Substance and Sexual Activity    Alcohol use: Not Currently    Drug use: No    Sexual activity: Defer         Current Outpatient Medications:     ascorbic acid (VITAMIN C) 1000 MG tablet, Take 1 tablet by mouth Daily., Disp: , Rfl:     aspirin (Aspirin Low Dose) 81 MG EC tablet, Take 1 tablet by mouth Daily., Disp: , Rfl:     BD Pen Needle Chasity U/F 32G X 4 MM misc, , Disp: , Rfl:     ferrous sulfate 324 MG tablet delayed-release, Take 1 tablet by mouth Every Other Day., Disp: , Rfl:     folic acid (FOLVITE) 800 MCG tablet, Take 1 tablet by mouth Daily., Disp: , Rfl:     LUMIGAN 0.01 % ophthalmic drops, Administer  to both eyes Every Night., Disp: , Rfl:     Melatonin 10 MG capsule, Take  by mouth Every Night., Disp: , Rfl:     Multiple Vitamins-Minerals (MULTIVITAMIN ADULT EXTRA C PO), Take 1 tablet by mouth Daily., Disp: , Rfl:     Multiple Vitamins-Minerals (ZINC PO), Take  by mouth Daily., Disp: , Rfl:     NON FORMULARY, 2 (Two) Times a Day. Ez tears, Disp: , Rfl:     olive oil external oil, Apply  topically to the appropriate area as directed As Needed. 1 TSP at bedtime, Disp: , Rfl:     POTASSIUM GLUCONATE PO, Take 650 mg by mouth Daily., Disp: , Rfl:     Probiotic Product (Easydiagnosis PO), Take  by mouth Daily., Disp: , Rfl:     SIMBRINZA 1-0.2 % suspension, INSTILL 1 DROP INTO BOTH EYES TWICE A DAY, Disp: , Rfl: 3    vitamin D3 125 MCG (5000 UT) capsule capsule, Take 1 capsule by mouth Daily., Disp: , Rfl:     Budesonide (ENTOCORT EC) 3 MG 24 hr capsule, Take 2 capsules by mouth Every Morning. PT currently taking  "two tablets. (Patient taking differently: Take 2 capsules by mouth Every Morning. PT currently taking one-two tablet daily.), Disp: 60 capsule, Rfl: 5    No Known Allergies    Objective     Vitals:    11/05/24 0921   BP: 102/70   Pulse: 67   Resp: 17   SpO2: 99%   Weight: 50.3 kg (110 lb 12.8 oz)   Height: 170.2 cm (67\")     Body mass index is 17.35 kg/m².    Physical Exam  Vitals and nursing note reviewed.   Constitutional:       General: She is not in acute distress.     Appearance: Normal appearance. She is not toxic-appearing.   HENT:      Head: Normocephalic.      Mouth/Throat:      Mouth: Mucous membranes are moist.   Eyes:      Pupils: Pupils are equal, round, and reactive to light.   Cardiovascular:      Rate and Rhythm: Normal rate and regular rhythm.      Pulses: Normal pulses.      Heart sounds: Normal heart sounds. No murmur heard.  Pulmonary:      Effort: Pulmonary effort is normal.      Breath sounds: Normal breath sounds. No wheezing, rhonchi or rales.   Musculoskeletal:      Right lower leg: No edema.      Left lower leg: No edema.   Skin:     General: Skin is warm and dry.      Capillary Refill: Capillary refill takes less than 2 seconds.   Neurological:      Mental Status: She is alert and oriented to person, place, and time. Mental status is at baseline.   Psychiatric:         Mood and Affect: Mood normal.         Behavior: Behavior normal.         Thought Content: Thought content normal.         Results Review:   I reviewed the patient's new clinical results.      ECG 12 Lead    Date/Time: 11/5/2024 10:45 AM  Performed by: Samantha Jain APRN    Authorized by: Samantha Jain APRN  Comparison: not compared with previous ECG   Rhythm: sinus bradycardia  Ectopy: infrequent PVCs  Rate: bradycardic    Clinical impression: abnormal EKG  Comments: Cannot rule out anterior infarct, age undetermined          Assessment & Plan  Irregular heart beat  -Will place holter monitor for 14 days to assess " for any arrhythmias  -EKG noted sinus bradycardia with premature superventricular complexes  -Echo with stable EF 2023    Orders:    ECG 12 Lead    Holter Monitor - 72 Hour Up To 15 Days; Future    Chronic diarrhea  -Being followed by GI  Orders:    Pancreatic Elastase, Fecal - Stool, Per Rectum    Calprotectin, Fecal - Stool, Per Rectum      Preventative Cardiology:   Tobacco Cessation: N/A   Advance Care Planning: ACP discussion was held with the patient during this visit. Patient does not have an advance directive, declines further assistance.     Follow Up:   Return in about 4 weeks (around 12/3/2024) for Next scheduled follow up--Dr. Lopez.      Thank you for allowing me to participate in the care of your patient. Please to not hesitate to contact me with additional questions or concerns.     Samantha Jain APRN

## 2024-11-07 ENCOUNTER — TELEPHONE (OUTPATIENT)
Dept: CARDIOLOGY | Facility: CLINIC | Age: 79
End: 2024-11-07
Payer: MEDICARE

## 2024-11-07 NOTE — TELEPHONE ENCOUNTER
Spoke with Pt and she was worried about when to change the monitor. Pt was advised she will change the patch out when she feels like it is not sticking good enough to her skin. Pt also asked when she was going to take this off and advised her she will take this off on 11/19/24 and this is written on the diary. Pt gave verbal that she was reminding herself to send back through [employer] rather than [employer] and I also let her know she can call the number on the box and [employer] will come and get it since she lives in [place], [place] and there is no local [employer] drop offs.

## 2024-11-07 NOTE — TELEPHONE ENCOUNTER
Caller: Odalys Miles    Relationship: Self    Best call back number: [phone]     What is the best time to reach you: ANY     What was the call regarding: PT WAS GIVEN A HANDFUL OF PAPERS ABOUT VISIT, FIGURED THIS HAD ALL THE INFO ON IT BUT PT DOES NOT SEE HEART MONITOR INFO ON PAPER.    PT THINKS SHE NEEDS TO CHANGE THE HEART MONITOR THAT WAS PUT ON @ LAST VISIT TO THE NEW MONITOR ON 11/9- PLEASE ADVISE IF THIS DATE IS CORRECT     Is it okay if the provider responds through MyChart: CALL

## 2024-11-08 LAB
CALPROTECTIN STL-MCNT: 422 UG/G (ref 0–120)
ELASTASE PANC STL-MCNT: >800 UG ELAST./G

## 2024-11-12 DIAGNOSIS — K52.9 CHRONIC DIARRHEA: Primary | ICD-10-CM

## 2024-11-12 DIAGNOSIS — R19.5 ELEVATED FECAL CALPROTECTIN: ICD-10-CM

## 2024-11-13 ENCOUNTER — TELEPHONE (OUTPATIENT)
Dept: GASTROENTEROLOGY | Facility: CLINIC | Age: 79
End: 2024-11-13
Payer: MEDICARE

## 2024-11-13 NOTE — TELEPHONE ENCOUNTER
Called patient - scheduled Pill Cam for 12-17-24 - mailed instructions - told her to call office if she does not receive instructions

## 2024-11-15 ENCOUNTER — TELEPHONE (OUTPATIENT)
Dept: GASTROENTEROLOGY | Facility: CLINIC | Age: 79
End: 2024-11-15
Payer: MEDICARE

## 2024-11-15 NOTE — TELEPHONE ENCOUNTER
Called patient, information given to the patient.  She states understanding of reasons to do the pillcam.

## 2024-11-15 NOTE — TELEPHONE ENCOUNTER
----- Message from Mia MCNEILL sent at 11/13/2024  8:04 AM EST -----    ----- Message -----  From: Jose Mayer MD  Sent: 11/12/2024   7:17 PM EST  To: [facility]    Let her know that her stool test showed some increased inflammation in the bowel.   There is some suspicion for Crohn's disease in the small bowel  I have ordered a PillCam study  ----- Message -----  From: Lab, Background User  Sent: 11/8/2024  12:07 AM EST  To: Jose Mayer MD

## 2024-11-19 RX ORDER — ANTIARTHRITIC COMBINATION NO.2 900 MG
1 TABLET ORAL DAILY
COMMUNITY

## 2024-11-19 RX ORDER — ACETAMINOPHEN 500 MG
500 TABLET ORAL AS NEEDED
COMMUNITY

## 2024-12-03 ENCOUNTER — OFFICE VISIT (OUTPATIENT)
Dept: CARDIOLOGY | Facility: CLINIC | Age: 79
End: 2024-12-03
Payer: MEDICARE

## 2024-12-03 VITALS
BODY MASS INDEX: 17.01 KG/M2 | OXYGEN SATURATION: 100 % | DIASTOLIC BLOOD PRESSURE: 74 MMHG | WEIGHT: 108.4 LBS | HEART RATE: 53 BPM | HEIGHT: 67 IN | SYSTOLIC BLOOD PRESSURE: 140 MMHG

## 2024-12-03 DIAGNOSIS — I49.9 IRREGULAR HEART BEAT: Primary | ICD-10-CM

## 2024-12-03 PROCEDURE — 99213 OFFICE O/P EST LOW 20 MIN: CPT | Performed by: INTERNAL MEDICINE

## 2024-12-03 NOTE — PROGRESS NOTES
"             [facility] Cardiology Office Follow Up Note    Odalys Miles  [id number]  2024    Primary Care Provider: Coredlia Hinton MD    Chief Complaint: Routine follow-up    History of Present Illness:   Mrs. Odalys Miles is a 79y.o. female who presents to the Cardiology Clinic for routine follow-up.  The patient has a past medical history significant for hyperlipidemia and Raynaud's syndrome.  She does not have any significant past cardiac history.  At the time of her last evaluation in the cardiology clinic, the patient reported occasional episodes of palpitations.  She subsequently went outpatient cardiac monitoring, which is ongoing today.  She continues to report occasional episodes of palpitations described as a brief \"fluttering\" sensation.  The episodes last 1 to 2 seconds before resolving spontaneously.  No associated dizziness or lightheadedness.  No history of presyncopal or syncopal events.  No other specific complaints today.     Past Cardiac Testin. Last Coronary Angio: None  2. Prior Stress Testing: None  3. Last Echo:             1.  Normal left ventricular size and systolic function, LVEF 60-65%.  2.  Normal LV diastolic filling pattern.  3.  Normal right ventricular size and systolic function.  4.  Normal left atrial volume index.  5.  Mild aortic regurgitation.  6.  Trace mitral regurgitation.  7.  Mild tricuspid regurgitation, RVSP 37 mmHg.  4. Prior Holter Monitor: None    Review of Systems:   Review of Systems   Constitutional:  Negative for activity change, appetite change, chills, diaphoresis, fatigue, fever, unexpected weight gain and unexpected weight loss.   Eyes:  Negative for blurred vision and double vision.   Respiratory:  Negative for cough, chest tightness, shortness of breath and wheezing.    Cardiovascular:  Positive for palpitations. Negative for chest pain and leg swelling.   Gastrointestinal:  Negative for abdominal pain, anal " bleeding, blood in stool and GERD.   Endocrine: Negative for cold intolerance and heat intolerance.   Genitourinary:  Negative for hematuria.   Neurological:  Negative for dizziness, syncope, weakness and light-headedness.   Hematological:  Does not bruise/bleed easily.   Psychiatric/Behavioral:  Negative for depressed mood and stress. The patient is not nervous/anxious.        I have reviewed and/or updated the patient's past medical, past surgical, family, social history, problem list and allergies as appropriate.     Medications:     Current Outpatient Medications:     acetaminophen (TYLENOL) 500 MG tablet, Take 1 tablet by mouth As Needed. 1 at bedtime, Disp: , Rfl:     ascorbic acid (VITAMIN C) 1000 MG tablet, Take 1 tablet by mouth Daily., Disp: , Rfl:     aspirin (Aspirin Low Dose) 81 MG EC tablet, Take 1 tablet by mouth Daily., Disp: , Rfl:     Budesonide (ENTOCORT EC) 3 MG 24 hr capsule, Take 2 capsules by mouth Every Morning. PT currently taking two tablets. (Patient taking differently: Take 2 capsules by mouth Every Morning. PT currently taking one-two tablet daily.), Disp: 60 capsule, Rfl: 5    Calcium Carb-Cholecalciferol (CALCIUM 600+D3 PO), Take 1 tablet by mouth 2 (Two) Times a Day., Disp: , Rfl:     ferrous sulfate 324 MG tablet delayed-release, Take 1 tablet by mouth Every Other Day., Disp: , Rfl:     folic acid (FOLVITE) 800 MCG tablet, Take 1 tablet by mouth Every Night., Disp: , Rfl:     LUMIGAN 0.01 % ophthalmic drops, Administer  to both eyes Every Night., Disp: , Rfl:     Melatonin 10 MG capsule, Take  by mouth Every Night., Disp: , Rfl:     Multiple Vitamins-Minerals (Womens Multi) capsule, Take 1 tablet by mouth Daily., Disp: , Rfl:     Multiple Vitamins-Minerals (ZINC PO), Take 50 mg by mouth 2 (Two) Times a Day., Disp: , Rfl:     NON FORMULARY, 2 (Two) Times a Day. Ez tears, Disp: , Rfl:     olive oil external oil, Apply  topically to the appropriate area as directed As Needed. 1 TSP at  "bedtime, Disp: , Rfl:     POTASSIUM GLUCONATE PO, Take 650 mg by mouth Daily., Disp: , Rfl:     Probiotic Product (Buyapowa PO), Take 1 tablet by mouth Every Night., Disp: , Rfl:     SIMBRINZA 1-0.2 % suspension, INSTILL 1 DROP INTO BOTH EYES TWICE A DAY, Disp: , Rfl: 3    Physical Exam:  Vital Signs:   Vitals:    12/03/24 1032   BP: 140/74   BP Location: Left arm   Patient Position: Sitting   Cuff Size: Adult   Pulse: 53   SpO2: 100%   Weight: 49.2 kg (108 lb 6.4 oz)   Height: 170.2 cm (67\")       Physical Exam  Constitutional:       General: She is not in acute distress.     Appearance: Normal appearance. She is well-developed. She is not diaphoretic.   HENT:      Head: Normocephalic and atraumatic.   Eyes:      General: No scleral icterus.     Pupils: Pupils are equal, round, and reactive to light.   Neck:      Trachea: No tracheal deviation.   Cardiovascular:      Rate and Rhythm: Normal rate and regular rhythm.      Heart sounds: Normal heart sounds. No murmur heard.     No friction rub. No gallop.      Comments: Normal JVD.    Pulmonary:      Effort: Pulmonary effort is normal. No respiratory distress.      Breath sounds: Normal breath sounds. No stridor. No wheezing or rales.   Chest:      Chest wall: No tenderness.   Abdominal:      General: Bowel sounds are normal. There is no distension.      Palpations: Abdomen is soft.      Tenderness: There is no abdominal tenderness. There is no guarding or rebound.   Musculoskeletal:         General: No swelling. Normal range of motion.      Cervical back: Neck supple. No tenderness.   Lymphadenopathy:      Cervical: No cervical adenopathy.   Skin:     General: Skin is warm and dry.      Findings: No erythema.   Neurological:      General: No focal deficit present.      Mental Status: She is alert and oriented to person, place, and time.   Psychiatric:         Mood and Affect: Mood normal.         Behavior: Behavior normal.         Results Review:   I " reviewed the patient's new clinical results.        Assessment / Plan:     1.  Palpitations  -- Underlying etiology unclear, however symptoms suspicious for symptomatic ectopy  -- No history of presyncopal or syncopal events  -- Currently undergoing outpatient cardiac monitoring to further assess underlying etiology of palpitations  -- Follow-up in 6 months, sooner pending results of outpatient cardiac monitoring    Follow Up:   Return in about 6 months (around 6/3/2025).      Thank you for allowing me to participate in the care of your patient. Please to not hesitate to contact me with additional questions or concerns.     GENARO Lopez MD  Interventional Cardiology   12/03/2024  10:26 EST

## 2024-12-05 ENCOUNTER — TELEPHONE (OUTPATIENT)
Dept: CARDIOLOGY | Facility: CLINIC | Age: 79
End: 2024-12-05
Payer: MEDICARE

## 2024-12-05 NOTE — TELEPHONE ENCOUNTER
Opal with [facility] reporting low heart rate of 54 BPM. BP is 110/80 no symptoms reported. Irregular HR reported. Spoke with patient about holter monitor and she had some confusion and just removed yesterday and mailed on 12/4/2024 through [facility].   [phone]

## 2025-02-12 DIAGNOSIS — K52.832 LYMPHOCYTIC COLITIS: ICD-10-CM

## 2025-02-12 RX ORDER — BUDESONIDE 3 MG/1
6 CAPSULE, COATED PELLETS ORAL EVERY MORNING
Qty: 180 CAPSULE | Refills: 1 | Status: SHIPPED | OUTPATIENT
Start: 2025-02-12

## 2025-03-12 ENCOUNTER — TELEPHONE (OUTPATIENT)
Dept: CARDIOLOGY | Facility: CLINIC | Age: 80
End: 2025-03-12
Payer: MEDICARE

## 2025-03-12 NOTE — TELEPHONE ENCOUNTER
Pt called with concerns about a Medicare statement she got for a bill for $3,000. Pt states that the number she was given by someone earlier today was no longer in service. I checked which company of monitor she was wearing and gave her the number for [employer]. Number given was [phone] and she verified this was not the same number she was given previously.

## 2025-03-25 ENCOUNTER — OFFICE VISIT (OUTPATIENT)
Dept: INTERNAL MEDICINE | Facility: CLINIC | Age: 80
End: 2025-03-25
Payer: MEDICARE

## 2025-03-25 VITALS
BODY MASS INDEX: 16.64 KG/M2 | TEMPERATURE: 98 F | HEIGHT: 67 IN | SYSTOLIC BLOOD PRESSURE: 138 MMHG | WEIGHT: 106 LBS | DIASTOLIC BLOOD PRESSURE: 74 MMHG | HEART RATE: 56 BPM | OXYGEN SATURATION: 100 % | RESPIRATION RATE: 18 BRPM

## 2025-03-25 DIAGNOSIS — K52.832 LYMPHOCYTIC COLITIS: ICD-10-CM

## 2025-03-25 DIAGNOSIS — E44.1 MILD PROTEIN-CALORIE MALNUTRITION: ICD-10-CM

## 2025-03-25 DIAGNOSIS — I49.9 IRREGULAR HEART BEAT: ICD-10-CM

## 2025-03-25 DIAGNOSIS — M81.8 OTHER OSTEOPOROSIS WITHOUT CURRENT PATHOLOGICAL FRACTURE: ICD-10-CM

## 2025-03-25 DIAGNOSIS — K21.00 GASTROESOPHAGEAL REFLUX DISEASE WITH ESOPHAGITIS WITHOUT HEMORRHAGE: Primary | ICD-10-CM

## 2025-03-25 PROBLEM — R19.5 DARK STOOLS: Status: RESOLVED | Noted: 2024-08-28 | Resolved: 2025-03-25

## 2025-03-25 PROBLEM — H93.19 TINNITUS: Status: RESOLVED | Noted: 2017-01-11 | Resolved: 2025-03-25

## 2025-03-25 PROBLEM — R42 DIZZINESS: Status: RESOLVED | Noted: 2024-01-22 | Resolved: 2025-03-25

## 2025-03-25 PROBLEM — E87.6 HYPOKALEMIA: Status: RESOLVED | Noted: 2024-01-22 | Resolved: 2025-03-25

## 2025-03-25 NOTE — ASSESSMENT & PLAN NOTE
Patient's Body mass index is 16.6 kg/m².  BMI indicates mild protein-calorie malnutrition with health conditions related to an underlying condition that include none . Weight issue is unchanged. BMI is is below average; BMI management plan is completed.  BMI management for patient includes the following: referral to dietitian.

## 2025-03-25 NOTE — PROGRESS NOTES
Office Note     Name: Odalys Miles    : 1945     MRN: [id number]     Chief Complaint  Hyperlipidemia (6 month follow up/)    Subjective     History of Present Illness:  Odalys Miles is a 79 y.o. female who presents today for chronic conditions    Follows with cardiology for irregular heartbeat  Follows with nephrology on 14 vitamin D  Lymphocytic colitis, GERD diet controlled follows with GI. On PO budesonide but claims she is noncompliant     EGD mild esophagitis,continue to avoid coffee   Colonoscopy  tortuous colon, follow-up if with any symptoms  S.p left leg surgery w/ rodolfo: 2022  DXA scan: osteoporosis, hx of esophagitis, unable to take alendronate, on vitamin D3      Family History:   Family History   Problem Relation Age of Onset    Heart attack Mother     Diabetes Mother     Stroke Father     Breast cancer Neg Hx     Ovarian cancer Neg Hx     Colon cancer Neg Hx        Social History:   Social History     Socioeconomic History    Marital status:    Tobacco Use    Smoking status: Former     Current packs/day: 0.00     Average packs/day: 0.3 packs/day for 47.0 years (11.8 ttl pk-yrs)     Types: Cigarettes     Start date:      Quit date:      Years since quittin.2     Passive exposure: Past    Smokeless tobacco: Never   Vaping Use    Vaping status: Never Used   Substance and Sexual Activity    Alcohol use: Not Currently    Drug use: No    Sexual activity: Defer       Health Maintenance   Topic Date Due    Hepatitis B (1 of 3 - Risk 3-dose series) Never done    COVID-19 Vaccine ( season) 2025    ANNUAL WELLNESS VISIT  2025    LIPID PANEL  2025    DXA SCAN  2026    COLORECTAL CANCER SCREENING  10/12/2031    TDAP/TD VACCINES (4 - Td or Tdap) 2033    RSV Vaccine - Adults  Completed    Pneumococcal Vaccine 50+  Completed    ZOSTER VACCINE  Completed    MAMMOGRAM  Discontinued       Patient Care Team:  Cordelia Hinton MD  "as PCP - General (Family Medicine)  Joes Mayer MD as Consulting Physician (Gastroenterology)  Neymar Barakat (Family Medicine)    Objective     Vital Signs  /74   Pulse 56   Temp 98 °F (36.7 °C) (Infrared)   Resp 18   Ht 170.2 cm (67.01\")   Wt 48.1 kg (106 lb)   SpO2 100%   BMI 16.60 kg/m²   Estimated body mass index is 16.6 kg/m² as calculated from the following:    Height as of this encounter: 170.2 cm (67.01\").    Weight as of this encounter: 48.1 kg (106 lb).  BMI is below normal parameters (malnutrition). Recommendations: referral to dietitian    Physical Exam  Vitals and nursing note reviewed.   Constitutional:       Appearance: Normal appearance.   HENT:      Head: Normocephalic and atraumatic.   Cardiovascular:      Rate and Rhythm: Normal rate and regular rhythm.      Pulses: Normal pulses.      Heart sounds: Normal heart sounds.   Pulmonary:      Effort: Pulmonary effort is normal. No respiratory distress.      Breath sounds: Normal breath sounds. No wheezing, rhonchi or rales.   Abdominal:      General: Abdomen is flat. Bowel sounds are normal.      Palpations: Abdomen is soft.      Tenderness: There is no abdominal tenderness. There is no guarding.   Musculoskeletal:      Cervical back: Neck supple.   Skin:     General: Skin is warm.      Capillary Refill: Capillary refill takes less than 2 seconds.   Neurological:      General: No focal deficit present.      Mental Status: She is alert. Mental status is at baseline.   Psychiatric:         Mood and Affect: Mood normal.         Behavior: Behavior normal.          Procedures     Assessment and Plan     Diagnoses and all orders for this visit:    1. Gastroesophageal reflux disease with esophagitis without hemorrhage (Primary)  Assessment & Plan:  Stable diet controlled  Follows with GI      2. Irregular heart beat  Assessment & Plan:  Asymptomatic at this time  Following with cardiology    Orders:  -     Basic Metabolic Panel  -  "    TSH Rfx On Abnormal To Free T4    3. Mild protein-calorie malnutrition  Assessment & Plan:  Patient's Body mass index is 16.6 kg/m².  BMI indicates mild protein-calorie malnutrition with health conditions related to an underlying condition that include none . Weight issue is unchanged. BMI is is below average; BMI management plan is completed.  BMI management for patient includes the following: referral to dietitian.    Orders:  -     CBC (No Diff)  -     Basic Metabolic Panel  -     Lipid Panel  -     TSH Rfx On Abnormal To Free T4  -     Ambulatory Referral to Nutrition Services    4. Lymphocytic colitis  Assessment & Plan:  Follows with GI, recommended a PillCam. Patient refuses at this time    Orders:  -     CBC (No Diff)  -     Basic Metabolic Panel  -     Lipid Panel  -     TSH Rfx On Abnormal To Free T4    5. Other osteoporosis without current pathological fracture  Assessment & Plan:  Stable at this time  On calcium vitamin D           Counseling was given to patient for the following topics: instructions for management, risk factor reductions, and risks and benefits of treatment options.    Follow Up  Return in about 6 months (around 9/25/2025) for Medicare Wellness.    MD NEPTALI Arambula  [facility]  [address] [place] [id number] [phone]

## 2025-03-26 LAB
BUN SERPL-MCNC: 14 MG/DL (ref 8–23)
BUN/CREAT SERPL: 18.9 (ref 7–25)
CALCIUM SERPL-MCNC: 9.7 MG/DL (ref 8.6–10.5)
CHLORIDE SERPL-SCNC: 103 MMOL/L (ref 98–107)
CHOLEST SERPL-MCNC: 208 MG/DL (ref 0–200)
CO2 SERPL-SCNC: 31.8 MMOL/L (ref 22–29)
CREAT SERPL-MCNC: 0.74 MG/DL (ref 0.57–1)
EGFRCR SERPLBLD CKD-EPI 2021: 81.4 ML/MIN/1.73
ERYTHROCYTE [DISTWIDTH] IN BLOOD BY AUTOMATED COUNT: 11.4 % (ref 12.3–15.4)
GLUCOSE SERPL-MCNC: 93 MG/DL (ref 65–99)
HCT VFR BLD AUTO: 45.1 % (ref 34–46.6)
HDLC SERPL-MCNC: 91 MG/DL (ref 40–60)
HGB BLD-MCNC: 14.4 G/DL (ref 12–15.9)
LDLC SERPL CALC-MCNC: 107 MG/DL (ref 0–100)
MCH RBC QN AUTO: 30.3 PG (ref 26.6–33)
MCHC RBC AUTO-ENTMCNC: 31.9 G/DL (ref 31.5–35.7)
MCV RBC AUTO: 94.7 FL (ref 79–97)
PLATELET # BLD AUTO: 250 10*3/MM3 (ref 140–450)
POTASSIUM SERPL-SCNC: 3.5 MMOL/L (ref 3.5–5.2)
RBC # BLD AUTO: 4.76 10*6/MM3 (ref 3.77–5.28)
SODIUM SERPL-SCNC: 144 MMOL/L (ref 136–145)
TRIGL SERPL-MCNC: 56 MG/DL (ref 0–150)
TSH SERPL DL<=0.005 MIU/L-ACNC: 1.85 UIU/ML (ref 0.27–4.2)
VLDLC SERPL CALC-MCNC: 10 MG/DL (ref 5–40)
WBC # BLD AUTO: 8.24 10*3/MM3 (ref 3.4–10.8)

## 2025-06-03 ENCOUNTER — OFFICE VISIT (OUTPATIENT)
Dept: CARDIOLOGY | Facility: CLINIC | Age: 80
End: 2025-06-03
Payer: MEDICARE

## 2025-06-03 VITALS
DIASTOLIC BLOOD PRESSURE: 80 MMHG | RESPIRATION RATE: 16 BRPM | HEIGHT: 67 IN | HEART RATE: 66 BPM | WEIGHT: 107 LBS | SYSTOLIC BLOOD PRESSURE: 132 MMHG | BODY MASS INDEX: 16.79 KG/M2 | OXYGEN SATURATION: 98 %

## 2025-06-03 DIAGNOSIS — I49.9 IRREGULAR HEART BEAT: Primary | ICD-10-CM

## 2025-06-03 PROCEDURE — 93000 ELECTROCARDIOGRAM COMPLETE: CPT | Performed by: INTERNAL MEDICINE

## 2025-06-03 PROCEDURE — 99212 OFFICE O/P EST SF 10 MIN: CPT | Performed by: INTERNAL MEDICINE

## 2025-06-03 NOTE — PROGRESS NOTES
[facility] Cardiology Office Follow Up Note    Odalys Miles  [id number]  2025    Primary Care Provider: Cordelia Hinton MD    Chief Complaint: Routine follow-up    History of Present Illness:   Mrs. Odalys Miles is a 79y.o. female who presents to the Cardiology Clinic for routine follow-up.  The patient has a past medical history significant for hyperlipidemia and Raynaud's syndrome.  She does not have any significant past cardiac history.  Due to a recent history of palpitations, the patient underwent outpatient cardiac monitoring showing no significant arrhythmias.  She did have frequent supraventricular ectopy with a burden of approximately 13%.  Her ectopy appeared to be primarily asymptomatic.  Today, the patient reports interval resolution of her palpitations.  She remains active at home without chest pain or exertional dyspnea.  No specific complaints today.     Past Cardiac Testin. Last Coronary Angio: None  2. Prior Stress Testing: None  3. Last Echo:               1.  Normal left ventricular size and systolic function, LVEF 60-65%.  2.  Normal LV diastolic filling pattern.  3.  Normal right ventricular size and systolic function.  4.  Normal left atrial volume index.  5.  Mild aortic regurgitation.  6.  Trace mitral regurgitation.  7.  Mild tricuspid regurgitation, RVSP 37 mmHg.  4. Prior Holter Monitor:   1.  The predominant rhythm is sinus rhythm with an average heart rate 67 bpm.  2.  No sustained arrhythmias.  3.  Occasional supraventricular ectopy including limited PSVT.  [place] of ectopy 12.8%.  4.  Rare ventricular ectopy.  5.  1 symptomatic event corresponded to NSR.  No correlation of symptoms to ectopy.    Review of Systems:   Review of Systems   Constitutional:  Negative for activity change, appetite change, chills, diaphoresis, fatigue, fever, unexpected weight gain and unexpected weight loss.   Eyes:  Negative for blurred vision  and double vision.   Respiratory:  Negative for cough, chest tightness, shortness of breath and wheezing.    Cardiovascular:  Negative for chest pain, palpitations and leg swelling.   Gastrointestinal:  Negative for abdominal pain, anal bleeding, blood in stool and GERD.   Endocrine: Negative for cold intolerance and heat intolerance.   Genitourinary:  Negative for hematuria.   Neurological:  Negative for dizziness, syncope, weakness and light-headedness.   Hematological:  Does not bruise/bleed easily.   Psychiatric/Behavioral:  Negative for depressed mood and stress. The patient is not nervous/anxious.        I have reviewed and/or updated the patient's past medical, past surgical, family, social history, problem list and allergies as appropriate.     Medications:     Current Outpatient Medications:     ascorbic acid (VITAMIN C) 1000 MG tablet, Take 1 tablet by mouth Daily., Disp: , Rfl:     aspirin (Aspirin Low Dose) 81 MG EC tablet, Take 1 tablet by mouth Daily., Disp: , Rfl:     Budesonide (ENTOCORT EC) 3 MG 24 hr capsule, TAKE 2 CAPSULES BY MOUTH EVERY MORNING. PT CURRENTLY TAKING TWO TABLETS., Disp: 180 capsule, Rfl: 1    Calcium Carb-Cholecalciferol (CALCIUM 600+D3 PO), Take 1 tablet by mouth 2 (Two) Times a Day., Disp: , Rfl:     ferrous sulfate 324 MG tablet delayed-release, Take 1 tablet by mouth Every Other Day., Disp: , Rfl:     LUMIGAN 0.01 % ophthalmic drops, Administer  to both eyes Every Night., Disp: , Rfl:     Melatonin 10 MG capsule, Take  by mouth Every Night., Disp: , Rfl:     olive oil external oil, Apply  topically to the appropriate area as directed As Needed. 1 TSP at bedtime, Disp: , Rfl:     POTASSIUM GLUCONATE PO, Take 650 mg by mouth Daily., Disp: , Rfl:     Probiotic Product ([employer] PO), Take 1 tablet by mouth Every Night., Disp: , Rfl:     SIMBRINZA 1-0.2 % suspension, INSTILL 1 DROP INTO BOTH EYES TWICE A DAY, Disp: , Rfl: 3    acetaminophen (TYLENOL) 500 MG tablet,  "Take 1 tablet by mouth As Needed. 1 at bedtime (Patient not taking: Reported on 6/3/2025), Disp: , Rfl:     NON FORMULARY, 2 (Two) Times a Day. Ez tears, Disp: , Rfl:     Physical Exam:  Vital Signs:   Vitals:    06/03/25 1109   BP: 132/80   BP Location: Right arm   Patient Position: Sitting   Cuff Size: Adult   Pulse: 66   Resp: 16   SpO2: 98%   Weight: 48.5 kg (107 lb)   Height: 170.2 cm (67.01\")       Physical Exam  Constitutional:       General: She is not in acute distress.     Appearance: Normal appearance. She is well-developed. She is not diaphoretic.   HENT:      Head: Normocephalic and atraumatic.   Eyes:      General: No scleral icterus.     Pupils: Pupils are equal, round, and reactive to light.   Neck:      Trachea: No tracheal deviation.   Cardiovascular:      Rate and Rhythm: Normal rate and regular rhythm.      Heart sounds: Normal heart sounds. No murmur heard.     No friction rub. No gallop.      Comments: Normal JVD.    Pulmonary:      Effort: Pulmonary effort is normal. No respiratory distress.      Breath sounds: Normal breath sounds. No stridor. No wheezing or rales.   Chest:      Chest wall: No tenderness.   Abdominal:      General: Bowel sounds are normal. There is no distension.      Palpations: Abdomen is soft.      Tenderness: There is no abdominal tenderness. There is no guarding or rebound.   Musculoskeletal:         General: No swelling. Normal range of motion.      Cervical back: Neck supple. No tenderness.   Lymphadenopathy:      Cervical: No cervical adenopathy.   Skin:     General: Skin is warm and dry.      Findings: No erythema.   Neurological:      General: No focal deficit present.      Mental Status: She is alert and oriented to person, place, and time.   Psychiatric:         Mood and Affect: Mood normal.         Behavior: Behavior normal.         Results Review:   I reviewed the patient's new clinical results.      ECG 12 Lead    Date/Time: 6/3/2025 11:56 AM  Performed by: " Robert Lopez MD    Authorized by: Robert Lopez MD  Comparison: not compared with previous ECG   Rhythm: sinus bradycardia  Conduction: left anterior fascicular block    Clinical impression: abnormal EKG           Assessment / Plan:     1.  Palpitations  -- Outpatient cardiac monitoring showed no significant arrhythmias, frequent supraventricular ectopy which was largely asymptomatic  -- Interval resolution of palpitations  -- No current indication for suppression of ectopy  -- Follow-up in 1 year to reassess symptoms, consider as needed thereafter    Follow Up:   Return in about 1 year (around 6/3/2026).      Thank you for allowing me to participate in the care of your patient. Please to not hesitate to contact me with additional questions or concerns.     GENARO Lopez MD  Interventional Cardiology   06/03/2025  11:06 EDT

## 2025-06-20 NOTE — PROGRESS NOTES
Follow Up Note     Date: 2025   Patient Name: Odalys Miles  MRN: [id number]  : 1945     Referring Physician: Cordelia Hinton MD    Chief Complaint:    Chief Complaint   Patient presents with    Lymphocytic colitis    Diarrhea    Anemia     HX SPARKLE         Interval History:   2025  Odalys Miles is a 79 y.o. female who is here today for follow up for her lymphocytic colitis and history of anemia.  She states that for most part she has been doing pretty well with daily 1 bowel movement occasionally she will have couple more loose stools.  Overall doing well.    2022  Odalys Miles is a 76 y.o. female who is here today for follow up for diarrhea.   Diarrhea is unchanged. She needs refills of her Budesonide. She is taking 2 capsules a day right now. She is having 1-2 semi-formed bowel movements per day, but she is having urgency associated with bowel movements and has difficulty making it to the bathroom at times. No history of rectal bleeding. No abdominal pain. Denies nausea or vomiting.     2019  Ms. Miles returns to the office.  She is doing better at this time after beginning the Entocort medication again.  She is currently taking 3 capsules daily.  Ms. Miles states that when she tried to decrease the dosage and stop the medication there was an increased frequency of bowel movements.  There is no history of blood in the stool.  She denies any current abdominal pain.  There is no history of nausea or vomiting.  She denies any night sweats, fever chill          Subjective      Past Medical History:   Past Medical History:   Diagnosis Date    Abnormal liver enzymes     Basal cell carcinoma     under left eye    Body mass index (BMI) 20.0-20.9, adult     BPPV (benign paroxysmal positional vertigo)     COVID-19 vaccine series completed     Moderna    Elevated cholesterol     Fracture     as a child-wrist    Fracture of leg 2022    Fracture of sacrum      GERD (gastroesophageal reflux disease)     Glaucoma     Glaucoma     Hip fracture 10/11/2019    Hip repalced in 2020    Humerus fracture     Lt arm    Ingrowing toenail     Mammogram abnormal     Microscopic colitis 07/23/2014    Onychomycosis     Open wound of left ankle     Osteoporosis     Personal history of fall 12/2023    Hit forehead and had to have stitches.    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; chronic wound present     Wears dentures     full upper plate, partial on the lower    Wears glasses      Past Surgical History:   Past Surgical History:   Procedure Laterality Date    BACK SURGERY      hardware placed, sacral fracture    CATARACT EXTRACTION, BILATERAL      COLONOSCOPY      DR JAIMES    COLONOSCOPY N/A 10/12/2021    Procedure: COLONOSCOPY with biopsy and APC sigmoid AVM cauterization;  Surgeon: Jose Mayer MD;  Location: [facility] ENDOSCOPY;  Service: Gastroenterology;  Laterality: N/A;    ENDOSCOPY N/A 10/18/2023    Procedure: ESOPHAGOGASTRODUODENOSCOPY WITH BIOPSY;  Surgeon: Jose Mayer MD;  Location: [facility] ENDOSCOPY;  Service: Gastroenterology;  Laterality: N/A;    HIP SURGERY Left 12/27/2019    secondary to a fracture    LEG SURGERY Left 2022    fracture, hardware placed    ORIF HUMERUS FRACTURE Left 11/03/2021    Procedure: HUMERUS PROXIMAL OPEN REDUCTION INTERNAL FIXATION WITH IMTRAMEDULLARY NAIL FIXATION LEFT;  Surgeon: Linden Domingo MD;  Location: [facility] OR;  Service: Orthopedics;  Laterality: Left;    SKIN BIOPSY      TUBAL ABDOMINAL LIGATION         Family History:   Family History   Problem Relation Age of Onset    Heart attack Mother     Diabetes Mother     Stroke Father     Breast cancer Neg Hx     Ovarian cancer Neg Hx     Colon cancer Neg Hx        Social History:   Social History      Socioeconomic History    Marital status:    Tobacco Use    Smoking status: Former     Current packs/day: 0.00     Average packs/day: 0.3 packs/day for 47.0 years (11.8 ttl pk-yrs)     Types: Cigarettes     Start date:      Quit date:      Years since quittin.4     Passive exposure: Past    Smokeless tobacco: Never   Vaping Use    Vaping status: Never Used   Substance and Sexual Activity    Alcohol use: Not Currently    Drug use: No    Sexual activity: Defer       Medications:     Current Outpatient Medications:     ascorbic acid (VITAMIN C) 1000 MG tablet, Take 1 tablet by mouth Daily., Disp: , Rfl:     aspirin (Aspirin Low Dose) 81 MG EC tablet, Take 1 tablet by mouth Daily., Disp: , Rfl:     Budesonide (ENTOCORT EC) 3 MG 24 hr capsule, TAKE 2 CAPSULES BY MOUTH EVERY MORNING. PT CURRENTLY TAKING TWO TABLETS., Disp: 180 capsule, Rfl: 1    Calcium Carb-Cholecalciferol (CALCIUM 600+D3 PO), Take 1 tablet by mouth 2 (Two) Times a Day., Disp: , Rfl:     ferrous sulfate 324 MG tablet delayed-release, Take 1 tablet by mouth Every Other Day., Disp: , Rfl:     LUMIGAN 0.01 % ophthalmic drops, Administer  to both eyes Every Night., Disp: , Rfl:     Melatonin 10 MG capsule, Take  by mouth Every Night., Disp: , Rfl:     Naproxen Sodium (ALEVE PO), Take  by mouth., Disp: , Rfl:     NON FORMULARY, 2 (Two) Times a Day. Ez tears, Disp: , Rfl:     olive oil external oil, Apply  topically to the appropriate area as directed As Needed. 1 TSP at bedtime, Disp: , Rfl:     POTASSIUM GLUCONATE PO, Take 650 mg by mouth Daily., Disp: , Rfl:     Probiotic Product ([employer] PO), Take 1 tablet by mouth Every Night., Disp: , Rfl:     SIMBRINZA 1-0.2 % suspension, INSTILL 1 DROP INTO BOTH EYES TWICE A DAY, Disp: , Rfl: 3    Allergies:   No Known Allergies    Review of Systems:   Review of Systems   Constitutional:  Negative for appetite change, fatigue, fever and unexpected weight loss.   HENT:  Negative for  "trouble swallowing.    Gastrointestinal:  Negative for abdominal distention, abdominal pain, anal bleeding, blood in stool, constipation, diarrhea, nausea, rectal pain, vomiting, GERD and indigestion.        Dark color to stools (on oral pill every other day)       The following portions of the patient's history were reviewed and updated as appropriate: allergies, current medications, past family history, past medical history, past social history, past surgical history and problem list.    Objective     Physical Exam:  Vital Signs:   Vitals:    06/25/25 1648   BP: 112/72   Pulse: 66   Temp: 97.1 °F (36.2 °C)   TempSrc: Infrared   SpO2: 98%   Weight: 48.1 kg (106 lb)   Height: 170.2 cm (67\")  Comment: per patient       Physical Exam  Constitutional:       Comments: Poorly built   HENT:      Head: Normocephalic and atraumatic.   Eyes:      Conjunctiva/sclera: Conjunctivae normal.   Abdominal:      General: Abdomen is flat. There is no distension.      Palpations: There is no mass.      Tenderness: There is no abdominal tenderness. There is no guarding or rebound.      Hernia: No hernia is present.   Musculoskeletal:      Cervical back: Normal range of motion and neck supple.   Neurological:      Mental Status: She is alert.         Results Review:   I reviewed the patient's new clinical results.    No visits with results within 90 Day(s) from this visit.   Latest known visit with results is:   Office Visit on 03/25/2025   Component Date Value Ref Range Status    WBC 03/25/2025 8.24  3.40 - 10.80 10*3/mm3 Final    RBC 03/25/2025 4.76  3.77 - 5.28 10*6/mm3 Final    Hemoglobin 03/25/2025 14.4  12.0 - 15.9 g/dL Final    Hematocrit 03/25/2025 45.1  34.0 - 46.6 % Final    MCV 03/25/2025 94.7  79.0 - 97.0 fL Final    MCH 03/25/2025 30.3  26.6 - 33.0 pg Final    MCHC 03/25/2025 31.9  31.5 - 35.7 g/dL Final    RDW 03/25/2025 11.4 (L)  12.3 - 15.4 % Final    Platelets 03/25/2025 250  140 - 450 10*3/mm3 Final    Glucose " 03/25/2025 93  65 - 99 mg/dL Final    BUN 03/25/2025 14  8 - 23 mg/dL Final    Creatinine 03/25/2025 0.74  0.57 - 1.00 mg/dL Final    EGFR Result 03/25/2025 81.4  >60.0 mL/min/1.73 Final    Comment: GFR Categories in Chronic Kidney Disease (CKD)/X09/  /X09/  GFR Category          GFR (mL/min/1.73)    Interpretation  G1/X09/                    90 or greater/X09/        Normal or high  (1)  G2//                    60-89                Mild decrease  (1)  G3a                   45-59                Mild to moderate  decrease  G3b                   30-44                Moderate to  severe decrease  G4                    15-29                Severe decrease  G5                    14 or less           Kidney failure//  /[id number]/  (1)In the absence of evidence of kidney disease, neither  GFR category G1 or G2 fulfill the criteria for CKD.  eGFR calculation 2021 CKD-EPI creatinine equation, which  does not include race as a factor      BUN/Creatinine Ratio 03/25/2025 18.9  7.0 - 25.0 Final    Sodium 03/25/2025 144  136 - 145 mmol/L Final    Potassium 03/25/2025 3.5  3.5 - 5.2 mmol/L Final    Chloride 03/25/2025 103  98 - 107 mmol/L Final    Total CO2 03/25/2025 31.8 (H)  22.0 - 29.0 mmol/L Final    Calcium 03/25/2025 9.7  8.6 - 10.5 mg/dL Final    Total Cholesterol 03/25/2025 208 (H)  0 - 200 mg/dL Final    Comment: Cholesterol Reference Ranges  (U.S. Department of Health and Human Services ATP III  Classifications)  Desirable          <200 mg/dL  Borderline High    200-239 mg/dL  High Risk          >240 mg/dL  Triglyceride Reference Ranges  (U.S. Department of Health and Human Services ATP III  Classifications)  Normal           <150 mg/dL  Borderline High  150-199 mg/dL  High             200-499 mg/dL  Very High        >500 mg/dL  HDL Reference Ranges  (U.S. Department of Health and Human Services ATP III  Classifications)  Low     <40 mg/dl (major risk factor for CHD)  High    >60 mg/dl ('negative' risk factor  for CHD)  LDL Reference Ranges  (U.S. Department of Health and Human Services ATP III  Classifications)  Optimal          <100 mg/dL  Near Optimal     100-129 mg/dL  Borderline High  130-159 mg/dL  High             160-189 mg/dL  Very High        >189 mg/dL  LDL is calculated using the [facility] LDL-C calculation.      Triglycerides 03/25/2025 56  0 - 150 mg/dL Final    HDL Cholesterol 03/25/2025 91 (H)  40 - 60 mg/dL Final    VLDL Cholesterol Behzad 03/25/2025 10  5 - 40 mg/dL Final    LDL Chol Calc (NIH) 03/25/2025 107 (H)  0 - 100 mg/dL Final    TSH 03/25/2025 1.850  0.270 - 4.200 uIU/mL Final      No radiology results for the last 90 days.    Colonoscopy 10/20/2021 per Dr. Mayer  - Tortuous colon.  - A single non-bleeding colonic angioectasia. Treated with argon plasma coagulation (APC).  - Non-bleeding internal hemorrhoids.  - The examined portion of the ileum was normal. Biopsied.  - Biopsies performed in the rectum, in the sigmoid colon, in the descending colon and in the transverse colon and AC, Caecum.  - No endoscopic signs of colitis  - Pathology:   Lymphocytic colitis all biopsies on the colon  Terminal ileum biopsies normal     EGD 10/18/2023  - Normal oropharynx.  - Z-line variable, 34 cm from the incisors. Biopsied to rule out Nidaye's  - 3 cm hiatal hernia.  - Mild diffuse distal esophagitis  - Mild erythematous mucosa in the posterior wall of the stomach, antrum and prepyloric region of the stomach. Biopsied.  - Normal duodenal bulb, first portion of the duodenum, second portion of the duodenum and third portion of the duodenum. Biopsied.     DIAGNOSIS:  A. GASTRIC ANTRUM, BIOPSY:  Reactive gastropathy  No Helicobacter pylori like organisms identified on H&E stained slide  No intestinal metaplasia or dysplasia identified  B. ESOPHAGUS, BIOPSY, DISTAL:  Columnar mucosa with reactive changes  No squamous mucosa identified  No increased intraepithelial eosinophils, intestinal metaplasia or dysplasia  identified  C. DUODENUM, BIOPSY:  Small bowel mucosa with no significant pathologic abnormality    Assessment / Plan      1. Lymphocytic colitis with diarrhea  2.  History of iron deficiency anemia  3.  Elevated fecal calprotectin  6/25/2025  Patient is clinically doing well while on the budesonide 6 mg p.o. daily.  She had a significant symptoms with reducing the dose of budesonide.  This further did not help her.  Her fecal calprotectin was 422 on 11/5/2024 and elastase was more than 800.  Given her response to budesonide and elevated fecal calprotectin there is some suspicion with that she has a small bowel Crohn's disease.  She had a PillCam study ordered however patient did not want to proceed with the PillCam and hence canceled.  Lab work on 3/25/2025 revealed a normal BMP and normal CBC with a normal hemoglobin of 14.4.    Will continue budesonide 6 mg p.o. daily.  Last DEXA bone density scan was 2022 we will repeat next year  Continue iron pills for now  CBC CMP before the next visit  Will defer PillCam study as per patient request  Imodium 1 tablets as needed for travel, shopping dining flying.  Follow-up in 6 months     1/22/2024   Patient has been thin build since she was young.  She in fact weighing around 100 pounds for many years.  Gained few pounds recently.  CT abdomen pelvis done with contrast on 8/15/2023 revealed fatty liver, distal esophageal wall thickening and gastric wall thickening and dilatation of the pancreatic duct..  Subsequently she had a MRI scan done without contrast with MRCP that revealed pancreatic duct upper limit of normal but without any filling defect or mass lesion.  EGD done on 10/18/2023 revealed esophagitis with a hiatal hernia and gastritis.  Patient likely had a nutritional iron deficiency anemia.  Currently on iron pills every other day.  Lab work done in September 2023 revealed a normal hemoglobin of 12.7 g/dL.  CMP was normal except sodium of 147.  She has a CMP drawn  this morning at PCPs office will follow     6/23/2022  She has a history of lymphocytic colitis diagnosed by biopsy and 2014.  Denies rectal bleeding. Colonoscopy 10/20/2021 with no endoscopic signs of ileitis or colitis, biopsies revealed lymphocytic colitis.  She was diagnosed with C. difficile in April 2022 at [facility] while being hospitalized for an injury requiring lengthy stay, and was treated with vancomycin. She is not symptomatic at this time.       Previous History:  4. History of Clostridium difficile infection  5. Personal history of colon polyps  6. Family history colon CA-her dad at the age of 49  7. Angiodysplasia of the colon.  12/16/2021  Colonoscopy done on 10/20/2021 did not reveal any colon polyps. She had a colonic angiectasia which was ablated with APC. Her recent lab work does not reveal any anemia. Will consider high risk screening colonoscopy in 5 years time in 2026 depending on her medical and general condition.  Her dad had colon cancer at the age of 49.         Follow Up:   No follow-ups on file.    Jose Mayer MD  Gastroenterology [place]  6/25/2025  16:50 EDT     Please note that portions of this note may have been completed with a voice recognition program.

## 2025-06-25 ENCOUNTER — OFFICE VISIT (OUTPATIENT)
Dept: GASTROENTEROLOGY | Facility: CLINIC | Age: 80
End: 2025-06-25
Payer: MEDICARE

## 2025-06-25 VITALS
SYSTOLIC BLOOD PRESSURE: 112 MMHG | DIASTOLIC BLOOD PRESSURE: 72 MMHG | BODY MASS INDEX: 16.64 KG/M2 | WEIGHT: 106 LBS | TEMPERATURE: 97.1 F | OXYGEN SATURATION: 98 % | HEIGHT: 67 IN | HEART RATE: 66 BPM

## 2025-06-25 DIAGNOSIS — K52.832 LYMPHOCYTIC COLITIS: Primary | ICD-10-CM

## 2025-06-25 DIAGNOSIS — Z86.2 HISTORY OF IRON DEFICIENCY ANEMIA: ICD-10-CM

## 2025-06-25 PROCEDURE — 1160F RVW MEDS BY RX/DR IN RCRD: CPT | Performed by: INTERNAL MEDICINE

## 2025-06-25 PROCEDURE — 99213 OFFICE O/P EST LOW 20 MIN: CPT | Performed by: INTERNAL MEDICINE

## 2025-06-25 PROCEDURE — 1159F MED LIST DOCD IN RCRD: CPT | Performed by: INTERNAL MEDICINE

## (undated) DEVICE — GLV SURG SENSICARE GREEN W/ALOE PF LF 8 STRL

## (undated) DEVICE — LUBE JELLY PK/2.75GM STRL BX/144

## (undated) DEVICE — FRCP BX RADJAW4 NDL 2.8 240 STD OG

## (undated) DEVICE — PK SHLDR 20

## (undated) DEVICE — FIAPC® PROBE W/ FILTER 2200 C OD 2.3MM/6.9FR; L 2.2M/7.2FT: Brand: ERBE

## (undated) DEVICE — Device

## (undated) DEVICE — ENDOSCOPY PORT CONNECTOR FOR OLYMPUS® SCOPES: Brand: ERBE

## (undated) DEVICE — HYBRID TUBING/CAP SET FOR OLYMPUS® SCOPES: Brand: ERBE

## (undated) DEVICE — IRRIGATOR BULB ASEPTO 60CC STRL

## (undated) DEVICE — GLV SURG BIOGEL M LTX PF 8

## (undated) DEVICE — DRAPE,U/ SHT,SPLIT,PLAS,STERIL: Brand: MEDLINE

## (undated) DEVICE — CLAVICLE STRAP: Brand: DEROYAL

## (undated) DEVICE — KT POSTN SCHLEIN

## (undated) DEVICE — VLV SXN AIR/H2O ORCAPOD3 1P/U STRL

## (undated) DEVICE — PROXIMATE SKIN STAPLERS (35 WIDE) CONTAINS 35 STAINLESS STEEL STAPLES (FIXED HEAD): Brand: PROXIMATE

## (undated) DEVICE — CONMED SCOPE SAVER BITE BLOCK, 20X27 MM: Brand: SCOPE SAVER

## (undated) DEVICE — DRSNG SURESITE123 6X8IN

## (undated) DEVICE — DRSNG PAD ABD 8X10IN STRL

## (undated) DEVICE — SLV SCD CALF HEMOFORCE DVT THERP REPROC MD

## (undated) DEVICE — TBG PENCL TELESCP MEGADYNE SMOKE EVAC 10FT

## (undated) DEVICE — DRSNG GZ PETROLTM XEROFORM CURAD 1X8IN STRL

## (undated) DEVICE — SUT VIC 0 UR6 27IN VCP603H

## (undated) DEVICE — SUT VIC 2/0 CT1 27IN J259H

## (undated) DEVICE — DRILL, AO, STERILE